# Patient Record
Sex: MALE | Race: WHITE | NOT HISPANIC OR LATINO | Employment: OTHER | URBAN - METROPOLITAN AREA
[De-identification: names, ages, dates, MRNs, and addresses within clinical notes are randomized per-mention and may not be internally consistent; named-entity substitution may affect disease eponyms.]

---

## 2017-01-01 ENCOUNTER — LAB REQUISITION (OUTPATIENT)
Dept: LAB | Facility: HOSPITAL | Age: 82
End: 2017-01-01

## 2017-01-01 ENCOUNTER — TELEPHONE (OUTPATIENT)
Dept: SURGERY | Facility: CLINIC | Age: 82
End: 2017-01-01

## 2017-01-01 ENCOUNTER — APPOINTMENT (OUTPATIENT)
Dept: GENERAL RADIOLOGY | Facility: HOSPITAL | Age: 82
End: 2017-01-01

## 2017-01-01 ENCOUNTER — HOSPITAL ENCOUNTER (OUTPATIENT)
Dept: INFUSION THERAPY | Facility: HOSPITAL | Age: 82
Discharge: HOME | End: 2017-11-14
Attending: INTERNAL MEDICINE | Admitting: INTERNAL MEDICINE

## 2017-01-01 ENCOUNTER — TELEPHONE (OUTPATIENT)
Dept: INFECTIOUS DISEASES | Facility: CLINIC | Age: 82
End: 2017-01-01

## 2017-01-01 ENCOUNTER — INPATIENT HOSPITAL (OUTPATIENT)
Dept: URBAN - METROPOLITAN AREA HOSPITAL 113 | Facility: HOSPITAL | Age: 82
End: 2017-01-01
Payer: MEDICARE

## 2017-01-01 ENCOUNTER — HOSPITAL ENCOUNTER (INPATIENT)
Facility: HOSPITAL | Age: 82
LOS: 15 days | Discharge: HOME HEALTH | End: 2017-06-06
Attending: EMERGENCY MEDICINE | Admitting: FAMILY MEDICINE

## 2017-01-01 ENCOUNTER — DOCUMENTATION (OUTPATIENT)
Dept: SURGERY | Facility: CLINIC | Age: 82
End: 2017-01-01

## 2017-01-01 ENCOUNTER — OFFICE VISIT (OUTPATIENT)
Dept: SURGERY | Facility: CLINIC | Age: 82
End: 2017-01-01

## 2017-01-01 ENCOUNTER — LAB (OUTPATIENT)
Dept: LAB | Facility: HOSPITAL | Age: 82
End: 2017-01-01

## 2017-01-01 ENCOUNTER — APPOINTMENT (OUTPATIENT)
Dept: LAB | Facility: HOSPITAL | Age: 82
End: 2017-01-01

## 2017-01-01 ENCOUNTER — TELEPHONE (OUTPATIENT)
Dept: GASTROENTEROLOGY | Facility: CLINIC | Age: 82
End: 2017-01-01

## 2017-01-01 ENCOUNTER — CLINICAL SUPPORT (OUTPATIENT)
Dept: ONCOLOGY | Facility: HOSPITAL | Age: 82
End: 2017-01-01

## 2017-01-01 ENCOUNTER — OFFICE VISIT (OUTPATIENT)
Dept: CARDIOLOGY | Facility: CLINIC | Age: 82
End: 2017-01-01

## 2017-01-01 ENCOUNTER — LAB (OUTPATIENT)
Dept: LAB | Facility: HOSPITAL | Age: 82
End: 2017-01-01
Attending: INTERNAL MEDICINE

## 2017-01-01 ENCOUNTER — APPOINTMENT (OUTPATIENT)
Dept: NUTRITION | Facility: HOSPITAL | Age: 82
End: 2017-01-01
Attending: SURGERY

## 2017-01-01 ENCOUNTER — TRANSCRIBE ORDERS (OUTPATIENT)
Dept: ADMINISTRATIVE | Facility: HOSPITAL | Age: 82
End: 2017-01-01

## 2017-01-01 ENCOUNTER — ANESTHESIA EVENT (OUTPATIENT)
Dept: PERIOP | Facility: HOSPITAL | Age: 82
End: 2017-01-01

## 2017-01-01 ENCOUNTER — INFUSION (OUTPATIENT)
Dept: ONCOLOGY | Facility: HOSPITAL | Age: 82
End: 2017-01-01

## 2017-01-01 ENCOUNTER — APPOINTMENT (OUTPATIENT)
Dept: CT IMAGING | Facility: HOSPITAL | Age: 82
End: 2017-01-01
Attending: SURGERY

## 2017-01-01 ENCOUNTER — HOSPITAL ENCOUNTER (OUTPATIENT)
Dept: ULTRASOUND IMAGING | Facility: HOSPITAL | Age: 82
Discharge: HOME | End: 2017-08-18
Admitting: NURSE PRACTITIONER

## 2017-01-01 ENCOUNTER — ANESTHESIA (OUTPATIENT)
Dept: PERIOP | Facility: HOSPITAL | Age: 82
End: 2017-01-01

## 2017-01-01 ENCOUNTER — OFFICE (OUTPATIENT)
Dept: URBAN - METROPOLITAN AREA OTHER 6 | Facility: OTHER | Age: 82
End: 2017-01-01
Payer: MEDICARE

## 2017-01-01 ENCOUNTER — APPOINTMENT (OUTPATIENT)
Dept: CARDIOLOGY | Facility: HOSPITAL | Age: 82
End: 2017-01-01
Attending: INTERNAL MEDICINE

## 2017-01-01 ENCOUNTER — APPOINTMENT (OUTPATIENT)
Dept: CT IMAGING | Facility: HOSPITAL | Age: 82
End: 2017-01-01

## 2017-01-01 ENCOUNTER — HOSPITAL ENCOUNTER (OUTPATIENT)
Facility: HOSPITAL | Age: 82
Setting detail: HOSPITAL OUTPATIENT SURGERY
Discharge: HOME | End: 2017-09-19
Attending: SURGERY | Admitting: SURGERY

## 2017-01-01 ENCOUNTER — HOSPITAL ENCOUNTER (OUTPATIENT)
Dept: INFUSION THERAPY | Facility: HOSPITAL | Age: 82
Discharge: HOME | End: 2017-09-12
Attending: INTERNAL MEDICINE | Admitting: INTERNAL MEDICINE

## 2017-01-01 ENCOUNTER — HOSPITAL ENCOUNTER (INPATIENT)
Facility: HOSPITAL | Age: 82
LOS: 21 days | Discharge: NURSING, REHAB OR LONG-TERM CARE | End: 2018-01-05
Attending: EMERGENCY MEDICINE | Admitting: FAMILY MEDICINE

## 2017-01-01 ENCOUNTER — HOSPITAL ENCOUNTER (OUTPATIENT)
Dept: INFUSION THERAPY | Facility: HOSPITAL | Age: 82
Discharge: HOME | End: 2017-08-03
Attending: INTERNAL MEDICINE | Admitting: INTERNAL MEDICINE

## 2017-01-01 ENCOUNTER — APPOINTMENT (OUTPATIENT)
Dept: ONCOLOGY | Facility: HOSPITAL | Age: 82
End: 2017-01-01

## 2017-01-01 ENCOUNTER — OFFICE VISIT (OUTPATIENT)
Dept: ONCOLOGY | Facility: CLINIC | Age: 82
End: 2017-01-01

## 2017-01-01 ENCOUNTER — APPOINTMENT (OUTPATIENT)
Dept: CT IMAGING | Facility: HOSPITAL | Age: 82
End: 2017-01-01
Attending: EMERGENCY MEDICINE

## 2017-01-01 ENCOUNTER — HOSPITAL ENCOUNTER (OUTPATIENT)
Dept: CT IMAGING | Facility: HOSPITAL | Age: 82
Discharge: HOME | End: 2017-11-15
Attending: SURGERY | Admitting: SURGERY

## 2017-01-01 ENCOUNTER — LAB (OUTPATIENT)
Dept: LAB | Facility: HOSPITAL | Age: 82
End: 2017-01-01
Attending: SURGERY

## 2017-01-01 ENCOUNTER — APPOINTMENT (OUTPATIENT)
Dept: ONCOLOGY | Facility: CLINIC | Age: 82
End: 2017-01-01

## 2017-01-01 ENCOUNTER — OFFICE VISIT (OUTPATIENT)
Dept: INFECTIOUS DISEASES | Facility: CLINIC | Age: 82
End: 2017-01-01

## 2017-01-01 ENCOUNTER — INPATIENT HOSPITAL (OUTPATIENT)
Dept: URBAN - METROPOLITAN AREA HOSPITAL 27 | Facility: HOSPITAL | Age: 82
End: 2017-01-01
Payer: MEDICARE

## 2017-01-01 ENCOUNTER — APPOINTMENT (OUTPATIENT)
Dept: CT IMAGING | Facility: HOSPITAL | Age: 82
End: 2017-01-01
Attending: HOSPITALIST

## 2017-01-01 VITALS
DIASTOLIC BLOOD PRESSURE: 62 MMHG | SYSTOLIC BLOOD PRESSURE: 100 MMHG | BODY MASS INDEX: 20.19 KG/M2 | WEIGHT: 141 LBS | HEIGHT: 70 IN

## 2017-01-01 VITALS
SYSTOLIC BLOOD PRESSURE: 118 MMHG | HEIGHT: 70 IN | WEIGHT: 137 LBS | BODY MASS INDEX: 19.61 KG/M2 | DIASTOLIC BLOOD PRESSURE: 72 MMHG

## 2017-01-01 VITALS
HEART RATE: 60 BPM | RESPIRATION RATE: 12 BRPM | BODY MASS INDEX: 19.61 KG/M2 | OXYGEN SATURATION: 95 % | SYSTOLIC BLOOD PRESSURE: 118 MMHG | HEIGHT: 70 IN | TEMPERATURE: 98.1 F | DIASTOLIC BLOOD PRESSURE: 59 MMHG | WEIGHT: 137 LBS

## 2017-01-01 VITALS
TEMPERATURE: 97.8 F | HEART RATE: 60 BPM | RESPIRATION RATE: 16 BRPM | SYSTOLIC BLOOD PRESSURE: 134 MMHG | OXYGEN SATURATION: 99 % | DIASTOLIC BLOOD PRESSURE: 67 MMHG

## 2017-01-01 VITALS
TEMPERATURE: 97.8 F | BODY MASS INDEX: 19.76 KG/M2 | RESPIRATION RATE: 16 BRPM | WEIGHT: 138 LBS | HEIGHT: 70 IN | SYSTOLIC BLOOD PRESSURE: 137 MMHG | HEART RATE: 62 BPM | DIASTOLIC BLOOD PRESSURE: 64 MMHG | OXYGEN SATURATION: 98 %

## 2017-01-01 VITALS
OXYGEN SATURATION: 96 % | BODY MASS INDEX: 22.65 KG/M2 | DIASTOLIC BLOOD PRESSURE: 56 MMHG | WEIGHT: 158.19 LBS | HEART RATE: 69 BPM | TEMPERATURE: 98 F | HEIGHT: 70 IN | SYSTOLIC BLOOD PRESSURE: 118 MMHG | RESPIRATION RATE: 18 BRPM

## 2017-01-01 VITALS
BODY MASS INDEX: 20.33 KG/M2 | WEIGHT: 142 LBS | SYSTOLIC BLOOD PRESSURE: 122 MMHG | DIASTOLIC BLOOD PRESSURE: 68 MMHG | HEIGHT: 70 IN

## 2017-01-01 VITALS
WEIGHT: 135 LBS | DIASTOLIC BLOOD PRESSURE: 52 MMHG | HEIGHT: 70 IN | SYSTOLIC BLOOD PRESSURE: 98 MMHG | BODY MASS INDEX: 19.33 KG/M2

## 2017-01-01 VITALS
DIASTOLIC BLOOD PRESSURE: 60 MMHG | BODY MASS INDEX: 18.75 KG/M2 | HEIGHT: 70 IN | WEIGHT: 131 LBS | SYSTOLIC BLOOD PRESSURE: 102 MMHG

## 2017-01-01 VITALS
BODY MASS INDEX: 19.64 KG/M2 | HEIGHT: 70 IN | WEIGHT: 137.2 LBS | SYSTOLIC BLOOD PRESSURE: 140 MMHG | DIASTOLIC BLOOD PRESSURE: 60 MMHG | HEART RATE: 80 BPM

## 2017-01-01 VITALS
DIASTOLIC BLOOD PRESSURE: 62 MMHG | BODY MASS INDEX: 19.76 KG/M2 | HEIGHT: 70 IN | WEIGHT: 138 LBS | SYSTOLIC BLOOD PRESSURE: 122 MMHG

## 2017-01-01 VITALS
HEART RATE: 55 BPM | SYSTOLIC BLOOD PRESSURE: 127 MMHG | DIASTOLIC BLOOD PRESSURE: 55 MMHG | RESPIRATION RATE: 16 BRPM | TEMPERATURE: 98 F | OXYGEN SATURATION: 99 %

## 2017-01-01 VITALS
HEART RATE: 56 BPM | WEIGHT: 140.38 LBS | RESPIRATION RATE: 18 BRPM | BODY MASS INDEX: 20.1 KG/M2 | OXYGEN SATURATION: 97 % | DIASTOLIC BLOOD PRESSURE: 58 MMHG | SYSTOLIC BLOOD PRESSURE: 114 MMHG | TEMPERATURE: 98 F | HEIGHT: 70 IN

## 2017-01-01 VITALS — TEMPERATURE: 98.5 F

## 2017-01-01 VITALS
OXYGEN SATURATION: 97 % | WEIGHT: 142 LBS | TEMPERATURE: 98.6 F | RESPIRATION RATE: 18 BRPM | SYSTOLIC BLOOD PRESSURE: 129 MMHG | DIASTOLIC BLOOD PRESSURE: 50 MMHG | HEIGHT: 70 IN | HEART RATE: 82 BPM | BODY MASS INDEX: 20.33 KG/M2

## 2017-01-01 VITALS
HEIGHT: 70 IN | HEART RATE: 56 BPM | TEMPERATURE: 97.5 F | OXYGEN SATURATION: 96 % | WEIGHT: 135.8 LBS | RESPIRATION RATE: 12 BRPM | BODY MASS INDEX: 19.44 KG/M2 | SYSTOLIC BLOOD PRESSURE: 111 MMHG | DIASTOLIC BLOOD PRESSURE: 52 MMHG

## 2017-01-01 VITALS
DIASTOLIC BLOOD PRESSURE: 43 MMHG | HEART RATE: 67 BPM | SYSTOLIC BLOOD PRESSURE: 106 MMHG | OXYGEN SATURATION: 98 % | BODY MASS INDEX: 19.54 KG/M2 | TEMPERATURE: 98.3 F | WEIGHT: 136.2 LBS

## 2017-01-01 VITALS
SYSTOLIC BLOOD PRESSURE: 98 MMHG | WEIGHT: 136 LBS | BODY MASS INDEX: 19.47 KG/M2 | HEIGHT: 70 IN | DIASTOLIC BLOOD PRESSURE: 60 MMHG

## 2017-01-01 VITALS
DIASTOLIC BLOOD PRESSURE: 70 MMHG | HEIGHT: 70 IN | BODY MASS INDEX: 19.76 KG/M2 | SYSTOLIC BLOOD PRESSURE: 128 MMHG | WEIGHT: 138 LBS

## 2017-01-01 VITALS
DIASTOLIC BLOOD PRESSURE: 52 MMHG | SYSTOLIC BLOOD PRESSURE: 99 MMHG | TEMPERATURE: 98.1 F | OXYGEN SATURATION: 96 % | HEART RATE: 62 BPM

## 2017-01-01 VITALS
HEIGHT: 70 IN | SYSTOLIC BLOOD PRESSURE: 92 MMHG | DIASTOLIC BLOOD PRESSURE: 58 MMHG | BODY MASS INDEX: 18.9 KG/M2 | WEIGHT: 132 LBS

## 2017-01-01 VITALS
DIASTOLIC BLOOD PRESSURE: 60 MMHG | BODY MASS INDEX: 19.61 KG/M2 | SYSTOLIC BLOOD PRESSURE: 118 MMHG | HEIGHT: 70 IN | WEIGHT: 137 LBS

## 2017-01-01 VITALS
HEIGHT: 70 IN | SYSTOLIC BLOOD PRESSURE: 132 MMHG | BODY MASS INDEX: 20.33 KG/M2 | WEIGHT: 142 LBS | DIASTOLIC BLOOD PRESSURE: 78 MMHG

## 2017-01-01 VITALS
DIASTOLIC BLOOD PRESSURE: 59 MMHG | HEIGHT: 70 IN | SYSTOLIC BLOOD PRESSURE: 128 MMHG | WEIGHT: 138.4 LBS | BODY MASS INDEX: 19.81 KG/M2 | HEART RATE: 64 BPM | TEMPERATURE: 97.5 F | RESPIRATION RATE: 12 BRPM

## 2017-01-01 VITALS
DIASTOLIC BLOOD PRESSURE: 64 MMHG | SYSTOLIC BLOOD PRESSURE: 112 MMHG | BODY MASS INDEX: 20.33 KG/M2 | WEIGHT: 142 LBS | HEIGHT: 70 IN

## 2017-01-01 VITALS
SYSTOLIC BLOOD PRESSURE: 110 MMHG | HEART RATE: 62 BPM | WEIGHT: 132 LBS | HEIGHT: 70 IN | DIASTOLIC BLOOD PRESSURE: 52 MMHG

## 2017-01-01 VITALS — TEMPERATURE: 98.4 F

## 2017-01-01 VITALS
DIASTOLIC BLOOD PRESSURE: 52 MMHG | OXYGEN SATURATION: 94 % | HEART RATE: 69 BPM | SYSTOLIC BLOOD PRESSURE: 114 MMHG | TEMPERATURE: 98.1 F

## 2017-01-01 VITALS
HEIGHT: 70 IN | BODY MASS INDEX: 19.33 KG/M2 | SYSTOLIC BLOOD PRESSURE: 110 MMHG | DIASTOLIC BLOOD PRESSURE: 72 MMHG | WEIGHT: 135 LBS

## 2017-01-01 VITALS — TEMPERATURE: 98.4 F | WEIGHT: 141 LBS | BODY MASS INDEX: 20.23 KG/M2 | TEMPERATURE: 98.3 F

## 2017-01-01 VITALS — TEMPERATURE: 97.7 F

## 2017-01-01 VITALS — TEMPERATURE: 97.5 F

## 2017-01-01 DIAGNOSIS — A49.8 PSEUDOMONAS AERUGINOSA INFECTION: ICD-10-CM

## 2017-01-01 DIAGNOSIS — I35.1 NONRHEUMATIC AORTIC VALVE INSUFFICIENCY: Primary | ICD-10-CM

## 2017-01-01 DIAGNOSIS — D46.9 ANEMIA ASSOC WITH MYELODYSPLASTIC SYNDROME TREATED WITH ERYTHROPOIETIN (HCC): ICD-10-CM

## 2017-01-01 DIAGNOSIS — E46 MALNUTRITION (HCC): Primary | ICD-10-CM

## 2017-01-01 DIAGNOSIS — A04.72 C. DIFFICILE COLITIS: Primary | ICD-10-CM

## 2017-01-01 DIAGNOSIS — K62.89 PROCTITIS: ICD-10-CM

## 2017-01-01 DIAGNOSIS — A04.71 ENTEROCOLITIS DUE TO CLOSTRIDIUM DIFFICILE, RECURRENT: ICD-10-CM

## 2017-01-01 DIAGNOSIS — Z09 SURGERY FOLLOW-UP: Primary | ICD-10-CM

## 2017-01-01 DIAGNOSIS — K44.9 DIAPHRAGMATIC HERNIA WITHOUT OBSTRUCTION OR GANGRENE: ICD-10-CM

## 2017-01-01 DIAGNOSIS — E53.8 VITAMIN B 12 DEFICIENCY: Primary | ICD-10-CM

## 2017-01-01 DIAGNOSIS — D46.20 MDS (MYELODYSPLASTIC SYNDROME), LOW GRADE (HCC): ICD-10-CM

## 2017-01-01 DIAGNOSIS — R10.9 ABDOMINAL PAIN, UNSPECIFIED LOCATION: ICD-10-CM

## 2017-01-01 DIAGNOSIS — D46.9 ANEMIA ASSOC WITH MYELODYSPLASTIC SYNDROME TREATED WITH ERYTHROPOIETIN (HCC): Primary | ICD-10-CM

## 2017-01-01 DIAGNOSIS — D46.20 MDS (MYELODYSPLASTIC SYNDROME), LOW GRADE (HCC): Primary | ICD-10-CM

## 2017-01-01 DIAGNOSIS — R30.0 DYSURIA: Primary | ICD-10-CM

## 2017-01-01 DIAGNOSIS — D69.6 THROMBOCYTOPENIA (HCC): ICD-10-CM

## 2017-01-01 DIAGNOSIS — K57.31 DIVERTICULOSIS OF LARGE INTESTINE WITHOUT PERFORATION OR ABS: ICD-10-CM

## 2017-01-01 DIAGNOSIS — N39.0 ACUTE LOWER UTI (URINARY TRACT INFECTION): Primary | ICD-10-CM

## 2017-01-01 DIAGNOSIS — D63.0 ANEMIA ASSOC WITH MYELODYSPLASTIC SYNDROME TREATED WITH ERYTHROPOIETIN (HCC): Primary | ICD-10-CM

## 2017-01-01 DIAGNOSIS — L08.9 CHRONIC ABDOMINAL WOUND INFECTION, SEQUELA: Primary | ICD-10-CM

## 2017-01-01 DIAGNOSIS — Z09 FOLLOW UP: Primary | ICD-10-CM

## 2017-01-01 DIAGNOSIS — L98.9 HAND LESION: Primary | ICD-10-CM

## 2017-01-01 DIAGNOSIS — T14.8XXA OTHER INJURY OF UNSPECIFIED BODY REGION: ICD-10-CM

## 2017-01-01 DIAGNOSIS — D64.9 ANEMIA, UNSPECIFIED: ICD-10-CM

## 2017-01-01 DIAGNOSIS — D46.20 LOW GRADE MYELODYSPLASTIC SYNDROME LESIONS (HCC): Primary | ICD-10-CM

## 2017-01-01 DIAGNOSIS — K57.10 DIVERTICULOSIS OF SMALL INTESTINE WITHOUT PERFORATION OR ABS: ICD-10-CM

## 2017-01-01 DIAGNOSIS — K63.2 ENTEROENTERIC FISTULA: ICD-10-CM

## 2017-01-01 DIAGNOSIS — R50.81 FEVER IN OTHER DISEASES: Primary | ICD-10-CM

## 2017-01-01 DIAGNOSIS — R35.0 URINARY FREQUENCY: ICD-10-CM

## 2017-01-01 DIAGNOSIS — L24.A9 WOUND DRAINAGE: Primary | ICD-10-CM

## 2017-01-01 DIAGNOSIS — E53.8 VITAMIN B 12 DEFICIENCY: ICD-10-CM

## 2017-01-01 DIAGNOSIS — H61.23 HEARING LOSS DUE TO CERUMEN IMPACTION, BILATERAL: ICD-10-CM

## 2017-01-01 DIAGNOSIS — K21.0 GASTRO-ESOPHAGEAL REFLUX DISEASE WITH ESOPHAGITIS: ICD-10-CM

## 2017-01-01 DIAGNOSIS — S31.109S CHRONIC ABDOMINAL WOUND INFECTION, SEQUELA: Primary | ICD-10-CM

## 2017-01-01 DIAGNOSIS — R53.1 GENERALIZED WEAKNESS: ICD-10-CM

## 2017-01-01 DIAGNOSIS — D46.20 LOW GRADE MYELODYSPLASTIC SYNDROME LESIONS (HCC): ICD-10-CM

## 2017-01-01 DIAGNOSIS — D63.0 ANEMIA ASSOC WITH MYELODYSPLASTIC SYNDROME TREATED WITH ERYTHROPOIETIN (HCC): ICD-10-CM

## 2017-01-01 DIAGNOSIS — K62.89 PROCTITIS: Primary | ICD-10-CM

## 2017-01-01 DIAGNOSIS — N32.1 COLOVESICAL FISTULA: ICD-10-CM

## 2017-01-01 DIAGNOSIS — N30.90 CYSTITIS: ICD-10-CM

## 2017-01-01 DIAGNOSIS — T81.40XA INFECTION FOLLOWING PROCEDURE: ICD-10-CM

## 2017-01-01 DIAGNOSIS — L98.9 HAND LESION: ICD-10-CM

## 2017-01-01 DIAGNOSIS — I45.10 BUNDLE BRANCH BLOCK, RIGHT: ICD-10-CM

## 2017-01-01 DIAGNOSIS — R10.33 PERIUMBILICAL ABDOMINAL PAIN: Primary | ICD-10-CM

## 2017-01-01 DIAGNOSIS — R50.9 FEVER AND CHILLS: Primary | ICD-10-CM

## 2017-01-01 DIAGNOSIS — IMO0001 DEEP INCISIONAL SURGICAL SITE INFECTION, SUBSEQUENT ENCOUNTER: ICD-10-CM

## 2017-01-01 DIAGNOSIS — Z22.322 MRSA (METHICILLIN RESISTANT STAPH AUREUS) CULTURE POSITIVE: ICD-10-CM

## 2017-01-01 DIAGNOSIS — I10 ESSENTIAL HYPERTENSION: ICD-10-CM

## 2017-01-01 DIAGNOSIS — D46.9 MYELODYSPLASTIC SYNDROME (HCC): ICD-10-CM

## 2017-01-01 DIAGNOSIS — K52.9 ILEITIS: ICD-10-CM

## 2017-01-01 DIAGNOSIS — K57.30 DIVERTICULOSIS OF LARGE INTESTINE WITHOUT PERFORATION OR ABS: ICD-10-CM

## 2017-01-01 DIAGNOSIS — R10.9 ABDOMINAL PAIN, UNSPECIFIED LOCATION: Primary | ICD-10-CM

## 2017-01-01 DIAGNOSIS — T14.8XXA OPEN WOUND: ICD-10-CM

## 2017-01-01 DIAGNOSIS — K62.5 RECTAL BLEED: Primary | ICD-10-CM

## 2017-01-01 DIAGNOSIS — E46 PROTEIN CALORIE MALNUTRITION (HCC): ICD-10-CM

## 2017-01-01 DIAGNOSIS — L98.9 SKIN LESION OF HAND: ICD-10-CM

## 2017-01-01 DIAGNOSIS — K62.5 HEMORRHAGE OF ANUS AND RECTUM: ICD-10-CM

## 2017-01-01 DIAGNOSIS — D62 ACUTE BLOOD LOSS ANEMIA: ICD-10-CM

## 2017-01-01 DIAGNOSIS — D12.2 BENIGN NEOPLASM OF ASCENDING COLON: ICD-10-CM

## 2017-01-01 DIAGNOSIS — R30.0 DYSURIA: ICD-10-CM

## 2017-01-01 LAB
ABO + RH BLD: NORMAL
ABO GROUP BLD: NORMAL
ALBUMIN SERPL-MCNC: 2.2 G/DL (ref 3.5–5.2)
ALBUMIN SERPL-MCNC: 2.3 G/DL (ref 3.5–5.2)
ALBUMIN SERPL-MCNC: 2.4 G/DL (ref 3.5–5.2)
ALBUMIN SERPL-MCNC: 2.4 G/DL (ref 3.5–5.2)
ALBUMIN SERPL-MCNC: 2.7 G/DL (ref 3.5–5.2)
ALBUMIN SERPL-MCNC: 2.7 G/DL (ref 3.5–5.2)
ALBUMIN SERPL-MCNC: 3.8 G/DL (ref 3.5–5.2)
ALBUMIN/GLOB SERPL: 0.6 G/DL
ALBUMIN/GLOB SERPL: 0.6 G/DL
ALBUMIN/GLOB SERPL: 0.7 G/DL
ALBUMIN/GLOB SERPL: 1.3 G/DL
ALP SERPL-CCNC: 35 U/L (ref 40–129)
ALP SERPL-CCNC: 47 U/L (ref 40–129)
ALP SERPL-CCNC: 49 U/L (ref 40–129)
ALP SERPL-CCNC: 51 U/L (ref 40–129)
ALP SERPL-CCNC: 61 U/L (ref 40–129)
ALP SERPL-CCNC: 63 U/L (ref 40–129)
ALP SERPL-CCNC: 76 U/L (ref 40–129)
ALT SERPL W P-5'-P-CCNC: 12 U/L (ref 5–41)
ALT SERPL W P-5'-P-CCNC: 5 U/L (ref 5–41)
ALT SERPL W P-5'-P-CCNC: 6 U/L (ref 5–41)
ALT SERPL W P-5'-P-CCNC: <5 U/L (ref 5–41)
ANION GAP SERPL CALCULATED.3IONS-SCNC: 10 MMOL/L
ANION GAP SERPL CALCULATED.3IONS-SCNC: 10 MMOL/L
ANION GAP SERPL CALCULATED.3IONS-SCNC: 10.1 MMOL/L
ANION GAP SERPL CALCULATED.3IONS-SCNC: 10.1 MMOL/L
ANION GAP SERPL CALCULATED.3IONS-SCNC: 10.2 MMOL/L
ANION GAP SERPL CALCULATED.3IONS-SCNC: 10.5 MMOL/L
ANION GAP SERPL CALCULATED.3IONS-SCNC: 10.7 MMOL/L
ANION GAP SERPL CALCULATED.3IONS-SCNC: 10.8 MMOL/L
ANION GAP SERPL CALCULATED.3IONS-SCNC: 10.8 MMOL/L
ANION GAP SERPL CALCULATED.3IONS-SCNC: 11.3 MMOL/L
ANION GAP SERPL CALCULATED.3IONS-SCNC: 11.3 MMOL/L
ANION GAP SERPL CALCULATED.3IONS-SCNC: 11.4 MMOL/L
ANION GAP SERPL CALCULATED.3IONS-SCNC: 11.6 MMOL/L
ANION GAP SERPL CALCULATED.3IONS-SCNC: 11.8 MMOL/L
ANION GAP SERPL CALCULATED.3IONS-SCNC: 11.9 MMOL/L
ANION GAP SERPL CALCULATED.3IONS-SCNC: 11.9 MMOL/L
ANION GAP SERPL CALCULATED.3IONS-SCNC: 12.3 MMOL/L
ANION GAP SERPL CALCULATED.3IONS-SCNC: 12.4 MMOL/L
ANION GAP SERPL CALCULATED.3IONS-SCNC: 12.4 MMOL/L
ANION GAP SERPL CALCULATED.3IONS-SCNC: 12.6 MMOL/L
ANION GAP SERPL CALCULATED.3IONS-SCNC: 13.4 MMOL/L
ANION GAP SERPL CALCULATED.3IONS-SCNC: 13.8 MMOL/L
ANION GAP SERPL CALCULATED.3IONS-SCNC: 14.2 MMOL/L
ANION GAP SERPL CALCULATED.3IONS-SCNC: 14.7 MMOL/L
ANION GAP SERPL CALCULATED.3IONS-SCNC: 14.8 MMOL/L
ANION GAP SERPL CALCULATED.3IONS-SCNC: 15.1 MMOL/L
ANION GAP SERPL CALCULATED.3IONS-SCNC: 16 MMOL/L
ANION GAP SERPL CALCULATED.3IONS-SCNC: 16.6 MMOL/L
ANION GAP SERPL CALCULATED.3IONS-SCNC: 19 MMOL/L
ANION GAP SERPL CALCULATED.3IONS-SCNC: 7 MMOL/L
ANION GAP SERPL CALCULATED.3IONS-SCNC: 7.4 MMOL/L
ANION GAP SERPL CALCULATED.3IONS-SCNC: 8 MMOL/L
ANION GAP SERPL CALCULATED.3IONS-SCNC: 8.1 MMOL/L
ANION GAP SERPL CALCULATED.3IONS-SCNC: 8.2 MMOL/L
ANION GAP SERPL CALCULATED.3IONS-SCNC: 8.2 MMOL/L
ANION GAP SERPL CALCULATED.3IONS-SCNC: 8.4 MMOL/L
ANION GAP SERPL CALCULATED.3IONS-SCNC: 9.7 MMOL/L
ANION GAP SERPL CALCULATED.3IONS-SCNC: 9.7 MMOL/L
ANISOCYTOSIS BLD QL: ABNORMAL
ANISOCYTOSIS BLD QL: NORMAL
APTT PPP: 29.8 SECONDS (ref 24.3–38.1)
APTT PPP: 30.5 SECONDS (ref 24.3–38.1)
APTT PPP: 32.1 SECONDS (ref 24.3–38.1)
APTT PPP: 34.2 SECONDS (ref 24.3–38.1)
APTT PPP: 35 SECONDS (ref 24.3–38.1)
APTT PPP: 35.7 SECONDS (ref 24.3–38.1)
APTT PPP: 38.4 SECONDS (ref 24.3–38.1)
APTT PPP: 38.8 SECONDS (ref 24.3–38.1)
APTT PPP: 39.5 SECONDS (ref 24.3–38.1)
APTT PPP: 40.3 SECONDS (ref 24.3–38.1)
APTT PPP: 43.3 SECONDS (ref 24.3–38.1)
APTT PPP: 46.8 SECONDS (ref 24.3–38.1)
APTT PPP: 49.2 SECONDS (ref 24.3–38.1)
AST SERPL-CCNC: 11 U/L (ref 5–40)
AST SERPL-CCNC: 11 U/L (ref 5–40)
AST SERPL-CCNC: 13 U/L (ref 5–40)
AST SERPL-CCNC: 9 U/L (ref 5–40)
BACTERIA SPEC AEROBE CULT: ABNORMAL
BACTERIA SPEC AEROBE CULT: NO GROWTH
BACTERIA SPEC AEROBE CULT: NORMAL
BACTERIA SPEC ANAEROBE CULT: NORMAL
BACTERIA UR QL AUTO: ABNORMAL /HPF
BASOPHILS # BLD AUTO: 0 10*3/MM3 (ref 0–0.2)
BASOPHILS # BLD AUTO: 0.01 10*3/MM3 (ref 0–0.2)
BASOPHILS # BLD AUTO: 0.02 10*3/MM3 (ref 0–0.2)
BASOPHILS NFR BLD AUTO: 0 % (ref 0–2)
BASOPHILS NFR BLD AUTO: 0.1 % (ref 0–2)
BASOPHILS NFR BLD AUTO: 0.2 % (ref 0–2)
BASOPHILS NFR BLD AUTO: 0.3 % (ref 0–2)
BASOPHILS NFR BLD AUTO: 0.4 % (ref 0–2)
BASOPHILS NFR BLD AUTO: 0.4 % (ref 0–2)
BH BB BLOOD EXPIRATION DATE: NORMAL
BH BB BLOOD TYPE BARCODE: 5100
BH BB BLOOD TYPE BARCODE: 6200
BH BB BLOOD TYPE BARCODE: 7300
BH BB BLOOD TYPE BARCODE: 7300
BH BB BLOOD TYPE BARCODE: 9500
BH BB BLOOD TYPE BARCODE: NORMAL
BH BB DISPENSE STATUS: NORMAL
BH BB PRODUCT CODE: NORMAL
BH BB UNIT NUMBER: NORMAL
BH CV ECHO MEAS - ACS: 2.1 CM
BH CV ECHO MEAS - AO MAX PG (FULL): 3.5 MMHG
BH CV ECHO MEAS - AO MAX PG: 5.7 MMHG
BH CV ECHO MEAS - AO MEAN PG (FULL): 2 MMHG
BH CV ECHO MEAS - AO MEAN PG: 3 MMHG
BH CV ECHO MEAS - AO ROOT AREA (BSA CORRECTED): 2.6
BH CV ECHO MEAS - AO ROOT AREA: 15.9 CM^2
BH CV ECHO MEAS - AO ROOT DIAM: 4.5 CM
BH CV ECHO MEAS - AO V2 MAX: 119 CM/SEC
BH CV ECHO MEAS - AO V2 MEAN: 82.4 CM/SEC
BH CV ECHO MEAS - AO V2 VTI: 25.7 CM
BH CV ECHO MEAS - AVA(I,A): 2.2 CM^2
BH CV ECHO MEAS - AVA(I,D): 2.2 CM^2
BH CV ECHO MEAS - AVA(V,A): 2.2 CM^2
BH CV ECHO MEAS - AVA(V,D): 2.2 CM^2
BH CV ECHO MEAS - BSA(HAYCOCK): 1.7 M^2
BH CV ECHO MEAS - BSA: 1.8 M^2
BH CV ECHO MEAS - BZI_BMI: 19.1 KILOGRAMS/M^2
BH CV ECHO MEAS - BZI_METRIC_HEIGHT: 177.8 CM
BH CV ECHO MEAS - BZI_METRIC_WEIGHT: 60.3 KG
BH CV ECHO MEAS - CONTRAST EF (2CH): 38.2 ML/M^2
BH CV ECHO MEAS - CONTRAST EF 4CH: 37.3 ML/M^2
BH CV ECHO MEAS - EDV(CUBED): 162.8 ML
BH CV ECHO MEAS - EDV(MOD-SP2): 146 ML
BH CV ECHO MEAS - EDV(MOD-SP4): 156 ML
BH CV ECHO MEAS - EDV(TEICH): 145 ML
BH CV ECHO MEAS - EF(CUBED): 60.4 %
BH CV ECHO MEAS - EF(MOD-SP2): 38.2 %
BH CV ECHO MEAS - EF(MOD-SP4): 37.3 %
BH CV ECHO MEAS - EF(TEICH): 51.4 %
BH CV ECHO MEAS - ESV(CUBED): 64.5 ML
BH CV ECHO MEAS - ESV(MOD-SP2): 90.3 ML
BH CV ECHO MEAS - ESV(MOD-SP4): 97.8 ML
BH CV ECHO MEAS - ESV(TEICH): 70.4 ML
BH CV ECHO MEAS - FS: 26.6 %
BH CV ECHO MEAS - IVS/LVPW: 0.85
BH CV ECHO MEAS - IVSD: 1.2 CM
BH CV ECHO MEAS - LAT PEAK E' VEL: 6 CM/SEC
BH CV ECHO MEAS - LV DIASTOLIC VOL/BSA (35-75): 88.9 ML/M^2
BH CV ECHO MEAS - LV MASS(C)D: 292.8 GRAMS
BH CV ECHO MEAS - LV MASS(C)DI: 166.8 GRAMS/M^2
BH CV ECHO MEAS - LV MAX PG: 2.2 MMHG
BH CV ECHO MEAS - LV MEAN PG: 1 MMHG
BH CV ECHO MEAS - LV SYSTOLIC VOL/BSA (12-30): 55.7 ML/M^2
BH CV ECHO MEAS - LV V1 MAX: 73.9 CM/SEC
BH CV ECHO MEAS - LV V1 MEAN: 47.2 CM/SEC
BH CV ECHO MEAS - LV V1 VTI: 16.1 CM
BH CV ECHO MEAS - LVIDD: 5.5 CM
BH CV ECHO MEAS - LVIDS: 4 CM
BH CV ECHO MEAS - LVLD AP2: 7.8 CM
BH CV ECHO MEAS - LVLD AP4: 8.4 CM
BH CV ECHO MEAS - LVLS AP2: 6.7 CM
BH CV ECHO MEAS - LVLS AP4: 7.2 CM
BH CV ECHO MEAS - LVOT AREA (M): 3.5 CM^2
BH CV ECHO MEAS - LVOT AREA: 3.5 CM^2
BH CV ECHO MEAS - LVOT DIAM: 2.1 CM
BH CV ECHO MEAS - LVPWD: 1.4 CM
BH CV ECHO MEAS - MED PEAK E' VEL: 4 CM/SEC
BH CV ECHO MEAS - MR MAX PG: 138.2 MMHG
BH CV ECHO MEAS - MR MAX VEL: 587.5 CM/SEC
BH CV ECHO MEAS - MV A DUR: 0.1 SEC
BH CV ECHO MEAS - MV A MAX VEL: 52.4 CM/SEC
BH CV ECHO MEAS - MV DEC SLOPE: 274 CM/SEC^2
BH CV ECHO MEAS - MV DEC TIME: 0.23 SEC
BH CV ECHO MEAS - MV E MAX VEL: 70.3 CM/SEC
BH CV ECHO MEAS - MV E/A: 1.3
BH CV ECHO MEAS - MV MAX PG: 1.6 MMHG
BH CV ECHO MEAS - MV MEAN PG: 1 MMHG
BH CV ECHO MEAS - MV P1/2T MAX VEL: 67.9 CM/SEC
BH CV ECHO MEAS - MV P1/2T: 72.6 MSEC
BH CV ECHO MEAS - MV V2 MAX: 63.1 CM/SEC
BH CV ECHO MEAS - MV V2 MEAN: 42.6 CM/SEC
BH CV ECHO MEAS - MV V2 VTI: 19.1 CM
BH CV ECHO MEAS - MVA P1/2T LCG: 3.2 CM^2
BH CV ECHO MEAS - MVA(P1/2T): 3 CM^2
BH CV ECHO MEAS - MVA(VTI): 2.9 CM^2
BH CV ECHO MEAS - PA ACC TIME: 0.13 SEC
BH CV ECHO MEAS - PA MAX PG (FULL): 1 MMHG
BH CV ECHO MEAS - PA MAX PG: 2.2 MMHG
BH CV ECHO MEAS - PA PR(ACCEL): 21.9 MMHG
BH CV ECHO MEAS - PA V2 MAX: 74.7 CM/SEC
BH CV ECHO MEAS - PULM A REVS DUR: 0.1 SEC
BH CV ECHO MEAS - PULM A REVS VEL: 23.7 CM/SEC
BH CV ECHO MEAS - PULM DIAS VEL: 41.5 CM/SEC
BH CV ECHO MEAS - PULM S/D: 1.3
BH CV ECHO MEAS - PULM SYS VEL: 53.4 CM/SEC
BH CV ECHO MEAS - PVA(V,A): 1.9 CM^2
BH CV ECHO MEAS - PVA(V,D): 1.9 CM^2
BH CV ECHO MEAS - QP/QS: 0.54
BH CV ECHO MEAS - RAP SYSTOLE: 8 MMHG
BH CV ECHO MEAS - RV MAX PG: 1.2 MMHG
BH CV ECHO MEAS - RV MEAN PG: 1 MMHG
BH CV ECHO MEAS - RV V1 MAX: 54.7 CM/SEC
BH CV ECHO MEAS - RV V1 MEAN: 35.9 CM/SEC
BH CV ECHO MEAS - RV V1 VTI: 11.8 CM
BH CV ECHO MEAS - RVOT AREA: 2.5 CM^2
BH CV ECHO MEAS - RVOT DIAM: 1.8 CM
BH CV ECHO MEAS - RVSP: 34 MMHG
BH CV ECHO MEAS - SI(AO): 232.9 ML/M^2
BH CV ECHO MEAS - SI(CUBED): 56 ML/M^2
BH CV ECHO MEAS - SI(LVOT): 31.8 ML/M^2
BH CV ECHO MEAS - SI(MOD-SP2): 31.7 ML/M^2
BH CV ECHO MEAS - SI(MOD-SP4): 33.2 ML/M^2
BH CV ECHO MEAS - SI(TEICH): 42.5 ML/M^2
BH CV ECHO MEAS - SV(AO): 408.7 ML
BH CV ECHO MEAS - SV(CUBED): 98.3 ML
BH CV ECHO MEAS - SV(LVOT): 55.8 ML
BH CV ECHO MEAS - SV(MOD-SP2): 55.7 ML
BH CV ECHO MEAS - SV(MOD-SP4): 58.2 ML
BH CV ECHO MEAS - SV(RVOT): 30 ML
BH CV ECHO MEAS - SV(TEICH): 74.5 ML
BH CV ECHO MEAS - TAPSE (>1.6): 1.7 CM2
BH CV ECHO MEAS - TR MAX VEL: 255 CM/SEC
BH CV VAS BP RIGHT ARM: NORMAL MMHG
BH CV XLRA - RV BASE: 3.9 CM
BH CV XLRA - TDI S': 1.4 CM/SEC
BILIRUB SERPL-MCNC: 0.3 MG/DL (ref 0.2–1.2)
BILIRUB SERPL-MCNC: 0.5 MG/DL (ref 0.2–1.2)
BILIRUB SERPL-MCNC: 0.5 MG/DL (ref 0.2–1.2)
BILIRUB SERPL-MCNC: 0.6 MG/DL (ref 0.2–1.2)
BILIRUB SERPL-MCNC: 0.7 MG/DL (ref 0.2–1.2)
BILIRUB SERPL-MCNC: 0.7 MG/DL (ref 0.2–1.2)
BILIRUB SERPL-MCNC: 0.8 MG/DL (ref 0.2–1.2)
BILIRUB UR QL STRIP: ABNORMAL
BILIRUB UR QL STRIP: ABNORMAL
BILIRUB UR QL STRIP: NEGATIVE
BILIRUB UR QL STRIP: NEGATIVE
BLD GP AB SCN SERPL QL: NEGATIVE
BUN BLD-MCNC: 10 MG/DL (ref 8–23)
BUN BLD-MCNC: 11 MG/DL (ref 8–23)
BUN BLD-MCNC: 11 MG/DL (ref 8–23)
BUN BLD-MCNC: 12 MG/DL (ref 8–23)
BUN BLD-MCNC: 13 MG/DL (ref 8–23)
BUN BLD-MCNC: 13 MG/DL (ref 8–23)
BUN BLD-MCNC: 14 MG/DL (ref 8–23)
BUN BLD-MCNC: 15 MG/DL (ref 8–23)
BUN BLD-MCNC: 16 MG/DL (ref 8–23)
BUN BLD-MCNC: 17 MG/DL (ref 8–23)
BUN BLD-MCNC: 18 MG/DL (ref 8–23)
BUN BLD-MCNC: 19 MG/DL (ref 8–23)
BUN BLD-MCNC: 19 MG/DL (ref 8–23)
BUN BLD-MCNC: 3 MG/DL (ref 8–23)
BUN BLD-MCNC: 4 MG/DL (ref 8–23)
BUN BLD-MCNC: 5 MG/DL (ref 8–23)
BUN BLD-MCNC: 5 MG/DL (ref 8–23)
BUN BLD-MCNC: 6 MG/DL (ref 8–23)
BUN BLD-MCNC: 7 MG/DL (ref 8–23)
BUN BLD-MCNC: 7 MG/DL (ref 8–23)
BUN BLD-MCNC: 8 MG/DL (ref 8–23)
BUN BLD-MCNC: 9 MG/DL (ref 8–23)
BUN BLD-MCNC: 9 MG/DL (ref 8–23)
BUN/CREAT SERPL: 10.2 (ref 7–25)
BUN/CREAT SERPL: 11.4 (ref 7–25)
BUN/CREAT SERPL: 11.5 (ref 7–25)
BUN/CREAT SERPL: 11.5 (ref 7–25)
BUN/CREAT SERPL: 12.7 (ref 7–25)
BUN/CREAT SERPL: 12.7 (ref 7–25)
BUN/CREAT SERPL: 13.4 (ref 7–25)
BUN/CREAT SERPL: 13.7 (ref 7–25)
BUN/CREAT SERPL: 14.3 (ref 7–25)
BUN/CREAT SERPL: 14.3 (ref 7–25)
BUN/CREAT SERPL: 14.8 (ref 7–25)
BUN/CREAT SERPL: 14.8 (ref 7–25)
BUN/CREAT SERPL: 15.2 (ref 7–25)
BUN/CREAT SERPL: 15.4 (ref 7–25)
BUN/CREAT SERPL: 15.5 (ref 7–25)
BUN/CREAT SERPL: 17.3 (ref 7–25)
BUN/CREAT SERPL: 18 (ref 7–25)
BUN/CREAT SERPL: 18.3 (ref 7–25)
BUN/CREAT SERPL: 20 (ref 7–25)
BUN/CREAT SERPL: 20.9 (ref 7–25)
BUN/CREAT SERPL: 22.2 (ref 7–25)
BUN/CREAT SERPL: 22.8 (ref 7–25)
BUN/CREAT SERPL: 23.3 (ref 7–25)
BUN/CREAT SERPL: 23.3 (ref 7–25)
BUN/CREAT SERPL: 23.7 (ref 7–25)
BUN/CREAT SERPL: 24.6 (ref 7–25)
BUN/CREAT SERPL: 26 (ref 7–25)
BUN/CREAT SERPL: 26.2 (ref 7–25)
BUN/CREAT SERPL: 26.6 (ref 7–25)
BUN/CREAT SERPL: 5.3 (ref 7–25)
BUN/CREAT SERPL: 5.4 (ref 7–25)
BUN/CREAT SERPL: 5.4 (ref 7–25)
BUN/CREAT SERPL: 7.4 (ref 7–25)
BUN/CREAT SERPL: 7.8 (ref 7–25)
BUN/CREAT SERPL: 8.1 (ref 7–25)
BUN/CREAT SERPL: 8.2 (ref 7–25)
BUN/CREAT SERPL: 8.7 (ref 7–25)
BUN/CREAT SERPL: 9.6 (ref 7–25)
C DIFF GDH STL QL: POSITIVE
CA-I BLD-MCNC: 4.05 MG/DL (ref 4.6–5.4)
CALCIUM SPEC-SCNC: 7.2 MG/DL (ref 8.8–10.5)
CALCIUM SPEC-SCNC: 7.3 MG/DL (ref 8.8–10.5)
CALCIUM SPEC-SCNC: 7.4 MG/DL (ref 8.8–10.5)
CALCIUM SPEC-SCNC: 7.5 MG/DL (ref 8.8–10.5)
CALCIUM SPEC-SCNC: 7.6 MG/DL (ref 8.8–10.5)
CALCIUM SPEC-SCNC: 7.7 MG/DL (ref 8.8–10.5)
CALCIUM SPEC-SCNC: 7.7 MG/DL (ref 8.8–10.5)
CALCIUM SPEC-SCNC: 7.8 MG/DL (ref 8.8–10.5)
CALCIUM SPEC-SCNC: 7.9 MG/DL (ref 8.8–10.5)
CALCIUM SPEC-SCNC: 8 MG/DL (ref 8.8–10.5)
CALCIUM SPEC-SCNC: 8.1 MG/DL (ref 8.8–10.5)
CALCIUM SPEC-SCNC: 8.1 MG/DL (ref 8.8–10.5)
CALCIUM SPEC-SCNC: 8.2 MG/DL (ref 8.8–10.5)
CALCIUM SPEC-SCNC: 8.4 MG/DL (ref 8.8–10.5)
CALCIUM SPEC-SCNC: 8.7 MG/DL (ref 8.8–10.5)
CALCIUM SPEC-SCNC: 8.9 MG/DL (ref 8.8–10.5)
CALCIUM SPEC-SCNC: 9.1 MG/DL (ref 8.8–10.5)
CHLORIDE SERPL-SCNC: 100 MMOL/L (ref 98–107)
CHLORIDE SERPL-SCNC: 100 MMOL/L (ref 98–107)
CHLORIDE SERPL-SCNC: 101 MMOL/L (ref 98–107)
CHLORIDE SERPL-SCNC: 102 MMOL/L (ref 98–107)
CHLORIDE SERPL-SCNC: 103 MMOL/L (ref 98–107)
CHLORIDE SERPL-SCNC: 104 MMOL/L (ref 98–107)
CHLORIDE SERPL-SCNC: 105 MMOL/L (ref 98–107)
CHLORIDE SERPL-SCNC: 106 MMOL/L (ref 98–107)
CHLORIDE SERPL-SCNC: 107 MMOL/L (ref 98–107)
CHLORIDE SERPL-SCNC: 107 MMOL/L (ref 98–107)
CHLORIDE SERPL-SCNC: 108 MMOL/L (ref 98–107)
CHLORIDE SERPL-SCNC: 110 MMOL/L (ref 98–107)
CHLORIDE SERPL-SCNC: 96 MMOL/L (ref 98–107)
CHLORIDE SERPL-SCNC: 98 MMOL/L (ref 98–107)
CHLORIDE SERPL-SCNC: 98 MMOL/L (ref 98–107)
CHLORIDE SERPL-SCNC: 99 MMOL/L (ref 98–107)
CK SERPL-CCNC: 57 U/L (ref 36–170)
CLARITY UR: ABNORMAL
CLARITY UR: ABNORMAL
CLARITY UR: CLEAR
CLARITY UR: CLEAR
CO2 SERPL-SCNC: 17.4 MMOL/L (ref 22–29)
CO2 SERPL-SCNC: 18.9 MMOL/L (ref 22–29)
CO2 SERPL-SCNC: 19 MMOL/L (ref 22–29)
CO2 SERPL-SCNC: 20.3 MMOL/L (ref 22–29)
CO2 SERPL-SCNC: 21.4 MMOL/L (ref 22–29)
CO2 SERPL-SCNC: 22.2 MMOL/L (ref 22–29)
CO2 SERPL-SCNC: 22.3 MMOL/L (ref 22–29)
CO2 SERPL-SCNC: 22.4 MMOL/L (ref 22–29)
CO2 SERPL-SCNC: 22.6 MMOL/L (ref 22–29)
CO2 SERPL-SCNC: 22.6 MMOL/L (ref 22–29)
CO2 SERPL-SCNC: 22.7 MMOL/L (ref 22–29)
CO2 SERPL-SCNC: 22.8 MMOL/L (ref 22–29)
CO2 SERPL-SCNC: 23.1 MMOL/L (ref 22–29)
CO2 SERPL-SCNC: 23.1 MMOL/L (ref 22–29)
CO2 SERPL-SCNC: 23.2 MMOL/L (ref 22–29)
CO2 SERPL-SCNC: 23.2 MMOL/L (ref 22–29)
CO2 SERPL-SCNC: 23.6 MMOL/L (ref 22–29)
CO2 SERPL-SCNC: 23.7 MMOL/L (ref 22–29)
CO2 SERPL-SCNC: 23.7 MMOL/L (ref 22–29)
CO2 SERPL-SCNC: 23.9 MMOL/L (ref 22–29)
CO2 SERPL-SCNC: 24 MMOL/L (ref 22–29)
CO2 SERPL-SCNC: 24.2 MMOL/L (ref 22–29)
CO2 SERPL-SCNC: 24.6 MMOL/L (ref 22–29)
CO2 SERPL-SCNC: 24.6 MMOL/L (ref 22–29)
CO2 SERPL-SCNC: 25 MMOL/L (ref 22–29)
CO2 SERPL-SCNC: 25 MMOL/L (ref 22–29)
CO2 SERPL-SCNC: 25.8 MMOL/L (ref 22–29)
CO2 SERPL-SCNC: 26.8 MMOL/L (ref 22–29)
CO2 SERPL-SCNC: 27.5 MMOL/L (ref 22–29)
CO2 SERPL-SCNC: 27.9 MMOL/L (ref 22–29)
CO2 SERPL-SCNC: 28.6 MMOL/L (ref 22–29)
CO2 SERPL-SCNC: 29 MMOL/L (ref 22–29)
CO2 SERPL-SCNC: 29.2 MMOL/L (ref 22–29)
CO2 SERPL-SCNC: 29.8 MMOL/L (ref 22–29)
CO2 SERPL-SCNC: 30.9 MMOL/L (ref 22–29)
CO2 SERPL-SCNC: 34 MMOL/L (ref 22–29)
COLOR UR: ABNORMAL
COLOR UR: YELLOW
CREAT BLD-MCNC: 0.49 MG/DL (ref 0.76–1.27)
CREAT BLD-MCNC: 0.51 MG/DL (ref 0.76–1.27)
CREAT BLD-MCNC: 0.52 MG/DL (ref 0.76–1.27)
CREAT BLD-MCNC: 0.52 MG/DL (ref 0.76–1.27)
CREAT BLD-MCNC: 0.54 MG/DL (ref 0.76–1.27)
CREAT BLD-MCNC: 0.54 MG/DL (ref 0.76–1.27)
CREAT BLD-MCNC: 0.56 MG/DL (ref 0.76–1.27)
CREAT BLD-MCNC: 0.57 MG/DL (ref 0.76–1.27)
CREAT BLD-MCNC: 0.57 MG/DL (ref 0.76–1.27)
CREAT BLD-MCNC: 0.58 MG/DL (ref 0.76–1.27)
CREAT BLD-MCNC: 0.59 MG/DL (ref 0.76–1.27)
CREAT BLD-MCNC: 0.59 MG/DL (ref 0.76–1.27)
CREAT BLD-MCNC: 0.6 MG/DL (ref 0.76–1.27)
CREAT BLD-MCNC: 0.61 MG/DL (ref 0.76–1.27)
CREAT BLD-MCNC: 0.61 MG/DL (ref 0.76–1.27)
CREAT BLD-MCNC: 0.62 MG/DL (ref 0.76–1.27)
CREAT BLD-MCNC: 0.63 MG/DL (ref 0.76–1.27)
CREAT BLD-MCNC: 0.64 MG/DL (ref 0.76–1.27)
CREAT BLD-MCNC: 0.66 MG/DL (ref 0.76–1.27)
CREAT BLD-MCNC: 0.67 MG/DL (ref 0.76–1.27)
CREAT BLD-MCNC: 0.68 MG/DL (ref 0.76–1.27)
CREAT BLD-MCNC: 0.69 MG/DL (ref 0.76–1.27)
CREAT BLD-MCNC: 0.69 MG/DL (ref 0.76–1.27)
CREAT BLD-MCNC: 0.73 MG/DL (ref 0.76–1.27)
CREAT BLD-MCNC: 0.79 MG/DL (ref 0.76–1.27)
CREAT BLD-MCNC: 0.82 MG/DL (ref 0.76–1.27)
CREAT BLD-MCNC: 0.85 MG/DL (ref 0.76–1.27)
CREAT BLD-MCNC: 1 MG/DL (ref 0.76–1.27)
CREAT BLD-MCNC: 1.02 MG/DL (ref 0.76–1.27)
CREAT BLD-MCNC: 1.05 MG/DL (ref 0.76–1.27)
CREAT BLD-MCNC: 1.23 MG/DL (ref 0.76–1.27)
CREAT BLD-MCNC: 1.56 MG/DL (ref 0.76–1.27)
CROSSMATCH INTERPRETATION: NORMAL
CRP SERPL-MCNC: 3.52 MG/DL (ref 0–0.5)
CYTOLOGIST CVX/VAG CYTO: NORMAL
D DIMER PPP FEU-MCNC: 4.49 MCGFEU/ML (ref 0–0.46)
DEPRECATED RDW RBC AUTO: 55.1 FL (ref 37–54)
DEPRECATED RDW RBC AUTO: 56.8 FL (ref 37–54)
DEPRECATED RDW RBC AUTO: 58.6 FL (ref 37–54)
DEPRECATED RDW RBC AUTO: 60.3 FL (ref 37–54)
DEPRECATED RDW RBC AUTO: 65.1 FL (ref 37–54)
DEPRECATED RDW RBC AUTO: 65.3 FL (ref 37–54)
DEPRECATED RDW RBC AUTO: 65.8 FL (ref 37–54)
DEPRECATED RDW RBC AUTO: 66.9 FL (ref 37–54)
DEPRECATED RDW RBC AUTO: 67 FL (ref 37–54)
DEPRECATED RDW RBC AUTO: 67.4 FL (ref 37–54)
DEPRECATED RDW RBC AUTO: 67.7 FL (ref 37–54)
DEPRECATED RDW RBC AUTO: 67.8 FL (ref 37–54)
DEPRECATED RDW RBC AUTO: 67.9 FL (ref 37–54)
DEPRECATED RDW RBC AUTO: 70.2 FL (ref 37–54)
DEPRECATED RDW RBC AUTO: 71 FL (ref 37–54)
DEPRECATED RDW RBC AUTO: 72.5 FL (ref 37–54)
DEPRECATED RDW RBC AUTO: 74.4 FL (ref 37–54)
DEPRECATED RDW RBC AUTO: 75.7 FL (ref 37–54)
DEPRECATED RDW RBC AUTO: 75.7 FL (ref 37–54)
DEPRECATED RDW RBC AUTO: 76.3 FL (ref 37–54)
DEPRECATED RDW RBC AUTO: 77.6 FL (ref 37–54)
DEPRECATED RDW RBC AUTO: 79.3 FL (ref 37–54)
DEPRECATED RDW RBC AUTO: 80.1 FL (ref 37–54)
DEPRECATED RDW RBC AUTO: 82.8 FL (ref 37–54)
DEPRECATED RDW RBC AUTO: 82.8 FL (ref 37–54)
DEPRECATED RDW RBC AUTO: 83.4 FL (ref 37–54)
DEPRECATED RDW RBC AUTO: 83.7 FL (ref 37–54)
DEPRECATED RDW RBC AUTO: 84.1 FL (ref 37–54)
DEPRECATED RDW RBC AUTO: 85.2 FL (ref 37–54)
DEPRECATED RDW RBC AUTO: 86.4 FL (ref 37–54)
DEPRECATED RDW RBC AUTO: 87.1 FL (ref 37–54)
DEPRECATED RDW RBC AUTO: 87.6 FL (ref 37–54)
DEPRECATED RDW RBC AUTO: 88 FL (ref 37–54)
DEPRECATED RDW RBC AUTO: 88.3 FL (ref 37–54)
DEPRECATED RDW RBC AUTO: 88.3 FL (ref 37–54)
DEPRECATED RDW RBC AUTO: 88.9 FL (ref 37–54)
DEPRECATED RDW RBC AUTO: 91.7 FL (ref 37–54)
DEPRECATED RDW RBC AUTO: 95 FL (ref 37–54)
DEPRECATED RDW RBC AUTO: 95.2 FL (ref 37–54)
DEPRECATED RDW RBC AUTO: ABNORMAL FL (ref 37–54)
DIMORPHIC RBC: PRESENT
E/E' RATIO: 14
EOSINOPHIL # BLD AUTO: 0 10*3/MM3 (ref 0.1–0.3)
EOSINOPHIL # BLD AUTO: 0.01 10*3/MM3 (ref 0.1–0.3)
EOSINOPHIL # BLD AUTO: 0.02 10*3/MM3 (ref 0.1–0.3)
EOSINOPHIL # BLD MANUAL: 0.06 10*3/MM3 (ref 0.1–0.3)
EOSINOPHIL # BLD MANUAL: 0.06 10*3/MM3 (ref 0.1–0.3)
EOSINOPHIL NFR BLD AUTO: 0 % (ref 0–4)
EOSINOPHIL NFR BLD AUTO: 0.1 % (ref 0–4)
EOSINOPHIL NFR BLD AUTO: 0.2 % (ref 0–4)
EOSINOPHIL NFR BLD AUTO: 0.3 % (ref 0–4)
EOSINOPHIL NFR BLD AUTO: 0.3 % (ref 0–4)
EOSINOPHIL NFR BLD AUTO: 0.4 % (ref 0–4)
EOSINOPHIL NFR BLD MANUAL: 1 % (ref 0–4)
EOSINOPHIL NFR BLD MANUAL: 1 % (ref 0–4)
ERYTHROCYTE [DISTWIDTH] IN BLOOD BY AUTOMATED COUNT: 13.7 % (ref 11.5–14.5)
ERYTHROCYTE [DISTWIDTH] IN BLOOD BY AUTOMATED COUNT: 14 % (ref 11.5–14.5)
ERYTHROCYTE [DISTWIDTH] IN BLOOD BY AUTOMATED COUNT: 14.1 % (ref 11.5–14.5)
ERYTHROCYTE [DISTWIDTH] IN BLOOD BY AUTOMATED COUNT: 15 % (ref 11.5–14.5)
ERYTHROCYTE [DISTWIDTH] IN BLOOD BY AUTOMATED COUNT: 19.9 % (ref 11.5–14.5)
ERYTHROCYTE [DISTWIDTH] IN BLOOD BY AUTOMATED COUNT: 20.2 % (ref 11.5–14.5)
ERYTHROCYTE [DISTWIDTH] IN BLOOD BY AUTOMATED COUNT: 20.4 % (ref 11.5–14.5)
ERYTHROCYTE [DISTWIDTH] IN BLOOD BY AUTOMATED COUNT: 20.6 % (ref 11.5–14.5)
ERYTHROCYTE [DISTWIDTH] IN BLOOD BY AUTOMATED COUNT: 20.7 % (ref 11.5–14.5)
ERYTHROCYTE [DISTWIDTH] IN BLOOD BY AUTOMATED COUNT: 20.9 % (ref 11.5–14.5)
ERYTHROCYTE [DISTWIDTH] IN BLOOD BY AUTOMATED COUNT: 21 % (ref 11.5–14.5)
ERYTHROCYTE [DISTWIDTH] IN BLOOD BY AUTOMATED COUNT: 21.1 % (ref 11.5–14.5)
ERYTHROCYTE [DISTWIDTH] IN BLOOD BY AUTOMATED COUNT: 21.2 % (ref 11.5–14.5)
ERYTHROCYTE [DISTWIDTH] IN BLOOD BY AUTOMATED COUNT: 21.3 % (ref 11.5–14.5)
ERYTHROCYTE [DISTWIDTH] IN BLOOD BY AUTOMATED COUNT: 21.3 % (ref 11.5–14.5)
ERYTHROCYTE [DISTWIDTH] IN BLOOD BY AUTOMATED COUNT: 21.4 % (ref 11.5–14.5)
ERYTHROCYTE [DISTWIDTH] IN BLOOD BY AUTOMATED COUNT: 21.5 % (ref 11.5–14.5)
ERYTHROCYTE [DISTWIDTH] IN BLOOD BY AUTOMATED COUNT: 21.8 % (ref 11.5–14.5)
ERYTHROCYTE [DISTWIDTH] IN BLOOD BY AUTOMATED COUNT: 21.8 % (ref 11.5–14.5)
ERYTHROCYTE [DISTWIDTH] IN BLOOD BY AUTOMATED COUNT: 21.9 % (ref 11.5–14.5)
ERYTHROCYTE [DISTWIDTH] IN BLOOD BY AUTOMATED COUNT: 22 % (ref 11.5–14.5)
ERYTHROCYTE [DISTWIDTH] IN BLOOD BY AUTOMATED COUNT: 22.1 % (ref 11.5–14.5)
ERYTHROCYTE [DISTWIDTH] IN BLOOD BY AUTOMATED COUNT: 22.2 % (ref 11.5–14.5)
ERYTHROCYTE [DISTWIDTH] IN BLOOD BY AUTOMATED COUNT: 22.4 % (ref 11.5–14.5)
ERYTHROCYTE [DISTWIDTH] IN BLOOD BY AUTOMATED COUNT: 22.5 % (ref 11.5–14.5)
ERYTHROCYTE [DISTWIDTH] IN BLOOD BY AUTOMATED COUNT: 22.6 % (ref 11.5–14.5)
ERYTHROCYTE [DISTWIDTH] IN BLOOD BY AUTOMATED COUNT: 23 % (ref 11.5–14.5)
ERYTHROCYTE [DISTWIDTH] IN BLOOD BY AUTOMATED COUNT: 23.1 % (ref 11.5–14.5)
ERYTHROCYTE [DISTWIDTH] IN BLOOD BY AUTOMATED COUNT: 23.7 % (ref 11.5–14.5)
ERYTHROCYTE [DISTWIDTH] IN BLOOD BY AUTOMATED COUNT: 23.7 % (ref 11.5–14.5)
ERYTHROCYTE [DISTWIDTH] IN BLOOD BY AUTOMATED COUNT: 23.9 % (ref 11.5–14.5)
ERYTHROCYTE [DISTWIDTH] IN BLOOD BY AUTOMATED COUNT: 24.7 % (ref 11.5–14.5)
ERYTHROCYTE [DISTWIDTH] IN BLOOD BY AUTOMATED COUNT: 24.8 % (ref 11.5–14.5)
ERYTHROCYTE [DISTWIDTH] IN BLOOD BY AUTOMATED COUNT: 24.9 % (ref 11.5–14.5)
ERYTHROCYTE [DISTWIDTH] IN BLOOD BY AUTOMATED COUNT: 24.9 % (ref 11.5–14.5)
ERYTHROCYTE [DISTWIDTH] IN BLOOD BY AUTOMATED COUNT: 25.2 % (ref 11.5–14.5)
ERYTHROCYTE [DISTWIDTH] IN BLOOD BY AUTOMATED COUNT: 25.7 % (ref 11.5–14.5)
ERYTHROCYTE [DISTWIDTH] IN BLOOD BY AUTOMATED COUNT: ABNORMAL % (ref 11.5–14.5)
FERRITIN SERPL-MCNC: 453 NG/ML (ref 30–400)
FERRITIN SERPL-MCNC: 466.6 NG/ML (ref 30–400)
FERRITIN SERPL-MCNC: 530 NG/ML (ref 30–400)
FIBRINOGEN AG PPP IA-MCNC: 531 MG/DL (ref 180–350)
FIBRINOGEN PPP-MCNC: 575 MG/DL (ref 200–400)
FIBRINOGEN PPP-MCNC: 577 MG/DL (ref 160–420)
FIBRINOGEN PPP-MCNC: 626 MG/DL (ref 200–400)
FSP PPP LA-ACNC: ABNORMAL
GFR SERPL CREATININE-BSD FRML MDRD: 103 ML/MIN/1.73
GFR SERPL CREATININE-BSD FRML MDRD: 110 ML/MIN/1.73
GFR SERPL CREATININE-BSD FRML MDRD: 110 ML/MIN/1.73
GFR SERPL CREATININE-BSD FRML MDRD: 111 ML/MIN/1.73
GFR SERPL CREATININE-BSD FRML MDRD: 113 ML/MIN/1.73
GFR SERPL CREATININE-BSD FRML MDRD: 115 ML/MIN/1.73
GFR SERPL CREATININE-BSD FRML MDRD: 119 ML/MIN/1.73
GFR SERPL CREATININE-BSD FRML MDRD: 122 ML/MIN/1.73
GFR SERPL CREATININE-BSD FRML MDRD: 124 ML/MIN/1.73
GFR SERPL CREATININE-BSD FRML MDRD: 126 ML/MIN/1.73
GFR SERPL CREATININE-BSD FRML MDRD: 126 ML/MIN/1.73
GFR SERPL CREATININE-BSD FRML MDRD: 129 ML/MIN/1.73
GFR SERPL CREATININE-BSD FRML MDRD: 131 ML/MIN/1.73
GFR SERPL CREATININE-BSD FRML MDRD: 131 ML/MIN/1.73
GFR SERPL CREATININE-BSD FRML MDRD: 134 ML/MIN/1.73
GFR SERPL CREATININE-BSD FRML MDRD: 137 ML/MIN/1.73
GFR SERPL CREATININE-BSD FRML MDRD: 137 ML/MIN/1.73
GFR SERPL CREATININE-BSD FRML MDRD: 139 ML/MIN/1.73
GFR SERPL CREATININE-BSD FRML MDRD: 145 ML/MIN/1.73
GFR SERPL CREATININE-BSD FRML MDRD: 145 ML/MIN/1.73
GFR SERPL CREATININE-BSD FRML MDRD: 43 ML/MIN/1.73
GFR SERPL CREATININE-BSD FRML MDRD: 56 ML/MIN/1.73
GFR SERPL CREATININE-BSD FRML MDRD: 67 ML/MIN/1.73
GFR SERPL CREATININE-BSD FRML MDRD: 70 ML/MIN/1.73
GFR SERPL CREATININE-BSD FRML MDRD: 71 ML/MIN/1.73
GFR SERPL CREATININE-BSD FRML MDRD: 86 ML/MIN/1.73
GFR SERPL CREATININE-BSD FRML MDRD: 90 ML/MIN/1.73
GFR SERPL CREATININE-BSD FRML MDRD: 94 ML/MIN/1.73
GFR SERPL CREATININE-BSD FRML MDRD: >150 ML/MIN/1.73
GLOBULIN UR ELPH-MCNC: 2.9 GM/DL
GLOBULIN UR ELPH-MCNC: 3 GM/DL
GLOBULIN UR ELPH-MCNC: 3.3 GM/DL
GLOBULIN UR ELPH-MCNC: 3.6 GM/DL
GLOBULIN UR ELPH-MCNC: 3.8 GM/DL
GLOBULIN UR ELPH-MCNC: 4.3 GM/DL
GLOBULIN UR ELPH-MCNC: 4.7 GM/DL
GLUCOSE BLD-MCNC: 101 MG/DL (ref 65–99)
GLUCOSE BLD-MCNC: 101 MG/DL (ref 65–99)
GLUCOSE BLD-MCNC: 102 MG/DL (ref 65–99)
GLUCOSE BLD-MCNC: 103 MG/DL (ref 65–99)
GLUCOSE BLD-MCNC: 107 MG/DL (ref 65–99)
GLUCOSE BLD-MCNC: 107 MG/DL (ref 65–99)
GLUCOSE BLD-MCNC: 111 MG/DL (ref 65–99)
GLUCOSE BLD-MCNC: 114 MG/DL (ref 65–99)
GLUCOSE BLD-MCNC: 121 MG/DL (ref 65–99)
GLUCOSE BLD-MCNC: 126 MG/DL (ref 65–99)
GLUCOSE BLD-MCNC: 131 MG/DL (ref 65–99)
GLUCOSE BLD-MCNC: 134 MG/DL (ref 65–99)
GLUCOSE BLD-MCNC: 136 MG/DL (ref 65–99)
GLUCOSE BLD-MCNC: 145 MG/DL (ref 65–99)
GLUCOSE BLD-MCNC: 152 MG/DL (ref 65–99)
GLUCOSE BLD-MCNC: 166 MG/DL (ref 65–99)
GLUCOSE BLD-MCNC: 173 MG/DL (ref 65–99)
GLUCOSE BLD-MCNC: 185 MG/DL (ref 65–99)
GLUCOSE BLD-MCNC: 186 MG/DL (ref 65–99)
GLUCOSE BLD-MCNC: 206 MG/DL (ref 65–99)
GLUCOSE BLD-MCNC: 227 MG/DL (ref 65–99)
GLUCOSE BLD-MCNC: 240 MG/DL (ref 65–99)
GLUCOSE BLD-MCNC: 287 MG/DL (ref 65–99)
GLUCOSE BLD-MCNC: 348 MG/DL (ref 65–99)
GLUCOSE BLD-MCNC: 78 MG/DL (ref 65–99)
GLUCOSE BLD-MCNC: 82 MG/DL (ref 65–99)
GLUCOSE BLD-MCNC: 87 MG/DL (ref 65–99)
GLUCOSE BLD-MCNC: 88 MG/DL (ref 65–99)
GLUCOSE BLD-MCNC: 89 MG/DL (ref 65–99)
GLUCOSE BLD-MCNC: 89 MG/DL (ref 65–99)
GLUCOSE BLD-MCNC: 90 MG/DL (ref 65–99)
GLUCOSE BLD-MCNC: 91 MG/DL (ref 65–99)
GLUCOSE BLD-MCNC: 91 MG/DL (ref 65–99)
GLUCOSE BLD-MCNC: 93 MG/DL (ref 65–99)
GLUCOSE BLD-MCNC: 94 MG/DL (ref 65–99)
GLUCOSE BLD-MCNC: 97 MG/DL (ref 65–99)
GLUCOSE BLD-MCNC: 98 MG/DL (ref 65–99)
GLUCOSE BLD-MCNC: 99 MG/DL (ref 65–99)
GLUCOSE BLDC GLUCOMTR-MCNC: 103 MG/DL (ref 70–130)
GLUCOSE BLDC GLUCOMTR-MCNC: 104 MG/DL (ref 70–130)
GLUCOSE BLDC GLUCOMTR-MCNC: 105 MG/DL (ref 70–130)
GLUCOSE BLDC GLUCOMTR-MCNC: 108 MG/DL (ref 70–130)
GLUCOSE BLDC GLUCOMTR-MCNC: 109 MG/DL (ref 70–130)
GLUCOSE BLDC GLUCOMTR-MCNC: 111 MG/DL (ref 70–130)
GLUCOSE BLDC GLUCOMTR-MCNC: 112 MG/DL (ref 70–130)
GLUCOSE BLDC GLUCOMTR-MCNC: 113 MG/DL (ref 70–130)
GLUCOSE BLDC GLUCOMTR-MCNC: 113 MG/DL (ref 70–130)
GLUCOSE BLDC GLUCOMTR-MCNC: 116 MG/DL (ref 70–130)
GLUCOSE BLDC GLUCOMTR-MCNC: 118 MG/DL (ref 70–130)
GLUCOSE BLDC GLUCOMTR-MCNC: 121 MG/DL (ref 70–130)
GLUCOSE BLDC GLUCOMTR-MCNC: 123 MG/DL (ref 70–130)
GLUCOSE BLDC GLUCOMTR-MCNC: 125 MG/DL (ref 70–130)
GLUCOSE BLDC GLUCOMTR-MCNC: 126 MG/DL (ref 70–130)
GLUCOSE BLDC GLUCOMTR-MCNC: 127 MG/DL (ref 70–130)
GLUCOSE BLDC GLUCOMTR-MCNC: 128 MG/DL (ref 70–130)
GLUCOSE BLDC GLUCOMTR-MCNC: 128 MG/DL (ref 70–130)
GLUCOSE BLDC GLUCOMTR-MCNC: 131 MG/DL (ref 70–130)
GLUCOSE BLDC GLUCOMTR-MCNC: 133 MG/DL (ref 70–130)
GLUCOSE BLDC GLUCOMTR-MCNC: 140 MG/DL (ref 70–130)
GLUCOSE BLDC GLUCOMTR-MCNC: 141 MG/DL (ref 70–130)
GLUCOSE BLDC GLUCOMTR-MCNC: 142 MG/DL (ref 70–130)
GLUCOSE BLDC GLUCOMTR-MCNC: 147 MG/DL (ref 70–130)
GLUCOSE BLDC GLUCOMTR-MCNC: 148 MG/DL (ref 70–130)
GLUCOSE BLDC GLUCOMTR-MCNC: 149 MG/DL (ref 70–130)
GLUCOSE BLDC GLUCOMTR-MCNC: 150 MG/DL (ref 70–130)
GLUCOSE BLDC GLUCOMTR-MCNC: 150 MG/DL (ref 70–130)
GLUCOSE BLDC GLUCOMTR-MCNC: 154 MG/DL (ref 70–130)
GLUCOSE BLDC GLUCOMTR-MCNC: 155 MG/DL (ref 70–130)
GLUCOSE BLDC GLUCOMTR-MCNC: 158 MG/DL (ref 70–130)
GLUCOSE BLDC GLUCOMTR-MCNC: 160 MG/DL (ref 70–130)
GLUCOSE BLDC GLUCOMTR-MCNC: 160 MG/DL (ref 70–130)
GLUCOSE BLDC GLUCOMTR-MCNC: 192 MG/DL (ref 70–130)
GLUCOSE BLDC GLUCOMTR-MCNC: 205 MG/DL (ref 70–130)
GLUCOSE BLDC GLUCOMTR-MCNC: 245 MG/DL (ref 70–130)
GLUCOSE BLDC GLUCOMTR-MCNC: 77 MG/DL (ref 70–130)
GLUCOSE BLDC GLUCOMTR-MCNC: 80 MG/DL (ref 70–130)
GLUCOSE BLDC GLUCOMTR-MCNC: 81 MG/DL (ref 70–130)
GLUCOSE BLDC GLUCOMTR-MCNC: 85 MG/DL (ref 70–130)
GLUCOSE BLDC GLUCOMTR-MCNC: 86 MG/DL (ref 70–130)
GLUCOSE BLDC GLUCOMTR-MCNC: 88 MG/DL (ref 70–130)
GLUCOSE BLDC GLUCOMTR-MCNC: 92 MG/DL (ref 70–130)
GLUCOSE BLDC GLUCOMTR-MCNC: 99 MG/DL (ref 70–130)
GLUCOSE UR STRIP-MCNC: NEGATIVE MG/DL
GRAM STN SPEC: ABNORMAL
GRAN CASTS URNS QL MICRO: ABNORMAL /LPF
HBA1C MFR BLD: 5.61 % (ref 4.8–5.6)
HCT VFR BLD AUTO: 18.1 % (ref 42–52)
HCT VFR BLD AUTO: 19.3 % (ref 42–52)
HCT VFR BLD AUTO: 20.3 % (ref 42–52)
HCT VFR BLD AUTO: 22 % (ref 42–52)
HCT VFR BLD AUTO: 22.3 % (ref 42–52)
HCT VFR BLD AUTO: 22.4 % (ref 42–52)
HCT VFR BLD AUTO: 22.8 % (ref 42–52)
HCT VFR BLD AUTO: 23.5 % (ref 42–52)
HCT VFR BLD AUTO: 23.7 % (ref 42–52)
HCT VFR BLD AUTO: 23.7 % (ref 42–52)
HCT VFR BLD AUTO: 23.9 % (ref 42–52)
HCT VFR BLD AUTO: 24 % (ref 42–52)
HCT VFR BLD AUTO: 24.7 % (ref 42–52)
HCT VFR BLD AUTO: 24.9 % (ref 42–52)
HCT VFR BLD AUTO: 24.9 % (ref 42–52)
HCT VFR BLD AUTO: 25.1 % (ref 42–52)
HCT VFR BLD AUTO: 25.1 % (ref 42–52)
HCT VFR BLD AUTO: 25.2 % (ref 42–52)
HCT VFR BLD AUTO: 25.5 % (ref 42–52)
HCT VFR BLD AUTO: 25.6 % (ref 42–52)
HCT VFR BLD AUTO: 25.8 % (ref 42–52)
HCT VFR BLD AUTO: 25.9 % (ref 42–52)
HCT VFR BLD AUTO: 26 % (ref 42–52)
HCT VFR BLD AUTO: 26 % (ref 42–52)
HCT VFR BLD AUTO: 26.1 % (ref 42–52)
HCT VFR BLD AUTO: 26.2 % (ref 42–52)
HCT VFR BLD AUTO: 26.3 % (ref 42–52)
HCT VFR BLD AUTO: 26.5 % (ref 42–52)
HCT VFR BLD AUTO: 26.6 % (ref 42–52)
HCT VFR BLD AUTO: 26.7 % (ref 42–52)
HCT VFR BLD AUTO: 26.7 % (ref 42–52)
HCT VFR BLD AUTO: 26.8 % (ref 42–52)
HCT VFR BLD AUTO: 26.9 % (ref 42–52)
HCT VFR BLD AUTO: 27.1 % (ref 42–52)
HCT VFR BLD AUTO: 27.4 % (ref 42–52)
HCT VFR BLD AUTO: 27.6 % (ref 42–52)
HCT VFR BLD AUTO: 27.6 % (ref 42–52)
HCT VFR BLD AUTO: 27.7 % (ref 42–52)
HCT VFR BLD AUTO: 28.1 % (ref 42–52)
HCT VFR BLD AUTO: 28.3 % (ref 42–52)
HCT VFR BLD AUTO: 28.5 % (ref 42–52)
HCT VFR BLD AUTO: 28.7 % (ref 42–52)
HCT VFR BLD AUTO: 29 % (ref 42–52)
HCT VFR BLD AUTO: 29 % (ref 42–52)
HCT VFR BLD AUTO: 29.1 % (ref 42–52)
HCT VFR BLD AUTO: 29.2 % (ref 42–52)
HCT VFR BLD AUTO: 29.3 % (ref 42–52)
HCT VFR BLD AUTO: 29.5 % (ref 42–52)
HCT VFR BLD AUTO: 29.7 % (ref 42–52)
HCT VFR BLD AUTO: 30.2 % (ref 42–52)
HCT VFR BLD AUTO: 30.8 % (ref 42–52)
HCT VFR BLD AUTO: 31.1 % (ref 42–52)
HCT VFR BLD AUTO: 31.4 % (ref 42–52)
HCT VFR BLD AUTO: 31.7 % (ref 42–52)
HCT VFR BLD AUTO: 31.7 % (ref 42–52)
HCT VFR BLD AUTO: 32.8 % (ref 42–52)
HGB BLD-MCNC: 10 G/DL (ref 14–18)
HGB BLD-MCNC: 10.1 G/DL (ref 14–18)
HGB BLD-MCNC: 10.3 G/DL (ref 14–18)
HGB BLD-MCNC: 10.3 G/DL (ref 14–18)
HGB BLD-MCNC: 10.5 G/DL (ref 14–18)
HGB BLD-MCNC: 5.9 G/DL (ref 14–18)
HGB BLD-MCNC: 6 G/DL (ref 14–18)
HGB BLD-MCNC: 6.3 G/DL (ref 14–18)
HGB BLD-MCNC: 7 G/DL (ref 14–18)
HGB BLD-MCNC: 7.1 G/DL (ref 14–18)
HGB BLD-MCNC: 7.1 G/DL (ref 14–18)
HGB BLD-MCNC: 7.2 G/DL (ref 14–18)
HGB BLD-MCNC: 7.2 G/DL (ref 14–18)
HGB BLD-MCNC: 7.3 G/DL (ref 14–18)
HGB BLD-MCNC: 7.6 G/DL (ref 14–18)
HGB BLD-MCNC: 7.7 G/DL (ref 14–18)
HGB BLD-MCNC: 7.7 G/DL (ref 14–18)
HGB BLD-MCNC: 7.8 G/DL (ref 14–18)
HGB BLD-MCNC: 7.8 G/DL (ref 14–18)
HGB BLD-MCNC: 7.9 G/DL (ref 14–18)
HGB BLD-MCNC: 7.9 G/DL (ref 14–18)
HGB BLD-MCNC: 8 G/DL (ref 14–18)
HGB BLD-MCNC: 8 G/DL (ref 14–18)
HGB BLD-MCNC: 8.1 G/DL (ref 14–18)
HGB BLD-MCNC: 8.1 G/DL (ref 14–18)
HGB BLD-MCNC: 8.2 G/DL (ref 14–18)
HGB BLD-MCNC: 8.3 G/DL (ref 14–18)
HGB BLD-MCNC: 8.4 G/DL (ref 14–18)
HGB BLD-MCNC: 8.5 G/DL (ref 14–18)
HGB BLD-MCNC: 8.6 G/DL (ref 14–18)
HGB BLD-MCNC: 8.8 G/DL (ref 14–18)
HGB BLD-MCNC: 8.9 G/DL (ref 14–18)
HGB BLD-MCNC: 8.9 G/DL (ref 14–18)
HGB BLD-MCNC: 9 G/DL (ref 14–18)
HGB BLD-MCNC: 9 G/DL (ref 14–18)
HGB BLD-MCNC: 9.1 G/DL (ref 14–18)
HGB BLD-MCNC: 9.2 G/DL (ref 14–18)
HGB BLD-MCNC: 9.2 G/DL (ref 14–18)
HGB BLD-MCNC: 9.3 G/DL (ref 14–18)
HGB BLD-MCNC: 9.4 G/DL (ref 14–18)
HGB BLD-MCNC: 9.4 G/DL (ref 14–18)
HGB BLD-MCNC: 9.5 G/DL (ref 14–18)
HGB BLD-MCNC: 9.7 G/DL (ref 14–18)
HGB BLD-MCNC: 9.8 G/DL (ref 14–18)
HGB UR QL STRIP.AUTO: ABNORMAL
HOLD SPECIMEN: NORMAL
HYALINE CASTS UR QL AUTO: ABNORMAL /LPF
HYPOCHROMIA BLD QL: ABNORMAL
HYPOCHROMIA BLD QL: NORMAL
IMM GRANULOCYTES # BLD: 0.02 10*3/MM3 (ref 0–0.03)
IMM GRANULOCYTES # BLD: 0.03 10*3/MM3 (ref 0–0.03)
IMM GRANULOCYTES # BLD: 0.03 10*3/MM3 (ref 0–0.03)
IMM GRANULOCYTES # BLD: 0.04 10*3/MM3 (ref 0–0.03)
IMM GRANULOCYTES # BLD: 0.05 10*3/MM3 (ref 0–0.03)
IMM GRANULOCYTES # BLD: 0.06 10*3/MM3 (ref 0–0.03)
IMM GRANULOCYTES # BLD: 0.06 10*3/MM3 (ref 0–0.03)
IMM GRANULOCYTES # BLD: 0.07 10*3/MM3 (ref 0–0.03)
IMM GRANULOCYTES # BLD: 0.08 10*3/MM3 (ref 0–0.03)
IMM GRANULOCYTES # BLD: 0.09 10*3/MM3 (ref 0–0.03)
IMM GRANULOCYTES # BLD: 0.1 10*3/MM3 (ref 0–0.03)
IMM GRANULOCYTES # BLD: 0.1 10*3/MM3 (ref 0–0.03)
IMM GRANULOCYTES # BLD: 0.11 10*3/MM3 (ref 0–0.03)
IMM GRANULOCYTES # BLD: 0.13 10*3/MM3 (ref 0–0.03)
IMM GRANULOCYTES # BLD: 0.14 10*3/MM3 (ref 0–0.03)
IMM GRANULOCYTES # BLD: 0.14 10*3/MM3 (ref 0–0.03)
IMM GRANULOCYTES # BLD: 0.17 10*3/MM3 (ref 0–0.03)
IMM GRANULOCYTES # BLD: 0.33 10*3/MM3 (ref 0–0.03)
IMM GRANULOCYTES # BLD: 0.38 10*3/MM3 (ref 0–0.03)
IMM GRANULOCYTES # BLD: 0.46 10*3/MM3 (ref 0–0.03)
IMM GRANULOCYTES # BLD: 0.46 10*3/MM3 (ref 0–0.03)
IMM GRANULOCYTES NFR BLD: 0.5 % (ref 0–0.5)
IMM GRANULOCYTES NFR BLD: 0.6 % (ref 0–0.5)
IMM GRANULOCYTES NFR BLD: 0.7 % (ref 0–0.5)
IMM GRANULOCYTES NFR BLD: 0.8 % (ref 0–0.5)
IMM GRANULOCYTES NFR BLD: 1 % (ref 0–0.5)
IMM GRANULOCYTES NFR BLD: 1.1 % (ref 0–0.5)
IMM GRANULOCYTES NFR BLD: 1.2 % (ref 0–0.5)
IMM GRANULOCYTES NFR BLD: 1.3 % (ref 0–0.5)
IMM GRANULOCYTES NFR BLD: 1.5 % (ref 0–0.5)
IMM GRANULOCYTES NFR BLD: 1.6 % (ref 0–0.5)
IMM GRANULOCYTES NFR BLD: 1.7 % (ref 0–0.5)
IMM GRANULOCYTES NFR BLD: 1.8 % (ref 0–0.5)
IMM GRANULOCYTES NFR BLD: 2.3 % (ref 0–0.5)
IMM GRANULOCYTES NFR BLD: 7.2 % (ref 0–0.5)
IMM GRANULOCYTES NFR BLD: 7.6 % (ref 0–0.5)
INR PPP: 1.18 (ref 0.9–1.1)
INR PPP: 1.22 (ref 0.9–1.1)
INR PPP: 1.23 (ref 0.9–1.1)
INR PPP: 1.35 (ref 0.9–1.1)
INR PPP: 1.4 (ref 0.9–1.1)
INR PPP: 1.47 (ref 0.9–1.1)
INR PPP: 1.48 (ref 0.9–1.1)
INR PPP: 1.5 (ref 0.9–1.1)
INR PPP: 1.51 (ref 0.9–1.1)
INR PPP: 1.52 (ref 0.9–1.1)
INR PPP: 1.56 (ref 0.9–1.1)
INR PPP: 1.56 (ref 0.9–1.1)
INR PPP: 1.64 (ref 0.9–1.1)
IRON 24H UR-MRATE: 48 MCG/DL (ref 59–158)
IRON 24H UR-MRATE: 61 MCG/DL (ref 59–158)
IRON 24H UR-MRATE: 95 MCG/DL (ref 59–158)
IRON SATN MFR SERPL: 24 %
IRON SATN MFR SERPL: 33 %
IRON SATN MFR SERPL: 68 %
KETONES UR QL STRIP: ABNORMAL
KETONES UR QL STRIP: ABNORMAL
KETONES UR QL STRIP: NEGATIVE
KETONES UR QL STRIP: NEGATIVE
LAB AP CASE REPORT: NORMAL
LAB AP CLINICAL INFORMATION: NORMAL
LAB AP CLINICAL INFORMATION: NORMAL
LARGE PLATELETS: ABNORMAL
LARGE PLATELETS: NORMAL
LEFT ATRIUM VOLUME INDEX: 24 ML/M2
LEUKOCYTE ESTERASE UR QL STRIP.AUTO: ABNORMAL
LEUKOCYTE ESTERASE UR QL STRIP.AUTO: NEGATIVE
LV EF 2D ECHO EST: 38 %
LYMPHOCYTES # BLD AUTO: 0.39 10*3/MM3 (ref 0.6–4.8)
LYMPHOCYTES # BLD AUTO: 0.49 10*3/MM3 (ref 0.6–4.8)
LYMPHOCYTES # BLD AUTO: 0.65 10*3/MM3 (ref 0.6–4.8)
LYMPHOCYTES # BLD AUTO: 0.65 10*3/MM3 (ref 0.6–4.8)
LYMPHOCYTES # BLD AUTO: 0.75 10*3/MM3 (ref 0.6–4.8)
LYMPHOCYTES # BLD AUTO: 0.77 10*3/MM3 (ref 0.6–4.8)
LYMPHOCYTES # BLD AUTO: 0.78 10*3/MM3 (ref 0.6–4.8)
LYMPHOCYTES # BLD AUTO: 0.8 10*3/MM3 (ref 0.6–4.8)
LYMPHOCYTES # BLD AUTO: 0.82 10*3/MM3 (ref 0.6–4.8)
LYMPHOCYTES # BLD AUTO: 0.83 10*3/MM3 (ref 0.6–4.8)
LYMPHOCYTES # BLD AUTO: 0.85 10*3/MM3 (ref 0.6–4.8)
LYMPHOCYTES # BLD AUTO: 0.86 10*3/MM3 (ref 0.6–4.8)
LYMPHOCYTES # BLD AUTO: 0.87 10*3/MM3 (ref 0.6–4.8)
LYMPHOCYTES # BLD AUTO: 0.87 10*3/MM3 (ref 0.6–4.8)
LYMPHOCYTES # BLD AUTO: 0.91 10*3/MM3 (ref 0.6–4.8)
LYMPHOCYTES # BLD AUTO: 0.95 10*3/MM3 (ref 0.6–4.8)
LYMPHOCYTES # BLD AUTO: 0.96 10*3/MM3 (ref 0.6–4.8)
LYMPHOCYTES # BLD AUTO: 0.96 10*3/MM3 (ref 0.6–4.8)
LYMPHOCYTES # BLD AUTO: 0.97 10*3/MM3 (ref 0.6–4.8)
LYMPHOCYTES # BLD AUTO: 0.98 10*3/MM3 (ref 0.6–4.8)
LYMPHOCYTES # BLD AUTO: 1.05 10*3/MM3 (ref 0.6–4.8)
LYMPHOCYTES # BLD AUTO: 1.05 10*3/MM3 (ref 0.6–4.8)
LYMPHOCYTES # BLD AUTO: 1.07 10*3/MM3 (ref 0.6–4.8)
LYMPHOCYTES # BLD AUTO: 1.07 10*3/MM3 (ref 0.6–4.8)
LYMPHOCYTES # BLD AUTO: 1.08 10*3/MM3 (ref 0.6–4.8)
LYMPHOCYTES # BLD AUTO: 1.1 10*3/MM3 (ref 0.6–4.8)
LYMPHOCYTES # BLD AUTO: 1.11 10*3/MM3 (ref 0.6–4.8)
LYMPHOCYTES # BLD AUTO: 1.12 10*3/MM3 (ref 0.6–4.8)
LYMPHOCYTES # BLD AUTO: 1.14 10*3/MM3 (ref 0.6–4.8)
LYMPHOCYTES # BLD AUTO: 1.14 10*3/MM3 (ref 0.6–4.8)
LYMPHOCYTES # BLD AUTO: 1.28 10*3/MM3 (ref 0.6–4.8)
LYMPHOCYTES # BLD AUTO: 1.29 10*3/MM3 (ref 0.6–4.8)
LYMPHOCYTES # BLD AUTO: 1.32 10*3/MM3 (ref 0.6–4.8)
LYMPHOCYTES # BLD AUTO: 1.34 10*3/MM3 (ref 0.6–4.8)
LYMPHOCYTES # BLD AUTO: 1.38 10*3/MM3 (ref 0.6–4.8)
LYMPHOCYTES # BLD AUTO: 1.38 10*3/MM3 (ref 0.6–4.8)
LYMPHOCYTES # BLD AUTO: 1.46 10*3/MM3 (ref 0.6–4.8)
LYMPHOCYTES # BLD AUTO: 1.46 10*3/MM3 (ref 0.6–4.8)
LYMPHOCYTES # BLD AUTO: 2.49 10*3/MM3 (ref 0.6–4.8)
LYMPHOCYTES # BLD MANUAL: 0.66 10*3/MM3 (ref 0.6–4.8)
LYMPHOCYTES # BLD MANUAL: 0.76 10*3/MM3 (ref 0.6–4.8)
LYMPHOCYTES # BLD MANUAL: 0.89 10*3/MM3 (ref 0.6–4.8)
LYMPHOCYTES # BLD MANUAL: 0.94 10*3/MM3 (ref 0.6–4.8)
LYMPHOCYTES # BLD MANUAL: 1.07 10*3/MM3 (ref 0.6–4.8)
LYMPHOCYTES # BLD MANUAL: 1.14 10*3/MM3 (ref 0.6–4.8)
LYMPHOCYTES # BLD MANUAL: 1.17 10*3/MM3 (ref 0.6–4.8)
LYMPHOCYTES # BLD MANUAL: 1.21 10*3/MM3 (ref 0.6–4.8)
LYMPHOCYTES # BLD MANUAL: 1.45 10*3/MM3 (ref 0.6–4.8)
LYMPHOCYTES NFR BLD AUTO: 10.5 % (ref 20–45)
LYMPHOCYTES NFR BLD AUTO: 11.8 % (ref 20–45)
LYMPHOCYTES NFR BLD AUTO: 14.4 % (ref 20–45)
LYMPHOCYTES NFR BLD AUTO: 14.7 % (ref 20–45)
LYMPHOCYTES NFR BLD AUTO: 15.4 % (ref 20–45)
LYMPHOCYTES NFR BLD AUTO: 15.7 % (ref 20–45)
LYMPHOCYTES NFR BLD AUTO: 15.9 % (ref 20–45)
LYMPHOCYTES NFR BLD AUTO: 16.3 % (ref 20–45)
LYMPHOCYTES NFR BLD AUTO: 16.4 % (ref 20–45)
LYMPHOCYTES NFR BLD AUTO: 17.4 % (ref 20–45)
LYMPHOCYTES NFR BLD AUTO: 17.4 % (ref 20–45)
LYMPHOCYTES NFR BLD AUTO: 17.5 % (ref 20–45)
LYMPHOCYTES NFR BLD AUTO: 17.6 % (ref 20–45)
LYMPHOCYTES NFR BLD AUTO: 17.7 % (ref 20–45)
LYMPHOCYTES NFR BLD AUTO: 18.9 % (ref 20–45)
LYMPHOCYTES NFR BLD AUTO: 19.8 % (ref 20–45)
LYMPHOCYTES NFR BLD AUTO: 19.9 % (ref 20–45)
LYMPHOCYTES NFR BLD AUTO: 2.9 % (ref 20–45)
LYMPHOCYTES NFR BLD AUTO: 20 % (ref 20–45)
LYMPHOCYTES NFR BLD AUTO: 20.2 % (ref 20–45)
LYMPHOCYTES NFR BLD AUTO: 20.3 % (ref 20–45)
LYMPHOCYTES NFR BLD AUTO: 21.5 % (ref 20–45)
LYMPHOCYTES NFR BLD AUTO: 22.5 % (ref 20–45)
LYMPHOCYTES NFR BLD AUTO: 22.7 % (ref 20–45)
LYMPHOCYTES NFR BLD AUTO: 22.8 % (ref 20–45)
LYMPHOCYTES NFR BLD AUTO: 23.9 % (ref 20–45)
LYMPHOCYTES NFR BLD AUTO: 25.3 % (ref 20–45)
LYMPHOCYTES NFR BLD AUTO: 25.5 % (ref 20–45)
LYMPHOCYTES NFR BLD AUTO: 27 % (ref 20–45)
LYMPHOCYTES NFR BLD AUTO: 27.1 % (ref 20–45)
LYMPHOCYTES NFR BLD AUTO: 29 % (ref 20–45)
LYMPHOCYTES NFR BLD AUTO: 31.1 % (ref 20–45)
LYMPHOCYTES NFR BLD AUTO: 31.8 % (ref 20–45)
LYMPHOCYTES NFR BLD AUTO: 4.5 % (ref 20–45)
LYMPHOCYTES NFR BLD AUTO: 45.7 % (ref 20–45)
LYMPHOCYTES NFR BLD AUTO: 48.4 % (ref 20–45)
LYMPHOCYTES NFR BLD AUTO: 5.3 % (ref 20–45)
LYMPHOCYTES NFR BLD AUTO: 6.4 % (ref 20–45)
LYMPHOCYTES NFR BLD AUTO: 9.5 % (ref 20–45)
LYMPHOCYTES NFR BLD MANUAL: 12 % (ref 20–45)
LYMPHOCYTES NFR BLD MANUAL: 14 % (ref 20–45)
LYMPHOCYTES NFR BLD MANUAL: 17 % (ref 20–45)
LYMPHOCYTES NFR BLD MANUAL: 22 % (ref 3–8)
LYMPHOCYTES NFR BLD MANUAL: 22.2 % (ref 3–8)
LYMPHOCYTES NFR BLD MANUAL: 23 % (ref 3–8)
LYMPHOCYTES NFR BLD MANUAL: 24 % (ref 3–8)
LYMPHOCYTES NFR BLD MANUAL: 25 % (ref 20–45)
LYMPHOCYTES NFR BLD MANUAL: 26 % (ref 20–45)
LYMPHOCYTES NFR BLD MANUAL: 26 % (ref 3–8)
LYMPHOCYTES NFR BLD MANUAL: 34 % (ref 3–8)
LYMPHOCYTES NFR BLD MANUAL: 35 % (ref 20–45)
LYMPHOCYTES NFR BLD MANUAL: 35 % (ref 3–8)
LYMPHOCYTES NFR BLD MANUAL: 48 % (ref 20–45)
LYMPHOCYTES NFR BLD MANUAL: 8 % (ref 20–45)
LYMPHOCYTES NFR BLD MANUAL: 9.1 % (ref 20–45)
Lab: ABNORMAL
Lab: NORMAL
MACROCYTES BLD QL SMEAR: ABNORMAL
MACROCYTES BLD QL SMEAR: NORMAL
MAGNESIUM SERPL-MCNC: 1.4 MG/DL (ref 1.7–2.5)
MAGNESIUM SERPL-MCNC: 1.4 MG/DL (ref 1.7–2.5)
MAGNESIUM SERPL-MCNC: 1.5 MG/DL (ref 1.7–2.5)
MAGNESIUM SERPL-MCNC: 1.6 MG/DL (ref 1.7–2.5)
MAGNESIUM SERPL-MCNC: 1.7 MG/DL (ref 1.7–2.5)
MAGNESIUM SERPL-MCNC: 1.8 MG/DL (ref 1.7–2.5)
MAGNESIUM SERPL-MCNC: 1.8 MG/DL (ref 1.7–2.5)
MAGNESIUM SERPL-MCNC: 1.9 MG/DL (ref 1.7–2.5)
MAGNESIUM SERPL-MCNC: 2 MG/DL (ref 1.7–2.5)
MAGNESIUM SERPL-MCNC: 2.1 MG/DL (ref 1.7–2.5)
MAGNESIUM SERPL-MCNC: 2.3 MG/DL (ref 1.7–2.5)
MAGNESIUM SERPL-MCNC: 2.5 MG/DL (ref 1.7–2.5)
MAGNESIUM SERPL-MCNC: 2.6 MG/DL (ref 1.7–2.5)
MAXIMAL PREDICTED HEART RATE: 137 BPM
MCH RBC QN AUTO: 29.9 PG (ref 27–31)
MCH RBC QN AUTO: 30 PG (ref 27–31)
MCH RBC QN AUTO: 30.2 PG (ref 27–31)
MCH RBC QN AUTO: 30.3 PG (ref 27–31)
MCH RBC QN AUTO: 30.3 PG (ref 27–31)
MCH RBC QN AUTO: 30.4 PG (ref 27–31)
MCH RBC QN AUTO: 30.5 PG (ref 27–31)
MCH RBC QN AUTO: 30.5 PG (ref 27–31)
MCH RBC QN AUTO: 30.6 PG (ref 27–31)
MCH RBC QN AUTO: 30.7 PG (ref 27–31)
MCH RBC QN AUTO: 30.7 PG (ref 27–31)
MCH RBC QN AUTO: 30.9 PG (ref 27–31)
MCH RBC QN AUTO: 31 PG (ref 27–31)
MCH RBC QN AUTO: 31.1 PG (ref 27–31)
MCH RBC QN AUTO: 31.6 PG (ref 27–31)
MCH RBC QN AUTO: 31.6 PG (ref 27–31)
MCH RBC QN AUTO: 32 PG (ref 27–31)
MCH RBC QN AUTO: 32 PG (ref 27–31)
MCH RBC QN AUTO: 32.3 PG (ref 27–31)
MCH RBC QN AUTO: 32.4 PG (ref 27–31)
MCH RBC QN AUTO: 33 PG (ref 27–31)
MCH RBC QN AUTO: 33.1 PG (ref 27–31)
MCH RBC QN AUTO: 33.2 PG (ref 27–31)
MCH RBC QN AUTO: 33.3 PG (ref 27–31)
MCH RBC QN AUTO: 33.8 PG (ref 27–31)
MCH RBC QN AUTO: 33.9 PG (ref 27–31)
MCH RBC QN AUTO: 34.2 PG (ref 27–31)
MCH RBC QN AUTO: 34.3 PG (ref 27–31)
MCH RBC QN AUTO: 34.3 PG (ref 27–31)
MCH RBC QN AUTO: 34.4 PG (ref 27–31)
MCH RBC QN AUTO: 34.5 PG (ref 27–31)
MCH RBC QN AUTO: 34.6 PG (ref 27–31)
MCH RBC QN AUTO: 34.6 PG (ref 27–31)
MCH RBC QN AUTO: 34.8 PG (ref 27–31)
MCH RBC QN AUTO: 34.8 PG (ref 27–31)
MCH RBC QN AUTO: 35.1 PG (ref 27–31)
MCH RBC QN AUTO: 35.1 PG (ref 27–31)
MCH RBC QN AUTO: 35.3 PG (ref 27–31)
MCH RBC QN AUTO: 35.4 PG (ref 27–31)
MCH RBC QN AUTO: 35.8 PG (ref 27–31)
MCH RBC QN AUTO: 36 PG (ref 27–31)
MCH RBC QN AUTO: 36.3 PG (ref 27–31)
MCH RBC QN AUTO: 36.3 PG (ref 27–31)
MCH RBC QN AUTO: 36.4 PG (ref 27–31)
MCH RBC QN AUTO: 36.6 PG (ref 27–31)
MCHC RBC AUTO-ENTMCNC: 29.5 G/DL (ref 31–37)
MCHC RBC AUTO-ENTMCNC: 29.9 G/DL (ref 31–37)
MCHC RBC AUTO-ENTMCNC: 30 G/DL (ref 31–37)
MCHC RBC AUTO-ENTMCNC: 30.1 G/DL (ref 31–37)
MCHC RBC AUTO-ENTMCNC: 30.5 G/DL (ref 31–37)
MCHC RBC AUTO-ENTMCNC: 30.7 G/DL (ref 31–37)
MCHC RBC AUTO-ENTMCNC: 30.8 G/DL (ref 31–37)
MCHC RBC AUTO-ENTMCNC: 30.9 G/DL (ref 31–37)
MCHC RBC AUTO-ENTMCNC: 31 G/DL (ref 31–37)
MCHC RBC AUTO-ENTMCNC: 31.1 G/DL (ref 31–37)
MCHC RBC AUTO-ENTMCNC: 31.1 G/DL (ref 31–37)
MCHC RBC AUTO-ENTMCNC: 31.2 G/DL (ref 31–37)
MCHC RBC AUTO-ENTMCNC: 31.4 G/DL (ref 31–37)
MCHC RBC AUTO-ENTMCNC: 31.4 G/DL (ref 31–37)
MCHC RBC AUTO-ENTMCNC: 31.5 G/DL (ref 31–37)
MCHC RBC AUTO-ENTMCNC: 31.7 G/DL (ref 31–37)
MCHC RBC AUTO-ENTMCNC: 31.7 G/DL (ref 31–37)
MCHC RBC AUTO-ENTMCNC: 31.8 G/DL (ref 31–37)
MCHC RBC AUTO-ENTMCNC: 32 G/DL (ref 31–37)
MCHC RBC AUTO-ENTMCNC: 32.1 G/DL (ref 31–37)
MCHC RBC AUTO-ENTMCNC: 32.2 G/DL (ref 31–37)
MCHC RBC AUTO-ENTMCNC: 32.2 G/DL (ref 31–37)
MCHC RBC AUTO-ENTMCNC: 32.3 G/DL (ref 31–37)
MCHC RBC AUTO-ENTMCNC: 32.3 G/DL (ref 31–37)
MCHC RBC AUTO-ENTMCNC: 32.5 G/DL (ref 31–37)
MCHC RBC AUTO-ENTMCNC: 32.6 G/DL (ref 31–37)
MCHC RBC AUTO-ENTMCNC: 32.6 G/DL (ref 31–37)
MCHC RBC AUTO-ENTMCNC: 32.7 G/DL (ref 31–37)
MCHC RBC AUTO-ENTMCNC: 32.8 G/DL (ref 31–37)
MCHC RBC AUTO-ENTMCNC: 32.9 G/DL (ref 31–37)
MCHC RBC AUTO-ENTMCNC: 32.9 G/DL (ref 31–37)
MCHC RBC AUTO-ENTMCNC: 33.1 G/DL (ref 31–37)
MCHC RBC AUTO-ENTMCNC: 33.2 G/DL (ref 31–37)
MCHC RBC AUTO-ENTMCNC: 33.3 G/DL (ref 31–37)
MCHC RBC AUTO-ENTMCNC: 33.5 G/DL (ref 31–37)
MCHC RBC AUTO-ENTMCNC: 33.9 G/DL (ref 31–37)
MCV RBC AUTO: 102 FL (ref 80–94)
MCV RBC AUTO: 103.3 FL (ref 80–94)
MCV RBC AUTO: 104.3 FL (ref 80–94)
MCV RBC AUTO: 105.4 FL (ref 80–94)
MCV RBC AUTO: 106.4 FL (ref 80–94)
MCV RBC AUTO: 107.3 FL (ref 80–94)
MCV RBC AUTO: 107.4 FL (ref 80–94)
MCV RBC AUTO: 107.7 FL (ref 80–94)
MCV RBC AUTO: 108.3 FL (ref 80–94)
MCV RBC AUTO: 108.7 FL (ref 80–94)
MCV RBC AUTO: 108.8 FL (ref 80–94)
MCV RBC AUTO: 109.3 FL (ref 80–94)
MCV RBC AUTO: 109.3 FL (ref 80–94)
MCV RBC AUTO: 109.7 FL (ref 80–94)
MCV RBC AUTO: 109.8 FL (ref 80–94)
MCV RBC AUTO: 110 FL (ref 80–94)
MCV RBC AUTO: 111.3 FL (ref 80–94)
MCV RBC AUTO: 111.3 FL (ref 80–94)
MCV RBC AUTO: 111.7 FL (ref 80–94)
MCV RBC AUTO: 111.7 FL (ref 80–94)
MCV RBC AUTO: 112.2 FL (ref 80–94)
MCV RBC AUTO: 113 FL (ref 80–94)
MCV RBC AUTO: 113.8 FL (ref 80–94)
MCV RBC AUTO: 115.5 FL (ref 80–94)
MCV RBC AUTO: 116 FL (ref 80–94)
MCV RBC AUTO: 116.4 FL (ref 80–94)
MCV RBC AUTO: 122.2 FL (ref 80–94)
MCV RBC AUTO: 90.6 FL (ref 80–94)
MCV RBC AUTO: 91.2 FL (ref 80–94)
MCV RBC AUTO: 91.4 FL (ref 80–94)
MCV RBC AUTO: 93 FL (ref 80–94)
MCV RBC AUTO: 93.4 FL (ref 80–94)
MCV RBC AUTO: 94.1 FL (ref 80–94)
MCV RBC AUTO: 95.1 FL (ref 80–94)
MCV RBC AUTO: 95.3 FL (ref 80–94)
MCV RBC AUTO: 95.4 FL (ref 80–94)
MCV RBC AUTO: 95.4 FL (ref 80–94)
MCV RBC AUTO: 96.6 FL (ref 80–94)
MCV RBC AUTO: 96.8 FL (ref 80–94)
MCV RBC AUTO: 96.9 FL (ref 80–94)
MCV RBC AUTO: 97 FL (ref 80–94)
MCV RBC AUTO: 97.2 FL (ref 80–94)
MCV RBC AUTO: 97.5 FL (ref 80–94)
MCV RBC AUTO: 97.7 FL (ref 80–94)
MCV RBC AUTO: 98 FL (ref 80–94)
MCV RBC AUTO: 98.1 FL (ref 80–94)
MCV RBC AUTO: 99.6 FL (ref 80–94)
MICROCYTES BLD QL: ABNORMAL
MICROCYTES BLD QL: NORMAL
MONOCYTES # BLD AUTO: 0.42 10*3/MM3 (ref 0–1)
MONOCYTES # BLD AUTO: 0.61 10*3/MM3 (ref 0–1)
MONOCYTES # BLD AUTO: 0.79 10*3/MM3 (ref 0–1)
MONOCYTES # BLD AUTO: 1.05 10*3/MM3 (ref 0–1)
MONOCYTES # BLD AUTO: 1.11 10*3/MM3 (ref 0–1)
MONOCYTES # BLD AUTO: 1.19 10*3/MM3 (ref 0–1)
MONOCYTES # BLD AUTO: 1.2 10*3/MM3 (ref 0–1)
MONOCYTES # BLD AUTO: 1.21 10*3/MM3 (ref 0–1)
MONOCYTES # BLD AUTO: 1.25 10*3/MM3 (ref 0–1)
MONOCYTES # BLD AUTO: 1.26 10*3/MM3 (ref 0–1)
MONOCYTES # BLD AUTO: 1.32 10*3/MM3 (ref 0–1)
MONOCYTES # BLD AUTO: 1.32 10*3/MM3 (ref 0–1)
MONOCYTES # BLD AUTO: 1.36 10*3/MM3 (ref 0–1)
MONOCYTES # BLD AUTO: 1.42 10*3/MM3 (ref 0–1)
MONOCYTES # BLD AUTO: 1.42 10*3/MM3 (ref 0–1)
MONOCYTES # BLD AUTO: 1.43 10*3/MM3 (ref 0–1)
MONOCYTES # BLD AUTO: 1.46 10*3/MM3 (ref 0–1)
MONOCYTES # BLD AUTO: 1.46 10*3/MM3 (ref 0–1)
MONOCYTES # BLD AUTO: 1.47 10*3/MM3 (ref 0–1)
MONOCYTES # BLD AUTO: 1.5 10*3/MM3 (ref 0–1)
MONOCYTES # BLD AUTO: 1.52 10*3/MM3 (ref 0–1)
MONOCYTES # BLD AUTO: 1.54 10*3/MM3 (ref 0–1)
MONOCYTES # BLD AUTO: 1.56 10*3/MM3 (ref 0–1)
MONOCYTES # BLD AUTO: 1.56 10*3/MM3 (ref 0–1)
MONOCYTES # BLD AUTO: 1.57 10*3/MM3 (ref 0–1)
MONOCYTES # BLD AUTO: 1.58 10*3/MM3 (ref 0–1)
MONOCYTES # BLD AUTO: 1.62 10*3/MM3 (ref 0–1)
MONOCYTES # BLD AUTO: 1.62 10*3/MM3 (ref 0–1)
MONOCYTES # BLD AUTO: 1.64 10*3/MM3 (ref 0–1)
MONOCYTES # BLD AUTO: 1.64 10*3/MM3 (ref 0–1)
MONOCYTES # BLD AUTO: 1.67 10*3/MM3 (ref 0–1)
MONOCYTES # BLD AUTO: 1.67 10*3/MM3 (ref 0–1)
MONOCYTES # BLD AUTO: 1.7 10*3/MM3 (ref 0–1)
MONOCYTES # BLD AUTO: 1.72 10*3/MM3 (ref 0–1)
MONOCYTES # BLD AUTO: 1.89 10*3/MM3 (ref 0–1)
MONOCYTES # BLD AUTO: 1.89 10*3/MM3 (ref 0–1)
MONOCYTES # BLD AUTO: 1.94 10*3/MM3 (ref 0–1)
MONOCYTES # BLD AUTO: 1.95 10*3/MM3 (ref 0–1)
MONOCYTES # BLD AUTO: 2.03 10*3/MM3 (ref 0–1)
MONOCYTES # BLD AUTO: 2.04 10*3/MM3 (ref 0–1)
MONOCYTES # BLD AUTO: 2.17 10*3/MM3 (ref 0–1)
MONOCYTES # BLD AUTO: 2.18 10*3/MM3 (ref 0–1)
MONOCYTES # BLD AUTO: 2.62 10*3/MM3 (ref 0–1)
MONOCYTES # BLD AUTO: 2.77 10*3/MM3 (ref 0–1)
MONOCYTES # BLD AUTO: 3.72 10*3/MM3 (ref 0–1)
MONOCYTES # BLD AUTO: 4.3 10*3/MM3 (ref 0–1)
MONOCYTES # BLD AUTO: 6.04 10*3/MM3 (ref 0–1)
MONOCYTES NFR BLD AUTO: 18 % (ref 3–8)
MONOCYTES NFR BLD AUTO: 19.8 % (ref 3–8)
MONOCYTES NFR BLD AUTO: 21.2 % (ref 3–8)
MONOCYTES NFR BLD AUTO: 23 % (ref 3–8)
MONOCYTES NFR BLD AUTO: 23.3 % (ref 3–8)
MONOCYTES NFR BLD AUTO: 24.6 % (ref 3–8)
MONOCYTES NFR BLD AUTO: 24.9 % (ref 3–8)
MONOCYTES NFR BLD AUTO: 25.9 % (ref 3–8)
MONOCYTES NFR BLD AUTO: 26 % (ref 3–8)
MONOCYTES NFR BLD AUTO: 26.3 % (ref 3–8)
MONOCYTES NFR BLD AUTO: 26.3 % (ref 3–8)
MONOCYTES NFR BLD AUTO: 26.4 % (ref 3–8)
MONOCYTES NFR BLD AUTO: 26.7 % (ref 3–8)
MONOCYTES NFR BLD AUTO: 26.9 % (ref 3–8)
MONOCYTES NFR BLD AUTO: 27.1 % (ref 3–8)
MONOCYTES NFR BLD AUTO: 28.4 % (ref 3–8)
MONOCYTES NFR BLD AUTO: 28.6 % (ref 3–8)
MONOCYTES NFR BLD AUTO: 28.9 % (ref 3–8)
MONOCYTES NFR BLD AUTO: 29.1 % (ref 3–8)
MONOCYTES NFR BLD AUTO: 29.7 % (ref 3–8)
MONOCYTES NFR BLD AUTO: 30.7 % (ref 3–8)
MONOCYTES NFR BLD AUTO: 30.9 % (ref 3–8)
MONOCYTES NFR BLD AUTO: 32.1 % (ref 3–8)
MONOCYTES NFR BLD AUTO: 32.5 % (ref 3–8)
MONOCYTES NFR BLD AUTO: 33.4 % (ref 3–8)
MONOCYTES NFR BLD AUTO: 34.5 % (ref 3–8)
MONOCYTES NFR BLD AUTO: 34.6 % (ref 3–8)
MONOCYTES NFR BLD AUTO: 35.8 % (ref 3–8)
MONOCYTES NFR BLD AUTO: 36.4 % (ref 3–8)
MONOCYTES NFR BLD AUTO: 36.7 % (ref 3–8)
MONOCYTES NFR BLD AUTO: 36.8 % (ref 3–8)
MONOCYTES NFR BLD AUTO: 38.1 % (ref 3–8)
MONOCYTES NFR BLD AUTO: 38.9 % (ref 3–8)
MONOCYTES NFR BLD AUTO: 40.6 % (ref 3–8)
MONOCYTES NFR BLD AUTO: 41 % (ref 3–8)
MONOCYTES NFR BLD AUTO: 41.1 % (ref 3–8)
MONOCYTES NFR BLD AUTO: 41.4 % (ref 3–8)
MONOCYTES NFR BLD AUTO: 6.9 % (ref 3–8)
NEUTROPHILS # BLD AUTO: 0.38 10*3/MM3 (ref 1.5–8.3)
NEUTROPHILS # BLD AUTO: 0.71 10*3/MM3 (ref 1.5–8.3)
NEUTROPHILS # BLD AUTO: 0.82 10*3/MM3 (ref 1.5–8.3)
NEUTROPHILS # BLD AUTO: 0.97 10*3/MM3 (ref 1.5–8.3)
NEUTROPHILS # BLD AUTO: 1 10*3/MM3 (ref 1.5–8.3)
NEUTROPHILS # BLD AUTO: 1.19 10*3/MM3 (ref 1.5–8.3)
NEUTROPHILS # BLD AUTO: 1.36 10*3/MM3 (ref 1.5–8.3)
NEUTROPHILS # BLD AUTO: 1.57 10*3/MM3 (ref 1.5–8.3)
NEUTROPHILS # BLD AUTO: 1.58 10*3/MM3 (ref 1.5–8.3)
NEUTROPHILS # BLD AUTO: 1.71 10*3/MM3 (ref 1.5–8.3)
NEUTROPHILS # BLD AUTO: 1.73 10*3/MM3 (ref 1.5–8.3)
NEUTROPHILS # BLD AUTO: 1.75 10*3/MM3 (ref 1.5–8.3)
NEUTROPHILS # BLD AUTO: 1.88 10*3/MM3 (ref 1.5–8.3)
NEUTROPHILS # BLD AUTO: 1.93 10*3/MM3 (ref 1.5–8.3)
NEUTROPHILS # BLD AUTO: 1.95 10*3/MM3 (ref 1.5–8.3)
NEUTROPHILS # BLD AUTO: 11.08 10*3/MM3 (ref 1.5–8.3)
NEUTROPHILS # BLD AUTO: 15.22 10*3/MM3 (ref 1.5–8.3)
NEUTROPHILS # BLD AUTO: 2.07 10*3/MM3 (ref 1.5–8.3)
NEUTROPHILS # BLD AUTO: 2.15 10*3/MM3 (ref 1.5–8.3)
NEUTROPHILS # BLD AUTO: 2.16 10*3/MM3 (ref 1.5–8.3)
NEUTROPHILS # BLD AUTO: 2.38 10*3/MM3 (ref 1.5–8.3)
NEUTROPHILS # BLD AUTO: 2.39 10*3/MM3 (ref 1.5–8.3)
NEUTROPHILS # BLD AUTO: 2.46 10*3/MM3 (ref 1.5–8.3)
NEUTROPHILS # BLD AUTO: 2.59 10*3/MM3 (ref 1.5–8.3)
NEUTROPHILS # BLD AUTO: 2.68 10*3/MM3 (ref 1.5–8.3)
NEUTROPHILS # BLD AUTO: 2.72 10*3/MM3 (ref 1.5–8.3)
NEUTROPHILS # BLD AUTO: 2.8 10*3/MM3 (ref 1.5–8.3)
NEUTROPHILS # BLD AUTO: 2.86 10*3/MM3 (ref 1.5–8.3)
NEUTROPHILS # BLD AUTO: 2.87 10*3/MM3 (ref 1.5–8.3)
NEUTROPHILS # BLD AUTO: 3.02 10*3/MM3 (ref 1.5–8.3)
NEUTROPHILS # BLD AUTO: 3.3 10*3/MM3 (ref 1.5–8.3)
NEUTROPHILS # BLD AUTO: 3.39 10*3/MM3 (ref 1.5–8.3)
NEUTROPHILS # BLD AUTO: 3.4 10*3/MM3 (ref 1.5–8.3)
NEUTROPHILS # BLD AUTO: 3.44 10*3/MM3 (ref 1.5–8.3)
NEUTROPHILS # BLD AUTO: 3.47 10*3/MM3 (ref 1.5–8.3)
NEUTROPHILS # BLD AUTO: 3.5 10*3/MM3 (ref 1.5–8.3)
NEUTROPHILS # BLD AUTO: 3.52 10*3/MM3 (ref 1.5–8.3)
NEUTROPHILS # BLD AUTO: 3.79 10*3/MM3 (ref 1.5–8.3)
NEUTROPHILS # BLD AUTO: 3.89 10*3/MM3 (ref 1.5–8.3)
NEUTROPHILS # BLD AUTO: 3.94 10*3/MM3 (ref 1.5–8.3)
NEUTROPHILS # BLD AUTO: 4.11 10*3/MM3 (ref 1.5–8.3)
NEUTROPHILS # BLD AUTO: 4.12 10*3/MM3 (ref 1.5–8.3)
NEUTROPHILS # BLD AUTO: 4.56 10*3/MM3 (ref 1.5–8.3)
NEUTROPHILS # BLD AUTO: 4.76 10*3/MM3 (ref 1.5–8.3)
NEUTROPHILS # BLD AUTO: 5.01 10*3/MM3 (ref 1.5–8.3)
NEUTROPHILS # BLD AUTO: 5.8 10*3/MM3 (ref 1.5–8.3)
NEUTROPHILS # BLD AUTO: 6.92 10*3/MM3 (ref 1.5–8.3)
NEUTROPHILS # BLD AUTO: 9.15 10*3/MM3 (ref 1.5–8.3)
NEUTROPHILS NFR BLD AUTO: 13.3 % (ref 45–70)
NEUTROPHILS NFR BLD AUTO: 24.9 % (ref 45–70)
NEUTROPHILS NFR BLD AUTO: 26.9 % (ref 45–70)
NEUTROPHILS NFR BLD AUTO: 28.9 % (ref 45–70)
NEUTROPHILS NFR BLD AUTO: 30.3 % (ref 45–70)
NEUTROPHILS NFR BLD AUTO: 34.6 % (ref 45–70)
NEUTROPHILS NFR BLD AUTO: 39.7 % (ref 45–70)
NEUTROPHILS NFR BLD AUTO: 40.6 % (ref 45–70)
NEUTROPHILS NFR BLD AUTO: 40.9 % (ref 45–70)
NEUTROPHILS NFR BLD AUTO: 40.9 % (ref 45–70)
NEUTROPHILS NFR BLD AUTO: 41.3 % (ref 45–70)
NEUTROPHILS NFR BLD AUTO: 41.9 % (ref 45–70)
NEUTROPHILS NFR BLD AUTO: 42.7 % (ref 45–70)
NEUTROPHILS NFR BLD AUTO: 45.2 % (ref 45–70)
NEUTROPHILS NFR BLD AUTO: 45.5 % (ref 45–70)
NEUTROPHILS NFR BLD AUTO: 45.7 % (ref 45–70)
NEUTROPHILS NFR BLD AUTO: 45.9 % (ref 45–70)
NEUTROPHILS NFR BLD AUTO: 46.3 % (ref 45–70)
NEUTROPHILS NFR BLD AUTO: 47.2 % (ref 45–70)
NEUTROPHILS NFR BLD AUTO: 47.7 % (ref 45–70)
NEUTROPHILS NFR BLD AUTO: 48 % (ref 45–70)
NEUTROPHILS NFR BLD AUTO: 49.1 % (ref 45–70)
NEUTROPHILS NFR BLD AUTO: 49.9 % (ref 45–70)
NEUTROPHILS NFR BLD AUTO: 50.3 % (ref 45–70)
NEUTROPHILS NFR BLD AUTO: 51.7 % (ref 45–70)
NEUTROPHILS NFR BLD AUTO: 51.9 % (ref 45–70)
NEUTROPHILS NFR BLD AUTO: 53.3 % (ref 45–70)
NEUTROPHILS NFR BLD AUTO: 54.3 % (ref 45–70)
NEUTROPHILS NFR BLD AUTO: 55.2 % (ref 45–70)
NEUTROPHILS NFR BLD AUTO: 55.7 % (ref 45–70)
NEUTROPHILS NFR BLD AUTO: 56.7 % (ref 45–70)
NEUTROPHILS NFR BLD AUTO: 61.2 % (ref 45–70)
NEUTROPHILS NFR BLD AUTO: 61.2 % (ref 45–70)
NEUTROPHILS NFR BLD AUTO: 62.5 % (ref 45–70)
NEUTROPHILS NFR BLD AUTO: 67.1 % (ref 45–70)
NEUTROPHILS NFR BLD AUTO: 68.4 % (ref 45–70)
NEUTROPHILS NFR BLD AUTO: 68.9 % (ref 45–70)
NEUTROPHILS NFR BLD AUTO: 74.8 % (ref 45–70)
NEUTROPHILS NFR BLD AUTO: 78.7 % (ref 45–70)
NEUTROPHILS NFR BLD MANUAL: 28 % (ref 45–70)
NEUTROPHILS NFR BLD MANUAL: 35 % (ref 45–70)
NEUTROPHILS NFR BLD MANUAL: 39 % (ref 45–70)
NEUTROPHILS NFR BLD MANUAL: 46 % (ref 45–70)
NEUTROPHILS NFR BLD MANUAL: 53 % (ref 45–70)
NEUTROPHILS NFR BLD MANUAL: 54 % (ref 45–70)
NEUTROPHILS NFR BLD MANUAL: 59 % (ref 45–70)
NEUTROPHILS NFR BLD MANUAL: 61 % (ref 45–70)
NEUTROPHILS NFR BLD MANUAL: 68.7 % (ref 45–70)
NEUTS BAND NFR BLD MANUAL: 1 % (ref 0–5)
NEUTS BAND NFR BLD MANUAL: 3 % (ref 0–5)
NEUTS BAND NFR BLD MANUAL: 3 % (ref 0–5)
NEUTS BAND NFR BLD MANUAL: 4 % (ref 0–5)
NEUTS BAND NFR BLD MANUAL: 7 % (ref 0–5)
NITRITE UR QL STRIP: NEGATIVE
NITRITE UR QL STRIP: POSITIVE
NRBC BLD MANUAL-RTO: 0 /100 WBC (ref 0–0)
NRBC BLD MANUAL-RTO: 0.3 /100 WBC (ref 0–0)
O+P SPEC MICRO: NORMAL
OVA + PARASITE RESULT 1: NORMAL
OVALOCYTES BLD QL SMEAR: ABNORMAL
OVALOCYTES BLD QL SMEAR: NORMAL
PATH INTERP BLD-IMP: NORMAL
PATH REPORT.FINAL DX SPEC: NORMAL
PH UR STRIP.AUTO: 5 [PH] (ref 4.5–8)
PH UR STRIP.AUTO: 5.5 [PH] (ref 4.5–8)
PH UR STRIP.AUTO: 6.5 [PH] (ref 4.5–8)
PH UR STRIP.AUTO: 6.5 [PH] (ref 4.5–8)
PHOSPHATE SERPL-MCNC: 1.7 MG/DL (ref 2.7–4.5)
PHOSPHATE SERPL-MCNC: 2.2 MG/DL (ref 2.7–4.5)
PHOSPHATE SERPL-MCNC: 2.3 MG/DL (ref 2.7–4.5)
PHOSPHATE SERPL-MCNC: 2.4 MG/DL (ref 2.7–4.5)
PHOSPHATE SERPL-MCNC: 2.5 MG/DL (ref 2.7–4.5)
PHOSPHATE SERPL-MCNC: 2.5 MG/DL (ref 2.7–4.5)
PHOSPHATE SERPL-MCNC: 2.6 MG/DL (ref 2.7–4.5)
PHOSPHATE SERPL-MCNC: 2.7 MG/DL (ref 2.7–4.5)
PHOSPHATE SERPL-MCNC: 2.8 MG/DL (ref 2.7–4.5)
PHOSPHATE SERPL-MCNC: 3 MG/DL (ref 2.7–4.5)
PHOSPHATE SERPL-MCNC: 3 MG/DL (ref 2.7–4.5)
PHOSPHATE SERPL-MCNC: 3.1 MG/DL (ref 2.7–4.5)
PHOSPHATE SERPL-MCNC: 3.2 MG/DL (ref 2.7–4.5)
PHOSPHATE SERPL-MCNC: 3.6 MG/DL (ref 2.7–4.5)
PHOSPHATE SERPL-MCNC: 3.6 MG/DL (ref 2.7–4.5)
PHOSPHATE SERPL-MCNC: 3.7 MG/DL (ref 2.7–4.5)
PLAT MORPH BLD: NORMAL
PLATELET # BLD AUTO: 101 10*3/MM3 (ref 140–500)
PLATELET # BLD AUTO: 102 10*3/MM3 (ref 140–500)
PLATELET # BLD AUTO: 104 10*3/MM3 (ref 140–500)
PLATELET # BLD AUTO: 105 10*3/MM3 (ref 140–500)
PLATELET # BLD AUTO: 107 10*3/MM3 (ref 140–500)
PLATELET # BLD AUTO: 107 10*3/MM3 (ref 140–500)
PLATELET # BLD AUTO: 108 10*3/MM3 (ref 140–500)
PLATELET # BLD AUTO: 109 10*3/MM3 (ref 140–500)
PLATELET # BLD AUTO: 110 10*3/MM3 (ref 140–500)
PLATELET # BLD AUTO: 112 10*3/MM3 (ref 140–500)
PLATELET # BLD AUTO: 113 10*3/MM3 (ref 140–500)
PLATELET # BLD AUTO: 113 10*3/MM3 (ref 140–500)
PLATELET # BLD AUTO: 114 10*3/MM3 (ref 140–500)
PLATELET # BLD AUTO: 114 10*3/MM3 (ref 140–500)
PLATELET # BLD AUTO: 117 10*3/MM3 (ref 140–500)
PLATELET # BLD AUTO: 118 10*3/MM3 (ref 140–500)
PLATELET # BLD AUTO: 121 10*3/MM3 (ref 140–500)
PLATELET # BLD AUTO: 122 10*3/MM3 (ref 140–500)
PLATELET # BLD AUTO: 126 10*3/MM3 (ref 140–500)
PLATELET # BLD AUTO: 127 10*3/MM3 (ref 140–500)
PLATELET # BLD AUTO: 128 10*3/MM3 (ref 140–500)
PLATELET # BLD AUTO: 129 10*3/MM3 (ref 140–500)
PLATELET # BLD AUTO: 129 10*3/MM3 (ref 140–500)
PLATELET # BLD AUTO: 134 10*3/MM3 (ref 140–500)
PLATELET # BLD AUTO: 134 10*3/MM3 (ref 140–500)
PLATELET # BLD AUTO: 135 10*3/MM3 (ref 140–500)
PLATELET # BLD AUTO: 136 10*3/MM3 (ref 140–500)
PLATELET # BLD AUTO: 142 10*3/MM3 (ref 140–500)
PLATELET # BLD AUTO: 143 10*3/MM3 (ref 140–500)
PLATELET # BLD AUTO: 146 10*3/MM3 (ref 140–500)
PLATELET # BLD AUTO: 146 10*3/MM3 (ref 140–500)
PLATELET # BLD AUTO: 149 10*3/MM3 (ref 140–500)
PLATELET # BLD AUTO: 156 10*3/MM3 (ref 140–500)
PLATELET # BLD AUTO: 163 10*3/MM3 (ref 140–500)
PLATELET # BLD AUTO: 180 10*3/MM3 (ref 140–500)
PLATELET # BLD AUTO: 198 10*3/MM3 (ref 140–500)
PLATELET # BLD AUTO: 209 10*3/MM3 (ref 140–500)
PLATELET # BLD AUTO: 64 10*3/MM3 (ref 140–500)
PLATELET # BLD AUTO: 77 10*3/MM3 (ref 140–500)
PLATELET # BLD AUTO: 83 10*3/MM3 (ref 140–500)
PLATELET # BLD AUTO: 88 10*3/MM3 (ref 140–500)
PLATELET # BLD AUTO: 89 10*3/MM3 (ref 140–500)
PLATELET # BLD AUTO: 96 10*3/MM3 (ref 140–500)
PLATELET # BLD AUTO: 96 10*3/MM3 (ref 140–500)
PLATELET # BLD AUTO: 97 10*3/MM3 (ref 140–500)
PLATELET # BLD AUTO: 99 10*3/MM3 (ref 140–500)
PMV BLD AUTO: 10.6 FL (ref 7.4–10.4)
PMV BLD AUTO: 10.8 FL (ref 7.4–10.4)
PMV BLD AUTO: 10.9 FL (ref 7.4–10.4)
PMV BLD AUTO: 10.9 FL (ref 7.4–10.4)
PMV BLD AUTO: 11 FL (ref 7.4–10.4)
PMV BLD AUTO: 11.1 FL (ref 7.4–10.4)
PMV BLD AUTO: 11.3 FL (ref 7.4–10.4)
PMV BLD AUTO: 11.4 FL (ref 7.4–10.4)
PMV BLD AUTO: 11.5 FL (ref 7.4–10.4)
PMV BLD AUTO: 11.6 FL (ref 7.4–10.4)
PMV BLD AUTO: 11.7 FL (ref 7.4–10.4)
PMV BLD AUTO: 11.8 FL (ref 7.4–10.4)
PMV BLD AUTO: 11.9 FL (ref 7.4–10.4)
PMV BLD AUTO: 12 FL (ref 7.4–10.4)
PMV BLD AUTO: 12.1 FL (ref 7.4–10.4)
PMV BLD AUTO: 12.2 FL (ref 7.4–10.4)
PMV BLD AUTO: 12.3 FL (ref 7.4–10.4)
PMV BLD AUTO: 12.3 FL (ref 7.4–10.4)
PMV BLD AUTO: 12.4 FL (ref 7.4–10.4)
PMV BLD AUTO: 12.4 FL (ref 7.4–10.4)
PMV BLD AUTO: 12.5 FL (ref 7.4–10.4)
PMV BLD AUTO: 12.6 FL (ref 7.4–10.4)
PMV BLD AUTO: 12.9 FL (ref 7.4–10.4)
PMV BLD AUTO: 13 FL (ref 7.4–10.4)
POIKILOCYTOSIS BLD QL SMEAR: ABNORMAL
POIKILOCYTOSIS BLD QL SMEAR: NORMAL
POLYCHROMASIA BLD QL SMEAR: ABNORMAL
POTASSIUM BLD-SCNC: 2.7 MMOL/L (ref 3.5–5.2)
POTASSIUM BLD-SCNC: 2.8 MMOL/L (ref 3.5–5.2)
POTASSIUM BLD-SCNC: 2.9 MMOL/L (ref 3.5–5.2)
POTASSIUM BLD-SCNC: 3 MMOL/L (ref 3.5–5.2)
POTASSIUM BLD-SCNC: 3 MMOL/L (ref 3.5–5.2)
POTASSIUM BLD-SCNC: 3.1 MMOL/L (ref 3.5–5.2)
POTASSIUM BLD-SCNC: 3.2 MMOL/L (ref 3.5–5.2)
POTASSIUM BLD-SCNC: 3.3 MMOL/L (ref 3.5–5.2)
POTASSIUM BLD-SCNC: 3.4 MMOL/L (ref 3.5–5.2)
POTASSIUM BLD-SCNC: 3.5 MMOL/L (ref 3.5–5.2)
POTASSIUM BLD-SCNC: 3.6 MMOL/L (ref 3.5–5.2)
POTASSIUM BLD-SCNC: 3.8 MMOL/L (ref 3.5–5.2)
POTASSIUM BLD-SCNC: 3.9 MMOL/L (ref 3.5–5.2)
POTASSIUM BLD-SCNC: 4 MMOL/L (ref 3.5–5.2)
POTASSIUM BLD-SCNC: 4.1 MMOL/L (ref 3.5–5.2)
POTASSIUM BLD-SCNC: 4.2 MMOL/L (ref 3.5–5.2)
POTASSIUM BLD-SCNC: 4.4 MMOL/L (ref 3.5–5.2)
PREALB SERPL-MCNC: 12 MG/DL (ref 20–40)
PREALB SERPL-MCNC: 8.3 MG/DL (ref 20–40)
PROCALCITONIN SERPL-MCNC: 0.08 NG/ML (ref 0.1–0.25)
PROT SERPL-MCNC: 5.2 G/DL (ref 6–8.5)
PROT SERPL-MCNC: 5.6 G/DL (ref 6–8.5)
PROT SERPL-MCNC: 6 G/DL (ref 6–8.5)
PROT SERPL-MCNC: 6.5 G/DL (ref 6–8.5)
PROT SERPL-MCNC: 6.7 G/DL (ref 6–8.5)
PROT SERPL-MCNC: 6.7 G/DL (ref 6–8.5)
PROT SERPL-MCNC: 7.4 G/DL (ref 6–8.5)
PROT UR QL STRIP: ABNORMAL
PROTHROMBIN TIME: 15.1 SECONDS (ref 12.1–15)
PROTHROMBIN TIME: 15.5 SECONDS (ref 12.1–15)
PROTHROMBIN TIME: 15.6 SECONDS (ref 12.1–15)
PROTHROMBIN TIME: 16.8 SECONDS (ref 12.1–15)
PROTHROMBIN TIME: 17.3 SECONDS (ref 12.1–15)
PROTHROMBIN TIME: 18 SECONDS (ref 12.1–15)
PROTHROMBIN TIME: 18.1 SECONDS (ref 12.1–15)
PROTHROMBIN TIME: 18.3 SECONDS (ref 12.1–15)
PROTHROMBIN TIME: 18.4 SECONDS (ref 12.1–15)
PROTHROMBIN TIME: 18.5 SECONDS (ref 12.1–15)
PROTHROMBIN TIME: 18.8 SECONDS (ref 12.1–15)
PROTHROMBIN TIME: 18.8 SECONDS (ref 12.1–15)
PROTHROMBIN TIME: 19.6 SECONDS (ref 12.1–15)
RBC # BLD AUTO: 1.68 10*6/MM3 (ref 4.7–6.1)
RBC # BLD AUTO: 1.93 10*6/MM3 (ref 4.7–6.1)
RBC # BLD AUTO: 1.94 10*6/MM3 (ref 4.7–6.1)
RBC # BLD AUTO: 1.97 10*6/MM3 (ref 4.7–6.1)
RBC # BLD AUTO: 2.25 10*6/MM3 (ref 4.7–6.1)
RBC # BLD AUTO: 2.26 10*6/MM3 (ref 4.7–6.1)
RBC # BLD AUTO: 2.3 10*6/MM3 (ref 4.7–6.1)
RBC # BLD AUTO: 2.31 10*6/MM3 (ref 4.7–6.1)
RBC # BLD AUTO: 2.35 10*6/MM3 (ref 4.7–6.1)
RBC # BLD AUTO: 2.37 10*6/MM3 (ref 4.7–6.1)
RBC # BLD AUTO: 2.38 10*6/MM3 (ref 4.7–6.1)
RBC # BLD AUTO: 2.4 10*6/MM3 (ref 4.7–6.1)
RBC # BLD AUTO: 2.41 10*6/MM3 (ref 4.7–6.1)
RBC # BLD AUTO: 2.48 10*6/MM3 (ref 4.7–6.1)
RBC # BLD AUTO: 2.49 10*6/MM3 (ref 4.7–6.1)
RBC # BLD AUTO: 2.51 10*6/MM3 (ref 4.7–6.1)
RBC # BLD AUTO: 2.52 10*6/MM3 (ref 4.7–6.1)
RBC # BLD AUTO: 2.53 10*6/MM3 (ref 4.7–6.1)
RBC # BLD AUTO: 2.54 10*6/MM3 (ref 4.7–6.1)
RBC # BLD AUTO: 2.59 10*6/MM3 (ref 4.7–6.1)
RBC # BLD AUTO: 2.59 10*6/MM3 (ref 4.7–6.1)
RBC # BLD AUTO: 2.6 10*6/MM3 (ref 4.7–6.1)
RBC # BLD AUTO: 2.61 10*6/MM3 (ref 4.7–6.1)
RBC # BLD AUTO: 2.64 10*6/MM3 (ref 4.7–6.1)
RBC # BLD AUTO: 2.66 10*6/MM3 (ref 4.7–6.1)
RBC # BLD AUTO: 2.67 10*6/MM3 (ref 4.7–6.1)
RBC # BLD AUTO: 2.71 10*6/MM3 (ref 4.7–6.1)
RBC # BLD AUTO: 2.73 10*6/MM3 (ref 4.7–6.1)
RBC # BLD AUTO: 2.75 10*6/MM3 (ref 4.7–6.1)
RBC # BLD AUTO: 2.75 10*6/MM3 (ref 4.7–6.1)
RBC # BLD AUTO: 2.76 10*6/MM3 (ref 4.7–6.1)
RBC # BLD AUTO: 2.8 10*6/MM3 (ref 4.7–6.1)
RBC # BLD AUTO: 2.85 10*6/MM3 (ref 4.7–6.1)
RBC # BLD AUTO: 2.86 10*6/MM3 (ref 4.7–6.1)
RBC # BLD AUTO: 2.89 10*6/MM3 (ref 4.7–6.1)
RBC # BLD AUTO: 2.97 10*6/MM3 (ref 4.7–6.1)
RBC # BLD AUTO: 2.99 10*6/MM3 (ref 4.7–6.1)
RBC # BLD AUTO: 3 10*6/MM3 (ref 4.7–6.1)
RBC # BLD AUTO: 3.04 10*6/MM3 (ref 4.7–6.1)
RBC # BLD AUTO: 3.05 10*6/MM3 (ref 4.7–6.1)
RBC # BLD AUTO: 3.05 10*6/MM3 (ref 4.7–6.1)
RBC # BLD AUTO: 3.06 10*6/MM3 (ref 4.7–6.1)
RBC # BLD AUTO: 3.07 10*6/MM3 (ref 4.7–6.1)
RBC # BLD AUTO: 3.17 10*6/MM3 (ref 4.7–6.1)
RBC # BLD AUTO: 3.37 10*6/MM3 (ref 4.7–6.1)
RBC # UR: ABNORMAL /HPF
RBC MORPH BLD: NORMAL
RBC MORPH BLD: NORMAL
REF LAB TEST METHOD: ABNORMAL
REPTILASE TIME: 19.2 SEC (ref 0–21.9)
RH BLD: POSITIVE
SCAN SLIDE: NORMAL
SMALL PLATELETS BLD QL SMEAR: ABNORMAL
SMALL PLATELETS BLD QL SMEAR: ADEQUATE
SMALL PLATELETS BLD QL SMEAR: NORMAL
SMALL PLATELETS BLD QL SMEAR: NORMAL
SODIUM BLD-SCNC: 135 MMOL/L (ref 136–145)
SODIUM BLD-SCNC: 136 MMOL/L (ref 136–145)
SODIUM BLD-SCNC: 136 MMOL/L (ref 136–145)
SODIUM BLD-SCNC: 137 MMOL/L (ref 136–145)
SODIUM BLD-SCNC: 138 MMOL/L (ref 136–145)
SODIUM BLD-SCNC: 139 MMOL/L (ref 136–145)
SODIUM BLD-SCNC: 140 MMOL/L (ref 136–145)
SODIUM BLD-SCNC: 141 MMOL/L (ref 136–145)
SODIUM BLD-SCNC: 142 MMOL/L (ref 136–145)
SODIUM BLD-SCNC: 143 MMOL/L (ref 136–145)
SODIUM BLD-SCNC: 144 MMOL/L (ref 136–145)
SODIUM BLD-SCNC: 145 MMOL/L (ref 136–145)
SP GR UR STRIP: 1.01 (ref 1–1.03)
SP GR UR STRIP: 1.02 (ref 1–1.03)
SP GR UR STRIP: 1.02 (ref 1–1.03)
SP GR UR STRIP: 1.03 (ref 1–1.03)
SQUAMOUS #/AREA URNS HPF: ABNORMAL /HPF
STRESS TARGET HR: 116 BPM
THROMBIN TIME: 17.1 SECONDS (ref 15.7–20.4)
THROMBIN TIME: 18.1 SEC (ref 0–23)
TIBC SERPL-MCNC: 139 MCG/DL (ref 261–498)
TIBC SERPL-MCNC: 185 MCG/DL (ref 261–498)
TIBC SERPL-MCNC: 197 MCG/DL (ref 261–498)
UIBC SERPL-MCNC: 124 MCG/DL (ref 112–346)
UIBC SERPL-MCNC: 149 MCG/DL (ref 112–346)
UIBC SERPL-MCNC: 44 MCG/DL (ref 112–346)
UNIT  ABO: NORMAL
UNIT  RH: NORMAL
UROBILINOGEN UR QL STRIP: ABNORMAL
VARIANT LYMPHS NFR BLD MANUAL: 2 % (ref 0–5)
VARIANT LYMPHS NFR BLD MANUAL: 2 % (ref 0–5)
VIT B12 BLD-MCNC: 1261 PG/ML (ref 232–1245)
WAXY CASTS #/AREA URNS LPF: ABNORMAL /LPF
WBC CASTS #/AREA URNS LPF: ABNORMAL /LPF
WBC MORPH BLD: NORMAL
WBC NRBC COR # BLD: 11.76 10*3/MM3 (ref 4.8–10.8)
WBC NRBC COR # BLD: 14.29 10*3/MM3 (ref 4.8–10.8)
WBC NRBC COR # BLD: 16.52 10*3/MM3 (ref 4.8–10.8)
WBC NRBC COR # BLD: 2.53 10*3/MM3 (ref 4.8–10.8)
WBC NRBC COR # BLD: 2.85 10*3/MM3 (ref 4.8–10.8)
WBC NRBC COR # BLD: 22.26 10*3/MM3 (ref 4.8–10.8)
WBC NRBC COR # BLD: 3.05 10*3/MM3 (ref 4.8–10.8)
WBC NRBC COR # BLD: 3.3 10*3/MM3 (ref 4.8–10.8)
WBC NRBC COR # BLD: 3.35 10*3/MM3 (ref 4.8–10.8)
WBC NRBC COR # BLD: 3.81 10*3/MM3 (ref 4.8–10.8)
WBC NRBC COR # BLD: 3.88 10*3/MM3 (ref 4.8–10.8)
WBC NRBC COR # BLD: 4.14 10*3/MM3 (ref 4.8–10.8)
WBC NRBC COR # BLD: 4.22 10*3/MM3 (ref 4.8–10.8)
WBC NRBC COR # BLD: 4.25 10*3/MM3 (ref 4.8–10.8)
WBC NRBC COR # BLD: 4.31 10*3/MM3 (ref 4.8–10.8)
WBC NRBC COR # BLD: 4.49 10*3/MM3 (ref 4.8–10.8)
WBC NRBC COR # BLD: 4.52 10*3/MM3 (ref 4.8–10.8)
WBC NRBC COR # BLD: 4.58 10*3/MM3 (ref 4.8–10.8)
WBC NRBC COR # BLD: 4.99 10*3/MM3 (ref 4.8–10.8)
WBC NRBC COR # BLD: 5.01 10*3/MM3 (ref 4.8–10.8)
WBC NRBC COR # BLD: 5.06 10*3/MM3 (ref 4.8–10.8)
WBC NRBC COR # BLD: 5.26 10*3/MM3 (ref 4.8–10.8)
WBC NRBC COR # BLD: 5.29 10*3/MM3 (ref 4.8–10.8)
WBC NRBC COR # BLD: 5.31 10*3/MM3 (ref 4.8–10.8)
WBC NRBC COR # BLD: 5.32 10*3/MM3 (ref 4.8–10.8)
WBC NRBC COR # BLD: 5.4 10*3/MM3 (ref 4.8–10.8)
WBC NRBC COR # BLD: 5.45 10*3/MM3 (ref 4.8–10.8)
WBC NRBC COR # BLD: 5.5 10*3/MM3 (ref 4.8–10.8)
WBC NRBC COR # BLD: 5.75 10*3/MM3 (ref 4.8–10.8)
WBC NRBC COR # BLD: 5.78 10*3/MM3 (ref 4.8–10.8)
WBC NRBC COR # BLD: 6 10*3/MM3 (ref 4.8–10.8)
WBC NRBC COR # BLD: 6.05 10*3/MM3 (ref 4.8–10.8)
WBC NRBC COR # BLD: 6.15 10*3/MM3 (ref 4.8–10.8)
WBC NRBC COR # BLD: 6.27 10*3/MM3 (ref 4.8–10.8)
WBC NRBC COR # BLD: 6.35 10*3/MM3 (ref 4.8–10.8)
WBC NRBC COR # BLD: 6.35 10*3/MM3 (ref 4.8–10.8)
WBC NRBC COR # BLD: 6.36 10*3/MM3 (ref 4.8–10.8)
WBC NRBC COR # BLD: 6.37 10*3/MM3 (ref 4.8–10.8)
WBC NRBC COR # BLD: 6.38 10*3/MM3 (ref 4.8–10.8)
WBC NRBC COR # BLD: 6.73 10*3/MM3 (ref 4.8–10.8)
WBC NRBC COR # BLD: 6.97 10*3/MM3 (ref 4.8–10.8)
WBC NRBC COR # BLD: 7.3 10*3/MM3 (ref 4.8–10.8)
WBC NRBC COR # BLD: 7.34 10*3/MM3 (ref 4.8–10.8)
WBC NRBC COR # BLD: 7.58 10*3/MM3 (ref 4.8–10.8)
WBC NRBC COR # BLD: 7.73 10*3/MM3 (ref 4.8–10.8)
WBC NRBC COR # BLD: 8.42 10*3/MM3 (ref 4.8–10.8)
WBC NRBC COR # BLD: 9.26 10*3/MM3 (ref 4.8–10.8)
WBC UR QL AUTO: ABNORMAL /HPF

## 2017-01-01 PROCEDURE — P9035 PLATELET PHERES LEUKOREDUCED: HCPCS

## 2017-01-01 PROCEDURE — 94799 UNLISTED PULMONARY SVC/PX: CPT

## 2017-01-01 PROCEDURE — 85730 THROMBOPLASTIN TIME PARTIAL: CPT | Performed by: SURGERY

## 2017-01-01 PROCEDURE — 0 DIATRIZOATE MEGLUMINE & SODIUM PER 1 ML: Performed by: FAMILY MEDICINE

## 2017-01-01 PROCEDURE — P9016 RBC LEUKOCYTES REDUCED: HCPCS

## 2017-01-01 PROCEDURE — 25010000002 CEFOXITIN PER 1 G: Performed by: SURGERY

## 2017-01-01 PROCEDURE — 86140 C-REACTIVE PROTEIN: CPT | Performed by: SURGERY

## 2017-01-01 PROCEDURE — 36430 TRANSFUSION BLD/BLD COMPNT: CPT

## 2017-01-01 PROCEDURE — 76700 US EXAM ABDOM COMPLETE: CPT

## 2017-01-01 PROCEDURE — 82962 GLUCOSE BLOOD TEST: CPT

## 2017-01-01 PROCEDURE — 85635 REPTILASE TEST: CPT | Performed by: FAMILY MEDICINE

## 2017-01-01 PROCEDURE — 81001 URINALYSIS AUTO W/SCOPE: CPT

## 2017-01-01 PROCEDURE — 25010000002 HYDROMORPHONE PER 4 MG: Performed by: NURSE PRACTITIONER

## 2017-01-01 PROCEDURE — 83735 ASSAY OF MAGNESIUM: CPT | Performed by: INTERNAL MEDICINE

## 2017-01-01 PROCEDURE — 84100 ASSAY OF PHOSPHORUS: CPT | Performed by: SURGERY

## 2017-01-01 PROCEDURE — 86901 BLOOD TYPING SEROLOGIC RH(D): CPT

## 2017-01-01 PROCEDURE — 80048 BASIC METABOLIC PNL TOTAL CA: CPT | Performed by: SURGERY

## 2017-01-01 PROCEDURE — 25010000003 POTASSIUM CHLORIDE 10 MEQ/100ML SOLUTION: Performed by: SURGERY

## 2017-01-01 PROCEDURE — 99232 SBSQ HOSP IP/OBS MODERATE 35: CPT | Performed by: INTERNAL MEDICINE

## 2017-01-01 PROCEDURE — 99222 1ST HOSP IP/OBS MODERATE 55: CPT | Performed by: INTERNAL MEDICINE

## 2017-01-01 PROCEDURE — 87045 FECES CULTURE AEROBIC BACT: CPT | Performed by: SURGERY

## 2017-01-01 PROCEDURE — 25010000002 PIPERACILLIN SOD-TAZOBACTAM PER 1 G: Performed by: SURGERY

## 2017-01-01 PROCEDURE — 97110 THERAPEUTIC EXERCISES: CPT

## 2017-01-01 PROCEDURE — 25010000003 POTASSIUM CHLORIDE PER 2 MEQ: Performed by: NURSE PRACTITIONER

## 2017-01-01 PROCEDURE — 85610 PROTHROMBIN TIME: CPT | Performed by: SURGERY

## 2017-01-01 PROCEDURE — 85014 HEMATOCRIT: CPT | Performed by: SURGERY

## 2017-01-01 PROCEDURE — 25010000002 ONDANSETRON PER 1 MG

## 2017-01-01 PROCEDURE — 86850 RBC ANTIBODY SCREEN: CPT | Performed by: SURGERY

## 2017-01-01 PROCEDURE — 85610 PROTHROMBIN TIME: CPT | Performed by: NURSE PRACTITIONER

## 2017-01-01 PROCEDURE — 86900 BLOOD TYPING SEROLOGIC ABO: CPT

## 2017-01-01 PROCEDURE — 87077 CULTURE AEROBIC IDENTIFY: CPT | Performed by: SURGERY

## 2017-01-01 PROCEDURE — 83550 IRON BINDING TEST: CPT | Performed by: INTERNAL MEDICINE

## 2017-01-01 PROCEDURE — 25010000002 HYDROMORPHONE PER 4 MG: Performed by: SURGERY

## 2017-01-01 PROCEDURE — A9270 NON-COVERED ITEM OR SERVICE: HCPCS

## 2017-01-01 PROCEDURE — 99231 SBSQ HOSP IP/OBS SF/LOW 25: CPT | Performed by: NURSE PRACTITIONER

## 2017-01-01 PROCEDURE — 85025 COMPLETE CBC W/AUTO DIFF WBC: CPT | Performed by: INTERNAL MEDICINE

## 2017-01-01 PROCEDURE — 85385 FIBRINOGEN ANTIGEN: CPT | Performed by: FAMILY MEDICINE

## 2017-01-01 PROCEDURE — 93005 ELECTROCARDIOGRAM TRACING: CPT | Performed by: SURGERY

## 2017-01-01 PROCEDURE — 81001 URINALYSIS AUTO W/SCOPE: CPT | Performed by: EMERGENCY MEDICINE

## 2017-01-01 PROCEDURE — 86927 PLASMA FRESH FROZEN: CPT

## 2017-01-01 PROCEDURE — 25010000002 POTASSIUM CHLORIDE PER 2 MEQ OF POTASSIUM: Performed by: SURGERY

## 2017-01-01 PROCEDURE — 99221 1ST HOSP IP/OBS SF/LOW 40: CPT | Performed by: SURGERY

## 2017-01-01 PROCEDURE — 81001 URINALYSIS AUTO W/SCOPE: CPT | Performed by: HOSPITALIST

## 2017-01-01 PROCEDURE — 86923 COMPATIBILITY TEST ELECTRIC: CPT

## 2017-01-01 PROCEDURE — 87070 CULTURE OTHR SPECIMN AEROBIC: CPT | Performed by: SURGERY

## 2017-01-01 PROCEDURE — 87046 STOOL CULTR AEROBIC BACT EA: CPT | Performed by: SURGERY

## 2017-01-01 PROCEDURE — 87205 SMEAR GRAM STAIN: CPT | Performed by: SURGERY

## 2017-01-01 PROCEDURE — 25010000002 MAGNESIUM SULFATE IN D5W 1G/100ML (PREMIX) 1-5 GM/100ML-% SOLUTION: Performed by: NURSE PRACTITIONER

## 2017-01-01 PROCEDURE — 36415 COLL VENOUS BLD VENIPUNCTURE: CPT | Performed by: INTERNAL MEDICINE

## 2017-01-01 PROCEDURE — 85007 BL SMEAR W/DIFF WBC COUNT: CPT | Performed by: INTERNAL MEDICINE

## 2017-01-01 PROCEDURE — 86850 RBC ANTIBODY SCREEN: CPT | Performed by: INTERNAL MEDICINE

## 2017-01-01 PROCEDURE — 25010000002 MAGNESIUM SULFATE PER 500 MG OF MAGNESIUM: Performed by: SURGERY

## 2017-01-01 PROCEDURE — 99232 SBSQ HOSP IP/OBS MODERATE 35: CPT | Performed by: HOSPITALIST

## 2017-01-01 PROCEDURE — 25010000002 ALBUMIN HUMAN 5% PER 50 ML: Performed by: ANESTHESIOLOGY

## 2017-01-01 PROCEDURE — 25810000003 SODIUM CHLORIDE 0.9 % WITH KCL 20 MEQ 20-0.9 MEQ/L-% SOLUTION

## 2017-01-01 PROCEDURE — 25010000002 DEXAMETHASONE PER 1 MG: Performed by: NURSE ANESTHETIST, CERTIFIED REGISTERED

## 2017-01-01 PROCEDURE — 25010000002 ONDANSETRON PER 1 MG: Performed by: NURSE ANESTHETIST, CERTIFIED REGISTERED

## 2017-01-01 PROCEDURE — 86900 BLOOD TYPING SEROLOGIC ABO: CPT | Performed by: FAMILY MEDICINE

## 2017-01-01 PROCEDURE — 97116 GAIT TRAINING THERAPY: CPT

## 2017-01-01 PROCEDURE — A9270 NON-COVERED ITEM OR SERVICE: HCPCS | Performed by: INTERNAL MEDICINE

## 2017-01-01 PROCEDURE — 84134 ASSAY OF PREALBUMIN: CPT | Performed by: SURGERY

## 2017-01-01 PROCEDURE — 25010000002 HYDROMORPHONE PER 4 MG: Performed by: ANESTHESIOLOGY

## 2017-01-01 PROCEDURE — 99223 1ST HOSP IP/OBS HIGH 75: CPT | Performed by: FAMILY MEDICINE

## 2017-01-01 PROCEDURE — 99024 POSTOP FOLLOW-UP VISIT: CPT | Performed by: SURGERY

## 2017-01-01 PROCEDURE — 87177 OVA AND PARASITES SMEARS: CPT

## 2017-01-01 PROCEDURE — 25010000002 MAGNESIUM SULFATE IN D5W 1G/100ML (PREMIX) 1-5 GM/100ML-% SOLUTION: Performed by: HOSPITALIST

## 2017-01-01 PROCEDURE — 87086 URINE CULTURE/COLONY COUNT: CPT

## 2017-01-01 PROCEDURE — 86901 BLOOD TYPING SEROLOGIC RH(D): CPT | Performed by: HOSPITALIST

## 2017-01-01 PROCEDURE — 84132 ASSAY OF SERUM POTASSIUM: CPT | Performed by: SURGERY

## 2017-01-01 PROCEDURE — 93005 ELECTROCARDIOGRAM TRACING: CPT | Performed by: NURSE PRACTITIONER

## 2017-01-01 PROCEDURE — 96372 THER/PROPH/DIAG INJ SC/IM: CPT

## 2017-01-01 PROCEDURE — 25010000002 MAGNESIUM SULFATE 2 GM/50ML SOLUTION

## 2017-01-01 PROCEDURE — 85384 FIBRINOGEN ACTIVITY: CPT | Performed by: INTERNAL MEDICINE

## 2017-01-01 PROCEDURE — 83735 ASSAY OF MAGNESIUM: CPT | Performed by: SURGERY

## 2017-01-01 PROCEDURE — 83540 ASSAY OF IRON: CPT | Performed by: INTERNAL MEDICINE

## 2017-01-01 PROCEDURE — G0378 HOSPITAL OBSERVATION PER HR: HCPCS

## 2017-01-01 PROCEDURE — 63710000001 INSULIN ASPART PER 5 UNITS: Performed by: SURGERY

## 2017-01-01 PROCEDURE — 25010000003 MORPHINE PER 10 MG: Performed by: SURGERY

## 2017-01-01 PROCEDURE — 63510000001 EPOETIN ALFA PER 1000 UNITS: Performed by: HOSPITALIST

## 2017-01-01 PROCEDURE — 85018 HEMOGLOBIN: CPT | Performed by: ANESTHESIOLOGY

## 2017-01-01 PROCEDURE — 99231 SBSQ HOSP IP/OBS SF/LOW 25: CPT | Performed by: INTERNAL MEDICINE

## 2017-01-01 PROCEDURE — 83036 HEMOGLOBIN GLYCOSYLATED A1C: CPT | Performed by: HOSPITALIST

## 2017-01-01 PROCEDURE — 93000 ELECTROCARDIOGRAM COMPLETE: CPT | Performed by: INTERNAL MEDICINE

## 2017-01-01 PROCEDURE — 99213 OFFICE O/P EST LOW 20 MIN: CPT | Performed by: INTERNAL MEDICINE

## 2017-01-01 PROCEDURE — 85362 FIBRIN DEGRADATION PRODUCTS: CPT | Performed by: INTERNAL MEDICINE

## 2017-01-01 PROCEDURE — 85730 THROMBOPLASTIN TIME PARTIAL: CPT | Performed by: INTERNAL MEDICINE

## 2017-01-01 PROCEDURE — 85025 COMPLETE CBC W/AUTO DIFF WBC: CPT | Performed by: SURGERY

## 2017-01-01 PROCEDURE — 85379 FIBRIN DEGRADATION QUANT: CPT | Performed by: NURSE PRACTITIONER

## 2017-01-01 PROCEDURE — 80048 BASIC METABOLIC PNL TOTAL CA: CPT | Performed by: ANESTHESIOLOGY

## 2017-01-01 PROCEDURE — 85025 COMPLETE CBC W/AUTO DIFF WBC: CPT | Performed by: NURSE ANESTHETIST, CERTIFIED REGISTERED

## 2017-01-01 PROCEDURE — 99231 SBSQ HOSP IP/OBS SF/LOW 25: CPT | Performed by: SURGERY

## 2017-01-01 PROCEDURE — 80053 COMPREHEN METABOLIC PANEL: CPT | Performed by: SURGERY

## 2017-01-01 PROCEDURE — 82728 ASSAY OF FERRITIN: CPT | Performed by: INTERNAL MEDICINE

## 2017-01-01 PROCEDURE — 85018 HEMOGLOBIN: CPT | Performed by: SURGERY

## 2017-01-01 PROCEDURE — 87086 URINE CULTURE/COLONY COUNT: CPT | Performed by: NURSE PRACTITIONER

## 2017-01-01 PROCEDURE — 99223 1ST HOSP IP/OBS HIGH 75: CPT | Performed by: INTERNAL MEDICINE

## 2017-01-01 PROCEDURE — 44120 REMOVAL OF SMALL INTESTINE: CPT | Performed by: SURGERY

## 2017-01-01 PROCEDURE — 02HV33Z INSERTION OF INFUSION DEVICE INTO SUPERIOR VENA CAVA, PERCUTANEOUS APPROACH: ICD-10-PCS | Performed by: SURGERY

## 2017-01-01 PROCEDURE — 25010000002 PERFLUTREN (DEFINITY) 8.476 MG IN SODIUM CHLORIDE 0.9 % 10 ML INJECTION: Performed by: FAMILY MEDICINE

## 2017-01-01 PROCEDURE — 85014 HEMATOCRIT: CPT | Performed by: HOSPITALIST

## 2017-01-01 PROCEDURE — 25010000003 POTASSIUM CHLORIDE 10 MEQ/100ML SOLUTION

## 2017-01-01 PROCEDURE — 80048 BASIC METABOLIC PNL TOTAL CA: CPT | Performed by: FAMILY MEDICINE

## 2017-01-01 PROCEDURE — 63510000001 EPOETIN ALFA PER 1000 UNITS: Performed by: INTERNAL MEDICINE

## 2017-01-01 PROCEDURE — 25010000002 CEFTRIAXONE PER 250 MG: Performed by: EMERGENCY MEDICINE

## 2017-01-01 PROCEDURE — 70450 CT HEAD/BRAIN W/O DYE: CPT

## 2017-01-01 PROCEDURE — 99225 PR SBSQ OBSERVATION CARE/DAY 25 MINUTES: CPT | Performed by: FAMILY MEDICINE

## 2017-01-01 PROCEDURE — 99218 PR INITIAL OBSERVATION CARE/DAY 30 MINUTES: CPT | Performed by: SURGERY

## 2017-01-01 PROCEDURE — 25010000002 CYANOCOBALAMIN PER 1000 MCG: Performed by: INTERNAL MEDICINE

## 2017-01-01 PROCEDURE — 0DN80ZZ RELEASE SMALL INTESTINE, OPEN APPROACH: ICD-10-PCS | Performed by: SURGERY

## 2017-01-01 PROCEDURE — 86900 BLOOD TYPING SEROLOGIC ABO: CPT | Performed by: HOSPITALIST

## 2017-01-01 PROCEDURE — 85384 FIBRINOGEN ACTIVITY: CPT | Performed by: FAMILY MEDICINE

## 2017-01-01 PROCEDURE — 99213 OFFICE O/P EST LOW 20 MIN: CPT | Performed by: SURGERY

## 2017-01-01 PROCEDURE — 25010000002 VITAMIN K1 PER 1 MG: Performed by: INTERNAL MEDICINE

## 2017-01-01 PROCEDURE — 25010000003 POTASSIUM CHLORIDE 10 MEQ/100ML SOLUTION: Performed by: NURSE PRACTITIONER

## 2017-01-01 PROCEDURE — 25810000003 SODIUM CHLORIDE 0.9 % WITH KCL 20 MEQ 20-0.9 MEQ/L-% SOLUTION: Performed by: HOSPITALIST

## 2017-01-01 PROCEDURE — 85610 PROTHROMBIN TIME: CPT | Performed by: INTERNAL MEDICINE

## 2017-01-01 PROCEDURE — 74022 RADEX COMPL AQT ABD SERIES: CPT

## 2017-01-01 PROCEDURE — 93010 ELECTROCARDIOGRAM REPORT: CPT | Performed by: INTERNAL MEDICINE

## 2017-01-01 PROCEDURE — 80053 COMPREHEN METABOLIC PANEL: CPT | Performed by: EMERGENCY MEDICINE

## 2017-01-01 PROCEDURE — 85730 THROMBOPLASTIN TIME PARTIAL: CPT | Performed by: ANESTHESIOLOGY

## 2017-01-01 PROCEDURE — 87086 URINE CULTURE/COLONY COUNT: CPT | Performed by: SURGERY

## 2017-01-01 PROCEDURE — 81003 URINALYSIS AUTO W/O SCOPE: CPT | Performed by: SURGERY

## 2017-01-01 PROCEDURE — 86850 RBC ANTIBODY SCREEN: CPT | Performed by: HOSPITALIST

## 2017-01-01 PROCEDURE — 85007 BL SMEAR W/DIFF WBC COUNT: CPT | Performed by: SURGERY

## 2017-01-01 PROCEDURE — 0 IOPAMIDOL 61 % SOLUTION: Performed by: FAMILY MEDICINE

## 2017-01-01 PROCEDURE — 96372 THER/PROPH/DIAG INJ SC/IM: CPT | Performed by: INTERNAL MEDICINE

## 2017-01-01 PROCEDURE — 99214 OFFICE O/P EST MOD 30 MIN: CPT | Performed by: INTERNAL MEDICINE

## 2017-01-01 PROCEDURE — 85025 COMPLETE CBC W/AUTO DIFF WBC: CPT | Performed by: NURSE PRACTITIONER

## 2017-01-01 PROCEDURE — 36415 COLL VENOUS BLD VENIPUNCTURE: CPT

## 2017-01-01 PROCEDURE — 0DJD8ZZ INSPECTION OF LOWER INTESTINAL TRACT, VIA NATURAL OR ARTIFICIAL OPENING ENDOSCOPIC: ICD-10-PCS | Performed by: INTERNAL MEDICINE

## 2017-01-01 PROCEDURE — 44139 MOBILIZATION OF COLON: CPT | Performed by: SURGERY

## 2017-01-01 PROCEDURE — 25010000002 FENTANYL CITRATE (PF) 100 MCG/2ML SOLUTION

## 2017-01-01 PROCEDURE — 45382 COLONOSCOPY W/CONTROL BLEED: CPT | Mod: 78

## 2017-01-01 PROCEDURE — 86900 BLOOD TYPING SEROLOGIC ABO: CPT | Performed by: SURGERY

## 2017-01-01 PROCEDURE — 87209 SMEAR COMPLEX STAIN: CPT

## 2017-01-01 PROCEDURE — 44143 PARTIAL REMOVAL OF COLON: CPT | Performed by: SURGERY

## 2017-01-01 PROCEDURE — 85025 COMPLETE CBC W/AUTO DIFF WBC: CPT | Performed by: EMERGENCY MEDICINE

## 2017-01-01 PROCEDURE — 25010000002 ONDANSETRON PER 1 MG: Performed by: SURGERY

## 2017-01-01 PROCEDURE — 25010000002 ADENOSINE PER 6 MG

## 2017-01-01 PROCEDURE — 85014 HEMATOCRIT: CPT | Performed by: INTERNAL MEDICINE

## 2017-01-01 PROCEDURE — 0D1B0ZP BYPASS ILEUM TO RECTUM, OPEN APPROACH: ICD-10-PCS | Performed by: SURGERY

## 2017-01-01 PROCEDURE — 87075 CULTR BACTERIA EXCEPT BLOOD: CPT | Performed by: SURGERY

## 2017-01-01 PROCEDURE — 99285 EMERGENCY DEPT VISIT HI MDM: CPT | Performed by: EMERGENCY MEDICINE

## 2017-01-01 PROCEDURE — 96372 THER/PROPH/DIAG INJ SC/IM: CPT | Performed by: NURSE PRACTITIONER

## 2017-01-01 PROCEDURE — 25010000002 AMIODARONE IN DEXTROSE 5% 360-4.14 MG/200ML-% SOLUTION: Performed by: INTERNAL MEDICINE

## 2017-01-01 PROCEDURE — 63510000001 EPOETIN ALFA PER 1000 UNITS: Performed by: NURSE PRACTITIONER

## 2017-01-01 PROCEDURE — 82550 ASSAY OF CK (CPK): CPT | Performed by: EMERGENCY MEDICINE

## 2017-01-01 PROCEDURE — 0DTL0ZZ RESECTION OF TRANSVERSE COLON, OPEN APPROACH: ICD-10-PCS | Performed by: SURGERY

## 2017-01-01 PROCEDURE — 25010000003 CEFAZOLIN PER 500 MG: Performed by: SURGERY

## 2017-01-01 PROCEDURE — 85018 HEMOGLOBIN: CPT | Performed by: HOSPITALIST

## 2017-01-01 PROCEDURE — 86901 BLOOD TYPING SEROLOGIC RH(D): CPT | Performed by: INTERNAL MEDICINE

## 2017-01-01 PROCEDURE — 3E0436Z INTRODUCTION OF NUTRITIONAL SUBSTANCE INTO CENTRAL VEIN, PERCUTANEOUS APPROACH: ICD-10-PCS | Performed by: SURGERY

## 2017-01-01 PROCEDURE — 85060 BLOOD SMEAR INTERPRETATION: CPT | Performed by: EMERGENCY MEDICINE

## 2017-01-01 PROCEDURE — 85027 COMPLETE CBC AUTOMATED: CPT | Performed by: INTERNAL MEDICINE

## 2017-01-01 PROCEDURE — 85027 COMPLETE CBC AUTOMATED: CPT | Performed by: SURGERY

## 2017-01-01 PROCEDURE — 86900 BLOOD TYPING SEROLOGIC ABO: CPT | Performed by: INTERNAL MEDICINE

## 2017-01-01 PROCEDURE — 25010000002 MORPHINE PER 10 MG: Performed by: SURGERY

## 2017-01-01 PROCEDURE — 36415 COLL VENOUS BLD VENIPUNCTURE: CPT | Performed by: SURGERY

## 2017-01-01 PROCEDURE — 25010000002 NEOSTIGMINE PER 0.5 MG: Performed by: NURSE ANESTHETIST, CERTIFIED REGISTERED

## 2017-01-01 PROCEDURE — 0 IOPAMIDOL PER 1 ML: Performed by: SURGERY

## 2017-01-01 PROCEDURE — 99232 SBSQ HOSP IP/OBS MODERATE 35: CPT | Performed by: NURSE PRACTITIONER

## 2017-01-01 PROCEDURE — 83735 ASSAY OF MAGNESIUM: CPT | Performed by: HOSPITALIST

## 2017-01-01 PROCEDURE — 0DUP0JZ SUPPLEMENT RECTUM WITH SYNTHETIC SUBSTITUTE, OPEN APPROACH: ICD-10-PCS | Performed by: SURGERY

## 2017-01-01 PROCEDURE — 25010000002 MAGNESIUM SULFATE 2 GM/50ML SOLUTION: Performed by: NURSE PRACTITIONER

## 2017-01-01 PROCEDURE — 74176 CT ABD & PELVIS W/O CONTRAST: CPT

## 2017-01-01 PROCEDURE — P9017 PLASMA 1 DONOR FRZ W/IN 8 HR: HCPCS

## 2017-01-01 PROCEDURE — 45380 COLONOSCOPY AND BIOPSY: CPT

## 2017-01-01 PROCEDURE — 25010000002 PHENYLEPHRINE PER 1 ML: Performed by: ANESTHESIOLOGY

## 2017-01-01 PROCEDURE — 99232 SBSQ HOSP IP/OBS MODERATE 35: CPT | Performed by: SURGERY

## 2017-01-01 PROCEDURE — 25010000002 SUCCINYLCHOLINE PER 20 MG: Performed by: ANESTHESIOLOGY

## 2017-01-01 PROCEDURE — 99225 PR SBSQ OBSERVATION CARE/DAY 25 MINUTES: CPT | Performed by: INTERNAL MEDICINE

## 2017-01-01 PROCEDURE — 25010000002 AMIODARONE IN DEXTROSE 5% 150-4.21 MG/100ML-% SOLUTION: Performed by: INTERNAL MEDICINE

## 2017-01-01 PROCEDURE — 87449 NOS EACH ORGANISM AG IA: CPT

## 2017-01-01 PROCEDURE — 86850 RBC ANTIBODY SCREEN: CPT | Performed by: FAMILY MEDICINE

## 2017-01-01 PROCEDURE — 85014 HEMATOCRIT: CPT | Performed by: FAMILY MEDICINE

## 2017-01-01 PROCEDURE — 87186 SC STD MICRODIL/AGAR DIL: CPT | Performed by: SURGERY

## 2017-01-01 PROCEDURE — 51702 INSERT TEMP BLADDER CATH: CPT

## 2017-01-01 PROCEDURE — 0DQP0ZZ REPAIR RECTUM, OPEN APPROACH: ICD-10-PCS | Performed by: SURGERY

## 2017-01-01 PROCEDURE — 87147 CULTURE TYPE IMMUNOLOGIC: CPT | Performed by: SURGERY

## 2017-01-01 PROCEDURE — 97162 PT EVAL MOD COMPLEX 30 MIN: CPT

## 2017-01-01 PROCEDURE — 81001 URINALYSIS AUTO W/SCOPE: CPT | Performed by: SURGERY

## 2017-01-01 PROCEDURE — 99239 HOSP IP/OBS DSCHRG MGMT >30: CPT | Performed by: NURSE PRACTITIONER

## 2017-01-01 PROCEDURE — 99283 EMERGENCY DEPT VISIT LOW MDM: CPT

## 2017-01-01 PROCEDURE — 80053 COMPREHEN METABOLIC PANEL: CPT | Performed by: INTERNAL MEDICINE

## 2017-01-01 PROCEDURE — 44121 REMOVAL OF SMALL INTESTINE: CPT | Performed by: SURGERY

## 2017-01-01 PROCEDURE — 85018 HEMOGLOBIN: CPT | Performed by: INTERNAL MEDICINE

## 2017-01-01 PROCEDURE — 0 IOPAMIDOL PER 1 ML: Performed by: FAMILY MEDICINE

## 2017-01-01 PROCEDURE — 85007 BL SMEAR W/DIFF WBC COUNT: CPT | Performed by: EMERGENCY MEDICINE

## 2017-01-01 PROCEDURE — 85730 THROMBOPLASTIN TIME PARTIAL: CPT | Performed by: NURSE PRACTITIONER

## 2017-01-01 PROCEDURE — 82330 ASSAY OF CALCIUM: CPT

## 2017-01-01 PROCEDURE — 84100 ASSAY OF PHOSPHORUS: CPT | Performed by: INTERNAL MEDICINE

## 2017-01-01 PROCEDURE — 99284 EMERGENCY DEPT VISIT MOD MDM: CPT | Performed by: EMERGENCY MEDICINE

## 2017-01-01 PROCEDURE — 0DTK0ZZ RESECTION OF ASCENDING COLON, OPEN APPROACH: ICD-10-PCS | Performed by: SURGERY

## 2017-01-01 PROCEDURE — 86920 COMPATIBILITY TEST SPIN: CPT

## 2017-01-01 PROCEDURE — 84132 ASSAY OF SERUM POTASSIUM: CPT | Performed by: FAMILY MEDICINE

## 2017-01-01 PROCEDURE — 63710000001 ACETAMINOPHEN 325 MG TABLET

## 2017-01-01 PROCEDURE — 0DTH0ZZ RESECTION OF CECUM, OPEN APPROACH: ICD-10-PCS | Performed by: SURGERY

## 2017-01-01 PROCEDURE — 84132 ASSAY OF SERUM POTASSIUM: CPT | Performed by: INTERNAL MEDICINE

## 2017-01-01 PROCEDURE — 25010000002 FENTANYL CITRATE (PF) 100 MCG/2ML SOLUTION: Performed by: NURSE ANESTHETIST, CERTIFIED REGISTERED

## 2017-01-01 PROCEDURE — 99222 1ST HOSP IP/OBS MODERATE 55: CPT | Performed by: HOSPITALIST

## 2017-01-01 PROCEDURE — 0D1B0Z4 BYPASS ILEUM TO CUTANEOUS, OPEN APPROACH: ICD-10-PCS | Performed by: SURGERY

## 2017-01-01 PROCEDURE — 25810000003 SODIUM CHLORIDE 0.9 % WITH KCL 20 MEQ 20-0.9 MEQ/L-% SOLUTION: Performed by: FAMILY MEDICINE

## 2017-01-01 PROCEDURE — 69210 REMOVE IMPACTED EAR WAX UNI: CPT | Performed by: NURSE PRACTITIONER

## 2017-01-01 PROCEDURE — 71010 HC CHEST PA OR AP: CPT

## 2017-01-01 PROCEDURE — 25010000002 PROPOFOL 10 MG/ML EMULSION: Performed by: ANESTHESIOLOGY

## 2017-01-01 PROCEDURE — 26113 EXC HAND TUM DEEP 1.5 CM/>: CPT | Performed by: SURGERY

## 2017-01-01 PROCEDURE — 87040 BLOOD CULTURE FOR BACTERIA: CPT | Performed by: INTERNAL MEDICINE

## 2017-01-01 PROCEDURE — 74177 CT ABD & PELVIS W/CONTRAST: CPT

## 2017-01-01 PROCEDURE — 45378 DIAGNOSTIC COLONOSCOPY: CPT | Performed by: SURGERY

## 2017-01-01 PROCEDURE — P9041 ALBUMIN (HUMAN),5%, 50ML: HCPCS | Performed by: ANESTHESIOLOGY

## 2017-01-01 PROCEDURE — 82607 VITAMIN B-12: CPT | Performed by: NURSE PRACTITIONER

## 2017-01-01 PROCEDURE — 93306 TTE W/DOPPLER COMPLETE: CPT

## 2017-01-01 PROCEDURE — 85670 THROMBIN TIME PLASMA: CPT | Performed by: NURSE PRACTITIONER

## 2017-01-01 PROCEDURE — 86901 BLOOD TYPING SEROLOGIC RH(D): CPT | Performed by: FAMILY MEDICINE

## 2017-01-01 PROCEDURE — 43235 EGD DIAGNOSTIC BRUSH WASH: CPT

## 2017-01-01 PROCEDURE — 0 DIATRIZOATE MEGLUMINE & SODIUM PER 1 ML: Performed by: SURGERY

## 2017-01-01 PROCEDURE — 0T788DZ DILATION OF BILATERAL URETERS WITH INTRALUMINAL DEVICE, VIA NATURAL OR ARTIFICIAL OPENING ENDOSCOPIC: ICD-10-PCS | Performed by: UROLOGY

## 2017-01-01 PROCEDURE — 0DJ08ZZ INSPECTION OF UPPER INTESTINAL TRACT, VIA NATURAL OR ARTIFICIAL OPENING ENDOSCOPIC: ICD-10-PCS | Performed by: INTERNAL MEDICINE

## 2017-01-01 PROCEDURE — 85014 HEMATOCRIT: CPT | Performed by: ANESTHESIOLOGY

## 2017-01-01 PROCEDURE — 25010000003 POTASSIUM CHLORIDE 10 MEQ/100ML SOLUTION: Performed by: HOSPITALIST

## 2017-01-01 PROCEDURE — 85007 BL SMEAR W/DIFF WBC COUNT: CPT | Performed by: NURSE PRACTITIONER

## 2017-01-01 PROCEDURE — 25010000002 PROPOFOL 10 MG/ML EMULSION: Performed by: NURSE ANESTHETIST, CERTIFIED REGISTERED

## 2017-01-01 PROCEDURE — 85049 AUTOMATED PLATELET COUNT: CPT | Performed by: SURGERY

## 2017-01-01 PROCEDURE — 0W3P8ZZ CONTROL BLEEDING IN GASTROINTESTINAL TRACT, VIA NATURAL OR ARTIFICIAL OPENING ENDOSCOPIC: ICD-10-PCS | Performed by: INTERNAL MEDICINE

## 2017-01-01 PROCEDURE — 63510000001 EPOETIN ALFA PER 1000 UNITS: Performed by: FAMILY MEDICINE

## 2017-01-01 PROCEDURE — 97162 PT EVAL MOD COMPLEX 30 MIN: CPT | Performed by: PHYSICAL THERAPIST

## 2017-01-01 PROCEDURE — 80048 BASIC METABOLIC PNL TOTAL CA: CPT | Performed by: NURSE PRACTITIONER

## 2017-01-01 PROCEDURE — 25010000002 MAGNESIUM SULFATE 2 GM/50ML SOLUTION: Performed by: SURGERY

## 2017-01-01 PROCEDURE — 99231 SBSQ HOSP IP/OBS SF/LOW 25: CPT | Performed by: HOSPITALIST

## 2017-01-01 PROCEDURE — 87324 CLOSTRIDIUM AG IA: CPT

## 2017-01-01 PROCEDURE — 0DB80ZZ EXCISION OF SMALL INTESTINE, OPEN APPROACH: ICD-10-PCS | Performed by: SURGERY

## 2017-01-01 PROCEDURE — 25010000002 ALBUMIN HUMAN 5% PER 50 ML: Performed by: NURSE ANESTHETIST, CERTIFIED REGISTERED

## 2017-01-01 PROCEDURE — 93005 ELECTROCARDIOGRAM TRACING: CPT | Performed by: HOSPITALIST

## 2017-01-01 PROCEDURE — 73560 X-RAY EXAM OF KNEE 1 OR 2: CPT

## 2017-01-01 PROCEDURE — 97530 THERAPEUTIC ACTIVITIES: CPT

## 2017-01-01 PROCEDURE — 63710000001 ACETAMINOPHEN 325 MG TABLET: Performed by: INTERNAL MEDICINE

## 2017-01-01 PROCEDURE — 0DTM0ZZ RESECTION OF DESCENDING COLON, OPEN APPROACH: ICD-10-PCS | Performed by: SURGERY

## 2017-01-01 PROCEDURE — 85670 THROMBIN TIME PLASMA: CPT | Performed by: FAMILY MEDICINE

## 2017-01-01 PROCEDURE — P9041 ALBUMIN (HUMAN),5%, 50ML: HCPCS | Performed by: NURSE ANESTHETIST, CERTIFIED REGISTERED

## 2017-01-01 PROCEDURE — 44140 PARTIAL REMOVAL OF COLON: CPT | Performed by: SURGERY

## 2017-01-01 PROCEDURE — 25010000002 DIPHENHYDRAMINE PER 50 MG: Performed by: INTERNAL MEDICINE

## 2017-01-01 PROCEDURE — 93306 TTE W/DOPPLER COMPLETE: CPT | Performed by: INTERNAL MEDICINE

## 2017-01-01 PROCEDURE — 85610 PROTHROMBIN TIME: CPT | Performed by: ANESTHESIOLOGY

## 2017-01-01 PROCEDURE — 99284 EMERGENCY DEPT VISIT MOD MDM: CPT

## 2017-01-01 PROCEDURE — 25010000002 FENTANYL CITRATE (PF) 100 MCG/2ML SOLUTION: Performed by: ANESTHESIOLOGY

## 2017-01-01 PROCEDURE — 87086 URINE CULTURE/COLONY COUNT: CPT | Performed by: HOSPITALIST

## 2017-01-01 PROCEDURE — 25010000002 EPINEPHRINE PER 0.1 MG: Performed by: INTERNAL MEDICINE

## 2017-01-01 PROCEDURE — 86901 BLOOD TYPING SEROLOGIC RH(D): CPT | Performed by: SURGERY

## 2017-01-01 PROCEDURE — 97110 THERAPEUTIC EXERCISES: CPT | Performed by: PHYSICAL THERAPIST

## 2017-01-01 PROCEDURE — 74000 HC ABDOMEN KUB: CPT

## 2017-01-01 PROCEDURE — 84100 ASSAY OF PHOSPHORUS: CPT | Performed by: NURSE PRACTITIONER

## 2017-01-01 PROCEDURE — 63710000001 DIPHENHYDRAMINE PER 50 MG

## 2017-01-01 PROCEDURE — 25010000002 VITAMIN K1 1 MG/1 ML SOLUTION: Performed by: INTERNAL MEDICINE

## 2017-01-01 PROCEDURE — 85018 HEMOGLOBIN: CPT | Performed by: FAMILY MEDICINE

## 2017-01-01 PROCEDURE — 99204 OFFICE O/P NEW MOD 45 MIN: CPT | Performed by: INTERNAL MEDICINE

## 2017-01-01 PROCEDURE — 25010000002 DIGOXIN PER 500 MCG

## 2017-01-01 PROCEDURE — 83735 ASSAY OF MAGNESIUM: CPT | Performed by: NURSE PRACTITIONER

## 2017-01-01 PROCEDURE — 84145 PROCALCITONIN (PCT): CPT | Performed by: NURSE PRACTITIONER

## 2017-01-01 PROCEDURE — 25810000003 SODIUM CHLORIDE 0.9 % WITH KCL 20 MEQ 20-0.9 MEQ/L-% SOLUTION: Performed by: SURGERY

## 2017-01-01 PROCEDURE — 87086 URINE CULTURE/COLONY COUNT: CPT | Performed by: EMERGENCY MEDICINE

## 2017-01-01 PROCEDURE — 99213 OFFICE O/P EST LOW 20 MIN: CPT

## 2017-01-01 PROCEDURE — 99221 1ST HOSP IP/OBS SF/LOW 40: CPT | Performed by: INTERNAL MEDICINE

## 2017-01-01 PROCEDURE — C1751 CATH, INF, PER/CENT/MIDLINE: HCPCS

## 2017-01-01 PROCEDURE — C1758 CATHETER, URETERAL: HCPCS | Performed by: SURGERY

## 2017-01-01 PROCEDURE — 25010000002 FENTANYL CITRATE (PF) 100 MCG/2ML SOLUTION: Performed by: SURGERY

## 2017-01-01 PROCEDURE — 25010000002 NEOSTIGMINE 10 MG/10ML SOLUTION: Performed by: ANESTHESIOLOGY

## 2017-01-01 PROCEDURE — 0DTN0ZZ RESECTION OF SIGMOID COLON, OPEN APPROACH: ICD-10-PCS | Performed by: SURGERY

## 2017-01-01 PROCEDURE — 25010000002 FUROSEMIDE PER 20 MG: Performed by: SURGERY

## 2017-01-01 DEVICE — SEALANT WND FIBRIN TISSEEL VAPOR/HEAT/PREFIL/SYR 10ML: Type: IMPLANTABLE DEVICE | Status: FUNCTIONAL

## 2017-01-01 DEVICE — IMPLANTABLE DEVICE: Type: IMPLANTABLE DEVICE | Status: FUNCTIONAL

## 2017-01-01 DEVICE — CLIPPING DEVICE
Type: IMPLANTABLE DEVICE | Status: FUNCTIONAL
Brand: RESOLUTION CLIP

## 2017-01-01 RX ORDER — MEGESTROL ACETATE 40 MG/ML
800 SUSPENSION ORAL DAILY
Qty: 600 ML | Refills: 0 | Status: SHIPPED | OUTPATIENT
Start: 2017-01-01 | End: 2017-01-01

## 2017-01-01 RX ORDER — OMEPRAZOLE 40 MG/1
40 CAPSULE, DELAYED RELEASE ORAL DAILY
Qty: 30 CAPSULE | Refills: 0 | Status: SHIPPED | OUTPATIENT
Start: 2017-01-01 | End: 2017-01-01 | Stop reason: HOSPADM

## 2017-01-01 RX ORDER — MULTIPLE VITAMINS W/ MINERALS TAB 9MG-400MCG
1 TAB ORAL DAILY
Status: DISCONTINUED | OUTPATIENT
Start: 2017-01-01 | End: 2017-01-01

## 2017-01-01 RX ORDER — SODIUM CHLORIDE 9 MG/ML
250 INJECTION, SOLUTION INTRAVENOUS AS NEEDED
Status: CANCELLED | OUTPATIENT
Start: 2017-01-01

## 2017-01-01 RX ORDER — MAGNESIUM SULFATE 1 G/100ML
1 INJECTION INTRAVENOUS ONCE
Status: COMPLETED | OUTPATIENT
Start: 2017-01-01 | End: 2017-01-01

## 2017-01-01 RX ORDER — DIGOXIN 0.25 MG/ML
250 INJECTION INTRAMUSCULAR; INTRAVENOUS ONCE
Status: COMPLETED | OUTPATIENT
Start: 2017-01-01 | End: 2017-01-01

## 2017-01-01 RX ORDER — PROMETHAZINE HYDROCHLORIDE 25 MG/ML
12.5 INJECTION, SOLUTION INTRAMUSCULAR; INTRAVENOUS EVERY 6 HOURS PRN
Status: DISCONTINUED | OUTPATIENT
Start: 2017-01-01 | End: 2017-01-01 | Stop reason: HOSPADM

## 2017-01-01 RX ORDER — MAGNESIUM SULFATE HEPTAHYDRATE 40 MG/ML
INJECTION, SOLUTION INTRAVENOUS
Status: COMPLETED
Start: 2017-01-01 | End: 2017-01-01

## 2017-01-01 RX ORDER — SODIUM CHLORIDE 9 MG/ML
INJECTION, SOLUTION INTRAVENOUS
Status: COMPLETED
Start: 2017-01-01 | End: 2017-01-01

## 2017-01-01 RX ORDER — CYANOCOBALAMIN 1000 UG/ML
1000 INJECTION, SOLUTION INTRAMUSCULAR; SUBCUTANEOUS ONCE
Status: CANCELLED | OUTPATIENT
Start: 2017-01-01

## 2017-01-01 RX ORDER — SODIUM CHLORIDE 9 MG/ML
INJECTION, SOLUTION INTRAVENOUS
Status: DISPENSED
Start: 2017-01-01 | End: 2017-01-01

## 2017-01-01 RX ORDER — ONDANSETRON 2 MG/ML
4 INJECTION INTRAMUSCULAR; INTRAVENOUS ONCE AS NEEDED
Status: DISCONTINUED | OUTPATIENT
Start: 2017-01-01 | End: 2018-01-01 | Stop reason: HOSPADM

## 2017-01-01 RX ORDER — LANOLIN ALCOHOL/MO/W.PET/CERES
400 CREAM (GRAM) TOPICAL DAILY
Status: DISCONTINUED | OUTPATIENT
Start: 2017-01-01 | End: 2017-01-01

## 2017-01-01 RX ORDER — POTASSIUM CHLORIDE 7.45 MG/ML
10 INJECTION INTRAVENOUS ONCE
Status: COMPLETED | OUTPATIENT
Start: 2017-01-01 | End: 2017-01-01

## 2017-01-01 RX ORDER — FUROSEMIDE 10 MG/ML
20 INJECTION INTRAMUSCULAR; INTRAVENOUS ONCE
Status: DISCONTINUED | OUTPATIENT
Start: 2017-01-01 | End: 2017-01-01 | Stop reason: SDUPTHER

## 2017-01-01 RX ORDER — ACETAMINOPHEN 325 MG/1
650 TABLET ORAL ONCE
Status: COMPLETED | OUTPATIENT
Start: 2017-01-01 | End: 2017-01-01

## 2017-01-01 RX ORDER — POTASSIUM CHLORIDE 20 MEQ/1
20 TABLET, EXTENDED RELEASE ORAL ONCE
Status: COMPLETED | OUTPATIENT
Start: 2017-01-01 | End: 2017-01-01

## 2017-01-01 RX ORDER — OXYCODONE HYDROCHLORIDE AND ACETAMINOPHEN 5; 325 MG/1; MG/1
1 TABLET ORAL EVERY 4 HOURS PRN
Status: DISCONTINUED | OUTPATIENT
Start: 2017-01-01 | End: 2018-01-01 | Stop reason: HOSPADM

## 2017-01-01 RX ORDER — DIPHENHYDRAMINE HCL 25 MG
25 CAPSULE ORAL ONCE
Status: CANCELLED | OUTPATIENT
Start: 2017-01-01 | End: 2017-01-01

## 2017-01-01 RX ORDER — LIDOCAINE HYDROCHLORIDE 20 MG/ML
INJECTION, SOLUTION INFILTRATION; PERINEURAL AS NEEDED
Status: DISCONTINUED | OUTPATIENT
Start: 2017-01-01 | End: 2017-01-01 | Stop reason: SURG

## 2017-01-01 RX ORDER — POTASSIUM CHLORIDE 7.45 MG/ML
INJECTION INTRAVENOUS
Status: COMPLETED
Start: 2017-01-01 | End: 2017-01-01

## 2017-01-01 RX ORDER — ONDANSETRON 2 MG/ML
4 INJECTION INTRAMUSCULAR; INTRAVENOUS ONCE AS NEEDED
Status: COMPLETED | OUTPATIENT
Start: 2017-01-01 | End: 2017-01-01

## 2017-01-01 RX ORDER — ALBUMIN, HUMAN INJ 5% 5 %
SOLUTION INTRAVENOUS CONTINUOUS PRN
Status: DISCONTINUED | OUTPATIENT
Start: 2017-01-01 | End: 2017-01-01 | Stop reason: SURG

## 2017-01-01 RX ORDER — OXYCODONE HYDROCHLORIDE AND ACETAMINOPHEN 5; 325 MG/1; MG/1
1 TABLET ORAL EVERY 6 HOURS PRN
Status: DISCONTINUED | OUTPATIENT
Start: 2017-01-01 | End: 2017-01-01

## 2017-01-01 RX ORDER — FUROSEMIDE 10 MG/ML
20 INJECTION INTRAMUSCULAR; INTRAVENOUS ONCE
Status: COMPLETED | OUTPATIENT
Start: 2017-01-01 | End: 2017-01-01

## 2017-01-01 RX ORDER — SODIUM CHLORIDE, SODIUM LACTATE, POTASSIUM CHLORIDE, CALCIUM CHLORIDE 600; 310; 30; 20 MG/100ML; MG/100ML; MG/100ML; MG/100ML
10 INJECTION, SOLUTION INTRAVENOUS CONTINUOUS
Status: DISCONTINUED | OUTPATIENT
Start: 2017-01-01 | End: 2017-01-01

## 2017-01-01 RX ORDER — SUCRALFATE 1 G/1
1 TABLET ORAL
Status: DISCONTINUED | OUTPATIENT
Start: 2017-01-01 | End: 2018-01-01 | Stop reason: HOSPADM

## 2017-01-01 RX ORDER — OXYCODONE HYDROCHLORIDE AND ACETAMINOPHEN 5; 325 MG/1; MG/1
TABLET ORAL
Status: COMPLETED
Start: 2017-01-01 | End: 2017-01-01

## 2017-01-01 RX ORDER — ONDANSETRON 4 MG/1
4 TABLET, FILM COATED ORAL EVERY 6 HOURS PRN
Status: DISCONTINUED | OUTPATIENT
Start: 2017-01-01 | End: 2017-01-01 | Stop reason: HOSPADM

## 2017-01-01 RX ORDER — SULFAMETHOXAZOLE AND TRIMETHOPRIM 800; 160 MG/1; MG/1
1 TABLET ORAL 2 TIMES DAILY
Qty: 14 TABLET | Refills: 0 | Status: SHIPPED | OUTPATIENT
Start: 2017-01-01 | End: 2017-01-01 | Stop reason: SDUPTHER

## 2017-01-01 RX ORDER — PANTOPRAZOLE SODIUM 40 MG/1
40 TABLET, DELAYED RELEASE ORAL DAILY
Qty: 30 TABLET | Refills: 1 | Status: SHIPPED | OUTPATIENT
Start: 2017-01-01 | End: 2017-01-01 | Stop reason: SDUPTHER

## 2017-01-01 RX ORDER — POTASSIUM CHLORIDE 750 MG/1
40 CAPSULE, EXTENDED RELEASE ORAL AS NEEDED
Status: DISCONTINUED | OUTPATIENT
Start: 2017-01-01 | End: 2017-01-01

## 2017-01-01 RX ORDER — MORPHINE SULFATE 2 MG/ML
2 INJECTION, SOLUTION INTRAMUSCULAR; INTRAVENOUS EVERY 4 HOURS PRN
Status: DISCONTINUED | OUTPATIENT
Start: 2017-01-01 | End: 2017-01-01

## 2017-01-01 RX ORDER — MORPHINE SULFATE 10 MG/ML
3 INJECTION INTRAMUSCULAR; INTRAVENOUS; SUBCUTANEOUS
Status: DISCONTINUED | OUTPATIENT
Start: 2017-01-01 | End: 2017-01-01 | Stop reason: HOSPADM

## 2017-01-01 RX ORDER — LEVOFLOXACIN 500 MG/1
500 TABLET, FILM COATED ORAL DAILY
Qty: 10 TABLET | Refills: 0 | Status: SHIPPED | OUTPATIENT
Start: 2017-01-01 | End: 2017-01-01

## 2017-01-01 RX ORDER — OXYCODONE HYDROCHLORIDE AND ACETAMINOPHEN 5; 325 MG/1; MG/1
1 TABLET ORAL EVERY 12 HOURS PRN
Qty: 20 TABLET | Refills: 0 | Status: SHIPPED | OUTPATIENT
Start: 2017-01-01 | End: 2017-01-01

## 2017-01-01 RX ORDER — POTASSIUM CHLORIDE 20 MEQ/1
40 TABLET, EXTENDED RELEASE ORAL ONCE
Status: COMPLETED | OUTPATIENT
Start: 2017-01-01 | End: 2017-01-01

## 2017-01-01 RX ORDER — DIPHENHYDRAMINE HCL 25 MG
25 CAPSULE ORAL ONCE
Status: COMPLETED | OUTPATIENT
Start: 2017-01-01 | End: 2017-01-01

## 2017-01-01 RX ORDER — PROPOFOL 10 MG/ML
VIAL (ML) INTRAVENOUS AS NEEDED
Status: DISCONTINUED | OUTPATIENT
Start: 2017-01-01 | End: 2017-01-01 | Stop reason: SURG

## 2017-01-01 RX ORDER — POTASSIUM CHLORIDE 20 MEQ/1
40 TABLET, EXTENDED RELEASE ORAL
Status: COMPLETED | OUTPATIENT
Start: 2017-01-01 | End: 2017-01-01

## 2017-01-01 RX ORDER — CYANOCOBALAMIN 1000 UG/ML
1000 INJECTION, SOLUTION INTRAMUSCULAR; SUBCUTANEOUS ONCE
Status: COMPLETED | OUTPATIENT
Start: 2017-01-01 | End: 2017-01-01

## 2017-01-01 RX ORDER — LEVOFLOXACIN 750 MG/1
750 TABLET ORAL EVERY 24 HOURS
Status: DISCONTINUED | OUTPATIENT
Start: 2017-01-01 | End: 2017-01-01

## 2017-01-01 RX ORDER — DEXTROSE MONOHYDRATE 25 G/50ML
25 INJECTION, SOLUTION INTRAVENOUS
Status: DISCONTINUED | OUTPATIENT
Start: 2017-01-01 | End: 2018-01-01 | Stop reason: HOSPADM

## 2017-01-01 RX ORDER — POTASSIUM CHLORIDE 1.5 G/1.77G
40 POWDER, FOR SOLUTION ORAL 3 TIMES DAILY
Status: COMPLETED | OUTPATIENT
Start: 2017-01-01 | End: 2017-01-01

## 2017-01-01 RX ORDER — OXYCODONE HYDROCHLORIDE AND ACETAMINOPHEN 5; 325 MG/1; MG/1
1 TABLET ORAL EVERY 8 HOURS PRN
Qty: 30 TABLET | Refills: 0 | Status: SHIPPED | OUTPATIENT
Start: 2017-01-01 | End: 2017-01-01 | Stop reason: SDUPTHER

## 2017-01-01 RX ORDER — MAGNESIUM HYDROXIDE 1200 MG/15ML
LIQUID ORAL AS NEEDED
Status: DISCONTINUED | OUTPATIENT
Start: 2017-01-01 | End: 2017-01-01 | Stop reason: HOSPADM

## 2017-01-01 RX ORDER — HYDROMORPHONE HCL 110MG/55ML
0.5 PATIENT CONTROLLED ANALGESIA SYRINGE INTRAVENOUS
Status: DISCONTINUED | OUTPATIENT
Start: 2017-01-01 | End: 2017-01-01

## 2017-01-01 RX ORDER — ACETAMINOPHEN 325 MG/1
650 TABLET ORAL ONCE
Status: CANCELLED | OUTPATIENT
Start: 2017-01-01 | End: 2017-01-01

## 2017-01-01 RX ORDER — SODIUM CHLORIDE AND POTASSIUM CHLORIDE 150; 900 MG/100ML; MG/100ML
100 INJECTION, SOLUTION INTRAVENOUS CONTINUOUS
Status: DISCONTINUED | OUTPATIENT
Start: 2017-01-01 | End: 2017-01-01

## 2017-01-01 RX ORDER — CIPROFLOXACIN 500 MG/1
500 TABLET, FILM COATED ORAL 2 TIMES DAILY
Qty: 14 TABLET | Refills: 0 | Status: SHIPPED | OUTPATIENT
Start: 2017-01-01 | End: 2017-01-01

## 2017-01-01 RX ORDER — ONDANSETRON 4 MG/1
4 TABLET, FILM COATED ORAL EVERY 8 HOURS PRN
Qty: 20 TABLET | Refills: 0 | Status: SHIPPED | OUTPATIENT
Start: 2017-01-01 | End: 2017-01-01

## 2017-01-01 RX ORDER — NITROFURANTOIN 25; 75 MG/1; MG/1
CAPSULE ORAL
COMMUNITY
Start: 2017-01-01 | End: 2018-01-01 | Stop reason: HOSPADM

## 2017-01-01 RX ORDER — FENTANYL CITRATE 50 UG/ML
25 INJECTION, SOLUTION INTRAMUSCULAR; INTRAVENOUS EVERY 6 HOURS PRN
Status: DISCONTINUED | OUTPATIENT
Start: 2017-01-01 | End: 2017-01-01

## 2017-01-01 RX ORDER — MAGNESIUM SULFATE 1 G/100ML
1 INJECTION INTRAVENOUS
Status: COMPLETED | OUTPATIENT
Start: 2017-01-01 | End: 2017-01-01

## 2017-01-01 RX ORDER — POTASSIUM CHLORIDE 7.45 MG/ML
10 INJECTION INTRAVENOUS
Status: DISCONTINUED | OUTPATIENT
Start: 2017-01-01 | End: 2017-01-01

## 2017-01-01 RX ORDER — PETROLATUM 42 G/100G
OINTMENT TOPICAL EVERY 12 HOURS SCHEDULED
Status: DISCONTINUED | OUTPATIENT
Start: 2017-01-01 | End: 2018-01-01 | Stop reason: HOSPADM

## 2017-01-01 RX ORDER — LIDOCAINE HYDROCHLORIDE AND EPINEPHRINE 15; 5 MG/ML; UG/ML
INJECTION, SOLUTION EPIDURAL AS NEEDED
Status: DISCONTINUED | OUTPATIENT
Start: 2017-01-01 | End: 2017-01-01 | Stop reason: HOSPADM

## 2017-01-01 RX ORDER — POLYETHYLENE GLYCOL 3350 17 G/17G
POWDER, FOR SOLUTION ORAL
Status: COMPLETED
Start: 2017-01-01 | End: 2017-01-01

## 2017-01-01 RX ORDER — CHOLESTYRAMINE 4 G/9G
1 POWDER, FOR SUSPENSION ORAL 2 TIMES DAILY WITH MEALS
COMMUNITY
End: 2018-01-01 | Stop reason: HOSPADM

## 2017-01-01 RX ORDER — GLYCOPYRROLATE 0.2 MG/ML
INJECTION INTRAMUSCULAR; INTRAVENOUS AS NEEDED
Status: DISCONTINUED | OUTPATIENT
Start: 2017-01-01 | End: 2017-01-01 | Stop reason: SURG

## 2017-01-01 RX ORDER — VECURONIUM BROMIDE 1 MG/ML
INJECTION, POWDER, LYOPHILIZED, FOR SOLUTION INTRAVENOUS AS NEEDED
Status: DISCONTINUED | OUTPATIENT
Start: 2017-01-01 | End: 2017-01-01 | Stop reason: SURG

## 2017-01-01 RX ORDER — SODIUM CHLORIDE, SODIUM LACTATE, POTASSIUM CHLORIDE, CALCIUM CHLORIDE 600; 310; 30; 20 MG/100ML; MG/100ML; MG/100ML; MG/100ML
75 INJECTION, SOLUTION INTRAVENOUS CONTINUOUS
Status: DISCONTINUED | OUTPATIENT
Start: 2017-01-01 | End: 2017-01-01

## 2017-01-01 RX ORDER — KETAMINE HYDROCHLORIDE 100 MG/ML
INJECTION INTRAMUSCULAR; INTRAVENOUS AS NEEDED
Status: DISCONTINUED | OUTPATIENT
Start: 2017-01-01 | End: 2017-01-01 | Stop reason: SURG

## 2017-01-01 RX ORDER — NITROFURANTOIN MACROCRYSTALS 100 MG/1
100 CAPSULE ORAL 4 TIMES DAILY
COMMUNITY
End: 2017-01-01

## 2017-01-01 RX ORDER — HYOSCYAMINE SULFATE 0.125 MG
TABLET ORAL
COMMUNITY
Start: 2017-01-01 | End: 2018-01-01 | Stop reason: HOSPADM

## 2017-01-01 RX ORDER — OXYCODONE HYDROCHLORIDE AND ACETAMINOPHEN 5; 325 MG/1; MG/1
1 TABLET ORAL EVERY 8 HOURS PRN
Status: DISCONTINUED | OUTPATIENT
Start: 2017-01-01 | End: 2017-01-01

## 2017-01-01 RX ORDER — LIDOCAINE HYDROCHLORIDE 10 MG/ML
0.5 INJECTION, SOLUTION EPIDURAL; INFILTRATION; INTRACAUDAL; PERINEURAL ONCE AS NEEDED
Status: COMPLETED | OUTPATIENT
Start: 2017-01-01 | End: 2017-01-01

## 2017-01-01 RX ORDER — CEPHALEXIN 500 MG/1
500 CAPSULE ORAL 3 TIMES DAILY
Qty: 21 CAPSULE | Refills: 0 | Status: SHIPPED | OUTPATIENT
Start: 2017-01-01 | End: 2017-01-01

## 2017-01-01 RX ORDER — LIDOCAINE HYDROCHLORIDE 10 MG/ML
0.5 INJECTION, SOLUTION EPIDURAL; INFILTRATION; INTRACAUDAL; PERINEURAL ONCE AS NEEDED
Status: DISCONTINUED | OUTPATIENT
Start: 2017-01-01 | End: 2017-01-01 | Stop reason: HOSPADM

## 2017-01-01 RX ORDER — ONDANSETRON 4 MG/1
4 TABLET, ORALLY DISINTEGRATING ORAL EVERY 6 HOURS PRN
Status: DISCONTINUED | OUTPATIENT
Start: 2017-01-01 | End: 2018-01-01 | Stop reason: HOSPADM

## 2017-01-01 RX ORDER — PANTOPRAZOLE SODIUM 40 MG/1
40 TABLET, DELAYED RELEASE ORAL DAILY
Qty: 30 TABLET | Refills: 1 | Status: SHIPPED | OUTPATIENT
Start: 2017-01-01 | End: 2018-01-01 | Stop reason: HOSPADM

## 2017-01-01 RX ORDER — SODIUM CHLORIDE 0.9 % (FLUSH) 0.9 %
1-10 SYRINGE (ML) INJECTION AS NEEDED
Status: DISCONTINUED | OUTPATIENT
Start: 2017-01-01 | End: 2017-01-01 | Stop reason: HOSPADM

## 2017-01-01 RX ORDER — OXYCODONE HYDROCHLORIDE AND ACETAMINOPHEN 5; 325 MG/1; MG/1
1 TABLET ORAL EVERY 6 HOURS PRN
Status: DISCONTINUED | OUTPATIENT
Start: 2017-01-01 | End: 2017-01-01 | Stop reason: HOSPADM

## 2017-01-01 RX ORDER — SODIUM CHLORIDE 9 MG/ML
40 INJECTION, SOLUTION INTRAVENOUS AS NEEDED
Status: DISCONTINUED | OUTPATIENT
Start: 2017-01-01 | End: 2018-01-01 | Stop reason: HOSPADM

## 2017-01-01 RX ORDER — FAMOTIDINE 10 MG/ML
20 INJECTION, SOLUTION INTRAVENOUS
Status: DISCONTINUED | OUTPATIENT
Start: 2017-01-01 | End: 2017-01-01 | Stop reason: HOSPADM

## 2017-01-01 RX ORDER — PANTOPRAZOLE SODIUM 40 MG/10ML
40 INJECTION, POWDER, LYOPHILIZED, FOR SOLUTION INTRAVENOUS 2 TIMES DAILY
Status: DISCONTINUED | OUTPATIENT
Start: 2017-01-01 | End: 2018-01-01 | Stop reason: HOSPADM

## 2017-01-01 RX ORDER — DEXTROSE, SODIUM CHLORIDE, AND POTASSIUM CHLORIDE 5; .45; .15 G/100ML; G/100ML; G/100ML
50 INJECTION INTRAVENOUS CONTINUOUS
Status: DISCONTINUED | OUTPATIENT
Start: 2017-01-01 | End: 2017-01-01

## 2017-01-01 RX ORDER — NITROGLYCERIN 0.4 MG/1
0.4 TABLET SUBLINGUAL
COMMUNITY
End: 2017-01-01

## 2017-01-01 RX ORDER — POLYETHYLENE GLYCOL 3350 17 G/17G
119 POWDER, FOR SOLUTION ORAL EVERY 8 HOURS
Status: COMPLETED | OUTPATIENT
Start: 2017-01-01 | End: 2017-01-01

## 2017-01-01 RX ORDER — SULFAMETHOXAZOLE AND TRIMETHOPRIM 800; 160 MG/1; MG/1
1 TABLET ORAL 2 TIMES DAILY
Qty: 14 TABLET | Refills: 0 | Status: SHIPPED | OUTPATIENT
Start: 2017-01-01 | End: 2017-01-01

## 2017-01-01 RX ORDER — SODIUM CHLORIDE 9 MG/ML
40 INJECTION, SOLUTION INTRAVENOUS AS NEEDED
Status: DISCONTINUED | OUTPATIENT
Start: 2017-01-01 | End: 2017-01-01 | Stop reason: HOSPADM

## 2017-01-01 RX ORDER — OXYCODONE HYDROCHLORIDE AND ACETAMINOPHEN 5; 325 MG/1; MG/1
2 TABLET ORAL ONCE
Status: COMPLETED | OUTPATIENT
Start: 2017-01-01 | End: 2017-01-01

## 2017-01-01 RX ORDER — IRBESARTAN 150 MG/1
300 TABLET ORAL
Status: DISCONTINUED | OUTPATIENT
Start: 2017-01-01 | End: 2017-01-01

## 2017-01-01 RX ORDER — POTASSIUM CHLORIDE 29.8 MG/ML
20 INJECTION INTRAVENOUS
Status: DISCONTINUED | OUTPATIENT
Start: 2017-01-01 | End: 2018-01-01 | Stop reason: HOSPADM

## 2017-01-01 RX ORDER — HYDROMORPHONE HCL 110MG/55ML
0.5 PATIENT CONTROLLED ANALGESIA SYRINGE INTRAVENOUS
Status: DISCONTINUED | OUTPATIENT
Start: 2017-01-01 | End: 2018-01-01 | Stop reason: HOSPADM

## 2017-01-01 RX ORDER — EPHEDRINE SULFATE 50 MG/ML
INJECTION, SOLUTION INTRAVENOUS AS NEEDED
Status: DISCONTINUED | OUTPATIENT
Start: 2017-01-01 | End: 2017-01-01 | Stop reason: SURG

## 2017-01-01 RX ORDER — PHYTONADIONE 2 MG/ML
5 INJECTION, EMULSION INTRAMUSCULAR; INTRAVENOUS; SUBCUTANEOUS ONCE
Status: COMPLETED | OUTPATIENT
Start: 2017-01-01 | End: 2017-01-01

## 2017-01-01 RX ORDER — FENTANYL CITRATE 50 UG/ML
INJECTION, SOLUTION INTRAMUSCULAR; INTRAVENOUS AS NEEDED
Status: DISCONTINUED | OUTPATIENT
Start: 2017-01-01 | End: 2017-01-01 | Stop reason: SURG

## 2017-01-01 RX ORDER — DEXAMETHASONE SODIUM PHOSPHATE 4 MG/ML
4 INJECTION, SOLUTION INTRA-ARTICULAR; INTRALESIONAL; INTRAMUSCULAR; INTRAVENOUS; SOFT TISSUE ONCE AS NEEDED
Status: COMPLETED | OUTPATIENT
Start: 2017-01-01 | End: 2017-01-01

## 2017-01-01 RX ORDER — MAGNESIUM SULFATE HEPTAHYDRATE 40 MG/ML
2 INJECTION, SOLUTION INTRAVENOUS ONCE
Status: COMPLETED | OUTPATIENT
Start: 2017-01-01 | End: 2017-01-01

## 2017-01-01 RX ORDER — DIPHENHYDRAMINE HYDROCHLORIDE 50 MG/ML
12.5 INJECTION INTRAMUSCULAR; INTRAVENOUS
Status: DISCONTINUED | OUTPATIENT
Start: 2017-01-01 | End: 2018-01-01 | Stop reason: HOSPADM

## 2017-01-01 RX ORDER — SODIUM CHLORIDE, SODIUM LACTATE, POTASSIUM CHLORIDE, CALCIUM CHLORIDE 600; 310; 30; 20 MG/100ML; MG/100ML; MG/100ML; MG/100ML
9 INJECTION, SOLUTION INTRAVENOUS CONTINUOUS
Status: DISCONTINUED | OUTPATIENT
Start: 2017-01-01 | End: 2017-01-01

## 2017-01-01 RX ORDER — NITROFURANTOIN 25; 75 MG/1; MG/1
1 CAPSULE ORAL DAILY
COMMUNITY
Start: 2017-01-01 | End: 2017-01-01

## 2017-01-01 RX ORDER — PANTOPRAZOLE SODIUM 40 MG/10ML
40 INJECTION, POWDER, LYOPHILIZED, FOR SOLUTION INTRAVENOUS
Status: DISCONTINUED | OUTPATIENT
Start: 2017-01-01 | End: 2017-01-01

## 2017-01-01 RX ORDER — ONDANSETRON 4 MG/1
4 TABLET, FILM COATED ORAL EVERY 6 HOURS PRN
Qty: 20 TABLET | Refills: 0 | Status: SHIPPED | OUTPATIENT
Start: 2017-01-01 | End: 2017-01-01

## 2017-01-01 RX ORDER — MORPHINE SULFATE 1 MG/ML
INJECTION INTRAVENOUS CONTINUOUS
Status: DISCONTINUED | OUTPATIENT
Start: 2017-01-01 | End: 2017-01-01

## 2017-01-01 RX ORDER — PROMETHAZINE HYDROCHLORIDE 25 MG/ML
12.5 INJECTION, SOLUTION INTRAMUSCULAR; INTRAVENOUS EVERY 4 HOURS PRN
Status: DISCONTINUED | OUTPATIENT
Start: 2017-01-01 | End: 2017-01-01 | Stop reason: HOSPADM

## 2017-01-01 RX ORDER — ONDANSETRON 2 MG/ML
4 INJECTION INTRAMUSCULAR; INTRAVENOUS EVERY 6 HOURS PRN
Status: DISCONTINUED | OUTPATIENT
Start: 2017-01-01 | End: 2017-01-01 | Stop reason: HOSPADM

## 2017-01-01 RX ORDER — OXYCODONE HYDROCHLORIDE AND ACETAMINOPHEN 5; 325 MG/1; MG/1
1 TABLET ORAL EVERY 6 HOURS PRN
Qty: 30 TABLET | Refills: 0 | Status: SHIPPED | OUTPATIENT
Start: 2017-01-01 | End: 2017-01-01 | Stop reason: SDUPTHER

## 2017-01-01 RX ORDER — ADENOSINE 3 MG/ML
6 INJECTION, SOLUTION INTRAVENOUS ONCE
Status: COMPLETED | OUTPATIENT
Start: 2017-01-01 | End: 2017-01-01

## 2017-01-01 RX ORDER — AMPICILLIN 500 MG/1
500 CAPSULE ORAL 4 TIMES DAILY
Qty: 28 CAPSULE | Refills: 0 | Status: SHIPPED | OUTPATIENT
Start: 2017-01-01 | End: 2017-01-01

## 2017-01-01 RX ORDER — ACETAMINOPHEN 325 MG/1
650 TABLET ORAL EVERY 6 HOURS PRN
Status: DISCONTINUED | OUTPATIENT
Start: 2017-01-01 | End: 2017-01-01

## 2017-01-01 RX ORDER — LOPERAMIDE HYDROCHLORIDE 2 MG/1
2 CAPSULE ORAL 3 TIMES DAILY PRN
Qty: 42 CAPSULE | Refills: 0 | Status: SHIPPED | OUTPATIENT
Start: 2017-01-01 | End: 2017-01-01

## 2017-01-01 RX ORDER — ONDANSETRON 2 MG/ML
4 INJECTION INTRAMUSCULAR; INTRAVENOUS EVERY 6 HOURS PRN
Status: DISCONTINUED | OUTPATIENT
Start: 2017-01-01 | End: 2018-01-01 | Stop reason: HOSPADM

## 2017-01-01 RX ORDER — ACETAMINOPHEN 325 MG/1
TABLET ORAL
Status: COMPLETED
Start: 2017-01-01 | End: 2017-01-01

## 2017-01-01 RX ORDER — ONDANSETRON 4 MG/1
4 TABLET, FILM COATED ORAL EVERY 6 HOURS PRN
Status: DISCONTINUED | OUTPATIENT
Start: 2017-01-01 | End: 2018-01-01 | Stop reason: HOSPADM

## 2017-01-01 RX ORDER — POTASSIUM CHLORIDE 7.45 MG/ML
10 INJECTION INTRAVENOUS
Status: COMPLETED | OUTPATIENT
Start: 2017-01-01 | End: 2017-01-01

## 2017-01-01 RX ORDER — SUCCINYLCHOLINE CHLORIDE 20 MG/ML
INJECTION INTRAMUSCULAR; INTRAVENOUS AS NEEDED
Status: DISCONTINUED | OUTPATIENT
Start: 2017-01-01 | End: 2017-01-01 | Stop reason: SURG

## 2017-01-01 RX ORDER — SODIUM CHLORIDE, SODIUM LACTATE, POTASSIUM CHLORIDE, CALCIUM CHLORIDE 600; 310; 30; 20 MG/100ML; MG/100ML; MG/100ML; MG/100ML
100 INJECTION, SOLUTION INTRAVENOUS CONTINUOUS
Status: DISCONTINUED | OUTPATIENT
Start: 2017-01-01 | End: 2017-01-01

## 2017-01-01 RX ORDER — OXYCODONE HYDROCHLORIDE AND ACETAMINOPHEN 5; 325 MG/1; MG/1
TABLET ORAL
Refills: 0 | COMMUNITY
Start: 2017-01-01 | End: 2017-01-01

## 2017-01-01 RX ORDER — DIPHENHYDRAMINE HCL 25 MG
CAPSULE ORAL
Status: COMPLETED
Start: 2017-01-01 | End: 2017-01-01

## 2017-01-01 RX ORDER — SODIUM CHLORIDE 9 MG/ML
250 INJECTION, SOLUTION INTRAVENOUS AS NEEDED
Status: DISCONTINUED | OUTPATIENT
Start: 2017-01-01 | End: 2017-01-01 | Stop reason: HOSPADM

## 2017-01-01 RX ORDER — CEFOXITIN 1 G/1
INJECTION, POWDER, FOR SOLUTION INTRAVENOUS
Status: DISPENSED
Start: 2017-01-01 | End: 2017-01-01

## 2017-01-01 RX ORDER — SODIUM CHLORIDE 0.9 % (FLUSH) 0.9 %
10 SYRINGE (ML) INJECTION AS NEEDED
Status: DISCONTINUED | OUTPATIENT
Start: 2017-01-01 | End: 2018-01-01 | Stop reason: HOSPADM

## 2017-01-01 RX ORDER — NEOSTIGMINE METHYLSULFATE 1 MG/ML
INJECTION, SOLUTION INTRAVENOUS AS NEEDED
Status: DISCONTINUED | OUTPATIENT
Start: 2017-01-01 | End: 2017-01-01 | Stop reason: SURG

## 2017-01-01 RX ORDER — POTASSIUM CHLORIDE 7.45 MG/ML
10 INJECTION INTRAVENOUS
Status: DISPENSED | OUTPATIENT
Start: 2017-01-01 | End: 2017-01-01

## 2017-01-01 RX ORDER — MAGNESIUM SULFATE 1 G/100ML
3 INJECTION INTRAVENOUS ONCE
Status: DISCONTINUED | OUTPATIENT
Start: 2017-01-01 | End: 2017-01-01 | Stop reason: CLARIF

## 2017-01-01 RX ORDER — POTASSIUM CHLORIDE 1.5 G/1.77G
40 POWDER, FOR SOLUTION ORAL AS NEEDED
Status: DISCONTINUED | OUTPATIENT
Start: 2017-01-01 | End: 2017-01-01

## 2017-01-01 RX ORDER — OXYCODONE HYDROCHLORIDE AND ACETAMINOPHEN 5; 325 MG/1; MG/1
1 TABLET ORAL ONCE AS NEEDED
Status: CANCELLED | OUTPATIENT
Start: 2017-01-01

## 2017-01-01 RX ORDER — MAGNESIUM SULFATE HEPTAHYDRATE 40 MG/ML
4 INJECTION, SOLUTION INTRAVENOUS AS NEEDED
Status: DISCONTINUED | OUTPATIENT
Start: 2017-01-01 | End: 2018-01-01 | Stop reason: HOSPADM

## 2017-01-01 RX ORDER — MAGNESIUM HYDROXIDE 1200 MG/15ML
LIQUID ORAL
Status: COMPLETED
Start: 2017-01-01 | End: 2017-01-01

## 2017-01-01 RX ORDER — OXYCODONE HYDROCHLORIDE AND ACETAMINOPHEN 5; 325 MG/1; MG/1
1 TABLET ORAL EVERY 6 HOURS PRN
Qty: 30 TABLET | Refills: 0
Start: 2017-01-01 | End: 2017-01-01

## 2017-01-01 RX ORDER — SODIUM CHLORIDE, SODIUM LACTATE, POTASSIUM CHLORIDE, CALCIUM CHLORIDE 600; 310; 30; 20 MG/100ML; MG/100ML; MG/100ML; MG/100ML
1000 INJECTION, SOLUTION INTRAVENOUS CONTINUOUS PRN
Status: DISCONTINUED | OUTPATIENT
Start: 2017-01-01 | End: 2017-01-01

## 2017-01-01 RX ORDER — SODIUM CHLORIDE, SODIUM LACTATE, POTASSIUM CHLORIDE, CALCIUM CHLORIDE 600; 310; 30; 20 MG/100ML; MG/100ML; MG/100ML; MG/100ML
9 INJECTION, SOLUTION INTRAVENOUS CONTINUOUS
Status: DISCONTINUED | OUTPATIENT
Start: 2017-01-01 | End: 2017-01-01 | Stop reason: HOSPADM

## 2017-01-01 RX ORDER — CHOLESTYRAMINE 4 G/9G
8 POWDER, FOR SUSPENSION ORAL
Qty: 60 | Refills: 12 | Status: ACTIVE
Start: 2017-01-01

## 2017-01-01 RX ORDER — FUROSEMIDE 10 MG/ML
20 INJECTION INTRAMUSCULAR; INTRAVENOUS ONCE
Status: DISCONTINUED | OUTPATIENT
Start: 2017-01-01 | End: 2017-01-01 | Stop reason: CLARIF

## 2017-01-01 RX ORDER — DOXYCYCLINE HYCLATE 50 MG/1
100 CAPSULE ORAL 2 TIMES DAILY
Qty: 40 CAPSULE | Refills: 0 | Status: SHIPPED | OUTPATIENT
Start: 2017-01-01 | End: 2017-01-01

## 2017-01-01 RX ORDER — MIDAZOLAM HYDROCHLORIDE 1 MG/ML
1 INJECTION INTRAMUSCULAR; INTRAVENOUS
Status: DISCONTINUED | OUTPATIENT
Start: 2017-01-01 | End: 2018-01-01 | Stop reason: HOSPADM

## 2017-01-01 RX ORDER — PANTOPRAZOLE SODIUM 40 MG/1
40 TABLET, DELAYED RELEASE ORAL DAILY
Status: DISCONTINUED | OUTPATIENT
Start: 2017-01-01 | End: 2017-01-01

## 2017-01-01 RX ORDER — SODIUM CHLORIDE AND POTASSIUM CHLORIDE 150; 900 MG/100ML; MG/100ML
75 INJECTION, SOLUTION INTRAVENOUS CONTINUOUS
Status: DISCONTINUED | OUTPATIENT
Start: 2017-01-01 | End: 2017-01-01

## 2017-01-01 RX ORDER — HYDROMORPHONE HCL 110MG/55ML
1 PATIENT CONTROLLED ANALGESIA SYRINGE INTRAVENOUS
Status: DISCONTINUED | OUTPATIENT
Start: 2017-01-01 | End: 2017-01-01

## 2017-01-01 RX ORDER — L.ACID,PARA/B.BIFIDUM/S.THERM 8B CELL
1 CAPSULE ORAL DAILY
Status: DISCONTINUED | OUTPATIENT
Start: 2017-01-01 | End: 2017-01-01

## 2017-01-01 RX ORDER — SODIUM CHLORIDE AND POTASSIUM CHLORIDE 150; 900 MG/100ML; MG/100ML
INJECTION, SOLUTION INTRAVENOUS
Status: COMPLETED
Start: 2017-01-01 | End: 2017-01-01

## 2017-01-01 RX ORDER — ROCURONIUM BROMIDE 10 MG/ML
INJECTION, SOLUTION INTRAVENOUS AS NEEDED
Status: DISCONTINUED | OUTPATIENT
Start: 2017-01-01 | End: 2017-01-01 | Stop reason: SURG

## 2017-01-01 RX ORDER — ONDANSETRON 4 MG/1
4 TABLET, FILM COATED ORAL ONCE AS NEEDED
Status: DISCONTINUED | OUTPATIENT
Start: 2017-01-01 | End: 2017-01-01 | Stop reason: HOSPADM

## 2017-01-01 RX ORDER — FAMOTIDINE 20 MG/1
40 TABLET, FILM COATED ORAL DAILY
Status: DISCONTINUED | OUTPATIENT
Start: 2017-01-01 | End: 2017-01-01

## 2017-01-01 RX ORDER — HYDROMORPHONE HCL 110MG/55ML
0.5 PATIENT CONTROLLED ANALGESIA SYRINGE INTRAVENOUS
Status: COMPLETED | OUTPATIENT
Start: 2017-01-01 | End: 2017-01-01

## 2017-01-01 RX ORDER — CLOTRIMAZOLE AND BETAMETHASONE DIPROPIONATE 10; .64 MG/G; MG/G
1 CREAM TOPICAL EVERY 12 HOURS SCHEDULED
Status: DISCONTINUED | OUTPATIENT
Start: 2017-01-01 | End: 2018-01-01 | Stop reason: HOSPADM

## 2017-01-01 RX ORDER — SODIUM CHLORIDE 0.9 % (FLUSH) 0.9 %
1-10 SYRINGE (ML) INJECTION AS NEEDED
Status: DISCONTINUED | OUTPATIENT
Start: 2017-01-01 | End: 2018-01-01 | Stop reason: HOSPADM

## 2017-01-01 RX ORDER — METRONIDAZOLE 500 MG/1
500 TABLET ORAL 3 TIMES DAILY
Qty: 30 TABLET | Refills: 0 | Status: SHIPPED | OUTPATIENT
Start: 2017-01-01 | End: 2017-01-01 | Stop reason: SDUPTHER

## 2017-01-01 RX ORDER — MEPERIDINE HYDROCHLORIDE 25 MG/ML
12.5 INJECTION INTRAMUSCULAR; INTRAVENOUS; SUBCUTANEOUS
Status: DISCONTINUED | OUTPATIENT
Start: 2017-01-01 | End: 2017-01-01 | Stop reason: HOSPADM

## 2017-01-01 RX ORDER — NALOXONE HCL 0.4 MG/ML
0.1 VIAL (ML) INJECTION
Status: DISCONTINUED | OUTPATIENT
Start: 2017-01-01 | End: 2017-01-01 | Stop reason: HOSPADM

## 2017-01-01 RX ORDER — ONDANSETRON 2 MG/ML
4 INJECTION INTRAMUSCULAR; INTRAVENOUS ONCE AS NEEDED
Status: DISCONTINUED | OUTPATIENT
Start: 2017-01-01 | End: 2017-01-01 | Stop reason: HOSPADM

## 2017-01-01 RX ORDER — MAGNESIUM CARB/ALUMINUM HYDROX 105-160MG
300 TABLET,CHEWABLE ORAL ONCE
Status: COMPLETED | OUTPATIENT
Start: 2017-01-01 | End: 2017-01-01

## 2017-01-01 RX ORDER — DEXTROSE, SODIUM CHLORIDE, AND POTASSIUM CHLORIDE 5; .45; .15 G/100ML; G/100ML; G/100ML
INJECTION INTRAVENOUS
Status: COMPLETED
Start: 2017-01-01 | End: 2017-01-01

## 2017-01-01 RX ORDER — ONDANSETRON 4 MG/1
4 TABLET, ORALLY DISINTEGRATING ORAL EVERY 6 HOURS PRN
Status: DISCONTINUED | OUTPATIENT
Start: 2017-01-01 | End: 2017-01-01 | Stop reason: HOSPADM

## 2017-01-01 RX ORDER — FENTANYL CITRATE 50 UG/ML
INJECTION, SOLUTION INTRAMUSCULAR; INTRAVENOUS
Status: COMPLETED
Start: 2017-01-01 | End: 2017-01-01

## 2017-01-01 RX ORDER — METRONIDAZOLE 500 MG/1
500 TABLET ORAL 3 TIMES DAILY
Qty: 33 TABLET | Refills: 0 | Status: SHIPPED | OUTPATIENT
Start: 2017-01-01 | End: 2017-01-01

## 2017-01-01 RX ORDER — METOPROLOL TARTRATE 50 MG/1
TABLET, FILM COATED ORAL
Status: COMPLETED
Start: 2017-01-01 | End: 2017-01-01

## 2017-01-01 RX ORDER — MAGNESIUM SULFATE 1 G/100ML
1 INJECTION INTRAVENOUS AS NEEDED
Status: DISCONTINUED | OUTPATIENT
Start: 2017-01-01 | End: 2018-01-01 | Stop reason: HOSPADM

## 2017-01-01 RX ORDER — OXYCODONE HYDROCHLORIDE AND ACETAMINOPHEN 5; 325 MG/1; MG/1
1 TABLET ORAL EVERY 8 HOURS PRN
Qty: 20 TABLET | Refills: 0 | Status: SHIPPED | OUTPATIENT
Start: 2017-01-01 | End: 2017-01-01 | Stop reason: SDUPTHER

## 2017-01-01 RX ORDER — PHYTONADIONE 10 MG/ML
2.5 INJECTION, EMULSION INTRAMUSCULAR; INTRAVENOUS; SUBCUTANEOUS ONCE
Status: COMPLETED | OUTPATIENT
Start: 2017-01-01 | End: 2017-01-01

## 2017-01-01 RX ORDER — ONDANSETRON 2 MG/ML
INJECTION INTRAMUSCULAR; INTRAVENOUS
Status: COMPLETED
Start: 2017-01-01 | End: 2017-01-01

## 2017-01-01 RX ORDER — NALOXONE HCL 0.4 MG/ML
0.1 VIAL (ML) INJECTION
Status: DISCONTINUED | OUTPATIENT
Start: 2017-01-01 | End: 2018-01-01 | Stop reason: HOSPADM

## 2017-01-01 RX ORDER — MEPERIDINE HYDROCHLORIDE 25 MG/ML
12.5 INJECTION INTRAMUSCULAR; INTRAVENOUS; SUBCUTANEOUS
Status: DISCONTINUED | OUTPATIENT
Start: 2017-01-01 | End: 2017-01-01

## 2017-01-01 RX ORDER — LIDOCAINE HYDROCHLORIDE 10 MG/ML
INJECTION, SOLUTION INFILTRATION; PERINEURAL AS NEEDED
Status: DISCONTINUED | OUTPATIENT
Start: 2017-01-01 | End: 2017-01-01 | Stop reason: HOSPADM

## 2017-01-01 RX ORDER — L.ACID,PARA/B.BIFIDUM/S.THERM 8B CELL
1 CAPSULE ORAL DAILY
Qty: 30 CAPSULE | Refills: 0 | Status: SHIPPED | OUTPATIENT
Start: 2017-01-01 | End: 2018-01-01 | Stop reason: HOSPADM

## 2017-01-01 RX ORDER — ADENOSINE 3 MG/ML
INJECTION, SOLUTION INTRAVENOUS
Status: COMPLETED
Start: 2017-01-01 | End: 2017-01-01

## 2017-01-01 RX ORDER — MAGNESIUM SULFATE HEPTAHYDRATE 40 MG/ML
2 INJECTION, SOLUTION INTRAVENOUS AS NEEDED
Status: DISCONTINUED | OUTPATIENT
Start: 2017-01-01 | End: 2018-01-01 | Stop reason: HOSPADM

## 2017-01-01 RX ORDER — NITROFURANTOIN 25; 75 MG/1; MG/1
CAPSULE ORAL
COMMUNITY
Start: 2017-01-01 | End: 2017-01-01

## 2017-01-01 RX ORDER — PANTOPRAZOLE SODIUM 40 MG/1
40 TABLET, DELAYED RELEASE ORAL
Status: DISCONTINUED | OUTPATIENT
Start: 2017-01-01 | End: 2017-01-01 | Stop reason: HOSPADM

## 2017-01-01 RX ORDER — DIGOXIN 0.25 MG/ML
INJECTION INTRAMUSCULAR; INTRAVENOUS
Status: COMPLETED
Start: 2017-01-01 | End: 2017-01-01

## 2017-01-01 RX ORDER — FAMOTIDINE 10 MG/ML
INJECTION, SOLUTION INTRAVENOUS
Status: COMPLETED
Start: 2017-01-01 | End: 2017-01-01

## 2017-01-01 RX ORDER — SODIUM CHLORIDE AND POTASSIUM CHLORIDE 150; 900 MG/100ML; MG/100ML
50 INJECTION, SOLUTION INTRAVENOUS CONTINUOUS
Status: DISCONTINUED | OUTPATIENT
Start: 2017-01-01 | End: 2017-01-01

## 2017-01-01 RX ORDER — COLLAGENASE SANTYL 250 [ARB'U]/G
OINTMENT TOPICAL
Refills: 0 | COMMUNITY
Start: 2017-01-01 | End: 2017-01-01

## 2017-01-01 RX ORDER — MAGNESIUM SULFATE HEPTAHYDRATE 40 MG/ML
2 INJECTION, SOLUTION INTRAVENOUS 2 TIMES DAILY
Status: COMPLETED | OUTPATIENT
Start: 2017-01-01 | End: 2017-01-01

## 2017-01-01 RX ORDER — PIPERACILLIN SODIUM, TAZOBACTAM SODIUM 4; .5 G/20ML; G/20ML
INJECTION, POWDER, LYOPHILIZED, FOR SOLUTION INTRAVENOUS
Status: DISPENSED
Start: 2017-01-01 | End: 2017-01-01

## 2017-01-01 RX ORDER — ETOMIDATE 2 MG/ML
INJECTION INTRAVENOUS AS NEEDED
Status: DISCONTINUED | OUTPATIENT
Start: 2017-01-01 | End: 2017-01-01 | Stop reason: SURG

## 2017-01-01 RX ORDER — SODIUM CHLORIDE, SODIUM LACTATE, POTASSIUM CHLORIDE, CALCIUM CHLORIDE 600; 310; 30; 20 MG/100ML; MG/100ML; MG/100ML; MG/100ML
9 INJECTION, SOLUTION INTRAVENOUS CONTINUOUS PRN
Status: DISCONTINUED | OUTPATIENT
Start: 2017-01-01 | End: 2017-01-01 | Stop reason: HOSPADM

## 2017-01-01 RX ORDER — OXYCODONE HYDROCHLORIDE AND ACETAMINOPHEN 5; 325 MG/1; MG/1
1 TABLET ORAL EVERY 6 HOURS PRN
Qty: 40 TABLET | Refills: 0 | Status: SHIPPED | OUTPATIENT
Start: 2017-01-01 | End: 2017-01-01 | Stop reason: HOSPADM

## 2017-01-01 RX ORDER — NICOTINE POLACRILEX 4 MG
15 LOZENGE BUCCAL
Status: DISCONTINUED | OUTPATIENT
Start: 2017-01-01 | End: 2018-01-01 | Stop reason: HOSPADM

## 2017-01-01 RX ORDER — POTASSIUM CHLORIDE 20 MEQ/1
TABLET, EXTENDED RELEASE ORAL
Status: DISPENSED
Start: 2017-01-01 | End: 2017-01-01

## 2017-01-01 RX ORDER — OXYCODONE HYDROCHLORIDE AND ACETAMINOPHEN 5; 325 MG/1; MG/1
1 TABLET ORAL EVERY 8 HOURS PRN
Qty: 10 TABLET | Refills: 0 | Status: SHIPPED | OUTPATIENT
Start: 2017-01-01 | End: 2018-01-01 | Stop reason: HOSPADM

## 2017-01-01 RX ORDER — DIPHENHYDRAMINE HYDROCHLORIDE 50 MG/ML
25 INJECTION INTRAMUSCULAR; INTRAVENOUS ONCE
Status: COMPLETED | OUTPATIENT
Start: 2017-01-01 | End: 2017-01-01

## 2017-01-01 RX ORDER — SODIUM CHLORIDE 9 MG/ML
INJECTION, SOLUTION INTRAVENOUS CONTINUOUS PRN
Status: DISCONTINUED | OUTPATIENT
Start: 2017-01-01 | End: 2017-01-01 | Stop reason: SURG

## 2017-01-01 RX ADMIN — CLOTRIMAZOLE AND BETAMETHASONE DIPROPIONATE 1 APPLICATION: 10; .5 CREAM TOPICAL at 20:14

## 2017-01-01 RX ADMIN — METRONIDAZOLE 500 MG: 500 INJECTION, SOLUTION INTRAVENOUS at 13:05

## 2017-01-01 RX ADMIN — METRONIDAZOLE 500 MG: 500 INJECTION, SOLUTION INTRAVENOUS at 20:48

## 2017-01-01 RX ADMIN — HYDROMORPHONE HYDROCHLORIDE 1 MG: 1 INJECTION, SOLUTION INTRAMUSCULAR; INTRAVENOUS; SUBCUTANEOUS at 08:09

## 2017-01-01 RX ADMIN — I.V. FAT EMULSION 47.04 G: 20 EMULSION INTRAVENOUS at 17:50

## 2017-01-01 RX ADMIN — SODIUM CHLORIDE 4.5 G: 9 INJECTION, SOLUTION INTRAVENOUS at 17:07

## 2017-01-01 RX ADMIN — METOPROLOL TARTRATE 2.5 MG: 5 INJECTION INTRAVENOUS at 03:30

## 2017-01-01 RX ADMIN — PANTOPRAZOLE SODIUM 40 MG: 40 TABLET, DELAYED RELEASE ORAL at 06:31

## 2017-01-01 RX ADMIN — ERYTHROPOIETIN 40000 UNITS: 40000 INJECTION, SOLUTION INTRAVENOUS; SUBCUTANEOUS at 12:14

## 2017-01-01 RX ADMIN — HYDROMORPHONE HYDROCHLORIDE 1 MG: 1 INJECTION, SOLUTION INTRAMUSCULAR; INTRAVENOUS; SUBCUTANEOUS at 22:49

## 2017-01-01 RX ADMIN — METRONIDAZOLE 500 MG: 500 INJECTION, SOLUTION INTRAVENOUS at 03:13

## 2017-01-01 RX ADMIN — POTASSIUM PHOSPHATE, MONOBASIC AND POTASSIUM PHOSPHATE, DIBASIC 20 MMOL: 224; 236 INJECTION, SOLUTION INTRAVENOUS at 10:50

## 2017-01-01 RX ADMIN — METOPROLOL TARTRATE 25 MG: 25 TABLET, FILM COATED ORAL at 09:01

## 2017-01-01 RX ADMIN — PETROLATUM: 42 OINTMENT TOPICAL at 08:10

## 2017-01-01 RX ADMIN — ERYTHROPOIETIN 10000 UNITS: 10000 INJECTION, SOLUTION INTRAVENOUS; SUBCUTANEOUS at 12:14

## 2017-01-01 RX ADMIN — METRONIDAZOLE 500 MG: 500 INJECTION, SOLUTION INTRAVENOUS at 10:58

## 2017-01-01 RX ADMIN — HYDROMORPHONE HYDROCHLORIDE 0.5 MG: 2 INJECTION, SOLUTION INTRAMUSCULAR; INTRAVENOUS; SUBCUTANEOUS at 17:20

## 2017-01-01 RX ADMIN — METRONIDAZOLE 500 MG: 500 INJECTION, SOLUTION INTRAVENOUS at 10:30

## 2017-01-01 RX ADMIN — SODIUM CHLORIDE 250 ML: 9 INJECTION, SOLUTION INTRAVENOUS at 15:57

## 2017-01-01 RX ADMIN — KETAMINE HYDROCHLORIDE 10 MG: 100 INJECTION INTRAMUSCULAR; INTRAVENOUS at 18:06

## 2017-01-01 RX ADMIN — POTASSIUM PHOSPHATE, MONOBASIC AND POTASSIUM PHOSPHATE, DIBASIC: 224; 236 INJECTION, SOLUTION INTRAVENOUS at 17:51

## 2017-01-01 RX ADMIN — SODIUM CHLORIDE 4.5 G: 9 INJECTION, SOLUTION INTRAVENOUS at 01:15

## 2017-01-01 RX ADMIN — CYANOCOBALAMIN 1000 MCG: 1000 INJECTION, SOLUTION INTRAMUSCULAR; SUBCUTANEOUS at 11:31

## 2017-01-01 RX ADMIN — METOPROLOL TARTRATE 2.5 MG: 5 INJECTION INTRAVENOUS at 02:35

## 2017-01-01 RX ADMIN — POTASSIUM CHLORIDE 40 MEQ: 1500 TABLET, EXTENDED RELEASE ORAL at 09:36

## 2017-01-01 RX ADMIN — METOPROLOL TARTRATE 5 MG: 5 INJECTION INTRAVENOUS at 22:43

## 2017-01-01 RX ADMIN — POTASSIUM CHLORIDE, DEXTROSE MONOHYDRATE AND SODIUM CHLORIDE 50 ML/HR: 150; 5; 450 INJECTION, SOLUTION INTRAVENOUS at 19:58

## 2017-01-01 RX ADMIN — HYDROMORPHONE HYDROCHLORIDE 0.5 MG: 2 INJECTION, SOLUTION INTRAMUSCULAR; INTRAVENOUS; SUBCUTANEOUS at 16:25

## 2017-01-01 RX ADMIN — PANTOPRAZOLE SODIUM 40 MG: 40 TABLET, DELAYED RELEASE ORAL at 06:14

## 2017-01-01 RX ADMIN — IRBESARTAN 300 MG: 150 TABLET ORAL at 08:54

## 2017-01-01 RX ADMIN — METOPROLOL TARTRATE 2.5 MG: 5 INJECTION INTRAVENOUS at 23:13

## 2017-01-01 RX ADMIN — SODIUM CHLORIDE, POTASSIUM CHLORIDE, SODIUM LACTATE AND CALCIUM CHLORIDE 100 ML/HR: 600; 310; 30; 20 INJECTION, SOLUTION INTRAVENOUS at 07:52

## 2017-01-01 RX ADMIN — MORPHINE SULFATE: 1 INJECTION INTRAVENOUS at 10:19

## 2017-01-01 RX ADMIN — PROPOFOL 50 MG: 10 INJECTION, EMULSION INTRAVENOUS at 11:31

## 2017-01-01 RX ADMIN — PHYTONADIONE 2.5 MG: 10 INJECTION, EMULSION INTRAMUSCULAR; INTRAVENOUS; SUBCUTANEOUS at 12:01

## 2017-01-01 RX ADMIN — SODIUM CHLORIDE, POTASSIUM CHLORIDE, SODIUM LACTATE AND CALCIUM CHLORIDE 100 ML/HR: 600; 310; 30; 20 INJECTION, SOLUTION INTRAVENOUS at 01:54

## 2017-01-01 RX ADMIN — POTASSIUM CHLORIDE 40 MEQ: 1500 TABLET, EXTENDED RELEASE ORAL at 09:01

## 2017-01-01 RX ADMIN — POLYETHYLENE GLYCOL 3350 119 G: 17 POWDER, FOR SOLUTION ORAL at 20:53

## 2017-01-01 RX ADMIN — CEFOXITIN SODIUM 2 G: 2 INJECTION, SOLUTION INTRAVENOUS at 12:33

## 2017-01-01 RX ADMIN — PERFLUTREN 2 ML: 6.52 INJECTION, SUSPENSION INTRAVENOUS at 17:15

## 2017-01-01 RX ADMIN — SODIUM CHLORIDE 4.5 G: 9 INJECTION, SOLUTION INTRAVENOUS at 00:45

## 2017-01-01 RX ADMIN — POTASSIUM CHLORIDE 20 MEQ: 400 INJECTION, SOLUTION INTRAVENOUS at 11:35

## 2017-01-01 RX ADMIN — MAGNESIUM SULFATE HEPTAHYDRATE: 500 INJECTION, SOLUTION INTRAMUSCULAR; INTRAVENOUS at 18:09

## 2017-01-01 RX ADMIN — PHENYLEPHRINE HYDROCHLORIDE 100 MCG: 10 INJECTION INTRAVENOUS at 14:46

## 2017-01-01 RX ADMIN — METOPROLOL TARTRATE 2.5 MG: 5 INJECTION INTRAVENOUS at 09:27

## 2017-01-01 RX ADMIN — HYDROMORPHONE HYDROCHLORIDE 1 MG: 1 INJECTION, SOLUTION INTRAMUSCULAR; INTRAVENOUS; SUBCUTANEOUS at 14:04

## 2017-01-01 RX ADMIN — ONDANSETRON 4 MG: 2 INJECTION INTRAMUSCULAR; INTRAVENOUS at 11:49

## 2017-01-01 RX ADMIN — SODIUM CHLORIDE, POTASSIUM CHLORIDE, SODIUM LACTATE AND CALCIUM CHLORIDE 100 ML/HR: 600; 310; 30; 20 INJECTION, SOLUTION INTRAVENOUS at 21:42

## 2017-01-01 RX ADMIN — METOPROLOL TARTRATE 25 MG: 25 TABLET ORAL at 12:52

## 2017-01-01 RX ADMIN — METOPROLOL TARTRATE 2.5 MG: 5 INJECTION INTRAVENOUS at 16:24

## 2017-01-01 RX ADMIN — METRONIDAZOLE 500 MG: 500 INJECTION, SOLUTION INTRAVENOUS at 18:34

## 2017-01-01 RX ADMIN — LEVOFLOXACIN 750 MG: 750 TABLET, FILM COATED ORAL at 08:42

## 2017-01-01 RX ADMIN — PANTOPRAZOLE SODIUM 40 MG: 40 INJECTION, POWDER, FOR SOLUTION INTRAVENOUS at 06:16

## 2017-01-01 RX ADMIN — METOPROLOL TARTRATE 2.5 MG: 5 INJECTION INTRAVENOUS at 04:35

## 2017-01-01 RX ADMIN — CLOTRIMAZOLE AND BETAMETHASONE DIPROPIONATE 1 APPLICATION: 10; .5 CREAM TOPICAL at 08:37

## 2017-01-01 RX ADMIN — METRONIDAZOLE 500 MG: 500 INJECTION, SOLUTION INTRAVENOUS at 20:59

## 2017-01-01 RX ADMIN — I.V. FAT EMULSION 47.04 G: 20 EMULSION INTRAVENOUS at 18:16

## 2017-01-01 RX ADMIN — PETROLATUM: 42 OINTMENT TOPICAL at 20:33

## 2017-01-01 RX ADMIN — POTASSIUM CHLORIDE 10 MEQ: 10 INJECTION, SOLUTION INTRAVENOUS at 20:46

## 2017-01-01 RX ADMIN — OXYCODONE HYDROCHLORIDE AND ACETAMINOPHEN 1 TABLET: 5; 325 TABLET ORAL at 10:40

## 2017-01-01 RX ADMIN — METRONIDAZOLE 500 MG: 500 INJECTION, SOLUTION INTRAVENOUS at 10:32

## 2017-01-01 RX ADMIN — KETAMINE HYDROCHLORIDE 10 MG: 100 INJECTION INTRAMUSCULAR; INTRAVENOUS at 16:32

## 2017-01-01 RX ADMIN — METOPROLOL TARTRATE 2.5 MG: 5 INJECTION INTRAVENOUS at 09:26

## 2017-01-01 RX ADMIN — Medication 10 ML: at 21:44

## 2017-01-01 RX ADMIN — POTASSIUM & SODIUM PHOSPHATES POWDER PACK 280-160-250 MG 1 PACKET: 280-160-250 PACK at 09:36

## 2017-01-01 RX ADMIN — PANTOPRAZOLE SODIUM 40 MG: 40 INJECTION, POWDER, FOR SOLUTION INTRAVENOUS at 06:31

## 2017-01-01 RX ADMIN — POTASSIUM CHLORIDE 40 MEQ: 1.5 POWDER, FOR SOLUTION ORAL at 16:53

## 2017-01-01 RX ADMIN — FAMOTIDINE 20 MG: 10 INJECTION, SOLUTION INTRAVENOUS at 11:49

## 2017-01-01 RX ADMIN — CEFOXITIN SODIUM 2 G: 2 INJECTION, SOLUTION INTRAVENOUS at 01:02

## 2017-01-01 RX ADMIN — PETROLATUM: 42 OINTMENT TOPICAL at 13:45

## 2017-01-01 RX ADMIN — SODIUM CHLORIDE 4.5 G: 9 INJECTION, SOLUTION INTRAVENOUS at 10:28

## 2017-01-01 RX ADMIN — MORPHINE SULFATE 2 MG: 2 INJECTION, SOLUTION INTRAMUSCULAR; INTRAVENOUS at 09:37

## 2017-01-01 RX ADMIN — POTASSIUM CHLORIDE AND SODIUM CHLORIDE 75 ML/HR: 900; 150 INJECTION, SOLUTION INTRAVENOUS at 08:28

## 2017-01-01 RX ADMIN — HYDROMORPHONE HYDROCHLORIDE 1 MG: 1 INJECTION, SOLUTION INTRAMUSCULAR; INTRAVENOUS; SUBCUTANEOUS at 04:35

## 2017-01-01 RX ADMIN — CLOTRIMAZOLE AND BETAMETHASONE DIPROPIONATE 1 APPLICATION: 10; .5 CREAM TOPICAL at 21:44

## 2017-01-01 RX ADMIN — SODIUM CHLORIDE 4.5 G: 9 INJECTION, SOLUTION INTRAVENOUS at 09:35

## 2017-01-01 RX ADMIN — ACETAMINOPHEN 650 MG: 325 TABLET, FILM COATED ORAL at 13:05

## 2017-01-01 RX ADMIN — METOPROLOL TARTRATE 2.5 MG: 5 INJECTION INTRAVENOUS at 03:16

## 2017-01-01 RX ADMIN — MORPHINE SULFATE: 1 INJECTION INTRAVENOUS at 10:01

## 2017-01-01 RX ADMIN — OXYCODONE HYDROCHLORIDE AND ACETAMINOPHEN 1 TABLET: 5; 325 TABLET ORAL at 02:41

## 2017-01-01 RX ADMIN — CEFOXITIN SODIUM 2 G: 2 INJECTION, SOLUTION INTRAVENOUS at 09:54

## 2017-01-01 RX ADMIN — SODIUM CHLORIDE, POTASSIUM CHLORIDE, SODIUM LACTATE AND CALCIUM CHLORIDE 100 ML/HR: 600; 310; 30; 20 INJECTION, SOLUTION INTRAVENOUS at 22:02

## 2017-01-01 RX ADMIN — OXYCODONE HYDROCHLORIDE AND ACETAMINOPHEN 1 TABLET: 5; 325 TABLET ORAL at 15:59

## 2017-01-01 RX ADMIN — SODIUM CHLORIDE 4.5 G: 9 INJECTION, SOLUTION INTRAVENOUS at 17:46

## 2017-01-01 RX ADMIN — MORPHINE SULFATE 2 MG: 2 INJECTION, SOLUTION INTRAMUSCULAR; INTRAVENOUS at 10:23

## 2017-01-01 RX ADMIN — HYDROMORPHONE HYDROCHLORIDE 1 MG: 1 INJECTION, SOLUTION INTRAMUSCULAR; INTRAVENOUS; SUBCUTANEOUS at 12:44

## 2017-01-01 RX ADMIN — SODIUM CHLORIDE AND POTASSIUM CHLORIDE 100 ML/HR: 9; 1.49 INJECTION, SOLUTION INTRAVENOUS at 20:11

## 2017-01-01 RX ADMIN — SODIUM CHLORIDE 4.5 G: 9 INJECTION, SOLUTION INTRAVENOUS at 09:00

## 2017-01-01 RX ADMIN — MORPHINE SULFATE: 1 INJECTION INTRAVENOUS at 08:29

## 2017-01-01 RX ADMIN — MORPHINE SULFATE 2 MG: 2 INJECTION, SOLUTION INTRAMUSCULAR; INTRAVENOUS at 22:15

## 2017-01-01 RX ADMIN — POTASSIUM CHLORIDE 10 MEQ: 10 INJECTION, SOLUTION INTRAVENOUS at 10:59

## 2017-01-01 RX ADMIN — SODIUM CHLORIDE, POTASSIUM CHLORIDE, SODIUM LACTATE AND CALCIUM CHLORIDE 100 ML/HR: 600; 310; 30; 20 INJECTION, SOLUTION INTRAVENOUS at 04:07

## 2017-01-01 RX ADMIN — SODIUM CHLORIDE 4.5 G: 9 INJECTION, SOLUTION INTRAVENOUS at 01:43

## 2017-01-01 RX ADMIN — METOPROLOL TARTRATE 2.5 MG: 5 INJECTION INTRAVENOUS at 20:42

## 2017-01-01 RX ADMIN — PROPOFOL 20 MG: 10 INJECTION, EMULSION INTRAVENOUS at 11:27

## 2017-01-01 RX ADMIN — MAGNESIUM SULFATE HEPTAHYDRATE: 500 INJECTION, SOLUTION INTRAMUSCULAR; INTRAVENOUS at 18:14

## 2017-01-01 RX ADMIN — SODIUM CHLORIDE 4.5 G: 9 INJECTION, SOLUTION INTRAVENOUS at 08:28

## 2017-01-01 RX ADMIN — HYDROMORPHONE HYDROCHLORIDE 1 MG: 1 INJECTION, SOLUTION INTRAMUSCULAR; INTRAVENOUS; SUBCUTANEOUS at 20:32

## 2017-01-01 RX ADMIN — SODIUM CHLORIDE 4.5 G: 9 INJECTION, SOLUTION INTRAVENOUS at 18:27

## 2017-01-01 RX ADMIN — METRONIDAZOLE 500 MG: 500 INJECTION, SOLUTION INTRAVENOUS at 10:05

## 2017-01-01 RX ADMIN — SODIUM CHLORIDE 4.5 G: 9 INJECTION, SOLUTION INTRAVENOUS at 00:55

## 2017-01-01 RX ADMIN — SODIUM CHLORIDE, POTASSIUM CHLORIDE, SODIUM LACTATE AND CALCIUM CHLORIDE 100 ML/HR: 600; 310; 30; 20 INJECTION, SOLUTION INTRAVENOUS at 10:46

## 2017-01-01 RX ADMIN — METRONIDAZOLE 500 MG: 500 INJECTION, SOLUTION INTRAVENOUS at 12:01

## 2017-01-01 RX ADMIN — METRONIDAZOLE 500 MG: 500 INJECTION, SOLUTION INTRAVENOUS at 02:37

## 2017-01-01 RX ADMIN — MAGNESIUM SULFATE HEPTAHYDRATE 2 G: 40 INJECTION, SOLUTION INTRAVENOUS at 06:46

## 2017-01-01 RX ADMIN — METRONIDAZOLE 500 MG: 500 INJECTION, SOLUTION INTRAVENOUS at 21:46

## 2017-01-01 RX ADMIN — SODIUM CHLORIDE, POTASSIUM CHLORIDE, SODIUM LACTATE AND CALCIUM CHLORIDE 10 ML/HR: 600; 310; 30; 20 INJECTION, SOLUTION INTRAVENOUS at 09:21

## 2017-01-01 RX ADMIN — Medication 1 CAPSULE: at 08:25

## 2017-01-01 RX ADMIN — FENTANYL CITRATE 25 MCG: 50 INJECTION, SOLUTION INTRAMUSCULAR; INTRAVENOUS at 12:52

## 2017-01-01 RX ADMIN — Medication 10 ML: at 17:11

## 2017-01-01 RX ADMIN — EPHEDRINE SULFATE 10 MG: 50 INJECTION INTRAMUSCULAR; INTRAVENOUS; SUBCUTANEOUS at 12:37

## 2017-01-01 RX ADMIN — PETROLATUM: 42 OINTMENT TOPICAL at 09:26

## 2017-01-01 RX ADMIN — METRONIDAZOLE 500 MG: 500 INJECTION, SOLUTION INTRAVENOUS at 12:15

## 2017-01-01 RX ADMIN — METOPROLOL TARTRATE 2.5 MG: 5 INJECTION INTRAVENOUS at 09:37

## 2017-01-01 RX ADMIN — MAGNESIUM SULFATE HEPTAHYDRATE: 500 INJECTION, SOLUTION INTRAMUSCULAR; INTRAVENOUS at 08:39

## 2017-01-01 RX ADMIN — METRONIDAZOLE 500 MG: 500 INJECTION, SOLUTION INTRAVENOUS at 04:00

## 2017-01-01 RX ADMIN — METOPROLOL TARTRATE 25 MG: 25 TABLET, FILM COATED ORAL at 20:46

## 2017-01-01 RX ADMIN — MAGNESIUM SULFATE HEPTAHYDRATE 1 G: 1 INJECTION, SOLUTION INTRAVENOUS at 15:56

## 2017-01-01 RX ADMIN — ACETAMINOPHEN 650 MG: 325 TABLET ORAL at 13:05

## 2017-01-01 RX ADMIN — POTASSIUM PHOSPHATE, MONOBASIC AND POTASSIUM PHOSPHATE, DIBASIC: 224; 236 INJECTION, SOLUTION INTRAVENOUS at 15:14

## 2017-01-01 RX ADMIN — PROPOFOL 50 MG: 10 INJECTION, EMULSION INTRAVENOUS at 15:06

## 2017-01-01 RX ADMIN — ACETAMINOPHEN 400 MCG: 400 TABLET ORAL at 10:15

## 2017-01-01 RX ADMIN — ACETAMINOPHEN 650 MG: 325 TABLET ORAL at 12:56

## 2017-01-01 RX ADMIN — SODIUM CHLORIDE 250 ML: 9 INJECTION, SOLUTION INTRAVENOUS at 16:28

## 2017-01-01 RX ADMIN — OXYCODONE HYDROCHLORIDE AND ACETAMINOPHEN 1 TABLET: 5; 325 TABLET ORAL at 09:36

## 2017-01-01 RX ADMIN — MORPHINE SULFATE 2 MG: 2 INJECTION, SOLUTION INTRAMUSCULAR; INTRAVENOUS at 17:42

## 2017-01-01 RX ADMIN — MAGNESIUM SULFATE IN WATER: 40 INJECTION, SOLUTION INTRAVENOUS at 08:15

## 2017-01-01 RX ADMIN — METRONIDAZOLE 500 MG: 500 INJECTION, SOLUTION INTRAVENOUS at 03:43

## 2017-01-01 RX ADMIN — INSULIN ASPART 2 UNITS: 100 INJECTION, SOLUTION INTRAVENOUS; SUBCUTANEOUS at 11:31

## 2017-01-01 RX ADMIN — OXYCODONE HYDROCHLORIDE AND ACETAMINOPHEN 1 TABLET: 5; 325 TABLET ORAL at 01:06

## 2017-01-01 RX ADMIN — FENTANYL CITRATE 50 MCG: 50 INJECTION, SOLUTION INTRAMUSCULAR; INTRAVENOUS at 15:36

## 2017-01-01 RX ADMIN — PROPOFOL 50 MG: 10 INJECTION, EMULSION INTRAVENOUS at 14:58

## 2017-01-01 RX ADMIN — SODIUM CHLORIDE, POTASSIUM CHLORIDE, SODIUM LACTATE AND CALCIUM CHLORIDE 75 ML/HR: 600; 310; 30; 20 INJECTION, SOLUTION INTRAVENOUS at 22:48

## 2017-01-01 RX ADMIN — PHYTONADIONE 5 MG: 1 INJECTION, EMULSION INTRAMUSCULAR; INTRAVENOUS; SUBCUTANEOUS at 19:45

## 2017-01-01 RX ADMIN — AMIODARONE HYDROCHLORIDE 0.5 MG/MIN: 1.8 INJECTION, SOLUTION INTRAVENOUS at 20:36

## 2017-01-01 RX ADMIN — METRONIDAZOLE 500 MG: 500 INJECTION, SOLUTION INTRAVENOUS at 21:00

## 2017-01-01 RX ADMIN — PROPOFOL 50 MG: 10 INJECTION, EMULSION INTRAVENOUS at 14:54

## 2017-01-01 RX ADMIN — POTASSIUM PHOSPHATE, MONOBASIC AND POTASSIUM PHOSPHATE, DIBASIC: 224; 236 INJECTION, SOLUTION INTRAVENOUS at 18:13

## 2017-01-01 RX ADMIN — SODIUM CHLORIDE 1000 ML: 900 IRRIGANT IRRIGATION at 16:00

## 2017-01-01 RX ADMIN — INSULIN ASPART 2 UNITS: 100 INJECTION, SOLUTION INTRAVENOUS; SUBCUTANEOUS at 06:47

## 2017-01-01 RX ADMIN — POTASSIUM CHLORIDE 40 MEQ: 1500 TABLET, EXTENDED RELEASE ORAL at 11:16

## 2017-01-01 RX ADMIN — MAGNESIUM SULFATE HEPTAHYDRATE: 500 INJECTION, SOLUTION INTRAMUSCULAR; INTRAVENOUS at 19:12

## 2017-01-01 RX ADMIN — POTASSIUM CHLORIDE 20 MEQ: 400 INJECTION, SOLUTION INTRAVENOUS at 12:07

## 2017-01-01 RX ADMIN — CEFOXITIN SODIUM 2 G: 2 INJECTION, SOLUTION INTRAVENOUS at 08:41

## 2017-01-01 RX ADMIN — METOPROLOL TARTRATE 2.5 MG: 5 INJECTION INTRAVENOUS at 08:14

## 2017-01-01 RX ADMIN — VECURONIUM BROMIDE FOR INJECTION 4 MG: 1 INJECTION, POWDER, LYOPHILIZED, FOR SOLUTION INTRAVENOUS at 12:35

## 2017-01-01 RX ADMIN — FENTANYL CITRATE 25 MCG: 50 INJECTION, SOLUTION INTRAMUSCULAR; INTRAVENOUS at 12:47

## 2017-01-01 RX ADMIN — PROPOFOL 120 MG: 10 INJECTION, EMULSION INTRAVENOUS at 12:31

## 2017-01-01 RX ADMIN — METOPROLOL TARTRATE 5 MG: 5 INJECTION INTRAVENOUS at 09:31

## 2017-01-01 RX ADMIN — METOPROLOL TARTRATE 2.5 MG: 5 INJECTION INTRAVENOUS at 16:50

## 2017-01-01 RX ADMIN — METRONIDAZOLE 500 MG: 500 INJECTION, SOLUTION INTRAVENOUS at 04:06

## 2017-01-01 RX ADMIN — PANTOPRAZOLE SODIUM 40 MG: 40 INJECTION, POWDER, FOR SOLUTION INTRAVENOUS at 06:09

## 2017-01-01 RX ADMIN — SODIUM CHLORIDE 4.5 G: 9 INJECTION, SOLUTION INTRAVENOUS at 10:03

## 2017-01-01 RX ADMIN — METOPROLOL TARTRATE 2.5 MG: 5 INJECTION INTRAVENOUS at 20:16

## 2017-01-01 RX ADMIN — METOPROLOL TARTRATE 25 MG: 50 TABLET, FILM COATED ORAL at 21:02

## 2017-01-01 RX ADMIN — CEFOXITIN SODIUM 2 G: 2 INJECTION, SOLUTION INTRAVENOUS at 01:51

## 2017-01-01 RX ADMIN — CEFOXITIN SODIUM 2 G: 2 INJECTION, SOLUTION INTRAVENOUS at 00:53

## 2017-01-01 RX ADMIN — CLOTRIMAZOLE AND BETAMETHASONE DIPROPIONATE 1 APPLICATION: 10; .5 CREAM TOPICAL at 20:44

## 2017-01-01 RX ADMIN — INSULIN ASPART 2 UNITS: 100 INJECTION, SOLUTION INTRAVENOUS; SUBCUTANEOUS at 19:42

## 2017-01-01 RX ADMIN — CYANOCOBALAMIN 1000 MCG: 1000 INJECTION, SOLUTION INTRAMUSCULAR; SUBCUTANEOUS at 11:30

## 2017-01-01 RX ADMIN — METRONIDAZOLE 500 MG: 500 INJECTION, SOLUTION INTRAVENOUS at 11:19

## 2017-01-01 RX ADMIN — MORPHINE SULFATE 2 MG: 2 INJECTION, SOLUTION INTRAMUSCULAR; INTRAVENOUS at 14:17

## 2017-01-01 RX ADMIN — HYDROMORPHONE HYDROCHLORIDE 1 MG: 1 INJECTION, SOLUTION INTRAMUSCULAR; INTRAVENOUS; SUBCUTANEOUS at 20:20

## 2017-01-01 RX ADMIN — POTASSIUM CHLORIDE 10 MEQ: 10 INJECTION, SOLUTION INTRAVENOUS at 01:58

## 2017-01-01 RX ADMIN — Medication 25 MG: at 13:06

## 2017-01-01 RX ADMIN — METRONIDAZOLE 500 MG: 500 INJECTION, SOLUTION INTRAVENOUS at 18:30

## 2017-01-01 RX ADMIN — Medication 1 TABLET: at 08:24

## 2017-01-01 RX ADMIN — METRONIDAZOLE 500 MG: 500 INJECTION, SOLUTION INTRAVENOUS at 11:10

## 2017-01-01 RX ADMIN — FENTANYL CITRATE 50 MCG: 50 INJECTION, SOLUTION INTRAMUSCULAR; INTRAVENOUS at 15:46

## 2017-01-01 RX ADMIN — CYANOCOBALAMIN 1000 MCG: 1000 INJECTION, SOLUTION INTRAMUSCULAR; SUBCUTANEOUS at 11:53

## 2017-01-01 RX ADMIN — FENTANYL CITRATE 25 MCG: 50 INJECTION, SOLUTION INTRAMUSCULAR; INTRAVENOUS at 12:31

## 2017-01-01 RX ADMIN — METRONIDAZOLE 500 MG: 500 INJECTION, SOLUTION INTRAVENOUS at 03:28

## 2017-01-01 RX ADMIN — Medication 25 MG: at 12:56

## 2017-01-01 RX ADMIN — METRONIDAZOLE 500 MG: 500 INJECTION, SOLUTION INTRAVENOUS at 19:27

## 2017-01-01 RX ADMIN — METRONIDAZOLE 500 MG: 500 INJECTION, SOLUTION INTRAVENOUS at 02:42

## 2017-01-01 RX ADMIN — METOPROLOL TARTRATE 25 MG: 25 TABLET ORAL at 08:24

## 2017-01-01 RX ADMIN — METRONIDAZOLE 500 MG: 500 INJECTION, SOLUTION INTRAVENOUS at 18:47

## 2017-01-01 RX ADMIN — SODIUM CHLORIDE 4.5 G: 9 INJECTION, SOLUTION INTRAVENOUS at 10:07

## 2017-01-01 RX ADMIN — HYDROMORPHONE HYDROCHLORIDE 1 MG: 1 INJECTION, SOLUTION INTRAMUSCULAR; INTRAVENOUS; SUBCUTANEOUS at 09:20

## 2017-01-01 RX ADMIN — METOPROLOL TARTRATE 2.5 MG: 5 INJECTION INTRAVENOUS at 08:58

## 2017-01-01 RX ADMIN — CEFOXITIN SODIUM 2 G: 2 INJECTION, SOLUTION INTRAVENOUS at 01:03

## 2017-01-01 RX ADMIN — LIDOCAINE HYDROCHLORIDE 60 MG: 20 INJECTION, SOLUTION INFILTRATION; PERINEURAL at 12:31

## 2017-01-01 RX ADMIN — METOPROLOL TARTRATE 25 MG: 25 TABLET, FILM COATED ORAL at 09:36

## 2017-01-01 RX ADMIN — SODIUM CHLORIDE 4.5 G: 9 INJECTION, SOLUTION INTRAVENOUS at 09:29

## 2017-01-01 RX ADMIN — PANTOPRAZOLE SODIUM 40 MG: 40 INJECTION, POWDER, FOR SOLUTION INTRAVENOUS at 05:04

## 2017-01-01 RX ADMIN — OXYCODONE HYDROCHLORIDE AND ACETAMINOPHEN 1 TABLET: 5; 325 TABLET ORAL at 11:29

## 2017-01-01 RX ADMIN — PETROLATUM 1 APPLICATION: 42 OINTMENT TOPICAL at 08:51

## 2017-01-01 RX ADMIN — OXYCODONE HYDROCHLORIDE AND ACETAMINOPHEN 1 TABLET: 5; 325 TABLET ORAL at 19:59

## 2017-01-01 RX ADMIN — SODIUM CHLORIDE 4.5 G: 9 INJECTION, SOLUTION INTRAVENOUS at 16:54

## 2017-01-01 RX ADMIN — DIGOXIN 250 MCG: 0.25 INJECTION INTRAMUSCULAR; INTRAVENOUS at 13:05

## 2017-01-01 RX ADMIN — POTASSIUM CHLORIDE 10 MEQ: 10 INJECTION, SOLUTION INTRAVENOUS at 04:53

## 2017-01-01 RX ADMIN — SODIUM CHLORIDE 4.5 G: 9 INJECTION, SOLUTION INTRAVENOUS at 08:14

## 2017-01-01 RX ADMIN — METOPROLOL TARTRATE 25 MG: 25 TABLET ORAL at 20:10

## 2017-01-01 RX ADMIN — METRONIDAZOLE 500 MG: 500 INJECTION, SOLUTION INTRAVENOUS at 18:26

## 2017-01-01 RX ADMIN — PROPOFOL 50 MG: 10 INJECTION, EMULSION INTRAVENOUS at 15:14

## 2017-01-01 RX ADMIN — PROPOFOL 50 MG: 10 INJECTION, EMULSION INTRAVENOUS at 11:46

## 2017-01-01 RX ADMIN — METRONIDAZOLE 500 MG: 500 INJECTION, SOLUTION INTRAVENOUS at 01:45

## 2017-01-01 RX ADMIN — METRONIDAZOLE 500 MG: 500 INJECTION, SOLUTION INTRAVENOUS at 04:04

## 2017-01-01 RX ADMIN — PHYTONADIONE 5 MG: 10 INJECTION, EMULSION INTRAMUSCULAR; INTRAVENOUS; SUBCUTANEOUS at 09:47

## 2017-01-01 RX ADMIN — METRONIDAZOLE 500 MG: 500 INJECTION, SOLUTION INTRAVENOUS at 05:00

## 2017-01-01 RX ADMIN — VECURONIUM BROMIDE FOR INJECTION 1 MG: 1 INJECTION, POWDER, LYOPHILIZED, FOR SOLUTION INTRAVENOUS at 14:29

## 2017-01-01 RX ADMIN — POTASSIUM PHOSPHATE, MONOBASIC AND POTASSIUM PHOSPHATE, DIBASIC 20 MMOL: 224; 236 INJECTION, SOLUTION INTRAVENOUS at 08:32

## 2017-01-01 RX ADMIN — PANTOPRAZOLE SODIUM 40 MG: 40 INJECTION, POWDER, FOR SOLUTION INTRAVENOUS at 06:49

## 2017-01-01 RX ADMIN — FENTANYL CITRATE 25 MCG: 50 INJECTION, SOLUTION INTRAMUSCULAR; INTRAVENOUS at 14:29

## 2017-01-01 RX ADMIN — METOPROLOL TARTRATE 5 MG: 5 INJECTION INTRAVENOUS at 16:37

## 2017-01-01 RX ADMIN — CEFOXITIN SODIUM 2 G: 2 INJECTION, SOLUTION INTRAVENOUS at 20:00

## 2017-01-01 RX ADMIN — MAGNESIUM SULFATE HEPTAHYDRATE: 500 INJECTION, SOLUTION INTRAMUSCULAR; INTRAVENOUS at 18:05

## 2017-01-01 RX ADMIN — METOPROLOL TARTRATE 2.5 MG: 5 INJECTION INTRAVENOUS at 03:39

## 2017-01-01 RX ADMIN — METRONIDAZOLE 500 MG: 500 INJECTION, SOLUTION INTRAVENOUS at 20:14

## 2017-01-01 RX ADMIN — SODIUM CHLORIDE 250 ML: 9 INJECTION, SOLUTION INTRAVENOUS at 12:10

## 2017-01-01 RX ADMIN — NEOSTIGMINE METHYLSULFATE 3 MG: 1 INJECTION, SOLUTION INTRAMUSCULAR; INTRAVENOUS; SUBCUTANEOUS at 18:21

## 2017-01-01 RX ADMIN — LIDOCAINE HYDROCHLORIDE 100 MG: 20 INJECTION, SOLUTION INFILTRATION; PERINEURAL at 14:35

## 2017-01-01 RX ADMIN — SODIUM CHLORIDE, POTASSIUM CHLORIDE, SODIUM LACTATE AND CALCIUM CHLORIDE 9 ML/HR: 600; 310; 30; 20 INJECTION, SOLUTION INTRAVENOUS at 11:48

## 2017-01-01 RX ADMIN — POTASSIUM CHLORIDE AND SODIUM CHLORIDE 100 ML/HR: 900; 150 INJECTION, SOLUTION INTRAVENOUS at 07:03

## 2017-01-01 RX ADMIN — PROPOFOL 50 MG: 10 INJECTION, EMULSION INTRAVENOUS at 14:46

## 2017-01-01 RX ADMIN — POTASSIUM CHLORIDE 40 MEQ: 1.5 POWDER, FOR SOLUTION ORAL at 21:57

## 2017-01-01 RX ADMIN — SUCRALFATE 1 G: 1 TABLET ORAL at 21:01

## 2017-01-01 RX ADMIN — SODIUM CHLORIDE, POTASSIUM CHLORIDE, SODIUM LACTATE AND CALCIUM CHLORIDE: 600; 310; 30; 20 INJECTION, SOLUTION INTRAVENOUS at 14:26

## 2017-01-01 RX ADMIN — SUCCINYLCHOLINE CHLORIDE 100 MG: 20 INJECTION, SOLUTION INTRAMUSCULAR; INTRAVENOUS at 12:31

## 2017-01-01 RX ADMIN — METRONIDAZOLE 500 MG: 500 INJECTION, SOLUTION INTRAVENOUS at 18:00

## 2017-01-01 RX ADMIN — CYANOCOBALAMIN 1000 MCG: 1000 INJECTION, SOLUTION INTRAMUSCULAR; SUBCUTANEOUS at 11:51

## 2017-01-01 RX ADMIN — METRONIDAZOLE 500 MG: 500 INJECTION, SOLUTION INTRAVENOUS at 11:32

## 2017-01-01 RX ADMIN — ERYTHROPOIETIN 40000 UNITS: 40000 INJECTION, SOLUTION INTRAVENOUS; SUBCUTANEOUS at 14:44

## 2017-01-01 RX ADMIN — METRONIDAZOLE 500 MG: 500 INJECTION, SOLUTION INTRAVENOUS at 02:38

## 2017-01-01 RX ADMIN — SODIUM CHLORIDE, POTASSIUM CHLORIDE, SODIUM LACTATE AND CALCIUM CHLORIDE: 600; 310; 30; 20 INJECTION, SOLUTION INTRAVENOUS at 10:55

## 2017-01-01 RX ADMIN — PETROLATUM: 42 OINTMENT TOPICAL at 21:01

## 2017-01-01 RX ADMIN — SODIUM CHLORIDE 4.5 G: 9 INJECTION, SOLUTION INTRAVENOUS at 00:54

## 2017-01-01 RX ADMIN — I.V. FAT EMULSION 47.04 G: 20 EMULSION INTRAVENOUS at 19:13

## 2017-01-01 RX ADMIN — CLOTRIMAZOLE AND BETAMETHASONE DIPROPIONATE 1 APPLICATION: 10; .5 CREAM TOPICAL at 20:52

## 2017-01-01 RX ADMIN — MORPHINE SULFATE: 1 INJECTION INTRAVENOUS at 14:12

## 2017-01-01 RX ADMIN — DIPHENHYDRAMINE HYDROCHLORIDE 25 MG: 25 CAPSULE ORAL at 12:56

## 2017-01-01 RX ADMIN — SODIUM CHLORIDE 4.5 G: 9 INJECTION, SOLUTION INTRAVENOUS at 09:21

## 2017-01-01 RX ADMIN — MORPHINE SULFATE 2 MG: 2 INJECTION, SOLUTION INTRAMUSCULAR; INTRAVENOUS at 18:15

## 2017-01-01 RX ADMIN — HYDROMORPHONE HYDROCHLORIDE 0.5 MG: 2 INJECTION INTRAMUSCULAR; INTRAVENOUS; SUBCUTANEOUS at 07:29

## 2017-01-01 RX ADMIN — CLOTRIMAZOLE AND BETAMETHASONE DIPROPIONATE 1 APPLICATION: 10; .5 CREAM TOPICAL at 08:43

## 2017-01-01 RX ADMIN — HYDROMORPHONE HYDROCHLORIDE 0.5 MG: 2 INJECTION, SOLUTION INTRAMUSCULAR; INTRAVENOUS; SUBCUTANEOUS at 06:53

## 2017-01-01 RX ADMIN — METOPROLOL TARTRATE 2.5 MG: 5 INJECTION INTRAVENOUS at 08:51

## 2017-01-01 RX ADMIN — PANTOPRAZOLE SODIUM 40 MG: 40 INJECTION, POWDER, FOR SOLUTION INTRAVENOUS at 08:41

## 2017-01-01 RX ADMIN — I.V. FAT EMULSION 50 G: 20 EMULSION INTRAVENOUS at 17:50

## 2017-01-01 RX ADMIN — PROPOFOL 50 MG: 10 INJECTION, EMULSION INTRAVENOUS at 11:16

## 2017-01-01 RX ADMIN — METRONIDAZOLE 500 MG: 500 INJECTION, SOLUTION INTRAVENOUS at 20:40

## 2017-01-01 RX ADMIN — POTASSIUM CHLORIDE 10 MEQ: 10 INJECTION, SOLUTION INTRAVENOUS at 17:32

## 2017-01-01 RX ADMIN — ONDANSETRON 4 MG: 2 INJECTION, SOLUTION INTRAMUSCULAR; INTRAVENOUS at 14:21

## 2017-01-01 RX ADMIN — HYDROMORPHONE HYDROCHLORIDE 0.5 MG: 2 INJECTION, SOLUTION INTRAMUSCULAR; INTRAVENOUS; SUBCUTANEOUS at 12:27

## 2017-01-01 RX ADMIN — POTASSIUM CHLORIDE AND SODIUM CHLORIDE 50 ML/HR: 900; 150 INJECTION, SOLUTION INTRAVENOUS at 23:47

## 2017-01-01 RX ADMIN — METOPROLOL TARTRATE 2.5 MG: 5 INJECTION INTRAVENOUS at 21:45

## 2017-01-01 RX ADMIN — PETROLATUM: 42 OINTMENT TOPICAL at 08:56

## 2017-01-01 RX ADMIN — POTASSIUM CHLORIDE: 10 INJECTION, SOLUTION INTRAVENOUS at 13:02

## 2017-01-01 RX ADMIN — DIATRIZOATE MEGLUMINE AND DIATRIZOATE SODIUM 45 ML: 600; 100 SOLUTION ORAL; RECTAL at 14:02

## 2017-01-01 RX ADMIN — ERYTHROPOIETIN 40000 UNITS: 40000 INJECTION, SOLUTION INTRAVENOUS; SUBCUTANEOUS at 11:52

## 2017-01-01 RX ADMIN — CLOTRIMAZOLE AND BETAMETHASONE DIPROPIONATE 1 APPLICATION: 10; .5 CREAM TOPICAL at 08:56

## 2017-01-01 RX ADMIN — SODIUM CHLORIDE, POTASSIUM CHLORIDE, SODIUM LACTATE AND CALCIUM CHLORIDE 9 ML/HR: 600; 310; 30; 20 INJECTION, SOLUTION INTRAVENOUS at 13:52

## 2017-01-01 RX ADMIN — MAGNESIUM SULFATE HEPTAHYDRATE 2 G: 40 INJECTION, SOLUTION INTRAVENOUS at 08:34

## 2017-01-01 RX ADMIN — METOPROLOL TARTRATE 2.5 MG: 5 INJECTION INTRAVENOUS at 09:07

## 2017-01-01 RX ADMIN — METRONIDAZOLE 500 MG: 500 INJECTION, SOLUTION INTRAVENOUS at 18:21

## 2017-01-01 RX ADMIN — PANTOPRAZOLE SODIUM 40 MG: 40 INJECTION, POWDER, FOR SOLUTION INTRAVENOUS at 20:47

## 2017-01-01 RX ADMIN — CEFOXITIN SODIUM 2 G: 2 INJECTION, SOLUTION INTRAVENOUS at 17:54

## 2017-01-01 RX ADMIN — FUROSEMIDE 20 MG: 10 INJECTION, SOLUTION INTRAMUSCULAR; INTRAVENOUS at 20:33

## 2017-01-01 RX ADMIN — DIPHENHYDRAMINE HYDROCHLORIDE 25 MG: 25 CAPSULE ORAL at 13:06

## 2017-01-01 RX ADMIN — POTASSIUM CHLORIDE 10 MEQ: 10 INJECTION, SOLUTION INTRAVENOUS at 22:32

## 2017-01-01 RX ADMIN — POTASSIUM CHLORIDE 10 MEQ: 10 INJECTION, SOLUTION INTRAVENOUS at 12:01

## 2017-01-01 RX ADMIN — MAGNESIUM SULFATE HEPTAHYDRATE 2 G: 40 INJECTION, SOLUTION INTRAVENOUS at 00:52

## 2017-01-01 RX ADMIN — METRONIDAZOLE 500 MG: 500 INJECTION, SOLUTION INTRAVENOUS at 03:08

## 2017-01-01 RX ADMIN — CEFOXITIN SODIUM 2 G: 2 INJECTION, SOLUTION INTRAVENOUS at 17:29

## 2017-01-01 RX ADMIN — SODIUM CHLORIDE 4.5 G: 9 INJECTION, SOLUTION INTRAVENOUS at 02:00

## 2017-01-01 RX ADMIN — METRONIDAZOLE 500 MG: 500 INJECTION, SOLUTION INTRAVENOUS at 03:10

## 2017-01-01 RX ADMIN — POTASSIUM PHOSPHATE, MONOBASIC AND POTASSIUM PHOSPHATE, DIBASIC 10 MMOL: 224; 236 INJECTION, SOLUTION INTRAVENOUS at 06:57

## 2017-01-01 RX ADMIN — OXYCODONE HYDROCHLORIDE AND ACETAMINOPHEN 1 TABLET: 5; 325 TABLET ORAL at 09:28

## 2017-01-01 RX ADMIN — POTASSIUM CHLORIDE 40 MEQ: 20 TABLET, EXTENDED RELEASE ORAL at 17:58

## 2017-01-01 RX ADMIN — CEFOXITIN SODIUM 2 G: 2 INJECTION, SOLUTION INTRAVENOUS at 01:53

## 2017-01-01 RX ADMIN — ERYTHROPOIETIN 60000 UNITS: 40000 INJECTION, SOLUTION INTRAVENOUS; SUBCUTANEOUS at 15:15

## 2017-01-01 RX ADMIN — PANTOPRAZOLE SODIUM 40 MG: 40 TABLET, DELAYED RELEASE ORAL at 08:54

## 2017-01-01 RX ADMIN — METOPROLOL TARTRATE 2.5 MG: 5 INJECTION INTRAVENOUS at 21:47

## 2017-01-01 RX ADMIN — METOPROLOL TARTRATE 25 MG: 25 TABLET ORAL at 21:02

## 2017-01-01 RX ADMIN — SODIUM CHLORIDE 4.5 G: 9 INJECTION, SOLUTION INTRAVENOUS at 00:31

## 2017-01-01 RX ADMIN — CEFOXITIN SODIUM 2 G: 2 INJECTION, SOLUTION INTRAVENOUS at 01:28

## 2017-01-01 RX ADMIN — POTASSIUM CHLORIDE 10 MEQ: 10 INJECTION, SOLUTION INTRAVENOUS at 13:43

## 2017-01-01 RX ADMIN — POTASSIUM CHLORIDE 10 MEQ: 10 INJECTION, SOLUTION INTRAVENOUS at 22:51

## 2017-01-01 RX ADMIN — KETAMINE HYDROCHLORIDE 10 MG: 100 INJECTION INTRAMUSCULAR; INTRAVENOUS at 15:38

## 2017-01-01 RX ADMIN — POTASSIUM CHLORIDE 10 MEQ: 10 INJECTION, SOLUTION INTRAVENOUS at 09:18

## 2017-01-01 RX ADMIN — SODIUM CHLORIDE, POTASSIUM CHLORIDE, SODIUM LACTATE AND CALCIUM CHLORIDE 100 ML/HR: 600; 310; 30; 20 INJECTION, SOLUTION INTRAVENOUS at 11:10

## 2017-01-01 RX ADMIN — POTASSIUM CHLORIDE 10 MEQ: 10 INJECTION, SOLUTION INTRAVENOUS at 13:50

## 2017-01-01 RX ADMIN — MAGNESIUM SULFATE IN DEXTROSE 1 G: 10 INJECTION, SOLUTION INTRAVENOUS at 06:16

## 2017-01-01 RX ADMIN — PANTOPRAZOLE SODIUM 40 MG: 40 TABLET, DELAYED RELEASE ORAL at 08:43

## 2017-01-01 RX ADMIN — METOPROLOL TARTRATE 5 MG: 5 INJECTION INTRAVENOUS at 23:18

## 2017-01-01 RX ADMIN — CEFOXITIN SODIUM 2 G: 2 INJECTION, SOLUTION INTRAVENOUS at 02:42

## 2017-01-01 RX ADMIN — METOPROLOL TARTRATE 2.5 MG: 5 INJECTION INTRAVENOUS at 03:44

## 2017-01-01 RX ADMIN — CEFOXITIN SODIUM 2 G: 2 INJECTION, SOLUTION INTRAVENOUS at 17:48

## 2017-01-01 RX ADMIN — ACETAMINOPHEN 650 MG: 325 TABLET ORAL at 15:37

## 2017-01-01 RX ADMIN — PANTOPRAZOLE SODIUM 40 MG: 40 INJECTION, POWDER, FOR SOLUTION INTRAVENOUS at 21:02

## 2017-01-01 RX ADMIN — POTASSIUM CHLORIDE 10 MEQ: 10 INJECTION, SOLUTION INTRAVENOUS at 15:42

## 2017-01-01 RX ADMIN — HYDROMORPHONE HYDROCHLORIDE 0.5 MG: 2 INJECTION, SOLUTION INTRAMUSCULAR; INTRAVENOUS; SUBCUTANEOUS at 06:25

## 2017-01-01 RX ADMIN — SODIUM CHLORIDE 4.5 G: 9 INJECTION, SOLUTION INTRAVENOUS at 16:50

## 2017-01-01 RX ADMIN — METRONIDAZOLE 500 MG: 500 INJECTION, SOLUTION INTRAVENOUS at 11:17

## 2017-01-01 RX ADMIN — POTASSIUM CHLORIDE AND SODIUM CHLORIDE 100 ML/HR: 900; 150 INJECTION, SOLUTION INTRAVENOUS at 03:08

## 2017-01-01 RX ADMIN — METRONIDAZOLE 500 MG: 500 INJECTION, SOLUTION INTRAVENOUS at 02:34

## 2017-01-01 RX ADMIN — AMIODARONE HYDROCHLORIDE 0.5 MG/MIN: 1.8 INJECTION, SOLUTION INTRAVENOUS at 20:47

## 2017-01-01 RX ADMIN — MAGNESIUM SULFATE HEPTAHYDRATE 1 G: 1 INJECTION, SOLUTION INTRAVENOUS at 12:50

## 2017-01-01 RX ADMIN — PANTOPRAZOLE SODIUM 40 MG: 40 INJECTION, POWDER, FOR SOLUTION INTRAVENOUS at 05:35

## 2017-01-01 RX ADMIN — INSULIN ASPART 2 UNITS: 100 INJECTION, SOLUTION INTRAVENOUS; SUBCUTANEOUS at 06:36

## 2017-01-01 RX ADMIN — PROPOFOL 80 MG: 10 INJECTION, EMULSION INTRAVENOUS at 14:35

## 2017-01-01 RX ADMIN — I.V. FAT EMULSION 47.04 G: 20 EMULSION INTRAVENOUS at 18:14

## 2017-01-01 RX ADMIN — METRONIDAZOLE 500 MG: 500 INJECTION, SOLUTION INTRAVENOUS at 02:07

## 2017-01-01 RX ADMIN — HYDROMORPHONE HYDROCHLORIDE 0.5 MG: 2 INJECTION, SOLUTION INTRAMUSCULAR; INTRAVENOUS; SUBCUTANEOUS at 09:45

## 2017-01-01 RX ADMIN — POTASSIUM PHOSPHATE, MONOBASIC AND POTASSIUM PHOSPHATE, DIBASIC: 224; 236 INJECTION, SOLUTION INTRAVENOUS at 18:19

## 2017-01-01 RX ADMIN — VECURONIUM BROMIDE FOR INJECTION 1 MG: 1 INJECTION, POWDER, LYOPHILIZED, FOR SOLUTION INTRAVENOUS at 12:31

## 2017-01-01 RX ADMIN — POTASSIUM CHLORIDE 10 MEQ: 10 INJECTION, SOLUTION INTRAVENOUS at 20:41

## 2017-01-01 RX ADMIN — METRONIDAZOLE 500 MG: 500 INJECTION, SOLUTION INTRAVENOUS at 17:31

## 2017-01-01 RX ADMIN — METOPROLOL TARTRATE 25 MG: 25 TABLET, FILM COATED ORAL at 08:50

## 2017-01-01 RX ADMIN — PETROLATUM: 42 OINTMENT TOPICAL at 10:46

## 2017-01-01 RX ADMIN — ALBUMIN HUMAN: 0.05 INJECTION, SOLUTION INTRAVENOUS at 15:05

## 2017-01-01 RX ADMIN — SODIUM CHLORIDE, POTASSIUM CHLORIDE, SODIUM LACTATE AND CALCIUM CHLORIDE: 600; 310; 30; 20 INJECTION, SOLUTION INTRAVENOUS at 16:16

## 2017-01-01 RX ADMIN — POTASSIUM PHOSPHATE, MONOBASIC AND POTASSIUM PHOSPHATE, DIBASIC: 224; 236 INJECTION, SOLUTION INTRAVENOUS at 18:05

## 2017-01-01 RX ADMIN — HYDROMORPHONE HYDROCHLORIDE 0.5 MG: 2 INJECTION, SOLUTION INTRAMUSCULAR; INTRAVENOUS; SUBCUTANEOUS at 20:46

## 2017-01-01 RX ADMIN — METRONIDAZOLE 500 MG: 500 INJECTION, SOLUTION INTRAVENOUS at 18:28

## 2017-01-01 RX ADMIN — HYDROMORPHONE HYDROCHLORIDE 0.5 MG: 2 INJECTION, SOLUTION INTRAMUSCULAR; INTRAVENOUS; SUBCUTANEOUS at 17:10

## 2017-01-01 RX ADMIN — POTASSIUM CHLORIDE 40 MEQ: 1500 TABLET, EXTENDED RELEASE ORAL at 17:03

## 2017-01-01 RX ADMIN — METOPROLOL TARTRATE 5 MG: 5 INJECTION INTRAVENOUS at 18:02

## 2017-01-01 RX ADMIN — WATER: 100 IRRIGANT IRRIGATION at 15:45

## 2017-01-01 RX ADMIN — METOPROLOL TARTRATE 2.5 MG: 5 INJECTION INTRAVENOUS at 04:00

## 2017-01-01 RX ADMIN — METRONIDAZOLE 500 MG: 500 INJECTION, SOLUTION INTRAVENOUS at 19:22

## 2017-01-01 RX ADMIN — FAMOTIDINE 20 MG: 10 INJECTION, SOLUTION INTRAVENOUS at 14:22

## 2017-01-01 RX ADMIN — PROPOFOL 50 MG: 10 INJECTION, EMULSION INTRAVENOUS at 11:36

## 2017-01-01 RX ADMIN — METOPROLOL TARTRATE 2.5 MG: 5 INJECTION INTRAVENOUS at 21:01

## 2017-01-01 RX ADMIN — IOPAMIDOL 100 ML: 755 INJECTION, SOLUTION INTRAVENOUS at 13:00

## 2017-01-01 RX ADMIN — AMIODARONE HYDROCHLORIDE 1 MG/MIN: 1.8 INJECTION, SOLUTION INTRAVENOUS at 14:56

## 2017-01-01 RX ADMIN — HYDROMORPHONE HYDROCHLORIDE 1 MG: 1 INJECTION, SOLUTION INTRAMUSCULAR; INTRAVENOUS; SUBCUTANEOUS at 12:15

## 2017-01-01 RX ADMIN — PANTOPRAZOLE SODIUM 40 MG: 40 INJECTION, POWDER, FOR SOLUTION INTRAVENOUS at 06:43

## 2017-01-01 RX ADMIN — CLOTRIMAZOLE AND BETAMETHASONE DIPROPIONATE 1 APPLICATION: 10; .5 CREAM TOPICAL at 08:29

## 2017-01-01 RX ADMIN — HYDROMORPHONE HYDROCHLORIDE 0.5 MG: 2 INJECTION, SOLUTION INTRAMUSCULAR; INTRAVENOUS; SUBCUTANEOUS at 14:17

## 2017-01-01 RX ADMIN — SODIUM CHLORIDE 4.5 G: 9 INJECTION, SOLUTION INTRAVENOUS at 00:30

## 2017-01-01 RX ADMIN — POTASSIUM & SODIUM PHOSPHATES POWDER PACK 280-160-250 MG 1 PACKET: 280-160-250 PACK at 16:42

## 2017-01-01 RX ADMIN — HYDROMORPHONE HYDROCHLORIDE 0.5 MG: 2 INJECTION, SOLUTION INTRAMUSCULAR; INTRAVENOUS; SUBCUTANEOUS at 07:34

## 2017-01-01 RX ADMIN — METRONIDAZOLE 500 MG: 500 INJECTION, SOLUTION INTRAVENOUS at 18:35

## 2017-01-01 RX ADMIN — SODIUM CHLORIDE 4.5 G: 9 INJECTION, SOLUTION INTRAVENOUS at 01:11

## 2017-01-01 RX ADMIN — METRONIDAZOLE 500 MG: 500 INJECTION, SOLUTION INTRAVENOUS at 10:45

## 2017-01-01 RX ADMIN — POLYETHYLENE GLYCOL 3350 119 G: 17 POWDER, FOR SOLUTION ORAL at 05:35

## 2017-01-01 RX ADMIN — METOPROLOL TARTRATE 5 MG: 5 INJECTION INTRAVENOUS at 11:17

## 2017-01-01 RX ADMIN — Medication 1 TABLET: at 08:43

## 2017-01-01 RX ADMIN — ACETAMINOPHEN 400 MCG: 400 TABLET ORAL at 09:15

## 2017-01-01 RX ADMIN — METOPROLOL TARTRATE 2.5 MG: 5 INJECTION INTRAVENOUS at 15:34

## 2017-01-01 RX ADMIN — Medication 1 TABLET: at 08:54

## 2017-01-01 RX ADMIN — POTASSIUM CHLORIDE 10 MEQ: 10 INJECTION, SOLUTION INTRAVENOUS at 21:38

## 2017-01-01 RX ADMIN — FENTANYL CITRATE 25 MCG: 50 INJECTION, SOLUTION INTRAMUSCULAR; INTRAVENOUS at 21:44

## 2017-01-01 RX ADMIN — INSULIN ASPART 2 UNITS: 100 INJECTION, SOLUTION INTRAVENOUS; SUBCUTANEOUS at 00:54

## 2017-01-01 RX ADMIN — METOPROLOL TARTRATE 2.5 MG: 5 INJECTION INTRAVENOUS at 08:44

## 2017-01-01 RX ADMIN — PETROLATUM: 42 OINTMENT TOPICAL at 21:10

## 2017-01-01 RX ADMIN — METOPROLOL TARTRATE 2.5 MG: 5 INJECTION INTRAVENOUS at 20:14

## 2017-01-01 RX ADMIN — Medication 1 CAPSULE: at 08:43

## 2017-01-01 RX ADMIN — CEFTRIAXONE 1 G: 1 INJECTION, SOLUTION INTRAVENOUS at 17:39

## 2017-01-01 RX ADMIN — ERYTHROPOIETIN 50000 UNITS: 40000 INJECTION, SOLUTION INTRAVENOUS; SUBCUTANEOUS at 11:36

## 2017-01-01 RX ADMIN — LIDOCAINE HYDROCHLORIDE 0.5 ML: 10 INJECTION, SOLUTION EPIDURAL; INFILTRATION; INTRACAUDAL; PERINEURAL at 12:45

## 2017-01-01 RX ADMIN — CLOTRIMAZOLE AND BETAMETHASONE DIPROPIONATE 1 APPLICATION: 10; .5 CREAM TOPICAL at 21:48

## 2017-01-01 RX ADMIN — LIDOCAINE HYDROCHLORIDE 100 MG: 20 INJECTION, SOLUTION INFILTRATION; PERINEURAL at 11:13

## 2017-01-01 RX ADMIN — METRONIDAZOLE 500 MG: 500 INJECTION, SOLUTION INTRAVENOUS at 04:35

## 2017-01-01 RX ADMIN — PANTOPRAZOLE SODIUM 40 MG: 40 INJECTION, POWDER, FOR SOLUTION INTRAVENOUS at 06:57

## 2017-01-01 RX ADMIN — HYDROMORPHONE HYDROCHLORIDE 0.5 MG: 2 INJECTION, SOLUTION INTRAMUSCULAR; INTRAVENOUS; SUBCUTANEOUS at 09:47

## 2017-01-01 RX ADMIN — SUCRALFATE 1 G: 1 TABLET ORAL at 16:50

## 2017-01-01 RX ADMIN — METOPROLOL TARTRATE 25 MG: 25 TABLET ORAL at 08:43

## 2017-01-01 RX ADMIN — SODIUM CHLORIDE, POTASSIUM CHLORIDE, SODIUM LACTATE AND CALCIUM CHLORIDE 50 ML/HR: 600; 310; 30; 20 INJECTION, SOLUTION INTRAVENOUS at 08:34

## 2017-01-01 RX ADMIN — PANTOPRAZOLE SODIUM 40 MG: 40 INJECTION, POWDER, FOR SOLUTION INTRAVENOUS at 06:01

## 2017-01-01 RX ADMIN — METOPROLOL TARTRATE 25 MG: 25 TABLET, FILM COATED ORAL at 09:24

## 2017-01-01 RX ADMIN — SODIUM CHLORIDE 4.5 G: 9 INJECTION, SOLUTION INTRAVENOUS at 17:16

## 2017-01-01 RX ADMIN — SODIUM CHLORIDE 4.5 G: 9 INJECTION, SOLUTION INTRAVENOUS at 16:39

## 2017-01-01 RX ADMIN — POTASSIUM CHLORIDE, DEXTROSE MONOHYDRATE AND SODIUM CHLORIDE 50 ML/HR: 150; 5; 450 INJECTION, SOLUTION INTRAVENOUS at 03:08

## 2017-01-01 RX ADMIN — HYDROMORPHONE HYDROCHLORIDE 1 MG: 1 INJECTION, SOLUTION INTRAMUSCULAR; INTRAVENOUS; SUBCUTANEOUS at 16:45

## 2017-01-01 RX ADMIN — ATROPA BELLADONNA AND OPIUM 30 MG: 16.2; 3 SUPPOSITORY RECTAL at 15:56

## 2017-01-01 RX ADMIN — SODIUM CHLORIDE 4.5 G: 9 INJECTION, SOLUTION INTRAVENOUS at 11:39

## 2017-01-01 RX ADMIN — POTASSIUM CHLORIDE 40 MEQ: 20 TABLET, EXTENDED RELEASE ORAL at 13:55

## 2017-01-01 RX ADMIN — CEFOXITIN SODIUM 2 G: 2 INJECTION, SOLUTION INTRAVENOUS at 18:23

## 2017-01-01 RX ADMIN — SODIUM CHLORIDE, POTASSIUM CHLORIDE, SODIUM LACTATE AND CALCIUM CHLORIDE: 600; 310; 30; 20 INJECTION, SOLUTION INTRAVENOUS at 12:20

## 2017-01-01 RX ADMIN — METRONIDAZOLE 500 MG: 500 INJECTION, SOLUTION INTRAVENOUS at 02:03

## 2017-01-01 RX ADMIN — SODIUM CHLORIDE 4.5 G: 9 INJECTION, SOLUTION INTRAVENOUS at 08:57

## 2017-01-01 RX ADMIN — IRBESARTAN 300 MG: 150 TABLET ORAL at 10:15

## 2017-01-01 RX ADMIN — POTASSIUM & SODIUM PHOSPHATES POWDER PACK 280-160-250 MG 1 PACKET: 280-160-250 PACK at 20:41

## 2017-01-01 RX ADMIN — PANTOPRAZOLE SODIUM 40 MG: 40 TABLET, DELAYED RELEASE ORAL at 08:24

## 2017-01-01 RX ADMIN — CLOTRIMAZOLE AND BETAMETHASONE DIPROPIONATE 1 APPLICATION: 10; .5 CREAM TOPICAL at 21:10

## 2017-01-01 RX ADMIN — PETROLATUM: 42 OINTMENT TOPICAL at 21:47

## 2017-01-01 RX ADMIN — CLOTRIMAZOLE AND BETAMETHASONE DIPROPIONATE 1 APPLICATION: 10; .5 CREAM TOPICAL at 10:04

## 2017-01-01 RX ADMIN — CEFOXITIN SODIUM 2 G: 2 INJECTION, SOLUTION INTRAVENOUS at 06:19

## 2017-01-01 RX ADMIN — METRONIDAZOLE 500 MG: 500 INJECTION, SOLUTION INTRAVENOUS at 10:03

## 2017-01-01 RX ADMIN — CEFOXITIN SODIUM 2 G: 2 INJECTION, SOLUTION INTRAVENOUS at 00:44

## 2017-01-01 RX ADMIN — POTASSIUM PHOSPHATE, MONOBASIC AND POTASSIUM PHOSPHATE, DIBASIC: 224; 236 INJECTION, SOLUTION INTRAVENOUS at 09:25

## 2017-01-01 RX ADMIN — IRBESARTAN 300 MG: 150 TABLET ORAL at 08:35

## 2017-01-01 RX ADMIN — EPHEDRINE SULFATE 5 MG: 50 INJECTION INTRAMUSCULAR; INTRAVENOUS; SUBCUTANEOUS at 15:31

## 2017-01-01 RX ADMIN — I.V. FAT EMULSION 47.04 G: 20 EMULSION INTRAVENOUS at 18:05

## 2017-01-01 RX ADMIN — POTASSIUM CHLORIDE 10 MEQ: 10 INJECTION, SOLUTION INTRAVENOUS at 19:06

## 2017-01-01 RX ADMIN — METRONIDAZOLE 500 MG: 500 INJECTION, SOLUTION INTRAVENOUS at 11:52

## 2017-01-01 RX ADMIN — ACETAMINOPHEN 650 MG: 325 TABLET, FILM COATED ORAL at 08:53

## 2017-01-01 RX ADMIN — ONDANSETRON 4 MG: 2 INJECTION INTRAMUSCULAR; INTRAVENOUS at 05:55

## 2017-01-01 RX ADMIN — KETAMINE HYDROCHLORIDE 10 MG: 100 INJECTION INTRAMUSCULAR; INTRAVENOUS at 18:35

## 2017-01-01 RX ADMIN — ERYTHROPOIETIN 40000 UNITS: 40000 INJECTION, SOLUTION INTRAVENOUS; SUBCUTANEOUS at 10:46

## 2017-01-01 RX ADMIN — HYDROMORPHONE HYDROCHLORIDE 1 MG: 1 INJECTION, SOLUTION INTRAMUSCULAR; INTRAVENOUS; SUBCUTANEOUS at 15:54

## 2017-01-01 RX ADMIN — METRONIDAZOLE 500 MG: 500 INJECTION, SOLUTION INTRAVENOUS at 18:25

## 2017-01-01 RX ADMIN — METOPROLOL TARTRATE 5 MG: 5 INJECTION INTRAVENOUS at 06:48

## 2017-01-01 RX ADMIN — SODIUM CHLORIDE 4.5 G: 9 INJECTION, SOLUTION INTRAVENOUS at 00:51

## 2017-01-01 RX ADMIN — POTASSIUM CHLORIDE: 10 INJECTION, SOLUTION INTRAVENOUS at 10:47

## 2017-01-01 RX ADMIN — HYDROMORPHONE HYDROCHLORIDE 0.5 MG: 2 INJECTION, SOLUTION INTRAMUSCULAR; INTRAVENOUS; SUBCUTANEOUS at 16:45

## 2017-01-01 RX ADMIN — MORPHINE SULFATE: 1 INJECTION INTRAVENOUS at 20:41

## 2017-01-01 RX ADMIN — METOPROLOL TARTRATE 25 MG: 25 TABLET, FILM COATED ORAL at 22:15

## 2017-01-01 RX ADMIN — SODIUM CHLORIDE 250 ML: 9 INJECTION, SOLUTION INTRAVENOUS at 11:54

## 2017-01-01 RX ADMIN — POTASSIUM CHLORIDE 10 MEQ: 10 INJECTION, SOLUTION INTRAVENOUS at 18:19

## 2017-01-01 RX ADMIN — ERYTHROPOIETIN 40000 UNITS: 40000 INJECTION, SOLUTION INTRAVENOUS; SUBCUTANEOUS at 10:44

## 2017-01-01 RX ADMIN — ERYTHROPOIETIN 40000 UNITS: 40000 INJECTION, SOLUTION INTRAVENOUS; SUBCUTANEOUS at 09:17

## 2017-01-01 RX ADMIN — MAGNESIUM SULFATE HEPTAHYDRATE 1 G: 1 INJECTION, SOLUTION INTRAVENOUS at 23:42

## 2017-01-01 RX ADMIN — METOPROLOL TARTRATE 2.5 MG: 5 INJECTION INTRAVENOUS at 21:55

## 2017-01-01 RX ADMIN — CYANOCOBALAMIN 1000 MCG: 1000 INJECTION, SOLUTION INTRAMUSCULAR; SUBCUTANEOUS at 10:43

## 2017-01-01 RX ADMIN — CEFOXITIN SODIUM 2 G: 2 INJECTION, SOLUTION INTRAVENOUS at 01:20

## 2017-01-01 RX ADMIN — PETROLATUM: 42 OINTMENT TOPICAL at 09:49

## 2017-01-01 RX ADMIN — I.V. FAT EMULSION 47.04 G: 20 EMULSION INTRAVENOUS at 17:46

## 2017-01-01 RX ADMIN — POTASSIUM CHLORIDE 10 MEQ: 10 INJECTION, SOLUTION INTRAVENOUS at 22:40

## 2017-01-01 RX ADMIN — POTASSIUM CHLORIDE 10 MEQ: 10 INJECTION, SOLUTION INTRAVENOUS at 14:31

## 2017-01-01 RX ADMIN — HYDROMORPHONE HYDROCHLORIDE 1 MG: 1 INJECTION, SOLUTION INTRAMUSCULAR; INTRAVENOUS; SUBCUTANEOUS at 08:06

## 2017-01-01 RX ADMIN — METRONIDAZOLE 500 MG: 500 INJECTION, SOLUTION INTRAVENOUS at 13:20

## 2017-01-01 RX ADMIN — PETROLATUM: 42 OINTMENT TOPICAL at 20:41

## 2017-01-01 RX ADMIN — VECURONIUM BROMIDE FOR INJECTION 2 MG: 1 INJECTION, POWDER, LYOPHILIZED, FOR SOLUTION INTRAVENOUS at 13:38

## 2017-01-01 RX ADMIN — SODIUM CHLORIDE, POTASSIUM CHLORIDE, SODIUM LACTATE AND CALCIUM CHLORIDE 1000 ML: 600; 310; 30; 20 INJECTION, SOLUTION INTRAVENOUS at 15:21

## 2017-01-01 RX ADMIN — POTASSIUM PHOSPHATE, MONOBASIC AND POTASSIUM PHOSPHATE, DIBASIC: 224; 236 INJECTION, SOLUTION INTRAVENOUS at 11:28

## 2017-01-01 RX ADMIN — MORPHINE SULFATE 2 MG: 2 INJECTION, SOLUTION INTRAMUSCULAR; INTRAVENOUS at 06:19

## 2017-01-01 RX ADMIN — CEFOXITIN SODIUM 2 G: 2 INJECTION, SOLUTION INTRAVENOUS at 19:38

## 2017-01-01 RX ADMIN — MAGNESIUM OXIDE TAB 400 MG (241.3 MG ELEMENTAL MG) 400 MG: 400 (241.3 MG) TAB at 11:52

## 2017-01-01 RX ADMIN — CEFOXITIN SODIUM 2 G: 2 INJECTION, SOLUTION INTRAVENOUS at 17:22

## 2017-01-01 RX ADMIN — HYDROMORPHONE HYDROCHLORIDE 1 MG: 1 INJECTION, SOLUTION INTRAMUSCULAR; INTRAVENOUS; SUBCUTANEOUS at 19:54

## 2017-01-01 RX ADMIN — AMIODARONE HYDROCHLORIDE 0.5 MG/MIN: 1.8 INJECTION, SOLUTION INTRAVENOUS at 08:56

## 2017-01-01 RX ADMIN — OXYCODONE HYDROCHLORIDE AND ACETAMINOPHEN 1 TABLET: 5; 325 TABLET ORAL at 17:03

## 2017-01-01 RX ADMIN — PROPOFOL 20 MG: 10 INJECTION, EMULSION INTRAVENOUS at 14:42

## 2017-01-01 RX ADMIN — DIATRIZOATE MEGLUMINE AND DIATRIZOATE SODIUM 30 ML: 600; 100 SOLUTION ORAL; RECTAL at 13:00

## 2017-01-01 RX ADMIN — CLOTRIMAZOLE AND BETAMETHASONE DIPROPIONATE 1 APPLICATION: 10; .5 CREAM TOPICAL at 20:41

## 2017-01-01 RX ADMIN — METRONIDAZOLE 500 MG: 500 INJECTION, SOLUTION INTRAVENOUS at 11:31

## 2017-01-01 RX ADMIN — METOPROLOL TARTRATE 25 MG: 25 TABLET, FILM COATED ORAL at 20:12

## 2017-01-01 RX ADMIN — METRONIDAZOLE 500 MG: 500 INJECTION, SOLUTION INTRAVENOUS at 11:36

## 2017-01-01 RX ADMIN — METOPROLOL TARTRATE 2.5 MG: 5 INJECTION INTRAVENOUS at 10:07

## 2017-01-01 RX ADMIN — ERYTHROPOIETIN 40000 UNITS: 40000 INJECTION, SOLUTION INTRAVENOUS; SUBCUTANEOUS at 11:41

## 2017-01-01 RX ADMIN — FENTANYL CITRATE 25 MCG: 50 INJECTION, SOLUTION INTRAMUSCULAR; INTRAVENOUS at 13:09

## 2017-01-01 RX ADMIN — FENTANYL CITRATE 50 MCG: 50 INJECTION, SOLUTION INTRAMUSCULAR; INTRAVENOUS at 13:57

## 2017-01-01 RX ADMIN — PROPOFOL 50 MG: 10 INJECTION, EMULSION INTRAVENOUS at 11:22

## 2017-01-01 RX ADMIN — MAGNESIUM SULFATE HEPTAHYDRATE 1 G: 1 INJECTION, SOLUTION INTRAVENOUS at 12:48

## 2017-01-01 RX ADMIN — POTASSIUM CHLORIDE 10 MEQ: 10 INJECTION, SOLUTION INTRAVENOUS at 22:50

## 2017-01-01 RX ADMIN — POTASSIUM CHLORIDE AND SODIUM CHLORIDE 75 ML/HR: 900; 150 INJECTION, SOLUTION INTRAVENOUS at 13:50

## 2017-01-01 RX ADMIN — METRONIDAZOLE 500 MG: 500 INJECTION, SOLUTION INTRAVENOUS at 02:35

## 2017-01-01 RX ADMIN — OXYCODONE HYDROCHLORIDE AND ACETAMINOPHEN 1 TABLET: 5; 325 TABLET ORAL at 20:59

## 2017-01-01 RX ADMIN — I.V. FAT EMULSION 47.04 G: 20 EMULSION INTRAVENOUS at 18:08

## 2017-01-01 RX ADMIN — ERYTHROPOIETIN 20000 UNITS: 20000 INJECTION, SOLUTION INTRAVENOUS; SUBCUTANEOUS at 14:44

## 2017-01-01 RX ADMIN — SODIUM CHLORIDE 4.5 G: 9 INJECTION, SOLUTION INTRAVENOUS at 09:07

## 2017-01-01 RX ADMIN — LIDOCAINE HYDROCHLORIDE 60 MG: 20 INJECTION, SOLUTION INFILTRATION; PERINEURAL at 16:21

## 2017-01-01 RX ADMIN — CEFOXITIN SODIUM 2 G: 2 INJECTION, SOLUTION INTRAVENOUS at 09:36

## 2017-01-01 RX ADMIN — METRONIDAZOLE 500 MG: 500 INJECTION, SOLUTION INTRAVENOUS at 12:06

## 2017-01-01 RX ADMIN — PANTOPRAZOLE SODIUM 40 MG: 40 INJECTION, POWDER, FOR SOLUTION INTRAVENOUS at 20:49

## 2017-01-01 RX ADMIN — POTASSIUM & SODIUM PHOSPHATES POWDER PACK 280-160-250 MG 1 PACKET: 280-160-250 PACK at 22:15

## 2017-01-01 RX ADMIN — CYANOCOBALAMIN 1000 MCG: 1000 INJECTION, SOLUTION INTRAMUSCULAR; SUBCUTANEOUS at 11:43

## 2017-01-01 RX ADMIN — METOPROLOL TARTRATE 2.5 MG: 5 INJECTION INTRAVENOUS at 03:50

## 2017-01-01 RX ADMIN — MORPHINE SULFATE 2 MG: 2 INJECTION, SOLUTION INTRAMUSCULAR; INTRAVENOUS at 03:59

## 2017-01-01 RX ADMIN — SODIUM CHLORIDE 4.5 G: 9 INJECTION, SOLUTION INTRAVENOUS at 16:35

## 2017-01-01 RX ADMIN — METOPROLOL TARTRATE 2.5 MG: 5 INJECTION INTRAVENOUS at 16:00

## 2017-01-01 RX ADMIN — POTASSIUM CHLORIDE 10 MEQ: 10 INJECTION, SOLUTION INTRAVENOUS at 13:44

## 2017-01-01 RX ADMIN — I.V. FAT EMULSION 32.64 G: 20 EMULSION INTRAVENOUS at 18:13

## 2017-01-01 RX ADMIN — METRONIDAZOLE 500 MG: 500 INJECTION, SOLUTION INTRAVENOUS at 18:49

## 2017-01-01 RX ADMIN — ONDANSETRON 4 MG: 2 INJECTION, SOLUTION INTRAMUSCULAR; INTRAVENOUS at 17:58

## 2017-01-01 RX ADMIN — METOPROLOL TARTRATE 2.5 MG: 5 INJECTION INTRAVENOUS at 17:19

## 2017-01-01 RX ADMIN — POTASSIUM CHLORIDE 10 MEQ: 10 INJECTION, SOLUTION INTRAVENOUS at 05:26

## 2017-01-01 RX ADMIN — SODIUM CHLORIDE 250 ML: 9 INJECTION, SOLUTION INTRAVENOUS at 09:14

## 2017-01-01 RX ADMIN — PANTOPRAZOLE SODIUM 40 MG: 40 INJECTION, POWDER, FOR SOLUTION INTRAVENOUS at 06:02

## 2017-01-01 RX ADMIN — MORPHINE SULFATE: 1 INJECTION INTRAVENOUS at 19:55

## 2017-01-01 RX ADMIN — HYDROMORPHONE HYDROCHLORIDE 0.5 MG: 2 INJECTION, SOLUTION INTRAMUSCULAR; INTRAVENOUS; SUBCUTANEOUS at 02:31

## 2017-01-01 RX ADMIN — OXYCODONE HYDROCHLORIDE AND ACETAMINOPHEN 1 TABLET: 5; 325 TABLET ORAL at 15:49

## 2017-01-01 RX ADMIN — ACETAMINOPHEN 650 MG: 325 TABLET, FILM COATED ORAL at 21:45

## 2017-01-01 RX ADMIN — CEFOXITIN SODIUM 2 G: 2 INJECTION, SOLUTION INTRAVENOUS at 09:30

## 2017-01-01 RX ADMIN — METRONIDAZOLE 500 MG: 500 INJECTION, SOLUTION INTRAVENOUS at 03:39

## 2017-01-01 RX ADMIN — POTASSIUM CHLORIDE 40 MEQ: 1500 TABLET, EXTENDED RELEASE ORAL at 15:13

## 2017-01-01 RX ADMIN — POTASSIUM PHOSPHATE, MONOBASIC AND POTASSIUM PHOSPHATE, DIBASIC: 224; 236 INJECTION, SOLUTION INTRAVENOUS at 17:46

## 2017-01-01 RX ADMIN — INSULIN ASPART 2 UNITS: 100 INJECTION, SOLUTION INTRAVENOUS; SUBCUTANEOUS at 02:15

## 2017-01-01 RX ADMIN — SODIUM CHLORIDE 4.5 G: 9 INJECTION, SOLUTION INTRAVENOUS at 08:36

## 2017-01-01 RX ADMIN — MORPHINE SULFATE: 1 INJECTION INTRAVENOUS at 10:07

## 2017-01-01 RX ADMIN — ERYTHROPOIETIN 50000 UNITS: 40000 INJECTION, SOLUTION INTRAVENOUS; SUBCUTANEOUS at 11:53

## 2017-01-01 RX ADMIN — INSULIN ASPART 2 UNITS: 100 INJECTION, SOLUTION INTRAVENOUS; SUBCUTANEOUS at 12:26

## 2017-01-01 RX ADMIN — KETAMINE HYDROCHLORIDE 10 MG: 100 INJECTION INTRAMUSCULAR; INTRAVENOUS at 15:17

## 2017-01-01 RX ADMIN — MAGNESIUM SULFATE HEPTAHYDRATE 2 G: 40 INJECTION, SOLUTION INTRAVENOUS at 08:28

## 2017-01-01 RX ADMIN — METRONIDAZOLE 500 MG: 500 INJECTION, SOLUTION INTRAVENOUS at 02:54

## 2017-01-01 RX ADMIN — PROPOFOL 50 MG: 10 INJECTION, EMULSION INTRAVENOUS at 11:41

## 2017-01-01 RX ADMIN — METOPROLOL TARTRATE 2.5 MG: 5 INJECTION INTRAVENOUS at 20:50

## 2017-01-01 RX ADMIN — NEOSTIGMINE METHYLSULFATE 2 MG: 1 INJECTION INTRAVENOUS at 15:55

## 2017-01-01 RX ADMIN — CEFOXITIN SODIUM 2 G: 2 INJECTION, SOLUTION INTRAVENOUS at 09:08

## 2017-01-01 RX ADMIN — CEFOXITIN SODIUM 2 G: 2 INJECTION, SOLUTION INTRAVENOUS at 09:20

## 2017-01-01 RX ADMIN — METOPROLOL TARTRATE 5 MG: 5 INJECTION INTRAVENOUS at 12:23

## 2017-01-01 RX ADMIN — POTASSIUM & SODIUM PHOSPHATES POWDER PACK 280-160-250 MG 1 PACKET: 280-160-250 PACK at 11:53

## 2017-01-01 RX ADMIN — METOPROLOL TARTRATE 5 MG: 5 INJECTION INTRAVENOUS at 23:48

## 2017-01-01 RX ADMIN — CEFOXITIN SODIUM 2 G: 2 INJECTION, SOLUTION INTRAVENOUS at 09:38

## 2017-01-01 RX ADMIN — MAGNESIUM SULFATE HEPTAHYDRATE: 500 INJECTION, SOLUTION INTRAMUSCULAR; INTRAVENOUS at 12:01

## 2017-01-01 RX ADMIN — METRONIDAZOLE 500 MG: 500 INJECTION, SOLUTION INTRAVENOUS at 03:17

## 2017-01-01 RX ADMIN — ERYTHROPOIETIN 40000 UNITS: 40000 INJECTION, SOLUTION INTRAVENOUS; SUBCUTANEOUS at 11:30

## 2017-01-01 RX ADMIN — SODIUM CHLORIDE 500 ML: 9 INJECTION, SOLUTION INTRAVENOUS at 17:19

## 2017-01-01 RX ADMIN — Medication 10 ML: at 06:01

## 2017-01-01 RX ADMIN — FENTANYL CITRATE 25 MCG: 50 INJECTION, SOLUTION INTRAMUSCULAR; INTRAVENOUS at 10:45

## 2017-01-01 RX ADMIN — OXYCODONE HYDROCHLORIDE AND ACETAMINOPHEN 1 TABLET: 5; 325 TABLET ORAL at 15:41

## 2017-01-01 RX ADMIN — POTASSIUM CHLORIDE AND SODIUM CHLORIDE 100 ML/HR: 900; 150 INJECTION, SOLUTION INTRAVENOUS at 14:32

## 2017-01-01 RX ADMIN — OXYCODONE HYDROCHLORIDE AND ACETAMINOPHEN 1 TABLET: 5; 325 TABLET ORAL at 21:05

## 2017-01-01 RX ADMIN — METOPROLOL TARTRATE 2.5 MG: 5 INJECTION INTRAVENOUS at 15:25

## 2017-01-01 RX ADMIN — METOPROLOL TARTRATE 2.5 MG: 5 INJECTION INTRAVENOUS at 20:48

## 2017-01-01 RX ADMIN — FENTANYL CITRATE 50 MCG: 50 INJECTION, SOLUTION INTRAMUSCULAR; INTRAVENOUS at 16:29

## 2017-01-01 RX ADMIN — CLOTRIMAZOLE AND BETAMETHASONE DIPROPIONATE 1 APPLICATION: 10; .5 CREAM TOPICAL at 09:17

## 2017-01-01 RX ADMIN — DIATRIZOATE MEGLUMINE AND DIATRIZOATE SODIUM 30 ML: 600; 100 SOLUTION ORAL; RECTAL at 15:30

## 2017-01-01 RX ADMIN — SODIUM CHLORIDE 4.5 G: 9 INJECTION, SOLUTION INTRAVENOUS at 17:23

## 2017-01-01 RX ADMIN — MAGNESIUM SULFATE HEPTAHYDRATE 1 G: 1 INJECTION, SOLUTION INTRAVENOUS at 22:40

## 2017-01-01 RX ADMIN — ACETAMINOPHEN 400 MCG: 400 TABLET ORAL at 08:36

## 2017-01-01 RX ADMIN — HYDROMORPHONE HYDROCHLORIDE 1 MG: 1 INJECTION, SOLUTION INTRAMUSCULAR; INTRAVENOUS; SUBCUTANEOUS at 06:15

## 2017-01-01 RX ADMIN — METOPROLOL TARTRATE 5 MG: 5 INJECTION INTRAVENOUS at 15:55

## 2017-01-01 RX ADMIN — ADENOSINE 6 MG: 3 INJECTION, SOLUTION INTRAVENOUS at 05:58

## 2017-01-01 RX ADMIN — HYDROMORPHONE HYDROCHLORIDE 0.5 MG: 2 INJECTION, SOLUTION INTRAMUSCULAR; INTRAVENOUS; SUBCUTANEOUS at 21:25

## 2017-01-01 RX ADMIN — CLOTRIMAZOLE AND BETAMETHASONE DIPROPIONATE 1 APPLICATION: 10; .5 CREAM TOPICAL at 09:26

## 2017-01-01 RX ADMIN — OXYCODONE HYDROCHLORIDE AND ACETAMINOPHEN 1 TABLET: 5; 325 TABLET ORAL at 18:00

## 2017-01-01 RX ADMIN — MAGNESIUM OXIDE TAB 400 MG (241.3 MG ELEMENTAL MG) 400 MG: 400 (241.3 MG) TAB at 17:48

## 2017-01-01 RX ADMIN — MAGESIUM CITRATE 300 ML: 1.75 LIQUID ORAL at 17:57

## 2017-01-01 RX ADMIN — CEFOXITIN SODIUM 2 G: 2 INJECTION, SOLUTION INTRAVENOUS at 11:11

## 2017-01-01 RX ADMIN — METOPROLOL TARTRATE 2.5 MG: 5 INJECTION INTRAVENOUS at 21:43

## 2017-01-01 RX ADMIN — METRONIDAZOLE 500 MG: 500 INJECTION, SOLUTION INTRAVENOUS at 02:55

## 2017-01-01 RX ADMIN — METOPROLOL TARTRATE 2.5 MG: 5 INJECTION INTRAVENOUS at 04:47

## 2017-01-01 RX ADMIN — METRONIDAZOLE 500 MG: 500 INJECTION, SOLUTION INTRAVENOUS at 14:54

## 2017-01-01 RX ADMIN — PANTOPRAZOLE SODIUM 40 MG: 40 INJECTION, POWDER, FOR SOLUTION INTRAVENOUS at 08:08

## 2017-01-01 RX ADMIN — MAGNESIUM OXIDE TAB 400 MG (241.3 MG ELEMENTAL MG) 400 MG: 400 (241.3 MG) TAB at 09:22

## 2017-01-01 RX ADMIN — POTASSIUM CHLORIDE 10 MEQ: 10 INJECTION, SOLUTION INTRAVENOUS at 23:42

## 2017-01-01 RX ADMIN — POTASSIUM CHLORIDE 20 MEQ: 400 INJECTION, SOLUTION INTRAVENOUS at 13:05

## 2017-01-01 RX ADMIN — POTASSIUM CHLORIDE 10 MEQ: 10 INJECTION, SOLUTION INTRAVENOUS at 16:00

## 2017-01-01 RX ADMIN — METRONIDAZOLE 500 MG: 500 INJECTION, SOLUTION INTRAVENOUS at 19:26

## 2017-01-01 RX ADMIN — METRONIDAZOLE 500 MG: 500 INJECTION, SOLUTION INTRAVENOUS at 19:17

## 2017-01-01 RX ADMIN — PETROLATUM: 42 OINTMENT TOPICAL at 20:49

## 2017-01-01 RX ADMIN — OXYCODONE HYDROCHLORIDE AND ACETAMINOPHEN 1 TABLET: 5; 325 TABLET ORAL at 01:08

## 2017-01-01 RX ADMIN — OXYCODONE HYDROCHLORIDE AND ACETAMINOPHEN 1 TABLET: 5; 325 TABLET ORAL at 11:35

## 2017-01-01 RX ADMIN — POTASSIUM CHLORIDE 10 MEQ: 10 INJECTION, SOLUTION INTRAVENOUS at 12:52

## 2017-01-01 RX ADMIN — HYDROMORPHONE HYDROCHLORIDE 0.5 MG: 2 INJECTION, SOLUTION INTRAMUSCULAR; INTRAVENOUS; SUBCUTANEOUS at 16:48

## 2017-01-01 RX ADMIN — PANTOPRAZOLE SODIUM 40 MG: 40 INJECTION, POWDER, FOR SOLUTION INTRAVENOUS at 08:36

## 2017-01-01 RX ADMIN — SODIUM CHLORIDE, POTASSIUM CHLORIDE, SODIUM LACTATE AND CALCIUM CHLORIDE: 600; 310; 30; 20 INJECTION, SOLUTION INTRAVENOUS at 15:02

## 2017-01-01 RX ADMIN — FAMOTIDINE 40 MG: 20 TABLET, FILM COATED ORAL at 08:35

## 2017-01-01 RX ADMIN — METOPROLOL TARTRATE 2.5 MG: 5 INJECTION INTRAVENOUS at 02:55

## 2017-01-01 RX ADMIN — METOPROLOL TARTRATE 2.5 MG: 5 INJECTION INTRAVENOUS at 15:14

## 2017-01-01 RX ADMIN — METRONIDAZOLE 500 MG: 500 INJECTION, SOLUTION INTRAVENOUS at 19:15

## 2017-01-01 RX ADMIN — CLOTRIMAZOLE AND BETAMETHASONE DIPROPIONATE 1 APPLICATION: 10; .5 CREAM TOPICAL at 20:33

## 2017-01-01 RX ADMIN — KETAMINE HYDROCHLORIDE 10 MG: 100 INJECTION INTRAMUSCULAR; INTRAVENOUS at 16:10

## 2017-01-01 RX ADMIN — CEFOXITIN SODIUM 2 G: 2 INJECTION, SOLUTION INTRAVENOUS at 09:01

## 2017-01-01 RX ADMIN — MAGNESIUM SULFATE HEPTAHYDRATE 2 G: 40 INJECTION, SOLUTION INTRAVENOUS at 13:46

## 2017-01-01 RX ADMIN — CEFOXITIN SODIUM 2 G: 2 INJECTION, SOLUTION INTRAVENOUS at 17:49

## 2017-01-01 RX ADMIN — FUROSEMIDE 20 MG: 10 INJECTION, SOLUTION INTRAMUSCULAR; INTRAVENOUS at 17:26

## 2017-01-01 RX ADMIN — POTASSIUM CHLORIDE 10 MEQ: 10 INJECTION, SOLUTION INTRAVENOUS at 07:54

## 2017-01-01 RX ADMIN — OXYCODONE HYDROCHLORIDE AND ACETAMINOPHEN 2 TABLET: 5; 325 TABLET ORAL at 17:39

## 2017-01-01 RX ADMIN — PANTOPRAZOLE SODIUM 40 MG: 40 TABLET, DELAYED RELEASE ORAL at 09:59

## 2017-01-01 RX ADMIN — GLYCOPYRROLATE 0.4 MG: 0.2 INJECTION INTRAMUSCULAR; INTRAVENOUS at 18:21

## 2017-01-01 RX ADMIN — MAGNESIUM SULFATE HEPTAHYDRATE 2 G: 40 INJECTION, SOLUTION INTRAVENOUS at 17:29

## 2017-01-01 RX ADMIN — METOPROLOL TARTRATE 5 MG: 5 INJECTION INTRAVENOUS at 05:04

## 2017-01-01 RX ADMIN — CEFOXITIN SODIUM 2 G: 2 INJECTION, SOLUTION INTRAVENOUS at 18:51

## 2017-01-01 RX ADMIN — ERYTHROPOIETIN 10000 UNITS: 10000 INJECTION, SOLUTION INTRAVENOUS; SUBCUTANEOUS at 12:00

## 2017-01-01 RX ADMIN — CYANOCOBALAMIN 1000 MCG: 1000 INJECTION, SOLUTION INTRAMUSCULAR; SUBCUTANEOUS at 10:28

## 2017-01-01 RX ADMIN — SODIUM CHLORIDE 4.5 G: 9 INJECTION, SOLUTION INTRAVENOUS at 18:07

## 2017-01-01 RX ADMIN — HYDROMORPHONE HYDROCHLORIDE 1 MG: 1 INJECTION, SOLUTION INTRAMUSCULAR; INTRAVENOUS; SUBCUTANEOUS at 03:49

## 2017-01-01 RX ADMIN — OXYCODONE HYDROCHLORIDE AND ACETAMINOPHEN 1 TABLET: 5; 325 TABLET ORAL at 17:13

## 2017-01-01 RX ADMIN — HYDROMORPHONE HYDROCHLORIDE 1 MG: 1 INJECTION, SOLUTION INTRAMUSCULAR; INTRAVENOUS; SUBCUTANEOUS at 06:27

## 2017-01-01 RX ADMIN — METRONIDAZOLE 500 MG: 500 INJECTION, SOLUTION INTRAVENOUS at 11:55

## 2017-01-01 RX ADMIN — OXYCODONE HYDROCHLORIDE AND ACETAMINOPHEN 1 TABLET: 5; 325 TABLET ORAL at 08:49

## 2017-01-01 RX ADMIN — PETROLATUM: 42 OINTMENT TOPICAL at 21:43

## 2017-01-01 RX ADMIN — HYDROMORPHONE HYDROCHLORIDE 1 MG: 1 INJECTION, SOLUTION INTRAMUSCULAR; INTRAVENOUS; SUBCUTANEOUS at 20:28

## 2017-01-01 RX ADMIN — PETROLATUM: 42 OINTMENT TOPICAL at 21:56

## 2017-01-01 RX ADMIN — PETROLATUM: 42 OINTMENT TOPICAL at 20:14

## 2017-01-01 RX ADMIN — INSULIN ASPART 3 UNITS: 100 INJECTION, SOLUTION INTRAVENOUS; SUBCUTANEOUS at 00:12

## 2017-01-01 RX ADMIN — HYDROMORPHONE HYDROCHLORIDE 1 MG: 1 INJECTION, SOLUTION INTRAMUSCULAR; INTRAVENOUS; SUBCUTANEOUS at 08:52

## 2017-01-01 RX ADMIN — SODIUM CHLORIDE: 9 INJECTION, SOLUTION INTRAVENOUS at 12:20

## 2017-01-01 RX ADMIN — PROPOFOL 50 MG: 10 INJECTION, EMULSION INTRAVENOUS at 11:13

## 2017-01-01 RX ADMIN — CEFAZOLIN SODIUM 2 G: 2 SOLUTION INTRAVENOUS at 14:47

## 2017-01-01 RX ADMIN — ACETAMINOPHEN 400 MCG: 400 TABLET ORAL at 08:54

## 2017-01-01 RX ADMIN — POTASSIUM PHOSPHATE, MONOBASIC AND POTASSIUM PHOSPHATE, DIBASIC 30 MMOL: 224; 236 INJECTION, SOLUTION INTRAVENOUS at 13:46

## 2017-01-01 RX ADMIN — PANTOPRAZOLE SODIUM 40 MG: 40 INJECTION, POWDER, FOR SOLUTION INTRAVENOUS at 05:37

## 2017-01-01 RX ADMIN — CEFOXITIN SODIUM 2 G: 2 INJECTION, SOLUTION INTRAVENOUS at 01:08

## 2017-01-01 RX ADMIN — CYANOCOBALAMIN 1000 MCG: 1000 INJECTION, SOLUTION INTRAMUSCULAR; SUBCUTANEOUS at 12:12

## 2017-01-01 RX ADMIN — HYDROMORPHONE HYDROCHLORIDE 1 MG: 1 INJECTION, SOLUTION INTRAMUSCULAR; INTRAVENOUS; SUBCUTANEOUS at 00:51

## 2017-01-01 RX ADMIN — POTASSIUM CHLORIDE 10 MEQ: 10 INJECTION, SOLUTION INTRAVENOUS at 20:29

## 2017-01-01 RX ADMIN — METOPROLOL TARTRATE 2.5 MG: 5 INJECTION INTRAVENOUS at 08:36

## 2017-01-01 RX ADMIN — HYDROMORPHONE HYDROCHLORIDE 1 MG: 1 INJECTION, SOLUTION INTRAMUSCULAR; INTRAVENOUS; SUBCUTANEOUS at 18:30

## 2017-01-01 RX ADMIN — PANTOPRAZOLE SODIUM 40 MG: 40 TABLET, DELAYED RELEASE ORAL at 07:04

## 2017-01-01 RX ADMIN — METRONIDAZOLE 500 MG: 500 INJECTION, SOLUTION INTRAVENOUS at 03:26

## 2017-01-01 RX ADMIN — PROPOFOL 120 MG: 10 INJECTION, EMULSION INTRAVENOUS at 16:21

## 2017-01-01 RX ADMIN — HYDROMORPHONE HYDROCHLORIDE 1 MG: 1 INJECTION, SOLUTION INTRAMUSCULAR; INTRAVENOUS; SUBCUTANEOUS at 03:58

## 2017-01-01 RX ADMIN — OXYCODONE HYDROCHLORIDE AND ACETAMINOPHEN 1 TABLET: 5; 325 TABLET ORAL at 18:03

## 2017-01-01 RX ADMIN — VECURONIUM BROMIDE FOR INJECTION 1 MG: 1 INJECTION, POWDER, LYOPHILIZED, FOR SOLUTION INTRAVENOUS at 16:45

## 2017-01-01 RX ADMIN — CEFOXITIN SODIUM 2 G: 2 INJECTION, SOLUTION INTRAVENOUS at 08:54

## 2017-01-01 RX ADMIN — SODIUM CHLORIDE 250 ML: 9 INJECTION, SOLUTION INTRAVENOUS at 21:18

## 2017-01-01 RX ADMIN — METOPROLOL TARTRATE 2.5 MG: 5 INJECTION INTRAVENOUS at 08:41

## 2017-01-01 RX ADMIN — METRONIDAZOLE 500 MG: 500 INJECTION, SOLUTION INTRAVENOUS at 13:02

## 2017-01-01 RX ADMIN — AMIODARONE HYDROCHLORIDE 150 MG: 1.5 INJECTION, SOLUTION INTRAVENOUS at 14:45

## 2017-01-01 RX ADMIN — SODIUM CHLORIDE 500 ML: 900 INJECTION, SOLUTION INTRAVENOUS at 14:38

## 2017-01-01 RX ADMIN — SODIUM CHLORIDE, POTASSIUM CHLORIDE, SODIUM LACTATE AND CALCIUM CHLORIDE 100 ML/HR: 600; 310; 30; 20 INJECTION, SOLUTION INTRAVENOUS at 18:35

## 2017-01-01 RX ADMIN — DEXAMETHASONE SODIUM PHOSPHATE 4 MG: 4 INJECTION, SOLUTION INTRAMUSCULAR; INTRAVENOUS at 14:21

## 2017-01-01 RX ADMIN — METOPROLOL TARTRATE 2.5 MG: 5 INJECTION INTRAVENOUS at 22:11

## 2017-01-01 RX ADMIN — SODIUM CHLORIDE, POTASSIUM CHLORIDE, SODIUM LACTATE AND CALCIUM CHLORIDE 100 ML/HR: 600; 310; 30; 20 INJECTION, SOLUTION INTRAVENOUS at 09:30

## 2017-01-01 RX ADMIN — OXYCODONE HYDROCHLORIDE AND ACETAMINOPHEN 1 TABLET: 5; 325 TABLET ORAL at 10:37

## 2017-01-01 RX ADMIN — POTASSIUM CHLORIDE 40 MEQ: 1.5 POWDER, FOR SOLUTION ORAL at 13:36

## 2017-01-01 RX ADMIN — LIDOCAINE HYDROCHLORIDE 50 MG: 20 INJECTION, SOLUTION INFILTRATION; PERINEURAL at 15:08

## 2017-01-01 RX ADMIN — AMIODARONE HYDROCHLORIDE 0.5 MG/MIN: 1.8 INJECTION, SOLUTION INTRAVENOUS at 08:28

## 2017-01-01 RX ADMIN — METOPROLOL TARTRATE 2.5 MG: 5 INJECTION INTRAVENOUS at 03:59

## 2017-01-01 RX ADMIN — HYDROMORPHONE HYDROCHLORIDE 0.5 MG: 2 INJECTION, SOLUTION INTRAMUSCULAR; INTRAVENOUS; SUBCUTANEOUS at 14:26

## 2017-01-01 RX ADMIN — METRONIDAZOLE 500 MG: 500 INJECTION, SOLUTION INTRAVENOUS at 18:52

## 2017-01-01 RX ADMIN — PROPOFOL 50 MG: 10 INJECTION, EMULSION INTRAVENOUS at 11:18

## 2017-01-01 RX ADMIN — PANTOPRAZOLE SODIUM 40 MG: 40 INJECTION, POWDER, FOR SOLUTION INTRAVENOUS at 04:53

## 2017-01-01 RX ADMIN — MAGNESIUM SULFATE HEPTAHYDRATE 1 G: 1 INJECTION, SOLUTION INTRAVENOUS at 13:44

## 2017-01-01 RX ADMIN — CEFOXITIN SODIUM 2 G: 2 INJECTION, SOLUTION INTRAVENOUS at 08:49

## 2017-01-01 RX ADMIN — PANTOPRAZOLE SODIUM 40 MG: 40 INJECTION, POWDER, FOR SOLUTION INTRAVENOUS at 08:14

## 2017-01-01 RX ADMIN — SODIUM CHLORIDE, SODIUM LACTATE, POTASSIUM CHLORIDE, AND CALCIUM CHLORIDE 9 ML/HR: 600; 310; 30; 20 INJECTION, SOLUTION INTRAVENOUS at 12:45

## 2017-01-01 RX ADMIN — ERYTHROPOIETIN 60000 UNITS: 40000 INJECTION, SOLUTION INTRAVENOUS; SUBCUTANEOUS at 11:48

## 2017-01-01 RX ADMIN — PETROLATUM 1 APPLICATION: 42 OINTMENT TOPICAL at 09:26

## 2017-01-01 RX ADMIN — METRONIDAZOLE 500 MG: 500 INJECTION, SOLUTION INTRAVENOUS at 19:14

## 2017-01-01 RX ADMIN — HYDROMORPHONE HYDROCHLORIDE 1 MG: 1 INJECTION, SOLUTION INTRAMUSCULAR; INTRAVENOUS; SUBCUTANEOUS at 02:42

## 2017-01-01 RX ADMIN — MORPHINE SULFATE 2 MG: 2 INJECTION, SOLUTION INTRAMUSCULAR; INTRAVENOUS at 13:30

## 2017-01-01 RX ADMIN — HYDROMORPHONE HYDROCHLORIDE 1 MG: 1 INJECTION, SOLUTION INTRAMUSCULAR; INTRAVENOUS; SUBCUTANEOUS at 02:22

## 2017-01-01 RX ADMIN — CEFOXITIN SODIUM 2 G: 2 INJECTION, SOLUTION INTRAVENOUS at 17:52

## 2017-01-01 RX ADMIN — IOPAMIDOL 100 ML: 755 INJECTION, SOLUTION INTRAVENOUS at 14:53

## 2017-01-01 RX ADMIN — Medication 1 CAPSULE: at 08:54

## 2017-01-01 RX ADMIN — MORPHINE SULFATE 2 MG: 2 INJECTION, SOLUTION INTRAMUSCULAR; INTRAVENOUS at 21:09

## 2017-01-01 RX ADMIN — SODIUM CHLORIDE 4.5 G: 9 INJECTION, SOLUTION INTRAVENOUS at 17:09

## 2017-01-01 RX ADMIN — POTASSIUM CHLORIDE: 10 INJECTION, SOLUTION INTRAVENOUS at 12:00

## 2017-01-01 RX ADMIN — PROPOFOL 50 MG: 10 INJECTION, EMULSION INTRAVENOUS at 14:39

## 2017-01-01 RX ADMIN — POTASSIUM CHLORIDE 10 MEQ: 10 INJECTION, SOLUTION INTRAVENOUS at 16:08

## 2017-01-01 RX ADMIN — METOPROLOL TARTRATE 5 MG: 5 INJECTION INTRAVENOUS at 11:42

## 2017-01-01 RX ADMIN — POTASSIUM CHLORIDE 40 MEQ: 1500 TABLET, EXTENDED RELEASE ORAL at 11:55

## 2017-01-01 RX ADMIN — HYDROMORPHONE HYDROCHLORIDE 1 MG: 1 INJECTION, SOLUTION INTRAMUSCULAR; INTRAVENOUS; SUBCUTANEOUS at 19:52

## 2017-01-01 RX ADMIN — METOPROLOL TARTRATE 2.5 MG: 5 INJECTION INTRAVENOUS at 15:11

## 2017-01-01 RX ADMIN — PETROLATUM: 42 OINTMENT TOPICAL at 08:37

## 2017-01-01 RX ADMIN — LEVOFLOXACIN 750 MG: 750 TABLET, FILM COATED ORAL at 05:39

## 2017-01-01 RX ADMIN — POTASSIUM & SODIUM PHOSPHATES POWDER PACK 280-160-250 MG 1 PACKET: 280-160-250 PACK at 12:47

## 2017-01-01 RX ADMIN — MORPHINE SULFATE 2 MG: 2 INJECTION, SOLUTION INTRAMUSCULAR; INTRAVENOUS at 08:51

## 2017-01-01 RX ADMIN — FENTANYL CITRATE 25 MCG: 50 INJECTION, SOLUTION INTRAMUSCULAR; INTRAVENOUS at 12:58

## 2017-01-01 RX ADMIN — POTASSIUM & SODIUM PHOSPHATES POWDER PACK 280-160-250 MG 1 PACKET: 280-160-250 PACK at 09:24

## 2017-01-01 RX ADMIN — PANTOPRAZOLE SODIUM 40 MG: 40 INJECTION, POWDER, FOR SOLUTION INTRAVENOUS at 21:49

## 2017-01-01 RX ADMIN — POTASSIUM CHLORIDE 10 MEQ: 10 INJECTION, SOLUTION INTRAVENOUS at 11:51

## 2017-01-01 RX ADMIN — POTASSIUM CHLORIDE 20 MEQ: 1500 TABLET, EXTENDED RELEASE ORAL at 18:43

## 2017-01-01 RX ADMIN — SODIUM CHLORIDE 4.5 G: 9 INJECTION, SOLUTION INTRAVENOUS at 17:28

## 2017-01-01 RX ADMIN — ROCURONIUM BROMIDE 38 MG: 10 INJECTION INTRAVENOUS at 15:14

## 2017-01-01 RX ADMIN — METOPROLOL TARTRATE 5 MG: 5 INJECTION INTRAVENOUS at 04:29

## 2017-01-01 RX ADMIN — GLYCOPYRROLATE 0.2 MG: 0.2 INJECTION INTRAMUSCULAR; INTRAVENOUS at 15:55

## 2017-01-01 RX ADMIN — MORPHINE SULFATE 2 MG: 2 INJECTION, SOLUTION INTRAMUSCULAR; INTRAVENOUS at 14:18

## 2017-01-01 RX ADMIN — PETROLATUM: 42 OINTMENT TOPICAL at 08:28

## 2017-01-01 RX ADMIN — POTASSIUM & SODIUM PHOSPHATES POWDER PACK 280-160-250 MG 1 PACKET: 280-160-250 PACK at 17:58

## 2017-01-01 RX ADMIN — I.V. FAT EMULSION 18.24 G: 20 EMULSION INTRAVENOUS at 18:10

## 2017-01-01 RX ADMIN — CEFOXITIN SODIUM 2 G: 2 INJECTION, SOLUTION INTRAVENOUS at 17:09

## 2017-01-01 RX ADMIN — POTASSIUM CHLORIDE 20 MEQ: 400 INJECTION, SOLUTION INTRAVENOUS at 12:20

## 2017-01-01 RX ADMIN — PANTOPRAZOLE SODIUM 40 MG: 40 TABLET, DELAYED RELEASE ORAL at 06:08

## 2017-01-01 RX ADMIN — POTASSIUM & SODIUM PHOSPHATES POWDER PACK 280-160-250 MG 1 PACKET: 280-160-250 PACK at 20:43

## 2017-01-01 RX ADMIN — METOPROLOL TARTRATE 2.5 MG: 5 INJECTION INTRAVENOUS at 10:03

## 2017-01-01 RX ADMIN — METOPROLOL TARTRATE 25 MG: 25 TABLET ORAL at 08:54

## 2017-01-01 RX ADMIN — METOPROLOL TARTRATE 2.5 MG: 5 INJECTION INTRAVENOUS at 16:41

## 2017-01-01 RX ADMIN — FUROSEMIDE 20 MG: 10 INJECTION, SOLUTION INTRAMUSCULAR; INTRAVENOUS at 17:24

## 2017-01-01 RX ADMIN — MAGNESIUM SULFATE HEPTAHYDRATE 2 G: 40 INJECTION, SOLUTION INTRAVENOUS at 10:03

## 2017-01-01 RX ADMIN — METRONIDAZOLE 500 MG: 500 INJECTION, SOLUTION INTRAVENOUS at 04:03

## 2017-01-01 RX ADMIN — METRONIDAZOLE 500 MG: 500 INJECTION, SOLUTION INTRAVENOUS at 11:50

## 2017-01-01 RX ADMIN — PROPOFOL 50 MG: 10 INJECTION, EMULSION INTRAVENOUS at 14:50

## 2017-01-01 RX ADMIN — PANTOPRAZOLE SODIUM 40 MG: 40 INJECTION, POWDER, FOR SOLUTION INTRAVENOUS at 06:19

## 2017-01-01 RX ADMIN — POTASSIUM CHLORIDE 10 MEQ: 10 INJECTION, SOLUTION INTRAVENOUS at 00:38

## 2017-01-01 RX ADMIN — SODIUM CHLORIDE: 0.9 INJECTION, SOLUTION INTRAVENOUS at 23:44

## 2017-01-01 RX ADMIN — METOPROLOL TARTRATE 2.5 MG: 5 INJECTION INTRAVENOUS at 14:58

## 2017-01-01 RX ADMIN — CLOTRIMAZOLE AND BETAMETHASONE DIPROPIONATE 1 APPLICATION: 10; .5 CREAM TOPICAL at 21:56

## 2017-01-01 RX ADMIN — PETROLATUM: 42 OINTMENT TOPICAL at 10:02

## 2017-01-01 RX ADMIN — HYDROMORPHONE HYDROCHLORIDE 1 MG: 1 INJECTION, SOLUTION INTRAMUSCULAR; INTRAVENOUS; SUBCUTANEOUS at 23:51

## 2017-01-01 RX ADMIN — IOPAMIDOL 100 ML: 612 INJECTION, SOLUTION INTRAVENOUS at 15:22

## 2017-01-01 RX ADMIN — HYDROMORPHONE HYDROCHLORIDE 0.5 MG: 2 INJECTION, SOLUTION INTRAMUSCULAR; INTRAVENOUS; SUBCUTANEOUS at 12:17

## 2017-01-01 RX ADMIN — ALBUMIN HUMAN: 0.05 INJECTION, SOLUTION INTRAVENOUS at 15:17

## 2017-01-01 RX ADMIN — ROCURONIUM BROMIDE 10 MG: 10 INJECTION INTRAVENOUS at 15:51

## 2017-01-01 RX ADMIN — METOPROLOL TARTRATE 2.5 MG: 5 INJECTION INTRAVENOUS at 21:10

## 2017-01-01 RX ADMIN — ALBUMIN HUMAN: 0.05 INJECTION, SOLUTION INTRAVENOUS at 12:56

## 2017-01-01 RX ADMIN — METRONIDAZOLE 500 MG: 500 INJECTION, SOLUTION INTRAVENOUS at 02:19

## 2017-01-01 RX ADMIN — ONDANSETRON 4 MG: 4 TABLET, ORALLY DISINTEGRATING ORAL at 07:04

## 2017-01-01 RX ADMIN — PHYTONADIONE 5 MG: 10 INJECTION, EMULSION INTRAMUSCULAR; INTRAVENOUS; SUBCUTANEOUS at 13:18

## 2017-01-01 RX ADMIN — ONDANSETRON 4 MG: 2 INJECTION, SOLUTION INTRAMUSCULAR; INTRAVENOUS at 11:49

## 2017-01-01 RX ADMIN — POTASSIUM CHLORIDE 10 MEQ: 10 INJECTION, SOLUTION INTRAVENOUS at 06:26

## 2017-01-01 RX ADMIN — METOPROLOL TARTRATE 2.5 MG: 5 INJECTION INTRAVENOUS at 20:51

## 2017-01-01 RX ADMIN — SODIUM CHLORIDE AND POTASSIUM CHLORIDE 100 ML/HR: 9; 1.49 INJECTION, SOLUTION INTRAVENOUS at 21:02

## 2017-01-01 RX ADMIN — MAGNESIUM SULFATE HEPTAHYDRATE 1 G: 1 INJECTION, SOLUTION INTRAVENOUS at 11:50

## 2017-01-01 RX ADMIN — I.V. FAT EMULSION 50 G: 20 EMULSION INTRAVENOUS at 18:05

## 2017-01-01 RX ADMIN — ETOMIDATE 16 MG: 2 INJECTION, SOLUTION INTRAVENOUS at 15:14

## 2017-01-01 RX ADMIN — POTASSIUM CHLORIDE AND SODIUM CHLORIDE 75 ML/HR: 900; 150 INJECTION, SOLUTION INTRAVENOUS at 02:33

## 2017-01-01 RX ADMIN — SODIUM CHLORIDE AND POTASSIUM CHLORIDE 100 ML/HR: 9; 1.49 INJECTION, SOLUTION INTRAVENOUS at 07:24

## 2017-01-01 RX ADMIN — POTASSIUM CHLORIDE 10 MEQ: 10 INJECTION, SOLUTION INTRAVENOUS at 08:50

## 2017-01-01 RX ADMIN — ERYTHROPOIETIN 40000 UNITS: 40000 INJECTION, SOLUTION INTRAVENOUS; SUBCUTANEOUS at 12:00

## 2017-01-01 RX ADMIN — MAGNESIUM SULFATE HEPTAHYDRATE 2 G: 40 INJECTION, SOLUTION INTRAVENOUS at 11:42

## 2017-01-01 RX ADMIN — METRONIDAZOLE 500 MG: 500 INJECTION, SOLUTION INTRAVENOUS at 17:36

## 2017-01-01 RX ADMIN — FUROSEMIDE 20 MG: 10 INJECTION, SOLUTION INTRAMUSCULAR; INTRAVENOUS at 12:05

## 2017-01-01 RX ADMIN — HYDROMORPHONE HYDROCHLORIDE 1 MG: 1 INJECTION, SOLUTION INTRAMUSCULAR; INTRAVENOUS; SUBCUTANEOUS at 08:58

## 2017-01-01 RX ADMIN — METOPROLOL TARTRATE 25 MG: 25 TABLET ORAL at 20:01

## 2017-01-01 RX ADMIN — Medication 10 ML: at 08:57

## 2017-01-01 RX ADMIN — HYDROMORPHONE HYDROCHLORIDE 1 MG: 1 INJECTION, SOLUTION INTRAMUSCULAR; INTRAVENOUS; SUBCUTANEOUS at 22:48

## 2017-01-01 RX ADMIN — SODIUM CHLORIDE 4.5 G: 9 INJECTION, SOLUTION INTRAVENOUS at 08:41

## 2017-01-01 RX ADMIN — MAGNESIUM SULFATE HEPTAHYDRATE 1 G: 1 INJECTION, SOLUTION INTRAVENOUS at 10:59

## 2017-01-01 RX ADMIN — POTASSIUM CHLORIDE 10 MEQ: 10 INJECTION, SOLUTION INTRAVENOUS at 14:57

## 2017-01-01 RX ADMIN — POTASSIUM PHOSPHATE, MONOBASIC AND POTASSIUM PHOSPHATE, DIBASIC: 224; 236 INJECTION, SOLUTION INTRAVENOUS at 10:26

## 2017-01-01 RX ADMIN — METOPROLOL TARTRATE 25 MG: 25 TABLET, FILM COATED ORAL at 20:39

## 2017-01-01 RX ADMIN — POTASSIUM & SODIUM PHOSPHATES POWDER PACK 280-160-250 MG 1 PACKET: 280-160-250 PACK at 17:48

## 2017-01-01 RX ADMIN — MORPHINE SULFATE 2 MG: 2 INJECTION, SOLUTION INTRAMUSCULAR; INTRAVENOUS at 11:28

## 2017-01-01 RX ADMIN — Medication 2 SPRAY: at 23:34

## 2017-01-01 RX ADMIN — SODIUM CHLORIDE, POTASSIUM CHLORIDE, SODIUM LACTATE AND CALCIUM CHLORIDE 50 ML/HR: 600; 310; 30; 20 INJECTION, SOLUTION INTRAVENOUS at 02:33

## 2017-01-01 RX ADMIN — CLOTRIMAZOLE AND BETAMETHASONE DIPROPIONATE 1 APPLICATION: 10; .5 CREAM TOPICAL at 13:05

## 2017-01-01 RX ADMIN — HYDROMORPHONE HYDROCHLORIDE 1 MG: 1 INJECTION, SOLUTION INTRAMUSCULAR; INTRAVENOUS; SUBCUTANEOUS at 20:46

## 2017-01-01 RX ADMIN — ROCURONIUM BROMIDE 2 MG: 10 INJECTION INTRAVENOUS at 15:10

## 2017-01-01 RX ADMIN — PETROLATUM: 42 OINTMENT TOPICAL at 20:47

## 2017-01-01 RX ADMIN — DIPHENHYDRAMINE HYDROCHLORIDE 25 MG: 50 INJECTION, SOLUTION INTRAMUSCULAR; INTRAVENOUS at 21:46

## 2017-01-04 ENCOUNTER — HOSPITAL ENCOUNTER (EMERGENCY)
Facility: HOSPITAL | Age: 82
Discharge: HOME | End: 2017-01-04
Attending: EMERGENCY MEDICINE | Admitting: EMERGENCY MEDICINE

## 2017-01-04 ENCOUNTER — APPOINTMENT (OUTPATIENT)
Dept: CT IMAGING | Facility: HOSPITAL | Age: 82
End: 2017-01-04

## 2017-01-04 VITALS
OXYGEN SATURATION: 98 % | BODY MASS INDEX: 30.21 KG/M2 | SYSTOLIC BLOOD PRESSURE: 124 MMHG | DIASTOLIC BLOOD PRESSURE: 61 MMHG | WEIGHT: 160 LBS | RESPIRATION RATE: 20 BRPM | TEMPERATURE: 98.3 F | HEART RATE: 67 BPM | HEIGHT: 61 IN

## 2017-01-04 DIAGNOSIS — K57.32 SIGMOID DIVERTICULITIS: Primary | ICD-10-CM

## 2017-01-04 LAB
ALBUMIN SERPL-MCNC: 4.1 G/DL (ref 3.5–5.2)
ALBUMIN/GLOB SERPL: 0.9 G/DL
ALP SERPL-CCNC: 110 U/L (ref 40–129)
ALT SERPL W P-5'-P-CCNC: 12 U/L (ref 5–41)
ANION GAP SERPL CALCULATED.3IONS-SCNC: 16.7 MMOL/L
AST SERPL-CCNC: 19 U/L (ref 5–40)
BACTERIA UR QL AUTO: ABNORMAL /HPF
BASOPHILS # BLD AUTO: 0.03 10*3/MM3 (ref 0–0.2)
BASOPHILS NFR BLD AUTO: 0.4 % (ref 0–2)
BILIRUB SERPL-MCNC: 1.6 MG/DL (ref 0.2–1.2)
BILIRUB UR QL STRIP: NEGATIVE
BUN BLD-MCNC: 19 MG/DL (ref 8–23)
BUN/CREAT SERPL: 17.6 (ref 7–25)
CALCIUM SPEC-SCNC: 9.5 MG/DL (ref 8.8–10.5)
CHLORIDE SERPL-SCNC: 92 MMOL/L (ref 98–107)
CLARITY UR: ABNORMAL
CO2 SERPL-SCNC: 26.3 MMOL/L (ref 22–29)
COLOR UR: ABNORMAL
CREAT BLD-MCNC: 1.08 MG/DL (ref 0.76–1.27)
DEPRECATED RDW RBC AUTO: 56.7 FL (ref 37–54)
EOSINOPHIL # BLD AUTO: 0 10*3/MM3 (ref 0.1–0.3)
EOSINOPHIL NFR BLD AUTO: 0 % (ref 0–4)
ERYTHROCYTE [DISTWIDTH] IN BLOOD BY AUTOMATED COUNT: 14 % (ref 11.5–14.5)
GFR SERPL CREATININE-BSD FRML MDRD: 65 ML/MIN/1.73
GLOBULIN UR ELPH-MCNC: 4.8 GM/DL
GLUCOSE BLD-MCNC: 131 MG/DL (ref 65–99)
GLUCOSE UR STRIP-MCNC: NEGATIVE MG/DL
GRAN CASTS URNS QL MICRO: ABNORMAL /LPF
HCT VFR BLD AUTO: 32.4 % (ref 42–52)
HGB BLD-MCNC: 10.2 G/DL (ref 14–18)
HGB UR QL STRIP.AUTO: ABNORMAL
HYALINE CASTS UR QL AUTO: ABNORMAL /LPF
IMM GRANULOCYTES # BLD: 0.07 10*3/MM3 (ref 0–0.03)
IMM GRANULOCYTES NFR BLD: 0.9 % (ref 0–0.5)
KETONES UR QL STRIP: NEGATIVE
LEUKOCYTE ESTERASE UR QL STRIP.AUTO: NEGATIVE
LIPASE SERPL-CCNC: 20 U/L (ref 13–60)
LYMPHOCYTES # BLD AUTO: 1.04 10*3/MM3 (ref 0.6–4.8)
LYMPHOCYTES NFR BLD AUTO: 13 % (ref 20–45)
MCH RBC QN AUTO: 34.7 PG (ref 27–31)
MCHC RBC AUTO-ENTMCNC: 31.5 G/DL (ref 31–37)
MCV RBC AUTO: 110.2 FL (ref 80–94)
MONOCYTES # BLD AUTO: 3.3 10*3/MM3 (ref 0–1)
MONOCYTES NFR BLD AUTO: 41.3 % (ref 3–8)
MUCOUS THREADS URNS QL MICRO: ABNORMAL /HPF
NEUTROPHILS # BLD AUTO: 3.56 10*3/MM3 (ref 1.5–8.3)
NEUTROPHILS NFR BLD AUTO: 44.4 % (ref 45–70)
NITRITE UR QL STRIP: NEGATIVE
NRBC BLD MANUAL-RTO: 0 /100 WBC (ref 0–0)
PH UR STRIP.AUTO: 6 [PH] (ref 4.5–8)
PLATELET # BLD AUTO: 112 10*3/MM3 (ref 140–500)
PMV BLD AUTO: 12 FL (ref 7.4–10.4)
POTASSIUM BLD-SCNC: 3.3 MMOL/L (ref 3.5–5.2)
PROT SERPL-MCNC: 8.9 G/DL (ref 6–8.5)
PROT UR QL STRIP: ABNORMAL
RBC # BLD AUTO: 2.94 10*6/MM3 (ref 4.7–6.1)
RBC # UR: ABNORMAL /HPF
REF LAB TEST METHOD: ABNORMAL
SODIUM BLD-SCNC: 135 MMOL/L (ref 136–145)
SP GR UR STRIP: 1.02 (ref 1–1.03)
SQUAMOUS #/AREA URNS HPF: ABNORMAL /HPF
UROBILINOGEN UR QL STRIP: ABNORMAL
WBC NRBC COR # BLD: 8 10*3/MM3 (ref 4.8–10.8)
WBC UR QL AUTO: ABNORMAL /HPF

## 2017-01-04 PROCEDURE — 81001 URINALYSIS AUTO W/SCOPE: CPT | Performed by: EMERGENCY MEDICINE

## 2017-01-04 PROCEDURE — 99283 EMERGENCY DEPT VISIT LOW MDM: CPT

## 2017-01-04 PROCEDURE — 83690 ASSAY OF LIPASE: CPT | Performed by: EMERGENCY MEDICINE

## 2017-01-04 PROCEDURE — 80053 COMPREHEN METABOLIC PANEL: CPT | Performed by: EMERGENCY MEDICINE

## 2017-01-04 PROCEDURE — 85025 COMPLETE CBC W/AUTO DIFF WBC: CPT | Performed by: EMERGENCY MEDICINE

## 2017-01-04 PROCEDURE — 99284 EMERGENCY DEPT VISIT MOD MDM: CPT | Performed by: EMERGENCY MEDICINE

## 2017-01-04 PROCEDURE — 74176 CT ABD & PELVIS W/O CONTRAST: CPT

## 2017-01-04 RX ORDER — HYDROCODONE BITARTRATE AND ACETAMINOPHEN 5; 325 MG/1; MG/1
1 TABLET ORAL EVERY 6 HOURS PRN
Qty: 20 TABLET | Refills: 0 | Status: SHIPPED | OUTPATIENT
Start: 2017-01-04 | End: 2017-01-09

## 2017-01-04 RX ORDER — LEVOFLOXACIN 500 MG/1
500 TABLET, FILM COATED ORAL ONCE
Status: COMPLETED | OUTPATIENT
Start: 2017-01-04 | End: 2017-01-04

## 2017-01-04 RX ORDER — METRONIDAZOLE 500 MG/1
500 TABLET ORAL 3 TIMES DAILY
Qty: 30 TABLET | Refills: 0 | Status: SHIPPED | OUTPATIENT
Start: 2017-01-04 | End: 2017-01-14

## 2017-01-04 RX ORDER — METRONIDAZOLE 500 MG/1
500 TABLET ORAL ONCE
Status: COMPLETED | OUTPATIENT
Start: 2017-01-04 | End: 2017-01-04

## 2017-01-04 RX ORDER — LEVOFLOXACIN 500 MG/1
500 TABLET, FILM COATED ORAL DAILY
Qty: 10 TABLET | Refills: 0 | Status: SHIPPED | OUTPATIENT
Start: 2017-01-04 | End: 2017-01-14

## 2017-01-04 RX ADMIN — METRONIDAZOLE 500 MG: 500 TABLET ORAL at 18:24

## 2017-01-04 RX ADMIN — LEVOFLOXACIN 500 MG: 500 TABLET, FILM COATED ORAL at 18:25

## 2017-01-04 NOTE — ED PROVIDER NOTES
Subjective     History provided by:  Patient (Daughters)    History of Present Illness    · Chief complaint: Right inguinal pain    · Location: Right inguinal area and right side    · Quality/Severity: Sudden onset of brief sharp jabbing pains in the right inguinal area    · Timing/Onset: Episodes started 6 days ago and occur abruptly and intermittently and last for a couple minutes before stopping    · Modifying Factors: Patient is unaware of any initiating her causative factors.  He states they go away on their own.    · Associated symptoms: He reports that his urine today is a little tea colored.  He denies any nausea or vomiting.  He does report some discomfort in his right flank last week.  He denies any discomfort in his upper abdomen or his left side of his abdomen.  He denies a swelling on the right inguinal area or right scrotum.    Narrative: The patient is a 82-year-old white male complaining of intermittent sharp stabbing pains in the right inguinal area and occasionally in the right side.  He denies a prior history of similar discomfort.  The patient has had to self catheter himself for the last 2 years since having umbilical hernia surgery repair.  He has a remote history of a right inguinal hernia repair.    ED Triage Vitals   Temp Heart Rate Resp BP SpO2   01/04/17 1342 01/04/17 1342 01/04/17 1342 01/04/17 1342 01/04/17 1342   98.3 °F (36.8 °C) 67 20 124/61 98 %      Temp src Heart Rate Source Patient Position BP Location FiO2 (%)   -- -- -- -- --              Review of Systems   Constitutional: Negative for activity change, appetite change, chills, diaphoresis, fatigue and fever.   HENT: Negative for congestion, dental problem, ear pain, hearing loss, mouth sores, postnasal drip, rhinorrhea, sinus pressure, sore throat and voice change.    Eyes: Negative for photophobia, pain, discharge, redness and visual disturbance.   Respiratory: Negative for cough, chest tightness, shortness of breath, wheezing  and stridor.    Cardiovascular: Negative for chest pain, palpitations and leg swelling.   Gastrointestinal: Negative for abdominal pain, diarrhea, nausea and vomiting.        Right inguinal pain   Genitourinary: Positive for difficulty urinating (patient has to self catheter), flank pain and hematuria (Tea colored urine today). Negative for discharge, dysuria, frequency, penile pain, penile swelling, scrotal swelling, testicular pain and urgency.   Musculoskeletal: Negative for arthralgias, back pain, gait problem, joint swelling, myalgias, neck pain and neck stiffness.   Skin: Negative for color change and rash.   Neurological: Negative for dizziness, tremors, seizures, syncope, facial asymmetry, speech difficulty, weakness, light-headedness, numbness and headaches.   Hematological: Negative for adenopathy.   Psychiatric/Behavioral: Negative.  Negative for confusion and decreased concentration. The patient is not nervous/anxious.        Past Medical History   Diagnosis Date   • Aortic regurgitation    • Aortic valve insufficiency    • Arthritis      Bilateral knee   • Chest pain    • Diastolic dysfunction    • History of urinary retention    • MDS (myelodysplastic syndrome)    • RBBB (right bundle branch block)        No Known Allergies    Past Surgical History   Procedure Laterality Date   • Back surgery  2008     fusion   • Cholecystectomy     • Hernia repair     • Knee arthroplasty         Family History   Problem Relation Age of Onset   • Diabetes Brother    • Liver cancer Brother    • Heart disease Other    • Heart disease Mother    • Heart attack Mother    • Other Sister      Brain tumor   • Emphysema Brother    • Alcohol abuse Brother        Social History     Social History   • Marital status:      Spouse name: Neris   • Number of children: N/A   • Years of education: N/A     Occupational History   •  Retired     Social History Main Topics   • Smoking status: Never Smoker   • Smokeless tobacco:  None   • Alcohol use 8.4 oz/week     14 Cans of beer per week   • Drug use: No   • Sexual activity: Not Asked     Other Topics Concern   • None     Social History Narrative           Objective   Physical Exam   Constitutional: He is oriented to person, place, and time. He appears well-developed and well-nourished. No distress.   HENT:   Head: Normocephalic and atraumatic.   Nose: Nose normal.   Mouth/Throat: Oropharynx is clear and moist. No oropharyngeal exudate.   Eyes: EOM are normal. Pupils are equal, round, and reactive to light. Right eye exhibits no discharge. Left eye exhibits no discharge. No scleral icterus.   Neck: Normal range of motion. Neck supple. No JVD present. No thyromegaly present.   Cardiovascular: Normal rate, regular rhythm and normal heart sounds.    No murmur heard.  Pulmonary/Chest: Effort normal and breath sounds normal. He has no wheezes. He has no rales. He exhibits no tenderness.   Abdominal: Soft. Bowel sounds are normal. He exhibits no distension. There is no tenderness.   Genitourinary: Penis normal.   Genitourinary Comments: Testes are descended and nontender and nonswollen.  Patient is circumcised and there is no urethral discharge.  There is no right-sided inguinal swelling or tenderness and no right-sided inguinal hernia.  There is a small left-sided inguinal hernia that is nontender and not incarcerated.   Musculoskeletal: Normal range of motion. He exhibits no edema, tenderness or deformity.   Lymphadenopathy:     He has no cervical adenopathy.   Neurological: He is alert and oriented to person, place, and time. No cranial nerve deficit. Coordination normal.   No focal motor sensory deficit   Skin: Skin is warm and dry. No rash noted. He is not diaphoretic.   Psychiatric: He has a normal mood and affect. His behavior is normal. Judgment and thought content normal.   Nursing note and vitals reviewed.      Procedures         ED Course  ED Course   Comment By Time   I discussed  with the patient and his daughters the results of the CT scan. Marvin Wright MD 01/04 6178                  MDM  Number of Diagnoses or Management Options  Sigmoid diverticulitis: new and requires workup     Amount and/or Complexity of Data Reviewed  Clinical lab tests: ordered and reviewed  Tests in the radiology section of CPT®: ordered and reviewed  Independent visualization of images, tracings, or specimens: yes    Risk of Complications, Morbidity, and/or Mortality  Presenting problems: high  Diagnostic procedures: high  Management options: high  General comments: My differential diagnosis for abdominal pain includes but is not limited to:  Gastritis, gastroenteritis, peptic ulcer disease, GERD, esophageal perforation, acute appendicitis, mesenteric adenitis, Meckel’s diverticulum, epiploic appendagitis, diverticulitis, colon cancer, ulcerative colitis, Crohn’s disease, intussusception, small bowel obstruction, adhesions, ischemic bowel, perforated viscus, ileus, obstipation, biliary colic, cholecystitis, cholelithiasis, Incoente-Dashawn Mejia, hepatitis, pancreatitis, common bile duct obstruction, cholangitis, bile leak, splenic trauma, splenic rupture, splenic infarction, splenic abscess, abdominal abscess, ascites, spontaneous bacterial peritonitis, hernia, UTI, cystitis, prostatitis, ureterolithiasis, urinary obstruction, ovarian cyst, torsion, pregnancy, ectopic pregnancy, PID, pelvic abscess, mittelschmerz, endometriosis, AAA, myocardial infarction, pneumonia, cancer, porphyria, DKA, medications, sickle cell, viral syndrome, zoster    Patient Progress  Patient progress: stable      Final diagnoses:   Sigmoid diverticulitis           Labs Reviewed   URINALYSIS W/ CULTURE IF INDICATED - Abnormal; Notable for the following:        Result Value    Color, UA Nita (*)     Appearance, UA Hazy (*)     Blood, UA Moderate (2+) (*)     Protein,  mg/dL (2+) (*)     All other components within normal limits    URINALYSIS, MICROSCOPIC ONLY - Abnormal; Notable for the following:     RBC, UA 31-50 (*)     WBC, UA 0-2 (*)     Bacteria, UA Trace (*)     Mucus, UA Small/1+ (*)     All other components within normal limits   COMPREHENSIVE METABOLIC PANEL - Abnormal; Notable for the following:     Glucose 131 (*)     Sodium 135 (*)     Potassium 3.3 (*)     Chloride 92 (*)     Total Protein 8.9 (*)     Total Bilirubin 1.6 (*)     All other components within normal limits    Narrative:     The MDRD GFR formula is only valid for adults with stable renal function between ages 18 and 70.   CBC WITH AUTO DIFFERENTIAL - Abnormal; Notable for the following:     RBC 2.94 (*)     Hemoglobin 10.2 (*)     Hematocrit 32.4 (*)     .2 (*)     MCH 34.7 (*)     RDW-SD 56.7 (*)     MPV 12.0 (*)     Platelets 112 (*)     Neutrophil % 44.4 (*)     Lymphocyte % 13.0 (*)     Monocyte % 41.3 (*)     Immature Grans % 0.9 (*)     Monocytes, Absolute 3.30 (*)     Eosinophils, Absolute 0.00 (*)     Immature Grans, Absolute 0.07 (*)     All other components within normal limits   LIPASE - Normal   CBC AND DIFFERENTIAL    Narrative:     The following orders were created for panel order CBC & Differential.  Procedure                               Abnormality         Status                     ---------                               -----------         ------                     Scan Slide[[id number]]                                                                   CBC Auto Differential[[id number]]         Abnormal            Final result                 Please view results for these tests on the individual orders.     CT Abdomen Pelvis Without Contrast   ED Interpretation   Severe acute diverticulitis involving the mid sigmoid colon.  No   visible abscess or bowel perforation.  Recommend future follow-up   colonoscopy to exclude mass.  Inflammation of adjacent bladder   likely secondary to colon inflammation.  No urinary obstruction.    Per Dr. Freeman.              Medication List      New Prescriptions          HYDROcodone-acetaminophen 5-325 MG per tablet   Commonly known as:  NORCO   Take 1 tablet by mouth Every 6 (Six) Hours As Needed for moderate pain   (4-6) for up to 5 days.       levoFLOXacin 500 MG tablet   Commonly known as:  LEVAQUIN   Take 1 tablet by mouth Daily for 10 days.       metroNIDAZOLE 500 MG tablet   Commonly known as:  FLAGYL   Take 1 tablet by mouth 3 (Three) Times a Day for 10 days.         Stop          cyanocobalamin 1000 MCG/ML injection       FLUZONE HIGH-DOSE 0.5 ML suspension prefilled syringe injection   Generic drug:  influenza vac split high-dose                Marvin Wright MD  01/04/17 5276

## 2017-01-10 ENCOUNTER — LAB (OUTPATIENT)
Dept: LAB | Facility: HOSPITAL | Age: 82
End: 2017-01-10

## 2017-01-10 ENCOUNTER — INFUSION (OUTPATIENT)
Dept: ONCOLOGY | Facility: HOSPITAL | Age: 82
End: 2017-01-10

## 2017-01-10 VITALS
DIASTOLIC BLOOD PRESSURE: 54 MMHG | SYSTOLIC BLOOD PRESSURE: 100 MMHG | TEMPERATURE: 98.2 F | HEART RATE: 79 BPM | OXYGEN SATURATION: 94 % | BODY MASS INDEX: 30.55 KG/M2 | WEIGHT: 161.7 LBS

## 2017-01-10 DIAGNOSIS — D46.9 MYELODYSPLASTIC SYNDROME (HCC): ICD-10-CM

## 2017-01-10 DIAGNOSIS — D46.20 MDS (MYELODYSPLASTIC SYNDROME), LOW GRADE (HCC): Primary | ICD-10-CM

## 2017-01-10 DIAGNOSIS — E53.8 VITAMIN B 12 DEFICIENCY: ICD-10-CM

## 2017-01-10 LAB
BASOPHILS # BLD AUTO: 0.02 10*3/MM3 (ref 0–0.2)
BASOPHILS NFR BLD AUTO: 0.3 % (ref 0–2)
DEPRECATED RDW RBC AUTO: 54.3 FL (ref 37–54)
EOSINOPHIL # BLD AUTO: 0 10*3/MM3 (ref 0.1–0.3)
EOSINOPHIL NFR BLD AUTO: 0 % (ref 0–4)
ERYTHROCYTE [DISTWIDTH] IN BLOOD BY AUTOMATED COUNT: 13.6 % (ref 11.5–14.5)
HCT VFR BLD AUTO: 27.2 % (ref 42–52)
HGB BLD-MCNC: 8.8 G/DL (ref 14–18)
IMM GRANULOCYTES # BLD: 0.11 10*3/MM3 (ref 0–0.03)
IMM GRANULOCYTES NFR BLD: 1.5 % (ref 0–0.5)
LYMPHOCYTES # BLD AUTO: 0.95 10*3/MM3 (ref 0.6–4.8)
LYMPHOCYTES NFR BLD AUTO: 12.8 % (ref 20–45)
MCH RBC QN AUTO: 35.2 PG (ref 27–31)
MCHC RBC AUTO-ENTMCNC: 32.4 G/DL (ref 31–37)
MCV RBC AUTO: 108.8 FL (ref 80–94)
MONOCYTES # BLD AUTO: 2.94 10*3/MM3 (ref 0–1)
MONOCYTES NFR BLD AUTO: 39.7 % (ref 3–8)
NEUTROPHILS # BLD AUTO: 3.38 10*3/MM3 (ref 1.5–8.3)
NEUTROPHILS NFR BLD AUTO: 45.7 % (ref 45–70)
NRBC BLD MANUAL-RTO: 0 /100 WBC (ref 0–0)
PLATELET # BLD AUTO: 123 10*3/MM3 (ref 140–500)
PMV BLD AUTO: 12.5 FL (ref 7.4–10.4)
RBC # BLD AUTO: 2.5 10*6/MM3 (ref 4.7–6.1)
WBC NRBC COR # BLD: 7.4 10*3/MM3 (ref 4.8–10.8)

## 2017-01-10 PROCEDURE — 85025 COMPLETE CBC W/AUTO DIFF WBC: CPT | Performed by: INTERNAL MEDICINE

## 2017-01-10 PROCEDURE — 25010000002 EPOETIN ALFA PER 1000 UNITS: Performed by: INTERNAL MEDICINE

## 2017-01-10 PROCEDURE — 36415 COLL VENOUS BLD VENIPUNCTURE: CPT | Performed by: INTERNAL MEDICINE

## 2017-01-10 PROCEDURE — 96372 THER/PROPH/DIAG INJ SC/IM: CPT

## 2017-01-10 PROCEDURE — 25010000002 CYANOCOBALAMIN PER 1000 MCG: Performed by: INTERNAL MEDICINE

## 2017-01-10 RX ORDER — CYANOCOBALAMIN 1000 UG/ML
1000 INJECTION, SOLUTION INTRAMUSCULAR; SUBCUTANEOUS ONCE
Status: COMPLETED | OUTPATIENT
Start: 2017-01-10 | End: 2017-01-10

## 2017-01-10 RX ORDER — CYANOCOBALAMIN 1000 UG/ML
1000 INJECTION, SOLUTION INTRAMUSCULAR; SUBCUTANEOUS ONCE
Status: CANCELLED | OUTPATIENT
Start: 2017-01-31

## 2017-01-10 RX ADMIN — ERYTHROPOIETIN 40000 UNITS: 40000 INJECTION, SOLUTION INTRAVENOUS; SUBCUTANEOUS at 11:49

## 2017-01-10 RX ADMIN — CYANOCOBALAMIN 1000 MCG: 1000 INJECTION, SOLUTION INTRAMUSCULAR; SUBCUTANEOUS at 11:50

## 2017-01-10 NOTE — MR AVS SNAPSHOT
"                        Jason WISEMAN Garcia   1/10/2017 10:45 AM   Appointment    Dept Phone:  [phone]   Encounter #:  [id number]    Provider:  CHAIR 4 CBC LAG   Department:  [facility] CBC                Your Full Care Plan              Your Updated Medication List          This list is accurate as of: 1/10/17 11:16 AM.  Always use your most recent med list.                acetaminophen 325 MG tablet   Commonly known as:  TYLENOL       B-D 3CC LUER-MATT SYR 22GX1\" 22G X 1\" 3 ML misc   Generic drug:  Syringe/Needle (Disp)       cyanocobalamin 1000 MCG/ML injection       FLUZONE HIGH-DOSE 0.5 ML suspension prefilled syringe injection   Generic drug:  influenza vac split high-dose       folic acid 400 MCG tablet   Commonly known as:  FOLVITE       ibuprofen 200 MG tablet   Commonly known as:  ADVIL,MOTRIN       irbesartan-hydrochlorothiazide 300-12.5 MG tablet   Commonly known as:  AVALIDE       levoFLOXacin 500 MG tablet   Commonly known as:  LEVAQUIN   Take 1 tablet by mouth Daily for 10 days.       metroNIDAZOLE 500 MG tablet   Commonly known as:  FLAGYL   Take 1 tablet by mouth 3 (Three) Times a Day for 10 days.       MULTIPLE VITAMINS-MINERALS PO       nitrofurantoin (macrocrystal-monohydrate) 100 MG capsule   Commonly known as:  MACROBID       * potassium chloride 10 MEQ CR tablet   Commonly known as:  K-DUR       * potassium chloride ER 20 MEQ tablet controlled-release ER tablet   Commonly known as:  K-TAB       tamsulosin 0.4 MG capsule 24 hr capsule   Commonly known as:  FLOMAX       VITAMIN B1-B12 IM       * Notice:  This list has 2 medication(s) that are the same as other medications prescribed for you. Read the directions carefully, and ask your doctor or other care provider to review them with you.            Instructions     None    Patient Instructions History      Upcoming Appointments     Visit Type Date Time Department    LAB 1/10/2017 10:20 AM  LAG ONC CBC LAB LAG   " INJECTION 1/10/2017 10:45 AM BH LAG OP INFU CBC LAG    LAB 2017 10:10 AM BH LAG ONC CBC LAB LAG    INJECTION 2017 10:30 AM BH LAG OP INFU CBC LAG    FOLLOW UP 1 UNIT 3/14/2017 10:40 AM MGK ONC CBC LAGRANGE    LAB 3/14/2017 10:20 AM BH LAG ONC CBC LAB LAG    INJECTION 3/14/2017 11:00 AM BH LAG OP INFU CBC LAG      MyChart Signup     [facility] [employer] allows you to send messages to your doctor, view your test results, renew your prescriptions, schedule appointments, and more. To sign up, go to [employer] and click on the Sign Up Now link in the New User? box. Enter your [employer] Activation Code exactly as it appears below along with the last four digits of your Social Security Number and your Date of Birth () to complete the sign-up process. If you do not sign up before the expiration date, you must request a new code.    [employer] Activation Code: [email or web address]  Expires: 2017  6:16 PM    If you have questions, you can email [email or web address] or call [phone] to talk to our [employer] staff. Remember, [employer] is NOT to be used for urgent needs. For medical emergencies, dial 911.               Other Info from Your Visit           Your Appointments     2017 10:10 AM EST   Lab with LAB CHAIR 1 [facility] ONCOLOGY CBC LAB ([place])    [address] [place] [address]   [phone]            2017 10:30 AM EST   Injection with CHAIR 4 [facility]FUSION CBC ([place])    [address]  [place] [address]   [phone]            Mar 14, 2017 10:20 AM EDT   Lab with LAB CHAIR 1 [facility] ONCOLOGY CBC LAB ([place])    [address]  [place] [address]   [phone]            Mar 14, 2017 10:40 AM EDT   FOLLOW UP with Anupam Green MD   [facility] CBC GROUP:CONSULTANTS IN BLOOD DISORDERS AND CANCER (CBC  [place])    [address] [facility] [address]  [place] [address]   [phone]            Mar 14, 2017 11:00 AM EDT   Injection with CHAIR 4 CBC [facility]    [address] [facility] [address]  [place] [address] [phone]              Allergies     No Known Allergies      Vital Signs     Smoking Status                   Never Smoker

## 2017-01-10 NOTE — PROGRESS NOTES
Pt arrived for CBC w/injections via WC with son by his side. He states that he continues to have abdominal pain, blood in the urine (pt self-caths), is very weak. He denies SOA but states that he has been very inactive the last couple days due to feeling so poorly. He has little appetite but will drink fluids if and when encouraged to do so. The pt was questioned regarding blood in the stool and explained that he has been constipated but is passing gas. He was instructed by the ER when seen on 1/4/17 and dx with diverticulitis to see a gastroenterologist. Pt states that he has no appointment at present with Dr. Amado but is filling out the paperwork. CBC showed hgb of 8.8 which is down from 10.2 on 1/4/17. Dr. Zamudio was consulted. Pt did receive B-12 1000mcg and Procrit 40,0000 unit injections today. The pt's PCP, Dr. Coates, was called to obtain an appt for the pt today. The pt was instructed to come by the office immediately following his appt here today for evaluation. The pt and his son was given a copy of the pt's lab results (1/4/17 and today) as well as the pt's 1/4/17 ER visit to take to Dr. Coates office. The pt was scheduled for a followup CBC and RN review in one week on 1/17/17 due to declining hgb and pt status. Pt and son v/u of above.

## 2017-01-17 ENCOUNTER — LAB (OUTPATIENT)
Dept: LAB | Facility: HOSPITAL | Age: 82
End: 2017-01-17

## 2017-01-17 ENCOUNTER — INFUSION (OUTPATIENT)
Dept: ONCOLOGY | Facility: HOSPITAL | Age: 82
End: 2017-01-17

## 2017-01-17 VITALS
HEART RATE: 87 BPM | SYSTOLIC BLOOD PRESSURE: 110 MMHG | TEMPERATURE: 98.5 F | WEIGHT: 160.9 LBS | OXYGEN SATURATION: 95 % | BODY MASS INDEX: 30.4 KG/M2 | DIASTOLIC BLOOD PRESSURE: 62 MMHG

## 2017-01-17 DIAGNOSIS — D46.20 MDS (MYELODYSPLASTIC SYNDROME), LOW GRADE (HCC): Primary | ICD-10-CM

## 2017-01-17 DIAGNOSIS — D46.9 MYELODYSPLASTIC SYNDROME (HCC): ICD-10-CM

## 2017-01-17 LAB
BASOPHILS # BLD AUTO: 0.02 10*3/MM3 (ref 0–0.2)
BASOPHILS NFR BLD AUTO: 0.5 % (ref 0–2)
DEPRECATED RDW RBC AUTO: 57.5 FL (ref 37–54)
EOSINOPHIL # BLD AUTO: 0 10*3/MM3 (ref 0.1–0.3)
EOSINOPHIL NFR BLD AUTO: 0 % (ref 0–4)
ERYTHROCYTE [DISTWIDTH] IN BLOOD BY AUTOMATED COUNT: 14.3 % (ref 11.5–14.5)
HCT VFR BLD AUTO: 28.3 % (ref 42–52)
HGB BLD-MCNC: 9 G/DL (ref 14–18)
IMM GRANULOCYTES # BLD: 0.05 10*3/MM3 (ref 0–0.03)
IMM GRANULOCYTES NFR BLD: 1.1 % (ref 0–0.5)
LYMPHOCYTES # BLD AUTO: 0.92 10*3/MM3 (ref 0.6–4.8)
LYMPHOCYTES NFR BLD AUTO: 21.1 % (ref 20–45)
MCH RBC QN AUTO: 34.9 PG (ref 27–31)
MCHC RBC AUTO-ENTMCNC: 31.8 G/DL (ref 31–37)
MCV RBC AUTO: 109.7 FL (ref 80–94)
MONOCYTES # BLD AUTO: 1.91 10*3/MM3 (ref 0–1)
MONOCYTES NFR BLD AUTO: 43.9 % (ref 3–8)
NEUTROPHILS # BLD AUTO: 1.45 10*3/MM3 (ref 1.5–8.3)
NEUTROPHILS NFR BLD AUTO: 33.4 % (ref 45–70)
NRBC BLD MANUAL-RTO: 0 /100 WBC (ref 0–0)
PLATELET # BLD AUTO: 187 10*3/MM3 (ref 140–500)
PMV BLD AUTO: 11.4 FL (ref 7.4–10.4)
RBC # BLD AUTO: 2.58 10*6/MM3 (ref 4.7–6.1)
WBC NRBC COR # BLD: 4.35 10*3/MM3 (ref 4.8–10.8)

## 2017-01-17 PROCEDURE — 36415 COLL VENOUS BLD VENIPUNCTURE: CPT

## 2017-01-17 PROCEDURE — 85025 COMPLETE CBC W/AUTO DIFF WBC: CPT | Performed by: INTERNAL MEDICINE

## 2017-01-17 PROCEDURE — 25010000002 EPOETIN ALFA PER 1000 UNITS: Performed by: INTERNAL MEDICINE

## 2017-01-17 PROCEDURE — 96372 THER/PROPH/DIAG INJ SC/IM: CPT

## 2017-01-17 RX ADMIN — ERYTHROPOIETIN 40000 UNITS: 20000 INJECTION, SOLUTION INTRAVENOUS; SUBCUTANEOUS at 10:19

## 2017-01-17 NOTE — MR AVS SNAPSHOT
"                        Jason WISEMAN Garcia   1/17/2017 10:00 AM   Infusion    Dept Phone:  [phone]   Encounter #:  [id number]    Provider:  CHAIR Frank CBC LAG   Department:  [facility] INF[facility]ION CBC                Your Full Care Plan              Your Updated Medication List          This list is accurate as of: 1/17/17 10:30 AM.  Always use your most recent med list.                acetaminophen 325 MG tablet   Commonly known as:  TYLENOL       B-D 3CC LUER-MATT SYR 22GX1\" 22G X 1\" 3 ML misc   Generic drug:  Syringe/Needle (Disp)       cyanocobalamin 1000 MCG/ML injection       FLUZONE HIGH-DOSE 0.5 ML suspension prefilled syringe injection   Generic drug:  influenza vac split high-dose       folic acid 400 MCG tablet   Commonly known as:  FOLVITE       ibuprofen 200 MG tablet   Commonly known as:  ADVIL,MOTRIN       irbesartan-hydrochlorothiazide 300-12.5 MG tablet   Commonly known as:  AVALIDE       MULTIPLE VITAMINS-MINERALS PO       VITAMIN B1-B12 IM               We Performed the Following     Provider Communication     Provider Communication     Treatment conditions       You Were Diagnosed With        Codes Comments    MDS (myelodysplastic syndrome), low grade    -  Primary ICD-10-CM: D46.20  ICD-9-CM: 238.72       Instructions     None    Patient Instructions History      Upcoming Appointments     Visit Type Date Time Department    INJECTION 1/17/2017 10:00 AM BH LAG OP INFU CBC LAG    LAB 1/17/2017  9:30 AM BH LAG ONC CBC LAB LAG    RN REVIEW 1/24/2017 10:30 AM  LAG OP INFU CBC LAG    LAB 1/24/2017 10:10 AM BH LAG ONC CBC LAB LAG    LAB 2/14/2017 10:10 AM BH LAG ONC CBC LAB LAG    INJECTION 2/14/2017 10:30 AM BH LAG OP INFU CBC LAG    FOLLOW UP 1 UNIT 3/14/2017 10:40 AM MG[facility] ONC CBC LAGRANGE    LAB 3/14/2017 10:20 AM BH LAG ONC CBC LAB LAG    INJECTION 3/14/2017 11:00 AM BH LAG OP INFU CBC LAG      MyChart Signup     [facility] MyChart allows you to send messages to your doctor, view " your test results, renew your prescriptions, schedule appointments, and more. To sign up, go to [employer] and click on the Sign Up Now link in the New User? box. Enter your [employer] Activation Code exactly as it appears below along with the last four digits of your Social Security Number and your Date of Birth () to complete the sign-up process. If you do not sign up before the expiration date, you must request a new code.    [employer] Activation Code: [email or web address]  Expires: 2017  6:16 PM    If you have questions, you can email [email or web address] or call [phone] to talk to our [employer] staff. Remember, [employer] is NOT to be used for urgent needs. For medical emergencies, dial 911.               Other Info from Your Visit           Your Appointments     2017 10:10 AM EST   Lab with LAB CHAIR 1 [facility] ONCOLOGY CBC LAB ([place])    [address]  [place] [address]   [phone]            2017 10:30 AM EST   NURSE APPOINTMENT with CHAIR 4 [facility] [place] [facility] CBC ([place])    [address]  [place] [address]   [phone]            2017 10:10 AM EST   Lab with LAB CHAIR 1 [facility] ONCOLOGY CBC LAB ([place])    [address]  [place] [address]   [phone]            2017 10:30 AM EST   Injection with CHAIR 4 [facility] CBC ([place])    [address]  [place] [address]   [phone]            Mar 14, 2017 10:40 AM EDT   FOLLOW UP with Anupam Green MD   [facility] MEDICAL GROUP CBC GROUP:CONSULTANTS IN BLOOD DISORDERS AND CANCER ([facility])    [address]  [facility] [address]  [place] [address]   [phone]              Allergies     No Known Allergies      Reason for Visit     Injections procrit      Vital Signs     Blood Pressure Pulse Temperature  Weight Oxygen Saturation Body Mass Index    110/62 87 98.5 °F (36.9 °C) (Oral) 160 lb 14.4 oz (73 kg) 95% 30.4 kg/m2    Smoking Status                   Never Smoker           Problems and Diagnoses Noted     MDS (myelodysplastic syndrome), low grade      Medications Administered     epoetin joi (EPOGEN,PROCRIT) injection 40,000 Units

## 2017-01-17 NOTE — PROGRESS NOTES
Pt arrived again via wheelchair with son as his escort. He states the abdominal pain is improved from last week,and he is no longer constipated but has black stools. He also experiences fevers usually at night (pt did not mention this last week) up to 102 degrees fahrenheit. Pain is experienced in the pt's joints and especially so in the pt's swollen right knee. Dr. Green briefly saw the pt in the infusion area and recommends that the pt be seen weekly for lab checks until he is seen by GI for a colonoscopy. Dr. Amado's office was called [phone]); the pt is scheduled for a COS at [facility] on 2/1/17 at 10:45am.  He should receive info in the mail this week regarding scheduling and prep details. Dr. Green also examined the pt's right knee and admits that it is warm to the touch. The pt was told by Dr. Green that he could contact his orthopedist, Umer Galo, regarding tx for the right knee. The pt and son v/u. The pt was scheduled for repeat labs and RN review in one week.  The pt was instructed to call our office or go to the nearest emergency room if he develops sudden/severe fatigue, SOA, or worsening symptoms such as abdominal pain. The pt and his son v/u of above.Pt did receive a procrit injection today.

## 2017-01-18 ENCOUNTER — TELEPHONE (OUTPATIENT)
Dept: ONCOLOGY | Facility: HOSPITAL | Age: 82
End: 2017-01-18

## 2017-01-18 NOTE — TELEPHONE ENCOUNTER
----- Message from Miesha Moss sent at 1/18/2017 11:16 AM EST -----   Pt's daughter is calling wanting to get him something for pain    Mary Jane  [phone]      Patient was seen at [facility] in the treatment area yesterday and was told to contact orthopedic in regards to pain in his knees.  They have contacted him but says they can't get him in until February.  Explained to daughter they really need to take that route for pain meds since we are not treating his knee issue.  She was going to try to call again.  She will call tomorrow if they cannot get anything and we can review with Dr. Green tomorrow if needed.

## 2017-01-24 ENCOUNTER — CLINICAL SUPPORT (OUTPATIENT)
Dept: ONCOLOGY | Facility: HOSPITAL | Age: 82
End: 2017-01-24

## 2017-01-24 ENCOUNTER — LAB (OUTPATIENT)
Dept: LAB | Facility: HOSPITAL | Age: 82
End: 2017-01-24

## 2017-01-24 DIAGNOSIS — D46.9 MYELODYSPLASTIC SYNDROME (HCC): ICD-10-CM

## 2017-01-24 LAB
BASOPHILS # BLD AUTO: 0.01 10*3/MM3 (ref 0–0.2)
BASOPHILS NFR BLD AUTO: 0.1 % (ref 0–2)
DEPRECATED RDW RBC AUTO: 59.9 FL (ref 37–54)
EOSINOPHIL # BLD AUTO: 0 10*3/MM3 (ref 0.1–0.3)
EOSINOPHIL NFR BLD AUTO: 0 % (ref 0–4)
ERYTHROCYTE [DISTWIDTH] IN BLOOD BY AUTOMATED COUNT: 14.8 % (ref 11.5–14.5)
HCT VFR BLD AUTO: 31.4 % (ref 42–52)
HGB BLD-MCNC: 10 G/DL (ref 14–18)
IMM GRANULOCYTES # BLD: 0.2 10*3/MM3 (ref 0–0.03)
IMM GRANULOCYTES NFR BLD: 2.5 % (ref 0–0.5)
LYMPHOCYTES # BLD AUTO: 1.21 10*3/MM3 (ref 0.6–4.8)
LYMPHOCYTES NFR BLD AUTO: 15.2 % (ref 20–45)
MCH RBC QN AUTO: 35.7 PG (ref 27–31)
MCHC RBC AUTO-ENTMCNC: 31.8 G/DL (ref 31–37)
MCV RBC AUTO: 112.1 FL (ref 80–94)
MONOCYTES # BLD AUTO: 0.69 10*3/MM3 (ref 0–1)
MONOCYTES NFR BLD AUTO: 8.7 % (ref 3–8)
NEUTROPHILS # BLD AUTO: 5.83 10*3/MM3 (ref 1.5–8.3)
NEUTROPHILS NFR BLD AUTO: 73.5 % (ref 45–70)
NRBC BLD MANUAL-RTO: 0 /100 WBC (ref 0–0)
PLATELET # BLD AUTO: 215 10*3/MM3 (ref 140–500)
PMV BLD AUTO: 12.2 FL (ref 7.4–10.4)
RBC # BLD AUTO: 2.8 10*6/MM3 (ref 4.7–6.1)
WBC NRBC COR # BLD: 7.94 10*3/MM3 (ref 4.8–10.8)

## 2017-01-24 PROCEDURE — 36415 COLL VENOUS BLD VENIPUNCTURE: CPT | Performed by: INTERNAL MEDICINE

## 2017-01-24 PROCEDURE — 85025 COMPLETE CBC W/AUTO DIFF WBC: CPT | Performed by: INTERNAL MEDICINE

## 2017-01-24 NOTE — PROGRESS NOTES
CBC reviewed with pt and son after reviewing with Dr. Green. CBC is improved this week with hgb of 10.0; wbc count of 7.94; and platelets of 215. Pt states that he feels better this week because he changed his PCP to Dr. Cedillo and was able to obtain right knee pain relief immediately following receipt of a knee injection. The pt explained that the right knee pain and swelling is improved. He states that he feels better overall as well. The pt is scheduled for a colonoscopy by Dr. Amado on Wed., 2/1/17. Per Dr. Green, the pt should return in 2 weeks for a repeat CBC and possible procrit. The pt did not receive procrit today due to the hgb improvement to the level of 10. However, the pt was reminded to call us if his status changes; that is, symptoms such as  worsening SOA and/or fatigue.

## 2017-01-31 ENCOUNTER — ANESTHESIA EVENT (OUTPATIENT)
Dept: PERIOP | Facility: HOSPITAL | Age: 82
End: 2017-01-31

## 2017-01-31 RX ORDER — SODIUM PHOSPHATE,MONO-DIBASIC 19G-7G/118
1 ENEMA (ML) RECTAL DAILY
COMMUNITY
End: 2017-01-01 | Stop reason: HOSPADM

## 2017-02-01 ENCOUNTER — PREP FOR SURGERY (OUTPATIENT)
Dept: GASTROENTEROLOGY | Facility: HOSPITAL | Age: 82
End: 2017-02-01

## 2017-02-01 ENCOUNTER — ON CAMPUS - OUTPATIENT (OUTPATIENT)
Dept: URBAN - METROPOLITAN AREA HOSPITAL 28 | Facility: HOSPITAL | Age: 82
End: 2017-02-01
Payer: MEDICARE

## 2017-02-01 ENCOUNTER — ANESTHESIA (OUTPATIENT)
Dept: PERIOP | Facility: HOSPITAL | Age: 82
End: 2017-02-01

## 2017-02-01 ENCOUNTER — HOSPITAL ENCOUNTER (OUTPATIENT)
Facility: HOSPITAL | Age: 82
Setting detail: HOSPITAL OUTPATIENT SURGERY
Discharge: HOME | End: 2017-02-01
Attending: INTERNAL MEDICINE | Admitting: INTERNAL MEDICINE

## 2017-02-01 VITALS
BODY MASS INDEX: 29.25 KG/M2 | DIASTOLIC BLOOD PRESSURE: 64 MMHG | SYSTOLIC BLOOD PRESSURE: 125 MMHG | HEART RATE: 78 BPM | WEIGHT: 154.8 LBS | TEMPERATURE: 98 F | OXYGEN SATURATION: 96 % | RESPIRATION RATE: 15 BRPM

## 2017-02-01 DIAGNOSIS — K57.30 DIVERTICULOSIS OF LARGE INTESTINE WITHOUT PERFORATION OR ABS: ICD-10-CM

## 2017-02-01 DIAGNOSIS — D12.5 BENIGN NEOPLASM OF SIGMOID COLON: ICD-10-CM

## 2017-02-01 DIAGNOSIS — Z86.010 HISTORY OF COLON POLYPS: ICD-10-CM

## 2017-02-01 DIAGNOSIS — D12.2 BENIGN NEOPLASM OF ASCENDING COLON: ICD-10-CM

## 2017-02-01 DIAGNOSIS — Z86.010 PERSONAL HISTORY OF COLONIC POLYPS: ICD-10-CM

## 2017-02-01 DIAGNOSIS — D12.3 BENIGN NEOPLASM OF TRANSVERSE COLON: ICD-10-CM

## 2017-02-01 LAB — POTASSIUM BLD-SCNC: 3.7 MMOL/L (ref 3.5–5.2)

## 2017-02-01 PROCEDURE — 84132 ASSAY OF SERUM POTASSIUM: CPT | Performed by: INTERNAL MEDICINE

## 2017-02-01 PROCEDURE — 45380 COLONOSCOPY AND BIOPSY: CPT | Mod: 59,PT

## 2017-02-01 PROCEDURE — 45380 COLONOSCOPY AND BIOPSY: CPT | Mod: PT,59

## 2017-02-01 PROCEDURE — 25010000002 PROPOFOL 10 MG/ML EMULSION: Performed by: ANESTHESIOLOGY

## 2017-02-01 PROCEDURE — 45385 COLONOSCOPY W/LESION REMOVAL: CPT | Mod: PT

## 2017-02-01 RX ORDER — SODIUM CHLORIDE 0.9 % (FLUSH) 0.9 %
1-10 SYRINGE (ML) INJECTION AS NEEDED
Status: DISCONTINUED | OUTPATIENT
Start: 2017-02-01 | End: 2017-02-01 | Stop reason: HOSPADM

## 2017-02-01 RX ORDER — SODIUM CHLORIDE 0.9 % (FLUSH) 0.9 %
1-10 SYRINGE (ML) INJECTION AS NEEDED
Status: CANCELLED | OUTPATIENT
Start: 2017-02-01

## 2017-02-01 RX ORDER — SODIUM CHLORIDE, SODIUM LACTATE, POTASSIUM CHLORIDE, CALCIUM CHLORIDE 600; 310; 30; 20 MG/100ML; MG/100ML; MG/100ML; MG/100ML
9 INJECTION, SOLUTION INTRAVENOUS CONTINUOUS
Status: DISCONTINUED | OUTPATIENT
Start: 2017-02-01 | End: 2017-02-01 | Stop reason: HOSPADM

## 2017-02-01 RX ORDER — LIDOCAINE HYDROCHLORIDE 10 MG/ML
0.3 INJECTION, SOLUTION EPIDURAL; INFILTRATION; INTRACAUDAL; PERINEURAL ONCE
Status: DISCONTINUED | OUTPATIENT
Start: 2017-02-01 | End: 2017-02-01 | Stop reason: HOSPADM

## 2017-02-01 RX ORDER — PROPOFOL 10 MG/ML
VIAL (ML) INTRAVENOUS AS NEEDED
Status: DISCONTINUED | OUTPATIENT
Start: 2017-02-01 | End: 2017-02-01 | Stop reason: SURG

## 2017-02-01 RX ADMIN — SODIUM CHLORIDE, POTASSIUM CHLORIDE, SODIUM LACTATE AND CALCIUM CHLORIDE: 600; 310; 30; 20 INJECTION, SOLUTION INTRAVENOUS at 10:38

## 2017-02-01 RX ADMIN — PROPOFOL 500 MG: 10 INJECTION, EMULSION INTRAVENOUS at 10:43

## 2017-02-01 NOTE — OP NOTE
DATE OF OPERATION:  02/01/2017    PROCEDURE PERFORMED: Difficult colonoscopy with polypectomy.     SURGEON: Bridger Amado MD     INDICATIONS FOR PROCEDURE: Screening for colorectal cancer in a patient with previous colon polyps, at least 5 years after his last exam.     MEDICATIONS: Monitored anesthesia care. Please see anesthesia record.     DESCRIPTION OF PROCEDURE: The risks and benefits of the procedure were explained to the patient. Informed consent was obtained.  He was placed in the left lateral decubitus position and given his IV sedation. Rectal exam revealed no external lesions, normal anal tone, firm mildly enlarged prostate without nodule. The scope was introduced into the rectum, advanced under direct visualization into the 22 to 25 cm.  The colon was tortuous.  Due to looping the scope could not advance further tests reductions and advancements rotational techniques were attempted. Using abdominal splinting, the over the symphysis, the scope could be advanced into the sigmoid colon and eventually, reduced and advanced more easily then through the sigmoid colon, past multiple diverticular openings, through the descending colon, to around the splenic flexure, reduced and advanced more easily through the transverse colon, however the scope was reduced around the hepatic flexure, past a broad-based greater than 1 cm polyp, abdominal splinting was again used to advance the through the ascending colon into the cecum. The cecum was identified by the lack of an appendiceal orifice and ileocecal valve was identified, but not intubated. The preparation of the colon overall was fair to poor. Most of the residual bowel contents could be aspirated, but there was a significant amount of particulate matter that would not suction through the scope.  As the scope was withdrawn through the ascending colon was a single ball-shaped polyp that was removed by cold snare technique, but there is a large, broad-based  2 cm by 1 to 1.5 cm polyp that was removed in piecemeal fashion.  Complete polypectomy was felt to be achieved with excellent hemostasis. These were sent as ascending colon polyps x2. The scope was withdrawn the hepatic flexure where 3 sessile, less than 6 mm, polyps were identified and removed by cold snare technique and biopsy and sent together as hepatic flexure polyps x3. The scope was then withdrawn to the transverse colon where the larger, broad-based polyp was identified, snared. It was too big to suction through the scope, it had to be cut before it could be suctioned through the scope.  There were 2 other flat sessile, less than 1 cm, polyps were noted in the transverse colon. They were sent together as transverse colon polyps x3. The scope was then withdrawn through the descending colon and sigmoid colon. There was one diminutive inflammatory-looking sigmoid polyp that was identified, biopsied, and felt to be completely removed. It was sent separately as sigmoid colon polyp. The scope was withdrawn in the rectum and retroflexed revealing intact dentate line, and no mucosal lesion. The scope was de-retroflexed and withdrawn. The patient tolerated the lengthy procedure well.     IMPRESSION:   1.  Difficult colonoscopy to cecum with fair prep.   2.  Pan diverticulosis.   3.  Polyps, numbering 9, removed, 2 from the ascending colon, 3 from the hepatic flexure, 3 from the transverse and 1 from the sigmoid.     RECOMMENDATIONS: I will have the patient avoid aspirin and nonsteroidal anti-inflammatory drugs for 2 weeks to decrease his risk of postpolypectomy bleeding.  I will contact him with the results of the polyps as soon as they are available. I will most likely recommend a repeat exam in 3 years.     LIYA Marie  D:  02/01/2017 11:40:15   T:  02/01/2017 12:58:56   Job ID:  [id number]   Document ID:  [id number]  cc:  David Cedillo M.D.

## 2017-02-01 NOTE — PLAN OF CARE
Problem: Patient Care Overview (Adult)  Goal: Plan of Care Review  Outcome: Outcome(s) achieved Date Met:  02/01/17 02/01/17 1144   Coping/Psychosocial Response Interventions   Plan Of Care Reviewed With patient   Patient Care Overview   Progress improving   Outcome Evaluation   Outcome Summary/Follow up Plan vss, waiting for procedure

## 2017-02-01 NOTE — PLAN OF CARE
Problem: Patient Care Overview (Adult)  Goal: Adult Individualization and Mutuality  Outcome: Outcome(s) achieved Date Met:  02/01/17 02/01/17 0952   Individualization   Patient Specific Preferences none

## 2017-02-01 NOTE — PLAN OF CARE
Problem: GI Endoscopy (Adult)  Goal: Signs and Symptoms of Listed Potential Problems Will be Absent or Manageable (GI Endoscopy)  Outcome: Ongoing (interventions implemented as appropriate)    02/01/17 1045   GI Endoscopy   Problems Assessed (GI Endoscopy) all   Problems Present (GI Endoscopy) none

## 2017-02-01 NOTE — ANESTHESIA PREPROCEDURE EVALUATION
Anesthesia Evaluation     Patient summary reviewed and Nursing notes reviewed    No history of anesthetic complications   Airway   Mallampati: II  TM distance: >3 FB  Neck ROM: full  no difficulty expected  Dental    (+) edentulous    Pulmonary - negative pulmonary ROS    breath sounds clear to auscultation    ROS comment: Study Result     INDICATION: Fever x1 day.      COMPARISON: 04/22/2014      FINDINGS: PA and lateral views of the chest. The heart and mediastinal  contours are normal. Minimal interstitial thickening is noted in both  lung bases. This is fairly similar to the prior radiograph and probably  represents chronic interstitial change or atelectasis. No pneumothorax  or pleural effusions.      IMPRESSION:  1. No acute cardiopulmonary findings.  2. Increased interstitial changes in both lung bases are fairly similar  to the prior study and probably represent chronic interstitial change  versus mild atelectasis.      This report was finalized on 9/2/2016 7:48 AM by Dr. Rafiq Blue MD.          Cardiovascular   Exercise tolerance: good (4-7 METS)  (+) hypertension well controlled, valvular problems/murmurs, dysrhythmias (RBBB),     ECG reviewed  Rhythm: regular  Rate: normal  ROS comment: Aortic regurgitation    Chest pain     RBBB (right bundle branch block)    Diastolic dysfunction     Cal Oropeza MD     5/23/2016 10:57 AM  ECG 12 lead    Date/Time: 5/23/2016 10:44 AM  Performed by: CAL OROPEZA  Authorized by: CAL OROPEZA   Comparison: compared with previous ECG   Similar to previous ECG  Rhythm: sinus rhythm  Ectopy: infrequent PVCs  Conduction: right bundle branch block  Clinical impression: abnormal ECG    Interpretation Summary     · Left ventricular function is normal. Estimated EF = 50%.  · Left ventricular wall thickness is consistent with moderate concentric hypertrophy.  · All left ventricular wall segments contract normally.  · Left ventricular diastolic  dysfunction (grade II) consistent with pseudonormalization.  · Moderate mitral valve regurgitation is present  · Moderate aortic valve regurgitation is present.  · Mild to moderate tricuspid valve regurgitation is present.  · Mild pulmonic valve regurgitation is present.  · Left atrial cavity size is mildly dilated.          Neuro/Psych  (+) dizziness/light headedness, weakness (WALKER AND WHEELCHAIR),    GI/Hepatic/Renal/Endo      ROS Comment: SELF CATHS  DIVERTICULITIS     Musculoskeletal     (+) back pain (DISC REMOVED/FUISON ), joint swelling (KNEES),       ROS comment: RIGHT SHOULDER RCT  PT STATES HE NEEDS STEFFANIE KNEES SURGERY  Abdominal    Substance History   Alcohol use: QUIT 3 MONTHS AGO.     OB/GYN          Other   (+) blood dyscrasia ( MDS (myelodysplastic syndrome)), arthritis      Other Comment: Skin cancer                      Anesthesia Plan    ASA 2     MAC     intravenous induction   Anesthetic plan and risks discussed with patient.  Use of blood products discussed with patient  Consented to blood products.

## 2017-02-01 NOTE — BRIEF OP NOTE
COLONOSCOPY  Procedure Note    Jason Zelaya  2/1/2017    Pre-op Diagnosis:   Z86.010    Post-op Diagnosis:     Post-Op Diagnosis Codes:     * Colonic diverticulum [K57.30]     * Colon polyps [K63.5]    Procedure/CPT® Codes:      Procedure(s):  COLONOSCOPY    Surgeon(s):  Bridger Amado MD    Anesthesia: Monitor Anesthesia Care    Staff:   Circulator: Anupam Henry RN  Scrub Person: Naila Johnson    Estimated Blood Loss: * No values recorded between 2/1/2017 10:37 AM and 2/1/2017 11:34 AM *    Specimens:                  ID Type Source Tests Collected by Time Destination   A : traps 1&2 (x2) Polyp Large Intestine, Right / Ascending Colon TISSUE EXAM Bridger Amado MD 2/1/2017 1102    B : hepatic flexure polyp x3 Polyp Large Intestine, Right / Ascending Colon TISSUE EXAM Bridger Amado MD 2/1/2017 1115    C : trap 3 (x3) Polyp Large Intestine, Transverse Colon TISSUE EXAM Bridger Amado MD 2/1/2017 1119    D :  Polyp Large Intestine, Sigmoid Colon TISSUE EXAM Bridger Amado MD 2/1/2017 1126          Drains:           Findings: Difficult colon to cecum fair prep  Pandiverticulosis  Polyps(9)    Complications: none   2333      Bridger Amado MD     Date: 2/1/2017  Time: 11:35 AM

## 2017-02-01 NOTE — PLAN OF CARE
Problem: Patient Care Overview (Adult)  Goal: Adult Individualization and Mutuality  Outcome: Ongoing (interventions implemented as appropriate)    02/01/17 0952   Individualization   Patient Specific Preferences none

## 2017-02-01 NOTE — PLAN OF CARE
Problem: GI Endoscopy (Adult)  Goal: Signs and Symptoms of Listed Potential Problems Will be Absent or Manageable (GI Endoscopy)  Outcome: Outcome(s) achieved Date Met:  02/01/17

## 2017-02-01 NOTE — ANESTHESIA POSTPROCEDURE EVALUATION
Patient: Jason Zelaya    Procedure Summary     Date Anesthesia Start Anesthesia Stop Room / Location    02/01/17 1038 1133 [facility] /  [facility]       Procedure Diagnosis Surgeon Provider    COLONOSCOPY (N/A ) Colonic diverticulum; Colon polyps  (Z86.010) MD Skylar Mott MD          Anesthesia Type: MAC  Last vitals  BP      Temp      Pulse     Resp      SpO2        Post Anesthesia Care and Evaluation    Patient location during evaluation: bedside  Patient participation: complete - patient participated  Level of consciousness: awake and alert  Pain management: adequate  Airway patency: patent  Anesthetic complications: No anesthetic complications    Cardiovascular status: acceptable  Respiratory status: acceptable  Hydration status: acceptable

## 2017-02-01 NOTE — PLAN OF CARE
Problem: Patient Care Overview (Adult)  Goal: Plan of Care Review  Outcome: Outcome(s) achieved Date Met:  02/01/17 02/01/17 0952   Coping/Psychosocial Response Interventions   Plan Of Care Reviewed With patient   Patient Care Overview   Progress no change   Outcome Evaluation   Outcome Summary/Follow up Plan vss, waiting for procedure

## 2017-02-01 NOTE — PLAN OF CARE
Problem: GI Endoscopy (Adult)  Goal: Signs and Symptoms of Listed Potential Problems Will be Absent or Manageable (GI Endoscopy)  Outcome: Ongoing (interventions implemented as appropriate)    02/01/17 0948   GI Endoscopy   Problems Assessed (GI Endoscopy) all   Problems Present (GI Endoscopy) none

## 2017-02-07 ENCOUNTER — HOSPITAL ENCOUNTER (INPATIENT)
Facility: HOSPITAL | Age: 82
LOS: 8 days | Discharge: HOME | End: 2017-02-15
Attending: HOSPITALIST | Admitting: HOSPITALIST

## 2017-02-07 ENCOUNTER — INFUSION (OUTPATIENT)
Dept: ONCOLOGY | Facility: HOSPITAL | Age: 82
End: 2017-02-07

## 2017-02-07 ENCOUNTER — LAB (OUTPATIENT)
Dept: LAB | Facility: HOSPITAL | Age: 82
End: 2017-02-07

## 2017-02-07 ENCOUNTER — OFFICE VISIT (OUTPATIENT)
Dept: ONCOLOGY | Facility: CLINIC | Age: 82
End: 2017-02-07

## 2017-02-07 ENCOUNTER — HOSPITAL ENCOUNTER (OUTPATIENT)
Dept: CT IMAGING | Facility: HOSPITAL | Age: 82
Discharge: HOME | End: 2017-02-07
Attending: INTERNAL MEDICINE

## 2017-02-07 ENCOUNTER — CLINICAL SUPPORT (OUTPATIENT)
Dept: ONCOLOGY | Facility: HOSPITAL | Age: 82
End: 2017-02-07

## 2017-02-07 VITALS
DIASTOLIC BLOOD PRESSURE: 61 MMHG | OXYGEN SATURATION: 95 % | HEART RATE: 103 BPM | SYSTOLIC BLOOD PRESSURE: 127 MMHG | WEIGHT: 149.9 LBS | BODY MASS INDEX: 28.32 KG/M2 | TEMPERATURE: 100.8 F

## 2017-02-07 VITALS
TEMPERATURE: 100.8 F | DIASTOLIC BLOOD PRESSURE: 61 MMHG | WEIGHT: 149.9 LBS | BODY MASS INDEX: 28.32 KG/M2 | HEART RATE: 103 BPM | SYSTOLIC BLOOD PRESSURE: 127 MMHG | OXYGEN SATURATION: 95 %

## 2017-02-07 DIAGNOSIS — E53.8 VITAMIN B 12 DEFICIENCY: ICD-10-CM

## 2017-02-07 DIAGNOSIS — D46.20 MDS (MYELODYSPLASTIC SYNDROME), LOW GRADE (HCC): Primary | ICD-10-CM

## 2017-02-07 DIAGNOSIS — D46.20 MDS (MYELODYSPLASTIC SYNDROME), LOW GRADE (HCC): ICD-10-CM

## 2017-02-07 DIAGNOSIS — R10.84 GENERALIZED ABDOMINAL PAIN: Primary | ICD-10-CM

## 2017-02-07 PROBLEM — K57.92 ACUTE DIVERTICULITIS: Status: ACTIVE | Noted: 2017-02-07

## 2017-02-07 LAB
ALBUMIN SERPL-MCNC: 2.9 G/DL (ref 3.5–5.2)
ALBUMIN/GLOB SERPL: 0.7 G/DL
ALP SERPL-CCNC: 186 U/L (ref 40–129)
ALT SERPL W P-5'-P-CCNC: 46 U/L (ref 5–41)
ANION GAP SERPL CALCULATED.3IONS-SCNC: 13.7 MMOL/L
AST SERPL-CCNC: 21 U/L (ref 5–40)
BASOPHILS # BLD AUTO: 0.02 10*3/MM3 (ref 0–0.2)
BASOPHILS NFR BLD AUTO: 0.2 % (ref 0–2)
BILIRUB SERPL-MCNC: 0.7 MG/DL (ref 0.2–1.2)
BUN BLD-MCNC: 15 MG/DL (ref 8–23)
BUN/CREAT SERPL: 17.2 (ref 7–25)
CALCIUM SPEC-SCNC: 8.4 MG/DL (ref 8.8–10.5)
CHLORIDE SERPL-SCNC: 96 MMOL/L (ref 98–107)
CO2 SERPL-SCNC: 23.3 MMOL/L (ref 22–29)
CREAT BLD-MCNC: 0.87 MG/DL (ref 0.76–1.27)
DEPRECATED RDW RBC AUTO: 61.7 FL (ref 37–54)
EOSINOPHIL # BLD AUTO: 0.02 10*3/MM3 (ref 0.1–0.3)
EOSINOPHIL NFR BLD AUTO: 0.2 % (ref 0–4)
ERYTHROCYTE [DISTWIDTH] IN BLOOD BY AUTOMATED COUNT: 15.2 % (ref 11.5–14.5)
GFR SERPL CREATININE-BSD FRML MDRD: 84 ML/MIN/1.73
GLOBULIN UR ELPH-MCNC: 4.2 GM/DL
GLUCOSE BLD-MCNC: 92 MG/DL (ref 65–99)
HCT VFR BLD AUTO: 30.3 % (ref 42–52)
HGB BLD-MCNC: 9.7 G/DL (ref 14–18)
IMM GRANULOCYTES # BLD: 0.23 10*3/MM3 (ref 0–0.03)
IMM GRANULOCYTES NFR BLD: 2 % (ref 0–0.5)
LYMPHOCYTES # BLD AUTO: 1.09 10*3/MM3 (ref 0.6–4.8)
LYMPHOCYTES NFR BLD AUTO: 9.7 % (ref 20–45)
MCH RBC QN AUTO: 35.7 PG (ref 27–31)
MCHC RBC AUTO-ENTMCNC: 32 G/DL (ref 31–37)
MCV RBC AUTO: 111.4 FL (ref 80–94)
MONOCYTES # BLD AUTO: 4.32 10*3/MM3 (ref 0–1)
MONOCYTES NFR BLD AUTO: 38.5 % (ref 3–8)
NEUTROPHILS # BLD AUTO: 5.54 10*3/MM3 (ref 1.5–8.3)
NEUTROPHILS NFR BLD AUTO: 49.4 % (ref 45–70)
NRBC BLD MANUAL-RTO: 0.2 /100 WBC (ref 0–0)
PLATELET # BLD AUTO: 107 10*3/MM3 (ref 140–500)
PMV BLD AUTO: 12.2 FL (ref 7.4–10.4)
POTASSIUM BLD-SCNC: 3.7 MMOL/L (ref 3.5–5.2)
PROT SERPL-MCNC: 7.1 G/DL (ref 6–8.5)
RBC # BLD AUTO: 2.72 10*6/MM3 (ref 4.7–6.1)
SODIUM BLD-SCNC: 133 MMOL/L (ref 136–145)
WBC NRBC COR # BLD: 11.22 10*3/MM3 (ref 4.8–10.8)

## 2017-02-07 PROCEDURE — 25010000002 LEVOFLOXACIN PER 250 MG

## 2017-02-07 PROCEDURE — 99222 1ST HOSP IP/OBS MODERATE 55: CPT | Performed by: HOSPITALIST

## 2017-02-07 PROCEDURE — 25010000002 EPOETIN ALFA PER 1000 UNITS: Performed by: INTERNAL MEDICINE

## 2017-02-07 PROCEDURE — 85025 COMPLETE CBC W/AUTO DIFF WBC: CPT | Performed by: INTERNAL MEDICINE

## 2017-02-07 PROCEDURE — 74177 CT ABD & PELVIS W/CONTRAST: CPT

## 2017-02-07 PROCEDURE — 96372 THER/PROPH/DIAG INJ SC/IM: CPT

## 2017-02-07 PROCEDURE — 80053 COMPREHEN METABOLIC PANEL: CPT | Performed by: HOSPITALIST

## 2017-02-07 PROCEDURE — 0 IOPAMIDOL 61 % SOLUTION: Performed by: INTERNAL MEDICINE

## 2017-02-07 PROCEDURE — 36415 COLL VENOUS BLD VENIPUNCTURE: CPT | Performed by: INTERNAL MEDICINE

## 2017-02-07 PROCEDURE — 99214 OFFICE O/P EST MOD 30 MIN: CPT | Performed by: INTERNAL MEDICINE

## 2017-02-07 PROCEDURE — 25010000002 CYANOCOBALAMIN PER 1000 MCG: Performed by: INTERNAL MEDICINE

## 2017-02-07 RX ORDER — MULTIPLE VITAMINS W/ MINERALS TAB 9MG-400MCG
1 TAB ORAL DAILY
Status: DISCONTINUED | OUTPATIENT
Start: 2017-02-08 | End: 2017-02-15 | Stop reason: HOSPADM

## 2017-02-07 RX ORDER — CYANOCOBALAMIN 1000 UG/ML
1000 INJECTION, SOLUTION INTRAMUSCULAR; SUBCUTANEOUS ONCE
Status: CANCELLED | OUTPATIENT
Start: 2017-03-07

## 2017-02-07 RX ORDER — CLOTRIMAZOLE AND BETAMETHASONE DIPROPIONATE 10; .64 MG/G; MG/G
1 CREAM TOPICAL AS NEEDED
COMMUNITY
Start: 2017-01-04 | End: 2018-01-01 | Stop reason: HOSPADM

## 2017-02-07 RX ORDER — LEVOFLOXACIN 5 MG/ML
INJECTION, SOLUTION INTRAVENOUS
Status: COMPLETED
Start: 2017-02-07 | End: 2017-02-07

## 2017-02-07 RX ORDER — SODIUM CHLORIDE 0.9 % (FLUSH) 0.9 %
1-10 SYRINGE (ML) INJECTION AS NEEDED
Status: DISCONTINUED | OUTPATIENT
Start: 2017-02-07 | End: 2017-02-15

## 2017-02-07 RX ORDER — CLOTRIMAZOLE AND BETAMETHASONE DIPROPIONATE 10; .64 MG/G; MG/G
1 CREAM TOPICAL DAILY
Status: DISCONTINUED | OUTPATIENT
Start: 2017-02-08 | End: 2017-02-15 | Stop reason: HOSPADM

## 2017-02-07 RX ORDER — LANOLIN ALCOHOL/MO/W.PET/CERES
400 CREAM (GRAM) TOPICAL DAILY
Status: DISCONTINUED | OUTPATIENT
Start: 2017-02-08 | End: 2017-02-15 | Stop reason: HOSPADM

## 2017-02-07 RX ORDER — SODIUM CHLORIDE, SODIUM LACTATE, POTASSIUM CHLORIDE, CALCIUM CHLORIDE 600; 310; 30; 20 MG/100ML; MG/100ML; MG/100ML; MG/100ML
75 INJECTION, SOLUTION INTRAVENOUS CONTINUOUS
Status: DISCONTINUED | OUTPATIENT
Start: 2017-02-07 | End: 2017-02-14

## 2017-02-07 RX ORDER — SODIUM CHLORIDE, SODIUM LACTATE, POTASSIUM CHLORIDE, CALCIUM CHLORIDE 600; 310; 30; 20 MG/100ML; MG/100ML; MG/100ML; MG/100ML
INJECTION, SOLUTION INTRAVENOUS
Status: COMPLETED
Start: 2017-02-07 | End: 2017-02-07

## 2017-02-07 RX ORDER — IRBESARTAN 150 MG/1
300 TABLET ORAL
Status: DISCONTINUED | OUTPATIENT
Start: 2017-02-08 | End: 2017-02-15 | Stop reason: HOSPADM

## 2017-02-07 RX ORDER — LEVOFLOXACIN 5 MG/ML
750 INJECTION, SOLUTION INTRAVENOUS EVERY 24 HOURS
Status: DISCONTINUED | OUTPATIENT
Start: 2017-02-07 | End: 2017-02-07

## 2017-02-07 RX ORDER — ACETAMINOPHEN 325 MG/1
650 TABLET ORAL EVERY 6 HOURS PRN
Status: DISCONTINUED | OUTPATIENT
Start: 2017-02-07 | End: 2017-02-15 | Stop reason: HOSPADM

## 2017-02-07 RX ORDER — HYDROCODONE BITARTRATE AND ACETAMINOPHEN 5; 325 MG/1; MG/1
1 TABLET ORAL EVERY 6 HOURS PRN
Status: DISCONTINUED | OUTPATIENT
Start: 2017-02-07 | End: 2017-02-15 | Stop reason: HOSPADM

## 2017-02-07 RX ORDER — CYANOCOBALAMIN 1000 UG/ML
1000 INJECTION, SOLUTION INTRAMUSCULAR; SUBCUTANEOUS ONCE
Status: COMPLETED | OUTPATIENT
Start: 2017-02-07 | End: 2017-02-07

## 2017-02-07 RX ADMIN — CYANOCOBALAMIN 1000 MCG: 1000 INJECTION INTRAMUSCULAR; SUBCUTANEOUS at 12:10

## 2017-02-07 RX ADMIN — HYDROCODONE BITARTRATE AND ACETAMINOPHEN 1 TABLET: 5; 325 TABLET ORAL at 22:25

## 2017-02-07 RX ADMIN — LEVOFLOXACIN 750 MG: 5 INJECTION, SOLUTION INTRAVENOUS at 21:42

## 2017-02-07 RX ADMIN — IOPAMIDOL 100 ML: 612 INJECTION, SOLUTION INTRAVENOUS at 17:15

## 2017-02-07 RX ADMIN — SODIUM CHLORIDE, POTASSIUM CHLORIDE, SODIUM LACTATE AND CALCIUM CHLORIDE 75 ML: 600; 310; 30; 20 INJECTION, SOLUTION INTRAVENOUS at 21:41

## 2017-02-07 RX ADMIN — ERYTHROPOIETIN 40000 UNITS: 40000 INJECTION, SOLUTION INTRAVENOUS; SUBCUTANEOUS at 12:08

## 2017-02-07 RX ADMIN — METRONIDAZOLE 500 MG: 500 INJECTION, SOLUTION INTRAVENOUS at 23:21

## 2017-02-07 NOTE — PROGRESS NOTES
Pt was brought back to the infusion/treatment area via wheelchair by his wife. Pt c/o low abdominal and rectal pain since the colonoscopy on 2/1/17. He has not contacted gastroenterologist, Dr. Amado regarding this. Pt mentioned that he has had night sweats for 3-4 nights; sweats are so great that he is changing his t-shirt 3 to 4 times a night. Temperature has been as high as 102 degrees fahrenheit but is improved with use of Tylenol. Pt states that he continues to eat, but his appetite is less than usual because he feels bloated at times. Pt denies rectal bleeding, diarrhea, or constipation. Dr. Green evaluated the pt in the treatment area and recommended a ct scan, which was scheduled for later today.

## 2017-02-07 NOTE — PROGRESS NOTES
Subjective    REASONS FOR FOLLOW-UP:   Low-grade myelodysplastic syndrome with chronic pancytopenia.   The patient is seen today for an acute care visit for evaluation of abdominal pain and fever    History of Present Illness    Mr. Zelaya is a pleasant 82-year-old man returning for follow-up of his myelodysplastic syndrome with chronic pancytopenia.  He currently is receiving monthly B12 injections and Procrit as needed when the hemoglobin drops below 10.0.  He came in today for CBC and Procrit but I was asked to see the patient by our nurse as the patient is not feeling well.  He had a colonoscopy performed on 2/1/17 and I reviewed the report.  It was a difficult exam and benign polyps were resected.  Since the colonoscopy, the patient has had generalized abdominal pain but no associated nausea or vomiting.  He has not had diarrhea or noted blood in the stool.  He has been running a fever at home up to 100.  He has been feeling generally weak and poor appetite.  He denies dysuria but straight catheters.  The urine has not been foul smelling.  He denies shortness of breath or cough.  He has been diaphoretic.    PAST MEDICAL HISTORY:    1. Arthritis.  2. Urinary retention.  3. Aortic insufficiency  4. Myelodysplastic syndrome    PAST SURGICAL HISTORY:    1. Back fusion in 2008.    2. Hernia surgery.    3. Cholecystectomy.  4. Knee arthroscopic surgery.      SOCIAL HISTORY:  .  Retired, worked in sheet metals.  Never smoked.  Alcohol consumption of approximately a 6 pack per week.  No risk factors for HIV.    FAMILY HISTORY: Negative for hematologic disorders. Sister had brain tumor. A brother had liver cancer.    Review of Systems   Constitutional: Positive for fatigue. Negative for activity change, fever and unexpected weight change.   Respiratory: Negative for cough and shortness of breath.    Cardiovascular: Negative for palpitations and leg swelling.   Gastrointestinal: Positive for abdominal pain and  rectal pain. Negative for abdominal distention, diarrhea, nausea and vomiting.   Endocrine: Positive for cold intolerance. Negative for heat intolerance.   Musculoskeletal:        Right shoulder pain   Hematological: Negative for adenopathy. Does not bruise/bleed easily.      A comprehensive 14 point review of systems was performed and was negative except as mentioned.    Medications:  The current medication list was reviewed in the EMR    ALLERGIES:  No Known Allergies    Objective    Temp 100.8  /61  Sat 95% RA        Physical Exam   Constitutional: No distress.   Looks somewhat ill but not necessarily toxic; generally weak   HENT:   Head: Atraumatic.   Eyes: No scleral icterus.   Cardiovascular: Normal rate.    Murmur heard.  Pulmonary/Chest: Effort normal and breath sounds normal. No respiratory distress.   Abdominal: Soft. He exhibits no distension and no mass. There is tenderness. There is no rebound and no guarding.   Musculoskeletal:   Decreased ROM right shoulder   Skin: Skin is warm. No erythema. There is pallor.   Psychiatric: He has a normal mood and affect.          RECENT LABS:  Hematology WBC   Date Value Ref Range Status   02/07/2017 11.22 (H) 4.80 - 10.80 10*3/mm3 Final   03/01/2016 6.15 4.80 - 10.80 K/Cumm Final     RBC   Date Value Ref Range Status   02/07/2017 2.72 (L) 4.70 - 6.10 10*6/mm3 Final   03/01/2016 3.19 (L) 4.70 - 6.10 Million Final     HEMOGLOBIN   Date Value Ref Range Status   02/07/2017 9.7 (L) 14.0 - 18.0 g/dL Final   03/01/2016 11.7 (L) 14.0 - 18.0 g/dL Final     HEMATOCRIT   Date Value Ref Range Status   02/07/2017 30.3 (L) 42.0 - 52.0 % Final   03/01/2016 34.8 (L) 42.0 - 52.0 % Final     PLATELETS   Date Value Ref Range Status   02/07/2017 107 (L) 140 - 500 10*3/mm3 Final   03/01/2016 108 (L) 140 - 500 K/Cumm Final       Lab Results   Component Value Date    GLUCOSE 131 (H) 01/04/2017    BUN 19 01/04/2017    CREATININE 1.08 01/04/2017    EGFRIFNONA 65 01/04/2017     EGFRIFAFRI  09/01/2016      Comment:      <15 Indicative of kidney failure.    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    His CBC was reviewed today with mild leukocytosis of 11.2, hemoglobin 9.7 and platelets 107.  His neutrophil count is 5.5 with elevated monocytes.  Procrit was given today.    The patient looks ill today with loss of appetite, weight loss, abdominal pain, and low-grade fever.  He had a difficult colonoscopy on 2/1/17 with multiple polyps resected.  His abdomen is mildly tender on exam diffusely but no rebound or guarding.  He is not having diarrhea or blood in the stool.  I have requested a stat CT of the abdomen and pelvis to rule out perforation after the colonoscopy, evaluate for diverticulitis, or any other acute abnormalities.  It is noted he had CT evidence of acute diverticulitis by CT 1/4/17.    ADDENDUM:  Dr. Dior called CT report which shows a long segment of severe sigmoid diverticulitis (mid and upper) with intramural and pericolonic abscesses and extensive surrounding soft tissue standing and bladder inflammation.  He took a course of fluoroquinolone and flagyl one month ago and has clearly failed oral antibiotics.  I have d/w hospitalist Dr. Hollingsworth who agrees to admit the patient for IV antibiotics and management.  Likely needs GI and/ or surgical input as well pending response to antibiotics.  I called and discussed with patient and wife who will come back to registration for admission.  We are available for consult if he has worsening anemia or neutropenia (if ANC less 1000 consider growth factors) but counts are currently stable.              2/7/2017      CC:

## 2017-02-08 ENCOUNTER — INPATIENT HOSPITAL (OUTPATIENT)
Dept: URBAN - METROPOLITAN AREA HOSPITAL 27 | Facility: HOSPITAL | Age: 82
End: 2017-02-08
Payer: MEDICARE

## 2017-02-08 DIAGNOSIS — K57.32 DIVERTICULITIS OF LARGE INTESTINE WITHOUT PERFORATION OR ABS: ICD-10-CM

## 2017-02-08 LAB
ABO GROUP BLD: NORMAL
ABO GROUP BLD: NORMAL
ANION GAP SERPL CALCULATED.3IONS-SCNC: 13.9 MMOL/L
ANISOCYTOSIS BLD QL: ABNORMAL
BLD GP AB SCN SERPL QL: NEGATIVE
BUN BLD-MCNC: 14 MG/DL (ref 8–23)
BUN/CREAT SERPL: 15.2 (ref 7–25)
CALCIUM SPEC-SCNC: 8.6 MG/DL (ref 8.8–10.5)
CHLORIDE SERPL-SCNC: 100 MMOL/L (ref 98–107)
CO2 SERPL-SCNC: 25.1 MMOL/L (ref 22–29)
CREAT BLD-MCNC: 0.92 MG/DL (ref 0.76–1.27)
DEPRECATED RDW RBC AUTO: 65.5 FL (ref 37–54)
ERYTHROCYTE [DISTWIDTH] IN BLOOD BY AUTOMATED COUNT: 15.1 % (ref 11.5–14.5)
GFR SERPL CREATININE-BSD FRML MDRD: 79 ML/MIN/1.73
GLUCOSE BLD-MCNC: 88 MG/DL (ref 65–99)
HCT VFR BLD AUTO: 25 % (ref 42–52)
HCT VFR BLD AUTO: 25.4 % (ref 42–52)
HGB BLD-MCNC: 7.6 G/DL (ref 14–18)
HGB BLD-MCNC: 7.7 G/DL (ref 14–18)
LYMPHOCYTES # BLD MANUAL: 1.15 10*3/MM3 (ref 0.6–4.8)
LYMPHOCYTES NFR BLD MANUAL: 13 % (ref 20–45)
LYMPHOCYTES NFR BLD MANUAL: 26 % (ref 3–8)
MACROCYTES BLD QL SMEAR: ABNORMAL
MCH RBC QN AUTO: 35.2 PG (ref 27–31)
MCHC RBC AUTO-ENTMCNC: 29.9 G/DL (ref 31–37)
MCV RBC AUTO: 117.6 FL (ref 80–94)
MONOCYTES # BLD AUTO: 2.3 10*3/MM3 (ref 0–1)
MYELOCYTES NFR BLD MANUAL: 2 % (ref 0–0)
NEUTROPHILS # BLD AUTO: 4.61 10*3/MM3 (ref 1.5–8.3)
NEUTROPHILS NFR BLD MANUAL: 52 % (ref 45–70)
OTHER CELLS %: 2 % (ref 0–0)
PLATELET # BLD AUTO: 97 10*3/MM3 (ref 140–500)
PMV BLD AUTO: 12 FL (ref 7.4–10.4)
POTASSIUM BLD-SCNC: 3.9 MMOL/L (ref 3.5–5.2)
RBC # BLD AUTO: 2.16 10*6/MM3 (ref 4.7–6.1)
RH BLD: POSITIVE
RH BLD: POSITIVE
SCAN SLIDE: NORMAL
SMALL PLATELETS BLD QL SMEAR: ABNORMAL
SODIUM BLD-SCNC: 139 MMOL/L (ref 136–145)
VARIANT LYMPHS NFR BLD MANUAL: 5 % (ref 0–5)
WBC MORPH BLD: NORMAL
WBC NRBC COR # BLD: 8.86 10*3/MM3 (ref 4.8–10.8)

## 2017-02-08 PROCEDURE — 85007 BL SMEAR W/DIFF WBC COUNT: CPT | Performed by: HOSPITALIST

## 2017-02-08 PROCEDURE — 86920 COMPATIBILITY TEST SPIN: CPT

## 2017-02-08 PROCEDURE — 36430 TRANSFUSION BLD/BLD COMPNT: CPT

## 2017-02-08 PROCEDURE — 25010000002 PIPERACILLIN SOD-TAZOBACTAM PER 1 G: Performed by: SURGERY

## 2017-02-08 PROCEDURE — 25010000002 PIPERACILLIN-TAZOBACTAM: Performed by: SURGERY

## 2017-02-08 PROCEDURE — 86850 RBC ANTIBODY SCREEN: CPT

## 2017-02-08 PROCEDURE — 86901 BLOOD TYPING SEROLOGIC RH(D): CPT

## 2017-02-08 PROCEDURE — 25010000002 PIPERACILLIN SOD-TAZOBACTAM PER 1 G

## 2017-02-08 PROCEDURE — 85025 COMPLETE CBC W/AUTO DIFF WBC: CPT | Performed by: HOSPITALIST

## 2017-02-08 PROCEDURE — 99231 SBSQ HOSP IP/OBS SF/LOW 25: CPT | Performed by: HOSPITALIST

## 2017-02-08 PROCEDURE — 99222 1ST HOSP IP/OBS MODERATE 55: CPT

## 2017-02-08 PROCEDURE — 86900 BLOOD TYPING SEROLOGIC ABO: CPT

## 2017-02-08 PROCEDURE — 99221 1ST HOSP IP/OBS SF/LOW 40: CPT | Performed by: SURGERY

## 2017-02-08 PROCEDURE — 85014 HEMATOCRIT: CPT | Performed by: INTERNAL MEDICINE

## 2017-02-08 PROCEDURE — 85018 HEMOGLOBIN: CPT | Performed by: INTERNAL MEDICINE

## 2017-02-08 PROCEDURE — P9016 RBC LEUKOCYTES REDUCED: HCPCS

## 2017-02-08 PROCEDURE — 80048 BASIC METABOLIC PNL TOTAL CA: CPT | Performed by: HOSPITALIST

## 2017-02-08 RX ORDER — SODIUM CHLORIDE 9 MG/ML
INJECTION, SOLUTION INTRAVENOUS
Status: COMPLETED
Start: 2017-02-08 | End: 2017-02-08

## 2017-02-08 RX ORDER — PIPERACILLIN SODIUM, TAZOBACTAM SODIUM 4; .5 G/20ML; G/20ML
INJECTION, POWDER, LYOPHILIZED, FOR SOLUTION INTRAVENOUS
Status: COMPLETED
Start: 2017-02-08 | End: 2017-02-08

## 2017-02-08 RX ADMIN — ACETAMINOPHEN 400 MCG: 400 TABLET ORAL at 09:07

## 2017-02-08 RX ADMIN — METRONIDAZOLE 500 MG: 500 INJECTION, SOLUTION INTRAVENOUS at 06:32

## 2017-02-08 RX ADMIN — SODIUM CHLORIDE 4.5 G: 900 INJECTION, SOLUTION INTRAVENOUS at 16:24

## 2017-02-08 RX ADMIN — SODIUM CHLORIDE 100 ML: 9 INJECTION, SOLUTION INTRAVENOUS at 00:35

## 2017-02-08 RX ADMIN — SODIUM CHLORIDE 4.5 G: 900 INJECTION, SOLUTION INTRAVENOUS at 00:36

## 2017-02-08 RX ADMIN — CLOTRIMAZOLE AND BETAMETHASONE DIPROPIONATE 1 APPLICATION: 10; .5 CREAM TOPICAL at 09:07

## 2017-02-08 RX ADMIN — MULTIPLE VITAMINS W/ MINERALS TAB 1 TABLET: TAB at 09:07

## 2017-02-08 RX ADMIN — METRONIDAZOLE 500 MG: 500 INJECTION, SOLUTION INTRAVENOUS at 22:20

## 2017-02-08 RX ADMIN — SODIUM CHLORIDE: 0.9 INJECTION, SOLUTION INTRAVENOUS at 20:01

## 2017-02-08 RX ADMIN — HYDROCODONE BITARTRATE AND ACETAMINOPHEN 1 TABLET: 5; 325 TABLET ORAL at 16:34

## 2017-02-08 RX ADMIN — SODIUM CHLORIDE: 0.9 INJECTION, SOLUTION INTRAVENOUS at 14:18

## 2017-02-08 RX ADMIN — HYDROCODONE BITARTRATE AND ACETAMINOPHEN 1 TABLET: 5; 325 TABLET ORAL at 10:22

## 2017-02-08 RX ADMIN — SODIUM CHLORIDE 4.5 G: 900 INJECTION, SOLUTION INTRAVENOUS at 09:00

## 2017-02-08 RX ADMIN — HYDROCODONE BITARTRATE AND ACETAMINOPHEN 1 TABLET: 5; 325 TABLET ORAL at 04:27

## 2017-02-08 RX ADMIN — IRBESARTAN 300 MG: 150 TABLET ORAL at 09:07

## 2017-02-08 RX ADMIN — PIPERACILLIN AND TAZOBACTAM 4.5 G: 4; .5 INJECTION, POWDER, LYOPHILIZED, FOR SOLUTION INTRAVENOUS; PARENTERAL at 00:35

## 2017-02-08 RX ADMIN — METRONIDAZOLE 500 MG: 500 INJECTION, SOLUTION INTRAVENOUS at 13:58

## 2017-02-08 NOTE — PLAN OF CARE
Problem: Patient Care Overview (Adult)  Goal: Plan of Care Review  Outcome: Ongoing (interventions implemented as appropriate)    02/08/17 0329   Patient Care Overview   Progress no change   Outcome Evaluation   Outcome Summary/Follow up Plan direct admit          Problem: Pain, Acute (Adult)  Goal: Identify Related Risk Factors and Signs and Symptoms  Outcome: Ongoing (interventions implemented as appropriate)  Goal: Acceptable Pain Control/Comfort Level  Outcome: Ongoing (interventions implemented as appropriate)

## 2017-02-08 NOTE — CONSULTS
Malnutrition Severity Assessment    Patient Name:  Jason Zelaya  YOB: 1934  MRN: [id number]  Admit Date:  2/7/2017    Patient meets criteria for : Severe malnutrition    Comments:    Pt appears to meet criteria for malnutrition due to 10# wt loss over 1 month and poor po intake x 1 month.      Malnutrition Type: Acute Illness/Injury Malnutrition     Malnutrition Type (last 8 hours)      Malnutrition Severity Assessment       02/08/17 1026    Malnutrition Severity Assessment    Malnutrition Type Acute Illness/Injury Malnutrition          Weight Status         Most Recent Value    Weight Loss  Severe (>5% / 1 mo)      Energy Intake Status         Most Recent Value    Energy Intake  Severe (< or equal to 50% / > or equal to 5d)              Electronically signed by:  Roseanne Clayton RD  02/08/17 10:33 AM

## 2017-02-08 NOTE — PLAN OF CARE
Problem: Patient Care Overview (Adult)  Goal: Discharge Needs Assessment  Outcome: Ongoing (interventions implemented as appropriate)    02/08/17 1316   Discharge Needs Assessment   Concerns To Be Addressed denies needs/concerns at this time   Readmission Within The Last 30 Days no previous admission in last 30 days   Equipment Needed After Discharge (will continue to assess)   Discharge Planning Comments spoke with patient and wife at bedside. patient agreeable to speak to . patient lives with wife. no HH or Home O2. patient has a rolling walker that he has been using lately. also has a w/c and cane but does not use. uses [facility] and mail order for prescriptions. denies having trouble paying for medications. he has not been driving recently but wife is available for transportation. independent with ADL's prior to admission.plan is home when medically stable. will continue to follow.    Current Health   Anticipated Changes Related to Illness (will continue to assess)   Living Environment   Transportation Available family or friend will provide   Self-Care   Equipment Currently Used at Home cane, straight;walker, rolling;wheelchair

## 2017-02-08 NOTE — NURSING NOTE
spoke with patient and wife at bedside. patient agreeable to speak to . patient lives with wife. no HH or Home O2. patient has a rolling walker that he has been using lately. also has a w/c and cane but does not use. uses [facility] and mail order for prescriptions. denies having trouble paying for medications. he has not been driving recently but wife is available for transportation. independent with ADL's prior to admission.plan is home when medically stable. will continue to follow.

## 2017-02-08 NOTE — PAYOR COMM NOTE
"Katie Zelaya (83 y.o. Male)     :              LEO ISBELL RN           PHONE  [phone]   FAX  [phone]        Date of Birth Social Security Number Address Home Phone MRN    [id number]  [id number] [id number] [phone] [id number]    [facility] Mar[facility] Status          Unknown        Admission Date Admission Type Admitting Provider Attending Provider Department, Room/Bed    2/7/17 Elective Kenya Marks MD Engelhardt, Michelle Theresa, MD [facility] MED SURG, 1401/1    Discharge Date Discharge Disposition Discharge Destination                      Attending Provider: Kenya Marks MD     Allergies:  No Known Allergies    Isolation:  None   Infection:  None   Code Status:  FULL    Ht:  70\" (177.8 cm)   Wt:  149 lb (67.6 kg)    Admission Cmt:  None   Principal Problem:  None                Active Insurance as of 2/7/2017     Primary Coverage     Payor Plan Insurance Group Employer/Plan Group    HUMANA MEDICARE REPL HUMANA MEDICARE REPL [id number]     Payor Plan Address Payor Plan Phone Number Effective From Effective To    [address] [address] [phone] 1/1/2016     [place] [address]       Subscriber Name Subscriber Birth Date Member ID       KATIE ZELAYA 1934 [id number]                 Emergency Contacts      (Rel.) Home Phone Work Phone Mobile Phone    GarciaNeris (Spouse) [phone] -- --            Insurance Information                HUMANA MEDICARE REPL/HUMANA MEDICARE REPL Phone: [phone]    Subscriber: Katie Zelaya Subscriber#: [id number]    Group#: [id number] Precert#:           Problem List           Codes Noted - Resolved       Hospital    Acute diverticulitis ICD-10-CM: K57.92  ICD-9-CM: 562.11 2/7/2017 - Present       Non-Hospital    AI (aortic incompetence) ICD-10-CM: I35.1  ICD-9-CM: 424.1 5/23/2016 - Present    Bundle branch block, right ICD-10-CM: " I45.10  ICD-9-CM: 426.4 5/23/2016 - Present    MDS (myelodysplastic syndrome), low grade ICD-10-CM: D46.20  ICD-9-CM: 238.72 3/25/2016 - Present    Vitamin B 12 deficiency ICD-10-CM: E53.8  ICD-9-CM: 266.2 3/25/2016 - Present             History & Physical      Kenya Marks MD at 2/7/2017 10:00 PM          [facility] HOSPITALIST     David Cedillo MD    CHIEF COMPLAINT: Abdominal pain and low grade fevers    HISTORY OF PRESENT ILLNESS:    84 y/o male with hypertension, grade II diastolic dysfunction, Mild to moderate cardiac valvular disease, chronic urinary retention and straight caths 3-4 x/day and  suspected low grade MDS is being admitted directly from Dr. Green's office secondary to acute diverticulitis with abscess.  The patient was here back in early January, seen in ER and treated outpatient for acute diverticulitis with levoquin and flagyl.  He followed up with Dr. Amado and had an outpatient colonoscopy on 2/1/17 that showed pan-diverticulosis and multiple polyps.  The patient notes that the original abdominal pain her had back in January did improve with antibiotics but never completely resolved.  Then about 2 days after his colonoscopy patient started having sweats at night and fever to 102.  He denies any N/V/D, CP, SOA or heart palpitations.  Patient notes poor appetite since his initial infection in January and probably (per patient) a 10lb weight loss due to his decreased appetite.  Patient saw Dr. Green today for labs and a procrit injection and a CT abdomen was ordered secondary to his symptoms which showed Acute diverticulitis and abscess and Dr. Green spoke with myself about the findings and the patient was direct admitted for treatment.       Past Medical History   Diagnosis Date   • Aortic regurgitation    • Aortic valve insufficiency    • Arthritis      Bilateral knee   • Chest pain    • Diastolic dysfunction    • History of urinary retention    •  Hypertension    • MDS (myelodysplastic syndrome)    • RBBB (right bundle branch block)    • Self-catheterizes urinary bladder      3-4 times aday   • Skin cancer      Past Surgical History   Procedure Laterality Date   • Back surgery  2008     fusion   • Cholecystectomy     • Hernia repair     • Colonoscopy N/A 2/1/2017     Procedure: COLONOSCOPY;  Surgeon: Bridger Amado MD;  Location: [facility];  Service:      Family History   Problem Relation Age of Onset   • Diabetes Brother    • Liver cancer Brother    • Heart disease Other    • Heart disease Mother    • Heart attack Mother    • Other Sister      Brain tumor   • Emphysema Brother    • Alcohol abuse Brother      Social History   Substance Use Topics   • Smoking status: Never Smoker   • Smokeless tobacco: Never Used   • Alcohol use 1.2 - 1.8 oz/week     2 - 3 Cans of beer per week      Comment: hasnt drank since romeo      Prescriptions Prior to Admission   Medication Sig Dispense Refill Last Dose   • acetaminophen (TYLENOL) 325 MG tablet Take 650 mg by mouth every 6 (six) hours as needed for mild pain (1-3).   Past Week at Unknown time   • cyanocobalamin 1000 MCG/ML injection 1,000 mcg Every 14 (Fourteen) Days. Cyanocobalamin SOLN; Patient Sig: Cyanocobalamin SOLN ; 0; 06-Jun-2014; Active   Past Week at Unknown time   • folic acid (FOLVITE) 400 MCG tablet Take 400 mcg by mouth Daily.   2/6/2017 at 0800   • glucosamine-chondroitin 500-400 MG capsule capsule Take 1 capsule by mouth Daily.   2/6/2017 at 0800   • irbesartan-hydrochlorothiazide (AVALIDE) 300-12.5 MG tablet Take 1 tablet by mouth. 1/17/17: pt was instructed last week by pcp to take 1/4 of this pill   2/6/2017 at 0800   • MULTIPLE VITAMINS-MINERALS PO Take 1 tablet by mouth Daily.   2/6/2017 at 0800   • clotrimazole-betamethasone (LOTRISONE) 1-0.05 % cream Apply 1 application topically Daily.        Allergies:  Review of patient's allergies indicates no known allergies.    REVIEW OF  "SYSTEMS:  Please see the above history of present illness for pertinent positives and negatives.  The remainder of the patient's systems have been reviewed and are negative.     Vital Signs  Temp:  [97.3 °F (36.3 °C)-100.8 °F (38.2 °C)] 97.3 °F (36.3 °C)  Heart Rate:  [] 93  Resp:  [16] 16  BP: (127-133)/(61-67) 133/67  Oxygen Therapy  SpO2: 94 %  O2 Device: room air  Body mass index is 21.38 kg/(m^2).     Flowsheet Rows         First Filed Value    Admission Height  70\" (177.8 cm) Documented at 02/07/2017 1859    Admission Weight  149 lb (67.6 kg) Documented at 02/07/2017 1859             Physical Exam:  Physical Exam   Constitutional: Patient appears well-developed and well-nourished and in no acute distress   HEENT:   Head: Normocephalic and atraumatic.   Eyes:  Pupils are equal, round, and reactive to light. EOM are intact. Sclera are anicteric and non-injected.  Mouth and Throat: Patient has moist mucous membranes. Oropharynx is clear of any erythema or exudate.     Neck: Neck supple. No JVD present. No thyromegaly present. No lymphadenopathy present.  Cardiovascular: Regular rate, regular rhythm, S1 normal and S2 normal.  Exam reveals no gallop and no friction rub.  1/6 systolic murmur heard RSB.  Pulmonary/Chest: Lungs are clear to auscultation bilaterally. No respiratory distress. No wheezes. No rhonchi. No rales.   Abdominal: Soft. TTP diffusely but most notable in the bilateral lower quadrants L>R, No rebound or guarding, Bowel sounds are normal. No distension and no mass. There is no hepatosplenomegaly.    Musculoskeletal: Normal Muscle tone  Extremities: No edema. Pulses are palpable in all 4 extremities.  Neurological: Patient is alert and oriented to person, place, and time. Cranial nerves II-XII are grossly intact with no focal deficits.  Skin: Skin is warm. No rash noted. Nails show no clubbing.  No cyanosis or erythema.     Results Review:    I reviewed the patient's new clinical results.  Lab " Results (most recent)     Procedure Component Value Units Date/Time    Comprehensive Metabolic Panel [[id number]]  (Abnormal) Collected:  02/07/17 2107    Specimen:  Blood Updated:  02/07/17 2130     Glucose 92 mg/dL      BUN 15 mg/dL      Creatinine 0.87 mg/dL      Sodium 133 (L) mmol/L      Potassium 3.7 mmol/L      Chloride 96 (L) mmol/L      CO2 23.3 mmol/L      Calcium 8.4 (L) mg/dL      Total Protein 7.1 g/dL      Albumin 2.90 (L) g/dL      ALT (SGPT) 46 (H) U/L      AST (SGOT) 21 U/L      Alkaline Phosphatase 186 (H) U/L      Total Bilirubin 0.7 mg/dL      eGFR Non African Amer 84 mL/min/1.73      Globulin 4.2 gm/dL      A/G Ratio 0.7 g/dL      BUN/Creatinine Ratio 17.2      Anion Gap 13.7 mmol/L     Narrative:       The MDRD GFR formula is only valid for adults with stable renal function between ages 18 and 70.          Imaging Results (most recent)     None            ECG/EMG Results (most recent)     None            Assessment/Plan     1. Acute diverticulitis with Abscess: Dr. Ma and Dr. Amado consulted.  Initially the patient was started back on levoquin and flagyl but after speaking with Dr. Ma he has changed the levoquin to zosyn with concern that this may be an unresolved colitis and not a recurrent colitis from 1 month ago.  Will keep patient NPO for now except sips with meds and start IVFs.  Norco PRN for pain.    2. Suspected low grade MDS: Patient follows with Dr. Green and received procrit outpatient today prior to admission. Hb appears near baseline.    3. Hypertension: continue patient's home avapro but hold HCTZ for now while on IVFs. BP is WNL.    4. Grade II diastolic dysfunction and mild-mod cardiac valvular disease: No acute issues here.    5. Chronic urinary retention: Patient to continue to straight cath 3-4 times per day as per home regimen.    6. DVT prophy: SCDs, No AC until seen by surgery.    I discussed the patients findings and my recommendations with patient.     Kenya  Pat Marks MD  02/07/17  10:00 PM           Electronically signed by Kenya Marks MD at 2/8/2017  1:34 AM        Emergency Department Notes     No notes of this type exist for this encounter.        Vital Signs (last 24 hours)       02/07 0700  -  02/08 0659 02/08 0700  -  02/08 0906   Most Recent    Temp (°F) 97.2 -  (!)100.8       97.2 (36.2)    Heart Rate 84 -  103       84    Resp   16       16    /59 -  133/67       115/59    SpO2 (%) 94 -  95       94          Intake & Output (last day)       02/07 0701 - 02/08 0700 02/08 0701 - 02/09 0700          Unmeasured Urine Occurrence 2 x         Lines, Drains & Airways    Active LDAs     Name:   Placement date:   Placement time:   Site:   Days:    Peripheral IV Line - Single Lumen 02/07/17 2030  22 gauge  02/07/17    2030      less than 1         Inactive LDAs     Name:   Placement date:   Placement time:   Removal date:   Removal time:   Site:   Days:    [REMOVED] Peripheral IV Line - Single Lumen 02/07/17 1620 median cubital vein (antecubital fossa), left 20 gauge  02/07/17    1620    02/07/17    1640      less than 1                Hospital Medications (all)       Dose Frequency Start End    acetaminophen (TYLENOL) tablet 650 mg 650 mg Every 6 Hours PRN 2/7/2017     Sig - Route: Take 2 tablets by mouth Every 6 (Six) Hours As Needed for mild pain (1-3). - Oral    clotrimazole-betamethasone (LOTRISONE) 1-0.05 % cream 1 application 1 application Daily 2/8/2017     Sig - Route: Apply 1 application topically Daily. - Topical    folic acid (FOLVITE) tablet 400 mcg 400 mcg Daily 2/8/2017     Sig - Route: Take 1 tablet by mouth Daily. - Oral    HYDROcodone-acetaminophen (NORCO) 5-325 MG per tablet 1 tablet 1 tablet Every 6 Hours PRN 2/7/2017 2/17/2017    Sig - Route: Take 1 tablet by mouth Every 6 (Six) Hours As Needed for moderate pain (4-6). - Oral    irbesartan (AVAPRO) tablet 300 mg 300 mg Every 24 Hours Scheduled 2/8/2017     Sig -  Route: Take 2 tablets by mouth Daily. - Oral    lactated ringers infusion 75 mL/hr Continuous 2/7/2017     Sig - Route: Infuse 75 mL/hr into a venous catheter Continuous. - Intravenous    metroNIDAZOLE (FLAGYL) IVPB 500 mg 500 mg Every 8 Hours 2/7/2017     Sig - Route: Infuse 100 mL into a venous catheter Every 8 (Eight) Hours. - Intravenous    multivitamin with minerals 1 tablet 1 tablet Daily 2/8/2017     Sig - Route: Take 1 tablet by mouth Daily. - Oral    piperacillin-tazobactam (ZOSYN) 4.5 (4-0.5) G injection  - ADS Override Pull   2/8/2017 2/8/2017    Notes to Pharmacy: Created by cabinet override    piperacillin-tazobactam (ZOSYN) 4.5 g/100 mL 0.9% NS IVPB (mbp) 4.5 g Every 8 Hours 2/7/2017     Sig - Route: Infuse 100 mL into a venous catheter Every 8 (Eight) Hours. - Intravenous    sodium chloride 0.9 % flush 1-10 mL 1-10 mL As Needed 2/7/2017     Sig - Route: Infuse 1-10 mL into a venous catheter As Needed for line care. - Intravenous    sodium chloride 0.9 % infusion  - ADS Override Pull   2/8/2017 2/8/2017    Notes to Pharmacy: Created by cabinet override    levoFLOXacin (LEVAQUIN) 750 mg/150 mL D5W (premix) (LEVAQUIN) 750 mg (Discontinued) 750 mg Every 24 Hours 2/7/2017 2/7/2017    Sig - Route: Infuse 150 mL into a venous catheter Daily. - Intravenous          Blood Administration Record     None          Lab Results (last 24 hours)     Procedure Component Value Units Date/Time    Comprehensive Metabolic Panel [[id number]]  (Abnormal) Collected:  02/07/17 2107    Specimen:  Blood Updated:  02/07/17 2130     Glucose 92 mg/dL      BUN 15 mg/dL      Creatinine 0.87 mg/dL      Sodium 133 (L) mmol/L      Potassium 3.7 mmol/L      Chloride 96 (L) mmol/L      CO2 23.3 mmol/L      Calcium 8.4 (L) mg/dL      Total Protein 7.1 g/dL      Albumin 2.90 (L) g/dL      ALT (SGPT) 46 (H) U/L      AST (SGOT) 21 U/L      Alkaline Phosphatase 186 (H) U/L      Total Bilirubin 0.7 mg/dL      eGFR Non African Amer 84 mL/min/1.73       Globulin 4.2 gm/dL      A/G Ratio 0.7 g/dL      BUN/Creatinine Ratio 17.2      Anion Gap 13.7 mmol/L     Narrative:       The MDRD GFR formula is only valid for adults with stable renal function between ages 18 and 70.    Basic Metabolic Panel [[id number]]  (Abnormal) Collected:  02/08/17 0349    Specimen:  Blood Updated:  02/08/17 0527     Glucose 88 mg/dL      BUN 14 mg/dL      Creatinine 0.92 mg/dL      Sodium 139 mmol/L      Potassium 3.9 mmol/L      Chloride 100 mmol/L      CO2 25.1 mmol/L      Calcium 8.6 (L) mg/dL      eGFR Non African Amer 79 mL/min/1.73      BUN/Creatinine Ratio 15.2      Anion Gap 13.9 mmol/L     Narrative:       The MDRD GFR formula is only valid for adults with stable renal function between ages 18 and 70.    CBC Auto Differential [[id number]]  (Abnormal) Collected:  02/08/17 0349    Specimen:  Blood Updated:  02/08/17 0618     WBC 8.86 10*3/mm3      RBC 2.16 (L) 10*6/mm3      Hemoglobin 7.6 (L) g/dL      Hematocrit 25.4 (L) %      .6 (H) fL      MCH 35.2 (H) pg      MCHC 29.9 (L) g/dL      RDW 15.1 (H) %      RDW-SD 65.5 (H) fl      MPV 12.0 (H) fL      Platelets 97 (L) 10*3/mm3     Scan Slide [[id number]] Collected:  02/08/17 0349    Specimen:  Blood Updated:  02/08/17 0618     Scan Slide --       See Manual Differential Results       Manual Differential [[id number]]  (Abnormal) Collected:  02/08/17 0349    Specimen:  Blood Updated:  02/08/17 0618     Neutrophil % 52.0 %      Lymphocyte % 13.0 (L) %      Monocyte % 26.0 (H) %      Myelocyte % 2.0 (H) %      Atypical Lymphocyte % 5.0 %      Other Cells % 2.0 (H) %      Neutrophils Absolute 4.61 10*3/mm3      Lymphocytes Absolute 1.15 10*3/mm3      Monocytes Absolute 2.30 (H) 10*3/mm3      Anisocytosis Mod/2+      Macrocytes Mod/2+      WBC Morphology Normal      Platelet Estimate Decreased       Platelets appear mildly decreased on smear        Narrative:       Other cells appear to be promonocytes      Slide was not sent to  pathologist.  If review by path is wanted, please notify lab staff.         Imaging Results (last 24 hours)     ** No results found for the last 24 hours. **        ECG/EMG Results (last 24 hours)     ** No results found for the last 24 hours. **        Ventilator/Non-Invasive Ventilation Settings     None        Operative/Procedure Notes (last 24 hours) (Notes from 2/7/2017  9:06 AM through 2/8/2017  9:06 AM)     No notes of this type exist for this encounter.        Physician Progress Notes (last 24 hours) (Notes from 2/7/2017  9:06 AM through 2/8/2017  9:06 AM)     No notes of this type exist for this encounter.        Consult Notes (last 24 hours) (Notes from 2/7/2017  9:06 AM through 2/8/2017  9:06 AM)     No notes of this type exist for this encounter.        Nutrition Notes (last 24 hours) (Notes from 2/7/2017  9:06 AM through 2/8/2017  9:06 AM)     No notes of this type exist for this encounter.            Speech Language Pathology Notes (last 24 hours) (Notes from 2/7/2017  9:06 AM through 2/8/2017  9:06 AM)     No notes of this type exist for this encounter.        Respiratory Therapy Notes (last 24 hours) (Notes from 2/7/2017  9:06 AM through 2/8/2017  9:06 AM)     No notes of this type exist for this encounter.

## 2017-02-08 NOTE — H&P
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 Green's office secondary to acute diverticulitis with abscess.  The patient was here back in early January, seen in ER and treated outpatient for acute diverticulitis with levoquin and flagyl.  He followed up with Dr. Amado and had an outpatient colonoscopy on 2/1/17 that showed pan-diverticulosis and multiple polyps.  The patient notes that the original abdominal pain her had back in January did improve with antibiotics but never completely resolved.  Then about 2 days after his colonoscopy patient started having sweats at night and fever to 102.  He denies any N/V/D, CP, SOA or heart palpitations.  Patient notes poor appetite since his initial infection in January and probably (per patient) a 10lb weight loss due to his decreased appetite.  Patient saw Dr. Green today for labs and a procrit injection and a CT abdomen was ordered secondary to his symptoms which showed Acute diverticulitis and abscess and Dr. Green spoke with myself about the findings and the patient was direct admitted for treatment.       Past Medical History   Diagnosis Date   • Aortic regurgitation    • Aortic valve insufficiency    • Arthritis      Bilateral knee   • Chest pain    • Diastolic dysfunction    • History of urinary retention    • Hypertension    • MDS (myelodysplastic syndrome)    • RBBB (right bundle branch block)    • Self-catheterizes urinary bladder      3-4 times aday   • Skin cancer      Past Surgical History   Procedure Laterality Date   • Back surgery  2008     fusion   • Cholecystectomy     • Hernia repair     • Colonoscopy N/A 2/1/2017      Procedure: COLONOSCOPY;  Surgeon: Bridger Amado MD;  Location: [facility];  Service:      Family History   Problem Relation Age of Onset   • Diabetes Brother    • Liver cancer Brother    • Heart disease Other    • Heart disease Mother    • Heart attack Mother    • Other Sister      Brain tumor   • Emphysema Brother    • Alcohol abuse Brother      Social History   Substance Use Topics   • Smoking status: Never Smoker   • Smokeless tobacco: Never Used   • Alcohol use 1.2 - 1.8 oz/week     2 - 3 Cans of beer per week      Comment: hasnt drank since [place]      Prescriptions Prior to Admission   Medication Sig Dispense Refill Last Dose   • acetaminophen (TYLENOL) 325 MG tablet Take 650 mg by mouth every 6 (six) hours as needed for mild pain (1-3).   Past Week at Unknown time   • cyanocobalamin 1000 MCG/ML injection 1,000 mcg Every 14 (Fourteen) Days. Cyanocobalamin SOLN; Patient Sig: Cyanocobalamin SOLN ; 0; 06-Jun-2014; Active   Past Week at Unknown time   • folic acid (FOLVITE) 400 MCG tablet Take 400 mcg by mouth Daily.   2/6/2017 at 0800   • glucosamine-chondroitin 500-400 MG capsule capsule Take 1 capsule by mouth Daily.   2/6/2017 at 0800   • irbesartan-hydrochlorothiazide (AVALIDE) 300-12.5 MG tablet Take 1 tablet by mouth. 1/17/17: pt was instructed last week by pcp to take 1/4 of this pill   2/6/2017 at 0800   • MULTIPLE VITAMINS-MINERALS PO Take 1 tablet by mouth Daily.   2/6/2017 at 0800   • clotrimazole-betamethasone (LOTRISONE) 1-0.05 % cream Apply 1 application topically Daily.        Allergies:  Review of patient's allergies indicates no known allergies.    REVIEW OF SYSTEMS:  Please see the above history of present illness for pertinent positives and negatives.  The remainder of the patient's systems have been reviewed and are negative.     Vital Signs  Temp:  [97.3 °F (36.3 °C)-100.8 °F (38.2 °C)] 97.3 °F (36.3 °C)  Heart Rate:  [] 93  Resp:  [16] 16  BP: (127-133)/(61-67)  "133/67  Oxygen Therapy  SpO2: 94 %  O2 Device: room air  Body mass index is 21.38 kg/(m^2).     Flowsheet Rows         First Filed Value    Admission Height  70\" (177.8 cm) Documented at 02/07/2017 1859    Admission Weight  149 lb (67.6 kg) Documented at 02/07/2017 1859             Physical Exam:  Physical Exam   Constitutional: Patient appears well-developed and well-nourished and in no acute distress   HEENT:   Head: Normocephalic and atraumatic.   Eyes:  Pupils are equal, round, and reactive to light. EOM are intact. Sclera are anicteric and non-injected.  Mouth and Throat: Patient has moist mucous membranes. Oropharynx is clear of any erythema or exudate.     Neck: Neck supple. No JVD present. No thyromegaly present. No lymphadenopathy present.  Cardiovascular: Regular rate, regular rhythm, S1 normal and S2 normal.  Exam reveals no gallop and no friction rub.  1/6 systolic murmur heard RSB.  Pulmonary/Chest: Lungs are clear to auscultation bilaterally. No respiratory distress. No wheezes. No rhonchi. No rales.   Abdominal: Soft. TTP diffusely but most notable in the bilateral lower quadrants L>R, No rebound or guarding, Bowel sounds are normal. No distension and no mass. There is no hepatosplenomegaly.    Musculoskeletal: Normal Muscle tone  Extremities: No edema. Pulses are palpable in all 4 extremities.  Neurological: Patient is alert and oriented to person, place, and time. Cranial nerves II-XII are grossly intact with no focal deficits.  Skin: Skin is warm. No rash noted. Nails show no clubbing.  No cyanosis or erythema.     Results Review:    I reviewed the patient's new clinical results.  Lab Results (most recent)     Procedure Component Value Units Date/Time    Comprehensive Metabolic Panel [[id number]]  (Abnormal) Collected:  02/07/17 2107    Specimen:  Blood Updated:  02/07/17 2130     Glucose 92 mg/dL      BUN 15 mg/dL      Creatinine 0.87 mg/dL      Sodium 133 (L) mmol/L      Potassium 3.7 mmol/L      " Chloride 96 (L) mmol/L      CO2 23.3 mmol/L      Calcium 8.4 (L) mg/dL      Total Protein 7.1 g/dL      Albumin 2.90 (L) g/dL      ALT (SGPT) 46 (H) U/L      AST (SGOT) 21 U/L      Alkaline Phosphatase 186 (H) U/L      Total Bilirubin 0.7 mg/dL      eGFR Non African Amer 84 mL/min/1.73      Globulin 4.2 gm/dL      A/G Ratio 0.7 g/dL      BUN/Creatinine Ratio 17.2      Anion Gap 13.7 mmol/L     Narrative:       The MDRD GFR formula is only valid for adults with stable renal function between ages 18 and 70.          Imaging Results (most recent)     None            ECG/EMG Results (most recent)     None            Assessment/Plan     1. Acute diverticulitis with Abscess: Dr. Ma and Dr. Amado consulted.  Initially the patient was started back on levoquin and flagyl but after speaking with Dr. Ma he has changed the levoquin to zosyn with concern that this may be an unresolved colitis and not a recurrent colitis from 1 month ago.  Will keep patient NPO for now except sips with meds and start IVFs.  Norco PRN for pain.    2. Suspected low grade MDS: Patient follows with Dr. Green and received procrit outpatient today prior to admission. Hb appears near baseline.    3. Hypertension: continue patient's home avapro but hold HCTZ for now while on IVFs. BP is WNL.    4. Grade II diastolic dysfunction and mild-mod cardiac valvular disease: No acute issues here.    5. Chronic urinary retention: Patient to continue to straight cath 3-4 times per day as per home regimen.    6. DVT prophy: SCDs, No AC until seen by surgery.    I discussed the patients findings and my recommendations with patient.     Kenya Marks MD  02/07/17  10:00 PM

## 2017-02-08 NOTE — CONSULTS
Patient Care Team:  David Cedillo MD as PCP - General (Family Medicine)  Anupam Green MD as Consulting Physician (Hematology and Oncology)    CHIEF COMPLAINT: Diverticulitis    HISTORY OF PRESENT ILLNESS:    84 yo S/P colonoscopy with polypectomy on 2/1/17, Had resovedd his diverticulitis from January but apparantly had increased pain after his colonoscopy, is a poor historian and cant say when his recurrant pain started afterward but was tolerating liquids and ensure as OP. Presented to CBC not feeling well simply for his procrit but his usual pancytopenia had a mild elevation in his WBC. Sent for CT and based on diverticulitis and small intramural and possibly extramural abscess formation Pt was admitted. Have discussed with Dr Anil tucker findings as well as his recent and present course, His polyp path is presently pending?!?  This Am he is feeling better but with IVFs hisHGB has settled to 7.6 down form 9.7(per pt hx from CBC) Will recheck that number prior to transfusion.      Past Medical History   Diagnosis Date   • Aortic regurgitation    • Aortic valve insufficiency    • Arthritis      Bilateral knee   • Chest pain    • Diastolic dysfunction    • History of urinary retention    • Hypertension    • MDS (myelodysplastic syndrome)    • RBBB (right bundle branch block)    • Self-catheterizes urinary bladder      3-4 times aday   • Skin cancer      Past Surgical History   Procedure Laterality Date   • Back surgery  2008     fusion   • Cholecystectomy     • Hernia repair     • Colonoscopy N/A 2/1/2017     Procedure: COLONOSCOPY;  Surgeon: Bridger Amado MD;  Location: [facility];  Service:      Family History   Problem Relation Age of Onset   • Diabetes Brother    • Liver cancer Brother    • Heart disease Other    • Heart disease Mother    • Heart attack Mother    • Other Sister      Brain tumor   • Emphysema Brother    • Alcohol abuse Brother      Social History   Substance Use Topics   •  "Smoking status: Never Smoker   • Smokeless tobacco: Never Used   • Alcohol use 1.2 - 1.8 oz/week     2 - 3 Cans of beer per week      Comment: hasnt drank since [place]      Prescriptions Prior to Admission   Medication Sig Dispense Refill Last Dose   • acetaminophen (TYLENOL) 325 MG tablet Take 650 mg by mouth every 6 (six) hours as needed for mild pain (1-3).   Past Week at Unknown time   • cyanocobalamin 1000 MCG/ML injection 1,000 mcg Every 14 (Fourteen) Days. Cyanocobalamin SOLN; Patient Sig: Cyanocobalamin SOLN ; 0; 06-Jun-2014; Active   Past Week at Unknown time   • folic acid (FOLVITE) 400 MCG tablet Take 400 mcg by mouth Daily.   2/6/2017 at 0800   • glucosamine-chondroitin 500-400 MG capsule capsule Take 1 capsule by mouth Daily.   2/6/2017 at 0800   • irbesartan-hydrochlorothiazide (AVALIDE) 300-12.5 MG tablet Take 1 tablet by mouth. 1/17/17: pt was instructed last week by pcp to take 1/4 of this pill   2/6/2017 at 0800   • MULTIPLE VITAMINS-MINERALS PO Take 1 tablet by mouth Daily.   2/6/2017 at 0800   • clotrimazole-betamethasone (LOTRISONE) 1-0.05 % cream Apply 1 application topically Daily.        Allergies:  Review of patient's allergies indicates no known allergies.    REVIEW OF SYSTEMS:  Please see the above history of present illness for pertinent positives and negatives.  The remainder of the patient's systems have been reviewed and are negative.     Vital Signs  Temp:  [97.2 °F (36.2 °C)-100.8 °F (38.2 °C)] 98.2 °F (36.8 °C)  Heart Rate:  [] 83  Resp:  [16] 16  BP: (104-133)/(58-67) 104/58    Flowsheet Rows         First Filed Value    Admission Height  70\" (177.8 cm) Documented at 02/07/2017 1859    Admission Weight  149 lb (67.6 kg) Documented at 02/07/2017 1859           Physical Exam:  Physical Exam   Constitutional: Patient appears well-developed and well-nourished and in no acute distress   HEENT:   Head: Normocephalic and atraumatic.   Eyes:  Pupils are equal, round, and reactive " to light. EOM are intact. Sclera are anicteric and non-injected.  Mouth and Throat: Patient has moist mucous membranes. Oropharynx is clear of any erythema or exudate.     Neck: Neck supple. No JVD present. No thyromegaly present. No lymphadenopathy present.  Cardiovascular: Regular rate, regular rhythm, S1 normal and S2 normal.  Exam reveals no gallop and no friction rub.  No murmur heard.  Pulmonary/Chest: Lungs are clear to auscultation bilaterally. No respiratory distress. No wheezes. No rhonchi. No rales.   Abdominal: Soft. Bowel sounds are normal. No distension and no mass. There is no hepatosplenomegaly. There is no tenderness.   Musculoskeletal: Normal Muscle tone  Extremities: No edema. Pulses are palpable in all 4 extremities.  Neurological: Patient is alert and oriented to person, place, and time. Cranial nerves II-XII are grossly intact with no focal deficits.  Skin: Skin is warm. No rash noted. Nails show no clubbing.  No cyanosis or erythema.     Results Review:    I reviewed the patient's new clinical results.  Lab Results (most recent)     Procedure Component Value Units Date/Time    Comprehensive Metabolic Panel [[id number]]  (Abnormal) Collected:  02/07/17 2107    Specimen:  Blood Updated:  02/07/17 2130     Glucose 92 mg/dL      BUN 15 mg/dL      Creatinine 0.87 mg/dL      Sodium 133 (L) mmol/L      Potassium 3.7 mmol/L      Chloride 96 (L) mmol/L      CO2 23.3 mmol/L      Calcium 8.4 (L) mg/dL      Total Protein 7.1 g/dL      Albumin 2.90 (L) g/dL      ALT (SGPT) 46 (H) U/L      AST (SGOT) 21 U/L      Alkaline Phosphatase 186 (H) U/L      Total Bilirubin 0.7 mg/dL      eGFR Non African Amer 84 mL/min/1.73      Globulin 4.2 gm/dL      A/G Ratio 0.7 g/dL      BUN/Creatinine Ratio 17.2      Anion Gap 13.7 mmol/L     Narrative:       The MDRD GFR formula is only valid for adults with stable renal function between ages 18 and 70.    Basic Metabolic Panel [[id number]]  (Abnormal) Collected:  02/08/17 6660     Specimen:  Blood Updated:  02/08/17 0527     Glucose 88 mg/dL      BUN 14 mg/dL      Creatinine 0.92 mg/dL      Sodium 139 mmol/L      Potassium 3.9 mmol/L      Chloride 100 mmol/L      CO2 25.1 mmol/L      Calcium 8.6 (L) mg/dL      eGFR Non African Amer 79 mL/min/1.73      BUN/Creatinine Ratio 15.2      Anion Gap 13.9 mmol/L     Narrative:       The MDRD GFR formula is only valid for adults with stable renal function between ages 18 and 70.    CBC Auto Differential [[id number]]  (Abnormal) Collected:  02/08/17 0349    Specimen:  Blood Updated:  02/08/17 0618     WBC 8.86 10*3/mm3      RBC 2.16 (L) 10*6/mm3      Hemoglobin 7.6 (L) g/dL      Hematocrit 25.4 (L) %      .6 (H) fL      MCH 35.2 (H) pg      MCHC 29.9 (L) g/dL      RDW 15.1 (H) %      RDW-SD 65.5 (H) fl      MPV 12.0 (H) fL      Platelets 97 (L) 10*3/mm3     Scan Slide [[id number]] Collected:  02/08/17 0349    Specimen:  Blood Updated:  02/08/17 0618     Scan Slide --       See Manual Differential Results       Manual Differential [[id number]]  (Abnormal) Collected:  02/08/17 0349    Specimen:  Blood Updated:  02/08/17 0618     Neutrophil % 52.0 %      Lymphocyte % 13.0 (L) %      Monocyte % 26.0 (H) %      Myelocyte % 2.0 (H) %      Atypical Lymphocyte % 5.0 %      Other Cells % 2.0 (H) %      Neutrophils Absolute 4.61 10*3/mm3      Lymphocytes Absolute 1.15 10*3/mm3      Monocytes Absolute 2.30 (H) 10*3/mm3      Anisocytosis Mod/2+      Macrocytes Mod/2+      WBC Morphology Normal      Platelet Estimate Decreased       Platelets appear mildly decreased on smear        Narrative:       Other cells appear to be promonocytes      Slide was not sent to pathologist.  If review by path is wanted, please notify lab staff.           Imaging Results (most recent)     None        reviewed    ECG/EMG Results (most recent)     None        reviewed    Assessment/Plan     Diverticulitis  Personal History of colon Polyps- Recent colon on 2/1 and Path pending    I  discussed the patients findings and my recommendations with patient and Wife and Dr Ma.     Bridger Amado MD  02/08/17  11:44 AM    Time: Not recorded

## 2017-02-08 NOTE — PROGRESS NOTES
"Adult Nutrition  Assessment/PES    Patient Name:  Jason Zelaya  YOB: 1934  MRN: [id number]  Admit Date:  2/7/2017    Assessment Date:  2/8/2017        Reason for Assessment       02/08/17 1018    Reason for Assessment    Reason For Assessment/Visit nurse/nurse practitioner consult    Identified At Risk By Screening Criteria MST SCORE 2+    Diagnosis --   Acute divirticulitis w abscess , poss low grade MDS, HTN              Nutrition/Diet History       02/08/17 1018    Nutrition/Diet History    Patient Reported Diet/Restrictions/Preferences regular    Typical Food/Fluid Intake Spoke w pt & wife, hasn't been eating well past month, always been smaller eater. Typically eats one good meal per day. Pt like ENSURE butter pecan twice per day, quyen is too chocolately. Reports 10# wt loss unintentional.     Meal/Snack Patterns 1 good meal per day per wife report typical , always has candy jar at hand not much on it lately            Anthropometrics       02/08/17 1021    Anthropometrics    RD Documented Current Weight  67.6 kg (149 lb 0.5 oz)    Anthropometrics (Special Considerations)    RD Calculated BMI (kg/m2) 21.3    Usual Body Weight (UBW)    Weight Loss 4.536 kg (10 lb)    % Weight Loss  6 %    Weight Loss Time Frame 1 month reported     Body Mass Index (BMI)    BMI Grade 19.1 - 24.9 - normal            Labs/Tests/Procedures/Meds       02/08/17 1022    Labs/Tests/Procedures/Meds    Labs/Tests Review Reviewed    Medication Review Antibiotic;Reviewed, pertinent;Multivitamin   folic acid              Estimated/Assessed Needs       02/08/17 1023    Calculation Measurements    Weight Used For Calculations 67.6 kg (149 lb 0.5 oz)    Height Used for Calculations 1.778 m (5' 10\")    Estimated/Assessed Energy Needs    Energy Need Method Rapides-St Jeor    Age 83    RMR (Rapides-St. Jeor Equation) 1377.25    Activity Factors (Rapides St Jeor)  Confined to bed  1.2    Total estimated needs (Rapides St. Jeor) " 1652 kcal     Estimated Kcal Range  1902 kcal (miflfin 1.2 + 250 kcal for gain)    Estimated/Assessed Protein Needs    Weight Used for Protein Calculation 67.6 kg (149 lb 0.5 oz)    Protein (gm/kg) 1.0    1.0 Gm Protein (gm) 67.6    Estimated Protein Range 68 gm    Estimated/Assessed Fluid Needs    Fluid Need Method RDA method    RDA Method (mL)  1900            Nutrition Prescription Ordered       02/08/17 1025    Nutrition Prescription PO    Current PO Diet NPO            Evaluation of Received Nutrient/Fluid Intake       02/08/17 1026    Evaluation of Received Nutrient/Fluid Intake    Nutrition Delivered Fluid Evaluation    Fluid Intake Evaluation    % Fluid Needs no data    PO Evaluation    Number of Days PO Intake Evaluated Insufficient Data              Malnutrition Severity Assessment       02/08/17 1026    Malnutrition Severity Assessment    Malnutrition Type Acute Illness/Injury Malnutrition    Weight Status (Acute)    Weight Loss Severe (>5% / 1 mo)    Energy Intake Status (Acute)    Energy Intake Severe (< or equal to 50% / > or equal to 5d)    Criteria Met (Must meet criteria for severity in at least 2 of these categories: M Wasting, Fat Loss, Fluid, Secondary Signs, Wt. Status, Intake)    Patient meets criteria for  Severe malnutrition          Problem/Interventions:        Problem 1       02/08/17 1028    Nutrition Diagnoses Problem 1    Problem 1 Malnutrition    Etiology (related to) Medical Diagnosis    Gastrointestinal Diverticulitis    Signs/Symptoms (evidenced by) Unintended Weight Change;Report of Mnimal PO Intake    Unintended Weight Change Loss    Number of Pounds Lost 10#    Weight loss time period 1 month                    Intervention Goal       02/08/17 1029    Intervention Goal    General Meet nutritional needs for age/condition    PO Establish PO    Weight Maintain weight            Nutrition Intervention       02/08/17 1029    Nutrition Intervention    RD/Tech Action Follow Tx  progress;Interview for preference            Nutrition Prescription       02/08/17 1029    Other Orders    Other Advance diet as clinically indicated w GI Function. Once on Fulls pt drinks ENSURE twice per day at home.            Education/Evaluation       02/08/17 1030    Education    Education Other (comment)   Spoke w pt & wife, aware of NPO. Regular diet at home. Will cont to follow.    Monitor/Evaluation    Monitor Per protocol;I&O;PO intake;Pertinent labs;Weight        Comments:    Advance diet as clinically indicated with GI issues. Will cont to follow and monitor.     Electronically signed by:  Roseanne Clayton RD  02/08/17 10:34 AM

## 2017-02-08 NOTE — CONSULTS
Chief complaint left lower quadrant abdominal pain  History of present illness I obtained this from the patient and his wife I discussed the case with Dr. Hollingsworth and Sophie Amado and Suresh.  He presented to the emergency room in January of this year with some lower abdominal pain and had a CT scan that showed fairly long segment severe sigmoid diverticulitis.  He was discharged to the emergency room on Levaquin and Flagyl he said that he improved.  He subsequently had a colonoscopy by Dr. Amado on February 1 of this year.  He says it soon after the colonoscopy he developed some abdominal pain some fevers and some night sweats this progressed over several days. .  He denies any nausea or vomiting.  Says he hasn't been eating well.  He follows with hematology for myelodysplastic syndrome and saw Dr. Green yesterday and Dr. Green was concerned about his abdominal pain and obtained a CT scan.  CT scan read to her and the patient was called and he was admitted for further care.  I was asked to see him.  He says he is feeling much better this morning than he did yesterday.  I reviewed his colonoscopy report and discussed the colonoscopy with Dr. Amado.  His past medical history is significant for arthritis, myelodysplastic syndrome, hypertension.  He says his hypertension has improved since he lost weight he's had to decrease his dose secondary to dizziness.  he had back surgery.  Exploratory laparotomy with cholecystectomy.  A right inguinal hernia repair.  and a laparoscopic umbilical hernia repair.  He says that since the umbilical hernia he has been unable to void and self caths himself 3 times daily.  He sees Dr. Prince for this.  He has a history of a leaky valve.  He denies allergies to medications.  Medications at home include Lotrisone, multivitamins, Avalide, glucosamine, Folic acid, vitamin B injection, Tylenol.  Family history is significant for diabetes  Social history does not smoke and  he has not had alcohol for several months.  Review of systems is significant for pain in his knees.  Otherwise negative  Physical exam is an elderly white male who is oriented ×3 in no active distress.  He is a low-grade fever.  His heart shows regular rate and rhythm with a systolic murmur.  His lungs are clear and equal.  His abdomen shows normoactive bowel sounds with very mild left lower quadrant tenderness.  His white blood count yesterday was elevated is normal today.  His hemoglobin has fallen to 7-1/2.  CT scan of the abdomen from yesterday was reviewed reviewed by me.  She is markedly thickened urinary bladder wall which is more thickened adjacent to the sigmoid colon.  Has long segment of sigmoid inflammation with an intramural abscess and a small abscess adjacent to the colon on the opposite side from the bladder.  Assessment recurrent long segment diverticulitis.  Recommendation I switched his antibiotics to Zosyn last evening.  I would continue bowel rest and intravenous antibiotics.  I spoke with Dr. Prince and he would like to see him on an outpatient basis after he is discharged from the hospital.  I will follow him along.

## 2017-02-09 ENCOUNTER — INPATIENT HOSPITAL (OUTPATIENT)
Dept: URBAN - METROPOLITAN AREA HOSPITAL 27 | Facility: HOSPITAL | Age: 82
End: 2017-02-09
Payer: MEDICARE

## 2017-02-09 DIAGNOSIS — K57.32 DIVERTICULITIS OF LARGE INTESTINE WITHOUT PERFORATION OR ABS: ICD-10-CM

## 2017-02-09 DIAGNOSIS — Z86.010 PERSONAL HISTORY OF COLONIC POLYPS: ICD-10-CM

## 2017-02-09 LAB
ABO + RH BLD: NORMAL
ABO + RH BLD: NORMAL
ANION GAP SERPL CALCULATED.3IONS-SCNC: 15.1 MMOL/L
BASOPHILS # BLD AUTO: 0.01 10*3/MM3 (ref 0–0.2)
BASOPHILS NFR BLD AUTO: 0.1 % (ref 0–2)
BH BB BLOOD EXPIRATION DATE: NORMAL
BH BB BLOOD EXPIRATION DATE: NORMAL
BH BB BLOOD TYPE BARCODE: 5100
BH BB BLOOD TYPE BARCODE: 5100
BH BB DISPENSE STATUS: NORMAL
BH BB DISPENSE STATUS: NORMAL
BH BB PRODUCT CODE: NORMAL
BH BB PRODUCT CODE: NORMAL
BH BB UNIT NUMBER: NORMAL
BH BB UNIT NUMBER: NORMAL
BUN BLD-MCNC: 12 MG/DL (ref 8–23)
BUN/CREAT SERPL: 12.8 (ref 7–25)
CALCIUM SPEC-SCNC: 8.5 MG/DL (ref 8.8–10.5)
CHLORIDE SERPL-SCNC: 100 MMOL/L (ref 98–107)
CO2 SERPL-SCNC: 20.9 MMOL/L (ref 22–29)
CREAT BLD-MCNC: 0.94 MG/DL (ref 0.76–1.27)
CROSSMATCH INTERPRETATION: NORMAL
CROSSMATCH INTERPRETATION: NORMAL
DEPRECATED RDW RBC AUTO: 81.8 FL (ref 37–54)
EOSINOPHIL # BLD AUTO: 0 10*3/MM3 (ref 0.1–0.3)
EOSINOPHIL NFR BLD AUTO: 0 % (ref 0–4)
ERYTHROCYTE [DISTWIDTH] IN BLOOD BY AUTOMATED COUNT: 20.9 % (ref 11.5–14.5)
GFR SERPL CREATININE-BSD FRML MDRD: 77 ML/MIN/1.73
GLUCOSE BLD-MCNC: 77 MG/DL (ref 65–99)
HCT VFR BLD AUTO: 31.1 % (ref 42–52)
HGB BLD-MCNC: 9.7 G/DL (ref 14–18)
IMM GRANULOCYTES # BLD: 0.45 10*3/MM3 (ref 0–0.03)
IMM GRANULOCYTES NFR BLD: 5.2 % (ref 0–0.5)
LYMPHOCYTES # BLD AUTO: 1.17 10*3/MM3 (ref 0.6–4.8)
LYMPHOCYTES NFR BLD AUTO: 13.5 % (ref 20–45)
MCH RBC QN AUTO: 33.8 PG (ref 27–31)
MCHC RBC AUTO-ENTMCNC: 31.2 G/DL (ref 31–37)
MCV RBC AUTO: 108.4 FL (ref 80–94)
MONOCYTES # BLD AUTO: 2.76 10*3/MM3 (ref 0–1)
MONOCYTES NFR BLD AUTO: 31.9 % (ref 3–8)
NEUTROPHILS # BLD AUTO: 4.25 10*3/MM3 (ref 1.5–8.3)
NEUTROPHILS NFR BLD AUTO: 49.3 % (ref 45–70)
NRBC BLD MANUAL-RTO: 0 /100 WBC (ref 0–0)
PLATELET # BLD AUTO: 101 10*3/MM3 (ref 140–500)
PMV BLD AUTO: 12.1 FL (ref 7.4–10.4)
POTASSIUM BLD-SCNC: 3.5 MMOL/L (ref 3.5–5.2)
RBC # BLD AUTO: 2.87 10*6/MM3 (ref 4.7–6.1)
SODIUM BLD-SCNC: 136 MMOL/L (ref 136–145)
UNIT  ABO: NORMAL
UNIT  ABO: NORMAL
UNIT  RH: NORMAL
UNIT  RH: NORMAL
WBC NRBC COR # BLD: 8.64 10*3/MM3 (ref 4.8–10.8)

## 2017-02-09 PROCEDURE — 85025 COMPLETE CBC W/AUTO DIFF WBC: CPT | Performed by: SURGERY

## 2017-02-09 PROCEDURE — 80048 BASIC METABOLIC PNL TOTAL CA: CPT | Performed by: SURGERY

## 2017-02-09 PROCEDURE — 25010000002 PIPERACILLIN SOD-TAZOBACTAM PER 1 G

## 2017-02-09 PROCEDURE — 25010000002 PIPERACILLIN SOD-TAZOBACTAM PER 1 G: Performed by: SURGERY

## 2017-02-09 PROCEDURE — 99231 SBSQ HOSP IP/OBS SF/LOW 25: CPT

## 2017-02-09 PROCEDURE — 99232 SBSQ HOSP IP/OBS MODERATE 35: CPT | Performed by: SURGERY

## 2017-02-09 PROCEDURE — 97161 PT EVAL LOW COMPLEX 20 MIN: CPT

## 2017-02-09 PROCEDURE — 99232 SBSQ HOSP IP/OBS MODERATE 35: CPT | Performed by: INTERNAL MEDICINE

## 2017-02-09 RX ORDER — L.ACID,PARA/B.BIFIDUM/S.THERM 8B CELL
1 CAPSULE ORAL DAILY
Status: DISCONTINUED | OUTPATIENT
Start: 2017-02-10 | End: 2017-02-15 | Stop reason: HOSPADM

## 2017-02-09 RX ORDER — PIPERACILLIN SODIUM, TAZOBACTAM SODIUM 4; .5 G/20ML; G/20ML
INJECTION, POWDER, LYOPHILIZED, FOR SOLUTION INTRAVENOUS
Status: COMPLETED
Start: 2017-02-09 | End: 2017-02-09

## 2017-02-09 RX ORDER — SODIUM CHLORIDE 9 MG/ML
INJECTION, SOLUTION INTRAVENOUS
Status: COMPLETED
Start: 2017-02-09 | End: 2017-02-09

## 2017-02-09 RX ORDER — SODIUM CHLORIDE, SODIUM LACTATE, POTASSIUM CHLORIDE, CALCIUM CHLORIDE 600; 310; 30; 20 MG/100ML; MG/100ML; MG/100ML; MG/100ML
INJECTION, SOLUTION INTRAVENOUS
Status: COMPLETED
Start: 2017-02-09 | End: 2017-02-09

## 2017-02-09 RX ADMIN — HYDROCODONE BITARTRATE AND ACETAMINOPHEN 1 TABLET: 5; 325 TABLET ORAL at 06:40

## 2017-02-09 RX ADMIN — SODIUM CHLORIDE, POTASSIUM CHLORIDE, SODIUM LACTATE AND CALCIUM CHLORIDE 75 ML/HR: 600; 310; 30; 20 INJECTION, SOLUTION INTRAVENOUS at 22:13

## 2017-02-09 RX ADMIN — IRBESARTAN 300 MG: 150 TABLET ORAL at 08:54

## 2017-02-09 RX ADMIN — SODIUM CHLORIDE: 9 INJECTION, SOLUTION INTRAVENOUS at 23:12

## 2017-02-09 RX ADMIN — METRONIDAZOLE 500 MG: 500 INJECTION, SOLUTION INTRAVENOUS at 22:12

## 2017-02-09 RX ADMIN — ACETAMINOPHEN 400 MCG: 400 TABLET ORAL at 08:54

## 2017-02-09 RX ADMIN — SODIUM CHLORIDE, POTASSIUM CHLORIDE, SODIUM LACTATE AND CALCIUM CHLORIDE 75 ML/HR: 600; 310; 30; 20 INJECTION, SOLUTION INTRAVENOUS at 01:50

## 2017-02-09 RX ADMIN — MULTIPLE VITAMINS W/ MINERALS TAB 1 TABLET: TAB at 08:54

## 2017-02-09 RX ADMIN — METRONIDAZOLE 500 MG: 500 INJECTION, SOLUTION INTRAVENOUS at 06:33

## 2017-02-09 RX ADMIN — METRONIDAZOLE 500 MG: 500 INJECTION, SOLUTION INTRAVENOUS at 15:43

## 2017-02-09 RX ADMIN — SODIUM CHLORIDE 4.5 G: 900 INJECTION, SOLUTION INTRAVENOUS at 17:01

## 2017-02-09 RX ADMIN — PIPERACILLIN AND TAZOBACTAM 4.5 G: 4; .5 INJECTION, POWDER, LYOPHILIZED, FOR SOLUTION INTRAVENOUS; PARENTERAL at 23:12

## 2017-02-09 RX ADMIN — SODIUM CHLORIDE 4.5 G: 900 INJECTION, SOLUTION INTRAVENOUS at 07:45

## 2017-02-09 RX ADMIN — HYDROCODONE BITARTRATE AND ACETAMINOPHEN 1 TABLET: 5; 325 TABLET ORAL at 17:07

## 2017-02-09 RX ADMIN — SODIUM CHLORIDE 4.5 G: 900 INJECTION, SOLUTION INTRAVENOUS at 00:16

## 2017-02-09 NOTE — PROGRESS NOTES
GI Daily Progress Note    Assessment/Plan:    Active Problems:    Acute diverticulitis       LOS: 2 days     Jason Zelaya is a 83 y.o. male who was admitted with Diverticulitis. He reports his symptoms are improving with treatment. Still with intermittant cramps controlled by po pain meds, also pos BM today. Plans per surgery for PO tomorrow.    Subjective:    Patient expresses abdominal pain  Patient denies constipation, vomiting and diarrhea    Objective:    Vital signs in last 24 hours:  Temp:  [96.8 °F (36 °C)-99.8 °F (37.7 °C)] 96.8 °F (36 °C)  Heart Rate:  [73-96] 73  Resp:  [16-18] 18  BP: (116-139)/(59-68) 133/67    Intake/Output last 3 shifts:  I/O last 3 completed shifts:  In: 1854 [I.V.:980; Blood:574; IV Piggyback:300]  Out: -   Intake/Output this shift:       Results from last 7 days  Lab Units 02/09/17  0620   WBC 10*3/mm3 8.64   HEMOGLOBIN g/dL 9.7*   HEMATOCRIT % 31.1*   PLATELETS 10*3/mm3 101*       Results from last 7 days  Lab Units 02/09/17  0620  02/07/17  2107   SODIUM mmol/L 136  < > 133*   POTASSIUM mmol/L 3.5  < > 3.7   CHLORIDE mmol/L 100  < > 96*   TOTAL CO2 mmol/L 20.9*  < > 23.3   BUN mg/dL 12  < > 15   CREATININE mg/dL 0.94  < > 0.87   CALCIUM mg/dL 8.5*  < > 8.4*   BILIRUBIN mg/dL  --   --  0.7   ALK PHOS U/L  --   --  186*   ALT (SGPT) U/L  --   --  46*   AST (SGOT) U/L  --   --  21   GLUCOSE mg/dL 77  < > 92   < > = values in this interval not displayed.      Physical Exam:Abdomen  Sounds Normal Active Bowel Sounds   Distension Soft   Tenderness Mildly Tender LLQ     Diverticulitis w developing Abscesses  Colon Polyps all benign Tubular adenomas    Overall looks good and will add Probiotic for his prolonged ABX exposure.

## 2017-02-09 NOTE — PROGRESS NOTES
"Hospitalist Team      Patient Care Team:  David Cedillo MD as PCP - General (Family Medicine)  Anupam Green MD as Consulting Physician (Hematology and Oncology)        Chief Complaint:  F/U Acute diverticulitis with abscess    Subjective    Interval History and ROS:     Patient notes he's feeling better today, still with abdominal pain but it has improved.  Denies N/V/D. Still with intermittent fevers.  Denies CP or SOA.    Objective    Vital Signs  Temp:  [97.2 °F (36.2 °C)-100.5 °F (38.1 °C)] 99 °F (37.2 °C)  Heart Rate:  [] 89  Resp:  [16-18] 18  BP: (104-138)/(58-71) 116/59  Oxygen Therapy  SpO2: 92 %  Pulse Oximetry Type: Intermittent  O2 Device: room air     Flowsheet Rows         First Filed Value    Admission Height  70\" (177.8 cm) Documented at 02/07/2017 1859    Admission Weight  149 lb (67.6 kg) Documented at 02/07/2017 1859            Physical Exam:  Constitutional: Patient appears well-developed and well-nourished and in no acute distress   HEENT:   Head: Normocephalic and atraumatic.   Eyes:  EOM are intact. Sclera are anicteric and non-injected.  Mouth and Throat: Patient has moist mucous membranes.   Neck: Neck supple. No JVD present.   Cardiovascular: Regular rate, regular rhythm, Exam reveals no gallop and no friction rub. 1/6 systolic murmur heard RSB.  Pulmonary/Chest: Lungs are clear to auscultation bilaterally. No respiratory distress. No wheezes. No rhonchi. No rales.   Abdominal: Soft. TTP in the bilateral lower quadrants L>R, No rebound or guarding, Bowel sounds are normal. No distension and no mass. There is no hepatosplenomegaly.   Extremities: No edema. Pulses are palpable in all 4 extremities.  Neurological: Patient is alert and oriented to person, place, and time.   Skin: Skin is warm.  No cyanosis or erythema.        Results Review:     I reviewed the patient's new clinical results.    Lab Results (last 24 hours)     Procedure Component Value Units Date/Time    " Comprehensive Metabolic Panel [[id number]]  (Abnormal) Collected:  02/07/17 2107    Specimen:  Blood Updated:  02/07/17 2130     Glucose 92 mg/dL      BUN 15 mg/dL      Creatinine 0.87 mg/dL      Sodium 133 (L) mmol/L      Potassium 3.7 mmol/L      Chloride 96 (L) mmol/L      CO2 23.3 mmol/L      Calcium 8.4 (L) mg/dL      Total Protein 7.1 g/dL      Albumin 2.90 (L) g/dL      ALT (SGPT) 46 (H) U/L      AST (SGOT) 21 U/L      Alkaline Phosphatase 186 (H) U/L      Total Bilirubin 0.7 mg/dL      eGFR Non African Amer 84 mL/min/1.73      Globulin 4.2 gm/dL      A/G Ratio 0.7 g/dL      BUN/Creatinine Ratio 17.2      Anion Gap 13.7 mmol/L     Narrative:       The MDRD GFR formula is only valid for adults with stable renal function between ages 18 and 70.    Basic Metabolic Panel [[id number]]  (Abnormal) Collected:  02/08/17 0349    Specimen:  Blood Updated:  02/08/17 0527     Glucose 88 mg/dL      BUN 14 mg/dL      Creatinine 0.92 mg/dL      Sodium 139 mmol/L      Potassium 3.9 mmol/L      Chloride 100 mmol/L      CO2 25.1 mmol/L      Calcium 8.6 (L) mg/dL      eGFR Non African Amer 79 mL/min/1.73      BUN/Creatinine Ratio 15.2      Anion Gap 13.9 mmol/L     Narrative:       The MDRD GFR formula is only valid for adults with stable renal function between ages 18 and 70.    CBC Auto Differential [[id number]]  (Abnormal) Collected:  02/08/17 0349    Specimen:  Blood Updated:  02/08/17 0618     WBC 8.86 10*3/mm3      RBC 2.16 (L) 10*6/mm3      Hemoglobin 7.6 (L) g/dL      Hematocrit 25.4 (L) %      .6 (H) fL      MCH 35.2 (H) pg      MCHC 29.9 (L) g/dL      RDW 15.1 (H) %      RDW-SD 65.5 (H) fl      MPV 12.0 (H) fL      Platelets 97 (L) 10*3/mm3     Scan Slide [[id number]] Collected:  02/08/17 0349    Specimen:  Blood Updated:  02/08/17 0618     Scan Slide --       See Manual Differential Results       Manual Differential [[id number]]  (Abnormal) Collected:  02/08/17 0349    Specimen:  Blood Updated:  02/08/17 0618      Neutrophil % 52.0 %      Lymphocyte % 13.0 (L) %      Monocyte % 26.0 (H) %      Myelocyte % 2.0 (H) %      Atypical Lymphocyte % 5.0 %      Other Cells % 2.0 (H) %      Neutrophils Absolute 4.61 10*3/mm3      Lymphocytes Absolute 1.15 10*3/mm3      Monocytes Absolute 2.30 (H) 10*3/mm3      Anisocytosis Mod/2+      Macrocytes Mod/2+      WBC Morphology Normal      Platelet Estimate Decreased       Platelets appear mildly decreased on smear        Narrative:       Other cells appear to be promonocytes      Slide was not sent to pathologist.  If review by path is wanted, please notify lab staff.     Hemoglobin & Hematocrit, Blood [[id number]]  (Abnormal) Collected:  02/08/17 1143    Specimen:  Blood Updated:  02/08/17 1146     Hemoglobin 7.7 (L) g/dL      Hematocrit 25.0 (L) %           Imaging Results (last 24 hours)     ** No results found for the last 24 hours. **          ECG/EMG Results (most recent)     None          Medication Review:   I have reviewed the patient's current medication list    Current Facility-Administered Medications:   •  acetaminophen (TYLENOL) tablet 650 mg, 650 mg, Oral, Q6H PRN, Kenya Marks MD  •  clotrimazole-betamethasone (LOTRISONE) 1-0.05 % cream 1 application, 1 application, Topical, Daily, Kenya Marks MD, 1 application at 02/08/17 0907  •  folic acid (FOLVITE) tablet 400 mcg, 400 mcg, Oral, Daily, Kenya Marks MD, 400 mcg at 02/08/17 0907  •  HYDROcodone-acetaminophen (NORCO) 5-325 MG per tablet 1 tablet, 1 tablet, Oral, Q6H PRN, Kenya Marks MD, 1 tablet at 02/08/17 1634  •  irbesartan (AVAPRO) tablet 300 mg, 300 mg, Oral, Q24H, Kenya Marks MD, 300 mg at 02/08/17 0907  •  lactated ringers infusion, 75 mL/hr, Intravenous, Continuous, Kenya Marks MD, Last Rate: 75 mL/hr at 02/07/17 2141, 75 mL at 02/07/17 2141  •  metroNIDAZOLE (FLAGYL) IVPB 500 mg, 500 mg, Intravenous, Q8H, Kenya  Pat Marks MD, Last Rate: 0 mL/hr at 02/08/17 0036, 500 mg at 02/08/17 1358  •  multivitamin with minerals 1 tablet, 1 tablet, Oral, Daily, Kenya Marks MD, 1 tablet at 02/08/17 0907  •  piperacillin-tazobactam (ZOSYN) 4.5 g/100 mL 0.9% NS IVPB (mbp), 4.5 g, Intravenous, Q8H, Marvin Ma MD, 4.5 g at 02/08/17 1624  •  sodium chloride 0.9 % flush 1-10 mL, 1-10 mL, Intravenous, PRN, Kenya Marks MD      Assessment/Plan     1. Acute diverticulitis with Abscess: Dr. Ma and Dr. Franket following. Initially the patient was started on levoquin and flagyl but Dr. Ma changed the levoquin to zosyn with concern that this may be an unresolved colitis and not a recurrent colitis from 1 month ago. continue NPO for now except sips with meds and IVFs. Norco PRN  Working for pain. Patient with fever earlier this evening, WBC WNL.     2. Suspected low grade MDS: Patient follows with Dr. Green and received procrit outpatient yesterday prior to admission. Hb was near baseline at admission, dropped today to 7.6 this AM but was stable.  Suspect multifactorial with IVFs, blood draws.  Patient with no overt s/s of bleeding. 2 units of PRBCs ordered by my partner earlier today. Monitor.      3. Hypertension: On home avapro but hold HCTZ for now while on IVFs. BP is WNL.     4. Grade II diastolic dysfunction and mild-mod cardiac valvular disease: No acute issues here.     5. Chronic urinary retention: Patient to continue to straight cath 3-4 times per day as per home regimen. CT showed some inflammation of the bladder where it abuts the inflamed sigmoid colon. Dr. Ma spoke with Dr. Prince and he would like to see patient in f/U as outpatient.     6. DVT prophy: SCDs, No AC for now with anemia and possibility of needed surgery.    Plan for disposition: Home when able     Kenya Marks MD  02/08/17  8:25 PM

## 2017-02-09 NOTE — PLAN OF CARE
Problem: Patient Care Overview (Adult)  Goal: Plan of Care Review    02/09/17 1504   Outcome Evaluation   Outcome Summary/Follow up Plan PT evaluation complete: Patient completes bed mobility with independence, transfers with supervision, and performs gait x 150 feet with SBA/supervision with use of RW. Patient does not require assist or cues for safe performance of functional mobility. No skilled PT needs at this time. Recommend patient to ambulate with nursing throughout duration of stay. Nursing notified.    Coping/Psychosocial Response Interventions   Plan Of Care Reviewed With patient

## 2017-02-09 NOTE — NURSING NOTE
2/9/17 @ 1155- I SPOKE WITH PATIENT AND WITH HIS PERMISSION HIS WIFE ANABELLA WHO WAS AT BEDSIDE- HE STATED HE IS NORMALLY INDEPENDENT AND DRIVES- HE PLANS TO RETURN HOME WHEN DISCHARGED- BOTH DENY ANY NEEDS AT THIS TIME- NOT ANTICIPATED FOR DISCHARGE TODAY AND THEY ARE AWARE    2/15/17 @ 1100-- I SPOKE WITH PATIENT AT BEDSIDE AND HE STATED DR. GIANG CAME IN AND SAW HIM AND TOLD HIM HE WAS PROBABLY GOING HOME TODAY AND HE WAS TO F/U  WITH HIM ON Tuesday- HE IS AGREEABLE TO DISCHARGE TODAY STATING  HE HAS BEEN HERE 8 DAYS- HE SAID HE LIVES WITH HIS WIFE AND WILL RETURN HOME WITH HER, AND HIS WIFE AND SON WILL COME AND PICK HIM   UP. DENIES ANY NEEDS AT THIS TIME

## 2017-02-09 NOTE — PROGRESS NOTES
"    HOSPITALIST SERVICES  @ [place], [place]            HOSPITALIST TEAM   PROGRESS NOTE      Patient Care Team:  David Cedillo MD as PCP - General (Family Medicine)  Anupam Green MD as Consulting Physician (Hematology and Oncology)        Chief Complaint:        Acute diverticulitis with abscess      Subjective      82 y/o male with hypertension, grade II diastolic dysfunction, Mild to moderate cardiac valvular disease, chronic urinary retention and straight caths 3-4 x/day and suspected low grade MDS is being admitted directly from Dr. Green's office secondary to acute diverticulitis with abscess.       Interval History and ROS:     Patient States abdominal pain has improved but still has some discomfort  Patient Complaints: No new complaints  Patient Denies:  Any sx of fever, n/v, chest pain or shortness of breath  History taken from: Patient      Objective    Vital Signs  Temp:  [96.8 °F (36 °C)-100.5 °F (38.1 °C)] 96.8 °F (36 °C)  Heart Rate:  [73-96] 73  Resp:  [16-18] 18  BP: (116-139)/(59-71) 133/67    Flowsheet Rows         First Filed Value    Admission Height  70\" (177.8 cm) Documented at 02/07/2017 1859    Admission Weight  149 lb (67.6 kg) Documented at 02/07/2017 1859              Physical Exam:      PHYSICAL EXAMINATION:    VITAL SIGNS: As per Nurse's notes    GENERAL APPEARANCE: The patient is a well developed, well nourished,  male, in no acute distress.     HEENT: Normocephalic, atraumatic. PERRL. The sclerae anicteric and conjunctivae pink and moist.     NECK: Supple and symmetric. No thyromegaly. No carotid bruits. No evidence of JVD.    SKIN: Warm, dry and intact. No rash or lesions or wounds or patechiae.    LUNGS: Clear to auscultation bilaterally.     CARDIOVASCULAR: RRR. S1, S2 normal without murmur/gallop/rub.     ABDOMEN: Soft, slightly tender-tender in lower part, non-distended. No masses. No rebound/guarding.     EXTREMITIES:  No evidence of cyanosis, " clubbing, or edema.     MUSCULOSKELETAL: Muscle strength and tone normal.    PSYCHIATRY: No evidence of acute anxiety, depression, panic attacks or hallucinations.    MENTAL STATUS EXAMINATION: Speech and behavior are normal. Patient is alert and oriented x3. Thought Process WNL.     NEUROLOGIC: Examination is grossly intact globally with no focal deficits.         Results Review:     I reviewed the patient's new clinical results.    Lab Results (last 24 hours)     Procedure Component Value Units Date/Time    CBC Auto Differential [[id number]]  (Abnormal) Collected:  02/09/17 0620    Specimen:  Blood Updated:  02/09/17 0721     WBC 8.64 10*3/mm3      RBC 2.87 (L) 10*6/mm3      Hemoglobin 9.7 (L) g/dL      Hematocrit 31.1 (L) %      .4 (H) fL      MCH 33.8 (H) pg      MCHC 31.2 g/dL      RDW 20.9 (H) %      RDW-SD 81.8 (H) fl      MPV 12.1 (H) fL      Platelets 101 (L) 10*3/mm3      Neutrophil % 49.3 %      Lymphocyte % 13.5 (L) %      Monocyte % 31.9 (H) %      Eosinophil % 0.0 %      Basophil % 0.1 %      Immature Grans % 5.2 (H) %      Neutrophils, Absolute 4.25 10*3/mm3      Lymphocytes, Absolute 1.17 10*3/mm3      Monocytes, Absolute 2.76 (H) 10*3/mm3      Eosinophils, Absolute 0.00 (L) 10*3/mm3      Basophils, Absolute 0.01 10*3/mm3      Immature Grans, Absolute 0.45 (H) 10*3/mm3      nRBC 0.0 /100 WBC     Basic Metabolic Panel [[id number]]  (Abnormal) Collected:  02/09/17 0620    Specimen:  Blood Updated:  02/09/17 0728     Glucose 77 mg/dL      BUN 12 mg/dL      Creatinine 0.94 mg/dL      Sodium 136 mmol/L      Potassium 3.5 mmol/L      Chloride 100 mmol/L      CO2 20.9 (L) mmol/L      Calcium 8.5 (L) mg/dL      eGFR Non African Amer 77 mL/min/1.73      BUN/Creatinine Ratio 12.8      Anion Gap 15.1 mmol/L     Narrative:       The MDRD GFR formula is only valid for adults with stable renal function between ages 18 and 70.    CBC & Differential [[id number]] Collected:  02/09/17 0620    Specimen:  Blood  Updated:  02/09/17 0907    Narrative:       The following orders were created for panel order CBC & Differential.  Procedure                               Abnormality         Status                     ---------                               -----------         ------                     Manual Differential[[id number]]                                                          Scan Slide[[id number]]                                                                   CBC Auto Differential[[id number]]         Abnormal            Final result                 Please view results for these tests on the individual orders.          Imaging Results (last 24 hours)     ** No results found for the last 24 hours. **          Xray not reviewed personally by physician.      ECG not reviewed personally by physician  ECG/EMG Results (most recent)     None          Medication Review:   I have reviewed the patient's current medication list    Current Facility-Administered Medications:   •  acetaminophen (TYLENOL) tablet 650 mg, 650 mg, Oral, Q6H PRN, Kenya Marks MD  •  clotrimazole-betamethasone (LOTRISONE) 1-0.05 % cream 1 application, 1 application, Topical, Daily, Kenya Marks MD, 1 application at 02/08/17 0907  •  folic acid (FOLVITE) tablet 400 mcg, 400 mcg, Oral, Daily, Kenya Marks MD, 400 mcg at 02/09/17 0854  •  HYDROcodone-acetaminophen (NORCO) 5-325 MG per tablet 1 tablet, 1 tablet, Oral, Q6H PRN, Kenya Marks MD, 1 tablet at 02/09/17 0640  •  irbesartan (AVAPRO) tablet 300 mg, 300 mg, Oral, Q24H, Kenya Marks MD, 300 mg at 02/09/17 0854  •  lactated ringers infusion, 75 mL/hr, Intravenous, Continuous, Kenya Marks MD, Last Rate: 75 mL/hr at 02/09/17 0150, 75 mL/hr at 02/09/17 0150  •  metroNIDAZOLE (FLAGYL) IVPB 500 mg, 500 mg, Intravenous, Q8H, Kenya Marks MD, Last Rate: 0 mL/hr at 02/09/17 0017, 500 mg at 02/09/17  0633  •  multivitamin with minerals 1 tablet, 1 tablet, Oral, Daily, Kenya Marks MD, 1 tablet at 02/09/17 0854  •  piperacillin-tazobactam (ZOSYN) 4.5 g/100 mL 0.9% NS IVPB (mbp), 4.5 g, Intravenous, Q8H, Marvin Ma MD, 4.5 g at 02/09/17 0745  •  sodium chloride 0.9 % flush 1-10 mL, 1-10 mL, Intravenous, PRN, Kenya Marks MD      Assessment/Plan       ASSESSMENT AND PLAN:       SUMMARY:    ?   PROPHYLAXIS:   -DVT Prophylaxis: On SCDs   -Ayoub Catheter: Not indicated at this time    NUTRITION AND FLUIDS:  -Diet/ Nutrition: NPO with sips with meds   -Fluid Status/Electrolytes: Lactated Ringers 75 mL/Hr      SOCIAL ISSUES:    -Behavioral/ Agitation Issues: NONE   -Social Issues: Lives at home with his family.       THERAPEUTIC:    -ANTIBIOTICS: Inj Zosyn and Inj Metronidazole  -PAIN MANAGEMENT: Norco 5-325 mg              PRIMARY DIAGNOSES:    1. Acute diverticulitis with Abscess: Dr. Ma and Dr. Amado following. Initially the patient was started on levoquin and flagyl but Dr. Ma changed the levoquin to zosyn with concern that this may be an unresolved colitis and not a recurrent colitis from 1 month ago. continue NPO for now except sips with meds and IVFs. Norco PRN Working for pain. Patient with fever earlier this evening, WBC WNL. Patient is afebrile now.      2. Suspected low grade MDS (Myelodysplastic syndrome): Patient follows with Dr. Green and received procrit outpatient yesterday prior to admission. Hb was near baseline at admission, dropped today to 7.6 this AM but was stable. Suspect multifactorial with IVFs, blood draws. Patient with no overt s/s of bleeding. Patient received 2 units of PRBCs yesterday and last Hb is 9.7.        3. Hypertension: On home avapro but hold HCTZ for now while on IVFs. BP is WNL.      4. Grade II diastolic dysfunction and mild-mod cardiac valvular disease: No acute issues here.      5. Chronic urinary retention: Patient to continue  to straight cath 3-4 times per day as per home regimen. CT showed some inflammation of the bladder where it abuts the inflamed sigmoid colon. Dr. Ma spoke with Dr. Prince and he would like to see patient in f/U as outpatient.      6. DVT prophy: SCDs, No AC for now with anemia and possibility of needed surgery.  ?     SECONDARY DIAGNOSES:  ?   Aortic Regurgitation, DJD, Hx of skin cancer      SURGICAL DIAGNOSES:    S/P Fusion Back surgery, S/P Cholecystectomy, S/P Colonoscopy, S/P Herniorrhaphy          PLAN:    -Labs and diagnostic tests reviewed: Gluc 77, Na 136, K 3.5, Creat 0.94, WBC 8.64, Hb 9.7, Plt 101    -Diagnostic tests reviewed: None done in last 24 hours    -Any new recommendations: N/A    -New Labs ordered: CBC and CMP in AM    -New diagnostic tests ordered: N/A    -Any changes in medications: N/A    -Discharge planning issues: Patient should be able to go back home once ready for discharge    -Placement issues: N/A    -Patient is clinically and hemodynamically stable    -To continue current management and supportive care    -Will follow patient closely    -Nothing new to add for right now      Plan for disposition: Patient should be able to go home once ready for discharge.    Rian Dean MD  02/09/17  12:29 PM            Rian Dean M.D., [facility]  Internal Medicine/ Hospitalist          Time:       EMR Dragon/Transcription disclaimer:      Much of this encounter note is an electronic transcription/translation of spoken language to printed text. The electronic translation of spoken language may permit erroneous, or at times, nonsensical words or phrases to be inadvertently transcribed; Although I have reviewed the note for such errors, some may still exist.

## 2017-02-09 NOTE — THERAPY DISCHARGE NOTE
Acute Care - Physical Therapy Initial Eval/Discharge   Tiffanie Keith     Patient Name: Jason Zelaya  : 1934  MRN: [id number]  Today's Date: 2017   Onset of Illness/Injury or Date of Surgery Date: 17  Date of Referral to PT: 17  Referring Physician: Dr. Dean       Admit Date: 2017    Visit Dx:  No diagnosis found.  Patient Active Problem List   Diagnosis   • MDS (myelodysplastic syndrome), low grade   • Vitamin B 12 deficiency   • AI (aortic incompetence)   • Bundle branch block, right   • Acute diverticulitis     Past Medical History   Diagnosis Date   • Aortic regurgitation    • Aortic valve insufficiency    • Arthritis      Bilateral knee   • Chest pain    • Diastolic dysfunction    • History of urinary retention    • Hypertension    • MDS (myelodysplastic syndrome)    • RBBB (right bundle branch block)    • Self-catheterizes urinary bladder      3-4 times aday   • Skin cancer      Past Surgical History   Procedure Laterality Date   • Back surgery       fusion   • Cholecystectomy     • Hernia repair     • Colonoscopy N/A 2017     Procedure: COLONOSCOPY;  Surgeon: Bridger Amado MD;  Location: [facility];  Service:           PT ASSESSMENT (last 72 hours)      PT Evaluation       17 1320 17 1316    Rehab Evaluation    Document Type evaluation  -BP     Subjective Information no complaints;agree to therapy  -BP     Patient Effort, Rehab Treatment good  -BP     Symptoms Noted During/After Treatment none  -BP     General Information    Patient Profile Review yes  -BP     Onset of Illness/Injury or Date of Surgery Date 17  -BP     Referring Physician Dr. Dean   -BP     General Observations Patient sitting EOB. Wife and family present. IV line in use   -BP     Pertinent History Of Current Problem Patient with onset of acute diverticulits, directly admitted from doctors office. Patient has been using a rolling walker for several weeks due to knee pain. He is  normally independent with all mobility and ADL's. He and wife report no concerns for return home at this time.   -BP     Precautions/Limitations fall precautions  -BP     Prior Level of Function independent:;bed mobility;gait;transfer  -BP     Equipment Currently Used at Home cane, straight;walker, rolling;crutches   rollator, BSC  -BP     Plans/Goals Discussed With patient;spouse/S.O.  -BP     Risks Reviewed patient:;spouse/S.O.:;increased discomfort  -BP     Benefits Reviewed patient:;spouse/S.O.:;improve function;increase independence  -BP     Barriers to Rehab none identified  -BP     Living Environment    Lives With spouse  -BP     Living Arrangements house  -BP     Home Accessibility stairs to enter home  -BP     Number of Stairs to Enter Home 3  -BP     Stair Railings at Home outside, present at both sides  -BP     Transportation Available      Clinical Impression    Date of Referral to PT 02/09/17  -BP     Patient/Family Goals Statement To return home when medically able  -BP     Criteria for Skilled Therapeutic Interventions Met no problems identified which require skilled intervention  -BP     Pain Assessment    Pain Assessment No/denies pain  -BP     ROM (Range of Motion)    General ROM Detail B LE AROM WFL  -BP     MMT (Manual Muscle Testing)    General MMT Assessment Detail B LE Gross MMT 4/5   -BP     Bed Mobility, Assessment/Treatment    Bed Mob, Sit to Supine, [place] independent  -BP     Transfer Assessment/Treatment    Transfers, Sit-Stand [place] supervision required  -BP     Transfers, Stand-Sit [place] supervision required  -BP     Transfers, Sit-Stand-Sit, Assist Device rolling walker  -BP     Gait Assessment/Treatment    Gait, [place] Level supervision required;stand by assist  -BP     Gait, Assistive Device rolling walker  -BP     Gait, Distance (Feet) 150  -BP     Gait, Gait Pattern Analysis swing-through gait  -BP     Gait, Gait Deviations forward flexed posture  -BP      Gait, Comment Cues for improved distance from walker. Patient demonstrates no LOB with directional changes.   -BP     Positioning and Restraints    Pre-Treatment Position in bed  -BP     Post Treatment Position bed  -BP     In Bed notified nsg;supine;call light within reach;encouraged to call for assist;with family/caregiver   notified CNA and RN  -BP             User Key  (r) = Recorded By, (t) = Taken By, (c) = Cosigned By    Initials Name Provider Type     Dipti Masters RN Registered Nurse    CLIVE Mayen RN Case Manager    BP Hermila St, PT Physical Therapist          Physical Therapy Education     Title: PT OT SLP Therapies (Resolved)     Topic: Physical Therapy (Resolved)     Point: Mobility training (Resolved)    Learning Progress Summary    Learner Readiness Method Response Comment Documented by Status   Patient Acceptance E VU Recommended patient to ambulate with nursing throughout duration of hospital stay. Patient and wife receptive BP 02/09/17 1502 Done   Significant Other Acceptance E VU Recommended patient to ambulate with nursing throughout duration of hospital stay. Patient and wife receptive BP 02/09/17 1502 Done                      User Key     Initials Effective Dates Name Provider Type Discipline     12/01/15 -  Hermila St, PT Physical Therapist PT                PT Recommendation and Plan  Anticipated Discharge Disposition: home  Planned Therapy Interventions:  (evaluation only)  PT Frequency: evaluation only  Plan of Care Review  Plan Of Care Reviewed With: patient  Outcome Summary/Follow up Plan: PT evaluation complete: Patient completes bed mobility with independence, transfers with supervision, and performs gait x 150 feet with SBA/supervision with use of RW. Patient does not require assist or cues for safe performance of functional mobility. No skilled PT needs at this time. Recommend patient to ambulate with nursing throughout duration of stay. Nursing  notified.               Outcome Measures       02/09/17 1320          How much help from another person do you currently need...    Turning from your back to your side while in flat bed without using bedrails? 4  -BP      Moving from lying on back to sitting on the side of a flat bed without bedrails? 4  -BP      Moving to and from a bed to a chair (including a wheelchair)? 3  -BP      Standing up from a chair using your arms (e.g., wheelchair, bedside chair)? 3  -BP      Climbing 3-5 steps with a railing? 3  -BP      To walk in hospital room? 3  -BP      AM-PAC 6 Clicks Score 20  -BP      Functional Assessment    Outcome Measure Options AM-PAC 6 Clicks Basic Mobility (PT)  -BP        User Key  (r) = Recorded By, (t) = Taken By, (c) = Cosigned By    Initials Name Provider Type    BP Hermila St PT Physical Therapist           Time Calculation:         PT Charges       02/09/17 1511          Time Calculation    Start Time 1315  -BP      Stop Time 1330  -BP      Time Calculation (min) 15 min  -BP      PT Received On 02/09/17  -BP        User Key  (r) = Recorded By, (t) = Taken By, (c) = Cosigned By    Initials Name Provider Type    BP Hermila St PT Physical Therapist          Therapy Charges for Today     Code Description Service Date Service Provider Modifiers Qty    [id number] HC PT EVAL LOW COMPLEXITY 2 2/9/2017 Hermila St PT GP 1          PT G-Codes  Outcome Measure Options: AM-PAC 6 Clicks Basic Mobility (PT)    PT Discharge Summary  Anticipated Discharge Disposition: home    Hermila St PT  2/9/2017

## 2017-02-09 NOTE — PROGRESS NOTES
BGA/GI Progress Note   Chief Complaint:  Acute diverticulitis with abscess    Subjective     Patient seen in follow-up acute diverticulitis with abscess.  States his pain is better today.  Is tolerating sips.  Maximum temperature 100.8 he's been afebrile since that spike.    Objective     Vital Signs  Temp:  [97.2 °F (36.2 °C)-100.5 °F (38.1 °C)] 98.3 °F (36.8 °C)  Heart Rate:  [79-96] 80  Resp:  [16-18] 18  BP: (104-139)/(58-71) 120/67  Body mass index is 21.38 kg/(m^2).    Intake/Output Summary (Last 24 hours) at 02/09/17 1009  Last data filed at 02/09/17 0150   Gross per 24 hour   Intake   1754 ml   Output      0 ml   Net   1754 ml             Physical Exam:   General: patient awake, alert and cooperative   Eyes: Normal lids and lashes, no scleral icterus   Abdomen: soft, mild left lower quadrant tenderness, no guarding or rebound, nondistended; normal bowel                              sounds   Extremities: no rash or edema   Neurologic: Normal mood and behavior     Results Review:     I reviewed the patient's new clinical results.      WBC No results found for: WBCS   HGB HEMOGLOBIN   Date Value Ref Range Status   02/09/2017 9.7 (L) 14.0 - 18.0 g/dL Final   02/08/2017 7.7 (L) 14.0 - 18.0 g/dL Final   02/08/2017 7.6 (L) 14.0 - 18.0 g/dL Final   02/07/2017 9.7 (L) 14.0 - 18.0 g/dL Final      HCT HEMATOCRIT   Date Value Ref Range Status   02/09/2017 31.1 (L) 42.0 - 52.0 % Final   02/08/2017 25.0 (L) 42.0 - 52.0 % Final   02/08/2017 25.4 (L) 42.0 - 52.0 % Final   02/07/2017 30.3 (L) 42.0 - 52.0 % Final      Platlets No results found for: LABPLAT     PT/INR:  No results found for: PROTIME/No results found for: INR    Sodium SODIUM   Date Value Ref Range Status   02/09/2017 136 136 - 145 mmol/L Final   02/08/2017 139 136 - 145 mmol/L Final   02/07/2017 133 (L) 136 - 145 mmol/L Final      Potassium POTASSIUM   Date Value Ref Range Status   02/09/2017 3.5 3.5 - 5.2 mmol/L Final   02/08/2017 3.9 3.5 - 5.2 mmol/L  Final   02/07/2017 3.7 3.5 - 5.2 mmol/L Final      Chloride CHLORIDE   Date Value Ref Range Status   02/09/2017 100 98 - 107 mmol/L Final   02/08/2017 100 98 - 107 mmol/L Final   02/07/2017 96 (L) 98 - 107 mmol/L Final      Bicarbonate No results found for: PLASMABICARB   BUN BUN   Date Value Ref Range Status   02/09/2017 12 8 - 23 mg/dL Final   02/08/2017 14 8 - 23 mg/dL Final   02/07/2017 15 8 - 23 mg/dL Final      Creatinine CREATININE   Date Value Ref Range Status   02/09/2017 0.94 0.76 - 1.27 mg/dL Final   02/08/2017 0.92 0.76 - 1.27 mg/dL Final   02/07/2017 0.87 0.76 - 1.27 mg/dL Final      Calcium CALCIUM   Date Value Ref Range Status   02/09/2017 8.5 (L) 8.8 - 10.5 mg/dL Final   02/08/2017 8.6 (L) 8.8 - 10.5 mg/dL Final   02/07/2017 8.4 (L) 8.8 - 10.5 mg/dL Final      Magnesium  AST  ALT  Bilirubin, Total  AlkPhos  Albumin    Amylase  Lipase    Radiology: No results found for: MG  No components found for: AST.*  No components found for: ALT.*  No components found for: BILIRUBIN, TOTAL.*    No components found for: ALKPHOS.*  No components found for: ALBUMIN.*      No components found for: AMYLASE.*  No components found for: LIPASE.*            Imaging Results (most recent)     None                                     lactated ringers 75 mL/hr Last Rate: 75 mL/hr (02/09/17 0150)           Assessment/Plan     Patient Active Problem List   Diagnosis Code   • MDS (myelodysplastic syndrome), low grade D46.20   • Vitamin B 12 deficiency E53.8   • AI (aortic incompetence) I35.1   • Bundle branch block, right I45.10   • Acute diverticulitis K57.92       Patient responding well to antibiotics.  Still has some mild left-sided tenderness.  Would hold his diet for today.  Continues to do well would consider liquids tomorrow.        Bhavesh Zavala MD  02/09/17  10:09 AM

## 2017-02-10 LAB
ANISOCYTOSIS BLD QL: ABNORMAL
DEPRECATED RDW RBC AUTO: 80.5 FL (ref 37–54)
ERYTHROCYTE [DISTWIDTH] IN BLOOD BY AUTOMATED COUNT: 20.1 % (ref 11.5–14.5)
HCT VFR BLD AUTO: 26.2 % (ref 42–52)
HGB BLD-MCNC: 8.2 G/DL (ref 14–18)
LAB AP CASE REPORT: NORMAL
LARGE PLATELETS: ABNORMAL
LYMPHOCYTES # BLD MANUAL: 0.51 10*3/MM3 (ref 0.6–4.8)
LYMPHOCYTES NFR BLD MANUAL: 11 % (ref 20–45)
LYMPHOCYTES NFR BLD MANUAL: 36 % (ref 3–8)
Lab: NORMAL
MACROCYTES BLD QL SMEAR: ABNORMAL
MCH RBC QN AUTO: 34.2 PG (ref 27–31)
MCHC RBC AUTO-ENTMCNC: 31.3 G/DL (ref 31–37)
MCV RBC AUTO: 109.2 FL (ref 80–94)
METAMYELOCYTES NFR BLD MANUAL: 3 % (ref 0–0)
MONOCYTES # BLD AUTO: 1.66 10*3/MM3 (ref 0–1)
NEUTROPHILS # BLD AUTO: 2.3 10*3/MM3 (ref 1.5–8.3)
NEUTROPHILS NFR BLD MANUAL: 48 % (ref 45–70)
NEUTS BAND NFR BLD MANUAL: 2 % (ref 0–5)
PATH REPORT.FINAL DX SPEC: NORMAL
PLATELET # BLD AUTO: 112 10*3/MM3 (ref 140–500)
PMV BLD AUTO: 10.9 FL (ref 7.4–10.4)
RBC # BLD AUTO: 2.4 10*6/MM3 (ref 4.7–6.1)
SMALL PLATELETS BLD QL SMEAR: ADEQUATE
WBC MORPH BLD: NORMAL
WBC NRBC COR # BLD: 4.6 10*3/MM3 (ref 4.8–10.8)

## 2017-02-10 PROCEDURE — 85007 BL SMEAR W/DIFF WBC COUNT: CPT | Performed by: INTERNAL MEDICINE

## 2017-02-10 PROCEDURE — 25010000002 PIPERACILLIN-TAZOBACTAM: Performed by: SURGERY

## 2017-02-10 PROCEDURE — 85025 COMPLETE CBC W/AUTO DIFF WBC: CPT | Performed by: INTERNAL MEDICINE

## 2017-02-10 PROCEDURE — 99232 SBSQ HOSP IP/OBS MODERATE 35: CPT | Performed by: SURGERY

## 2017-02-10 PROCEDURE — 99231 SBSQ HOSP IP/OBS SF/LOW 25: CPT

## 2017-02-10 PROCEDURE — 99232 SBSQ HOSP IP/OBS MODERATE 35: CPT | Performed by: INTERNAL MEDICINE

## 2017-02-10 PROCEDURE — 25010000002 PIPERACILLIN SOD-TAZOBACTAM PER 1 G: Performed by: SURGERY

## 2017-02-10 RX ADMIN — HYDROCODONE BITARTRATE AND ACETAMINOPHEN 1 TABLET: 5; 325 TABLET ORAL at 23:01

## 2017-02-10 RX ADMIN — METRONIDAZOLE 500 MG: 500 INJECTION, SOLUTION INTRAVENOUS at 06:45

## 2017-02-10 RX ADMIN — SODIUM CHLORIDE 4.5 G: 900 INJECTION, SOLUTION INTRAVENOUS at 08:28

## 2017-02-10 RX ADMIN — SODIUM CHLORIDE, POTASSIUM CHLORIDE, SODIUM LACTATE AND CALCIUM CHLORIDE 75 ML/HR: 600; 310; 30; 20 INJECTION, SOLUTION INTRAVENOUS at 18:48

## 2017-02-10 RX ADMIN — HYDROCODONE BITARTRATE AND ACETAMINOPHEN 1 TABLET: 5; 325 TABLET ORAL at 00:23

## 2017-02-10 RX ADMIN — MULTIPLE VITAMINS W/ MINERALS TAB 1 TABLET: TAB at 08:28

## 2017-02-10 RX ADMIN — CLOTRIMAZOLE AND BETAMETHASONE DIPROPIONATE 1 APPLICATION: 10; .5 CREAM TOPICAL at 08:29

## 2017-02-10 RX ADMIN — METRONIDAZOLE 500 MG: 500 INJECTION, SOLUTION INTRAVENOUS at 23:04

## 2017-02-10 RX ADMIN — Medication 1 CAPSULE: at 08:28

## 2017-02-10 RX ADMIN — METRONIDAZOLE 500 MG: 500 INJECTION, SOLUTION INTRAVENOUS at 14:35

## 2017-02-10 RX ADMIN — IRBESARTAN 300 MG: 150 TABLET ORAL at 08:28

## 2017-02-10 RX ADMIN — SODIUM CHLORIDE 4.5 G: 900 INJECTION, SOLUTION INTRAVENOUS at 15:53

## 2017-02-10 RX ADMIN — ACETAMINOPHEN 400 MCG: 400 TABLET ORAL at 08:28

## 2017-02-10 NOTE — PROGRESS NOTES
BGA/GI Progress Note   Chief Complaint:  Acute diverticulitis with abscess    Subjective     Patient admitted with acute diverticulitis with abscess.  He's been improving on a daily basis.  States his pain is resolved.  Today he is afebrile.  No complaints.      Objective     Vital Signs  Temp:  [96.8 °F (36 °C)-98.1 °F (36.7 °C)] 97.3 °F (36.3 °C)  Heart Rate:  [67-77] 67  Resp:  [18] 18  BP: (121-133)/(57-67) 121/57  Body mass index is 21.38 kg/(m^2).    Intake/Output Summary (Last 24 hours) at 02/10/17 0910  Last data filed at 02/10/17 0647   Gross per 24 hour   Intake    270 ml   Output    150 ml   Net    120 ml             Physical Exam:   General: patient awake, alert and cooperative   Abdomen: soft, nontender, nondistended; normal bowel sounds   Extremities: no rash or edema   Neurologic: Normal mood and behavior     Results Review:     I reviewed the patient's new clinical results.      WBC No results found for: WBCS   HGB HEMOGLOBIN   Date Value Ref Range Status   02/09/2017 9.7 (L) 14.0 - 18.0 g/dL Final   02/08/2017 7.7 (L) 14.0 - 18.0 g/dL Final   02/08/2017 7.6 (L) 14.0 - 18.0 g/dL Final   02/07/2017 9.7 (L) 14.0 - 18.0 g/dL Final      HCT HEMATOCRIT   Date Value Ref Range Status   02/09/2017 31.1 (L) 42.0 - 52.0 % Final   02/08/2017 25.0 (L) 42.0 - 52.0 % Final   02/08/2017 25.4 (L) 42.0 - 52.0 % Final   02/07/2017 30.3 (L) 42.0 - 52.0 % Final      Platlets No results found for: LABPLAT     PT/INR:  No results found for: PROTIME/No results found for: INR    Sodium SODIUM   Date Value Ref Range Status   02/09/2017 136 136 - 145 mmol/L Final   02/08/2017 139 136 - 145 mmol/L Final   02/07/2017 133 (L) 136 - 145 mmol/L Final      Potassium POTASSIUM   Date Value Ref Range Status   02/09/2017 3.5 3.5 - 5.2 mmol/L Final   02/08/2017 3.9 3.5 - 5.2 mmol/L Final   02/07/2017 3.7 3.5 - 5.2 mmol/L Final      Chloride CHLORIDE   Date Value Ref Range Status   02/09/2017 100 98 - 107 mmol/L Final    02/08/2017 100 98 - 107 mmol/L Final   02/07/2017 96 (L) 98 - 107 mmol/L Final      Bicarbonate No results found for: PLASMABICARB   BUN BUN   Date Value Ref Range Status   02/09/2017 12 8 - 23 mg/dL Final   02/08/2017 14 8 - 23 mg/dL Final   02/07/2017 15 8 - 23 mg/dL Final      Creatinine CREATININE   Date Value Ref Range Status   02/09/2017 0.94 0.76 - 1.27 mg/dL Final   02/08/2017 0.92 0.76 - 1.27 mg/dL Final   02/07/2017 0.87 0.76 - 1.27 mg/dL Final      Calcium CALCIUM   Date Value Ref Range Status   02/09/2017 8.5 (L) 8.8 - 10.5 mg/dL Final   02/08/2017 8.6 (L) 8.8 - 10.5 mg/dL Final   02/07/2017 8.4 (L) 8.8 - 10.5 mg/dL Final      Magnesium  AST  ALT  Bilirubin, Total  AlkPhos  Albumin    Amylase  Lipase    Radiology: No results found for: MG  No components found for: AST.*  No components found for: ALT.*  No components found for: BILIRUBIN, TOTAL.*    No components found for: ALKPHOS.*  No components found for: ALBUMIN.*      No components found for: AMYLASE.*  No components found for: LIPASE.*            Imaging Results (most recent)     None                                     lactated ringers 75 mL/hr Last Rate: 75 mL/hr (02/09/17 2213)           Assessment/Plan     Patient Active Problem List   Diagnosis Code   • MDS (myelodysplastic syndrome), low grade D46.20   • Vitamin B 12 deficiency E53.8   • AI (aortic incompetence) I35.1   • Bundle branch block, right I45.10   • Acute diverticulitis K57.92       Patient is doing well.  Will start him on clear liquids today.  Continue IV antibiotics.        Bhavesh Zavala MD  02/10/17  9:10 AM

## 2017-02-10 NOTE — PROGRESS NOTES
GI Daily Progress Note    Assessment/Plan:    Active Problems:    Acute diverticulitis       LOS: 3 days     Jason Zelaya is a 83 y.o. male who was admitted with Diverticulitis. He reports his symptoms are improving with treatment. Tolerating clears but has had two [place] BMs abd feels better.    Subjective:    Patient expresses abdominal pain and bloody stools  Patient denies vomiting, difficulty swallowing and loss of appetite    Objective:    Vital signs in last 24 hours:  Temp:  [96.6 °F (35.9 °C)-98.1 °F (36.7 °C)] 96.6 °F (35.9 °C)  Heart Rate:  [67-81] 81  Resp:  [18] 18  BP: (121-133)/(57-64) 133/64    Intake/Output last 3 shifts:  I/O last 3 completed shifts:  In: 1550 [I.V.:1250; Blood:300]  Out: 150 [Urine:150]  Intake/Output this shift:  I/O this shift:  In: 960 [P.O.:960]  Out: 400 [Urine:400]    Results from last 7 days  Lab Units 02/09/17  0620   WBC 10*3/mm3 8.64   HEMOGLOBIN g/dL 9.7*   HEMATOCRIT % 31.1*   PLATELETS 10*3/mm3 101*       Results from last 7 days  Lab Units 02/09/17  0620   SODIUM mmol/L 136   POTASSIUM mmol/L 3.5   CHLORIDE mmol/L 100   TOTAL CO2 mmol/L 20.9*   BUN mg/dL 12   CREATININE mg/dL 0.94   GLUCOSE mg/dL 77   CALCIUM mg/dL 8.5*     Physical Exam:Abdomen  Sounds Normal Active Bowel Sounds   Distension Soft   Tenderness Moderately Tender BLQ     Diverticulitis  Rectal bleeding    Will check a CBC and have Bernie follow this wknd

## 2017-02-11 LAB
ALBUMIN SERPL-MCNC: 2.3 G/DL (ref 3.5–5.2)
ALBUMIN/GLOB SERPL: 0.7 G/DL
ALP SERPL-CCNC: 116 U/L (ref 40–129)
ALT SERPL W P-5'-P-CCNC: 17 U/L (ref 5–41)
ANION GAP SERPL CALCULATED.3IONS-SCNC: 10.6 MMOL/L
ANISOCYTOSIS BLD QL: ABNORMAL
AST SERPL-CCNC: 13 U/L (ref 5–40)
BASOPHILS # BLD AUTO: 0 10*3/MM3 (ref 0–0.2)
BASOPHILS NFR BLD AUTO: 0 % (ref 0–2)
BILIRUB SERPL-MCNC: 0.6 MG/DL (ref 0.2–1.2)
BUN BLD-MCNC: 11 MG/DL (ref 8–23)
BUN/CREAT SERPL: 15.9 (ref 7–25)
CALCIUM SPEC-SCNC: 7.6 MG/DL (ref 8.8–10.5)
CHLORIDE SERPL-SCNC: 110 MMOL/L (ref 98–107)
CO2 SERPL-SCNC: 21.4 MMOL/L (ref 22–29)
CREAT BLD-MCNC: 0.69 MG/DL (ref 0.76–1.27)
DEPRECATED RDW RBC AUTO: 78.6 FL (ref 37–54)
EOSINOPHIL # BLD AUTO: 0 10*3/MM3 (ref 0.1–0.3)
EOSINOPHIL NFR BLD AUTO: 0 % (ref 0–4)
ERYTHROCYTE [DISTWIDTH] IN BLOOD BY AUTOMATED COUNT: 19.7 % (ref 11.5–14.5)
GFR SERPL CREATININE-BSD FRML MDRD: 110 ML/MIN/1.73
GLOBULIN UR ELPH-MCNC: 3.5 GM/DL
GLUCOSE BLD-MCNC: 88 MG/DL (ref 65–99)
HCT VFR BLD AUTO: 25 % (ref 42–52)
HCT VFR BLD AUTO: 28.9 % (ref 42–52)
HGB BLD-MCNC: 7.7 G/DL (ref 14–18)
HGB BLD-MCNC: 9 G/DL (ref 14–18)
HYPOCHROMIA BLD QL: ABNORMAL
IMM GRANULOCYTES # BLD: 0.25 10*3/MM3 (ref 0–0.03)
IMM GRANULOCYTES NFR BLD: 6.8 % (ref 0–0.5)
LYMPHOCYTES # BLD AUTO: 0.75 10*3/MM3 (ref 0.6–4.8)
LYMPHOCYTES # BLD MANUAL: 1.1 10*3/MM3 (ref 0.6–4.8)
LYMPHOCYTES NFR BLD AUTO: 20.4 % (ref 20–45)
LYMPHOCYTES NFR BLD MANUAL: 30 % (ref 20–45)
LYMPHOCYTES NFR BLD MANUAL: 31 % (ref 3–8)
MACROCYTES BLD QL SMEAR: ABNORMAL
MAGNESIUM SERPL-MCNC: 2 MG/DL (ref 1.7–2.5)
MCH RBC QN AUTO: 34.1 PG (ref 27–31)
MCHC RBC AUTO-ENTMCNC: 30.8 G/DL (ref 31–37)
MCV RBC AUTO: 110.6 FL (ref 80–94)
METAMYELOCYTES NFR BLD MANUAL: 2 % (ref 0–0)
MONOCYTES # BLD AUTO: 1.14 10*3/MM3 (ref 0–1)
MONOCYTES # BLD AUTO: 1.68 10*3/MM3 (ref 0–1)
MONOCYTES NFR BLD AUTO: 45.8 % (ref 3–8)
MYELOCYTES NFR BLD MANUAL: 3 % (ref 0–0)
NEUTROPHILS # BLD AUTO: 0.99 10*3/MM3 (ref 1.5–8.3)
NEUTROPHILS # BLD AUTO: 1.17 10*3/MM3 (ref 1.5–8.3)
NEUTROPHILS NFR BLD AUTO: 27 % (ref 45–70)
NEUTROPHILS NFR BLD MANUAL: 28 % (ref 45–70)
NEUTS BAND NFR BLD MANUAL: 4 % (ref 0–5)
NRBC BLD MANUAL-RTO: 0 /100 WBC (ref 0–0)
PLATELET # BLD AUTO: 104 10*3/MM3 (ref 140–500)
PMV BLD AUTO: 11.6 FL (ref 7.4–10.4)
POIKILOCYTOSIS BLD QL SMEAR: ABNORMAL
POTASSIUM BLD-SCNC: 2.9 MMOL/L (ref 3.5–5.2)
PROMYELOCYTES NFR BLD MANUAL: 2 % (ref 0–0)
PROT SERPL-MCNC: 5.8 G/DL (ref 6–8.5)
RBC # BLD AUTO: 2.26 10*6/MM3 (ref 4.7–6.1)
SCAN SLIDE: NORMAL
SMALL PLATELETS BLD QL SMEAR: ABNORMAL
SODIUM BLD-SCNC: 142 MMOL/L (ref 136–145)
WBC MORPH BLD: NORMAL
WBC NRBC COR # BLD: 3.67 10*3/MM3 (ref 4.8–10.8)

## 2017-02-11 PROCEDURE — 85014 HEMATOCRIT: CPT | Performed by: SURGERY

## 2017-02-11 PROCEDURE — 80053 COMPREHEN METABOLIC PANEL: CPT | Performed by: INTERNAL MEDICINE

## 2017-02-11 PROCEDURE — 25010000002 PIPERACILLIN SOD-TAZOBACTAM PER 1 G: Performed by: SURGERY

## 2017-02-11 PROCEDURE — 25010000002 PIPERACILLIN-TAZOBACTAM: Performed by: SURGERY

## 2017-02-11 PROCEDURE — 85025 COMPLETE CBC W/AUTO DIFF WBC: CPT | Performed by: INTERNAL MEDICINE

## 2017-02-11 PROCEDURE — 99231 SBSQ HOSP IP/OBS SF/LOW 25: CPT | Performed by: HOSPITALIST

## 2017-02-11 PROCEDURE — 99231 SBSQ HOSP IP/OBS SF/LOW 25: CPT | Performed by: SURGERY

## 2017-02-11 PROCEDURE — 85018 HEMOGLOBIN: CPT | Performed by: SURGERY

## 2017-02-11 PROCEDURE — 83735 ASSAY OF MAGNESIUM: CPT | Performed by: HOSPITALIST

## 2017-02-11 PROCEDURE — 99231 SBSQ HOSP IP/OBS SF/LOW 25: CPT | Performed by: INTERNAL MEDICINE

## 2017-02-11 PROCEDURE — 85007 BL SMEAR W/DIFF WBC COUNT: CPT | Performed by: INTERNAL MEDICINE

## 2017-02-11 RX ORDER — POTASSIUM CHLORIDE 20 MEQ/1
TABLET, EXTENDED RELEASE ORAL
Status: COMPLETED
Start: 2017-02-11 | End: 2017-02-11

## 2017-02-11 RX ORDER — POTASSIUM CHLORIDE 7.45 MG/ML
10 INJECTION INTRAVENOUS
Status: DISCONTINUED | OUTPATIENT
Start: 2017-02-11 | End: 2017-02-15

## 2017-02-11 RX ORDER — POTASSIUM CHLORIDE 1.5 G/1.77G
40 POWDER, FOR SOLUTION ORAL AS NEEDED
Status: DISCONTINUED | OUTPATIENT
Start: 2017-02-11 | End: 2017-02-15

## 2017-02-11 RX ORDER — GUAIFENESIN 600 MG/1
600 TABLET, EXTENDED RELEASE ORAL 2 TIMES DAILY
Status: DISCONTINUED | OUTPATIENT
Start: 2017-02-11 | End: 2017-02-15 | Stop reason: HOSPADM

## 2017-02-11 RX ORDER — POTASSIUM CHLORIDE 1500 MG/1
40 TABLET, FILM COATED, EXTENDED RELEASE ORAL AS NEEDED
Status: DISCONTINUED | OUTPATIENT
Start: 2017-02-11 | End: 2017-02-15

## 2017-02-11 RX ADMIN — METRONIDAZOLE 500 MG: 500 INJECTION, SOLUTION INTRAVENOUS at 06:59

## 2017-02-11 RX ADMIN — Medication 1 CAPSULE: at 09:15

## 2017-02-11 RX ADMIN — METRONIDAZOLE 500 MG: 500 INJECTION, SOLUTION INTRAVENOUS at 14:51

## 2017-02-11 RX ADMIN — POTASSIUM CHLORIDE 40 MEQ: 1500 TABLET, FILM COATED, EXTENDED RELEASE ORAL at 14:47

## 2017-02-11 RX ADMIN — HYDROCODONE BITARTRATE AND ACETAMINOPHEN 1 TABLET: 5; 325 TABLET ORAL at 22:58

## 2017-02-11 RX ADMIN — SODIUM CHLORIDE 4.5 G: 900 INJECTION, SOLUTION INTRAVENOUS at 00:36

## 2017-02-11 RX ADMIN — METRONIDAZOLE 500 MG: 500 INJECTION, SOLUTION INTRAVENOUS at 22:58

## 2017-02-11 RX ADMIN — MULTIPLE VITAMINS W/ MINERALS TAB 1 TABLET: TAB at 09:16

## 2017-02-11 RX ADMIN — POTASSIUM CHLORIDE 40 MEQ: 1500 TABLET, FILM COATED, EXTENDED RELEASE ORAL at 19:11

## 2017-02-11 RX ADMIN — CLOTRIMAZOLE AND BETAMETHASONE DIPROPIONATE 1 APPLICATION: 10; .5 CREAM TOPICAL at 09:17

## 2017-02-11 RX ADMIN — ACETAMINOPHEN 400 MCG: 400 TABLET ORAL at 09:15

## 2017-02-11 RX ADMIN — GUAIFENESIN 600 MG: 600 TABLET, EXTENDED RELEASE ORAL at 17:33

## 2017-02-11 RX ADMIN — POTASSIUM CHLORIDE 40 MEQ: 20 TABLET, EXTENDED RELEASE ORAL at 14:47

## 2017-02-11 RX ADMIN — POTASSIUM CHLORIDE 40 MEQ: 1500 TABLET, EXTENDED RELEASE ORAL at 19:11

## 2017-02-11 RX ADMIN — SODIUM CHLORIDE 4.5 G: 900 INJECTION, SOLUTION INTRAVENOUS at 09:16

## 2017-02-11 RX ADMIN — SODIUM CHLORIDE 4.5 G: 900 INJECTION, SOLUTION INTRAVENOUS at 17:33

## 2017-02-11 RX ADMIN — IRBESARTAN 300 MG: 150 TABLET ORAL at 09:15

## 2017-02-11 NOTE — PROGRESS NOTES
"    HOSPITALIST SERVICES  @ [place], [place]            HOSPITALIST TEAM   PROGRESS NOTE      Patient Care Team:  David Cedillo MD as PCP - General (Family Medicine)  Anupam Green MD as Consulting Physician (Hematology and Oncology)        Chief Complaint:        Acute abdominal pain      Subjective    83 year old  male was admitted with Acute Diverticulitis        Interval History and ROS:     Patient States his pain is better but has blood in stool  Patient Complaints: As above  Patient Denies:  No hx of chest pain, dyspnea or n/v  History taken from: Patient      Objective    Vital Signs  Temp:  [96.6 °F (35.9 °C)-98.1 °F (36.7 °C)] 98.1 °F (36.7 °C)  Heart Rate:  [67-81] 81  Resp:  [18] 18  BP: (121-139)/(57-65) 139/65    Flowsheet Rows         First Filed Value    Admission Height  70\" (177.8 cm) Documented at 02/07/2017 1859    Admission Weight  149 lb (67.6 kg) Documented at 02/07/2017 1859              Physical Exam:      PHYSICAL EXAMINATION:    VITAL SIGNS: As per Nurse's notes    GENERAL APPEARANCE: The patient is a well developed, well nourished,  male, in no acute distress.     HEENT: Normocephalic, atraumatic. PERRL.     NECK: Supple and symmetric. No thyromegaly. No No evidence of JVD.    LYMPH NODES: No palpable lymphadenopathy.    SKIN: Warm, dry and intact. No rash or lesions or wounds or patechiae.    CHEST: Normal AP diameter and normal contour without any kyphoscoliosis.    LUNGS: Accessory muscles of respiration are not active. Clear to auscultation bilaterally. Respiratory expansion equal bilaterally. No wheezes/rales/crackles/rubs.    CARDIOVASCULAR: RRR. S1, S2 normal without murmur/gallop/rub. No S3, S4.     ABDOMEN: Soft, non-tender, non-distended. No masses. No rebound/guarding.     EXTREMITIES:  No evidence of cyanosis, clubbing, or edema.     NEUROLOGIC: Examination is grossly intact globally with no focal deficits.         Results Review:     I " reviewed the patient's new clinical results.    Lab Results (last 24 hours)     Procedure Component Value Units Date/Time    CBC Auto Differential [[id number]]  (Abnormal) Collected:  02/10/17 1529    Specimen:  Blood Updated:  02/10/17 1603     WBC 4.60 (L) 10*3/mm3      RBC 2.40 (L) 10*6/mm3      Hemoglobin 8.2 (L) g/dL      Hematocrit 26.2 (L) %      .2 (H) fL      MCH 34.2 (H) pg      MCHC 31.3 g/dL      RDW 20.1 (H) %      RDW-SD 80.5 (H) fl      MPV 10.9 (H) fL      Platelets 112 (L) 10*3/mm3     Manual Differential [[id number]]  (Abnormal) Collected:  02/10/17 1529    Specimen:  Blood Updated:  02/10/17 1603     Neutrophil % 48.0 %      Lymphocyte % 11.0 (L) %      Monocyte % 36.0 (H) %      Bands %  2.0 %      Metamyelocyte % 3.0 (H) %      Neutrophils Absolute 2.30 10*3/mm3      Lymphocytes Absolute 0.51 (L) 10*3/mm3      Monocytes Absolute 1.66 (H) 10*3/mm3      Anisocytosis Mod/2+      Macrocytes Mod/2+      WBC Morphology Normal      Platelet Estimate Adequate      Large Platelets Slight/1+     CBC & Differential [[id number]] Collected:  02/10/17 1529    Specimen:  Blood Updated:  02/10/17 1603    Narrative:       The following orders were created for panel order CBC & Differential.  Procedure                               Abnormality         Status                     ---------                               -----------         ------                     Manual Differential[[id number]]           Abnormal            Final result               Scan Slide[[id number]]                                                                   CBC Auto Differential[[id number]]         Abnormal            Final result                 Please view results for these tests on the individual orders.          Imaging Results (last 24 hours)     ** No results found for the last 24 hours. **          Xray not reviewed personally by physician.      ECG not reviewed personally by physician  ECG/EMG Results (most recent)     None           Medication Review:   I have reviewed the patient's current medication list    Current Facility-Administered Medications:   •  acetaminophen (TYLENOL) tablet 650 mg, 650 mg, Oral, Q6H PRN, Kenya Marks MD  •  clotrimazole-betamethasone (LOTRISONE) 1-0.05 % cream 1 application, 1 application, Topical, Daily, Kenya Marks MD, 1 application at 02/10/17 0829  •  folic acid (FOLVITE) tablet 400 mcg, 400 mcg, Oral, Daily, Kenya Marks MD, 400 mcg at 02/10/17 0828  •  HYDROcodone-acetaminophen (NORCO) 5-325 MG per tablet 1 tablet, 1 tablet, Oral, Q6H PRN, Kenya Marks MD, 1 tablet at 02/10/17 0023  •  irbesartan (AVAPRO) tablet 300 mg, 300 mg, Oral, Q24H, Kenya Marks MD, 300 mg at 02/10/17 0828  •  lactated ringers infusion, 75 mL/hr, Intravenous, Continuous, Kenya Marks MD, Last Rate: 75 mL/hr at 02/10/17 1848, 75 mL/hr at 02/10/17 1848  •  lactobacillus acidophilus (RISAQUAD) capsule 1 capsule, 1 capsule, Oral, Daily, Bridger Amado MD, 1 capsule at 02/10/17 0828  •  metroNIDAZOLE (FLAGYL) IVPB 500 mg, 500 mg, Intravenous, Q8H, Kenya Marks MD, Last Rate: 0 mL/hr at 02/09/17 0017, 500 mg at 02/10/17 1435  •  multivitamin with minerals 1 tablet, 1 tablet, Oral, Daily, Kenya Marks MD, 1 tablet at 02/10/17 0828  •  piperacillin-tazobactam (ZOSYN) 4.5 g/100 mL 0.9% NS IVPB (mbp), 4.5 g, Intravenous, Q8H, Marvin Ma MD, 4.5 g at 02/10/17 1553  •  sodium chloride 0.9 % flush 1-10 mL, 1-10 mL, Intravenous, PRN, Kenya Marks MD      Assessment/Plan       ASSESSMENT AND PLAN:       SUMMARY:    ?   PROPHYLAXIS:   -DVT Prophylaxis: On SCDs   -Ayoub Catheter: Not indicated at this time    NUTRITION AND FLUIDS:  -Diet/ Nutrition: Clear Liquids   -Fluid Status/Electrolytes: Lactated Ringer 75 mL/Hr      SOCIAL ISSUES:    -Behavioral/ Agitation Issues: NONE   -Social  Issues: Lives at home with his family.       THERAPEUTIC:    -ANTIBIOTICS: Inj Zosyn  -PAIN MANAGEMENT: Norco 5-325 mg              PRIMARY DIAGNOSES:     1. Acute diverticulitis with Abscess: Dr. Ma and Dr. Amado following. Initially the patient was started on levoquin and flagyl but Dr. Ma changed the levoquin to zosyn with concern that this may be an unresolved colitis and not a recurrent colitis from 1 month ago. continue NPO for now except sips with meds and IVFs. Norco PRN Working for pain. Patient with fever earlier this evening, WBC WNL. Patient is afebrile now.      2. Suspected low grade MDS (Myelodysplastic syndrome): Patient follows with Dr. Green and received procrit outpatient yesterday prior to admission. Hb was near baseline at admission, dropped today to 7.6 this AM but was stable. Suspect multifactorial with IVFs, blood draws. Patient with no overt s/s of bleeding. Patient received 2 units of PRBCs yesterday and last Hb is 9.7.        3. Hypertension: On home avapro but hold HCTZ for now while on IVFs. BP is WNL.      4. Grade II diastolic dysfunction and mild-mod cardiac valvular disease: No acute issues here.      5. Chronic urinary retention: Patient to continue to straight cath 3-4 times per day as per home regimen. CT showed some inflammation of the bladder where it abuts the inflamed sigmoid colon. Dr. Ma spoke with Dr. Prince and he would like to see patient in f/U as outpatient.      6. DVT prophy: SCDs, No AC for now with anemia and possibility of needed surgery.         SECONDARY DIAGNOSES:      Aortic Regurgitation, DJD, Hx of skin cancer        SURGICAL DIAGNOSES:     S/P Fusion Back surgery, S/P Cholecystectomy, S/P Colonoscopy, S/P Herniorrhaphy            PLAN:    -Labs and diagnostic tests reviewed: WBC 4.60, Hb 8.2, Plt 112    -Diagnostic tests reviewed: None done in last 24 hrs    -Any new recommendations: N/A    -New Labs ordered: CBC and CMP in AM    -New diagnostic  tests ordered: N/A    -Any changes in medications: N/A    -Discharge planning issues: Patient should be able to go back home once ready for discharge    -Placement issues: N/A    -Patient is clinically and hemodynamically stable    -To continue current management and supportive care    -Will follow patient closely    -Nothing new to add for right now      Plan for disposition: patient should be able to go home once ready for discharge    Rian Dean MD  02/10/17  7:03 PM            Rian Dean M.D., FACP  Internal Medicine/ Hospitalist          Time:       EMR Dragon/Transcription disclaimer:      Much of this encounter note is an electronic transcription/translation of spoken language to printed text. The electronic translation of spoken language may permit erroneous, or at times, nonsensical words or phrases to be inadvertently transcribed; Although I have reviewed the note for such errors, some may still exist.

## 2017-02-11 NOTE — PROGRESS NOTES
"Hospitalist Team      Patient Care Team:  David Cedillo MD as PCP - General (Family Medicine)  Anupam Green MD as Consulting Physician (Hematology and Oncology)        Chief Complaint:  F/U Acute diverticulitis with abscess.    Subjective    Interval History and ROS:     Patient notes his abdominal tenderness is better, he is still having diarrhea and blood in his stools though although he also notes this has improved a little. Patient denies any N/V.  Is tolerating a clear liquid diet.  Notes he continues to have sweats at night. He has remained afebrile for >24 hours.  He denies any SOA or CP.      Objective    Vital Signs  Temp:  [97.4 °F (36.3 °C)-98.5 °F (36.9 °C)] 97.5 °F (36.4 °C)  Heart Rate:  [64-81] 70  Resp:  [18] 18  BP: (128-145)/(56-75) 145/65  Oxygen Therapy  SpO2: 95 %  Pulse Oximetry Type: Intermittent  O2 Device: room air     Flowsheet Rows         First Filed Value    Admission Height  70\" (177.8 cm) Documented at 02/07/2017 1859    Admission Weight  149 lb (67.6 kg) Documented at 02/07/2017 1859            Physical Exam:  Constitutional: Patient appears well-developed and well-nourished and in no acute distress   HEENT:   Head: Normocephalic and atraumatic.   Eyes:  EOM are intact. Sclera are anicteric and non-injected.  Mouth and Throat: Patient has moist mucous membranes.   Neck: Neck supple. No JVD present.   Cardiovascular: Regular rate, regular rhythm, S1 normal and S2 normal. Exam reveals no gallop and no friction rub. 1/6 systolic murmur heard RSB.  Pulmonary/Chest: Lungs are clear to auscultation bilaterally. No respiratory distress. No wheezes. No rhonchi. No rales.   Abdominal: Soft. Mild TTP in the bilateral lower quadrants L>R, No rebound or guarding, Bowel sounds are normal. No distension and no mass.   Extremities: No edema. Pulses are palpable in all 4 extremities.  Neurological: Patient is alert and oriented to person, place, and time.   Skin: Skin is warm. No rash " noted.    Results Review:     I reviewed the patient's new clinical results.    Lab Results (last 24 hours)     Procedure Component Value Units Date/Time    CBC Auto Differential [[id number]]  (Abnormal) Collected:  02/10/17 1529    Specimen:  Blood Updated:  02/10/17 1603     WBC 4.60 (L) 10*3/mm3      RBC 2.40 (L) 10*6/mm3      Hemoglobin 8.2 (L) g/dL      Hematocrit 26.2 (L) %      .2 (H) fL      MCH 34.2 (H) pg      MCHC 31.3 g/dL      RDW 20.1 (H) %      RDW-SD 80.5 (H) fl      MPV 10.9 (H) fL      Platelets 112 (L) 10*3/mm3     Manual Differential [[id number]]  (Abnormal) Collected:  02/10/17 1529    Specimen:  Blood Updated:  02/10/17 1603     Neutrophil % 48.0 %      Lymphocyte % 11.0 (L) %      Monocyte % 36.0 (H) %      Bands %  2.0 %      Metamyelocyte % 3.0 (H) %      Neutrophils Absolute 2.30 10*3/mm3      Lymphocytes Absolute 0.51 (L) 10*3/mm3      Monocytes Absolute 1.66 (H) 10*3/mm3      Anisocytosis Mod/2+      Macrocytes Mod/2+      WBC Morphology Normal      Platelet Estimate Adequate      Large Platelets Slight/1+     CBC & Differential [[id number]] Collected:  02/10/17 1529    Specimen:  Blood Updated:  02/10/17 1603    Narrative:       The following orders were created for panel order CBC & Differential.  Procedure                               Abnormality         Status                     ---------                               -----------         ------                     Manual Differential[[id number]]           Abnormal            Final result               Scan Slide[[id number]]                                                                   CBC Auto Differential[[id number]]         Abnormal            Final result                 Please view results for these tests on the individual orders.    CBC Auto Differential [[id number]]  (Abnormal) Collected:  02/11/17 0328    Specimen:  Blood Updated:  02/11/17 0508     WBC 3.67 (L) 10*3/mm3      RBC 2.26 (L) 10*6/mm3      Hemoglobin 7.7 (L) g/dL       Hematocrit 25.0 (L) %      .6 (H) fL      MCH 34.1 (H) pg      MCHC 30.8 (L) g/dL      RDW 19.7 (H) %      RDW-SD 78.6 (H) fl      MPV 11.6 (H) fL      Platelets 104 (L) 10*3/mm3      Neutrophil % 27.0 (L) %      Lymphocyte % 20.4 %      Monocyte % 45.8 (H) %      Eosinophil % 0.0 %      Basophil % 0.0 %      Immature Grans % 6.8 (H) %      Neutrophils, Absolute 0.99 (L) 10*3/mm3      Lymphocytes, Absolute 0.75 10*3/mm3      Monocytes, Absolute 1.68 (H) 10*3/mm3      Eosinophils, Absolute 0.00 (L) 10*3/mm3      Basophils, Absolute 0.00 10*3/mm3      Immature Grans, Absolute 0.25 (H) 10*3/mm3      nRBC 0.0 /100 WBC     Comprehensive Metabolic Panel [[id number]]  (Abnormal) Collected:  02/11/17 0328    Specimen:  Blood Updated:  02/11/17 0521     Glucose 88 mg/dL      BUN 11 mg/dL      Creatinine 0.69 (L) mg/dL      Sodium 142 mmol/L      Potassium 2.9 (L) mmol/L      Chloride 110 (H) mmol/L      CO2 21.4 (L) mmol/L      Calcium 7.6 (L) mg/dL      Total Protein 5.8 (L) g/dL      Albumin 2.30 (L) g/dL      ALT (SGPT) 17 U/L      AST (SGOT) 13 U/L      Alkaline Phosphatase 116 U/L      Total Bilirubin 0.6 mg/dL      eGFR Non African Amer 110 mL/min/1.73      Globulin 3.5 gm/dL      A/G Ratio 0.7 g/dL      BUN/Creatinine Ratio 15.9      Anion Gap 10.6 mmol/L     Narrative:       The MDRD GFR formula is only valid for adults with stable renal function between ages 18 and 70.    Manual Differential [[id number]]  (Abnormal) Collected:  02/11/17 0328    Specimen:  Blood Updated:  02/11/17 0545     Neutrophil % 28.0 (L) %      Lymphocyte % 30.0 %      Monocyte % 31.0 (H) %      Bands %  4.0 %      Metamyelocyte % 2.0 (H) %      Myelocyte % 3.0 (H) %      Promyelocyte % 2.0 (H) %      Neutrophils Absolute 1.17 (L) 10*3/mm3      Lymphocytes Absolute 1.10 10*3/mm3      Monocytes Absolute 1.14 (H) 10*3/mm3      Anisocytosis Slight/1+      Hypochromia Slight/1+      Macrocytes Slight/1+      Poikilocytes Slight/1+      WBC  Morphology Normal      Platelet Estimate Decreased     CBC & Differential [[id number]] Collected:  02/11/17 0328    Specimen:  Blood Updated:  02/11/17 0545    Narrative:       The following orders were created for panel order CBC & Differential.  Procedure                               Abnormality         Status                     ---------                               -----------         ------                     Manual Differential[[id number]]           Abnormal            Final result               Scan Slide[[id number]]                                        Final result               CBC Auto Differential[[id number]]         Abnormal            Final result                 Please view results for these tests on the individual orders.    Scan Slide [[id number]] Collected:  02/11/17 0328    Specimen:  Blood Updated:  02/11/17 0545     Scan Slide --       See Manual Differential Results             Imaging Results (last 24 hours)     ** No results found for the last 24 hours. **          ECG/EMG Results (most recent)     None          Medication Review:   I have reviewed the patient's current medication list    Current Facility-Administered Medications:   •  acetaminophen (TYLENOL) tablet 650 mg, 650 mg, Oral, Q6H PRN, Kenya Marks MD  •  clotrimazole-betamethasone (LOTRISONE) 1-0.05 % cream 1 application, 1 application, Topical, Daily, Kenya Marks MD, 1 application at 02/11/17 0917  •  folic acid (FOLVITE) tablet 400 mcg, 400 mcg, Oral, Daily, Kenya Marks MD, 400 mcg at 02/11/17 0915  •  HYDROcodone-acetaminophen (NORCO) 5-325 MG per tablet 1 tablet, 1 tablet, Oral, Q6H PRN, Kenya Marks MD, 1 tablet at 02/10/17 2301  •  irbesartan (AVAPRO) tablet 300 mg, 300 mg, Oral, Q24H, Kenya Marks MD, 300 mg at 02/11/17 0915  •  lactated ringers infusion, 75 mL/hr, Intravenous, Continuous, Kenya Marks MD, Last Rate: 75 mL/hr at  02/10/17 1848, 75 mL/hr at 02/10/17 1848  •  lactobacillus acidophilus (RISAQUAD) capsule 1 capsule, 1 capsule, Oral, Daily, Bridger Amado MD, 1 capsule at 02/11/17 0915  •  metroNIDAZOLE (FLAGYL) IVPB 500 mg, 500 mg, Intravenous, Q8H, Kenya Marks MD, Last Rate: 0 mL/hr at 02/09/17 0017, 500 mg at 02/11/17 1451  •  multivitamin with minerals 1 tablet, 1 tablet, Oral, Daily, Kenya Marks MD, 1 tablet at 02/11/17 0916  •  piperacillin-tazobactam (ZOSYN) 4.5 g/100 mL 0.9% NS IVPB (mbp), 4.5 g, Intravenous, Q8H, Marvin Ma MD, 4.5 g at 02/11/17 0916  •  potassium chloride (K-DUR,KLOR-CON) ER tablet 40 mEq, 40 mEq, Oral, PRN, 40 mEq at 02/11/17 1447 **OR** potassium chloride (KLOR-CON) packet 40 mEq, 40 mEq, Oral, PRN **OR** potassium chloride 10 mEq in 100 mL IVPB, 10 mEq, Intravenous, Q1H PRN, Kaylie Granados MD  •  sodium chloride 0.9 % flush 1-10 mL, 1-10 mL, Intravenous, PRN, Kenya Marks MD      Assessment/Plan     1. Acute diverticulitis with Abscess: Surgery and GI following.  Patient afebrile.  Still with diarrhea and blood in stools.  Remains on clear liquid diet.  On IV zosyn and flagyl. Likely could discontinue the flagyl, will discuss with surgery in AM.      2. Suspected low grade MDS: Patient follows with Dr. Green and received procrit outpatient prior to admission. Hb was near baseline at admission, dropped today to 7.6 and required 2 units of PRBCs. Hb dropped again this AM but repeat level was much higher (suspect initial blood draw was a lab error). Continue to monitor.      3. Hypertension: On home avapro but hold HCTZ for now while on IVFs. BP is WNL.      4. Grade II diastolic dysfunction and mild-mod cardiac valvular disease: No acute issues here.      5. Chronic urinary retention: Patient to continue to straight cath 3-4 times per day as per home regimen. CT showed some inflammation of the bladder where it abuts the inflamed  sigmoid colon. Dr. Ma spoke with Dr. Prince at admission and he would like to see patient in f/U as outpatient.      6. Hypokalemia: replaced po by surgery, Mag is WNL.    7. DVT prophy: SCDs, No AC for now with anemia and bleeding.    Plan for disposition: Home when able    Kenya Marks MD  02/11/17  3:13 PM

## 2017-02-11 NOTE — PROGRESS NOTES
Gastroenterology Progress Note       Subjective:    Feeling better, little pain, tolerating clears    Objective:    Vital Signs  Temp:  [96.6 °F (35.9 °C)-98.5 °F (36.9 °C)] 97.5 °F (36.4 °C)  Heart Rate:  [64-81] 81  Resp:  [18] 18  BP: (128-141)/(56-75) 128/56  Body mass index is 21.38 kg/(m^2).    Intake/Output Summary (Last 24 hours) at 02/11/17 0821  Last data filed at 02/11/17 0405   Gross per 24 hour   Intake    960 ml   Output   1050 ml   Net    -90 ml            Physical Exam:   General: patient awake, alert and cooperative   Skin: warm and dry, not jaundiced   Cardiovascular: regular rhythm and rate, no murmurs auscultated   Pulm: clear to auscultation bilaterally, regular and unlabored   Abdomen: soft, mild llq pain, no rebound   Extremities: no rash or edema   Neurologic: Normal mood and behavior     Results Review:     I reviewed the patient's new clinical results.        Results from last 7 days  Lab Units 02/11/17  0328   WBC 10*3/mm3 3.67*   HEMOGLOBIN g/dL 7.7*   HEMATOCRIT % 25.0*   PLATELETS 10*3/mm3 104*       Results from last 7 days  Lab Units 02/11/17  0328   SODIUM mmol/L 142   POTASSIUM mmol/L 2.9*   CHLORIDE mmol/L 110*   TOTAL CO2 mmol/L 21.4*   BUN mg/dL 11   CREATININE mg/dL 0.69*   CALCIUM mg/dL 7.6*   BILIRUBIN mg/dL 0.6   ALK PHOS U/L 116   ALT (SGPT) U/L 17   AST (SGOT) U/L 13   GLUCOSE mg/dL 88         PT/INR:  No results found for: PROTIME/No results found for: INR    Calcium CALCIUM   Date Value Ref Range Status   02/11/2017 7.6 (L) 8.8 - 10.5 mg/dL Final   02/09/2017 8.5 (L) 8.8 - 10.5 mg/dL Final      Magnesium  AST  ALT  Bilirubin, Total  AlkPhos  Albumin    Amylase  Lipase    Radiology: No results found for: MG  No components found for: AST.*  No components found for: ALT.*  No components found for: BILIRUBIN, TOTAL.*    No components found for: ALKPHOS.*  No components found for: ALBUMIN.*      No components found for: AMYLASE.*  No components found for: LIPASE.*      Imaging  Results (most recent)     None             Assessment/Plan     Diverticulitis-on antibiotics tolerating clears, slowly improving.  Surgery following  Anemia-hemoglobin of 7.7 noted has MDS. BUN is normal.   Will follow    Radha Gibbs MD  02/11/17  8:21 AM    Time:

## 2017-02-11 NOTE — PROGRESS NOTES
General Surgery Progress Note    Chief Complaint:  Acute diverticulitis      Interval History:   Reports abdominal pain is improved.  Has been tolerating clear liquids.  Still having bloody bowel movements.  His nervous about advancing diet.  H&H has dropped this morning.    Objective     Vital Signs  Temp:  [97.4 °F (36.3 °C)-98.5 °F (36.9 °C)] 97.5 °F (36.4 °C)  Heart Rate:  [64-81] 81  Resp:  [18] 18  BP: (128-141)/(56-75) 128/56  Body mass index is 21.38 kg/(m^2).    Intake/Output Summary (Last 24 hours) at 02/11/17 1129  Last data filed at 02/11/17 1052   Gross per 24 hour   Intake    480 ml   Output   1550 ml   Net  -1070 ml     I/O this shift:  In: -   Out: 500 [Urine:500]       Physical Exam:   General: patient awake, alert and cooperative   Cardiovascular: regular rhythm and rate, no murmurs auscultated   Pulm: clear to auscultation bilaterally, regular and unlabored   Abdomen: soft, nondistended;mild left lower quadrant tenderness,  normal bowel  sounds   Extremities: no rash or edema   Neurologic: Normal mood and behavior     Results Review:     I reviewed the patient's new clinical results.    Lab Results (last 24 hours)     Procedure Component Value Units Date/Time    CBC Auto Differential [[id number]]  (Abnormal) Collected:  02/10/17 1529    Specimen:  Blood Updated:  02/10/17 1603     WBC 4.60 (L) 10*3/mm3      RBC 2.40 (L) 10*6/mm3      Hemoglobin 8.2 (L) g/dL      Hematocrit 26.2 (L) %      .2 (H) fL      MCH 34.2 (H) pg      MCHC 31.3 g/dL      RDW 20.1 (H) %      RDW-SD 80.5 (H) fl      MPV 10.9 (H) fL      Platelets 112 (L) 10*3/mm3     Manual Differential [[id number]]  (Abnormal) Collected:  02/10/17 1529    Specimen:  Blood Updated:  02/10/17 1603     Neutrophil % 48.0 %      Lymphocyte % 11.0 (L) %      Monocyte % 36.0 (H) %      Bands %  2.0 %      Metamyelocyte % 3.0 (H) %      Neutrophils Absolute 2.30 10*3/mm3      Lymphocytes Absolute 0.51 (L) 10*3/mm3      Monocytes Absolute 1.66 (H)  10*3/mm3      Anisocytosis Mod/2+      Macrocytes Mod/2+      WBC Morphology Normal      Platelet Estimate Adequate      Large Platelets Slight/1+     CBC & Differential [[id number]] Collected:  02/10/17 1529    Specimen:  Blood Updated:  02/10/17 1603    Narrative:       The following orders were created for panel order CBC & Differential.  Procedure                               Abnormality         Status                     ---------                               -----------         ------                     Manual Differential[[id number]]           Abnormal            Final result               Scan Slide[[id number]]                                                                   CBC Auto Differential[[id number]]         Abnormal            Final result                 Please view results for these tests on the individual orders.    CBC Auto Differential [[id number]]  (Abnormal) Collected:  02/11/17 0328    Specimen:  Blood Updated:  02/11/17 0508     WBC 3.67 (L) 10*3/mm3      RBC 2.26 (L) 10*6/mm3      Hemoglobin 7.7 (L) g/dL      Hematocrit 25.0 (L) %      .6 (H) fL      MCH 34.1 (H) pg      MCHC 30.8 (L) g/dL      RDW 19.7 (H) %      RDW-SD 78.6 (H) fl      MPV 11.6 (H) fL      Platelets 104 (L) 10*3/mm3      Neutrophil % 27.0 (L) %      Lymphocyte % 20.4 %      Monocyte % 45.8 (H) %      Eosinophil % 0.0 %      Basophil % 0.0 %      Immature Grans % 6.8 (H) %      Neutrophils, Absolute 0.99 (L) 10*3/mm3      Lymphocytes, Absolute 0.75 10*3/mm3      Monocytes, Absolute 1.68 (H) 10*3/mm3      Eosinophils, Absolute 0.00 (L) 10*3/mm3      Basophils, Absolute 0.00 10*3/mm3      Immature Grans, Absolute 0.25 (H) 10*3/mm3      nRBC 0.0 /100 WBC     Comprehensive Metabolic Panel [[id number]]  (Abnormal) Collected:  02/11/17 0328    Specimen:  Blood Updated:  02/11/17 0521     Glucose 88 mg/dL      BUN 11 mg/dL      Creatinine 0.69 (L) mg/dL      Sodium 142 mmol/L      Potassium 2.9 (L) mmol/L      Chloride 110 (H)  mmol/L      CO2 21.4 (L) mmol/L      Calcium 7.6 (L) mg/dL      Total Protein 5.8 (L) g/dL      Albumin 2.30 (L) g/dL      ALT (SGPT) 17 U/L      AST (SGOT) 13 U/L      Alkaline Phosphatase 116 U/L      Total Bilirubin 0.6 mg/dL      eGFR Non African Amer 110 mL/min/1.73      Globulin 3.5 gm/dL      A/G Ratio 0.7 g/dL      BUN/Creatinine Ratio 15.9      Anion Gap 10.6 mmol/L     Narrative:       The MDRD GFR formula is only valid for adults with stable renal function between ages 18 and 70.    Manual Differential [[id number]]  (Abnormal) Collected:  02/11/17 0328    Specimen:  Blood Updated:  02/11/17 0545     Neutrophil % 28.0 (L) %      Lymphocyte % 30.0 %      Monocyte % 31.0 (H) %      Bands %  4.0 %      Metamyelocyte % 2.0 (H) %      Myelocyte % 3.0 (H) %      Promyelocyte % 2.0 (H) %      Neutrophils Absolute 1.17 (L) 10*3/mm3      Lymphocytes Absolute 1.10 10*3/mm3      Monocytes Absolute 1.14 (H) 10*3/mm3      Anisocytosis Slight/1+      Hypochromia Slight/1+      Macrocytes Slight/1+      Poikilocytes Slight/1+      WBC Morphology Normal      Platelet Estimate Decreased     CBC & Differential [[id number]] Collected:  02/11/17 0328    Specimen:  Blood Updated:  02/11/17 0545    Narrative:       The following orders were created for panel order CBC & Differential.  Procedure                               Abnormality         Status                     ---------                               -----------         ------                     Manual Differential[[id number]]           Abnormal            Final result               Scan Slide[[id number]]                                        Final result               CBC Auto Differential[[id number]]         Abnormal            Final result                 Please view results for these tests on the individual orders.    Scan Slide [[id number]] Collected:  02/11/17 0328    Specimen:  Blood Updated:  02/11/17 0545     Scan Slide --       See Manual Differential Results              Radiology:    Imaging Results (last 72 hours)     ** No results found for the last 72 hours. **            lactated ringers 75 mL/hr Last Rate: 75 mL/hr (02/10/17 6747)           Assessment/Plan     Patient Active Problem List   Diagnosis Code   • MDS (myelodysplastic syndrome), low grade D46.20   • Vitamin B 12 deficiency E53.8   • AI (aortic incompetence) I35.1   • Bundle branch block, right I45.10   • Acute diverticulitis K57.92     Acute diverticulitis with abscess  Continue IV antibiotics  Continue clear liquids for now    Still with bloody bowel movements.  H&H has trended down. Does have myelodysplastic syndrome.  Did receive 2 units of packed RBC 2 days ago.  We'll recheck H&H this afternoon - may need to consider transfusion if continues to have bloody bowel movements.  GI is following.    Hypokalemia-will replace    Plan of care discussed with patient, nursing staff and the hospitalist Dr. Berman and Dr. Miller.      Kaylie Granados MD  02/11/17  11:29 AM    Time:

## 2017-02-12 LAB
ABO GROUP BLD: NORMAL
ANION GAP SERPL CALCULATED.3IONS-SCNC: 13.3 MMOL/L
BASOPHILS # BLD AUTO: 0 10*3/MM3 (ref 0–0.2)
BASOPHILS NFR BLD AUTO: 0 % (ref 0–2)
BLD GP AB SCN SERPL QL: NEGATIVE
BUN BLD-MCNC: 7 MG/DL (ref 8–23)
BUN/CREAT SERPL: 10.6 (ref 7–25)
CALCIUM SPEC-SCNC: 7.7 MG/DL (ref 8.8–10.5)
CHLORIDE SERPL-SCNC: 108 MMOL/L (ref 98–107)
CO2 SERPL-SCNC: 19.7 MMOL/L (ref 22–29)
CREAT BLD-MCNC: 0.66 MG/DL (ref 0.76–1.27)
DEPRECATED RDW RBC AUTO: 81.1 FL (ref 37–54)
EOSINOPHIL # BLD AUTO: 0.01 10*3/MM3 (ref 0.1–0.3)
EOSINOPHIL NFR BLD AUTO: 0.3 % (ref 0–4)
ERYTHROCYTE [DISTWIDTH] IN BLOOD BY AUTOMATED COUNT: 19.9 % (ref 11.5–14.5)
GFR SERPL CREATININE-BSD FRML MDRD: 115 ML/MIN/1.73
GLUCOSE BLD-MCNC: 89 MG/DL (ref 65–99)
HCT VFR BLD AUTO: 24.1 % (ref 42–52)
HCT VFR BLD AUTO: 24.2 % (ref 42–52)
HGB BLD-MCNC: 7.1 G/DL (ref 14–18)
HGB BLD-MCNC: 7.4 G/DL (ref 14–18)
IMM GRANULOCYTES # BLD: 0.16 10*3/MM3 (ref 0–0.03)
IMM GRANULOCYTES NFR BLD: 4.9 % (ref 0–0.5)
LYMPHOCYTES # BLD AUTO: 0.72 10*3/MM3 (ref 0.6–4.8)
LYMPHOCYTES NFR BLD AUTO: 22 % (ref 20–45)
MCH RBC QN AUTO: 33.3 PG (ref 27–31)
MCHC RBC AUTO-ENTMCNC: 29.5 G/DL (ref 31–37)
MCV RBC AUTO: 113.1 FL (ref 80–94)
MONOCYTES # BLD AUTO: 1.48 10*3/MM3 (ref 0–1)
MONOCYTES NFR BLD AUTO: 45.3 % (ref 3–8)
NEUTROPHILS # BLD AUTO: 0.9 10*3/MM3 (ref 1.5–8.3)
NEUTROPHILS NFR BLD AUTO: 27.5 % (ref 45–70)
NRBC BLD MANUAL-RTO: 0 /100 WBC (ref 0–0)
PLATELET # BLD AUTO: 114 10*3/MM3 (ref 140–500)
PMV BLD AUTO: 11.5 FL (ref 7.4–10.4)
POTASSIUM BLD-SCNC: 3.6 MMOL/L (ref 3.5–5.2)
RBC # BLD AUTO: 2.13 10*6/MM3 (ref 4.7–6.1)
RH BLD: POSITIVE
SODIUM BLD-SCNC: 141 MMOL/L (ref 136–145)
WBC NRBC COR # BLD: 3.27 10*3/MM3 (ref 4.8–10.8)

## 2017-02-12 PROCEDURE — 86900 BLOOD TYPING SEROLOGIC ABO: CPT

## 2017-02-12 PROCEDURE — 86920 COMPATIBILITY TEST SPIN: CPT

## 2017-02-12 PROCEDURE — 85014 HEMATOCRIT: CPT | Performed by: HOSPITALIST

## 2017-02-12 PROCEDURE — 99232 SBSQ HOSP IP/OBS MODERATE 35: CPT | Performed by: INTERNAL MEDICINE

## 2017-02-12 PROCEDURE — 86850 RBC ANTIBODY SCREEN: CPT

## 2017-02-12 PROCEDURE — 87493 C DIFF AMPLIFIED PROBE: CPT | Performed by: HOSPITALIST

## 2017-02-12 PROCEDURE — P9016 RBC LEUKOCYTES REDUCED: HCPCS

## 2017-02-12 PROCEDURE — 36430 TRANSFUSION BLD/BLD COMPNT: CPT

## 2017-02-12 PROCEDURE — 25010000002 PIPERACILLIN-TAZOBACTAM: Performed by: SURGERY

## 2017-02-12 PROCEDURE — 85018 HEMOGLOBIN: CPT | Performed by: HOSPITALIST

## 2017-02-12 PROCEDURE — 85025 COMPLETE CBC W/AUTO DIFF WBC: CPT | Performed by: SURGERY

## 2017-02-12 PROCEDURE — 99232 SBSQ HOSP IP/OBS MODERATE 35: CPT | Performed by: HOSPITALIST

## 2017-02-12 PROCEDURE — 25010000002 PIPERACILLIN SOD-TAZOBACTAM PER 1 G: Performed by: SURGERY

## 2017-02-12 PROCEDURE — 86901 BLOOD TYPING SEROLOGIC RH(D): CPT

## 2017-02-12 PROCEDURE — 80048 BASIC METABOLIC PNL TOTAL CA: CPT | Performed by: SURGERY

## 2017-02-12 PROCEDURE — 99231 SBSQ HOSP IP/OBS SF/LOW 25: CPT | Performed by: SURGERY

## 2017-02-12 RX ORDER — POTASSIUM CHLORIDE 20 MEQ/1
TABLET, EXTENDED RELEASE ORAL
Status: COMPLETED
Start: 2017-02-12 | End: 2017-02-12

## 2017-02-12 RX ORDER — SODIUM CHLORIDE 9 MG/ML
INJECTION, SOLUTION INTRAVENOUS
Status: COMPLETED
Start: 2017-02-12 | End: 2017-02-12

## 2017-02-12 RX ADMIN — POTASSIUM CHLORIDE 40 MEQ: 1500 TABLET, FILM COATED, EXTENDED RELEASE ORAL at 17:34

## 2017-02-12 RX ADMIN — SODIUM CHLORIDE 250 ML: 9 INJECTION, SOLUTION INTRAVENOUS at 12:40

## 2017-02-12 RX ADMIN — METRONIDAZOLE 500 MG: 500 INJECTION, SOLUTION INTRAVENOUS at 06:52

## 2017-02-12 RX ADMIN — ACETAMINOPHEN 400 MCG: 400 TABLET ORAL at 09:44

## 2017-02-12 RX ADMIN — POTASSIUM CHLORIDE 40 MEQ: 1500 TABLET, FILM COATED, EXTENDED RELEASE ORAL at 00:26

## 2017-02-12 RX ADMIN — POTASSIUM CHLORIDE 40 MEQ: 1500 TABLET, EXTENDED RELEASE ORAL at 00:26

## 2017-02-12 RX ADMIN — POTASSIUM CHLORIDE 40 MEQ: 1500 TABLET, EXTENDED RELEASE ORAL at 17:34

## 2017-02-12 RX ADMIN — SODIUM CHLORIDE 4.5 G: 900 INJECTION, SOLUTION INTRAVENOUS at 09:43

## 2017-02-12 RX ADMIN — IRBESARTAN 300 MG: 150 TABLET ORAL at 09:44

## 2017-02-12 RX ADMIN — SODIUM CHLORIDE, POTASSIUM CHLORIDE, SODIUM LACTATE AND CALCIUM CHLORIDE 75 ML/HR: 600; 310; 30; 20 INJECTION, SOLUTION INTRAVENOUS at 09:43

## 2017-02-12 RX ADMIN — MULTIPLE VITAMINS W/ MINERALS TAB 1 TABLET: TAB at 09:44

## 2017-02-12 RX ADMIN — METRONIDAZOLE 500 MG: 500 INJECTION, SOLUTION INTRAVENOUS at 21:07

## 2017-02-12 RX ADMIN — GUAIFENESIN 600 MG: 600 TABLET, EXTENDED RELEASE ORAL at 09:45

## 2017-02-12 RX ADMIN — POTASSIUM CHLORIDE 40 MEQ: 20 TABLET, EXTENDED RELEASE ORAL at 09:48

## 2017-02-12 RX ADMIN — GUAIFENESIN 600 MG: 600 TABLET, EXTENDED RELEASE ORAL at 17:34

## 2017-02-12 RX ADMIN — Medication 1 CAPSULE: at 09:44

## 2017-02-12 RX ADMIN — SODIUM CHLORIDE 4.5 G: 900 INJECTION, SOLUTION INTRAVENOUS at 23:11

## 2017-02-12 RX ADMIN — CLOTRIMAZOLE AND BETAMETHASONE DIPROPIONATE 1 APPLICATION: 10; .5 CREAM TOPICAL at 09:00

## 2017-02-12 RX ADMIN — METRONIDAZOLE 500 MG: 500 INJECTION, SOLUTION INTRAVENOUS at 16:02

## 2017-02-12 RX ADMIN — SODIUM CHLORIDE 4.5 G: 900 INJECTION, SOLUTION INTRAVENOUS at 17:35

## 2017-02-12 RX ADMIN — POTASSIUM CHLORIDE 40 MEQ: 1500 TABLET, FILM COATED, EXTENDED RELEASE ORAL at 09:48

## 2017-02-12 NOTE — PROGRESS NOTES
Gastroenterology Progress Note       Subjective:    Patient Complaints: abdominal pain  He has been having bloody diarrhea. Hemoglobin yesterday was 7.7 and 7.1 this morning. He has orders for blood today. Abdominal pain is about the same. No nausea or vomiting.      Objective:    Vital Signs  Temp:  [97.5 °F (36.4 °C)-98.7 °F (37.1 °C)] 97.9 °F (36.6 °C)  Heart Rate:  [62-75] 62  Resp:  [18] 18  BP: (132-145)/(57-68) 139/57  Body mass index is 21.38 kg/(m^2).    Intake/Output Summary (Last 24 hours) at 02/12/17 1114  Last data filed at 02/12/17 0943   Gross per 24 hour   Intake   4880 ml   Output   1300 ml   Net   3580 ml            Physical Exam:   General: patient awake, alert and cooperative   Skin: warm and dry, not jaundiced   Cardiovascular: regular rhythm and rate, no murmurs auscultated   Pulm: clear to auscultation bilaterally, regular and unlabored   Abdomen: soft, mild llq pain, nondistended; normal bowel sounds   Extremities: no rash or edema   Neurologic: Normal mood and behavior     Results Review:     I reviewed the patient's new clinical results.        Results from last 7 days  Lab Units 02/12/17  0819 02/12/17  0606   WBC 10*3/mm3  --  3.27*   HEMOGLOBIN g/dL 7.4* 7.1*   HEMATOCRIT % 24.2* 24.1*   PLATELETS 10*3/mm3  --  114*       Results from last 7 days  Lab Units 02/12/17  0359 02/11/17  0328   SODIUM mmol/L 141 142   POTASSIUM mmol/L 3.6 2.9*   CHLORIDE mmol/L 108* 110*   TOTAL CO2 mmol/L 19.7* 21.4*   BUN mg/dL 7* 11   CREATININE mg/dL 0.66* 0.69*   CALCIUM mg/dL 7.7* 7.6*   BILIRUBIN mg/dL  --  0.6   ALK PHOS U/L  --  116   ALT (SGPT) U/L  --  17   AST (SGOT) U/L  --  13   GLUCOSE mg/dL 89 88         PT/INR:  No results found for: PROTIME/No results found for: INR    Calcium CALCIUM   Date Value Ref Range Status   02/12/2017 7.7 (L) 8.8 - 10.5 mg/dL Final   02/11/2017 7.6 (L) 8.8 - 10.5 mg/dL Final      Magnesium  AST  ALT  Bilirubin, Total  AlkPhos  Albumin    Amylase  Lipase    Radiology:  MAGNESIUM   Date Value Ref Range Status   02/11/2017 2.0 1.7 - 2.5 mg/dL Final     No components found for: AST.*  No components found for: ALT.*  No components found for: BILIRUBIN, TOTAL.*    No components found for: ALKPHOS.*  No components found for: ALBUMIN.*      No components found for: AMYLASE.*  No components found for: LIPASE.*      Imaging Results (most recent)     None             Assessment/Plan     Acute diverticulitis  Lower Gi bleed-likely diverticular, has one unit prbc ordered  On antibiotics  Clinically better, surgery following. Dr. Amado to see in the morning.        Radha Gibbs MD  02/12/17  11:14 AM    Time:

## 2017-02-12 NOTE — PROGRESS NOTES
"Hospitalist Team      Patient Care Team:  David Cedillo MD as PCP - General (Family Medicine)  Anupam Green MD as Consulting Physician (Hematology and Oncology)        Chief Complaint:  F/U Acute diverticulitis with GI bleeding    Subjective    Interval History and ROS:     Patient with large bloody diarrhea early this AM and Hb dropped this AM.  Afebrile. Abdomen still with mild-mod tenderness.  No N/V.  Tolerating clear liquids. Denies CP or SOA.    Objective    Vital Signs  Temp:  [97.9 °F (36.6 °C)-98.7 °F (37.1 °C)] 98.1 °F (36.7 °C)  Heart Rate:  [62-75] 64  Resp:  [18] 18  BP: (131-143)/(57-68) 131/68  Oxygen Therapy  SpO2: 95 %  Pulse Oximetry Type: Intermittent  O2 Device: room air     Flowsheet Rows         First Filed Value    Admission Height  70\" (177.8 cm) Documented at 02/07/2017 1859    Admission Weight  149 lb (67.6 kg) Documented at 02/07/2017 1859            Physical Exam:  Constitutional: Patient appears well-developed and well-nourished and in no acute distress   HEENT:   Head: Normocephalic and atraumatic.   Eyes: EOM are intact. Sclera are anicteric and non-injected.  Mouth and Throat: Patient has moist mucous membranes.   Neck: Neck supple. No JVD present.   Cardiovascular: Regular rate, regular rhythm, S1 normal and S2 normal. Exam reveals no gallop and no friction rub. Faint 1/6 systolic murmur heard RSB.  Pulmonary/Chest: Lungs are clear to auscultation bilaterally. No respiratory distress. No wheezes. No rhonchi. No rales.   Abdominal: Soft. Mild TTP in the bilateral lower quadrants now R>L, No rebound or guarding, Bowel sounds are normal. No distension and no mass.   Extremities: No edema. Pulses are palpable in all 4 extremities.  Neurological: Patient is alert and oriented to person, place, and time.   Skin: Skin is warm. No rash noted.    Results Review:     I reviewed the patient's new clinical results.    Lab Results (last 24 hours)     Procedure Component Value Units " Date/Time    Magnesium [[id number]]  (Normal) Collected:  02/11/17 0328    Specimen:  Blood Updated:  02/11/17 1604     Magnesium 2.0 mg/dL     Hemoglobin & Hematocrit, Blood [[id number]]  (Abnormal) Collected:  02/11/17 1626    Specimen:  Blood Updated:  02/11/17 1634     Hemoglobin 9.0 (L) g/dL      Hematocrit 28.9 (L) %     Basic Metabolic Panel [[id number]]  (Abnormal) Collected:  02/12/17 0359    Specimen:  Blood Updated:  02/12/17 0539     Glucose 89 mg/dL      BUN 7 (L) mg/dL      Creatinine 0.66 (L) mg/dL      Sodium 141 mmol/L      Potassium 3.6 mmol/L      Chloride 108 (H) mmol/L      CO2 19.7 (L) mmol/L      Calcium 7.7 (L) mg/dL      eGFR Non African Amer 115 mL/min/1.73      BUN/Creatinine Ratio 10.6      Anion Gap 13.3 mmol/L     Narrative:       The MDRD GFR formula is only valid for adults with stable renal function between ages 18 and 70.    CBC & Differential [[id number]] Collected:  02/12/17 0359    Specimen:  Blood Updated:  02/12/17 0654    Narrative:       The following orders were created for panel order CBC & Differential.  Procedure                               Abnormality         Status                     ---------                               -----------         ------                     Scan Slide[[id number]]                                                                   CBC Auto Differential[[id number]]         Abnormal            Final result                 Please view results for these tests on the individual orders.    CBC Auto Differential [[id number]]  (Abnormal) Collected:  02/12/17 0606    Specimen:  Blood Updated:  02/12/17 0654     WBC 3.27 (L) 10*3/mm3      RBC 2.13 (L) 10*6/mm3      Hemoglobin 7.1 (C) g/dL      Hematocrit 24.1 (L) %      .1 (H) fL      MCH 33.3 (H) pg      MCHC 29.5 (L) g/dL      RDW 19.9 (H) %      RDW-SD 81.1 (H) fl      MPV 11.5 (H) fL      Platelets 114 (L) 10*3/mm3      Neutrophil % 27.5 (L) %      Lymphocyte % 22.0 %      Monocyte % 45.3 (H) %       Eosinophil % 0.3 %      Basophil % 0.0 %      Immature Grans % 4.9 (H) %      Neutrophils, Absolute 0.90 (L) 10*3/mm3      Lymphocytes, Absolute 0.72 10*3/mm3      Monocytes, Absolute 1.48 (H) 10*3/mm3      Eosinophils, Absolute 0.01 (L) 10*3/mm3      Basophils, Absolute 0.00 10*3/mm3      Immature Grans, Absolute 0.16 (H) 10*3/mm3      nRBC 0.0 /100 WBC     Hemoglobin & Hematocrit, Blood [[id number]]  (Abnormal) Collected:  02/12/17 0819    Specimen:  Blood Updated:  02/12/17 0823     Hemoglobin 7.4 (C) g/dL      Hematocrit 24.2 (L) %           Imaging Results (last 24 hours)     ** No results found for the last 24 hours. **          ECG/EMG Results (most recent)     None          Medication Review:   I have reviewed the patient's current medication list    Current Facility-Administered Medications:   •  acetaminophen (TYLENOL) tablet 650 mg, 650 mg, Oral, Q6H PRN, Kenya Marks MD  •  clotrimazole-betamethasone (LOTRISONE) 1-0.05 % cream 1 application, 1 application, Topical, Daily, Kenya Marks MD, 1 application at 02/12/17 0900  •  folic acid (FOLVITE) tablet 400 mcg, 400 mcg, Oral, Daily, Kenya Marks MD, 400 mcg at 02/12/17 0944  •  guaiFENesin (MUCINEX) 12 hr tablet 600 mg, 600 mg, Oral, BID, Kenya Marks MD, 600 mg at 02/12/17 0945  •  HYDROcodone-acetaminophen (NORCO) 5-325 MG per tablet 1 tablet, 1 tablet, Oral, Q6H PRN, Kenya Marks MD, 1 tablet at 02/11/17 2258  •  irbesartan (AVAPRO) tablet 300 mg, 300 mg, Oral, Q24H, Kenya Marks MD, 300 mg at 02/12/17 0944  •  lactated ringers infusion, 75 mL/hr, Intravenous, Continuous, Kenya Marks MD, Last Rate: 75 mL/hr at 02/12/17 0943, 75 mL/hr at 02/12/17 0943  •  lactobacillus acidophilus (RISAQUAD) capsule 1 capsule, 1 capsule, Oral, Daily, Bridger Amado MD, 1 capsule at 02/12/17 0944  •  metroNIDAZOLE (FLAGYL) IVPB 500 mg, 500 mg,  Intravenous, Q8H, Kenya Marks MD, Stopped at 02/12/17 0750  •  multivitamin with minerals 1 tablet, 1 tablet, Oral, Daily, Kenya Marks MD, 1 tablet at 02/12/17 0944  •  piperacillin-tazobactam (ZOSYN) 4.5 g/100 mL 0.9% NS IVPB (mbp), 4.5 g, Intravenous, Q8H, Marvin Ma MD, Last Rate: 0 mL/hr at 02/12/17 0140, 4.5 g at 02/12/17 0943  •  potassium chloride (K-DUR,KLOR-CON) ER tablet 40 mEq, 40 mEq, Oral, PRN, 40 mEq at 02/12/17 0948 **OR** potassium chloride (KLOR-CON) packet 40 mEq, 40 mEq, Oral, PRN **OR** potassium chloride 10 mEq in 100 mL IVPB, 10 mEq, Intravenous, Q1H PRN, Kaylie Granados MD  •  sodium chloride 0.9 % flush 1-10 mL, 1-10 mL, Intravenous, PRN, Kenya Marks MD      Assessment/Plan     1. Acute diverticulitis with Abscess and GI bleeding: Surgery and GI following. Patient afebrile. Still with diarrhea and blood in stools. Remains on clear liquid diet. On IV zosyn and flagyl. I spoke with Dr. Granados and she would like to check C-diff before we D/C flagyl. I will order today.      2. Acute blood loss on Chronic anemia with suspected low grade MDS: Patient follows with Dr. Green and received procrit outpatient prior to admission. Platelets and WBC near baseline. Hb was near baseline at admission, dropped today to 7.6 and required 2 units of PRBCs. Hb dropped again this AM and I am transfusing another unit of PRBCs as patient is still bleeding. Continue to monitor.      3. Hypertension: On home avapro, holding HCTZ for now while on IVFs. BP is WNL.      4. Grade II diastolic dysfunction and mild-mod cardiac valvular disease: No acute issues here.      5. Chronic urinary retention: Patient to continue to straight cath 3-4 times per day as per home regimen. CT showed some inflammation of the bladder where it abuts the inflamed sigmoid colon. Dr. Ma spoke with Dr. Prince at admission and he would like to see patient in f/U as outpatient.      6.  Hypokalemia: replaced po by surgery yesterday, Mag is WNL, today potassium is WNL. Monitor.     7. DVT prophy: SCDs, No AC for now with anemia and bleeding.    Plan for disposition: Home when able.    Kenya Marks MD  02/12/17  11:57 AM

## 2017-02-12 NOTE — PROGRESS NOTES
General Surgery Progress Note    Chief Complaint:  Acute diverticulitis    Interval History:   Still with bloody bowel movements.  Per nursing staff has had multiple bloody bowel movements overnight. H&H is down to 7.1/24.1 this morning  Reports abdominal pain is the same.  Denies any nausea, vomiting.      Objective     Vital Signs  Temp:  [97.5 °F (36.4 °C)-98.7 °F (37.1 °C)] 97.9 °F (36.6 °C)  Heart Rate:  [62-75] 62  Resp:  [18] 18  BP: (132-145)/(57-68) 139/57  Body mass index is 21.38 kg/(m^2).    Intake/Output Summary (Last 24 hours) at 02/12/17 1120  Last data filed at 02/12/17 0943   Gross per 24 hour   Intake   4880 ml   Output   1300 ml   Net   3580 ml     I/O this shift:  In: 4060 [P.O.:240; I.V.:3820]  Out: 400 [Urine:400]       Physical Exam:   General: patient awake, alert and cooperative   Cardiovascular: regular rhythm and rate, no murmurs auscultated   Pulm: clear to auscultation bilaterally, regular and unlabored   Abdomen: soft, nondistended; mild tenderness left lower quadrant,  normal bowel  sounds   Extremities: no rash or edema   Neurologic: Normal mood and behavior     Results Review:     I reviewed the patient's new clinical results.    Lab Results (last 24 hours)     Procedure Component Value Units Date/Time    Magnesium [[id number]]  (Normal) Collected:  02/11/17 0328    Specimen:  Blood Updated:  02/11/17 1604     Magnesium 2.0 mg/dL     Hemoglobin & Hematocrit, Blood [[id number]]  (Abnormal) Collected:  02/11/17 1626    Specimen:  Blood Updated:  02/11/17 1634     Hemoglobin 9.0 (L) g/dL      Hematocrit 28.9 (L) %     Basic Metabolic Panel [[id number]]  (Abnormal) Collected:  02/12/17 0359    Specimen:  Blood Updated:  02/12/17 0539     Glucose 89 mg/dL      BUN 7 (L) mg/dL      Creatinine 0.66 (L) mg/dL      Sodium 141 mmol/L      Potassium 3.6 mmol/L      Chloride 108 (H) mmol/L      CO2 19.7 (L) mmol/L      Calcium 7.7 (L) mg/dL      eGFR Non African Amer 115 mL/min/1.73       BUN/Creatinine Ratio 10.6      Anion Gap 13.3 mmol/L     Narrative:       The MDRD GFR formula is only valid for adults with stable renal function between ages 18 and 70.    CBC & Differential [[id number]] Collected:  02/12/17 0359    Specimen:  Blood Updated:  02/12/17 0654    Narrative:       The following orders were created for panel order CBC & Differential.  Procedure                               Abnormality         Status                     ---------                               -----------         ------                     Scan Slide[[id number]]                                                                   CBC Auto Differential[[id number]]         Abnormal            Final result                 Please view results for these tests on the individual orders.    CBC Auto Differential [[id number]]  (Abnormal) Collected:  02/12/17 0606    Specimen:  Blood Updated:  02/12/17 0654     WBC 3.27 (L) 10*3/mm3      RBC 2.13 (L) 10*6/mm3      Hemoglobin 7.1 (C) g/dL      Hematocrit 24.1 (L) %      .1 (H) fL      MCH 33.3 (H) pg      MCHC 29.5 (L) g/dL      RDW 19.9 (H) %      RDW-SD 81.1 (H) fl      MPV 11.5 (H) fL      Platelets 114 (L) 10*3/mm3      Neutrophil % 27.5 (L) %      Lymphocyte % 22.0 %      Monocyte % 45.3 (H) %      Eosinophil % 0.3 %      Basophil % 0.0 %      Immature Grans % 4.9 (H) %      Neutrophils, Absolute 0.90 (L) 10*3/mm3      Lymphocytes, Absolute 0.72 10*3/mm3      Monocytes, Absolute 1.48 (H) 10*3/mm3      Eosinophils, Absolute 0.01 (L) 10*3/mm3      Basophils, Absolute 0.00 10*3/mm3      Immature Grans, Absolute 0.16 (H) 10*3/mm3      nRBC 0.0 /100 WBC     Hemoglobin & Hematocrit, Blood [[id number]]  (Abnormal) Collected:  02/12/17 0819    Specimen:  Blood Updated:  02/12/17 0823     Hemoglobin 7.4 (C) g/dL      Hematocrit 24.2 (L) %           Radiology:    Imaging Results (last 72 hours)     ** No results found for the last 72 hours. **            lactated ringers 75 mL/hr Last Rate:  75 mL/hr (02/12/17 0943)           Assessment/Plan     Patient Active Problem List   Diagnosis Code   • MDS (myelodysplastic syndrome), low grade D46.20   • Vitamin B 12 deficiency E53.8   • AI (aortic incompetence) I35.1   • Bundle branch block, right I45.10   • Acute diverticulitis K57.92     Acute diverticulitis with abscess  Lower GI bleed --likely diverticular -- being transfused 1 unit PRBC  Clinically abdominal pain is improving  Continue IV antibiotics  GI following.    Plan of care discussed with patient, Dr. Berman and Dr. Gibbs.        Kaylie Granados MD  02/12/17  11:20 AM    Time:

## 2017-02-13 LAB
ABO + RH BLD: NORMAL
ANION GAP SERPL CALCULATED.3IONS-SCNC: 12.2 MMOL/L
BH BB BLOOD EXPIRATION DATE: NORMAL
BH BB BLOOD TYPE BARCODE: 5100
BH BB DISPENSE STATUS: NORMAL
BH BB PRODUCT CODE: NORMAL
BH BB UNIT NUMBER: NORMAL
BUN BLD-MCNC: 4 MG/DL (ref 8–23)
BUN/CREAT SERPL: 5.6 (ref 7–25)
C DIFF TOX GENS STL QL NAA+PROBE: NEGATIVE
CALCIUM SPEC-SCNC: 7.9 MG/DL (ref 8.8–10.5)
CHLORIDE SERPL-SCNC: 108 MMOL/L (ref 98–107)
CO2 SERPL-SCNC: 19.8 MMOL/L (ref 22–29)
CREAT BLD-MCNC: 0.71 MG/DL (ref 0.76–1.27)
CROSSMATCH INTERPRETATION: NORMAL
DEPRECATED RDW RBC AUTO: 79.2 FL (ref 37–54)
ERYTHROCYTE [DISTWIDTH] IN BLOOD BY AUTOMATED COUNT: 20.2 % (ref 11.5–14.5)
GFR SERPL CREATININE-BSD FRML MDRD: 106 ML/MIN/1.73
GLUCOSE BLD-MCNC: 90 MG/DL (ref 65–99)
HCT VFR BLD AUTO: 27.8 % (ref 42–52)
HGB BLD-MCNC: 8.5 G/DL (ref 14–18)
MCH RBC QN AUTO: 33.3 PG (ref 27–31)
MCHC RBC AUTO-ENTMCNC: 30.6 G/DL (ref 31–37)
MCV RBC AUTO: 109 FL (ref 80–94)
PLATELET # BLD AUTO: 118 10*3/MM3 (ref 140–500)
PMV BLD AUTO: 11.5 FL (ref 7.4–10.4)
POTASSIUM BLD-SCNC: 3.7 MMOL/L (ref 3.5–5.2)
RBC # BLD AUTO: 2.55 10*6/MM3 (ref 4.7–6.1)
SODIUM BLD-SCNC: 140 MMOL/L (ref 136–145)
UNIT  ABO: NORMAL
UNIT  RH: NORMAL
WBC NRBC COR # BLD: 3.68 10*3/MM3 (ref 4.8–10.8)

## 2017-02-13 PROCEDURE — 80048 BASIC METABOLIC PNL TOTAL CA: CPT | Performed by: HOSPITALIST

## 2017-02-13 PROCEDURE — 25010000002 PIPERACILLIN SOD-TAZOBACTAM PER 1 G: Performed by: SURGERY

## 2017-02-13 PROCEDURE — 99231 SBSQ HOSP IP/OBS SF/LOW 25: CPT

## 2017-02-13 PROCEDURE — 25010000002 PIPERACILLIN-TAZOBACTAM: Performed by: SURGERY

## 2017-02-13 PROCEDURE — 99232 SBSQ HOSP IP/OBS MODERATE 35: CPT | Performed by: HOSPITALIST

## 2017-02-13 PROCEDURE — 85027 COMPLETE CBC AUTOMATED: CPT | Performed by: INTERNAL MEDICINE

## 2017-02-13 PROCEDURE — 99232 SBSQ HOSP IP/OBS MODERATE 35: CPT | Performed by: SURGERY

## 2017-02-13 RX ADMIN — IRBESARTAN 300 MG: 150 TABLET ORAL at 08:47

## 2017-02-13 RX ADMIN — SODIUM CHLORIDE, POTASSIUM CHLORIDE, SODIUM LACTATE AND CALCIUM CHLORIDE 75 ML/HR: 600; 310; 30; 20 INJECTION, SOLUTION INTRAVENOUS at 10:40

## 2017-02-13 RX ADMIN — METRONIDAZOLE 500 MG: 500 INJECTION, SOLUTION INTRAVENOUS at 14:47

## 2017-02-13 RX ADMIN — GUAIFENESIN 600 MG: 600 TABLET, EXTENDED RELEASE ORAL at 08:46

## 2017-02-13 RX ADMIN — CLOTRIMAZOLE AND BETAMETHASONE DIPROPIONATE 1 APPLICATION: 10; .5 CREAM TOPICAL at 08:49

## 2017-02-13 RX ADMIN — SODIUM CHLORIDE 4.5 G: 900 INJECTION, SOLUTION INTRAVENOUS at 16:07

## 2017-02-13 RX ADMIN — ACETAMINOPHEN 400 MCG: 400 TABLET ORAL at 08:47

## 2017-02-13 RX ADMIN — SODIUM CHLORIDE 4.5 G: 900 INJECTION, SOLUTION INTRAVENOUS at 22:48

## 2017-02-13 RX ADMIN — Medication 1 CAPSULE: at 08:46

## 2017-02-13 RX ADMIN — GUAIFENESIN 600 MG: 600 TABLET, EXTENDED RELEASE ORAL at 17:50

## 2017-02-13 RX ADMIN — MULTIPLE VITAMINS W/ MINERALS TAB 1 TABLET: TAB at 08:47

## 2017-02-13 RX ADMIN — METRONIDAZOLE 500 MG: 500 INJECTION, SOLUTION INTRAVENOUS at 20:57

## 2017-02-13 RX ADMIN — METRONIDAZOLE 500 MG: 500 INJECTION, SOLUTION INTRAVENOUS at 07:02

## 2017-02-13 RX ADMIN — SODIUM CHLORIDE 4.5 G: 900 INJECTION, SOLUTION INTRAVENOUS at 08:45

## 2017-02-13 NOTE — PLAN OF CARE
Problem: Patient Care Overview (Adult)  Goal: Plan of Care Review  Outcome: Ongoing (interventions implemented as appropriate)    02/13/17 1310   Patient Care Overview   Progress improving   Outcome Evaluation   Outcome Summary/Follow up Plan less stools today , tolerates full lquids, up in room   Coping/Psychosocial Response Interventions   Plan Of Care Reviewed With patient;spouse       Goal: Adult Individualization and Mutuality  Outcome: Ongoing (interventions implemented as appropriate)  Goal: Discharge Needs Assessment  Outcome: Ongoing (interventions implemented as appropriate)    Problem: Pain, Acute (Adult)  Goal: Identify Related Risk Factors and Signs and Symptoms  Outcome: Outcome(s) achieved Date Met:  02/13/17  Goal: Acceptable Pain Control/Comfort Level  Outcome: Ongoing (interventions implemented as appropriate)

## 2017-02-13 NOTE — PROGRESS NOTES
GI Daily Progress Note    Assessment/Plan:    Active Problems:    Acute diverticulitis       LOS: 6 days     Jason Zelaya is a 83 y.o. male who was admitted with Diverticulitis. He reports his symptoms are improving with treatment. BMs have slowed and none after lunch. Overall no complaints except his LUE from a bad IV site. HGB stable ofter transfusion.    Subjective:    Patient expresses abdominal pain  Patient denies vomiting, diarrhea and bloody stools    Objective:    Vital signs in last 24 hours:  Temp:  [97 °F (36.1 °C)-97.4 °F (36.3 °C)] 97.4 °F (36.3 °C)  Heart Rate:  [70-81] 70  Resp:  [16-18] 18  BP: (114-144)/(61-67) 132/65    Intake/Output last 3 shifts:  I/O last 3 completed shifts:  In: 7071.3 [P.O.:1200; I.V.:5241.3; Blood:330; IV Piggyback:300]  Out: 2710 [Urine:2650; Stool:60]  Intake/Output this shift:  I/O this shift:  In: 1067.5 [P.O.:360; I.V.:407.5; IV Piggyback:300]  Out: 800 [Urine:800]    Results from last 7 days  Lab Units 02/13/17  0334 02/12/17  0819 02/12/17  0606  02/11/17  0328 02/10/17  1529  02/08/17  0349   WBC 10*3/mm3 3.68*  --  3.27*  --  3.67* 4.60*  < > 8.86   HEMOGLOBIN g/dL 8.5* 7.4* 7.1*  < > 7.7* 8.2*  < > 7.6*   HEMATOCRIT % 27.8* 24.2* 24.1*  < > 25.0* 26.2*  < > 25.4*   PLATELETS 10*3/mm3 118*  --  114*  --  104* 112*  < > 97*   MONOCYTES % %  --   --   --   --  31.0* 36.0*  --  26.0*   < > = values in this interval not displayed.  Physical Exam:Abdomen  Sounds Normal Active Bowel Sounds   Distension Soft   Tenderness Mildly Tender     Diverticulitis  Rectal bleeding  MDS  Phlebitis    Surgery managing but subjectively improved on all fronts, will follow.

## 2017-02-13 NOTE — PROGRESS NOTES
Adult Nutrition  Assessment/PES    Patient Name:  Jason Zelaya  YOB: 1934  MRN: [id number]  Admit Date:  2/7/2017    Assessment Date:  2/13/2017        Reason for Assessment       02/13/17 1107    Reason for Assessment    Reason For Assessment/Visit follow up protocol    Gastrointestinal Diverticulitis   Abcess              Nutrition/Diet History       02/13/17 1108    Nutrition/Diet History    Typical Food/Fluid Intake Pt tolerating liquids. Noted no plans for surgery per Dr. Ma note today. Bloody BM cont noted.            Anthropometrics       02/13/17 1108    Anthropometrics    RD Documented Current Weight  --   no new wt            Labs/Tests/Procedures/Meds       02/13/17 1108    Labs/Tests/Procedures/Meds    Labs/Tests Review Reviewed    Medication Review Reviewed, pertinent;Antibiotic;Multivitamin   Risaquad, folic acid                Nutrition Prescription Ordered       02/13/17 1109    Nutrition Prescription PO    Current PO Diet Full Liquid            Evaluation of Received Nutrient/Fluid Intake       02/13/17 1110    Fluid Intake Evaluation    Oral Fluid (mL) 840    Total Fluid Intake (mL) 840    % Fluid Needs 44%    PO Evaluation    Number of Meals 7    % PO Intake 58%              Problem/Interventions:        Problem 1       02/13/17 1111    Nutrition Diagnoses Problem 1    Problem 1 Malnutrition    Etiology (related to) Medical Diagnosis    Gastrointestinal Diverticulitis    Signs/Symptoms (evidenced by) Unintended Weight Change;PO Intake    Unintended Weight Change Loss                    Intervention Goal       02/13/17 1111    Intervention Goal    General Meet nutritional needs for age/condition    PO Increase intake;PO intake (%)    PO Intake % 75 %    Weight Maintain weight            Nutrition Intervention       02/13/17 1111    Nutrition Intervention    RD/Tech Action Follow Tx progress;Recommend/ordered    Recommended/Ordered Supplement            Nutrition Prescription        02/13/17 1112    Nutrition Prescription PO    PO Prescription Begin/change supplement    Supplement Ensure Plus    Supplement Frequency 3 times a day    Other Orders    Other pt drinks ensure at home             Education/Evaluation       02/13/17 1112    Education    Education Other (comment)   Pt receiving care, will follow.     Monitor/Evaluation    Monitor Per protocol;I&O;PO intake;Supplement intake;Pertinent labs;Weight;Symptoms        Comments:   Recommend: ENSURE PLUS 3 times per day (pt drinks ensure daily at home) Butterpecan Flavor if available.    Will cont to follow and monitor closely.    Electronically signed by:  Roseanne Clayton RD  02/13/17 11:12 AM

## 2017-02-13 NOTE — PLAN OF CARE
Problem: Diarrhea (Adult)  Goal: Identify Related Risk Factors and Signs and Symptoms  Outcome: Outcome(s) achieved Date Met:  02/13/17  Goal: Improved/Reduced Symptoms  Outcome: Ongoing (interventions implemented as appropriate)

## 2017-02-13 NOTE — PROGRESS NOTES
He denies any abdominal pain.  He says it is bloody bowel movements have persisted but had markedly decreased in volume.  He is tolerating full liquids.  His abdomen was soft and nontender.  His hemoglobin this morning is 8.5.  Continue liquids on him and recheck a CBC in the a.m.  I have no plans for surgical intervention at this point.  Events of the weekend were noted.

## 2017-02-13 NOTE — PLAN OF CARE
Problem: Patient Care Overview (Adult)  Goal: Plan of Care Review  Outcome: Ongoing (interventions implemented as appropriate)    02/12/17 1947   Patient Care Overview   Progress improving   Outcome Evaluation   Outcome Summary/Follow up Plan patient still on liquids, IV antibiotics, moderated amount of bloody stools   Coping/Psychosocial Response Interventions   Plan Of Care Reviewed With patient         Problem: Pain, Acute (Adult)  Goal: Acceptable Pain Control/Comfort Level  Outcome: Ongoing (interventions implemented as appropriate)    02/12/17 1947   Pain, Acute (Adult)   Acceptable Pain Control/Comfort Level achieves outcome

## 2017-02-14 ENCOUNTER — APPOINTMENT (OUTPATIENT)
Dept: ONCOLOGY | Facility: HOSPITAL | Age: 82
End: 2017-02-14

## 2017-02-14 ENCOUNTER — APPOINTMENT (OUTPATIENT)
Dept: LAB | Facility: HOSPITAL | Age: 82
End: 2017-02-14

## 2017-02-14 ENCOUNTER — INPATIENT HOSPITAL (OUTPATIENT)
Dept: URBAN - METROPOLITAN AREA HOSPITAL 27 | Facility: HOSPITAL | Age: 82
End: 2017-02-14
Payer: MEDICARE

## 2017-02-14 DIAGNOSIS — K57.32 DIVERTICULITIS OF LARGE INTESTINE WITHOUT PERFORATION OR ABS: ICD-10-CM

## 2017-02-14 LAB
ANISOCYTOSIS BLD QL: ABNORMAL
BASOPHILS # BLD AUTO: 0.01 10*3/MM3 (ref 0–0.2)
BASOPHILS NFR BLD AUTO: 0.3 % (ref 0–2)
DEPRECATED RDW RBC AUTO: 78.8 FL (ref 37–54)
EOSINOPHIL # BLD AUTO: 0 10*3/MM3 (ref 0.1–0.3)
EOSINOPHIL NFR BLD AUTO: 0 % (ref 0–4)
ERYTHROCYTE [DISTWIDTH] IN BLOOD BY AUTOMATED COUNT: 20.3 % (ref 11.5–14.5)
GLUCOSE BLDC GLUCOMTR-MCNC: 108 MG/DL (ref 70–130)
GLUCOSE BLDC GLUCOMTR-MCNC: 127 MG/DL (ref 70–130)
HCT VFR BLD AUTO: 33 % (ref 42–52)
HGB BLD-MCNC: 10.3 G/DL (ref 14–18)
IMM GRANULOCYTES # BLD: 0.13 10*3/MM3 (ref 0–0.03)
IMM GRANULOCYTES NFR BLD: 3.3 % (ref 0–0.5)
LARGE PLATELETS: ABNORMAL
LYMPHOCYTES # BLD AUTO: 1.19 10*3/MM3 (ref 0.6–4.8)
LYMPHOCYTES # BLD MANUAL: 1.15 10*3/MM3 (ref 0.6–4.8)
LYMPHOCYTES NFR BLD AUTO: 30 % (ref 20–45)
LYMPHOCYTES NFR BLD MANUAL: 29 % (ref 20–45)
LYMPHOCYTES NFR BLD MANUAL: 36 % (ref 3–8)
MACROCYTES BLD QL SMEAR: ABNORMAL
MCH RBC QN AUTO: 33.6 PG (ref 27–31)
MCHC RBC AUTO-ENTMCNC: 31.2 G/DL (ref 31–37)
MCV RBC AUTO: 107.5 FL (ref 80–94)
METAMYELOCYTES NFR BLD MANUAL: 3 % (ref 0–0)
MONOCYTES # BLD AUTO: 1.36 10*3/MM3 (ref 0–1)
MONOCYTES # BLD AUTO: 1.43 10*3/MM3 (ref 0–1)
MONOCYTES NFR BLD AUTO: 34.3 % (ref 3–8)
MYELOCYTES NFR BLD MANUAL: 1 % (ref 0–0)
NEUTROPHILS # BLD AUTO: 1.23 10*3/MM3 (ref 1.5–8.3)
NEUTROPHILS # BLD AUTO: 1.28 10*3/MM3 (ref 1.5–8.3)
NEUTROPHILS NFR BLD AUTO: 32.1 % (ref 45–70)
NEUTROPHILS NFR BLD MANUAL: 27 % (ref 45–70)
NEUTS BAND NFR BLD MANUAL: 4 % (ref 0–5)
NRBC BLD MANUAL-RTO: 0 /100 WBC (ref 0–0)
PLATELET # BLD AUTO: 156 10*3/MM3 (ref 140–500)
PMV BLD AUTO: 11.4 FL (ref 7.4–10.4)
RBC # BLD AUTO: 3.07 10*6/MM3 (ref 4.7–6.1)
WBC MORPH BLD: NORMAL
WBC NRBC COR # BLD: 3.97 10*3/MM3 (ref 4.8–10.8)

## 2017-02-14 PROCEDURE — 82962 GLUCOSE BLOOD TEST: CPT

## 2017-02-14 PROCEDURE — 85025 COMPLETE CBC W/AUTO DIFF WBC: CPT | Performed by: SURGERY

## 2017-02-14 PROCEDURE — 99232 SBSQ HOSP IP/OBS MODERATE 35: CPT | Performed by: SURGERY

## 2017-02-14 PROCEDURE — 99231 SBSQ HOSP IP/OBS SF/LOW 25: CPT

## 2017-02-14 PROCEDURE — 99232 SBSQ HOSP IP/OBS MODERATE 35: CPT | Performed by: INTERNAL MEDICINE

## 2017-02-14 PROCEDURE — 85007 BL SMEAR W/DIFF WBC COUNT: CPT | Performed by: SURGERY

## 2017-02-14 PROCEDURE — 25010000002 PIPERACILLIN-TAZOBACTAM: Performed by: SURGERY

## 2017-02-14 PROCEDURE — 25010000002 PIPERACILLIN SOD-TAZOBACTAM PER 1 G: Performed by: SURGERY

## 2017-02-14 RX ORDER — AMOXICILLIN AND CLAVULANATE POTASSIUM 875; 125 MG/1; MG/1
1 TABLET, FILM COATED ORAL EVERY 12 HOURS SCHEDULED
Status: DISCONTINUED | OUTPATIENT
Start: 2017-02-14 | End: 2017-02-15 | Stop reason: HOSPADM

## 2017-02-14 RX ADMIN — ACETAMINOPHEN 400 MCG: 400 TABLET ORAL at 10:08

## 2017-02-14 RX ADMIN — MULTIPLE VITAMINS W/ MINERALS TAB 1 TABLET: TAB at 10:08

## 2017-02-14 RX ADMIN — CLOTRIMAZOLE AND BETAMETHASONE DIPROPIONATE 1 APPLICATION: 10; .5 CREAM TOPICAL at 10:07

## 2017-02-14 RX ADMIN — SODIUM CHLORIDE 4.5 G: 900 INJECTION, SOLUTION INTRAVENOUS at 06:46

## 2017-02-14 RX ADMIN — GUAIFENESIN 600 MG: 600 TABLET, EXTENDED RELEASE ORAL at 18:14

## 2017-02-14 RX ADMIN — AMOXICILLIN AND CLAVULANATE POTASSIUM 1 TABLET: 875; 125 TABLET, FILM COATED ORAL at 20:26

## 2017-02-14 RX ADMIN — IRBESARTAN 300 MG: 150 TABLET ORAL at 10:08

## 2017-02-14 RX ADMIN — SODIUM CHLORIDE 4.5 G: 900 INJECTION, SOLUTION INTRAVENOUS at 15:07

## 2017-02-14 RX ADMIN — Medication 1 CAPSULE: at 10:08

## 2017-02-14 RX ADMIN — GUAIFENESIN 600 MG: 600 TABLET, EXTENDED RELEASE ORAL at 10:08

## 2017-02-14 NOTE — PROGRESS NOTES
"Hospitalist Team      Patient Care Team:  aDvid Cedillo MD as PCP - General (Family Medicine)  Anupam Green MD as Consulting Physician (Hematology and Oncology)        Chief Complaint: Follow-up Acute Diverticulitis; Blood loss anemia    Subjective    Pain much better this evening, and no further bloody bowel movements.  He denies chest pain and dyspnea.  Tolerating clears.    Objective    Vital Signs  Temp:  [97 °F (36.1 °C)-97.7 °F (36.5 °C)] 97.7 °F (36.5 °C)  Heart Rate:  [70-81] 73  Resp:  [18] 18  BP: (132-144)/(61-69) 134/69  Oxygen Therapy  SpO2: 97 %  Pulse Oximetry Type: Intermittent  O2 Device: room air}    Flowsheet Rows         First Filed Value    Admission Height  70\" (177.8 cm) Documented at 02/07/2017 1859    Admission Weight  149 lb (67.6 kg) Documented at 02/07/2017 1859          Physical Exam:    General Appearance:    Alert, cooperative, in no acute distress   Lungs:     Clear to auscultation,respirations regular, even and                  unlabored    Heart:    Regular rhythm and normal rate, normal S1 and S2, no            murmur, no gallop, no rub, no click   Abdomen:     Normal bowel sounds, no masses, no organomegaly, soft        non-tender, non-distended, no guarding, no rebound                tenderness   Extremities:   Moves all extremities well, no edema, no cyanosis   Pulses:   Radial pulses palpable and equal bilaterally   Neurologic:   Cranial nerves 2 - 12 grossly intact       Results Review:     I reviewed the patient's new clinical results.    Lab Results (last 24 hours)     Procedure Component Value Units Date/Time    CBC (No Diff) [[id number]]  (Abnormal) Collected:  02/13/17 0334    Specimen:  Blood Updated:  02/13/17 0526     WBC 3.68 (L) 10*3/mm3      RBC 2.55 (L) 10*6/mm3      Hemoglobin 8.5 (L) g/dL      Hematocrit 27.8 (L) %      .0 (H) fL      MCH 33.3 (H) pg      MCHC 30.6 (L) g/dL      RDW 20.2 (H) %      RDW-SD 79.2 (H) fl      MPV 11.5 (H) fL      " Platelets 118 (L) 10*3/mm3     Basic Metabolic Panel [[id number]]  (Abnormal) Collected:  02/13/17 0334    Specimen:  Blood Updated:  02/13/17 0528     Glucose 90 mg/dL      BUN 4 (L) mg/dL      Creatinine 0.71 (L) mg/dL      Sodium 140 mmol/L      Potassium 3.7 mmol/L      Chloride 108 (H) mmol/L      CO2 19.8 (L) mmol/L      Calcium 7.9 (L) mg/dL      eGFR Non African Amer 106 mL/min/1.73      BUN/Creatinine Ratio 5.6 (L)      Anion Gap 12.2 mmol/L     Narrative:       The MDRD GFR formula is only valid for adults with stable renal function between ages 18 and 70.    Clostridium Difficile Toxin, PCR [[id number]]  (Normal) Collected:  02/12/17 2327    Specimen:  Stool from Per Rectum Updated:  02/13/17 1100     C. Difficile Toxins by PCR Negative           Medication Review:   I have reviewed the patient's current medication list    Current Facility-Administered Medications:   •  acetaminophen (TYLENOL) tablet 650 mg, 650 mg, Oral, Q6H PRN, Kenya Marks MD  •  clotrimazole-betamethasone (LOTRISONE) 1-0.05 % cream 1 application, 1 application, Topical, Daily, Kenya Marks MD, 1 application at 02/13/17 0849  •  folic acid (FOLVITE) tablet 400 mcg, 400 mcg, Oral, Daily, Kenya Marks MD, 400 mcg at 02/13/17 0847  •  guaiFENesin (MUCINEX) 12 hr tablet 600 mg, 600 mg, Oral, BID, Kenya Marks MD, 600 mg at 02/13/17 1750  •  HYDROcodone-acetaminophen (NORCO) 5-325 MG per tablet 1 tablet, 1 tablet, Oral, Q6H PRN, Kenya Marks MD, 1 tablet at 02/11/17 2258  •  irbesartan (AVAPRO) tablet 300 mg, 300 mg, Oral, Q24H, Kenya Marks MD, 300 mg at 02/13/17 0847  •  lactated ringers infusion, 75 mL/hr, Intravenous, Continuous, Kenya Marks MD, Stopped at 02/13/17 2057  •  lactobacillus acidophilus (RISAQUAD) capsule 1 capsule, 1 capsule, Oral, Daily, Bridger Amado MD, 1 capsule at 02/13/17 0824  •  metroNIDAZOLE  (FLAGYL) IVPB 500 mg, 500 mg, Intravenous, Q8H, Kenya Marks MD, Last Rate: 0 mL/hr at 02/13/17 1606, 500 mg at 02/13/17 2057  •  multivitamin with minerals 1 tablet, 1 tablet, Oral, Daily, Kenya Marks MD, 1 tablet at 02/13/17 0847  •  piperacillin-tazobactam (ZOSYN) 4.5 g/100 mL 0.9% NS IVPB (mbp), 4.5 g, Intravenous, Q8H, Marvin Ma MD, Stopped at 02/13/17 1701  •  potassium chloride (K-DUR,KLOR-CON) ER tablet 40 mEq, 40 mEq, Oral, PRN, 40 mEq at 02/12/17 1734 **OR** potassium chloride (KLOR-CON) packet 40 mEq, 40 mEq, Oral, PRN **OR** potassium chloride 10 mEq in 100 mL IVPB, 10 mEq, Intravenous, Q1H PRN, Kaylie Granados MD  •  sodium chloride 0.9 % flush 1-10 mL, 1-10 mL, Intravenous, PRN, Kenya Marks MD      Assessment/Plan     1. Acute diverticulitis with Abscess:  C-diff was negative so I've stopped the Flagyl.  Appears clinically better.  Appreciate consulting services.  Since tolerating PO, I would consider changing to PO Augmentin tomorrow.       2. Acute blood loss on Chronic anemia with suspected low grade MDS:  Hgb appears stable.  Given recovery, and lack of continued bleeding, would not check any further serial labs.      3. Hypertension:  Near goal on exam.  Continue to hold HCTZ.      4. Grade II diastolic dysfunction and mild-mod cardiac valvular disease:  Nothing acute.      5. Chronic urinary retention: Will need Urology follow-up at discharge.        Plan for disposition: Home soon.    Ronald Sagastume MD  02/13/17  9:53 PM

## 2017-02-14 NOTE — PROGRESS NOTES
GI Daily Progress Note    Assessment/Plan:    Active Problems:    Acute diverticulitis       LOS: 7 days     Jason Zelaya is a 83 y.o. male who was admitted with diverticulitis. He reports his symptoms are improving with treatment. Tolerating regular food w/o diarrhea or bleeding.    Subjective:    Patient expresses abdominal pain  Patient denies vomiting, diarrhea and bloody stools    Objective:    Vital signs in last 24 hours:  Temp:  [97.4 °F (36.3 °C)-98.7 °F (37.1 °C)] 97.6 °F (36.4 °C)  Heart Rate:  [54-73] 72  Resp:  [18] 18  BP: (118-137)/(61-69) 118/67    Intake/Output last 3 shifts:  I/O last 3 completed shifts:  In: 3588.8 [P.O.:960; I.V.:1828.8; IV Piggyback:800]  Out: 2810 [Urine:2750; Stool:60]  Intake/Output this shift:  I/O this shift:  In: 920 [P.O.:720; IV Piggyback:200]  Out: 800 [Urine:800]    Results from last 7 days  Lab Units 02/14/17  0829 02/13/17  0334 02/12/17  0819 02/12/17  0606  02/11/17  0328 02/10/17  1529   WBC 10*3/mm3 3.97* 3.68*  --  3.27*  --  3.67* 4.60*   HEMOGLOBIN g/dL 10.3* 8.5* 7.4* 7.1*  < > 7.7* 8.2*   HEMATOCRIT % 33.0* 27.8* 24.2* 24.1*  < > 25.0* 26.2*   PLATELETS 10*3/mm3 156 118*  --  114*  --  104* 112*   MONOCYTES % % 36.0*  --   --   --   --  31.0* 36.0*   < > = values in this interval not displayed.     Results from last 7 days  Lab Units 02/13/17  0334 02/12/17  0359 02/11/17  0328   SODIUM mmol/L 140 141 142   POTASSIUM mmol/L 3.7 3.6 2.9*   CHLORIDE mmol/L 108* 108* 110*   TOTAL CO2 mmol/L 19.8* 19.7* 21.4*   BUN mg/dL 4* 7* 11   CREATININE mg/dL 0.71* 0.66* 0.69*   GLUCOSE mg/dL 90 89 88   CALCIUM mg/dL 7.9* 7.7* 7.6*       Physical Exam:Abdomen  Sounds Normal Active Bowel Sounds   Distension Soft   Tenderness Mildly Tender bilat pelvis     Diverticulitis  Rectal Bleeding  Anemia w hx of MDS    Overall making great progress will follow surgery lead

## 2017-02-14 NOTE — PROGRESS NOTES
He says he is feeling well.  His abdomen is soft and nontender.  His vital signs are stable.  His CBC is still pending from this morning.  If this is stable we will advance his diet.

## 2017-02-14 NOTE — PLAN OF CARE
Problem: Patient Care Overview (Adult)  Goal: Plan of Care Review  Outcome: Ongoing (interventions implemented as appropriate)    02/14/17 1531   Patient Care Overview   Progress improving   Outcome Evaluation   Outcome Summary/Follow up Plan diet adjusted,( tolerates well), decrease stool output, feeiling better   Coping/Psychosocial Response Interventions   Plan Of Care Reviewed With patient;spouse       Goal: Adult Individualization and Mutuality  Outcome: Ongoing (interventions implemented as appropriate)  Goal: Discharge Needs Assessment  Outcome: Ongoing (interventions implemented as appropriate)    Problem: Pain, Acute (Adult)  Goal: Acceptable Pain Control/Comfort Level  Outcome: Ongoing (interventions implemented as appropriate)    Problem: Diarrhea (Adult)  Goal: Improved/Reduced Symptoms  Outcome: Ongoing (interventions implemented as appropriate)

## 2017-02-15 VITALS
SYSTOLIC BLOOD PRESSURE: 137 MMHG | OXYGEN SATURATION: 97 % | WEIGHT: 149 LBS | HEIGHT: 70 IN | TEMPERATURE: 98.8 F | HEART RATE: 82 BPM | BODY MASS INDEX: 21.33 KG/M2 | DIASTOLIC BLOOD PRESSURE: 67 MMHG | RESPIRATION RATE: 18 BRPM

## 2017-02-15 LAB
ANION GAP SERPL CALCULATED.3IONS-SCNC: 15 MMOL/L
ANISOCYTOSIS BLD QL: ABNORMAL
BUN BLD-MCNC: 4 MG/DL (ref 8–23)
BUN/CREAT SERPL: 4.9 (ref 7–25)
CALCIUM SPEC-SCNC: 8.4 MG/DL (ref 8.8–10.5)
CHLORIDE SERPL-SCNC: 103 MMOL/L (ref 98–107)
CO2 SERPL-SCNC: 22 MMOL/L (ref 22–29)
CREAT BLD-MCNC: 0.82 MG/DL (ref 0.76–1.27)
DEPRECATED RDW RBC AUTO: 76.4 FL (ref 37–54)
EOSINOPHIL # BLD MANUAL: 0.02 10*3/MM3 (ref 0.1–0.3)
EOSINOPHIL NFR BLD MANUAL: 1 % (ref 0–4)
ERYTHROCYTE [DISTWIDTH] IN BLOOD BY AUTOMATED COUNT: 20.2 % (ref 11.5–14.5)
GFR SERPL CREATININE-BSD FRML MDRD: 90 ML/MIN/1.73
GLUCOSE BLD-MCNC: 167 MG/DL (ref 65–99)
HCT VFR BLD AUTO: 29.4 % (ref 42–52)
HCT VFR BLD AUTO: 29.5 % (ref 42–52)
HGB BLD-MCNC: 9.1 G/DL (ref 14–18)
HGB BLD-MCNC: 9.3 G/DL (ref 14–18)
LYMPHOCYTES # BLD MANUAL: 0.5 10*3/MM3 (ref 0.6–4.8)
LYMPHOCYTES NFR BLD MANUAL: 21 % (ref 20–45)
LYMPHOCYTES NFR BLD MANUAL: 44 % (ref 3–8)
MACROCYTES BLD QL SMEAR: ABNORMAL
MAGNESIUM SERPL-MCNC: 1.6 MG/DL (ref 1.7–2.5)
MAGNESIUM SERPL-MCNC: 2 MG/DL (ref 1.7–2.5)
MCH RBC QN AUTO: 33.8 PG (ref 27–31)
MCHC RBC AUTO-ENTMCNC: 31.6 G/DL (ref 31–37)
MCV RBC AUTO: 106.9 FL (ref 80–94)
METAMYELOCYTES NFR BLD MANUAL: 2 % (ref 0–0)
MONOCYTES # BLD AUTO: 1.04 10*3/MM3 (ref 0–1)
NEUTROPHILS # BLD AUTO: 0.73 10*3/MM3 (ref 1.5–8.3)
NEUTROPHILS NFR BLD MANUAL: 31 % (ref 45–70)
PLAT MORPH BLD: NORMAL
PLATELET # BLD AUTO: 116 10*3/MM3 (ref 140–500)
PMV BLD AUTO: 11.7 FL (ref 7.4–10.4)
POTASSIUM BLD-SCNC: 2.9 MMOL/L (ref 3.5–5.2)
POTASSIUM BLD-SCNC: 4.1 MMOL/L (ref 3.5–5.2)
RBC # BLD AUTO: 2.75 10*6/MM3 (ref 4.7–6.1)
SODIUM BLD-SCNC: 140 MMOL/L (ref 136–145)
VARIANT LYMPHS NFR BLD MANUAL: 1 % (ref 0–5)
WBC MORPH BLD: NORMAL
WBC NRBC COR # BLD: 2.36 10*3/MM3 (ref 4.8–10.8)

## 2017-02-15 PROCEDURE — 99231 SBSQ HOSP IP/OBS SF/LOW 25: CPT

## 2017-02-15 PROCEDURE — 99232 SBSQ HOSP IP/OBS MODERATE 35: CPT | Performed by: SURGERY

## 2017-02-15 PROCEDURE — 25010000003 POTASSIUM CHLORIDE 10 MEQ/100ML SOLUTION: Performed by: HOSPITALIST

## 2017-02-15 PROCEDURE — 84132 ASSAY OF SERUM POTASSIUM: CPT | Performed by: HOSPITALIST

## 2017-02-15 PROCEDURE — 25010000002 MAGNESIUM SULFATE IN D5W 1G/100ML (PREMIX) 10-5 MG/ML-% SOLUTION: Performed by: HOSPITALIST

## 2017-02-15 PROCEDURE — 85018 HEMOGLOBIN: CPT | Performed by: HOSPITALIST

## 2017-02-15 PROCEDURE — 85007 BL SMEAR W/DIFF WBC COUNT: CPT | Performed by: HOSPITALIST

## 2017-02-15 PROCEDURE — 85014 HEMATOCRIT: CPT | Performed by: HOSPITALIST

## 2017-02-15 PROCEDURE — 83735 ASSAY OF MAGNESIUM: CPT | Performed by: HOSPITALIST

## 2017-02-15 PROCEDURE — 85025 COMPLETE CBC W/AUTO DIFF WBC: CPT | Performed by: HOSPITALIST

## 2017-02-15 PROCEDURE — 80048 BASIC METABOLIC PNL TOTAL CA: CPT | Performed by: HOSPITALIST

## 2017-02-15 PROCEDURE — 99239 HOSP IP/OBS DSCHRG MGMT >30: CPT | Performed by: HOSPITALIST

## 2017-02-15 RX ORDER — AMOXICILLIN AND CLAVULANATE POTASSIUM 875; 125 MG/1; MG/1
1 TABLET, FILM COATED ORAL EVERY 12 HOURS SCHEDULED
Qty: 14 TABLET | Refills: 0 | Status: SHIPPED | OUTPATIENT
Start: 2017-02-15 | End: 2017-02-22

## 2017-02-15 RX ORDER — POTASSIUM CHLORIDE 1.5 G/1.77G
40 POWDER, FOR SOLUTION ORAL
Status: COMPLETED | OUTPATIENT
Start: 2017-02-15 | End: 2017-02-15

## 2017-02-15 RX ORDER — POTASSIUM CHLORIDE 20 MEQ/1
20 TABLET, EXTENDED RELEASE ORAL DAILY
Status: DISCONTINUED | OUTPATIENT
Start: 2017-02-16 | End: 2017-02-15 | Stop reason: HOSPADM

## 2017-02-15 RX ORDER — IRBESARTAN 300 MG/1
300 TABLET ORAL
Qty: 30 TABLET | Refills: 0 | Status: SHIPPED | OUTPATIENT
Start: 2017-02-15 | End: 2017-01-01 | Stop reason: HOSPADM

## 2017-02-15 RX ORDER — POTASSIUM CHLORIDE 20 MEQ/1
20 TABLET, EXTENDED RELEASE ORAL DAILY
Status: DISCONTINUED | OUTPATIENT
Start: 2017-02-15 | End: 2017-02-15

## 2017-02-15 RX ORDER — POTASSIUM CHLORIDE 750 MG/1
10 TABLET, FILM COATED, EXTENDED RELEASE ORAL DAILY
Qty: 30 TABLET | Refills: 0 | Status: SHIPPED | OUTPATIENT
Start: 2017-02-15 | End: 2017-01-01

## 2017-02-15 RX ORDER — POTASSIUM CHLORIDE 7.45 MG/ML
10 INJECTION INTRAVENOUS
Status: COMPLETED | OUTPATIENT
Start: 2017-02-15 | End: 2017-02-15

## 2017-02-15 RX ORDER — L.ACID,PARA/B.BIFIDUM/S.THERM 8B CELL
1 CAPSULE ORAL DAILY
Qty: 30 CAPSULE | Refills: 0 | Status: ON HOLD | OUTPATIENT
Start: 2017-02-15 | End: 2017-01-01

## 2017-02-15 RX ORDER — GUAIFENESIN 600 MG/1
600 TABLET, EXTENDED RELEASE ORAL 2 TIMES DAILY
Start: 2017-02-15 | End: 2017-03-14

## 2017-02-15 RX ADMIN — AMOXICILLIN AND CLAVULANATE POTASSIUM 1 TABLET: 875; 125 TABLET, FILM COATED ORAL at 09:01

## 2017-02-15 RX ADMIN — GUAIFENESIN 600 MG: 600 TABLET, EXTENDED RELEASE ORAL at 09:02

## 2017-02-15 RX ADMIN — GUAIFENESIN 600 MG: 600 TABLET, EXTENDED RELEASE ORAL at 17:17

## 2017-02-15 RX ADMIN — POTASSIUM CHLORIDE 40 MEQ: 1.5 POWDER, FOR SOLUTION ORAL at 11:37

## 2017-02-15 RX ADMIN — POTASSIUM CHLORIDE 10 MEQ: 10 INJECTION, SOLUTION INTRAVENOUS at 13:25

## 2017-02-15 RX ADMIN — POTASSIUM CHLORIDE 40 MEQ: 1.5 POWDER, FOR SOLUTION ORAL at 13:25

## 2017-02-15 RX ADMIN — MULTIPLE VITAMINS W/ MINERALS TAB 1 TABLET: TAB at 09:02

## 2017-02-15 RX ADMIN — CLOTRIMAZOLE AND BETAMETHASONE DIPROPIONATE 1 APPLICATION: 10; .5 CREAM TOPICAL at 09:02

## 2017-02-15 RX ADMIN — IRBESARTAN 300 MG: 150 TABLET ORAL at 09:01

## 2017-02-15 RX ADMIN — POTASSIUM CHLORIDE 10 MEQ: 10 INJECTION, SOLUTION INTRAVENOUS at 11:38

## 2017-02-15 RX ADMIN — MAGNESIUM SULFATE HEPTAHYDRATE 1 G: 1 INJECTION, SOLUTION INTRAVENOUS at 15:15

## 2017-02-15 RX ADMIN — Medication 1 CAPSULE: at 09:02

## 2017-02-15 RX ADMIN — ACETAMINOPHEN 400 MCG: 400 TABLET ORAL at 09:01

## 2017-02-15 NOTE — PROGRESS NOTES
"    HOSPITALIST SERVICES  @ [place], [place]            HOSPITALIST TEAM   PROGRESS NOTE      Patient Care Team:  David Cedillo MD as PCP - General (Family Medicine)  Anupam Green MD as Consulting Physician (Hematology and Oncology)        Chief Complaint:        Abdominal pain and low grade fevers      Subjective      Mr. Jason Zelaya is an 84 y/o  male with hypertension, grade II diastolic dysfunction, Mild to moderate cardiac valvular disease, chronic urinary retention and straight caths 3-4 x/day and suspected low grade MDS was admitted directly from Dr. Green's office secondary to acute diverticulitis with abscess.       Interval History and ROS:     Patient States abdominal pain is better and he is not running any fever  Patient Complaints: No new complaints  Patient Denies:  Any shortness of breath, n/v or cheat pain  History taken from: patient and his wife      Objective    Vital Signs  Temp:  [97.4 °F (36.3 °C)-98.7 °F (37.1 °C)] 97.6 °F (36.4 °C)  Heart Rate:  [54-73] 72  Resp:  [18] 18  BP: (118-137)/(61-69) 118/67    Flowsheet Rows         First Filed Value    Admission Height  70\" (177.8 cm) Documented at 02/07/2017 1859    Admission Weight  149 lb (67.6 kg) Documented at 02/07/2017 1859              Physical Exam:      Vital signs: As per Nurses' notes   General: Mr. Zelaya is a  male in no acute distress.  HEENT: Normocephalic. PERRL  Lymph nodes: None palpable  Neck: Supple  Cardiovascular: Regular rate and rhythm; normal S1, S2; no murmurs, rubs, or gallops  Lungs: Lungs clear to auscultation bilaterally; No wheezes or crackles  Abdominal:  • Abdomen soft and non-distended with no scars or striations  • No pulsatile masses, no abdominal bruits ascultated  • Spleen not palpable, liver not palpable  Neurologic: No focal deficits   Muskuloskeletal: Unremarkable          Results Review:     I reviewed the patient's new clinical results.    Lab Results (last " 24 hours)     Procedure Component Value Units Date/Time    CBC Auto Differential [[id number]]  (Abnormal) Collected:  02/14/17 0829    Specimen:  Blood Updated:  02/14/17 0903     WBC 3.97 (L) 10*3/mm3      RBC 3.07 (L) 10*6/mm3      Hemoglobin 10.3 (L) g/dL      Hematocrit 33.0 (L) %      .5 (H) fL      MCH 33.6 (H) pg      MCHC 31.2 g/dL      RDW 20.3 (H) %      RDW-SD 78.8 (H) fl      MPV 11.4 (H) fL      Platelets 156 10*3/mm3      Neutrophil % 32.1 (L) %      Lymphocyte % 30.0 %      Monocyte % 34.3 (H) %      Eosinophil % 0.0 %      Basophil % 0.3 %      Immature Grans % 3.3 (H) %      Neutrophils, Absolute 1.28 (L) 10*3/mm3      Lymphocytes, Absolute 1.19 10*3/mm3      Monocytes, Absolute 1.36 (H) 10*3/mm3      Eosinophils, Absolute 0.00 (L) 10*3/mm3      Basophils, Absolute 0.01 10*3/mm3      Immature Grans, Absolute 0.13 (H) 10*3/mm3      nRBC 0.0 /100 WBC     CBC & Differential [[id number]] Collected:  02/14/17 0829    Specimen:  Blood Updated:  02/14/17 1038    Narrative:       The following orders were created for panel order CBC & Differential.  Procedure                               Abnormality         Status                     ---------                               -----------         ------                     Manual Differential[[id number]]           Abnormal            Final result               Scan Slide[[id number]]                                                                   CBC Auto Differential[[id number]]         Abnormal            Final result                 Please view results for these tests on the individual orders.    Manual Differential [[id number]]  (Abnormal) Collected:  02/14/17 0829    Specimen:  Blood Updated:  02/14/17 1038     Neutrophil % 27.0 (L) %      Lymphocyte % 29.0 %      Monocyte % 36.0 (H) %      Bands %  4.0 %      Metamyelocyte % 3.0 (H) %      Myelocyte % 1.0 (H) %      Neutrophils Absolute 1.23 (L) 10*3/mm3      Lymphocytes Absolute 1.15 10*3/mm3      Monocytes  Absolute 1.43 (H) 10*3/mm3      Anisocytosis Slight/1+      Macrocytes Slight/1+      WBC Morphology Normal      Large Platelets Slight/1+     POC Glucose Fingerstick [[id number]]  (Normal) Collected:  02/14/17 1147    Specimen:  Blood Updated:  02/14/17 1238     Glucose 127 mg/dL     Narrative:       Meter: [id number] : [id number] Tut[facility] [place] PCA    POC Glucose Fingerstick [[id number]]  (Normal) Collected:  02/14/17 1700    Specimen:  Blood Updated:  02/14/17 1709     Glucose 108 mg/dL     Narrative:       Meter: [id number] : [id number] [facility] [place] PCA          Imaging Results (last 24 hours)     ** No results found for the last 24 hours. **          Xray not reviewed personally by physician.      ECG not reviewed personally by physician  ECG/EMG Results (most recent)     None          Medication Review:   I have reviewed the patient's current medication list    Current Facility-Administered Medications:   •  acetaminophen (TYLENOL) tablet 650 mg, 650 mg, Oral, Q6H PRN, Kenya Marks MD  •  clotrimazole-betamethasone (LOTRISONE) 1-0.05 % cream 1 application, 1 application, Topical, Daily, Kenya Marks MD, 1 application at 02/14/17 1007  •  folic acid (FOLVITE) tablet 400 mcg, 400 mcg, Oral, Daily, Kenya Marks MD, 400 mcg at 02/14/17 1008  •  guaiFENesin (MUCINEX) 12 hr tablet 600 mg, 600 mg, Oral, BID, Kenya Marks MD, 600 mg at 02/14/17 1814  •  HYDROcodone-acetaminophen (NORCO) 5-325 MG per tablet 1 tablet, 1 tablet, Oral, Q6H PRN, Kenya Marks MD, 1 tablet at 02/11/17 2258  •  irbesartan (AVAPRO) tablet 300 mg, 300 mg, Oral, Q24H, Kenya Marks MD, 300 mg at 02/14/17 1008  •  lactobacillus acidophilus (RISAQUAD) capsule 1 capsule, 1 capsule, Oral, Daily, Bridger Amado MD, 1 capsule at 02/14/17 1008  •  multivitamin with minerals 1 tablet, 1 tablet, Oral, Daily, Kenya Stewart  MD Kendrick, 1 tablet at 02/14/17 1008  •  piperacillin-tazobactam (ZOSYN) 4.5 g/100 mL 0.9% NS IVPB (mbp), 4.5 g, Intravenous, Q8H, Marvin aM MD, Stopped at 02/14/17 1618  •  potassium chloride (K-DUR,KLOR-CON) ER tablet 40 mEq, 40 mEq, Oral, PRN, 40 mEq at 02/12/17 1734 **OR** potassium chloride (KLOR-CON) packet 40 mEq, 40 mEq, Oral, PRN **OR** potassium chloride 10 mEq in 100 mL IVPB, 10 mEq, Intravenous, Q1H PRN, Kaylie Granados MD  •  sodium chloride 0.9 % flush 1-10 mL, 1-10 mL, Intravenous, PRN, Kenya Marks MD      Assessment/Plan       ASSESSMENT AND PLAN:       SUMMARY:    ?   PROPHYLAXIS:   -DVT Prophylaxis: On SCDs   -Ayoub Catheter: Not indicated at this time    NUTRITION AND FLUIDS:  -Diet/ Nutrition: Low residue diet   -Fluid Status/Electrolytes: Saline Lock       SOCIAL ISSUES:    -Behavioral/ Agitation Issues: NONE   -Social Issues: Lives at home with his family.       THERAPEUTIC:    -ANTIBIOTICS: Augmentin PO  -PAIN MANAGEMENT: Norco 5-325mg              PLAN:    -Labs and diagnostic tests reviewed: WBC 3.97, Hb 10.2, Plt 156, Gluc 108,     -Diagnostic tests reviewed:    None done in last 24 hours      -Consultations: Being followed by Dr. Ma and Dr Amado    -Any new recommendations: Switch to oral Augmentin    -New Labs ordered: N/A    -New diagnostic tests ordered: N/A    -Any changes in medications: D/C Inj Zosyn    -Discharge planning issues: Patient should be able to go back home once ready for discharge    -Placement issues: N/A    -Patient is clinically and hemodynamically stable    -To continue current management and supportive care    -Will follow patient closely    -Nothing new to add for right now              PRIMARY DIAGNOSES:    1. Acute diverticulitis with Abscess: C-diff was negative so I've stopped the Flagyl. Appears clinically better. Appreciate consulting services. Since tolerating PO, I D/Cd Inj Zosyn and changed to PO Augmentin.      2.  Acute blood loss on Chronic anemia with suspected low grade MDS: Hgb appears stable. Given recovery, and lack of continued bleeding, would not check any further serial labs.      3. Hypertension: Near goal on exam. Continue to hold HCTZ. BP is 118/67 today      4. Grade II diastolic dysfunction and mild-mod cardiac valvular disease: Nothing acute.      5. Chronic urinary retention: Will need Urology follow-up at discharge.  ?     SECONDARY DIAGNOSES:  ?     Hx of Myelodysplastic Syndrome      SURGICAL DIAGNOSES:      S/P Fusion Back surgery 2008, S/P Cholecystectomy, S/P Colonoscopy 2/1/2017, S/P Herniorrhaphy          PLAN NARRATIVE: Patient is improving. Was admitted with Acute Cholecystitis. Has been started on oral Augmentin and Inj Zosyn has been stopped.              Plan for disposition: patient should be able to go home once ready for discharge    Rian Dean MD  02/14/17  7:24 PM            Rian Dean M.D., FACP  Internal Medicine/ Hospitalist          Time:       EMR Dragon/Transcription disclaimer:      Much of this encounter note is an electronic transcription/translation of spoken language to printed text. The electronic translation of spoken language may permit erroneous, or at times, nonsensical words or phrases to be inadvertently transcribed; Although I have reviewed the note for such errors, some may still exist.

## 2017-02-15 NOTE — DISCHARGE SUMMARY
Jason Zelaya  1934  [id number]        Hospitalists Discharge Summary    Date of Admission: 2/7/2017  Date of Discharge:  2/15/2017    Primary Discharge Diagnoses:   1. Acute diverticulitis with Abscess and GI bleeding    Secondary Discharge Diagnoses:   2. 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 Hypertension    4. Grade II diastolic dysfunction and mild-mod cardiac valvular disease    5. Chronic urinary retention    6. Hypokalemia and hypomagnesemia    PCP  Patient Care Team:  David Cedillo MD as PCP - General (Family Medicine)  Anupam Green MD as Consulting Physician (Hematology and Oncology)    Consults:   Consults     Date and Time Order Name Status Description    2/7/2017 2040 Inpatient Consult to General Surgery Completed     2/7/2017 2040 Inpatient Consult to Gastroenterology Completed           Operations and Procedures Performed:       Ct Abdomen Pelvis With Contrast    Result Date: 2/7/2017  Narrative: CT ABDOMEN AND PELVIS WITH CONTRAST, 02/07/2017:  HISTORY: 83-year-old male with recent history of acute sigmoid colitis. Colonoscopy on 02/01/2017. One week history of lower abdomen pain and constipation.  TECHNIQUE: CT examination of the abdomen and pelvis with oral and IV contrast. Radiation dose reduction techniques were utilized, including automated exposure control and exposure modulation based on body size.  COMPARISON: *  CT abdomen/pelvis, noncontrast, 01/04/2017.  ABDOMEN FINDINGS: The exam shows severe long segment sigmoid diverticulitis involving the mid and upper sigmoid colon in the left mid pelvis. There is evidence of intramural and small pericolonic abscess formation along the lateral margin of the involved sigmoid colon, but no drainable abscess is present. Extensive surrounding inflammatory soft tissue stranding in the left side of the pelvis, and likely inflammatory wall thickening involving the urinary bladder where it abuts the inflamed  colon. Similar findings were present on the previous examination but have increased in severity on the current exam. No evidence of bowel obstruction or free intraperitoneal air.  Liver, pancreas, kidneys and adrenal glands are within normal limits. Mild splenomegaly. Previous midline ventral hernia repair surgery. Normal caliber abdominal aorta.  PELVIS FINDINGS: Prostate and rectum are unremarkable. Limited lung base images show mild-to-moderate diffuse chronic interstitial pulmonary fibrosis.      Impression: 1. Severe long segment acute sigmoid diverticulitis with extensive linear intramural abscess and tiny pericolonic abscess. No drainable fluid collection is present. Severity of inflammation has increased significantly since 01/04/2017. 2. Probable secondary inflammation of the urinary bladder where it abuts the inflamed sigmoid colon. No air within the urinary bladder to suggest fistula. 3. No evidence of bowel obstruction or free intraperitoneal air. 4. Cholecystectomy and ventral hernia repair. 5. Interstitial pulmonary fibrosis. 6. Stat final copy of this report was sent to the ordering physician's office immediately following this dictation with telephone notification and documentation.  This report was finalized on 2/7/2017 4:55 PM by Dr. Chencho Henderson MD.        Allergies:  has No Known Allergies.    Song  reviewed    Discharge Medications:   Jason Zelaya   Home Medication Instructions TALI:[id number]    Printed on:02/15/17 2884   Medication Information                      acetaminophen (TYLENOL) 325 MG tablet  Take 650 mg by mouth every 6 (six) hours as needed for mild pain (1-3).             amoxicillin-clavulanate (AUGMENTIN) 875-125 MG per tablet  Take 1 tablet by mouth Every 12 (Twelve) Hours for 7 days. Indications: Infection Within the Abdomen             clotrimazole-betamethasone (LOTRISONE) 1-0.05 % cream  Apply 1 application topically Daily.             cyanocobalamin 1000  MCG/ML injection  1,000 mcg Every 14 (Fourteen) Days. Cyanocobalamin SOLN; Patient Sig: Cyanocobalamin SOLN ; 0; 06-Jun-2014; Active             folic acid (FOLVITE) 400 MCG tablet  Take 400 mcg by mouth Daily.             glucosamine-chondroitin 500-400 MG capsule capsule  Take 1 capsule by mouth Daily.             guaiFENesin (MUCINEX) 600 MG 12 hr tablet  Take 1 tablet by mouth 2 (Two) Times a Day.             irbesartan (AVAPRO) 300 MG tablet  Take 1 tablet by mouth Daily.             lactobacillus acidophilus (RISAQUAD) capsule capsule  Take 1 capsule by mouth Daily.             MULTIPLE VITAMINS-MINERALS PO  Take 1 tablet by mouth Daily.             potassium chloride (K-DUR) 10 MEQ CR tablet  Take 1 tablet by mouth Daily.                 History of Present Illness:  84 y/o male with hypertension, grade II diastolic dysfunction, Mild to moderate cardiac valvular disease, chronic urinary retention and straight caths 3-4 x/day and suspected low grade MDS is being admitted directly from Dr. Green's office secondary to acute diverticulitis with abscess. The patient was here back in early January, seen in ER and treated outpatient for acute diverticulitis with levoquin and flagyl. He followed up with Dr. Amado and had an outpatient colonoscopy on 2/1/17 that showed pan-diverticulosis and multiple polyps. The patient notes that the original abdominal pain her had back in January did improve with antibiotics but never completely resolved. Then about 2 days after his colonoscopy patient started having sweats at night and fever to 102. He denies any N/V/D, CP, SOA or heart palpitations. Patient notes poor appetite since his initial infection in January and probably (per patient) a 10lb weight loss due to his decreased appetite. Patient saw Dr. Green today for labs and a procrit injection and a CT abdomen was ordered secondary to his symptoms which showed Acute diverticulitis and abscess and Dr. Green spoke with  myself about the findings and the patient was direct admitted for treatment.     Hospital Course  1. Acute diverticulitis with Abscess and GI bleeding: Dr. Ma and Dr. Amado followed here. Initially the patient was started back on levoquin and flagyl but after speaking with Dr. Ma he changed the levoquin to zosyn. Patient remained NPO on IVFs, then diet was slowly advanced until abdominal pain had resolved and patient was tolerating low residue diet.  IVFs were discontinued.  Patient remained afebrile.  C-diff was negative so flagyl was stopped and patient was changed to po augmentin to complete a 2 week course.  F/U with Dr. Ma in office next Tuesday.      2. 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 Green and received procrit outpatient today prior to admission. Patient required transfusion here secondary to acute bleeding, see #1 above.  At the time of discharge bleeding had resolved and stools were brown.  Patient has f/U with Dr. Green next Tuesday.  Will re-check CBC on Friday with results to Dr. Green. Hb stable today.     3. Hypertension: continued patient's home avapro but HCTZ on hold. BP is WNL. Would not resume HCTZ until f/U with PCP.     4. Grade II diastolic dysfunction and mild-mod cardiac valvular disease: No acute issues here.     5. Chronic urinary retention: Patient to continue to straight cath 3-4 times per day as per home regimen. CT showed some inflammation of the bladder where it abuts the inflamed sigmoid colon. Dr. Ma spoke with Dr. Prince at admission and he would like to see patient in f/U as outpatient. Will have patient f/U in 2 weeks.    6. Hypokalemia and hypomagnesemia: Secondary to diarrhea, replaced here.  Will send home on po supplementation (low dose) for now and have patient get BMP on Friday with results to Dr. Cedillo.     6. DVT prophy: SCDs. No AC secondary to the bleeding he had here.    Last Lab Results:    Lab Results (most recent)     Procedure Component Value Units Date/Time    Comprehensive Metabolic Panel [[id number]]  (Abnormal) Collected:  02/07/17 2107    Specimen:  Blood Updated:  02/07/17 2130     Glucose 92 mg/dL      BUN 15 mg/dL      Creatinine 0.87 mg/dL      Sodium 133 (L) mmol/L      Potassium 3.7 mmol/L      Chloride 96 (L) mmol/L      CO2 23.3 mmol/L      Calcium 8.4 (L) mg/dL      Total Protein 7.1 g/dL      Albumin 2.90 (L) g/dL      ALT (SGPT) 46 (H) U/L      AST (SGOT) 21 U/L      Alkaline Phosphatase 186 (H) U/L      Total Bilirubin 0.7 mg/dL      eGFR Non African Amer 84 mL/min/1.73      Globulin 4.2 gm/dL      A/G Ratio 0.7 g/dL      BUN/Creatinine Ratio 17.2      Anion Gap 13.7 mmol/L     Narrative:       The MDRD GFR formula is only valid for adults with stable renal function between ages 18 and 70.    Basic Metabolic Panel [[id number]]  (Abnormal) Collected:  02/08/17 0349    Specimen:  Blood Updated:  02/08/17 0527     Glucose 88 mg/dL      BUN 14 mg/dL      Creatinine 0.92 mg/dL      Sodium 139 mmol/L      Potassium 3.9 mmol/L      Chloride 100 mmol/L      CO2 25.1 mmol/L      Calcium 8.6 (L) mg/dL      eGFR Non African Amer 79 mL/min/1.73      BUN/Creatinine Ratio 15.2      Anion Gap 13.9 mmol/L     Narrative:       The MDRD GFR formula is only valid for adults with stable renal function between ages 18 and 70.    CBC Auto Differential [[id number]]  (Abnormal) Collected:  02/08/17 0349    Specimen:  Blood Updated:  02/08/17 0618     WBC 8.86 10*3/mm3      RBC 2.16 (L) 10*6/mm3      Hemoglobin 7.6 (L) g/dL      Hematocrit 25.4 (L) %      .6 (H) fL      MCH 35.2 (H) pg      MCHC 29.9 (L) g/dL      RDW 15.1 (H) %      RDW-SD 65.5 (H) fl      MPV 12.0 (H) fL      Platelets 97 (L) 10*3/mm3     Scan Slide [[id number]] Collected:  02/08/17 0349    Specimen:  Blood Updated:  02/08/17 0618     Scan Slide --       See Manual Differential Results       Manual Differential [[id number]]  (Abnormal)  Collected:  02/08/17 0349    Specimen:  Blood Updated:  02/08/17 0618     Neutrophil % 52.0 %      Lymphocyte % 13.0 (L) %      Monocyte % 26.0 (H) %      Myelocyte % 2.0 (H) %      Atypical Lymphocyte % 5.0 %      Other Cells % 2.0 (H) %      Neutrophils Absolute 4.61 10*3/mm3      Lymphocytes Absolute 1.15 10*3/mm3      Monocytes Absolute 2.30 (H) 10*3/mm3      Anisocytosis Mod/2+      Macrocytes Mod/2+      WBC Morphology Normal      Platelet Estimate Decreased       Platelets appear mildly decreased on smear        Narrative:       Other cells appear to be promonocytes      Slide was not sent to pathologist.  If review by path is wanted, please notify lab staff.     Hemoglobin & Hematocrit, Blood [[id number]]  (Abnormal) Collected:  02/08/17 1143    Specimen:  Blood Updated:  02/08/17 1146     Hemoglobin 7.7 (L) g/dL      Hematocrit 25.0 (L) %     CBC Auto Differential [[id number]]  (Abnormal) Collected:  02/09/17 0620    Specimen:  Blood Updated:  02/09/17 0721     WBC 8.64 10*3/mm3      RBC 2.87 (L) 10*6/mm3      Hemoglobin 9.7 (L) g/dL      Hematocrit 31.1 (L) %      .4 (H) fL      MCH 33.8 (H) pg      MCHC 31.2 g/dL      RDW 20.9 (H) %      RDW-SD 81.8 (H) fl      MPV 12.1 (H) fL      Platelets 101 (L) 10*3/mm3      Neutrophil % 49.3 %      Lymphocyte % 13.5 (L) %      Monocyte % 31.9 (H) %      Eosinophil % 0.0 %      Basophil % 0.1 %      Immature Grans % 5.2 (H) %      Neutrophils, Absolute 4.25 10*3/mm3      Lymphocytes, Absolute 1.17 10*3/mm3      Monocytes, Absolute 2.76 (H) 10*3/mm3      Eosinophils, Absolute 0.00 (L) 10*3/mm3      Basophils, Absolute 0.01 10*3/mm3      Immature Grans, Absolute 0.45 (H) 10*3/mm3      nRBC 0.0 /100 WBC     Basic Metabolic Panel [[id number]]  (Abnormal) Collected:  02/09/17 0620    Specimen:  Blood Updated:  02/09/17 0728     Glucose 77 mg/dL      BUN 12 mg/dL      Creatinine 0.94 mg/dL      Sodium 136 mmol/L      Potassium 3.5 mmol/L      Chloride 100 mmol/L      CO2  20.9 (L) mmol/L      Calcium 8.5 (L) mg/dL      eGFR Non African Amer 77 mL/min/1.73      BUN/Creatinine Ratio 12.8      Anion Gap 15.1 mmol/L     Narrative:       The MDRD GFR formula is only valid for adults with stable renal function between ages 18 and 70.    CBC & Differential [[id number]] Collected:  02/09/17 0620    Specimen:  Blood Updated:  02/09/17 0907    Narrative:       The following orders were created for panel order CBC & Differential.  Procedure                               Abnormality         Status                     ---------                               -----------         ------                     Manual Differential[[id number]]                                                          Scan Slide[[id number]]                                                                   CBC Auto Differential[[id number]]         Abnormal            Final result                 Please view results for these tests on the individual orders.    CBC Auto Differential [[id number]]  (Abnormal) Collected:  02/10/17 1529    Specimen:  Blood Updated:  02/10/17 1603     WBC 4.60 (L) 10*3/mm3      RBC 2.40 (L) 10*6/mm3      Hemoglobin 8.2 (L) g/dL      Hematocrit 26.2 (L) %      .2 (H) fL      MCH 34.2 (H) pg      MCHC 31.3 g/dL      RDW 20.1 (H) %      RDW-SD 80.5 (H) fl      MPV 10.9 (H) fL      Platelets 112 (L) 10*3/mm3     Manual Differential [[id number]]  (Abnormal) Collected:  02/10/17 1529    Specimen:  Blood Updated:  02/10/17 1603     Neutrophil % 48.0 %      Lymphocyte % 11.0 (L) %      Monocyte % 36.0 (H) %      Bands %  2.0 %      Metamyelocyte % 3.0 (H) %      Neutrophils Absolute 2.30 10*3/mm3      Lymphocytes Absolute 0.51 (L) 10*3/mm3      Monocytes Absolute 1.66 (H) 10*3/mm3      Anisocytosis Mod/2+      Macrocytes Mod/2+      WBC Morphology Normal      Platelet Estimate Adequate      Large Platelets Slight/1+     CBC & Differential [[id number]] Collected:  02/10/17 1529    Specimen:  Blood Updated:   02/10/17 1603    Narrative:       The following orders were created for panel order CBC & Differential.  Procedure                               Abnormality         Status                     ---------                               -----------         ------                     Manual Differential[[id number]]           Abnormal            Final result               Scan Slide[[id number]]                                                                   CBC Auto Differential[[id number]]         Abnormal            Final result                 Please view results for these tests on the individual orders.    CBC Auto Differential [[id number]]  (Abnormal) Collected:  02/11/17 0328    Specimen:  Blood Updated:  02/11/17 0508     WBC 3.67 (L) 10*3/mm3      RBC 2.26 (L) 10*6/mm3      Hemoglobin 7.7 (L) g/dL      Hematocrit 25.0 (L) %      .6 (H) fL      MCH 34.1 (H) pg      MCHC 30.8 (L) g/dL      RDW 19.7 (H) %      RDW-SD 78.6 (H) fl      MPV 11.6 (H) fL      Platelets 104 (L) 10*3/mm3      Neutrophil % 27.0 (L) %      Lymphocyte % 20.4 %      Monocyte % 45.8 (H) %      Eosinophil % 0.0 %      Basophil % 0.0 %      Immature Grans % 6.8 (H) %      Neutrophils, Absolute 0.99 (L) 10*3/mm3      Lymphocytes, Absolute 0.75 10*3/mm3      Monocytes, Absolute 1.68 (H) 10*3/mm3      Eosinophils, Absolute 0.00 (L) 10*3/mm3      Basophils, Absolute 0.00 10*3/mm3      Immature Grans, Absolute 0.25 (H) 10*3/mm3      nRBC 0.0 /100 WBC     Comprehensive Metabolic Panel [[id number]]  (Abnormal) Collected:  02/11/17 0328    Specimen:  Blood Updated:  02/11/17 0521     Glucose 88 mg/dL      BUN 11 mg/dL      Creatinine 0.69 (L) mg/dL      Sodium 142 mmol/L      Potassium 2.9 (L) mmol/L      Chloride 110 (H) mmol/L      CO2 21.4 (L) mmol/L      Calcium 7.6 (L) mg/dL      Total Protein 5.8 (L) g/dL      Albumin 2.30 (L) g/dL      ALT (SGPT) 17 U/L      AST (SGOT) 13 U/L      Alkaline Phosphatase 116 U/L      Total Bilirubin 0.6 mg/dL      eGFR  Non African Amer 110 mL/min/1.73      Globulin 3.5 gm/dL      A/G Ratio 0.7 g/dL      BUN/Creatinine Ratio 15.9      Anion Gap 10.6 mmol/L     Narrative:       The MDRD GFR formula is only valid for adults with stable renal function between ages 18 and 70.    Manual Differential [[id number]]  (Abnormal) Collected:  02/11/17 0328    Specimen:  Blood Updated:  02/11/17 0545     Neutrophil % 28.0 (L) %      Lymphocyte % 30.0 %      Monocyte % 31.0 (H) %      Bands %  4.0 %      Metamyelocyte % 2.0 (H) %      Myelocyte % 3.0 (H) %      Promyelocyte % 2.0 (H) %      Neutrophils Absolute 1.17 (L) 10*3/mm3      Lymphocytes Absolute 1.10 10*3/mm3      Monocytes Absolute 1.14 (H) 10*3/mm3      Anisocytosis Slight/1+      Hypochromia Slight/1+      Macrocytes Slight/1+      Poikilocytes Slight/1+      WBC Morphology Normal      Platelet Estimate Decreased     CBC & Differential [[id number]] Collected:  02/11/17 0328    Specimen:  Blood Updated:  02/11/17 0545    Narrative:       The following orders were created for panel order CBC & Differential.  Procedure                               Abnormality         Status                     ---------                               -----------         ------                     Manual Differential[[id number]]           Abnormal            Final result               Scan Slide[[id number]]                                        Final result               CBC Auto Differential[[id number]]         Abnormal            Final result                 Please view results for these tests on the individual orders.    Scan Slide [[id number]] Collected:  02/11/17 0328    Specimen:  Blood Updated:  02/11/17 0545     Scan Slide --       See Manual Differential Results       Magnesium [[id number]]  (Normal) Collected:  02/11/17 0328    Specimen:  Blood Updated:  02/11/17 1604     Magnesium 2.0 mg/dL     Hemoglobin & Hematocrit, Blood [[id number]]  (Abnormal) Collected:  02/11/17 1626    Specimen:  Blood Updated:   02/11/17 1634     Hemoglobin 9.0 (L) g/dL      Hematocrit 28.9 (L) %     Basic Metabolic Panel [[id number]]  (Abnormal) Collected:  02/12/17 0359    Specimen:  Blood Updated:  02/12/17 0539     Glucose 89 mg/dL      BUN 7 (L) mg/dL      Creatinine 0.66 (L) mg/dL      Sodium 141 mmol/L      Potassium 3.6 mmol/L      Chloride 108 (H) mmol/L      CO2 19.7 (L) mmol/L      Calcium 7.7 (L) mg/dL      eGFR Non African Amer 115 mL/min/1.73      BUN/Creatinine Ratio 10.6      Anion Gap 13.3 mmol/L     Narrative:       The MDRD GFR formula is only valid for adults with stable renal function between ages 18 and 70.    CBC & Differential [[id number]] Collected:  02/12/17 0359    Specimen:  Blood Updated:  02/12/17 0654    Narrative:       The following orders were created for panel order CBC & Differential.  Procedure                               Abnormality         Status                     ---------                               -----------         ------                     Scan Slide[[id number]]                                                                   CBC Auto Differential[[id number]]         Abnormal            Final result                 Please view results for these tests on the individual orders.    CBC Auto Differential [[id number]]  (Abnormal) Collected:  02/12/17 0606    Specimen:  Blood Updated:  02/12/17 0654     WBC 3.27 (L) 10*3/mm3      RBC 2.13 (L) 10*6/mm3      Hemoglobin 7.1 (C) g/dL      Hematocrit 24.1 (L) %      .1 (H) fL      MCH 33.3 (H) pg      MCHC 29.5 (L) g/dL      RDW 19.9 (H) %      RDW-SD 81.1 (H) fl      MPV 11.5 (H) fL      Platelets 114 (L) 10*3/mm3      Neutrophil % 27.5 (L) %      Lymphocyte % 22.0 %      Monocyte % 45.3 (H) %      Eosinophil % 0.3 %      Basophil % 0.0 %      Immature Grans % 4.9 (H) %      Neutrophils, Absolute 0.90 (L) 10*3/mm3      Lymphocytes, Absolute 0.72 10*3/mm3      Monocytes, Absolute 1.48 (H) 10*3/mm3      Eosinophils, Absolute 0.01 (L) 10*3/mm3       Basophils, Absolute 0.00 10*3/mm3      Immature Grans, Absolute 0.16 (H) 10*3/mm3      nRBC 0.0 /100 WBC     Hemoglobin & Hematocrit, Blood [[id number]]  (Abnormal) Collected:  02/12/17 0819    Specimen:  Blood Updated:  02/12/17 0823     Hemoglobin 7.4 (C) g/dL      Hematocrit 24.2 (L) %     CBC (No Diff) [[id number]]  (Abnormal) Collected:  02/13/17 0334    Specimen:  Blood Updated:  02/13/17 0526     WBC 3.68 (L) 10*3/mm3      RBC 2.55 (L) 10*6/mm3      Hemoglobin 8.5 (L) g/dL      Hematocrit 27.8 (L) %      .0 (H) fL      MCH 33.3 (H) pg      MCHC 30.6 (L) g/dL      RDW 20.2 (H) %      RDW-SD 79.2 (H) fl      MPV 11.5 (H) fL      Platelets 118 (L) 10*3/mm3     Basic Metabolic Panel [[id number]]  (Abnormal) Collected:  02/13/17 0334    Specimen:  Blood Updated:  02/13/17 0528     Glucose 90 mg/dL      BUN 4 (L) mg/dL      Creatinine 0.71 (L) mg/dL      Sodium 140 mmol/L      Potassium 3.7 mmol/L      Chloride 108 (H) mmol/L      CO2 19.8 (L) mmol/L      Calcium 7.9 (L) mg/dL      eGFR Non African Amer 106 mL/min/1.73      BUN/Creatinine Ratio 5.6 (L)      Anion Gap 12.2 mmol/L     Narrative:       The MDRD GFR formula is only valid for adults with stable renal function between ages 18 and 70.    Clostridium Difficile Toxin, PCR [[id number]]  (Normal) Collected:  02/12/17 2327    Specimen:  Stool from Per Rectum Updated:  02/13/17 1100     C. Difficile Toxins by PCR Negative     CBC Auto Differential [[id number]]  (Abnormal) Collected:  02/14/17 0829    Specimen:  Blood Updated:  02/14/17 0903     WBC 3.97 (L) 10*3/mm3      RBC 3.07 (L) 10*6/mm3      Hemoglobin 10.3 (L) g/dL      Hematocrit 33.0 (L) %      .5 (H) fL      MCH 33.6 (H) pg      MCHC 31.2 g/dL      RDW 20.3 (H) %      RDW-SD 78.8 (H) fl      MPV 11.4 (H) fL      Platelets 156 10*3/mm3      Neutrophil % 32.1 (L) %      Lymphocyte % 30.0 %      Monocyte % 34.3 (H) %      Eosinophil % 0.0 %      Basophil % 0.3 %      Immature Grans % 3.3 (H) %       Neutrophils, Absolute 1.28 (L) 10*3/mm3      Lymphocytes, Absolute 1.19 10*3/mm3      Monocytes, Absolute 1.36 (H) 10*3/mm3      Eosinophils, Absolute 0.00 (L) 10*3/mm3      Basophils, Absolute 0.01 10*3/mm3      Immature Grans, Absolute 0.13 (H) 10*3/mm3      nRBC 0.0 /100 WBC     CBC & Differential [[id number]] Collected:  02/14/17 0829    Specimen:  Blood Updated:  02/14/17 1038    Narrative:       The following orders were created for panel order CBC & Differential.  Procedure                               Abnormality         Status                     ---------                               -----------         ------                     Manual Differential[[id number]]           Abnormal            Final result               Scan Slide[[id number]]                                                                   CBC Auto Differential[[id number]]         Abnormal            Final result                 Please view results for these tests on the individual orders.    Manual Differential [[id number]]  (Abnormal) Collected:  02/14/17 0829    Specimen:  Blood Updated:  02/14/17 1038     Neutrophil % 27.0 (L) %      Lymphocyte % 29.0 %      Monocyte % 36.0 (H) %      Bands %  4.0 %      Metamyelocyte % 3.0 (H) %      Myelocyte % 1.0 (H) %      Neutrophils Absolute 1.23 (L) 10*3/mm3      Lymphocytes Absolute 1.15 10*3/mm3      Monocytes Absolute 1.43 (H) 10*3/mm3      Anisocytosis Slight/1+      Macrocytes Slight/1+      WBC Morphology Normal      Large Platelets Slight/1+     POC Glucose Fingerstick [[id number]]  (Normal) Collected:  02/14/17 1147    Specimen:  Blood Updated:  02/14/17 1238     Glucose 127 mg/dL     Narrative:       Meter: [id number] : [id number] Tutu Maryuri PCA    POC Glucose Fingerstick [[id number]]  (Normal) Collected:  02/14/17 1700    Specimen:  Blood Updated:  02/14/17 1709     Glucose 108 mg/dL     Narrative:       Meter: [id number] : [id number] Tutu Maryuri PCA        Imaging Results  (most recent)     None          PROCEDURES      Condition on Discharge:  Stable    Physical Exam at Discharge  Vital Signs  Temp:  [98 °F (36.7 °C)-98.8 °F (37.1 °C)] 98.8 °F (37.1 °C)  Heart Rate:  [75-88] 82  Resp:  [18-20] 18  BP: (132-146)/(66-69) 137/67    Physical Exam:  Physical Exam   Constitutional: Patient appears well-developed and well-nourished and in no acute distress   HEENT:   Head: Normocephalic and atraumatic.   Eyes:  Pupils are equal, round, and reactive to light. EOM are intact. Sclera are anicteric and non-injected.  Mouth and Throat: Patient has moist mucous membranes. Oropharynx is clear of any erythema or exudate.     Neck: Neck supple. No JVD present. No thyromegaly present. No lymphadenopathy present.  Cardiovascular: Regular rate, regular rhythm, S1 normal and S2 normal.  Exam reveals no gallop and no friction rub.  No murmur heard.  Pulmonary/Chest: Lungs are clear to auscultation bilaterally. No respiratory distress. No wheezes. No rhonchi. No rales.   Abdominal: Soft. Bowel sounds are normal. No distension and no mass. There is no hepatosplenomegaly. There is no tenderness.   Musculoskeletal: Normal Muscle tone  Extremities: No edema. Pulses are palpable in all 4 extremities.  Neurological: Patient is alert and oriented to person, place, and time. Cranial nerves II-XII are grossly intact with no focal deficits.  Skin: Skin is warm. No rash noted. Nails show no clubbing.  No cyanosis or erythema.    Discharge Disposition  Home    Visiting Nurse:    No     Home PT/OT:  No     Home Safety Evaluation:  No     DME  None    Discharge Diet:           Dietary Orders            Start     Ordered    02/14/17 0927  Diet Regular; Low Residue  Diet Effective Now     Question Answer Comment   Diet Texture / Consistency Regular    Other Modifiers: Low Residue        02/14/17 0927 02/14/17 0800  Dietary Nutrition Supplement: Ensure Plus  Daily With Breakfast, Lunch & Dinner     Question:  Select  Supplement:  Answer:  Ensure Plus    02/14/17 0718          Activity at Discharge:  As tolerated.    Pre-discharge education  Medications and F/U      Follow-up Appointments  Future Appointments  Date Time Provider Department Center   2/21/2017 9:40 AM LAB CHAIR 1 [facility]  LAB [facility]   2/21/2017 10:00 AM CHAIR 4 [facility] INFUS [facility]   2/22/2017 1:45 PM Marvin Ma MD [facility] None   3/14/2017 10:20 AM LAB CHAIR 1 [facility]  LAB [facility]   3/14/2017 10:40 AM Anupam Green MD [facility] [place]   3/14/2017 11:00 AM CHAIR 4 [facility] BH INFUS [facility]     Referrals and Follow-ups to Schedule     Additional Follow-Up    As directed    1-2 weeks   Follow Up Details:  Dr. David Cedillo       Follow-Up    As directed    Tuesday 2/21/17   Follow Up Details:  Dr. Ma       Schedule Discharge Follow Up    As directed    Follow Up:  1 Week   Special Instructions:  Next week on tuesday with Dr. Green at already scheduled appt.             F/U Dr. Prince (urology) in 2 weeks. (Patient notified of appt. Following discharge)    Test Results Pending at Discharge       Kenya Marks MD  02/15/17  6:09 PM    Time: Discharge >30 min Secondary to discussion of patient with surgery, replacement and monitoring of electrolytes, discussion of plan with patient and documentation and orders.

## 2017-02-15 NOTE — DISCHARGE INSTR - APPOINTMENTS
This patient has discharge follow-up on February 23,2017 @9:50am.  David Cedillo [facility]   [place] [address] [phone]        This patient has discharge follow-up on March 9,2017 @ 10:00am.  [facility]   Doctor in [address], [place], [place] [address] [phone]

## 2017-02-15 NOTE — PROGRESS NOTES
GI Daily Progress Note    Assessment/Plan:    Active Problems:    Acute diverticulitis       LOS: 8 days     Jason Zelaya is a 83 y.o. male who was admitted with Diverticulitis. He reports his symptoms are improving with treatment. Small BM this Am and tolerating diet. Is waiting on repeat [place] prior to going home per Pt and wife.    Subjective:    Patient expresses abdominal pain  Patient denies constipation and bloody stools    Objective:    Vital signs in last 24 hours:  Temp:  [98 °F (36.7 °C)-98.8 °F (37.1 °C)] 98.8 °F (37.1 °C)  Heart Rate:  [75-88] 82  Resp:  [18-20] 18  BP: (132-146)/(66-69) 137/67    Intake/Output last 3 shifts:  I/O last 3 completed shifts:  In: 1480 [P.O.:1080; IV Piggyback:400]  Out: 2750 [Urine:2750]  Intake/Output this shift:  I/O this shift:  In: 900 [P.O.:600; I.V.:100; IV Piggyback:200]  Out: 800 [Urine:800]    Results from last 7 days  Lab Units 02/15/17  0956   WBC 10*3/mm3 2.36*   HEMOGLOBIN g/dL 9.3*   HEMATOCRIT % 29.4*   PLATELETS 10*3/mm3 116*       Physical Exam:Abdomen  Sounds Normal Active Bowel Sounds   Distension Soft   Tenderness Mildly Tender     Diverticulitis  Anemia/rectal bleeding/MDS  Self cathing Bladder with increasing resistance    Appears to be ready to go home on po abx with F/U with CBC, Dr Ma and Dr Prince, will be glad to see as needed but no scheduled f/u at this time

## 2017-02-15 NOTE — PROGRESS NOTES
Denies any further bloody bowel movements.  He said his stool was soft and brown this morning.  He was switched over to Augmentin yesterday and is tolerating that.  His abdomen is soft and nontender.  I will need to see him back in the office next week.  I would continue his antibiotics for a total of 2 weeks.  Dr. Prince wanted to see him in the office in approximately 2 weeks.

## 2017-02-17 ENCOUNTER — TRANSCRIBE ORDERS (OUTPATIENT)
Dept: ADMINISTRATIVE | Facility: HOSPITAL | Age: 82
End: 2017-02-17

## 2017-02-17 ENCOUNTER — LAB (OUTPATIENT)
Dept: LAB | Facility: HOSPITAL | Age: 82
End: 2017-02-17
Attending: HOSPITALIST

## 2017-02-17 DIAGNOSIS — D64.9 ANEMIA, UNSPECIFIED TYPE: Primary | ICD-10-CM

## 2017-02-17 DIAGNOSIS — D64.9 ANEMIA, UNSPECIFIED TYPE: ICD-10-CM

## 2017-02-17 DIAGNOSIS — D46.9 MDS (MYELODYSPLASTIC SYNDROME) (HCC): ICD-10-CM

## 2017-02-17 LAB
ANION GAP SERPL CALCULATED.3IONS-SCNC: 16.3 MMOL/L
BASOPHILS # BLD AUTO: 0.01 10*3/MM3 (ref 0–0.2)
BASOPHILS NFR BLD AUTO: 0.4 % (ref 0–2)
BUN BLD-MCNC: 7 MG/DL (ref 8–23)
BUN/CREAT SERPL: 9 (ref 7–25)
CALCIUM SPEC-SCNC: 8.8 MG/DL (ref 8.8–10.5)
CHLORIDE SERPL-SCNC: 100 MMOL/L (ref 98–107)
CO2 SERPL-SCNC: 22.7 MMOL/L (ref 22–29)
CREAT BLD-MCNC: 0.78 MG/DL (ref 0.76–1.27)
DEPRECATED RDW RBC AUTO: 77.9 FL (ref 37–54)
EOSINOPHIL # BLD AUTO: 0 10*3/MM3 (ref 0.1–0.3)
EOSINOPHIL NFR BLD AUTO: 0 % (ref 0–4)
ERYTHROCYTE [DISTWIDTH] IN BLOOD BY AUTOMATED COUNT: 20.8 % (ref 11.5–14.5)
GFR SERPL CREATININE-BSD FRML MDRD: 95 ML/MIN/1.73
GLUCOSE BLD-MCNC: 153 MG/DL (ref 65–99)
HCT VFR BLD AUTO: 31.9 % (ref 42–52)
HGB BLD-MCNC: 9.7 G/DL (ref 14–18)
IMM GRANULOCYTES # BLD: 0.02 10*3/MM3 (ref 0–0.03)
IMM GRANULOCYTES NFR BLD: 0.7 % (ref 0–0.5)
LYMPHOCYTES # BLD AUTO: 0.79 10*3/MM3 (ref 0.6–4.8)
LYMPHOCYTES NFR BLD AUTO: 28.6 % (ref 20–45)
MCH RBC QN AUTO: 33.4 PG (ref 27–31)
MCHC RBC AUTO-ENTMCNC: 30.4 G/DL (ref 31–37)
MCV RBC AUTO: 110 FL (ref 80–94)
MONOCYTES # BLD AUTO: 1.14 10*3/MM3 (ref 0–1)
MONOCYTES NFR BLD AUTO: 41.3 % (ref 3–8)
NEUTROPHILS # BLD AUTO: 0.8 10*3/MM3 (ref 1.5–8.3)
NEUTROPHILS NFR BLD AUTO: 29 % (ref 45–70)
NRBC BLD MANUAL-RTO: 0 /100 WBC (ref 0–0)
PLATELET # BLD AUTO: 126 10*3/MM3 (ref 140–500)
PMV BLD AUTO: 11.6 FL (ref 7.4–10.4)
POTASSIUM BLD-SCNC: 3.7 MMOL/L (ref 3.5–5.2)
RBC # BLD AUTO: 2.9 10*6/MM3 (ref 4.7–6.1)
SODIUM BLD-SCNC: 139 MMOL/L (ref 136–145)
WBC NRBC COR # BLD: 2.76 10*3/MM3 (ref 4.8–10.8)

## 2017-02-17 PROCEDURE — 85025 COMPLETE CBC W/AUTO DIFF WBC: CPT

## 2017-02-17 PROCEDURE — 80048 BASIC METABOLIC PNL TOTAL CA: CPT

## 2017-02-17 PROCEDURE — 36415 COLL VENOUS BLD VENIPUNCTURE: CPT

## 2017-02-21 ENCOUNTER — LAB (OUTPATIENT)
Dept: LAB | Facility: HOSPITAL | Age: 82
End: 2017-02-21

## 2017-02-21 ENCOUNTER — INFUSION (OUTPATIENT)
Dept: ONCOLOGY | Facility: HOSPITAL | Age: 82
End: 2017-02-21

## 2017-02-21 VITALS
SYSTOLIC BLOOD PRESSURE: 121 MMHG | DIASTOLIC BLOOD PRESSURE: 72 MMHG | TEMPERATURE: 98.4 F | OXYGEN SATURATION: 97 % | HEART RATE: 96 BPM

## 2017-02-21 DIAGNOSIS — D46.20 MDS (MYELODYSPLASTIC SYNDROME), LOW GRADE (HCC): Primary | ICD-10-CM

## 2017-02-21 LAB
BASOPHILS # BLD AUTO: 0.01 10*3/MM3 (ref 0–0.2)
BASOPHILS NFR BLD AUTO: 0.3 % (ref 0–2)
DEPRECATED RDW RBC AUTO: 80.1 FL (ref 37–54)
EOSINOPHIL # BLD AUTO: 0 10*3/MM3 (ref 0.1–0.3)
EOSINOPHIL NFR BLD AUTO: 0 % (ref 0–4)
ERYTHROCYTE [DISTWIDTH] IN BLOOD BY AUTOMATED COUNT: 21 % (ref 11.5–14.5)
HCT VFR BLD AUTO: 32.4 % (ref 42–52)
HGB BLD-MCNC: 10.5 G/DL (ref 14–18)
IMM GRANULOCYTES # BLD: 0.02 10*3/MM3 (ref 0–0.03)
IMM GRANULOCYTES NFR BLD: 0.5 % (ref 0–0.5)
LYMPHOCYTES # BLD AUTO: 1.09 10*3/MM3 (ref 0.6–4.8)
LYMPHOCYTES NFR BLD AUTO: 29.2 % (ref 20–45)
MCH RBC QN AUTO: 34.5 PG (ref 27–31)
MCHC RBC AUTO-ENTMCNC: 32.4 G/DL (ref 31–37)
MCV RBC AUTO: 106.6 FL (ref 80–94)
MONOCYTES # BLD AUTO: 1.79 10*3/MM3 (ref 0–1)
MONOCYTES NFR BLD AUTO: 48 % (ref 3–8)
NEUTROPHILS # BLD AUTO: 0.82 10*3/MM3 (ref 1.5–8.3)
NEUTROPHILS NFR BLD AUTO: 22 % (ref 45–70)
NRBC BLD MANUAL-RTO: 0 /100 WBC (ref 0–0)
PLATELET # BLD AUTO: 107 10*3/MM3 (ref 140–500)
PMV BLD AUTO: 12 FL (ref 7.4–10.4)
RBC # BLD AUTO: 3.04 10*6/MM3 (ref 4.7–6.1)
WBC NRBC COR # BLD: 3.73 10*3/MM3 (ref 4.8–10.8)

## 2017-02-21 PROCEDURE — 36415 COLL VENOUS BLD VENIPUNCTURE: CPT | Performed by: INTERNAL MEDICINE

## 2017-02-21 PROCEDURE — 85025 COMPLETE CBC W/AUTO DIFF WBC: CPT | Performed by: INTERNAL MEDICINE

## 2017-02-21 NOTE — PROGRESS NOTES
CBC r/w pt and caregiver (?son) by pt's side. Pt was informed that EPO will not be given today because hgb is 10.5. ANC is consistently low at 820; pt was reminded about neutropenic precautions. Platelets continue to be low at 107. Pt states that he feels much better than he did when here in the office two weeks ago and before his hospital admit for colon abscess and diverticulitis. The pt was urged to call us if his fatigue and/or SOA worsens before his next appointment in 3 weeks. Pt v/u. Pt was escorted out of the office via wheelchair by his caregiver.

## 2017-02-22 ENCOUNTER — OFFICE VISIT (OUTPATIENT)
Dept: SURGERY | Facility: CLINIC | Age: 82
End: 2017-02-22

## 2017-02-22 VITALS
DIASTOLIC BLOOD PRESSURE: 72 MMHG | SYSTOLIC BLOOD PRESSURE: 118 MMHG | BODY MASS INDEX: 20.04 KG/M2 | HEIGHT: 70 IN | WEIGHT: 140 LBS

## 2017-02-22 DIAGNOSIS — K57.33 DIVERTICULITIS OF LARGE INTESTINE WITHOUT PERFORATION OR ABSCESS WITH BLEEDING: Primary | ICD-10-CM

## 2017-02-22 PROBLEM — R06.89 DIFFICULTY BREATHING: Status: ACTIVE | Noted: 2017-02-22

## 2017-02-22 PROBLEM — R07.9 CHEST PAIN: Status: ACTIVE | Noted: 2017-02-22

## 2017-02-22 PROBLEM — R53.83 FATIGUE: Status: ACTIVE | Noted: 2017-02-22

## 2017-02-22 PROCEDURE — 99212 OFFICE O/P EST SF 10 MIN: CPT | Performed by: SURGERY

## 2017-02-22 RX ORDER — IBUPROFEN 800 MG/1
TABLET ORAL
COMMUNITY
Start: 2017-02-12 | End: 2017-03-14

## 2017-02-22 NOTE — PROGRESS NOTES
F/U DIVERTICULITIS, PT SEEN IN ER, FATIGUED AND WEAK  He was not seen in the emergency room.  He went to his hematology appointment.  He does complain of some fatigue.  His diet has slightly improved.  He denies any fevers or chills.  He is here for follow-up on his sigmoid diverticulitis.  He completes his antibiotic course today.  His abdomen is soft with some very minimal tenderness in the left lower quadrant.  He is due to follow up with Dr. Prince on March 9.  I would like to recheck a CT scan on him of the abdomen and pelvis in 1 month and see him back after that study is complete.  He had blood work drawn at [facility] medical oncology his white blood count is were normally is as is his hemoglobin which is 10.5.

## 2017-02-28 ENCOUNTER — APPOINTMENT (OUTPATIENT)
Dept: CT IMAGING | Facility: HOSPITAL | Age: 82
End: 2017-02-28
Attending: SURGERY

## 2017-03-14 ENCOUNTER — LAB (OUTPATIENT)
Dept: LAB | Facility: HOSPITAL | Age: 82
End: 2017-03-14

## 2017-03-14 ENCOUNTER — INFUSION (OUTPATIENT)
Dept: ONCOLOGY | Facility: HOSPITAL | Age: 82
End: 2017-03-14

## 2017-03-14 ENCOUNTER — OFFICE VISIT (OUTPATIENT)
Dept: ONCOLOGY | Facility: CLINIC | Age: 82
End: 2017-03-14

## 2017-03-14 VITALS
DIASTOLIC BLOOD PRESSURE: 65 MMHG | BODY MASS INDEX: 20.67 KG/M2 | RESPIRATION RATE: 18 BRPM | HEIGHT: 70 IN | WEIGHT: 144.4 LBS | OXYGEN SATURATION: 96 % | TEMPERATURE: 97.5 F | HEART RATE: 87 BPM | SYSTOLIC BLOOD PRESSURE: 128 MMHG

## 2017-03-14 DIAGNOSIS — E53.8 VITAMIN B 12 DEFICIENCY: ICD-10-CM

## 2017-03-14 DIAGNOSIS — D46.20 MDS (MYELODYSPLASTIC SYNDROME), LOW GRADE (HCC): ICD-10-CM

## 2017-03-14 DIAGNOSIS — E53.8 VITAMIN B 12 DEFICIENCY: Primary | ICD-10-CM

## 2017-03-14 DIAGNOSIS — D46.20 MDS (MYELODYSPLASTIC SYNDROME), LOW GRADE (HCC): Primary | ICD-10-CM

## 2017-03-14 LAB
BASOPHILS # BLD AUTO: 0.01 10*3/MM3 (ref 0–0.2)
BASOPHILS NFR BLD AUTO: 0.2 % (ref 0–2)
DEPRECATED RDW RBC AUTO: 84.9 FL (ref 37–54)
EOSINOPHIL # BLD AUTO: 0.01 10*3/MM3 (ref 0.1–0.3)
EOSINOPHIL NFR BLD AUTO: 0.2 % (ref 0–4)
ERYTHROCYTE [DISTWIDTH] IN BLOOD BY AUTOMATED COUNT: 21.7 % (ref 11.5–14.5)
FERRITIN SERPL-MCNC: 543.3 NG/ML (ref 30–400)
HCT VFR BLD AUTO: 29 % (ref 42–52)
HGB BLD-MCNC: 9.6 G/DL (ref 14–18)
HOLD SPECIMEN: NORMAL
IMM GRANULOCYTES # BLD: 0.05 10*3/MM3 (ref 0–0.03)
IMM GRANULOCYTES NFR BLD: 1.1 % (ref 0–0.5)
IRON 24H UR-MRATE: 86 MCG/DL (ref 59–158)
IRON SATN MFR SERPL: 41 %
LYMPHOCYTES # BLD AUTO: 1.74 10*3/MM3 (ref 0.6–4.8)
LYMPHOCYTES NFR BLD AUTO: 39.8 % (ref 20–45)
MCH RBC QN AUTO: 35.6 PG (ref 27–31)
MCHC RBC AUTO-ENTMCNC: 33.1 G/DL (ref 31–37)
MCV RBC AUTO: 107.4 FL (ref 80–94)
MONOCYTES # BLD AUTO: 1.72 10*3/MM3 (ref 0–1)
MONOCYTES NFR BLD AUTO: 39.4 % (ref 3–8)
NEUTROPHILS # BLD AUTO: 0.84 10*3/MM3 (ref 1.5–8.3)
NEUTROPHILS NFR BLD AUTO: 19.3 % (ref 45–70)
NRBC BLD MANUAL-RTO: 0 /100 WBC (ref 0–0)
PLATELET # BLD AUTO: 122 10*3/MM3 (ref 140–500)
PMV BLD AUTO: 12.1 FL (ref 7.4–10.4)
RBC # BLD AUTO: 2.7 10*6/MM3 (ref 4.7–6.1)
TIBC SERPL-MCNC: 212 MCG/DL (ref 261–498)
UIBC SERPL-MCNC: 126 MCG/DL (ref 112–346)
WBC NRBC COR # BLD: 4.37 10*3/MM3 (ref 4.8–10.8)

## 2017-03-14 PROCEDURE — 63510000001 EPOETIN ALFA PER 1000 UNITS: Performed by: INTERNAL MEDICINE

## 2017-03-14 PROCEDURE — 96372 THER/PROPH/DIAG INJ SC/IM: CPT

## 2017-03-14 PROCEDURE — 85025 COMPLETE CBC W/AUTO DIFF WBC: CPT | Performed by: INTERNAL MEDICINE

## 2017-03-14 PROCEDURE — 99213 OFFICE O/P EST LOW 20 MIN: CPT | Performed by: INTERNAL MEDICINE

## 2017-03-14 PROCEDURE — 83540 ASSAY OF IRON: CPT | Performed by: INTERNAL MEDICINE

## 2017-03-14 PROCEDURE — 25010000002 CYANOCOBALAMIN PER 1000 MCG: Performed by: INTERNAL MEDICINE

## 2017-03-14 PROCEDURE — 82728 ASSAY OF FERRITIN: CPT | Performed by: INTERNAL MEDICINE

## 2017-03-14 PROCEDURE — 83550 IRON BINDING TEST: CPT | Performed by: INTERNAL MEDICINE

## 2017-03-14 PROCEDURE — 36415 COLL VENOUS BLD VENIPUNCTURE: CPT | Performed by: INTERNAL MEDICINE

## 2017-03-14 RX ORDER — CYANOCOBALAMIN 1000 UG/ML
1000 INJECTION, SOLUTION INTRAMUSCULAR; SUBCUTANEOUS ONCE
Status: COMPLETED | OUTPATIENT
Start: 2017-03-14 | End: 2017-03-14

## 2017-03-14 RX ORDER — CYANOCOBALAMIN 1000 UG/ML
1000 INJECTION, SOLUTION INTRAMUSCULAR; SUBCUTANEOUS ONCE
Status: CANCELLED | OUTPATIENT
Start: 2017-04-04

## 2017-03-14 RX ADMIN — CYANOCOBALAMIN 1000 MCG: 1000 INJECTION, SOLUTION INTRAMUSCULAR; SUBCUTANEOUS at 10:57

## 2017-03-14 RX ADMIN — ERYTHROPOIETIN 40000 UNITS: 40000 INJECTION, SOLUTION INTRAVENOUS; SUBCUTANEOUS at 10:58

## 2017-03-14 NOTE — PROGRESS NOTES
Subjective    REASONS FOR FOLLOW-UP:   Low-grade myelodysplastic syndrome with chronic pancytopenia.       History of Present Illness    Mr. Zelaya is a pleasant 83-year-old man returning for follow-up of his myelodysplastic syndrome with chronic pancytopenia.  He currently is receiving monthly B12 injections and Procrit as needed when the hemoglobin drops below 10.0.  He was seen in the office on 2/7/17 with fever and abdominal pain.  A CT of the abdomen was performed which showed a long segment of severe sigmoid diverticulitis with pericolonic abscesses.  He was admitted to the hospitalist service and treated with broad-spectrum antibiotics.  He did require transfusion of 3 units packed red blood cells for worsening anemia related to his mild dysplastic syndrome and GI blood loss.    He continues to feel quite weak since his extended hospital stay but is making improvement.  He is having no abdominal pain or nausea or diarrhea.  He still has low-grade fever every now and then which resolves with Tylenol.  He has a follow-up CT of the abdomen with Dr. Ma next week.    PAST MEDICAL HISTORY:    1. Arthritis.  2. Urinary retention.  3. Aortic insufficiency  4. Myelodysplastic syndrome    PAST SURGICAL HISTORY:    1. Back fusion in 2008.    2. Hernia surgery.    3. Cholecystectomy.  4. Knee arthroscopic surgery.      SOCIAL HISTORY:  .  Retired, worked in sheet metals.  Never smoked.  Alcohol consumption of approximately a 6 pack per week.  No risk factors for HIV.    FAMILY HISTORY: Negative for hematologic disorders. Sister had brain tumor. A brother had liver cancer.    Review of Systems   Constitutional: Positive for fatigue. Negative for activity change, fever and unexpected weight change.   Respiratory: Negative for cough and shortness of breath.    Cardiovascular: Negative for palpitations and leg swelling.   Gastrointestinal: Negative for abdominal distention, abdominal pain, diarrhea, nausea,  rectal pain and vomiting.   Endocrine: Positive for cold intolerance. Negative for heat intolerance.   Musculoskeletal: Positive for arthralgias and gait problem.        Right shoulder pain   Neurological: Positive for weakness.   Hematological: Negative for adenopathy. Does not bruise/bleed easily.      A comprehensive 14 point review of systems was performed and was negative except as mentioned.    Medications:  The current medication list was reviewed in the EMR    ALLERGIES:  No Known Allergies    Objective    Temp 100.8  /61  Sat 95% RA        Physical Exam   Constitutional: No distress.   Looks somewhat ill but not necessarily toxic; generally weak   HENT:   Head: Atraumatic.   Eyes: No scleral icterus.   Cardiovascular: Normal rate.    Murmur heard.  Pulmonary/Chest: Effort normal and breath sounds normal. No respiratory distress.   Abdominal: Soft. He exhibits no distension and no mass. There is no tenderness. There is no rebound and no guarding.   Musculoskeletal:   Decreased ROM right shoulder   Skin: Skin is warm. No erythema. There is pallor.   Psychiatric: He has a normal mood and affect.          RECENT LABS:  Hematology WBC   Date Value Ref Range Status   02/21/2017 3.73 (L) 4.80 - 10.80 10*3/mm3 Final   03/01/2016 6.15 4.80 - 10.80 K/Cumm Final     RBC   Date Value Ref Range Status   02/21/2017 3.04 (L) 4.70 - 6.10 10*6/mm3 Final   03/01/2016 3.19 (L) 4.70 - 6.10 Million Final     HEMOGLOBIN   Date Value Ref Range Status   02/21/2017 10.5 (L) 14.0 - 18.0 g/dL Final   03/01/2016 11.7 (L) 14.0 - 18.0 g/dL Final     HEMATOCRIT   Date Value Ref Range Status   02/21/2017 32.4 (L) 42.0 - 52.0 % Final   03/01/2016 34.8 (L) 42.0 - 52.0 % Final     PLATELETS   Date Value Ref Range Status   02/21/2017 107 (L) 140 - 500 10*3/mm3 Final   03/01/2016 108 (L) 140 - 500 K/Cumm Final       Lab Results   Component Value Date    GLUCOSE 153 (H) 02/17/2017    BUN 7 (L) 02/17/2017    CREATININE 0.78  02/17/2017    EGFRIFNONA 95 02/17/2017    EGFRIFAFRI  09/01/2016      Comment:      <15 Indicative of kidney failure.    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  He continues a monthly B12 injection for B12 deficiency.  We will bring him in every 2 weeks for CBC and Procrit for hemoglobin 10 or less.  It is due I'm to reassess his iron studies which were requested for 2 week visit.  He seems to be doing better from diverticulitis and has a follow-up CT ordered next week from Dr. Ma.  He remains neutropenic but is having no temperatures over 100.5.  His platelet count is moderately low but having no bleeding problems.            3/14/2017      CC:

## 2017-03-20 ENCOUNTER — HOSPITAL ENCOUNTER (OUTPATIENT)
Dept: CT IMAGING | Facility: HOSPITAL | Age: 82
Discharge: HOME | End: 2017-03-20
Attending: SURGERY | Admitting: SURGERY

## 2017-03-20 DIAGNOSIS — K57.33 DIVERTICULITIS OF LARGE INTESTINE WITHOUT PERFORATION OR ABSCESS WITH BLEEDING: ICD-10-CM

## 2017-03-20 PROCEDURE — 74177 CT ABD & PELVIS W/CONTRAST: CPT

## 2017-03-20 PROCEDURE — 0 IOPAMIDOL PER 1 ML: Performed by: SURGERY

## 2017-03-20 RX ADMIN — IOPAMIDOL 100 ML: 755 INJECTION, SOLUTION INTRAVENOUS at 09:16

## 2017-03-22 ENCOUNTER — OFFICE VISIT (OUTPATIENT)
Dept: SURGERY | Facility: CLINIC | Age: 82
End: 2017-03-22

## 2017-03-22 VITALS
WEIGHT: 144 LBS | DIASTOLIC BLOOD PRESSURE: 74 MMHG | BODY MASS INDEX: 20.62 KG/M2 | SYSTOLIC BLOOD PRESSURE: 120 MMHG | HEIGHT: 70 IN | TEMPERATURE: 99 F

## 2017-03-22 DIAGNOSIS — K57.32 DIVERTICULITIS OF LARGE INTESTINE WITHOUT PERFORATION OR ABSCESS WITHOUT BLEEDING: Primary | ICD-10-CM

## 2017-03-22 PROCEDURE — 99212 OFFICE O/P EST SF 10 MIN: CPT | Performed by: SURGERY

## 2017-03-22 NOTE — PROGRESS NOTES
F/U DIVERTICULITIS, CT DONE 3/20/17, C/O COLD SWEATS, FEVER, LOWER ABD PAIN  Started having left lower quadrant pain again yesterday.  His CT scan performed.  He is here for for follow-up on his diverticular disease.  He did have some nausea and vomited once yesterday but has been able to keep things down today.  He is able to continue straight catheter himself and has a normal urine output.  He recently had blood work [facility] medical oncology and that showed a mildly low platelet and white blood counts.  CT scan of the abdomen was reviewed by me and shows actually improved from 4 weeks ago.  Intramural abscess has resolved but there is still ongoing diverticulitis and a long segment of an formation in the sigmoid colon adjacent to the bladder.  He is mild tenderness in the left lower quadrant.  I discussed the risks benefits and options with him in detail another trial of antibiotics on an outpatient basis versus admitting the hospital and starting him on intravenous antibiotics versus proceeding with surgery.  I think that he is going to need a sigmoid colectomy but I would like to try to get him over this acute episode so we could prep him prior to surgery to decrease the chance that he would affect have a colostomy.  I wrote him a prescription for Augmentin 875 mg by mouth twice a day 28 were dispensed without refills.  He's been advised to keep his oral intake up.  If he worsens he is going to give us a call or go to the emergency room.  I will see him back in the office next week.

## 2017-03-28 ENCOUNTER — OFFICE VISIT (OUTPATIENT)
Dept: SURGERY | Facility: CLINIC | Age: 82
End: 2017-03-28

## 2017-03-28 VITALS
WEIGHT: 144 LBS | HEIGHT: 70 IN | DIASTOLIC BLOOD PRESSURE: 72 MMHG | SYSTOLIC BLOOD PRESSURE: 118 MMHG | BODY MASS INDEX: 20.62 KG/M2

## 2017-03-28 DIAGNOSIS — K57.92 ACUTE DIVERTICULITIS: Primary | ICD-10-CM

## 2017-03-28 PROCEDURE — 99212 OFFICE O/P EST SF 10 MIN: CPT | Performed by: SURGERY

## 2017-03-28 RX ORDER — AMOXICILLIN AND CLAVULANATE POTASSIUM 875; 125 MG/1; MG/1
TABLET, FILM COATED ORAL
COMMUNITY
Start: 2017-03-22 | End: 2017-04-11

## 2017-03-28 NOTE — PROGRESS NOTES
F/U DIVERTICULITIS, STILL ON ABX, PT IS WELL AND IS DOING BETTER  He is feeling better.  He has approximately 1 more weeks of antibiotics to complete.  He denies any nausea or vomiting or fevers or chills.  He says his pain is much improved.  This alert white male in no active distress his abdomen is soft and nontender today.  I feel he will likely need a colectomy because of his recurrent problems.  I would like to be able to try to prep him to minimize the chances that he would need a colostomy.  I also spoke with Dr. Prince and we discussed possibly trying to coordinate the surgery so he would be at the facility when we were doing the procedure in case he was needed for handling of the urinary bladder.

## 2017-03-30 ENCOUNTER — INFUSION (OUTPATIENT)
Dept: ONCOLOGY | Facility: HOSPITAL | Age: 82
End: 2017-03-30

## 2017-03-30 ENCOUNTER — LAB (OUTPATIENT)
Dept: LAB | Facility: HOSPITAL | Age: 82
End: 2017-03-30

## 2017-03-30 VITALS — TEMPERATURE: 98.4 F

## 2017-03-30 DIAGNOSIS — D46.20 MDS (MYELODYSPLASTIC SYNDROME), LOW GRADE (HCC): Primary | ICD-10-CM

## 2017-03-30 DIAGNOSIS — E53.8 VITAMIN B 12 DEFICIENCY: ICD-10-CM

## 2017-03-30 DIAGNOSIS — D46.20 MDS (MYELODYSPLASTIC SYNDROME), LOW GRADE (HCC): ICD-10-CM

## 2017-03-30 LAB
BASOPHILS # BLD AUTO: 0.02 10*3/MM3 (ref 0–0.2)
BASOPHILS NFR BLD AUTO: 0.6 % (ref 0–2)
DEPRECATED RDW RBC AUTO: 74.8 FL (ref 37–54)
EOSINOPHIL # BLD AUTO: 0 10*3/MM3 (ref 0.1–0.3)
EOSINOPHIL NFR BLD AUTO: 0 % (ref 0–4)
ERYTHROCYTE [DISTWIDTH] IN BLOOD BY AUTOMATED COUNT: 19.4 % (ref 11.5–14.5)
HCT VFR BLD AUTO: 28 % (ref 42–52)
HGB BLD-MCNC: 9.4 G/DL (ref 14–18)
IMM GRANULOCYTES # BLD: 0.07 10*3/MM3 (ref 0–0.03)
IMM GRANULOCYTES NFR BLD: 2.1 % (ref 0–0.5)
LYMPHOCYTES # BLD AUTO: 1.47 10*3/MM3 (ref 0.6–4.8)
LYMPHOCYTES NFR BLD AUTO: 44.8 % (ref 20–45)
MCH RBC QN AUTO: 36.3 PG (ref 27–31)
MCHC RBC AUTO-ENTMCNC: 33.6 G/DL (ref 31–37)
MCV RBC AUTO: 108.1 FL (ref 80–94)
MONOCYTES # BLD AUTO: 1.14 10*3/MM3 (ref 0–1)
MONOCYTES NFR BLD AUTO: 34.8 % (ref 3–8)
NEUTROPHILS # BLD AUTO: 0.58 10*3/MM3 (ref 1.5–8.3)
NEUTROPHILS NFR BLD AUTO: 17.7 % (ref 45–70)
NRBC BLD MANUAL-RTO: 0 /100 WBC (ref 0–0)
PLATELET # BLD AUTO: 165 10*3/MM3 (ref 140–500)
PMV BLD AUTO: 11.5 FL (ref 7.4–10.4)
RBC # BLD AUTO: 2.59 10*6/MM3 (ref 4.7–6.1)
WBC NRBC COR # BLD: 3.28 10*3/MM3 (ref 4.8–10.8)

## 2017-03-30 PROCEDURE — 36415 COLL VENOUS BLD VENIPUNCTURE: CPT

## 2017-03-30 PROCEDURE — 63510000001 EPOETIN ALFA PER 1000 UNITS: Performed by: NURSE PRACTITIONER

## 2017-03-30 PROCEDURE — 96372 THER/PROPH/DIAG INJ SC/IM: CPT | Performed by: NURSE PRACTITIONER

## 2017-03-30 PROCEDURE — 85025 COMPLETE CBC W/AUTO DIFF WBC: CPT | Performed by: INTERNAL MEDICINE

## 2017-03-30 RX ADMIN — ERYTHROPOIETIN 40000 UNITS: 40000 INJECTION, SOLUTION INTRAVENOUS; SUBCUTANEOUS at 11:40

## 2017-04-11 ENCOUNTER — OFFICE VISIT (OUTPATIENT)
Dept: SURGERY | Facility: CLINIC | Age: 82
End: 2017-04-11

## 2017-04-11 VITALS
SYSTOLIC BLOOD PRESSURE: 122 MMHG | WEIGHT: 144 LBS | BODY MASS INDEX: 20.62 KG/M2 | DIASTOLIC BLOOD PRESSURE: 74 MMHG | HEIGHT: 70 IN

## 2017-04-11 DIAGNOSIS — K57.92 ACUTE DIVERTICULITIS: Primary | ICD-10-CM

## 2017-04-11 PROCEDURE — 99212 OFFICE O/P EST SF 10 MIN: CPT | Performed by: SURGERY

## 2017-04-11 NOTE — PROGRESS NOTES
PATIENT INFORMATION  Jason Zelaya  2 wk f/u diverticulitis, pt has completed ABX, pt is eating much better per wife     - 1934    CHIEF COMPLAINT  Chief Complaint   Patient presents with   • Follow-up       HISTORY OF PRESENT ILLNESS  HPI he feels well he states he has not had any fever his abdominal pain is resolved he is off antibiotics.  He denies any changes in his bowel movements.        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]   • MDS (myelodysplastic syndrome), low grade [D46.20]   • Vitamin B 12 deficiency [E53.8]   • Benign prostatic hyperplasia [N40.0]   • Hypertension [I10]   • Knee pain [M25.569]         PAST MEDICAL HISTORY  Past Medical History:   Diagnosis Date   • Aortic regurgitation    • Aortic valve insufficiency    • Arthritis     Bilateral knee   • Chest pain    • Diastolic dysfunction    • History of urinary retention    • Hypertension    • MDS (myelodysplastic syndrome)    • RBBB (right bundle branch block)    • Self-catheterizes urinary bladder     3-4 times aday   • Skin cancer          SURGICAL HISTORY  Past Surgical History:   Procedure Laterality Date   • BACK SURGERY      fusion   • CHOLECYSTECTOMY     • COLONOSCOPY N/A 2017    Procedure: COLONOSCOPY;  Surgeon: Bridger Amado MD;  Location: [facility];  Service:    • HERNIA REPAIR           FAMILY HISTORY  Family History   Problem Relation Age of Onset   • Diabetes Brother    • Liver cancer Brother    • Heart disease Other    • Heart disease Mother    • Heart attack Mother    • Other Sister      Brain tumor   • Emphysema Brother    • Alcohol abuse Brother          SOCIAL HISTORY  Social History     Occupational History   •  Retired  "    Social History Main Topics   • Smoking status: Never Smoker   • Smokeless tobacco: Never Used   • Alcohol use 1.2 - 1.8 oz/week     2 - 3 Cans of beer per week      Comment: hasnt drank since christmas    • Drug use: No   • Sexual activity: Defer         CURRENT MEDICATIONS    Current Outpatient Prescriptions:   •  acetaminophen (TYLENOL) 325 MG tablet, Take 650 mg by mouth every 6 (six) hours as needed for mild pain (1-3)., Disp: , Rfl:   •  clotrimazole-betamethasone (LOTRISONE) 1-0.05 % cream, Apply 1 application topically Daily., Disp: , Rfl:   •  cyanocobalamin 1000 MCG/ML injection, 1,000 mcg Every 14 (Fourteen) Days. Cyanocobalamin SOLN; Patient Sig: Cyanocobalamin SOLN ; 0; 06-Jun-2014; Active, Disp: , Rfl:   •  folic acid (FOLVITE) 400 MCG tablet, Take 400 mcg by mouth Daily., Disp: , Rfl:   •  glucosamine-chondroitin 500-400 MG capsule capsule, Take 1 capsule by mouth Daily., Disp: , Rfl:   •  irbesartan (AVAPRO) 300 MG tablet, Take 1 tablet by mouth Daily., Disp: 30 tablet, Rfl: 0  •  lactobacillus acidophilus (RISAQUAD) capsule capsule, Take 1 capsule by mouth Daily., Disp: 30 capsule, Rfl: 0  •  MULTIPLE VITAMINS-MINERALS PO, Take 1 tablet by mouth Daily., Disp: , Rfl:   •  potassium chloride (K-DUR) 10 MEQ CR tablet, Take 1 tablet by mouth Daily., Disp: 30 tablet, Rfl: 0    ALLERGIES  Review of patient's allergies indicates no known allergies.    VITALS  Vitals:    04/11/17 0839   BP: 122/74   Weight: 144 lb (65.3 kg)   Height: 70\" (177.8 cm)       LAST RESULTS   Lab on 03/30/2017   Component Date Value Ref Range Status   • WBC 03/30/2017 3.28* 4.80 - 10.80 10*3/mm3 Final   • RBC 03/30/2017 2.59* 4.70 - 6.10 10*6/mm3 Final   • Hemoglobin 03/30/2017 9.4* 14.0 - 18.0 g/dL Final   • Hematocrit 03/30/2017 28.0* 42.0 - 52.0 % Final   • MCV 03/30/2017 108.1* 80.0 - 94.0 fL Final   • MCH 03/30/2017 36.3* 27.0 - 31.0 pg Final   • MCHC 03/30/2017 33.6  31.0 - 37.0 g/dL Final   • RDW 03/30/2017 19.4* 11.5 - " 14.5 % Final   • RDW-SD 03/30/2017 74.8* 37.0 - 54.0 fl Final   • MPV 03/30/2017 11.5* 7.4 - 10.4 fL Final   • Platelets 03/30/2017 165  140 - 500 10*3/mm3 Final   • Neutrophil % 03/30/2017 17.7* 45.0 - 70.0 % Final   • Lymphocyte % 03/30/2017 44.8  20.0 - 45.0 % Final   • Monocyte % 03/30/2017 34.8* 3.0 - 8.0 % Final   • Eosinophil % 03/30/2017 0.0  0.0 - 4.0 % Final   • Basophil % 03/30/2017 0.6  0.0 - 2.0 % Final   • Immature Grans % 03/30/2017 2.1* 0.0 - 0.5 % Final   • Neutrophils, Absolute 03/30/2017 0.58* 1.50 - 8.30 10*3/mm3 Final   • Lymphocytes, Absolute 03/30/2017 1.47  0.60 - 4.80 10*3/mm3 Final   • Monocytes, Absolute 03/30/2017 1.14* 0.00 - 1.00 10*3/mm3 Final   • Eosinophils, Absolute 03/30/2017 0.00* 0.10 - 0.30 10*3/mm3 Final   • Basophils, Absolute 03/30/2017 0.02  0.00 - 0.20 10*3/mm3 Final   • Immature Grans, Absolute 03/30/2017 0.07* 0.00 - 0.03 10*3/mm3 Final   • nRBC 03/30/2017 0.0  0.0 - 0.0 /100 WBC Final     Ct Abdomen Pelvis With Contrast    Result Date: 3/20/2017  Narrative: EXAM: CT abdomen and pelvis with contrast  DATE: 03/20/2017  HISTORY: Generalized abdominal pain today. Follow-up diverticulitis with HISTORY of antibiotic treatment. Additional history of cholecystectomy, appendectomy and hernia repair.  COMPARISON: CT abdomen and pelvis with contrast 02/07/2017.  PROCEDURE: 5 mm axial images from the lung bases through the lesser trochanters after intravenous and enteric contrast administration. Sagittal and coronal reformatted images were obtained.  Radiation dose reduction techniques were utilized including automated exposure control and exposure modulation based on body size.  Abdomen findings:  Advanced interstitial pulmonary fibrosis is redemonstrated in the lung bases. A noncalcified nodule at the subpleural margin of the left lower lobe measures approximately 9 mm, and is unchanged compared to the most remote CT abdomen from 01/04/2017.  Dense coronary calcifications are  present. Proximal ascending thoracic aorta is mildly aneurysmal at 4.2 cm. The mid to distal descending thoracic aorta is ectatic at 2.9 cm. Mild cardiomegaly..  Cholecystectomy. No abnormal biliary dilation. Liver, spleen, pancreas, left adrenal and right kidney are normal. Tiny left renal cyst.  2.4 x 1.2 cm right adrenal nodule (postcontrast Hounsfield Hounsfield units 87, previous precontrast Hounsfield units 18.8), stable since 01/04/2017 but remains indeterminate. Dense calcific atherosclerosis is seen within the abdominal aorta and mesenteric arteries. Signs of intra-abdominal abdominal hernia repair without hernia recurrence. No pathologic adenopathy. No free air or free fluid.  Extensive diverticular changes are present throughout the colon. There is abnormal thickening and inflammatory change involving a long segment of mid sigmoid colon. FINDINGS are thought to represent changes of acute sigmoid diverticulitis, but these findings appear improved compared to both 1/4/2017 and 2/7/2017. No abscess is identified. There may be some secondary reactive type thickening of the adjacent urinary bladder. However, no air is seen within the bladder to suggest a fistula.  Additional pelvis findings: Mild prostatic enlargement protruding into the urinary bladder base. Rectum is normal. No free fluid.  Degenerative facet and endplate changes within the lumbar spine. No acute osseous abnormalities.      Impression: 1. Acute mid sigmoid diverticulitis. The inflammation persists but is improved compared to 7 2017 and 01/04/2017. No intramural abscess is seen on today's study.  2. Probable secondary reactive inflammatory change of the urinary bladder adjacent to the inflamed sigmoid segment. No evidence of fistula communication. 3. Stable 2.3 x 1.2 similar right adrenal nodule. It does not meet pre and postcontrast CT criteria this time for an adenoma, although it could represent a lipid poor adenoma. This could be further  evaluate with CT or MRI abdomen adrenal mass protocol. 4.   9 mm noncalcified left lower lobe pulmonary nodule is stable since 01/04/2017. Consider short term CT chest follow-up in 3-6 months. Alternatively, correlation with outside CT chest imaging studies would be recommended, if available, to document stability. 5. Please refer to the body report for additional CT findings.  This report was finalized on 3/20/2017 10:12 AM by Dr. Samantha Keita MD.        PHYSICAL EXAM  Physical Exam alert white male in no active distress.  His abdominal exam is benign nontender without mass.    ASSESSMENT  Diverticulitis      PLAN  I discussed the risks benefits and options with he and his wife in detail.  I feel that he will have recurrent problems.  He feels well now and has deferred on any surgical intervention at this time.  I will see him back in several months.  If he has problems before he will call.

## 2017-04-13 ENCOUNTER — LAB (OUTPATIENT)
Dept: LAB | Facility: HOSPITAL | Age: 82
End: 2017-04-13

## 2017-04-13 ENCOUNTER — INFUSION (OUTPATIENT)
Dept: ONCOLOGY | Facility: HOSPITAL | Age: 82
End: 2017-04-13

## 2017-04-13 VITALS
DIASTOLIC BLOOD PRESSURE: 78 MMHG | SYSTOLIC BLOOD PRESSURE: 152 MMHG | HEART RATE: 74 BPM | TEMPERATURE: 97.8 F | WEIGHT: 151.7 LBS | BODY MASS INDEX: 21.77 KG/M2 | OXYGEN SATURATION: 93 %

## 2017-04-13 DIAGNOSIS — D46.20 MDS (MYELODYSPLASTIC SYNDROME), LOW GRADE (HCC): ICD-10-CM

## 2017-04-13 DIAGNOSIS — E53.8 VITAMIN B 12 DEFICIENCY: ICD-10-CM

## 2017-04-13 DIAGNOSIS — D46.20 MDS (MYELODYSPLASTIC SYNDROME), LOW GRADE (HCC): Primary | ICD-10-CM

## 2017-04-13 LAB
BASOPHILS # BLD AUTO: 0.01 10*3/MM3 (ref 0–0.2)
BASOPHILS NFR BLD AUTO: 0.4 % (ref 0–2)
DEPRECATED RDW RBC AUTO: 78.9 FL (ref 37–54)
EOSINOPHIL # BLD AUTO: 0.01 10*3/MM3 (ref 0.1–0.3)
EOSINOPHIL NFR BLD AUTO: 0.4 % (ref 0–4)
ERYTHROCYTE [DISTWIDTH] IN BLOOD BY AUTOMATED COUNT: 18.2 % (ref 11.5–14.5)
FERRITIN SERPL-MCNC: 286.4 NG/ML (ref 30–400)
HCT VFR BLD AUTO: 31.2 % (ref 42–52)
HGB BLD-MCNC: 9.9 G/DL (ref 14–18)
IMM GRANULOCYTES # BLD: 0.02 10*3/MM3 (ref 0–0.03)
IMM GRANULOCYTES NFR BLD: 0.7 % (ref 0–0.5)
IRON 24H UR-MRATE: 88 MCG/DL (ref 59–158)
IRON SATN MFR SERPL: 37 %
LYMPHOCYTES # BLD AUTO: 1.14 10*3/MM3 (ref 0.6–4.8)
LYMPHOCYTES NFR BLD AUTO: 40.4 % (ref 20–45)
MCH RBC QN AUTO: 36.9 PG (ref 27–31)
MCHC RBC AUTO-ENTMCNC: 31.7 G/DL (ref 31–37)
MCV RBC AUTO: 116.4 FL (ref 80–94)
MONOCYTES # BLD AUTO: 1.12 10*3/MM3 (ref 0–1)
MONOCYTES NFR BLD AUTO: 39.7 % (ref 3–8)
NEUTROPHILS # BLD AUTO: 0.52 10*3/MM3 (ref 1.5–8.3)
NEUTROPHILS NFR BLD AUTO: 18.4 % (ref 45–70)
NRBC BLD MANUAL-RTO: 0 /100 WBC (ref 0–0)
PLATELET # BLD AUTO: 88 10*3/MM3 (ref 140–500)
PMV BLD AUTO: 12 FL (ref 7.4–10.4)
RBC # BLD AUTO: 2.68 10*6/MM3 (ref 4.7–6.1)
TIBC SERPL-MCNC: 237 MCG/DL (ref 261–498)
UIBC SERPL-MCNC: 149 MCG/DL (ref 112–346)
WBC NRBC COR # BLD: 2.82 10*3/MM3 (ref 4.8–10.8)

## 2017-04-13 PROCEDURE — 63510000001 EPOETIN ALFA PER 1000 UNITS: Performed by: INTERNAL MEDICINE

## 2017-04-13 PROCEDURE — 82728 ASSAY OF FERRITIN: CPT | Performed by: INTERNAL MEDICINE

## 2017-04-13 PROCEDURE — 36415 COLL VENOUS BLD VENIPUNCTURE: CPT

## 2017-04-13 PROCEDURE — 96372 THER/PROPH/DIAG INJ SC/IM: CPT

## 2017-04-13 PROCEDURE — 83550 IRON BINDING TEST: CPT | Performed by: INTERNAL MEDICINE

## 2017-04-13 PROCEDURE — 85025 COMPLETE CBC W/AUTO DIFF WBC: CPT | Performed by: INTERNAL MEDICINE

## 2017-04-13 PROCEDURE — 25010000002 CYANOCOBALAMIN PER 1000 MCG: Performed by: INTERNAL MEDICINE

## 2017-04-13 PROCEDURE — 83540 ASSAY OF IRON: CPT | Performed by: INTERNAL MEDICINE

## 2017-04-13 RX ORDER — CYANOCOBALAMIN 1000 UG/ML
1000 INJECTION, SOLUTION INTRAMUSCULAR; SUBCUTANEOUS ONCE
Status: COMPLETED | OUTPATIENT
Start: 2017-04-13 | End: 2017-04-13

## 2017-04-13 RX ORDER — CYANOCOBALAMIN 1000 UG/ML
1000 INJECTION, SOLUTION INTRAMUSCULAR; SUBCUTANEOUS ONCE
Status: CANCELLED | OUTPATIENT
Start: 2017-01-01

## 2017-04-13 RX ADMIN — CYANOCOBALAMIN 1000 MCG: 1000 INJECTION, SOLUTION INTRAMUSCULAR; SUBCUTANEOUS at 10:49

## 2017-04-13 RX ADMIN — ERYTHROPOIETIN 40000 UNITS: 40000 INJECTION, SOLUTION INTRAVENOUS; SUBCUTANEOUS at 10:47

## 2017-04-13 NOTE — PROGRESS NOTES
CBC was r/w the pt and his wife. Results are stable with ANC of 520; hgb of 9.9; and slightly lower than usual platelets of 88. Reviewed bleeding and neutropenic precautions with the pt and his spouse. Both v/u.

## 2017-04-27 NOTE — PROGRESS NOTES
EPO held today due to hgb of 10.5. Pt states that he is feeling well and is currently on oral antibiotic (Cephalexin) for left hand infection following lesion excision by dermatologist.The pt was instructed to call our office for other s/s of infection including fever > 100.4 degrees fahrenheit due to ANC of 380. Pt was instructed also to  followup as scheduled in two weeks.Pt and wife v/u.

## 2017-05-22 PROBLEM — K62.5 RECTAL BLEED: Status: ACTIVE | Noted: 2017-01-01

## 2017-05-23 PROBLEM — D62 ACUTE BLOOD LOSS ANEMIA: Status: ACTIVE | Noted: 2017-01-01

## 2017-05-30 PROBLEM — K57.92 ACUTE DIVERTICULITIS: Status: RESOLVED | Noted: 2017-02-07 | Resolved: 2017-01-01

## 2017-05-30 PROBLEM — R06.89 DIFFICULTY BREATHING: Status: RESOLVED | Noted: 2017-02-22 | Resolved: 2017-01-01

## 2017-05-30 PROBLEM — I48.0 PAF (PAROXYSMAL ATRIAL FIBRILLATION) (HCC): Status: ACTIVE | Noted: 2017-01-01

## 2017-05-30 PROBLEM — R07.9 CHEST PAIN: Status: RESOLVED | Noted: 2017-02-22 | Resolved: 2017-01-01

## 2017-05-30 PROBLEM — I47.1 PSVT (PAROXYSMAL SUPRAVENTRICULAR TACHYCARDIA) (HCC): Status: ACTIVE | Noted: 2017-01-01

## 2017-06-07 NOTE — TELEPHONE ENCOUNTER
SPOKE WITH RACHEL FROM HOME HEALTH; SHE WASN'T SURE WHAT TO DO WITH PT'S ABD DRESSINGS? PT WAS SENT HOME WITH BETADINE

## 2017-06-13 NOTE — PROGRESS NOTES
Subjective    REASONS FOR FOLLOW-UP:   Low-grade myelodysplastic syndrome with chronic pancytopenia.       History of Present Illness    Mr. Zelaya is a pleasant 83-year-old man returning for follow-up of his myelodysplastic syndrome with chronic pancytopenia.  He currently is receiving monthly B12 injections and Procrit as needed when the hemoglobin drops below 10.0.   The patient was recently hospitalized and discharged 6/6/17 after experiencing recurrent diverticular bleeding eventually requiring subtotal colectomy with ileorectal anastomosis by Dr. Granados.  He required multiple packed red blood cell transfusions during the hospital stay.  He was discharged with a hemoglobin of 8.2 on 6/6/17.  The hemoglobin today has improved to 9.4.  He is currently non-neutropenic and with normal platelet count.  He does continue to have some drainage from the abdominal wound which his wife reports is mostly serosanguineous.  He has surgery follow-up tomorrow.  Overall he is making very slow recovery.    PAST MEDICAL HISTORY:    1. Arthritis.  2. Urinary retention.  3. Aortic insufficiency  4. Myelodysplastic syndrome    PAST SURGICAL HISTORY:    1. Back fusion in 2008.    2. Hernia surgery.    3. Cholecystectomy.  4. Knee arthroscopic surgery.      SOCIAL HISTORY:  .  Retired, worked in sheet metals.  Never smoked.  Alcohol consumption of approximately a 6 pack per week.  No risk factors for HIV.    FAMILY HISTORY: Negative for hematologic disorders. Sister had brain tumor. A brother had liver cancer.    Review of Systems   Constitutional: Positive for fatigue and unexpected weight change. Negative for activity change and fever.   Respiratory: Negative for cough and shortness of breath.    Cardiovascular: Negative for palpitations and leg swelling.   Gastrointestinal: Positive for abdominal pain. Negative for abdominal distention, diarrhea, nausea, rectal pain and vomiting.        Poor appetite   Endocrine: Positive  for cold intolerance. Negative for heat intolerance.   Musculoskeletal: Positive for arthralgias and gait problem.        Right shoulder pain   Neurological: Positive for weakness.   Hematological: Negative for adenopathy. Does not bruise/bleed easily.      A comprehensive 14 point review of systems was performed and was negative except as mentioned.    Medications:  The current medication list was reviewed in the EMR    ALLERGIES:  No Known Allergies    Objective    Temp 100.8  /61  Sat 95% RA        Physical Exam   Constitutional: No distress.   Ill and weak appearing   HENT:   Head: Atraumatic.   Eyes: No scleral icterus.   Cardiovascular: Normal rate.    Murmur heard.  Pulmonary/Chest: Effort normal and breath sounds normal. No respiratory distress.   Abdominal: Soft. He exhibits no distension and no mass. There is no tenderness. There is no rebound and no guarding.   Abdominal wound dressed with drainage   Musculoskeletal:   Decreased ROM right shoulder   Skin: Skin is warm. No erythema. There is pallor.   Psychiatric: He has a normal mood and affect.          RECENT LABS:  Hematology WBC   Date Value Ref Range Status   06/13/2017 7.73 4.80 - 10.80 10*3/mm3 Final     RBC   Date Value Ref Range Status   06/13/2017 3.06 (L) 4.70 - 6.10 10*6/mm3 Final     Hemoglobin   Date Value Ref Range Status   06/13/2017 9.4 (L) 14.0 - 18.0 g/dL Final     Hematocrit   Date Value Ref Range Status   06/13/2017 29.2 (L) 42.0 - 52.0 % Final     Platelets   Date Value Ref Range Status   06/13/2017 198 140 - 500 10*3/mm3 Final       Lab Results   Component Value Date    GLUCOSE 101 (H) 06/06/2017    BUN 5 (L) 06/06/2017    CREATININE 0.68 (L) 06/06/2017    EGFRIFNONA 111 06/06/2017    EGFRIFAFRI  09/01/2016      Comment:      <15 Indicative of kidney failure.    BCR 7.4 06/06/2017    CO2 22.6 06/06/2017    CALCIUM 7.4 (L) 06/06/2017    ALBUMIN 2.30 (L) 06/06/2017    LABIL2 0.7 06/06/2017    AST 9 06/06/2017    ALT 6  06/06/2017            Assessment/Plan    Chronic pancytopenia secondary to low-grade myelodysplastic syndrome most consistent with myelomonocytic leukemia:  The patient was discharged from the hospital one week ago after experiencing recurrent diverticular bleeding eventually requiring a subtotal colectomy.  He required multiple transfusions of packed red blood cells perioperatively.  The hemoglobin today has improved 1 g to 9.4.  He has a normal neutrophil and a normal platelet count.    We will proceed with a Procrit injection today and also B-12 injection.  I recommended that we continue to monitor his CBC for now every 2 weeks with Procrit as needed and a monthly B12 injection.    He is scheduled to see Dr. Seay tomorrow for follow-up of his surgery and abdominal wound.            6/13/2017      CC:

## 2017-06-14 NOTE — PROGRESS NOTES
PATIENT INFORMATION  Jason Zelaya   - 1934    CHIEF COMPLAINT  Chief Complaint   Patient presents with   • Post-op Follow-up   2 WKS 5 DAYS S/P EXP LAP, SUBTOTAL COLECTOMY, SORENESS IN LOWER ABD, KNEE PAIN    HISTORY OF PRESENT ILLNESS  HPI  Patient presents to the office today for his first postoperative visit.  He is status post emergent exploratory laparotomy with subtotal colectomy for recurrent lower GI bleed.  He was seen by the medical oncologist and did get procrit and erythropoietin shots.  His most recent hemoglobin is 9.7 chest improved by 1 g since hospital discharge.  He has myelodysplastic syndrome with pancytopenia. He did see Dr. Green yesterday.  I have reviewed his notes.  He is making slow progress.  He reports his appetite is definitely improved.  He is now having 2-3 bowel movements on a daily basis -  initially he did have problems with diarrhea as expected in someone with an ileorectal anastomosis.  He has modified his diet.  He denies any nausea, vomiting, fevers chills or redness from his surgical incision.  He is complaining of some incisional abdominal pain in the periumbilical region.  He had a previous umbilical hernia repair with mesh and the mesh had to be removed at the time of the emergent surgery.  He has had some serosanguinous drainage from the site.  And after discussion with home health nurse we've been doing Betadine dressings.  He is also complaining of fatigue and severe bilateral knee and right shoulder pain.  His son informed me that he was evaluated by orthopedic surgery but deemed a nonoperative candidate.  He has received corticosteroid shots by his primary care physician.  He does do home physical therapy.    REVIEW OF SYSTEMS  As per HPI      PROBLEMS  Patient Active Problem List    Diagnosis   • PAF (paroxysmal atrial fibrillation) [I48.0]   • PSVT (paroxysmal supraventricular tachycardia) [I47.1]   • Acute blood loss anemia [D62]   • Rectal bleed  [K62.5]   • AI (aortic incompetence) [I35.1]   • Bundle branch block, right [I45.10]   • MDS (myelodysplastic syndrome), low grade [D46.20]   • Vitamin B 12 deficiency [E53.8]   • Benign prostatic hyperplasia [N40.0]   • Hypertension [I10]   • Knee pain [M25.569]         PAST MEDICAL HISTORY  Past Medical History:   Diagnosis Date   • Aortic regurgitation    • Aortic valve insufficiency    • Arthritis     Bilateral knee   • Chest pain    • Diastolic dysfunction    • History of transfusion    • History of urinary retention    • Hypertension    • MDS (myelodysplastic syndrome)    • RBBB (right bundle branch block)    • Self-catheterizes urinary bladder     3-4 times aday   • Skin cancer          SURGICAL HISTORY  Past Surgical History:   Procedure Laterality Date   • APPENDECTOMY     • BACK SURGERY  2008    fusion   • CHOLECYSTECTOMY     • COLON RESECTION N/A 5/26/2017    Procedure: sub-total colectomy with ileo-rectal anastamosis, mobilization of splenic  INTRAOPERATIVE COLONOSCOPY  ([id number]), rigid sigmoidoscopy;  Surgeon: Kaylie Granados MD;  Location: [facility] OR;  Service:    • COLONOSCOPY N/A 2/1/2017    Procedure: COLONOSCOPY;  Surgeon: Bridger Amado MD;  Location: [facility] OR;  Service:    • COLONOSCOPY N/A 5/24/2017    Procedure: COLONOSCOPY w/ polypectomy;  Surgeon: Bridger Amado MD;  Location: [facility] OR;  Service:    • COLONOSCOPY N/A 5/24/2017    Procedure: COLONOSCOPY-return to surgery 2nd procedure, sclerotherapy with epi injection @ 40cm, placement of resolution clips X 2;  Surgeon: Bridger Amado MD;  Location: [facility] OR;  Service:    • ENDOSCOPY N/A 5/23/2017    Procedure: ESOPHAGOGASTRODUODENOSCOPY;  Surgeon: Bridger Amado MD;  Location: [facility] OR;  Service:    • EXPLORATORY LAPAROTOMY N/A 5/26/2017    Procedure: LAPAROTOMY EXPLORATORY - Explantation of old hernia mesh;  Surgeon: Kaylie Granados MD;  Location: [facility] OR;  Service:    • HERNIA  REPAIR      x 2         FAMILY HISTORY  Family History   Problem Relation Age of Onset   • Diabetes Brother    • Liver cancer Brother    • Heart disease Mother    • Heart attack Mother    • Diabetes Mother    • Heart attack Father    • Other Sister      Brain tumor   • Cancer Sister    • Emphysema Brother    • Cancer Brother    • Alcohol abuse Brother    • Cancer Brother          SOCIAL HISTORY  Social History     Occupational History   •  Retired     Social History Main Topics   • Smoking status: Never Smoker   • Smokeless tobacco: Never Used   • Alcohol use 1.2 - 1.8 oz/week     2 - 3 Cans of beer per week   • Drug use: No   • Sexual activity: Defer         CURRENT MEDICATIONS    Current Outpatient Prescriptions:   •  acetaminophen (TYLENOL) 325 MG tablet, Take 650 mg by mouth every 6 (six) hours as needed for mild pain (1-3)., Disp: , Rfl:   •  clotrimazole-betamethasone (LOTRISONE) 1-0.05 % cream, Apply 1 application topically Daily., Disp: , Rfl:   •  cyanocobalamin 1000 MCG/ML injection, 1,000 mcg Every 28 (Twenty-Eight) Days. Cyanocobalamin SOLN; Patient Sig: Cyanocobalamin SOLN ; 0; 06-Jun-2014; Active, Disp: , Rfl:   •  folic acid (FOLVITE) 400 MCG tablet, Take 400 mcg by mouth Daily., Disp: , Rfl:   •  lactobacillus acidophilus (RISAQUAD) capsule capsule, Take 1 capsule by mouth Daily., Disp: 30 capsule, Rfl: 0  •  metoprolol tartrate (LOPRESSOR) 25 MG tablet, Take 1 tablet by mouth Every 12 (Twelve) Hours., Disp: 60 tablet, Rfl: 1  •  MULTIPLE VITAMINS-MINERALS PO, Take 1 tablet by mouth Daily., Disp: , Rfl:   •  ondansetron (ZOFRAN) 4 MG tablet, Take 1 tablet by mouth Every 6 (Six) Hours As Needed for Nausea or Vomiting., Disp: 20 tablet, Rfl: 0  •  oxyCODONE-acetaminophen (PERCOCET) 5-325 MG per tablet, take 1 tablet by mouth every 6 hours if needed for MODERATE or severe pain, Disp: , Rfl: 0  •  pantoprazole (PROTONIX) 40 MG EC tablet, Take 1 tablet by mouth Daily., Disp: 30 tablet,  "Rfl: 1    ALLERGIES  Review of patient's allergies indicates no known allergies.    VITALS  Vitals:    06/14/17 1112   BP: 112/64   Weight: 142 lb (64.4 kg)   Height: 70\" (177.8 cm)       LAST RESULTS   Lab on 06/13/2017   Component Date Value Ref Range Status   • Ferritin 06/13/2017 453.00* 30.00 - 400.00 ng/mL Final   • Iron 06/13/2017 48* 59 - 158 mcg/dL Final   • Iron Saturation 06/13/2017 24  % Final   • UIBC 06/13/2017 149  112 - 346 mcg/dL Final   • TIBC 06/13/2017 197* 261 - 498 mcg/dL Final   • Extra Tube 06/13/2017 Hold for add-ons.   Final    Auto resulted.   • WBC 06/13/2017 7.73  4.80 - 10.80 10*3/mm3 Final   • RBC 06/13/2017 3.06* 4.70 - 6.10 10*6/mm3 Final   • Hemoglobin 06/13/2017 9.4* 14.0 - 18.0 g/dL Final   • Hematocrit 06/13/2017 29.2* 42.0 - 52.0 % Final   • MCV 06/13/2017 95.4* 80.0 - 94.0 fL Final   • MCH 06/13/2017 30.7  27.0 - 31.0 pg Final   • MCHC 06/13/2017 32.2  31.0 - 37.0 g/dL Final   • RDW 06/13/2017 22.4* 11.5 - 14.5 % Final   • RDW-SD 06/13/2017 76.3* 37.0 - 54.0 fl Final   • MPV 06/13/2017 11.7* 7.4 - 10.4 fL Final   • Platelets 06/13/2017 198  140 - 500 10*3/mm3 Final   • Neutrophil % 06/13/2017 50.3  45.0 - 70.0 % Final   • Lymphocyte % 06/13/2017 11.8* 20.0 - 45.0 % Final   • Monocyte % 06/13/2017 35.8* 3.0 - 8.0 % Final   • Eosinophil % 06/13/2017 0.0  0.0 - 4.0 % Final   • Basophil % 06/13/2017 0.3  0.0 - 2.0 % Final   • Immature Grans % 06/13/2017 1.8* 0.0 - 0.5 % Final   • Neutrophils, Absolute 06/13/2017 3.89  1.50 - 8.30 10*3/mm3 Final   • Lymphocytes, Absolute 06/13/2017 0.91  0.60 - 4.80 10*3/mm3 Final   • Monocytes, Absolute 06/13/2017 2.77* 0.00 - 1.00 10*3/mm3 Final   • Eosinophils, Absolute 06/13/2017 0.00* 0.10 - 0.30 10*3/mm3 Final   • Basophils, Absolute 06/13/2017 0.02  0.00 - 0.20 10*3/mm3 Final   • Immature Grans, Absolute 06/13/2017 0.14* 0.00 - 0.03 10*3/mm3 Final   • nRBC 06/13/2017 0.0  0.0 - 0.0 /100 WBC Final     Xr Abdomen 2 View With Chest 1 " View    Result Date: 6/6/2017  Narrative: SUPINE AND UPRIGHT VIEWS OF THE ABDOMEN 06/06/2017  HISTORY: Follow up abdominal pain after colon surgery on 06/02/2017.  COMPARISON: CT abdomen and pelvis 06/05/2017. CT abdomen and pelvis 03/20/2017. AP portal chest 05/28/2017.  FINDINGS: Free air is seen underneath each diaphragm, right greater than left, likely related to previous abdominal surgery. There is mild bibasilar atelectasis. No dense lung consolidations are seen. Trace right pleural effusion. Heart size within normal limits.  Midline laparotomy staples are present. Cholecystectomy changes are present. On the supine image, small amount of free air is seen underneath the right hepatic margin. There is nonspecific but nonobstructing appearing bowel gas pattern. Mild gaseous distention of the stomach.  Presumed Ayoub catheter over the midline pelvis. Calcified phleboliths in the pelvis on the right. Moderate bilateral hip joint space narrowing.      Impression: 1. Small-to-moderate quantity of free air is seen underneath each diaphragmatic margin. This may be related to history of recent surgical intervention. Similar findings were seen on yesterday's CT abdomen and pelvis. 2. Mild bibasilar atelectasis and questionable trace right pleural effusion.  This report was finalized on 6/6/2017 10:57 AM by Dr. Samantha Keita MD.      Ct Abdomen Pelvis With Contrast    Result Date: 6/5/2017  Narrative: EXAM: CT ABDOMEN AND PELVIS WITH CONTRAST  DATE: 06/05/2017  HISTORY: STATUS POST COLECTOMY LAST WEEK. WORSENING ABDOMINAL PAIN FOR 2 DAYS.  COMPARISON: CT ABDOMEN AND PELVIS 03/20/2017.  PROCEDURE: 5 MM AXIAL IMAGES FROM THE LUNG BASES THROUGH THE LESSER TROCHANTERS AFTER INTRAVENOUS AND ENTERIC CONTRAST ADMINISTRATION. SAGITTAL AND CORONAL REFORMATTED IMAGES WERE OBTAINED.  Radiation dose reduction techniques were utilized, including automated exposure control and exposure modulation based on body size.  ABDOMEN FINDINGS:  There is a mild to moderate free intraperitoneal air within the abdomen abdominal and diaphragm, right greater than left. Small quantity of free fluid is seen, predominantly adjacent to the liver. No well-defined abscess is seen. Midline laparotomy staples are present.  The liver, spleen, pancreas, adrenals and right kidney are within normal limits. Incidental note is made of a 1 cm low density lesion in theleft mid kidney exophytically and posteriorly, statistically representing a cyst, although nonspecific.  Surgical changes of colectomy are present with ilio anal anastomosis. There is air-fluid surrounding the anastomotic site (series 2 image 62); however, no enteric contrast extravasation is seen in this collection surrounding the anastomosis, and may represent some loculated postop air and fluid at the anastomotic site. Abscess is thought unlikely, given the lack of associated history of fever or elevated white count. No evidence of high-grade bowel obstruction.  Features of interstitial fibrosis in the lung bases.  Pelvis findings: Ayoub catheter decompression of the urinary bladder. Enteric contrast is seen within the rectum. Prostate gland is not enlarged.  Advanced degenerative disc and endplate changes and facet arthropathy in the lower lumbar spine.       Impression: 1. Surgical changes of recent colectomy with ileoanal anastomosis. Small amount of loculated air and fluid is seen surrounding the anastomotic site, but no oral contrast extravasation is seen. Free air and fluid is also seen underneath the diaphragm, right greater left. Given the lack of associated fever and elevated white blood cell count, this may simply be within the realm of normal postop retained air and fluid. 2. Interstitial fibrotic changes in the lung bases  This report was finalized on 6/5/2017 1:13 PM by Dr. Samantha Keita MD.      Xr Chest 1 View    Result Date: 5/29/2017  Narrative: Portable chest  INDICATION: Fever and recent  colon surgery. Compared with 09/01/2016.  FINDINGS: There is consolidation or atelectasis within the left lung base. Heart size stable. Free air under the diaphragm likely from recent surgery. NG tube projects over the stomach.  This report was finalized on 5/29/2017 7:48 AM by Dr. Dexter Irene MD.        PHYSICAL EXAM  Physical Exam   Constitutional: He appears well-developed and well-nourished.   Cardiovascular: Normal rate, regular rhythm and normal heart sounds.    Pulmonary/Chest: Breath sounds normal.   Abdominal:   Soft, nondistended, mild tenderness around the periumbilical area.  Incision well healed chest.  To the umbilicus there was some serous drainage I opened it with a sterile Q-tip and a small seroma was drained.  The drainage was mostly serous.  I do not appreciate an enterocutaneous fistula.  A culture was obtained. Wound was packed and wet-to-dry dressings done.   Nursing note and vitals reviewed.      ASSESSMENT    Status post emergent exploratory laparotomy and subtotal colectomy for recurrent diverticular bleed  History of myelodysplastic syndrome with pancytopenia    A wound culture was obtained.  We'll follow-up on that and make further recommendations regarding antibiotics once results are in.    We'll contact the home health nurse and discuss to change dressings to wet-to-dry daily dressings.    I had a lengthy discussion with him regarding nutrition and post subtotal colectomy diet.  Extensive counseling was done with him and his son.    I also advised him to contact his PCP and orthopedic surgeon regarding the arthritis.    He will need aggressive physical therapy/rehabilitation this was discussed and will be communicated to the home health.    Diagnoses and all orders for this visit:    Wound drainage  -     Wound Culture      PLAN  Follow-up one week.  Patient was advised to call the office sooner should he have worsening symptoms or go to the nearest emergency room.

## 2017-06-23 NOTE — PROGRESS NOTES
PATIENT INFORMATION  Jason Zelaya   - 1934    CHIEF COMPLAINT  Chief Complaint   Patient presents with   • Post-op Follow-up     4 WKS S/P SUBTOTAL COLECTOMY, pt has finished ABX    HISTORY OF PRESENT ILLNESS  HPI  Patient was as the office today for a second postoperative visit.  He looks much better at the office visit today he has been up out of the wheelchair and ambulating without much assistance.  He reports his appetite has improved.  He is now having 3 bowel movements a day and these are mostly solid.  He denies any fevers chills redness.  He reports the drainage from his wound is significantly decreased.  The wound is being packed with wet saline wet-to-dry dressing daily with the help of home health.    Cultures was positive for Pseudomonas and Enterococcus faecalis.  He did take ciprofloxacin.  I spoke with the lab and it appears that the enterococcus faecalis may not be susceptible to ciprofloxacin.      REVIEW OF SYSTEMS  Review of Systems  As per history of present illness    ACTIVE PROBLEMS  Patient Active Problem List    Diagnosis   • PAF (paroxysmal atrial fibrillation) [I48.0]   • PSVT (paroxysmal supraventricular tachycardia) [I47.1]   • Acute blood loss anemia [D62]   • Rectal bleed [K62.5]   • AI (aortic incompetence) [I35.1]   • Bundle branch block, right [I45.10]   • MDS (myelodysplastic syndrome), low grade [D46.20]   • Vitamin B 12 deficiency [E53.8]   • Benign prostatic hyperplasia [N40.0]   • Hypertension [I10]   • Knee pain [M25.569]         PAST MEDICAL HISTORY  Past Medical History:   Diagnosis Date   • Aortic regurgitation    • Aortic valve insufficiency    • Arthritis     Bilateral knee   • Chest pain    • Diastolic dysfunction    • History of transfusion    • History of urinary retention    • Hypertension    • MDS (myelodysplastic syndrome)    • RBBB (right bundle branch block)    • Self-catheterizes urinary bladder     3-4 times aday   • Skin cancer           SURGICAL HISTORY  Past Surgical History:   Procedure Laterality Date   • APPENDECTOMY     • BACK SURGERY  2008    fusion   • CHOLECYSTECTOMY     • COLON RESECTION N/A 5/26/2017    Procedure: sub-total colectomy with ileo-rectal anastamosis, mobilization of splenic  INTRAOPERATIVE COLONOSCOPY  ([id number]), rigid sigmoidoscopy;  Surgeon: Kaylie Granados MD;  Location: [facility] OR;  Service:    • COLONOSCOPY N/A 2/1/2017    Procedure: COLONOSCOPY;  Surgeon: Bridger Amado MD;  Location: [facility] OR;  Service:    • COLONOSCOPY N/A 5/24/2017    Procedure: COLONOSCOPY w/ polypectomy;  Surgeon: Bridger Amado MD;  Location: [facility] OR;  Service:    • COLONOSCOPY N/A 5/24/2017    Procedure: COLONOSCOPY-return to surgery 2nd procedure, sclerotherapy with epi injection @ 40cm, placement of resolution clips X 2;  Surgeon: Bridger Amado MD;  Location: [facility] OR;  Service:    • ENDOSCOPY N/A 5/23/2017    Procedure: ESOPHAGOGASTRODUODENOSCOPY;  Surgeon: Bridger Amado MD;  Location: [facility] OR;  Service:    • EXPLORATORY LAPAROTOMY N/A 5/26/2017    Procedure: LAPAROTOMY EXPLORATORY - Explantation of old hernia mesh;  Surgeon: Kaylie Granados MD;  Location: [facility] OR;  Service:    • HERNIA REPAIR      x 2         FAMILY HISTORY  Family History   Problem Relation Age of Onset   • Diabetes Brother    • Liver cancer Brother    • Heart disease Mother    • Heart attack Mother    • Diabetes Mother    • Heart attack Father    • Other Sister      Brain tumor   • Cancer Sister    • Emphysema Brother    • Cancer Brother    • Alcohol abuse Brother    • Cancer Brother          SOCIAL HISTORY  Social History     Occupational History   •  Retired     Social History Main Topics   • Smoking status: Never Smoker   • Smokeless tobacco: Never Used   • Alcohol use 1.2 - 1.8 oz/week     2 - 3 Cans of beer per week   • Drug use: No   • Sexual activity: Defer         CURRENT  "MEDICATIONS    Current Outpatient Prescriptions:   •  acetaminophen (TYLENOL) 325 MG tablet, Take 650 mg by mouth every 6 (six) hours as needed for mild pain (1-3)., Disp: , Rfl:   •  ampicillin (PRINCIPEN) 500 MG capsule, Take 1 capsule by mouth 4 (Four) Times a Day for 7 days., Disp: 28 capsule, Rfl: 0  •  ciprofloxacin (CIPRO) 500 MG tablet, Take 1 tablet by mouth 2 (Two) Times a Day for 7 days., Disp: 14 tablet, Rfl: 0  •  clotrimazole-betamethasone (LOTRISONE) 1-0.05 % cream, Apply 1 application topically Daily., Disp: , Rfl:   •  cyanocobalamin 1000 MCG/ML injection, 1,000 mcg Every 28 (Twenty-Eight) Days. Cyanocobalamin SOLN; Patient Sig: Cyanocobalamin SOLN ; 0; 06-Jun-2014; Active, Disp: , Rfl:   •  folic acid (FOLVITE) 400 MCG tablet, Take 400 mcg by mouth Daily., Disp: , Rfl:   •  lactobacillus acidophilus (RISAQUAD) capsule capsule, Take 1 capsule by mouth Daily., Disp: 30 capsule, Rfl: 0  •  metoprolol tartrate (LOPRESSOR) 25 MG tablet, Take 1 tablet by mouth Every 12 (Twelve) Hours., Disp: 60 tablet, Rfl: 1  •  MULTIPLE VITAMINS-MINERALS PO, Take 1 tablet by mouth Daily., Disp: , Rfl:   •  ondansetron (ZOFRAN) 4 MG tablet, Take 1 tablet by mouth Every 6 (Six) Hours As Needed for Nausea or Vomiting., Disp: 20 tablet, Rfl: 0  •  oxyCODONE-acetaminophen (PERCOCET) 5-325 MG per tablet, take 1 tablet by mouth every 6 hours if needed for MODERATE or severe pain, Disp: , Rfl: 0  •  oxyCODONE-acetaminophen (PERCOCET) 5-325 MG per tablet, Take 1 tablet by mouth Every 6 (Six) Hours As Needed for Severe Pain (7-10)., Disp: 30 tablet, Rfl: 0  •  pantoprazole (PROTONIX) 40 MG EC tablet, Take 1 tablet by mouth Daily., Disp: 30 tablet, Rfl: 1    ALLERGIES  Review of patient's allergies indicates no known allergies.    VITALS  Vitals:    06/23/17 1147   BP: 132/78   Weight: 142 lb (64.4 kg)   Height: 70\" (177.8 cm)       LAST RESULTS   Office Visit on 06/14/2017   Component Date Value Ref Range Status   • Wound " Culture 06/14/2017 Scant growth (1+) Pseudomonas aeruginosa*  Final   • Wound Culture 06/14/2017 Scant growth (1+) Enterococcus faecalis*  Final   • Gram Stain Result 06/14/2017 Rare (1+) WBCs seen   Final   • Gram Stain Result 06/14/2017 Few (2+) Gram negative bacilli   Final     Xr Abdomen 2 View With Chest 1 View    Result Date: 6/6/2017  Narrative: SUPINE AND UPRIGHT VIEWS OF THE ABDOMEN 06/06/2017  HISTORY: Follow up abdominal pain after colon surgery on 06/02/2017.  COMPARISON: CT abdomen and pelvis 06/05/2017. CT abdomen and pelvis 03/20/2017. AP portal chest 05/28/2017.  FINDINGS: Free air is seen underneath each diaphragm, right greater than left, likely related to previous abdominal surgery. There is mild bibasilar atelectasis. No dense lung consolidations are seen. Trace right pleural effusion. Heart size within normal limits.  Midline laparotomy staples are present. Cholecystectomy changes are present. On the supine image, small amount of free air is seen underneath the right hepatic margin. There is nonspecific but nonobstructing appearing bowel gas pattern. Mild gaseous distention of the stomach.  Presumed Ayoub catheter over the midline pelvis. Calcified phleboliths in the pelvis on the right. Moderate bilateral hip joint space narrowing.      Impression: 1. Small-to-moderate quantity of free air is seen underneath each diaphragmatic margin. This may be related to history of recent surgical intervention. Similar findings were seen on yesterday's CT abdomen and pelvis. 2. Mild bibasilar atelectasis and questionable trace right pleural effusion.  This report was finalized on 6/6/2017 10:57 AM by Dr. Samantha Keita MD.      Ct Abdomen Pelvis With Contrast    Result Date: 6/5/2017  Narrative: EXAM: CT ABDOMEN AND PELVIS WITH CONTRAST  DATE: 06/05/2017  HISTORY: STATUS POST COLECTOMY LAST WEEK. WORSENING ABDOMINAL PAIN FOR 2 DAYS.  COMPARISON: CT ABDOMEN AND PELVIS 03/20/2017.  PROCEDURE: 5 MM AXIAL IMAGES  FROM THE LUNG BASES THROUGH THE LESSER TROCHANTERS AFTER INTRAVENOUS AND ENTERIC CONTRAST ADMINISTRATION. SAGITTAL AND CORONAL REFORMATTED IMAGES WERE OBTAINED.  Radiation dose reduction techniques were utilized, including automated exposure control and exposure modulation based on body size.  ABDOMEN FINDINGS: There is a mild to moderate free intraperitoneal air within the abdomen abdominal and diaphragm, right greater than left. Small quantity of free fluid is seen, predominantly adjacent to the liver. No well-defined abscess is seen. Midline laparotomy staples are present.  The liver, spleen, pancreas, adrenals and right kidney are within normal limits. Incidental note is made of a 1 cm low density lesion in theleft mid kidney exophytically and posteriorly, statistically representing a cyst, although nonspecific.  Surgical changes of colectomy are present with ilio anal anastomosis. There is air-fluid surrounding the anastomotic site (series 2 image 62); however, no enteric contrast extravasation is seen in this collection surrounding the anastomosis, and may represent some loculated postop air and fluid at the anastomotic site. Abscess is thought unlikely, given the lack of associated history of fever or elevated white count. No evidence of high-grade bowel obstruction.  Features of interstitial fibrosis in the lung bases.  Pelvis findings: Ayoub catheter decompression of the urinary bladder. Enteric contrast is seen within the rectum. Prostate gland is not enlarged.  Advanced degenerative disc and endplate changes and facet arthropathy in the lower lumbar spine.       Impression: 1. Surgical changes of recent colectomy with ileoanal anastomosis. Small amount of loculated air and fluid is seen surrounding the anastomotic site, but no oral contrast extravasation is seen. Free air and fluid is also seen underneath the diaphragm, right greater left. Given the lack of associated fever and elevated white blood cell  count, this may simply be within the realm of normal postop retained air and fluid. 2. Interstitial fibrotic changes in the lung bases  This report was finalized on 6/5/2017 1:13 PM by Dr. Samantha Keita MD.      Xr Chest 1 View    Result Date: 5/29/2017  Narrative: Portable chest  INDICATION: Fever and recent colon surgery. Compared with 09/01/2016.  FINDINGS: There is consolidation or atelectasis within the left lung base. Heart size stable. Free air under the diaphragm likely from recent surgery. NG tube projects over the stomach.  This report was finalized on 5/29/2017 7:48 AM by Dr. Dexter Irene MD.        PHYSICAL EXAM  Physical Exam   Constitutional: He is oriented to person, place, and time. He appears well-developed and well-nourished.   Cardiovascular: Normal rate, regular rhythm and normal heart sounds.    Pulmonary/Chest: Breath sounds normal.   Abdominal:   Soft, nondistended, nontender positive bowel sounds in all 4 quadrants.  Wound edges clean, positive granulation tissue, there was some fibrinous exudate that was debrided.  A stitch is visible but I do not see any dehiscence or bowel evisceration.  Saline wet-to-dry dressing was done   Neurological: He is alert and oriented to person, place, and time.   Skin: Skin is warm and dry.   Psychiatric: He has a normal mood and affect. His behavior is normal.   Nursing note and vitals reviewed.      ASSESSMENT  Status post emergent subtotal colectomy for recurrent lower GI bleed-diverticular  Myelodysplastic syndrome with pancytopenia    Postoperative wound infection.  Discussed with patient and his home health nurse will switch to Dakin's quarter percent strength wet-to-dry dressing daily.  Also based on cultures and sensitivities I have E scribed ciprofloxacin and ampicillin ×7 days.  A prescription for Percocet 5/325 mg 1 by mouth every 6 when necessary severe pain #30 no refills was given to the patient.  Continue home health and home PT.  Continue  enteral supplements.    PLAN  Follow-up 2 weeks.  Patient was advised to call the office sooner should he have worsening symptoms or go to the nearest emergency room.

## 2017-06-27 NOTE — PROGRESS NOTES
HGB 8.4. Pt denies any bleeding or worsening SOA. Drainage from colostomy wound is clear and without blood. Per Dr. Green, ok for pt to receive EPO today and return in one week for recheck. Pt and spouse informed and v/u. Pt already has an appointment on 7/5/17.

## 2017-07-05 NOTE — PROGRESS NOTES
PATIENT INFORMATION  Jason Zelaya   - 1934    CHIEF COMPLAINT  Chief Complaint   Patient presents with   • Post-op Follow-up   5 wks 5 days s/p colon resection, wound is healing well    HISTORY OF PRESENT ILLNESS  HPI  Patient was as the office today for follow-up visit.  He reports he is overall doing well.  His wife however is concerned about his poor appetite and low energy levels.  She reports that he has not been taking any enteral supplements (doesn't like the Ensure).  He did complete the antibiotics.  Home health has been doing daily dressing change with 0.25% Dakin solution.  He reports he is still experiencing a lot of shoulder discomfort he however has been out of bed ambulating well and has been mobile and ambulating without a cane/walker.  He denies any abdominal pain nausea and vomiting.  He reports 3-4 bowel movements per day.  Denies any melena or hematochezia.      REVIEW OF SYSTEMS  Review of Systems  As per history of present illness    ACTIVE PROBLEMS  Patient Active Problem List    Diagnosis   • PAF (paroxysmal atrial fibrillation) [I48.0]   • PSVT (paroxysmal supraventricular tachycardia) [I47.1]   • Acute blood loss anemia [D62]   • Rectal bleed [K62.5]   • AI (aortic incompetence) [I35.1]   • Bundle branch block, right [I45.10]   • MDS (myelodysplastic syndrome), low grade [D46.20]   • Vitamin B 12 deficiency [E53.8]   • Benign prostatic hyperplasia [N40.0]   • Hypertension [I10]   • Knee pain [M25.569]         PAST MEDICAL HISTORY  Past Medical History:   Diagnosis Date   • Aortic regurgitation    • Aortic valve insufficiency    • Arthritis     Bilateral knee   • Chest pain    • Diastolic dysfunction    • History of transfusion    • History of urinary retention    • Hypertension    • MDS (myelodysplastic syndrome)    • RBBB (right bundle branch block)    • Self-catheterizes urinary bladder     3-4 times aday   • Skin cancer          SURGICAL HISTORY  Past Surgical History:    Procedure Laterality Date   • APPENDECTOMY     • BACK SURGERY  2008    fusion   • CHOLECYSTECTOMY     • COLON RESECTION N/A 5/26/2017    Procedure: sub-total colectomy with ileo-rectal anastamosis, mobilization of splenic  INTRAOPERATIVE COLONOSCOPY  ([id number]), rigid sigmoidoscopy;  Surgeon: Kaylie Granados MD;  Location: [facility] OR;  Service:    • COLONOSCOPY N/A 2/1/2017    Procedure: COLONOSCOPY;  Surgeon: Bridger Amado MD;  Location: [facility] OR;  Service:    • COLONOSCOPY N/A 5/24/2017    Procedure: COLONOSCOPY w/ polypectomy;  Surgeon: Bridger Amado MD;  Location: [facility] OR;  Service:    • COLONOSCOPY N/A 5/24/2017    Procedure: COLONOSCOPY-return to surgery 2nd procedure, sclerotherapy with epi injection @ 40cm, placement of resolution clips X 2;  Surgeon: Bridger Amado MD;  Location: [facility] OR;  Service:    • ENDOSCOPY N/A 5/23/2017    Procedure: ESOPHAGOGASTRODUODENOSCOPY;  Surgeon: Bridger Amado MD;  Location: [facility] OR;  Service:    • EXPLORATORY LAPAROTOMY N/A 5/26/2017    Procedure: LAPAROTOMY EXPLORATORY - Explantation of old hernia mesh;  Surgeon: Kaylie Granados MD;  Location: [facility] OR;  Service:    • HERNIA REPAIR      x 2         FAMILY HISTORY  Family History   Problem Relation Age of Onset   • Diabetes Brother    • Liver cancer Brother    • Heart disease Mother    • Heart attack Mother    • Diabetes Mother    • Heart attack Father    • Other Sister      Brain tumor   • Cancer Sister    • Emphysema Brother    • Cancer Brother    • Alcohol abuse Brother    • Cancer Brother          SOCIAL HISTORY  Social History     Occupational History   •  Retired     Social History Main Topics   • Smoking status: Never Smoker   • Smokeless tobacco: Never Used   • Alcohol use 1.2 - 1.8 oz/week     2 - 3 Cans of beer per week   • Drug use: No   • Sexual activity: Defer         CURRENT MEDICATIONS    Current Outpatient Prescriptions:   •   "acetaminophen (TYLENOL) 325 MG tablet, Take 650 mg by mouth every 6 (six) hours as needed for mild pain (1-3)., Disp: , Rfl:   •  clotrimazole-betamethasone (LOTRISONE) 1-0.05 % cream, Apply 1 application topically Daily., Disp: , Rfl:   •  cyanocobalamin 1000 MCG/ML injection, 1,000 mcg Every 28 (Twenty-Eight) Days. Cyanocobalamin SOLN; Patient Sig: Cyanocobalamin SOLN ; 0; 06-Jun-2014; Active, Disp: , Rfl:   •  folic acid (FOLVITE) 400 MCG tablet, Take 400 mcg by mouth Daily., Disp: , Rfl:   •  lactobacillus acidophilus (RISAQUAD) capsule capsule, Take 1 capsule by mouth Daily., Disp: 30 capsule, Rfl: 0  •  metoprolol tartrate (LOPRESSOR) 25 MG tablet, Take 1 tablet by mouth Every 12 (Twelve) Hours., Disp: 60 tablet, Rfl: 1  •  MULTIPLE VITAMINS-MINERALS PO, Take 1 tablet by mouth Daily., Disp: , Rfl:   •  ondansetron (ZOFRAN) 4 MG tablet, Take 1 tablet by mouth Every 6 (Six) Hours As Needed for Nausea or Vomiting., Disp: 20 tablet, Rfl: 0  •  oxyCODONE-acetaminophen (PERCOCET) 5-325 MG per tablet, Take 1 tablet by mouth Every 6 (Six) Hours As Needed for Severe Pain (7-10)., Disp: 30 tablet, Rfl: 0  •  pantoprazole (PROTONIX) 40 MG EC tablet, Take 1 tablet by mouth Daily., Disp: 30 tablet, Rfl: 1  No current facility-administered medications for this visit.     ALLERGIES  Review of patient's allergies indicates no known allergies.    VITALS  Vitals:    07/05/17 0830   BP: 122/68   Weight: 142 lb (64.4 kg)   Height: 70\" (177.8 cm)       LAST RESULTS   Lab on 06/27/2017   Component Date Value Ref Range Status   • WBC 06/27/2017 3.30* 4.80 - 10.80 10*3/mm3 Final   • RBC 06/27/2017 2.66* 4.70 - 6.10 10*6/mm3 Final   • Hemoglobin 06/27/2017 8.4* 14.0 - 18.0 g/dL Final   • Hematocrit 06/27/2017 26.0* 42.0 - 52.0 % Final   • MCV 06/27/2017 97.7* 80.0 - 94.0 fL Final   • MCH 06/27/2017 31.6* 27.0 - 31.0 pg Final   • MCHC 06/27/2017 32.3  31.0 - 37.0 g/dL Final   • RDW 06/27/2017 23.9* 11.5 - 14.5 % Final   • RDW-SD " 06/27/2017 82.8* 37.0 - 54.0 fl Final   • MPV 06/27/2017 12.4* 7.4 - 10.4 fL Final   • Platelets 06/27/2017 121* 140 - 500 10*3/mm3 Final   • Neutrophil % 06/27/2017 30.3* 45.0 - 70.0 % Final   • Lymphocyte % 06/27/2017 31.8  20.0 - 45.0 % Final   • Monocyte % 06/27/2017 36.4* 3.0 - 8.0 % Final   • Eosinophil % 06/27/2017 0.0  0.0 - 4.0 % Final   • Basophil % 06/27/2017 0.3  0.0 - 2.0 % Final   • Immature Grans % 06/27/2017 1.2* 0.0 - 0.5 % Final   • Neutrophils, Absolute 06/27/2017 1.00* 1.50 - 8.30 10*3/mm3 Final   • Lymphocytes, Absolute 06/27/2017 1.05  0.60 - 4.80 10*3/mm3 Final   • Monocytes, Absolute 06/27/2017 1.20* 0.00 - 1.00 10*3/mm3 Final   • Eosinophils, Absolute 06/27/2017 0.00* 0.10 - 0.30 10*3/mm3 Final   • Basophils, Absolute 06/27/2017 0.01  0.00 - 0.20 10*3/mm3 Final   • Immature Grans, Absolute 06/27/2017 0.04* 0.00 - 0.03 10*3/mm3 Final   • nRBC 06/27/2017 0.0  0.0 - 0.0 /100 WBC Final     Xr Abdomen 2 View With Chest 1 View    Result Date: 6/6/2017  Narrative: SUPINE AND UPRIGHT VIEWS OF THE ABDOMEN 06/06/2017  HISTORY: Follow up abdominal pain after colon surgery on 06/02/2017.  COMPARISON: CT abdomen and pelvis 06/05/2017. CT abdomen and pelvis 03/20/2017. AP portal chest 05/28/2017.  FINDINGS: Free air is seen underneath each diaphragm, right greater than left, likely related to previous abdominal surgery. There is mild bibasilar atelectasis. No dense lung consolidations are seen. Trace right pleural effusion. Heart size within normal limits.  Midline laparotomy staples are present. Cholecystectomy changes are present. On the supine image, small amount of free air is seen underneath the right hepatic margin. There is nonspecific but nonobstructing appearing bowel gas pattern. Mild gaseous distention of the stomach.  Presumed Ayoub catheter over the midline pelvis. Calcified phleboliths in the pelvis on the right. Moderate bilateral hip joint space narrowing.      Impression: 1.  Small-to-moderate quantity of free air is seen underneath each diaphragmatic margin. This may be related to history of recent surgical intervention. Similar findings were seen on yesterday's CT abdomen and pelvis. 2. Mild bibasilar atelectasis and questionable trace right pleural effusion.  This report was finalized on 6/6/2017 10:57 AM by Dr. Samantha Keita MD.        PHYSICAL EXAM  Physical Exam   Constitutional: He is oriented to person, place, and time. He appears well-developed and well-nourished.   Cardiovascular: Normal rate, regular rhythm and normal heart sounds.    Pulmonary/Chest: Breath sounds normal.   Abdominal:   Soft, nondistended, nontender positive bowel sounds in all 4 quadrants.    Wound-edges clean, positive granulation tissue, scant fibrinous date.  Wound was debrided in a non-excisional fashion.   Musculoskeletal: He exhibits no edema.   Neurological: He is alert and oriented to person, place, and time.   Nursing note and vitals reviewed.      ASSESSMENT    Status post emergent subtotal colectomy for recurrent lower GI bleed-diverticular  Myelodysplastic syndrome with pancytopenia     Postoperative wound infection.  Healing well via secondary intention.  Continue - Dakin's quarter percent strength wet-to-dry dressing daily.  Presently he has seen no gross signs of infection we'll hold off on any further antibiotic therapy at this time.    Continue home health and home PT.      Protein calorie malnutrition-discussed supplementing with boost or Ensure.  Patient is agreeable to trying boost.  I have also recommended to him and we will arrange for a outpatient referral to nutrition for counseling and assessment.    PLAN  Follow-up 2 weeks.  Patient was advised to call the office sooner should he have worsening symptoms or go to the nearest emergency room.

## 2017-07-05 NOTE — PROGRESS NOTES
CBC r/w pt and spouse.. Results stable with hgb of 8.3. Pt states he feels well and denies worsening fatigue or SOA. He just saw Dr. Granados for surgical followup and denies known blood loss. Pt was offered an appointment in one week for labs and possible EPO; he declined but understands to call us if a lab appointment with possible EPO injection is needed next week. The pt will otherwise followup in 2 weeks.

## 2017-07-18 NOTE — PROGRESS NOTES
CBC was r/w pt and spouse. Hgb is about the same as previous check 2 weeks ago at 8.2. Platelets are 134 and ANC is 1.58. Pt states that he is feeling relatively well except he did have stomach upset last night which he attributed to the two antibiotics he is on (Doxycycline and Levaquin) for wound MRSA. The pt was instructed to eat prior to taking the antibiotics and to take the two antibiotics at different times. This should be easy for the pt to do since the Levaquin is QD and can be taken around noon and the Doxycycline is BID which can be taken in the morning and at night. The pt and wife were reminded to call us for a sooner appointment if needed for worsening symptoms such as SOA, dizziness, or worsening fatigue since the pt again did not want to schedule a lab check for next week and wanted to instead keep his next appointment as scheduled on 8/1/17. Pt and wife v/u of above.

## 2017-07-19 NOTE — PROGRESS NOTES
"    PATIENT INFORMATION  Jason Zelaya   - 1934    CHIEF COMPLAINT  Chief Complaint   Patient presents with   • Post-op Follow-up   7 WKS 5 DAYS S/P SUBTOTAL COLON RESECTION, PT STATES THE ABX HAVE BEEN HURTING HIS STOMACH AND HE CAN'T SLEEP BECAUSE OF IT, WOUND HEALING WELL    HISTORY OF PRESENT ILLNESS  HPI  Patient was sent to the office today for routine follow-up.  He reports he is overall doing well.  He has had some \"stomach upset\" with the antibiotics.  He denies any fevers shows nausea vomiting.  He just complains of not having an appetite and a bad taste in his mouth.  According to his wife is helping him with the wound care the wound is healing well that has been scant drainage no redness erythema or purulence noted.  He has been drinking boost/enteral supplements.  He has been referred to infectious disease and [facility].  We are still awaiting the consultation.  He is currently on Levaquin and doxycycline for pseudomonas and MRSA.  He reports he has 3-4 bowel movements daily however recently with being on the antibiotics sometimes he can have 5-6 bowel movements.  We have discussed diet modification and avoiding triggers such as spicy, fatty and fried foods.      He has myelodysplastic syndrome-his most recent hemoglobin was 8.2.  He did receive Procrit yesterday.    REVIEW OF SYSTEMS  Review of Systems  As per history of present illness    ACTIVE PROBLEMS  Patient Active Problem List    Diagnosis   • PAF (paroxysmal atrial fibrillation) [I48.0]   • PSVT (paroxysmal supraventricular tachycardia) [I47.1]   • Acute blood loss anemia [D62]   • Rectal bleed [K62.5]   • AI (aortic incompetence) [I35.1]   • Bundle branch block, right [I45.10]   • MDS (myelodysplastic syndrome), low grade [D46.20]   • Vitamin B 12 deficiency [E53.8]   • Benign prostatic hyperplasia [N40.0]   • Hypertension [I10]   • Knee pain [M25.569]         PAST MEDICAL HISTORY  Past Medical History:   Diagnosis Date   • " Aortic regurgitation    • Aortic valve insufficiency    • Arthritis     Bilateral knee   • Chest pain    • Diastolic dysfunction    • History of transfusion    • History of urinary retention    • Hypertension    • MDS (myelodysplastic syndrome)    • RBBB (right bundle branch block)    • Self-catheterizes urinary bladder     3-4 times aday   • Skin cancer          SURGICAL HISTORY  Past Surgical History:   Procedure Laterality Date   • APPENDECTOMY     • BACK SURGERY  2008    fusion   • CHOLECYSTECTOMY     • COLON RESECTION N/A 5/26/2017    Procedure: sub-total colectomy with ileo-rectal anastamosis, mobilization of splenic  INTRAOPERATIVE COLONOSCOPY  ([id number]), rigid sigmoidoscopy;  Surgeon: Kaylie Granados MD;  Location: [facility] OR;  Service:    • COLONOSCOPY N/A 2/1/2017    Procedure: COLONOSCOPY;  Surgeon: Bridger Amado MD;  Location: [facility] OR;  Service:    • COLONOSCOPY N/A 5/24/2017    Procedure: COLONOSCOPY w/ polypectomy;  Surgeon: Bridger Amado MD;  Location: [facility] OR;  Service:    • COLONOSCOPY N/A 5/24/2017    Procedure: COLONOSCOPY-return to surgery 2nd procedure, sclerotherapy with epi injection @ 40cm, placement of resolution clips X 2;  Surgeon: Bridger Amado MD;  Location: [facility] OR;  Service:    • ENDOSCOPY N/A 5/23/2017    Procedure: ESOPHAGOGASTRODUODENOSCOPY;  Surgeon: Bridger Amado MD;  Location: [facility] OR;  Service:    • EXPLORATORY LAPAROTOMY N/A 5/26/2017    Procedure: LAPAROTOMY EXPLORATORY - Explantation of old hernia mesh;  Surgeon: Kaylie Granados MD;  Location: [facility] OR;  Service:    • HERNIA REPAIR      x 2         FAMILY HISTORY  Family History   Problem Relation Age of Onset   • Diabetes Brother    • Liver cancer Brother    • Heart disease Mother    • Heart attack Mother    • Diabetes Mother    • Heart attack Father    • Other Sister      Brain tumor   • Cancer Sister    • Emphysema Brother    • Cancer Brother    • Alcohol  abuse Brother    • Cancer Brother          SOCIAL HISTORY  Social History     Occupational History   •  Retired     Social History Main Topics   • Smoking status: Never Smoker   • Smokeless tobacco: Never Used   • Alcohol use 1.2 - 1.8 oz/week     2 - 3 Cans of beer per week   • Drug use: No   • Sexual activity: Defer         CURRENT MEDICATIONS    Current Outpatient Prescriptions:   •  acetaminophen (TYLENOL) 325 MG tablet, Take 650 mg by mouth every 6 (six) hours as needed for mild pain (1-3)., Disp: , Rfl:   •  clotrimazole-betamethasone (LOTRISONE) 1-0.05 % cream, Apply 1 application topically Daily., Disp: , Rfl:   •  cyanocobalamin 1000 MCG/ML injection, 1,000 mcg Every 28 (Twenty-Eight) Days. Cyanocobalamin SOLN; Patient Sig: Cyanocobalamin SOLN ; 0; 06-Jun-2014; Active, Disp: , Rfl:   •  doxycycline (VIBRAMYCIN) 50 MG capsule, Take 2 capsules by mouth 2 (Two) Times a Day for 10 days., Disp: 40 capsule, Rfl: 0  •  folic acid (FOLVITE) 400 MCG tablet, Take 400 mcg by mouth Daily., Disp: , Rfl:   •  lactobacillus acidophilus (RISAQUAD) capsule capsule, Take 1 capsule by mouth Daily., Disp: 30 capsule, Rfl: 0  •  levoFLOXacin (LEVAQUIN) 500 MG tablet, Take 1 tablet by mouth Daily for 10 days., Disp: 10 tablet, Rfl: 0  •  metoprolol tartrate (LOPRESSOR) 25 MG tablet, Take 1 tablet by mouth Every 12 (Twelve) Hours., Disp: 60 tablet, Rfl: 1  •  MULTIPLE VITAMINS-MINERALS PO, Take 1 tablet by mouth Daily., Disp: , Rfl:   •  ondansetron (ZOFRAN) 4 MG tablet, Take 1 tablet by mouth Every 6 (Six) Hours As Needed for Nausea or Vomiting., Disp: 20 tablet, Rfl: 0  •  oxyCODONE-acetaminophen (PERCOCET) 5-325 MG per tablet, Take 1 tablet by mouth Every 6 (Six) Hours As Needed for Severe Pain ., Disp: 30 tablet, Rfl: 0  •  pantoprazole (PROTONIX) 40 MG EC tablet, Take 1 tablet by mouth Daily., Disp: 30 tablet, Rfl: 1  No current facility-administered medications for this visit.     ALLERGIES  Review of  "patient's allergies indicates no known allergies.    VITALS  Vitals:    07/19/17 0902   BP: 100/62   Weight: 141 lb (64 kg)   Height: 70\" (177.8 cm)       LAST RESULTS   Lab on 07/18/2017   Component Date Value Ref Range Status   • WBC 07/18/2017 3.88* 4.80 - 10.80 10*3/mm3 Final   • RBC 07/18/2017 2.54* 4.70 - 6.10 10*6/mm3 Final   • Hemoglobin 07/18/2017 8.2* 14.0 - 18.0 g/dL Final   • Hematocrit 07/18/2017 25.9* 42.0 - 52.0 % Final   • MCV 07/18/2017 102.0* 80.0 - 94.0 fL Final   • MCH 07/18/2017 32.3* 27.0 - 31.0 pg Final   • MCHC 07/18/2017 31.7  31.0 - 37.0 g/dL Final   • RDW 07/18/2017 22.5* 11.5 - 14.5 % Final   • RDW-SD 07/18/2017 83.4* 37.0 - 54.0 fl Final   • MPV 07/18/2017 12.1* 7.4 - 10.4 fL Final   • Platelets 07/18/2017 134* 140 - 500 10*3/mm3 Final   • Neutrophil % 07/18/2017 40.6* 45.0 - 70.0 % Final   • Lymphocyte % 07/18/2017 27.1  20.0 - 45.0 % Final   • Monocyte % 07/18/2017 30.7* 3.0 - 8.0 % Final   • Eosinophil % 07/18/2017 0.0  0.0 - 4.0 % Final   • Basophil % 07/18/2017 0.3  0.0 - 2.0 % Final   • Immature Grans % 07/18/2017 1.3* 0.0 - 0.5 % Final   • Neutrophils, Absolute 07/18/2017 1.58  1.50 - 8.30 10*3/mm3 Final   • Lymphocytes, Absolute 07/18/2017 1.05  0.60 - 4.80 10*3/mm3 Final   • Monocytes, Absolute 07/18/2017 1.19* 0.00 - 1.00 10*3/mm3 Final   • Eosinophils, Absolute 07/18/2017 0.00* 0.10 - 0.30 10*3/mm3 Final   • Basophils, Absolute 07/18/2017 0.01  0.00 - 0.20 10*3/mm3 Final   • Immature Grans, Absolute 07/18/2017 0.05* 0.00 - 0.03 10*3/mm3 Final   • nRBC 07/18/2017 0.0  0.0 - 0.0 /100 WBC Final     No results found.    PHYSICAL EXAM  Physical Exam   Constitutional: He appears well-developed and well-nourished.   Cardiovascular: Normal rate, regular rhythm and normal heart sounds.    Pulmonary/Chest: Breath sounds normal.   Abdominal:   Soft nondistended nontender positive bowel sounds in all 4 quadrants.  The abdominal wound is healing well, there is granulation tissue, wound " edges are clean, scant fibrinous exudate, wound was irrigated with peroxide and saline wet to dry dressing done.   Nursing note and vitals reviewed.      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  Healing well via secondary intention. Will switch to saline wet-to-dry dressings daily. We will contact home health and inform them.  Infectious disease consult pending    Continue home health and home PT.     Protein calorie malnutrition-discussed supplementing with boost or Ensure.   Patient canceled outpatient referral to nutrition reports he would like to hold off for now    PLAN  Follow-up 2 weeks.  Patient is advised to call the office sooner should he have worsening symptoms or go to the nearest emergency room

## 2017-07-20 NOTE — TELEPHONE ENCOUNTER
Sonia from [facility] called in regards to Mr. Zelaya. She had a few concerns. One being she thinks there may be a stitch protruding from the bottom portion of his incision and she wasn't sure what to do about that. Second, she was wondering if we could start using Santyl on the open wound. Third, in the middle region of the incision there is some raw tissue that is draining serous drainage; she wasn't sure if we should do a wet to dry on that area or what? Sonia [phone]

## 2017-07-20 NOTE — TELEPHONE ENCOUNTER
Attempted to reach pt to schedule a new pt appt with Dr Sade Ma. Phone was ringing busy. Will try back tomorrow, 7/21. This is call 1 of Dilcia Zambrano

## 2017-07-24 NOTE — TELEPHONE ENCOUNTER
Kacy with [facility].  She is out of nursing visits, needs new order to continue weekly visit for 4 weeks.  Please call her back once the order has been written at [phone].

## 2017-07-31 NOTE — PROGRESS NOTES
PATIENT INFORMATION  Jason Zelaya   - 1934    CHIEF COMPLAINT  Chief Complaint   Patient presents with   • Post-op Follow-up   9 wks 3 day s/p colon resection, wound is healing well      HISTORY OF PRESENT ILLNESS  HPI  Patient presents to the office today for follow-up.  Since his last office visit with me he has completed the antibiotics as of this past weekend.  He reports he is doing fair.  His appetite has been fair.  He has had episodes of diarrhea.  He has tried to modify his diet. We had arranged a referral to the outpatient nutritionist which the patient declined.  As far as the wound is concerned it is healing well.  We are applying Santyl at the base and packing it with normal saline wet-to-dry.  According to the patient's wife who has been changing the wound dressing daily the wound is healing well.  She denies erythema, drainage.  She reports he had one episode a few days ago when he spiked a temperature of 101.8, she gave him a Tylenol and the fever subsided.  She denies any cough, shortness of breath, nausea, vomiting, fevers, chills, urinary symptoms or foul order to the diarrhea.  He reports he will have a 4-5 bowel movements daily.  He usually does experience some cramping abdominal pain preceding the diarrhea.  He is scheduled to see Dr. Sade Ma on  17 and the [facility] for further blood work tomorrow.  He has myelodysplastic syndrome.  His hemoglobin seems to be at its baseline at this point.  He does get Procrit.    REVIEW OF SYSTEMS  Review of Systems  As per history of present illness    ACTIVE PROBLEMS  Patient Active Problem List    Diagnosis   • PAF (paroxysmal atrial fibrillation) [I48.0]   • PSVT (paroxysmal supraventricular tachycardia) [I47.1]   • Acute blood loss anemia [D62]   • Rectal bleed [K62.5]   • AI (aortic incompetence) [I35.1]   • Bundle branch block, right [I45.10]   • MDS (myelodysplastic syndrome), low grade [D46.20]   • Vitamin B 12 deficiency  [E53.8]   • Benign prostatic hyperplasia [N40.0]   • Hypertension [I10]   • Knee pain [M25.569]         PAST MEDICAL HISTORY  Past Medical History:   Diagnosis Date   • Aortic regurgitation    • Aortic valve insufficiency    • Arthritis     Bilateral knee   • Chest pain    • Diastolic dysfunction    • History of transfusion    • History of urinary retention    • Hypertension    • MDS (myelodysplastic syndrome)    • RBBB (right bundle branch block)    • Self-catheterizes urinary bladder     3-4 times aday   • Skin cancer          SURGICAL HISTORY  Past Surgical History:   Procedure Laterality Date   • APPENDECTOMY     • BACK SURGERY  2008    fusion   • CHOLECYSTECTOMY     • COLON RESECTION N/A 5/26/2017    Procedure: sub-total colectomy with ileo-rectal anastamosis, mobilization of splenic  INTRAOPERATIVE COLONOSCOPY  ([id number]), rigid sigmoidoscopy;  Surgeon: Kaylie Granados MD;  Location: [facility] OR;  Service:    • COLONOSCOPY N/A 2/1/2017    Procedure: COLONOSCOPY;  Surgeon: Bridger Amado MD;  Location: [facility] OR;  Service:    • COLONOSCOPY N/A 5/24/2017    Procedure: COLONOSCOPY w/ polypectomy;  Surgeon: Bridger Amado MD;  Location: [facility] OR;  Service:    • COLONOSCOPY N/A 5/24/2017    Procedure: COLONOSCOPY-return to surgery 2nd procedure, sclerotherapy with epi injection @ 40cm, placement of resolution clips X 2;  Surgeon: Bridger Amado MD;  Location: [facility] OR;  Service:    • ENDOSCOPY N/A 5/23/2017    Procedure: ESOPHAGOGASTRODUODENOSCOPY;  Surgeon: Bridger Amado MD;  Location: [facility] OR;  Service:    • EXPLORATORY LAPAROTOMY N/A 5/26/2017    Procedure: LAPAROTOMY EXPLORATORY - Explantation of old hernia mesh;  Surgeon: Kaylie Granados MD;  Location: [facility] OR;  Service:    • HERNIA REPAIR      x 2         FAMILY HISTORY  Family History   Problem Relation Age of Onset   • Diabetes Brother    • Liver cancer Brother    • Heart disease Mother    • Heart  attack Mother    • Diabetes Mother    • Heart attack Father    • Other Sister      Brain tumor   • Cancer Sister    • Emphysema Brother    • Cancer Brother    • Alcohol abuse Brother    • Cancer Brother          SOCIAL HISTORY  Social History     Occupational History   •  Retired     Social History Main Topics   • Smoking status: Never Smoker   • Smokeless tobacco: Never Used   • Alcohol use 1.2 - 1.8 oz/week     2 - 3 Cans of beer per week   • Drug use: No   • Sexual activity: Defer         CURRENT MEDICATIONS    Current Outpatient Prescriptions:   •  acetaminophen (TYLENOL) 325 MG tablet, Take 650 mg by mouth every 6 (six) hours as needed for mild pain (1-3)., Disp: , Rfl:   •  clotrimazole-betamethasone (LOTRISONE) 1-0.05 % cream, Apply 1 application topically Daily., Disp: , Rfl:   •  cyanocobalamin 1000 MCG/ML injection, 1,000 mcg Every 28 (Twenty-Eight) Days. Cyanocobalamin SOLN; Patient Sig: Cyanocobalamin SOLN ; 0; 06-Jun-2014; Active, Disp: , Rfl:   •  folic acid (FOLVITE) 400 MCG tablet, Take 400 mcg by mouth Daily., Disp: , Rfl:   •  lactobacillus acidophilus (RISAQUAD) capsule capsule, Take 1 capsule by mouth Daily., Disp: 30 capsule, Rfl: 0  •  loperamide (IMODIUM) 2 MG capsule, Take 1 capsule by mouth 3 (Three) Times a Day As Needed for Diarrhea for up to 14 days., Disp: 42 capsule, Rfl: 0  •  metoprolol tartrate (LOPRESSOR) 25 MG tablet, Take 1 tablet by mouth Every 12 (Twelve) Hours., Disp: 60 tablet, Rfl: 1  •  MULTIPLE VITAMINS-MINERALS PO, Take 1 tablet by mouth Daily., Disp: , Rfl:   •  ondansetron (ZOFRAN) 4 MG tablet, Take 1 tablet by mouth Every 6 (Six) Hours As Needed for Nausea or Vomiting., Disp: 20 tablet, Rfl: 0  •  oxyCODONE-acetaminophen (PERCOCET) 5-325 MG per tablet, Take 1 tablet by mouth Every 8 (Eight) Hours As Needed for Severe Pain  (7-10)., Disp: 20 tablet, Rfl: 0  •  pantoprazole (PROTONIX) 40 MG EC tablet, Take 1 tablet by mouth Daily., Disp: 30 tablet, Rfl:  "1  •  SANTYL 250 UNIT/GM ointment, , Disp: , Rfl: 0    ALLERGIES  Review of patient's allergies indicates no known allergies.    VITALS  Vitals:    07/31/17 0944   BP: 118/72   Weight: 137 lb (62.1 kg)   Height: 70\" (177.8 cm)       LAST RESULTS   Lab on 07/18/2017   Component Date Value Ref Range Status   • WBC 07/18/2017 3.88* 4.80 - 10.80 10*3/mm3 Final   • RBC 07/18/2017 2.54* 4.70 - 6.10 10*6/mm3 Final   • Hemoglobin 07/18/2017 8.2* 14.0 - 18.0 g/dL Final   • Hematocrit 07/18/2017 25.9* 42.0 - 52.0 % Final   • MCV 07/18/2017 102.0* 80.0 - 94.0 fL Final   • MCH 07/18/2017 32.3* 27.0 - 31.0 pg Final   • MCHC 07/18/2017 31.7  31.0 - 37.0 g/dL Final   • RDW 07/18/2017 22.5* 11.5 - 14.5 % Final   • RDW-SD 07/18/2017 83.4* 37.0 - 54.0 fl Final   • MPV 07/18/2017 12.1* 7.4 - 10.4 fL Final   • Platelets 07/18/2017 134* 140 - 500 10*3/mm3 Final   • Neutrophil % 07/18/2017 40.6* 45.0 - 70.0 % Final   • Lymphocyte % 07/18/2017 27.1  20.0 - 45.0 % Final   • Monocyte % 07/18/2017 30.7* 3.0 - 8.0 % Final   • Eosinophil % 07/18/2017 0.0  0.0 - 4.0 % Final   • Basophil % 07/18/2017 0.3  0.0 - 2.0 % Final   • Immature Grans % 07/18/2017 1.3* 0.0 - 0.5 % Final   • Neutrophils, Absolute 07/18/2017 1.58  1.50 - 8.30 10*3/mm3 Final   • Lymphocytes, Absolute 07/18/2017 1.05  0.60 - 4.80 10*3/mm3 Final   • Monocytes, Absolute 07/18/2017 1.19* 0.00 - 1.00 10*3/mm3 Final   • Eosinophils, Absolute 07/18/2017 0.00* 0.10 - 0.30 10*3/mm3 Final   • Basophils, Absolute 07/18/2017 0.01  0.00 - 0.20 10*3/mm3 Final   • Immature Grans, Absolute 07/18/2017 0.05* 0.00 - 0.03 10*3/mm3 Final   • nRBC 07/18/2017 0.0  0.0 - 0.0 /100 WBC Final     No results found.    PHYSICAL EXAM  Physical Exam   Constitutional: He is oriented to person, place, and time. He appears well-developed and well-nourished.   Cardiovascular: Normal rate, regular rhythm and normal heart sounds.    Pulmonary/Chest: Breath sounds normal.   Abdominal:   Soft, nondistended, " nontender positive bowel sounds in all 4 quadrants.  The upper abdominal wound-edges clean, granulation tissue, scant fibrin exudate, no erythema, no drainage  Wound was irrigated with peroxide.  Santyl was applied at the base followed by saline wet-to-dry dressing.    Inferiorly this appears to be a small suture granuloma.  I was able to excise this at the bedside.  The wound was irrigated with peroxide and antibiotic ointment applied.   Neurological: He is alert and oriented to person, place, and time.   Nursing note and vitals reviewed.      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  Wound healing well via secondary intention.  Continue Santyl at the wound base and saline wet-to-dry dressings daily.   Currently I see no signs of gross infection.  Will hold off on any further antibiotics at this time.  Infectious disease consult pending.  Will await their input.     Continue home health and home PT.      Protein calorie malnutrition-discussed supplementing with boost or Ensure.   Patient canceled outpatient referral to nutrition -- reports he would like to hold off for now    Diarrhea s/p subtotal colectomy --   discussed with patient and his wife in detail -- diet modification.  I have also E scribed Imodium to the pharmacy.  I have explained to them that they can take the Imodium 3- 4 times a day if needed and titrate as per bowel movement.  I did caution them that they should not take more than 4 doses in a 24-hour period.  Once again extensive dietary counseling was done and patient was advised to avoid triggers such as fatty foods.    Prescription for Protonix was also E scribed to their pharmacy.    PLAN  Follow-up one week with one of my associates Dr. Zavala as I will be out of town.  Patient was advised to call the office sooner should he worsening symptoms or go to the  nearest emergency room.

## 2017-08-01 NOTE — PROGRESS NOTES
Subjective    REASONS FOR FOLLOW-UP:   Low-grade myelodysplastic syndrome with chronic pancytopenia.       History of Present Illness    Mr. Zelaya is a pleasant 83-year-old man returning for follow-up of his myelodysplastic syndrome with chronic pancytopenia.  He currently is receiving monthly B12 injections and Procrit as needed when the hemoglobin drops below 10.0.   The patient was recently hospitalized and discharged 6/6/17 after experiencing recurrent diverticular bleeding eventually requiring subtotal colectomy with ileorectal anastomosis by Dr. Granados.  He required multiple packed red blood cell transfusions during the hospital stay.  He was discharged with a hemoglobin of 8.2 on 6/6/17.      He returns today for follow-up.  He has been having issues with an abdominal wound currently apparently with MRSA.  He is scheduled to see infectious disease tomorrow.  He continues to feel weak.  He had a fever last week which resolved with Tylenol.  He denies bleeding.    PAST MEDICAL HISTORY:    1. Arthritis.  2. Urinary retention.  3. Aortic insufficiency  4. Myelodysplastic syndrome    PAST SURGICAL HISTORY:    1. Back fusion in 2008.    2. Hernia surgery.    3. Cholecystectomy.  4. Knee arthroscopic surgery.      SOCIAL HISTORY:  .  Retired, worked in sheet metals.  Never smoked.  Alcohol consumption of approximately a 6 pack per week.  No risk factors for HIV.    FAMILY HISTORY: Negative for hematologic disorders. Sister had brain tumor. A brother had liver cancer.    Review of Systems   Constitutional: Positive for fatigue and unexpected weight change. Negative for activity change and fever.   Respiratory: Negative for cough and shortness of breath.    Cardiovascular: Negative for palpitations and leg swelling.   Gastrointestinal: Positive for abdominal pain. Negative for abdominal distention, diarrhea, nausea, rectal pain and vomiting.        Poor appetite   Endocrine: Positive for cold intolerance.  Negative for heat intolerance.   Musculoskeletal: Positive for arthralgias and gait problem.        Right shoulder pain   Neurological: Positive for weakness.   Hematological: Negative for adenopathy. Does not bruise/bleed easily.      A comprehensive 14 point review of systems was performed and was negative except as mentioned.    Medications:  The current medication list was reviewed in the EMR    ALLERGIES:  No Known Allergies    Objective    Temp 100.8  /61  Sat 95% RA        Physical Exam   Constitutional: No distress.   Ill and weak appearing   HENT:   Head: Atraumatic.   Eyes: No scleral icterus.   Cardiovascular: Normal rate.    Murmur heard.  Pulmonary/Chest: Effort normal and breath sounds normal. No respiratory distress.   Abdominal: Soft. He exhibits no distension and no mass. There is no tenderness. There is no rebound and no guarding.   Abdominal wound dressed    Musculoskeletal:   Decreased ROM right shoulder   Skin: Skin is warm. No erythema. There is pallor.   Psychiatric: He has a normal mood and affect.          RECENT LABS:  Hematology WBC   Date Value Ref Range Status   08/01/2017 4.22 (L) 4.80 - 10.80 10*3/mm3 Final     RBC   Date Value Ref Range Status   08/01/2017 2.30 (L) 4.70 - 6.10 10*6/mm3 Final     Hemoglobin   Date Value Ref Range Status   08/01/2017 7.6 (L) 14.0 - 18.0 g/dL Final     Hematocrit   Date Value Ref Range Status   08/01/2017 24.7 (L) 42.0 - 52.0 % Final     Platelets   Date Value Ref Range Status   08/01/2017 149 140 - 500 10*3/mm3 Final       Lab Results   Component Value Date    GLUCOSE 101 (H) 06/06/2017    BUN 5 (L) 06/06/2017    CREATININE 0.68 (L) 06/06/2017    EGFRIFNONA 111 06/06/2017    EGFRIFAFRI  09/01/2016      Comment:      <15 Indicative of kidney failure.    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  He still having issues with wound infection.  He is not neutropenic or thrombocytopenic, but the hemoglobin has declined to 7.6.  I recommended that we proceed with transfusion of 2 units of packed red blood cells this week.  We will continue to monitor his CBC every 2 weeks with Procrit as needed and monthly B12 injections.            8/1/2017      CC:

## 2017-08-02 NOTE — PROGRESS NOTES
Referring Provider: Kaylie Granados MD  [address] [facility] [address]  [place] [address]  Reason for clinic visits: Initial infectious disease clinic visit for surgical site infection    HPI: Jason Zelaya is a 83 y.o. male with myelodysplastic syndrome status post subtotal colectomy with ileorectal anastomosis and explantation of old hernia mesh on 5/26/2017 who presents to infectious disease clinic for evaluation of his surgical site infection.  The patient was discharged on June 6.  At that time per the medical record the patient had his staples removed and had Steri-Strips in place.  Sometime between June 6 and June 14 he developed serosanguineous drainage from his incision and wound dehiscence.  A wound culture was obtained on June 14 grew pseudomonas and Enterococcus faecalis.  The patient was started on ciprofloxacin and ampicillin.  Per the patient and his wife he continued to have drainage although they're not 100% sure of this.  Another culture was obtained on July 12th and grew pseudomonas and MRSA.  The patient was treated with Levaquin and doxycycline which he completed this past weekend.  He has been seen by Dr. Granados and per her report the wound is healing well with no signs of infection.  The patient and his wife also agreed that the wound continues to look better with less and less drainage.  He states that since his discharge she had a bout 2-3 episodes of fever with the last one being 3-4 nights ago with obtaining max of 101.8.  He took Tylenol and had no more recurrence of his fevers.  He continues to report abdominal pain but this is overall decreasing.  He denies any nausea or vomiting.  He does report diarrhea ever since leaving the hospital which has slowed down since stopping the antibiotics.  He denies any rashes or skin lesions.  He reports some dyspnea on exertion.  No cough rhinorrhea or sore throat.    Past Medical History:   Diagnosis Date   • Aortic valve insufficiency    •  Arthritis    • Diastolic dysfunction    • Urinary retention - in/out self cath    • Hypertension    • MDS (myelodysplastic syndrome)    • RBBB (right bundle branch block)    - Diverticular disease status post subtotal colectomy with ileorectal anastomosis and explantation of old hernia mesh 5/26/2017    Past Surgical History:   Procedure Laterality Date   • APPENDECTOMY     • BACK SURGERY  2008    fusion   • CHOLECYSTECTOMY     • COLON RESECTION N/A 5/26/2017    Procedure: sub-total colectomy with ileo-rectal anastamosis, mobilization of splenic  INTRAOPERATIVE COLONOSCOPY  ([id number]), rigid sigmoidoscopy;  Surgeon: Kaylie Granados MD;  Location: [facility] OR;  Service:    • EXPLORATORY LAPAROTOMY N/A 5/26/2017    Procedure: LAPAROTOMY EXPLORATORY - Explantation of old hernia mesh;  Surgeon: Kaylie Granados MD;  Location: [facility] OR;  Service:    • HERNIA REPAIR      x 2       Social History   reports that he has never smoked. He has never used smokeless tobacco. He reports that he drinks about 1.2 - 1.8 oz of alcohol per week  He reports that he does not use illicit drugs.    Family History  family history includes Alcohol abuse in his brother; Cancer in his brother, brother, and sister; Diabetes in his brother and mother; Emphysema in his brother; Heart attack in his father and mother; Heart disease in his mother; Liver cancer in his brother; Other in his sister.    No Known Allergies    The medication list has been reviewed and updated.     Review of Systems  Pertinent items are noted in HPI, all other systems reviewed and negative    Vital Signs   Temp:  [97.5 °F (36.4 °C)] 97.5 °F (36.4 °C)  Heart Rate:  [64] 64  Resp:  [12] 12  BP: (128)/(59) 128/59    Physical Exam:   General: In no acute distress  HEENT: Normocephalic, atraumatic, PERRL, EOMI, no scleral icterus. Oropharynx is clear and moist  Neck: Supple, trachea is midline  Cardiovascular: Normal rate, regular rhythm, mariah S1 and S2, positive systolic  murmur   Respiratory: Crackles at the bases bilaterally otherwise clear to auscultation   GI: Abdomen is soft, non-tender, non-distended, positive bowel sounds bilaterally, surgical incision with the superior margin oh been about a half a centimeter no tunneling, no purulence, no erythema another area where per the patient suture was removed with very mild erythema no purulence  Musculoskeletal: , no edema, tenderness or deformity  Skin: No rashes or lesions  LE: no E/C/C  Neurological: Alert and oriented, moving all 4 extremities  Psychiatric: Normal mood and affect     Lab Results   Component Value Date    WBC 4.22 (L) 08/01/2017    HGB 7.6 (L) 08/01/2017    HCT 24.7 (L) 08/01/2017    .4 (H) 08/01/2017     08/01/2017       Lab Results   Component Value Date    GLUCOSE 101 (H) 06/06/2017    BUN 5 (L) 06/06/2017    CREATININE 0.68 (L) 06/06/2017    EGFRIFNONA 111 06/06/2017    EGFRIFAFRI  09/01/2016      Comment:      <15 Indicative of kidney failure.    BCR 7.4 06/06/2017    CO2 22.6 06/06/2017    CALCIUM 7.4 (L) 06/06/2017    ALBUMIN 2.30 (L) 06/06/2017    LABIL2 0.7 06/06/2017    AST 9 06/06/2017    ALT 6 06/06/2017     Microbiology  6/14 Wound cx Pseudomonas, enterococcus faecalis  7/12 Wound cx pseudomonas MRSA    Radiology  6/5 CT A/P Surgical changes of recent colectomy with ileoanal anastomosis. Small amount of loculated air and fluid is seen surrounding the anastomoticsite, but no oral contrast extravasation is seen. Free air and fluid is also seen underneath the diaphragm, right greater left. Given the lack of associated fever and elevated white blood cell count, this may simply be within the realm of normal postop retained air and fluid.  Interstitial fibrotic changes in the lung bases    Assessment:  This is a 83 y.o. male with myelodysplastic syndrome status post subtotal colectomy with ileorectal anastomosis and explantation of old hernia mesh on 5/26/2017 who presents to infectious  disease clinic for evaluation of his surgical site infection.  At this time the patient does not have any signs or symptoms consistent with an infection.  His fever 3-4 days ago is concerning for me and therefore I would like to obtain blood cultures ×2.  I asked the patient to keep that temperature diary for the next week and call me if his temperature exceeds 101.  As long as the patient does not have any more fevers and his wound continues to heal there is no need for further antibiotic therapy.  If on the other hand his wound starts looking worse again or he continues to have fevers further workup will be needed.  At that time I would like to obtain a CAT scan of the abdomen and pelvis to rule out deeper infection.  The patient and his wife understand and agreed to the plan.    1.  Pseudomonas and MRSA surgical site infection - resolved  2.  Fevers - resolved?  3.  Myelodysplastic syndrome complicating above    Plan:   1.  No need for additional antibiotics at this time  2.  Continue wound care as per   3.  Obtain blood cultures ×2  4.  Maintain a fever diary for the next week and call the infectious disease clinic with the results or sooner if the temperature exceeds 101  Return to Infectious Disease clinic in one month    Time: More than 50% of time spent in counseling and coordination of care:  Total face-to-face/floor time 60 min.  Time spent in counseling 60 min. Counseling included the following topics: Signs and symptoms of a surgical site infection, possible etiologies of fever, possible side effects of antibiotics, need for repeat CAT scan of the abdomen and pelvis if symptoms worsen

## 2017-08-03 NOTE — PATIENT INSTRUCTIONS
"  Call Dr. Anupam Green, [facility] at [phone] if you have any problems or concerns.    We know you have  Choice in healthcare and appreciate you using [facility].  Our purpose is to provide you \"Excellent Care\".  We hope that you will always choose us in the future and continue to recommend us to your family and friends.            Blood Transfusion, Care After  Refer to this sheet in the next few weeks. These instructions provide you with information about caring for yourself after your procedure. Your health care provider may also give you more specific instructions. Your treatment has been planned according to current medical practices, but problems sometimes occur. Call your health care provider if you have any problems or questions after your procedure.  WHAT TO EXPECT AFTER THE PROCEDURE  After your procedure, it is common to have:  · Bruising and soreness at the IV site.  · Chills or fever.  · Headache.  HOME CARE INSTRUCTIONS  · Take medicines only as directed by your health care provider. Ask your health care provider if you can take an over-the-counter pain reliever in case you have a fever or headache a day or two after your transfusion.  · Return to your normal activities as directed by your health care provider.  SEEK MEDICAL CARE IF:   · You develop redness or irritation at your IV site.  · You have persistent fever, chills, or headache.  · Your urine is darker than normal.  · Your urine turns pink, red, or brown.    · The white part of your eye turns yellow (jaundice).    · You feel weak after doing your normal activities.    SEEK IMMEDIATE MEDICAL CARE IF:   · You have trouble breathing.  · You have fever and chills along with:    Anxiety.    Chest or back pain.    Flushed skin.    Clammy skin.    A rapid heartbeat.    Nausea.     This information is not intended to replace advice given to you by your health care provider. Make sure you discuss any questions you have with your " health care provider.     Document Released: 01/08/2016 Document Reviewed: 01/08/2016  Elsevier Interactive Patient Education ©2017 [employer]

## 2017-08-03 NOTE — PROGRESS NOTES
1500 2ND UNIT PRBC'S INFUSED. NO S/S OF REACTION. VSS. AVS PRINTED & REVIEWED WITH PT. & SPOUSE, VERBALIZES UNDERSTANDING. 1525 DISCHARGED VIA W/C WITHOUT C/O. TScarlet WARREN RN

## 2017-08-03 NOTE — PROGRESS NOTES
NURSING PROGRESS NOTE: Patient arrived to [facility] per W/C at 0840 for scheduled transfusion.  Assisted patient to a bed.  Consent signed.  History and medications reviewed.  1`st unit PRBC started at 0920.  Patient resting quietly with his wife at bedside.  2nd unit PRBC started 1226, patient without c/o.  DHAILE Aguirre

## 2017-08-09 NOTE — PROGRESS NOTES
PATIENT INFORMATION  Jason Zelaya   10 wks 5 days s/p colon resection, wound is healing well     - 1934    CHIEF COMPLAINT  Chief Complaint   Patient presents with   • Post-op Follow-up       HISTORY OF PRESENT ILLNESS  HPI     Patient presents for follow-up status post excision of suture granuloma.  His incision is doing well.  Patient states that his closing without difficulty.  He has no other complaints.        REVIEW OF SYSTEMS  Review of Systems      ACTIVE PROBLEMS  Patient Active Problem List    Diagnosis   • PAF (paroxysmal atrial fibrillation) [I48.0]   • PSVT (paroxysmal supraventricular tachycardia) [I47.1]   • Acute blood loss anemia [D62]   • Rectal bleed [K62.5]   • AI (aortic incompetence) [I35.1]   • Bundle branch block, right [I45.10]   • MDS (myelodysplastic syndrome), low grade [D46.20]   • Vitamin B 12 deficiency [E53.8]   • Benign prostatic hyperplasia [N40.0]   • Hypertension [I10]   • Knee pain [M25.569]         PAST MEDICAL HISTORY  Past Medical History:   Diagnosis Date   • Aortic regurgitation    • Aortic valve insufficiency    • Arthritis     Bilateral knee   • Chest pain    • Diastolic dysfunction    • History of transfusion    • History of urinary retention    • Hypertension    • MDS (myelodysplastic syndrome)    • RBBB (right bundle branch block)    • Self-catheterizes urinary bladder     3-4 times aday   • Skin cancer          SURGICAL HISTORY  Past Surgical History:   Procedure Laterality Date   • APPENDECTOMY     • BACK SURGERY      fusion   • CHOLECYSTECTOMY     • COLON RESECTION N/A 2017    Procedure: sub-total colectomy with ileo-rectal anastamosis, mobilization of splenic  INTRAOPERATIVE COLONOSCOPY  ([id number]), rigid sigmoidoscopy;  Surgeon: Kaylie Granados MD;  Location: [facility] OR;  Service:    • COLONOSCOPY N/A 2017    Procedure: COLONOSCOPY;  Surgeon: Bridger Amado MD;  Location: [facility] OR;  Service:    • COLONOSCOPY N/A  5/24/2017    Procedure: COLONOSCOPY w/ polypectomy;  Surgeon: Bridger Amado MD;  Location:  [facility] OR;  Service:    • COLONOSCOPY N/A 5/24/2017    Procedure: COLONOSCOPY-return to surgery 2nd procedure, sclerotherapy with epi injection @ 40cm, placement of resolution clips X 2;  Surgeon: Bridger Amado MD;  Location:  [facility] OR;  Service:    • ENDOSCOPY N/A 5/23/2017    Procedure: ESOPHAGOGASTRODUODENOSCOPY;  Surgeon: Bridger Amado MD;  Location:  [facility] OR;  Service:    • EXPLORATORY LAPAROTOMY N/A 5/26/2017    Procedure: LAPAROTOMY EXPLORATORY - Explantation of old hernia mesh;  Surgeon: Kaylie Granados MD;  Location:  [facility] OR;  Service:    • HERNIA REPAIR      x 2         FAMILY HISTORY  Family History   Problem Relation Age of Onset   • Diabetes Brother    • Liver cancer Brother    • Heart disease Mother    • Heart attack Mother    • Diabetes Mother    • Heart attack Father    • Other Sister      Brain tumor   • Cancer Sister    • Emphysema Brother    • Cancer Brother    • Alcohol abuse Brother    • Cancer Brother          SOCIAL HISTORY  Social History     Occupational History   •  Retired     Social History Main Topics   • Smoking status: Never Smoker   • Smokeless tobacco: Never Used   • Alcohol use 1.2 - 1.8 oz/week     2 - 3 Cans of beer per week   • Drug use: No   • Sexual activity: Defer         CURRENT MEDICATIONS    Current Outpatient Prescriptions:   •  acetaminophen (TYLENOL) 325 MG tablet, Take 650 mg by mouth every 6 (six) hours as needed for mild pain (1-3)., Disp: , Rfl:   •  clotrimazole-betamethasone (LOTRISONE) 1-0.05 % cream, Apply 1 application topically Daily., Disp: , Rfl:   •  cyanocobalamin 1000 MCG/ML injection, 1,000 mcg Every 28 (Twenty-Eight) Days. Cyanocobalamin SOLN; Patient Sig: Cyanocobalamin SOLN ; 0; 06-Jun-2014; Active, Disp: , Rfl:   •  folic acid (FOLVITE) 400 MCG tablet, Take 400 mcg by mouth Daily., Disp: , Rfl:   •   lactobacillus acidophilus (RISAQUAD) capsule capsule, Take 1 capsule by mouth Daily., Disp: 30 capsule, Rfl: 0  •  loperamide (IMODIUM) 2 MG capsule, Take 1 capsule by mouth 3 (Three) Times a Day As Needed for Diarrhea for up to 14 days., Disp: 42 capsule, Rfl: 0  •  metoprolol tartrate (LOPRESSOR) 25 MG tablet, Take 1 tablet by mouth Every 12 (Twelve) Hours., Disp: 60 tablet, Rfl: 1  •  MULTIPLE VITAMINS-MINERALS PO, Take 1 tablet by mouth Daily., Disp: , Rfl:   •  ondansetron (ZOFRAN) 4 MG tablet, Take 1 tablet by mouth Every 6 (Six) Hours As Needed for Nausea or Vomiting., Disp: 20 tablet, Rfl: 0  •  oxyCODONE-acetaminophen (PERCOCET) 5-325 MG per tablet, Take 1 tablet by mouth Every 8 (Eight) Hours As Needed for Severe Pain  (7-10)., Disp: 20 tablet, Rfl: 0  •  pantoprazole (PROTONIX) 40 MG EC tablet, Take 1 tablet by mouth Daily., Disp: 30 tablet, Rfl: 1  •  SANTYL 250 UNIT/GM ointment, , Disp: , Rfl: 0    ALLERGIES  Review of patient's allergies indicates no known allergies.    VITALS  There were no vitals filed for this visit.    LAST RESULTS   Hospital Outpatient Visit on 08/03/2017   Component Date Value Ref Range Status   • Product Code 08/04/2017 E0654T19   Final   • Unit Number 08/04/2017 [id number]   Final   • UNIT  ABO 08/04/2017 O   Final   • UNIT  RH 08/04/2017 POS   Final   • CROSSMATCH 1 INTERPRETATION 08/04/2017 Compatible   Final   • Dispense Status 08/04/2017 PT   Final   • Blood Type 08/04/2017 OPOS   Final   • Blood Expiration Date 08/04/2017 [id number]   Final   • Blood Type Barcode 08/04/2017 5100   Final   • Product Code 08/04/2017 E3397L13   Final   • Unit Number 08/04/2017 [id number]   Final   • UNIT  ABO 08/04/2017 O   Final   • UNIT  RH 08/04/2017 POS   Final   • CROSSMATCH 1 INTERPRETATION 08/04/2017 Compatible   Final   • Dispense Status 08/04/2017 PT   Final   • Blood Type 08/04/2017 OPOS   Final   • Blood Expiration Date 08/04/2017 [id number]   Final   • Blood Type  Barcode 08/04/2017 5100   Final   • ABO Type 08/01/2017 O   Final   • RH type 08/01/2017 Positive   Final   • Antibody Screen 08/01/2017 Negative   Final     No results found.    PHYSICAL EXAM  Physical Exam     Incision healing well.  Good granulation base at site of prior granuloma.  No cellulitis.  Santyl and  dressings applied.      ASSESSMENT    Patient is continue current wound care.      PLAN    Follow-up with Dr. Seay one week.

## 2017-08-09 NOTE — TELEPHONE ENCOUNTER
His blood cx are negative. I will see him as scheduled but have him call us if he develops fevers >101 thanks

## 2017-08-09 NOTE — TELEPHONE ENCOUNTER
Patient notified Blood cultures negative. Follow up as scheduled and call for temp > 101 per Dr. Ma. Thanks, Kimberly Ignacio RN

## 2017-08-09 NOTE — TELEPHONE ENCOUNTER
Patient calling to let you know how his temperature's have been running for this past week. It has ranged anywhere from 96.9-98.6 all week. He also would like to know the results of his blood cultures that were done. Thanks, Kimberly

## 2017-08-11 PROBLEM — I48.0 PAF (PAROXYSMAL ATRIAL FIBRILLATION) (HCC): Status: RESOLVED | Noted: 2017-01-01 | Resolved: 2017-01-01

## 2017-08-11 PROBLEM — I47.1 PSVT (PAROXYSMAL SUPRAVENTRICULAR TACHYCARDIA) (HCC): Status: RESOLVED | Noted: 2017-01-01 | Resolved: 2017-01-01

## 2017-08-11 NOTE — PROGRESS NOTES
Date of Office Visit: 2017  Encounter Provider: Ronda Gustafson MD  Place of Service: [facility] CARDIOLOGY  Patient Name: Jason Zelaay  :1934      Patient ID:  Jason Zelaya is a 83 y.o. male is here for  followup for AI and RBBB.         History of Present Illness  He has a known history of myelodysplastic syndrome, and the patient follows with Dr. Anupam Green. This was found as anemia and low platelets by Dr. Ana Maria Coates, his PCP.  He was told in the past that a virus may have attacked his heart, but this was when he was  60 years old and is about 20 years ago. At that time, he had a heart catheterization  which was normal.      He is treated for hypertension.     When I initially saw the patient, on 2013 he was having chest pain, fatigue and  dyspnea. I set him up for a stress nuclear perfusion study which was performed on  2013 at our main office. This showed no evidence of ischemia or infarct. His  ejection fraction was 43% but his echocardiogram which was performed on 2013 showed  an ejection fraction of 61%, mild concentric left ventricular hypertrophy, grade I  diastolic dysfunction, mild left atrial enlargement, aortic root sclerosis, mild mitral  insufficiency, mild-to-moderate aortic insufficiency with an eccentric jet directed  anteriorly and mild aortic root dilation. I felt like his fatigue might be due to his  myelodysplastic syndrome because he was quite anemic but I did not make any medication  changes.          At his visit in , he was having chest pressure and dyspnea.  He had a stress nuclear perfusion study done in 2016 which showed no ischemia.  An echocardiogram done 2016 showed ejection fraction of 50%, moderate concentric left ventricular hypertrophy, grade 2 diastolic dysfunction, moderate aortic and mitral insufficiency, mild-to-moderate tricuspid insufficiency and mild pulmonic insufficiency.  He  had a Holter recording which showed sinus rhythm with frequent PACs and PVCs and one 3 beat run of atrial tachycardia.  His symptoms of dizziness did not correlate with arrhythmia.  This was done September 2016.    He had a colon resection with Dr. Granados for diverticular bleeding.  Since then he lost a lot of weight.  He lost his hearing which he thinks is due to the multiple antibiotics that he was on.  He has fairly chronic diarrhea and fairly chronic stomach pain.  He is having no chest pain or difficulty breathing.  He is weak and is now in a wheelchair.      Past Medical History:   Diagnosis Date   • Aortic regurgitation    • Aortic valve insufficiency    • Arthritis     Bilateral knee   • Chest pain    • Diastolic dysfunction    • History of transfusion    • History of urinary retention    • Hypertension    • MDS (myelodysplastic syndrome)    • RBBB (right bundle branch block)    • Self-catheterizes urinary bladder     3-4 times aday   • Skin cancer          Past Surgical History:   Procedure Laterality Date   • APPENDECTOMY     • BACK SURGERY  2008    fusion   • CHOLECYSTECTOMY     • COLON RESECTION N/A 5/26/2017    Procedure: sub-total colectomy with ileo-rectal anastamosis, mobilization of splenic  INTRAOPERATIVE COLONOSCOPY  ([id number]), rigid sigmoidoscopy;  Surgeon: Kaylie Granados MD;  Location: [facility] OR;  Service:    • COLONOSCOPY N/A 2/1/2017    Procedure: COLONOSCOPY;  Surgeon: Bridger Amado MD;  Location: [facility] OR;  Service:    • COLONOSCOPY N/A 5/24/2017    Procedure: COLONOSCOPY w/ polypectomy;  Surgeon: Bridger Amado MD;  Location: [facility] OR;  Service:    • COLONOSCOPY N/A 5/24/2017    Procedure: COLONOSCOPY-return to surgery 2nd procedure, sclerotherapy with epi injection @ 40cm, placement of resolution clips X 2;  Surgeon: Bridger Amado MD;  Location: [facility] OR;  Service:    • ENDOSCOPY N/A 5/23/2017    Procedure: ESOPHAGOGASTRODUODENOSCOPY;  Surgeon:  Bridger Amado MD;  Location:  [facility] OR;  Service:    • EXPLORATORY LAPAROTOMY N/A 5/26/2017    Procedure: LAPAROTOMY EXPLORATORY - Explantation of old hernia mesh;  Surgeon: Kaylie Granados MD;  Location:  [facility] OR;  Service:    • HERNIA REPAIR      x 2       Current Outpatient Prescriptions on File Prior to Visit   Medication Sig Dispense Refill   • acetaminophen (TYLENOL) 325 MG tablet Take 650 mg by mouth every 6 (six) hours as needed for mild pain (1-3).     • clotrimazole-betamethasone (LOTRISONE) 1-0.05 % cream Apply 1 application topically Daily.     • cyanocobalamin 1000 MCG/ML injection 1,000 mcg Every 28 (Twenty-Eight) Days. Cyanocobalamin SOLN; Patient Sig: Cyanocobalamin SOLN ; 0; 06-Jun-2014; Active     • folic acid (FOLVITE) 400 MCG tablet Take 400 mcg by mouth Daily.     • lactobacillus acidophilus (RISAQUAD) capsule capsule Take 1 capsule by mouth Daily. 30 capsule 0   • loperamide (IMODIUM) 2 MG capsule Take 1 capsule by mouth 3 (Three) Times a Day As Needed for Diarrhea for up to 14 days. 42 capsule 0   • metoprolol tartrate (LOPRESSOR) 25 MG tablet Take 1 tablet by mouth Every 12 (Twelve) Hours. 60 tablet 1   • MULTIPLE VITAMINS-MINERALS PO Take 1 tablet by mouth Daily.     • ondansetron (ZOFRAN) 4 MG tablet Take 1 tablet by mouth Every 6 (Six) Hours As Needed for Nausea or Vomiting. 20 tablet 0   • oxyCODONE-acetaminophen (PERCOCET) 5-325 MG per tablet Take 1 tablet by mouth Every 8 (Eight) Hours As Needed for Severe Pain  (7-10). 20 tablet 0   • pantoprazole (PROTONIX) 40 MG EC tablet Take 1 tablet by mouth Daily. 30 tablet 1   • SANTYL 250 UNIT/GM ointment   0     No current facility-administered medications on file prior to visit.        Social History     Social History   • Marital status:      Spouse name: Neris   • Number of children: N/A   • Years of education: 12     Occupational History   •  Retired     Social History Main Topics   • Smoking  "status: Never Smoker   • Smokeless tobacco: Never Used   • Alcohol use 1.2 - 1.8 oz/week     2 - 3 Cans of beer per week   • Drug use: No   • Sexual activity: Defer     Other Topics Concern   • Not on file     Social History Narrative           Review of Systems   Constitution: Negative.   HENT: Negative for congestion and headaches.    Eyes: Negative for vision loss in left eye and vision loss in right eye.   Respiratory: Negative.  Negative for cough, hemoptysis, shortness of breath, sleep disturbances due to breathing, snoring, sputum production and wheezing.    Endocrine: Negative.    Hematologic/Lymphatic: Negative.    Skin: Negative for poor wound healing and rash.   Musculoskeletal: Positive for joint pain. Negative for falls, gout, muscle cramps and myalgias.   Gastrointestinal: Negative for abdominal pain, diarrhea, dysphagia, hematemesis, melena, nausea and vomiting.   Neurological: Negative for excessive daytime sleepiness, dizziness, light-headedness, loss of balance, seizures and vertigo.   Psychiatric/Behavioral: Negative for depression and substance abuse. The patient is not nervous/anxious.        Procedures    ECG 12 Lead  Date/Time: 8/11/2017 9:33 AM  Performed by: CAL OROPEZA  Authorized by: CAL OROPEZA   Comparison: compared with previous ECG   Similar to previous ECG  Rhythm: sinus rhythm  Ectopy: atrial premature contractions  Conduction: right bundle branch block  Clinical impression: abnormal ECG               Objective:      Vitals:    08/11/17 0855   BP: 140/60   BP Location: Left arm   Patient Position: Sitting   Pulse: 80   Weight: 137 lb 3.2 oz (62.2 kg)   Height: 70\" (177.8 cm)     Body mass index is 19.69 kg/(m^2).    Physical Exam   Constitutional: He is oriented to person, place, and time. He appears well-developed and well-nourished. No distress.   HENT:   Head: Normocephalic and atraumatic.   Eyes: Conjunctivae are normal. No scleral icterus.   Neck: Neck supple. " No JVD present. Carotid bruit is not present. No thyromegaly present.   Cardiovascular: Normal rate, regular rhythm, S1 normal, S2 normal, normal heart sounds and intact distal pulses.   No extrasystoles are present. PMI is not displaced.  Exam reveals no gallop.    No murmur heard.  Pulses:       Carotid pulses are 2+ on the right side, and 2+ on the left side.       Radial pulses are 2+ on the right side, and 2+ on the left side.        Dorsalis pedis pulses are 2+ on the right side, and 2+ on the left side.        Posterior tibial pulses are 2+ on the right side, and 2+ on the left side.   Pulmonary/Chest: Effort normal and breath sounds normal. No respiratory distress. He has no wheezes. He has no rhonchi. He has no rales. He exhibits no tenderness.   Abdominal: Soft. Bowel sounds are normal. He exhibits no distension, no abdominal bruit and no mass. There is no tenderness.   Musculoskeletal: He exhibits no edema or deformity.   Lymphadenopathy:     He has no cervical adenopathy.   Neurological: He is alert and oriented to person, place, and time. No cranial nerve deficit.   Skin: Skin is warm and dry. No rash noted. He is not diaphoretic. No cyanosis. No pallor. Nails show no clubbing.   Psychiatric: He has a normal mood and affect. Judgment normal.   Vitals reviewed.      Lab Review:       Assessment:      Diagnosis Plan   1. Nonrheumatic aortic valve insufficiency     2. Bundle branch block, right     3. Essential hypertension     4. MDS (myelodysplastic syndrome), low grade       1. Chest pain with activity, which is worsening.  No ischemia on low level Lexiscan stress nuclear  perfusion study 4/8/2013. LVEF 61% on echo 4/8/2013. Normal Lexiscan nuclear stress study 6/2016.   2. Fatigue. Multifactoral.   3. Abnormal ECG showing a right bundle branch block.   4. Hypertension. Well controlled.   5. Hiatal hernia. Stable.   6. Moderate aortic insufficiency and MR.   7. S/p colon resection, weight loss and abd  pain now - he looks bad.      Plan:       See back in 1 year. No changes.

## 2017-08-15 NOTE — PROGRESS NOTES
Pt here for CBC and EPO injection. He is brought into the treatment area via wheelchair by his wife. The pt mentioned that he had fever of 101.4 degrees fahrenheit last night. The pt explained that it returned to WNL after he took 1 Tylenol, and it is now WNL in the office today. He and his wife were instructed to call Dr. Sade Ma (per her instructions) regarding last night's fever. The pt's wife stated that she will do so. The pt does c/o abdominal pain which he says Dr. Granados feels is due to the healing following the May colon surgery He said his abdomen hurts all the time but especially so after eating or drinking, so he is therefore ingesting less than usual..He sees Dr. Granados for followup of the healing abdominal wound tomorrow and was encouraged to speak to her about the abdominal pain again at that time. The pt and wife v/u of above.

## 2017-08-16 NOTE — PROGRESS NOTES
"    PATIENT INFORMATION  Jason Zelaya   - 1934    CHIEF COMPLAINT  Chief Complaint   Patient presents with   • Post-op Follow-up   11 wks 5 days s/p colon resection,c/o fever/sweats at night, is on ABX for kidneys    HISTORY OF PRESENT ILLNESS  HPI    Patient was as the office today for follow-up.  He was seen by one of my associates when I was out of town last week.  Also since his last office visit with me he was seen by infectious disease DrScarlet Ma.  I have reviewed her records.  He did have blood cultures ×2 done which have been negative.  According to him and his wife over the last one week he has had 3-4 episodes of fevers.  It happens usually in the evening.  It is acute in onset and his fever has been as high as 101.6°F patient and his wife informed me that every time that he gets a \"urinary tract infection\" he gets a fever.  He was advised by Dr. Ma to contact her office if he had any temperature spikes.  When I inquired the informed me that they did not call our office.  Last temperature spike was 2 nights ago.  It was 101.6.  He has a history of self catheterization.  He follows with Dr. Prince of urology.  And he started nitrofurantoin last night.  He otherwise reports he is doing fair.  His appetite is slowly getting better.  He is trying to take boost/nutritional supplements.  He does complain of diarrhea but he reports that it is 3-4 times a day.  He denies melena, hematochezia.  As far as the wound is concerned according to the wife and home health the wound is healing very well.    Also had labs done with CBC most recent hemoglobin 9.1    REVIEW OF SYSTEMS  Review of Systems  As per history of present illness    ACTIVE PROBLEMS  Patient Active Problem List    Diagnosis   • Acute blood loss anemia [D62]   • Rectal bleed [K62.5]   • AI (aortic incompetence) [I35.1]   • Bundle branch block, right [I45.10]   • MDS (myelodysplastic syndrome), low grade [D46.20]   • Vitamin B 12 " deficiency [E53.8]   • Benign prostatic hyperplasia [N40.0]   • Hypertension [I10]   • Knee pain [M25.569]         PAST MEDICAL HISTORY  Past Medical History:   Diagnosis Date   • Aortic regurgitation    • Aortic valve insufficiency    • Arthritis     Bilateral knee   • Chest pain    • Diastolic dysfunction    • History of transfusion    • History of urinary retention    • Hypertension    • MDS (myelodysplastic syndrome)    • RBBB (right bundle branch block)    • Self-catheterizes urinary bladder     3-4 times aday   • Skin cancer          SURGICAL HISTORY  Past Surgical History:   Procedure Laterality Date   • APPENDECTOMY     • BACK SURGERY  2008    fusion   • CHOLECYSTECTOMY     • COLON RESECTION N/A 5/26/2017    Procedure: sub-total colectomy with ileo-rectal anastamosis, mobilization of splenic  INTRAOPERATIVE COLONOSCOPY  ([id number]), rigid sigmoidoscopy;  Surgeon: Kaylie Granados MD;  Location: [facility] OR;  Service:    • COLONOSCOPY N/A 2/1/2017    Procedure: COLONOSCOPY;  Surgeon: Bridger Amado MD;  Location: [facility] OR;  Service:    • COLONOSCOPY N/A 5/24/2017    Procedure: COLONOSCOPY w/ polypectomy;  Surgeon: Bridger Amado MD;  Location: [facility] OR;  Service:    • COLONOSCOPY N/A 5/24/2017    Procedure: COLONOSCOPY-return to surgery 2nd procedure, sclerotherapy with epi injection @ 40cm, placement of resolution clips X 2;  Surgeon: Bridger Amado MD;  Location: [facility] OR;  Service:    • ENDOSCOPY N/A 5/23/2017    Procedure: ESOPHAGOGASTRODUODENOSCOPY;  Surgeon: Bridger Amado MD;  Location: [facility] OR;  Service:    • EXPLORATORY LAPAROTOMY N/A 5/26/2017    Procedure: LAPAROTOMY EXPLORATORY - Explantation of old hernia mesh;  Surgeon: Kaylie Granados MD;  Location: [facility] OR;  Service:    • HERNIA REPAIR      x 2         FAMILY HISTORY  Family History   Problem Relation Age of Onset   • Diabetes Brother    • Liver cancer Brother    • Heart disease Mother     • Heart attack Mother    • Diabetes Mother    • Heart attack Father    • Other Sister      Brain tumor   • Cancer Sister    • Emphysema Brother    • Cancer Brother    • Alcohol abuse Brother    • Cancer Brother          SOCIAL HISTORY  Social History     Occupational History   •  Retired     Social History Main Topics   • Smoking status: Never Smoker   • Smokeless tobacco: Never Used   • Alcohol use 1.2 - 1.8 oz/week     2 - 3 Cans of beer per week   • Drug use: No   • Sexual activity: Defer         CURRENT MEDICATIONS    Current Outpatient Prescriptions:   •  nitrofurantoin (MACRODANTIN) 100 MG capsule, Take 100 mg by mouth 4 (Four) Times a Day., Disp: , Rfl:   •  acetaminophen (TYLENOL) 325 MG tablet, Take 650 mg by mouth every 6 (six) hours as needed for mild pain (1-3)., Disp: , Rfl:   •  clotrimazole-betamethasone (LOTRISONE) 1-0.05 % cream, Apply 1 application topically Daily., Disp: , Rfl:   •  cyanocobalamin 1000 MCG/ML injection, 1,000 mcg Every 28 (Twenty-Eight) Days. Cyanocobalamin SOLN; Patient Sig: Cyanocobalamin SOLN ; 0; 06-Jun-2014; Active, Disp: , Rfl:   •  folic acid (FOLVITE) 400 MCG tablet, Take 400 mcg by mouth Daily., Disp: , Rfl:   •  lactobacillus acidophilus (RISAQUAD) capsule capsule, Take 1 capsule by mouth Daily., Disp: 30 capsule, Rfl: 0  •  metoprolol tartrate (LOPRESSOR) 25 MG tablet, Take 1 tablet by mouth Every 12 (Twelve) Hours., Disp: 60 tablet, Rfl: 1  •  MULTIPLE VITAMINS-MINERALS PO, Take 1 tablet by mouth Daily., Disp: , Rfl:   •  ondansetron (ZOFRAN) 4 MG tablet, Take 1 tablet by mouth Every 6 (Six) Hours As Needed for Nausea or Vomiting., Disp: 20 tablet, Rfl: 0  •  oxyCODONE-acetaminophen (PERCOCET) 5-325 MG per tablet, Take 1 tablet by mouth Every 8 (Eight) Hours As Needed for Severe Pain ., Disp: 20 tablet, Rfl: 0  •  pantoprazole (PROTONIX) 40 MG EC tablet, Take 1 tablet by mouth Daily., Disp: 30 tablet, Rfl: 1  •  SANTYL 250 UNIT/GM ointment, , Disp:  ", Rfl: 0  No current facility-administered medications for this visit.     ALLERGIES  Review of patient's allergies indicates no known allergies.    VITALS  Vitals:    08/16/17 0924   BP: 118/60   Weight: 137 lb (62.1 kg)   Height: 70\" (177.8 cm)       LAST RESULTS   Lab on 08/15/2017   Component Date Value Ref Range Status   • WBC 08/15/2017 6.97  4.80 - 10.80 10*3/mm3 Final   • RBC 08/15/2017 2.75* 4.70 - 6.10 10*6/mm3 Final   • Hemoglobin 08/15/2017 9.1* 14.0 - 18.0 g/dL Final   • Hematocrit 08/15/2017 30.2* 42.0 - 52.0 % Final   • MCV 08/15/2017 109.8* 80.0 - 94.0 fL Final   • MCH 08/15/2017 33.1* 27.0 - 31.0 pg Final   • MCHC 08/15/2017 30.1* 31.0 - 37.0 g/dL Final   • RDW 08/15/2017 21.3* 11.5 - 14.5 % Final   • RDW-SD 08/15/2017 85.2* 37.0 - 54.0 fl Final   • MPV 08/15/2017 11.6* 7.4 - 10.4 fL Final   • Platelets 08/15/2017 134* 140 - 500 10*3/mm3 Final   • Neutrophil % 08/15/2017 49.9  45.0 - 70.0 % Final   • Lymphocyte % 08/15/2017 19.8* 20.0 - 45.0 % Final   • Monocyte % 08/15/2017 29.1* 3.0 - 8.0 % Final   • Eosinophil % 08/15/2017 0.0  0.0 - 4.0 % Final   • Basophil % 08/15/2017 0.1  0.0 - 2.0 % Final   • Immature Grans % 08/15/2017 1.1* 0.0 - 0.5 % Final   • Neutrophils, Absolute 08/15/2017 3.47  1.50 - 8.30 10*3/mm3 Final   • Lymphocytes, Absolute 08/15/2017 1.38  0.60 - 4.80 10*3/mm3 Final   • Monocytes, Absolute 08/15/2017 2.03* 0.00 - 1.00 10*3/mm3 Final   • Eosinophils, Absolute 08/15/2017 0.00* 0.10 - 0.30 10*3/mm3 Final   • Basophils, Absolute 08/15/2017 0.01  0.00 - 0.20 10*3/mm3 Final   • Immature Grans, Absolute 08/15/2017 0.08* 0.00 - 0.03 10*3/mm3 Final   • nRBC 08/15/2017 0.0  0.0 - 0.0 /100 WBC Final     No results found.    PHYSICAL EXAM  Physical Exam   Cardiovascular: Normal rate, regular rhythm and normal heart sounds.    Pulmonary/Chest: Breath sounds normal.   Abdominal:   Soft, nondistended nontender positive bowel sounds in all 4 quadrants.  Superior wound healing well via " secondary intention, at edges clean positive granulation tissue scant fibrin.  Inferior wound/suture granuloma excision site also healing well via secondary intention.  No drainage noted.   Nursing note and vitals reviewed.      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  Wound healing well via secondary intention.  Continue Santyl at the wound base and saline wet-to-dry dressings daily.   Also follows with infectious disease.  Intermittent fevers-we'll check a UA orders were given and I will also send a message Dr. Ma.        Protein calorie malnutrition-discussed supplementing with boost or Ensure.   Patient canceled outpatient referral to nutrition -- reports he would like to hold off for now     Diarrhea s/p subtotal colectomy --   discussed with patient and his wife in detail -- diet modification and on Imodium.  Presently he reports he moves his bowels 3-4 times daily I have discussed with him that if he had more frequent bowel movements he could certainly increase the dosage of Imodium and then titrate. Once again extensive dietary counseling was done and patient was advised to avoid triggers such as fatty foods.     PLAN  Check urine analysis.  Follow up 2 weeks.  Patient was advised to call the office sooner should he have worsening symptoms or go to the nearest emergency room.

## 2017-08-18 NOTE — TELEPHONE ENCOUNTER
----- Message from Sade Ma MD sent at 8/18/2017  9:06 AM EDT -----  Can you call him and see when he had a fever and how high it was. Any symptoms? Abd pain, SOB, cough, sore throat, rhinorrhea, diarrhea, dysuria etc. Thanks    ----- Message -----     From: Kaylie Granados MD     Sent: 8/16/2017   5:03 PM       To: Sade Ma MD    Saw Mr. Simmons in office today.  His abdominal wound is healing well, I see no signs of infection.  He has reported intermittent fevers over the last 2 weeks. His highest fever has been 101.6 -- 2 days ago  I have ordered a UA.  He does self catheterize, he apparently started taking nitrofurantoin this morning (apparently has refills on it from his urologist).  I did advise him to contact your office.  Let me know if you have any other recommendations   thanks

## 2017-08-18 NOTE — TELEPHONE ENCOUNTER
"I called Mr. Zelaya and spoke to his wife. His is gone having an Abdominal US today. The said he is feeling better now that he has started the Macrobid. His temperature got to 101.4 at it's highest and he has no fever now. His stomach is always \"sore\" but no worse than usual and no other symptoms right now. Advised patient's wife to let us and/or PCP know if he develops a fever > 101 in the future. Thanks, Kimberly Ignacio RN  "

## 2017-08-30 NOTE — PROGRESS NOTES
PATIENT INFORMATION  Jason Zelaya   - 1934    CHIEF COMPLAINT  Chief Complaint   Patient presents with   • Follow-up   2 wk f/u, wound is healing well, pt thinks he may have a UTI is scheduled with urology today, requesting RF on pain meds      HISTORY OF PRESENT ILLNESS  HPI  Patient presents to the office today for follow-up.  He reports he is feeling much better.  Denies any nausea, vomiting, chills, or fevers in the last 2 weeks.  He thinks he may have another urinary tract infection.  He does have history of chronic recurrent urinary tract infections.  He does self catheterize.  He is established with Dr. Prince.  He has just completed a course of nitrofurantoin and was advised by Dr. Prince to start another week's worth.  He is scheduled to see him this afternoon.  He also had an ultrasound of the abdomen done as per his PCP.  I have reviewed those images.  He is established with Dr. Sade Ma of infectious disease also.  As far as his surgical wounds are concerned the inferior wound has completely healed at this visit.  In the superior one is almost completely healed.  According to his wife [employer] is not applying any Santyl at this time.  Patient reports he has about 3-4 bowel movements every day.  I had discussed with him that in a patient status post subtotal colectomy diarrhea was not unusual.  He has been advised to avoid food triggers such as fried fatty and spicy foods, he has also been advised to add Metamucil or MiraLAX to add bulk to the bowel movement and use Imodium as needed and to titrate the dosage based on the bowel movements.  I had given him a prescription for this and we have specifically discussed all this.  Patient reports that he uses the Imodium sparingly.   He is asking for refill on the pain medication.  He reports that helps with her dressing change.    He does have a lesion on the dorsal aspect of his left hand at the fourth metatarsal.  He's had a history of  a basal cell carcinoma excised in that area and would like to have this removed if possible.  He also had his recent blood work done and his hemoglobin is down to 8.2/27.4.  He did receive another dose of Procrit.  He has myelodysplastic syndrome    REVIEW OF SYSTEMS  Review of Systems   Constitutional: Negative for appetite change, chills, diaphoresis and fever.   Respiratory: Negative.    Cardiovascular: Negative.    Gastrointestinal: Positive for abdominal pain.   Genitourinary: Positive for dysuria and frequency.   Skin: Positive for wound.         ACTIVE PROBLEMS  Patient Active Problem List    Diagnosis   • Acute blood loss anemia [D62]   • Rectal bleed [K62.5]   • AI (aortic incompetence) [I35.1]   • Bundle branch block, right [I45.10]   • MDS (myelodysplastic syndrome), low grade [D46.20]   • Vitamin B 12 deficiency [E53.8]   • Benign prostatic hyperplasia [N40.0]   • Hypertension [I10]   • Knee pain [M25.569]         PAST MEDICAL HISTORY  Past Medical History:   Diagnosis Date   • Aortic regurgitation    • Aortic valve insufficiency    • Arthritis     Bilateral knee   • Chest pain    • Diastolic dysfunction    • History of transfusion    • History of urinary retention    • Hypertension    • MDS (myelodysplastic syndrome)    • RBBB (right bundle branch block)    • Self-catheterizes urinary bladder     3-4 times aday   • Skin cancer          SURGICAL HISTORY  Past Surgical History:   Procedure Laterality Date   • APPENDECTOMY     • BACK SURGERY  2008    fusion   • CHOLECYSTECTOMY     • COLON RESECTION N/A 5/26/2017    Procedure: sub-total colectomy with ileo-rectal anastamosis, mobilization of splenic  INTRAOPERATIVE COLONOSCOPY  ([id number]), rigid sigmoidoscopy;  Surgeon: Kaylie Granados MD;  Location: [facility] OR;  Service:    • COLONOSCOPY N/A 2/1/2017    Procedure: COLONOSCOPY;  Surgeon: Bridger Amado MD;  Location: [facility] OR;  Service:    • COLONOSCOPY N/A 5/24/2017    Procedure: COLONOSCOPY  w/ polypectomy;  Surgeon: Bridger Amado MD;  Location:  [facility] OR;  Service:    • COLONOSCOPY N/A 5/24/2017    Procedure: COLONOSCOPY-return to surgery 2nd procedure, sclerotherapy with epi injection @ 40cm, placement of resolution clips X 2;  Surgeon: Bridger Amado MD;  Location:  [facility] OR;  Service:    • ENDOSCOPY N/A 5/23/2017    Procedure: ESOPHAGOGASTRODUODENOSCOPY;  Surgeon: Bridger Amado MD;  Location:  [facility] OR;  Service:    • EXPLORATORY LAPAROTOMY N/A 5/26/2017    Procedure: LAPAROTOMY EXPLORATORY - Explantation of old hernia mesh;  Surgeon: Kaylie Granados MD;  Location:  [facility] OR;  Service:    • HERNIA REPAIR      x 2         FAMILY HISTORY  Family History   Problem Relation Age of Onset   • Diabetes Brother    • Liver cancer Brother    • Heart disease Mother    • Heart attack Mother    • Diabetes Mother    • Heart attack Father    • Other Sister      Brain tumor   • Cancer Sister    • Emphysema Brother    • Cancer Brother    • Alcohol abuse Brother    • Cancer Brother          SOCIAL HISTORY  Social History     Occupational History   •  Retired     Social History Main Topics   • Smoking status: Never Smoker   • Smokeless tobacco: Never Used   • Alcohol use 1.2 - 1.8 oz/week     2 - 3 Cans of beer per week   • Drug use: No   • Sexual activity: Defer         CURRENT MEDICATIONS    Current Outpatient Prescriptions:   •  acetaminophen (TYLENOL) 325 MG tablet, Take 650 mg by mouth every 6 (six) hours as needed for mild pain (1-3)., Disp: , Rfl:   •  clotrimazole-betamethasone (LOTRISONE) 1-0.05 % cream, Apply 1 application topically Daily., Disp: , Rfl:   •  cyanocobalamin 1000 MCG/ML injection, 1,000 mcg Every 28 (Twenty-Eight) Days. Cyanocobalamin SOLN; Patient Sig: Cyanocobalamin SOLN ; 0; 06-Jun-2014; Active, Disp: , Rfl:   •  folic acid (FOLVITE) 400 MCG tablet, Take 400 mcg by mouth Daily., Disp: , Rfl:   •  lactobacillus acidophilus (RISAQUAD)  "capsule capsule, Take 1 capsule by mouth Daily., Disp: 30 capsule, Rfl: 0  •  metoprolol tartrate (LOPRESSOR) 25 MG tablet, Take 1 tablet by mouth Every 12 (Twelve) Hours., Disp: 60 tablet, Rfl: 1  •  MULTIPLE VITAMINS-MINERALS PO, Take 1 tablet by mouth Daily., Disp: , Rfl:   •  nitrofurantoin (MACRODANTIN) 100 MG capsule, Take 100 mg by mouth 4 (Four) Times a Day., Disp: , Rfl:   •  ondansetron (ZOFRAN) 4 MG tablet, Take 1 tablet by mouth Every 6 (Six) Hours As Needed for Nausea or Vomiting., Disp: 20 tablet, Rfl: 0  •  oxyCODONE-acetaminophen (PERCOCET) 5-325 MG per tablet, Take 1 tablet by mouth Every 12 (Twelve) Hours As Needed for Severe Pain ., Disp: 20 tablet, Rfl: 0  •  pantoprazole (PROTONIX) 40 MG EC tablet, Take 1 tablet by mouth Daily., Disp: 30 tablet, Rfl: 1  •  SANTYL 250 UNIT/GM ointment, , Disp: , Rfl: 0    ALLERGIES  Review of patient's allergies indicates no known allergies.    VITALS  Vitals:    08/30/17 1005   BP: 128/70   Weight: 138 lb (62.6 kg)   Height: 70\" (177.8 cm)       LAST RESULTS   Lab on 08/29/2017   Component Date Value Ref Range Status   • WBC 08/29/2017 4.52* 4.80 - 10.80 10*3/mm3 Final   • RBC 08/29/2017 2.53* 4.70 - 6.10 10*6/mm3 Final   • Hemoglobin 08/29/2017 8.2* 14.0 - 18.0 g/dL Final   • Hematocrit 08/29/2017 27.4* 42.0 - 52.0 % Final   • MCV 08/29/2017 108.3* 80.0 - 94.0 fL Final   • MCH 08/29/2017 32.4* 27.0 - 31.0 pg Final   • MCHC 08/29/2017 29.9* 31.0 - 37.0 g/dL Final   • RDW 08/29/2017 21.2* 11.5 - 14.5 % Final   • RDW-SD 08/29/2017 84.1* 37.0 - 54.0 fl Final   • MPV 08/29/2017 11.5* 7.4 - 10.4 fL Final   • Platelets 08/29/2017 142  140 - 500 10*3/mm3 Final   • Neutrophil % 08/29/2017 42.7* 45.0 - 70.0 % Final   • Lymphocyte % 08/29/2017 23.9  20.0 - 45.0 % Final   • Monocyte % 08/29/2017 32.5* 3.0 - 8.0 % Final   • Eosinophil % 08/29/2017 0.0  0.0 - 4.0 % Final   • Basophil % 08/29/2017 0.2  0.0 - 2.0 % Final   • Immature Grans % 08/29/2017 0.7* 0.0 - 0.5 % Final "   • Neutrophils, Absolute 08/29/2017 1.93  1.50 - 8.30 10*3/mm3 Final   • Lymphocytes, Absolute 08/29/2017 1.08  0.60 - 4.80 10*3/mm3 Final   • Monocytes, Absolute 08/29/2017 1.47* 0.00 - 1.00 10*3/mm3 Final   • Eosinophils, Absolute 08/29/2017 0.00* 0.10 - 0.30 10*3/mm3 Final   • Basophils, Absolute 08/29/2017 0.01  0.00 - 0.20 10*3/mm3 Final   • Immature Grans, Absolute 08/29/2017 0.03  0.00 - 0.03 10*3/mm3 Final   • nRBC 08/29/2017 0.0  0.0 - 0.0 /100 WBC Final     Us Abdomen Complete    Result Date: 8/18/2017  Narrative: INDICATION: Mid to lower abdominal pain.  EXAM: Abdominal ultrasound, complete.  COMPARISON: CT abdomen 06/05/2017  FINDINGS: Visualized portions of the pancreas are within normal limits.  The echogenicity and echotexture of the hepatic parenchyma is within normal limits. No hepatic mass. The intrahepatic bile ducts are normal in caliber. The common duct is normal in size at the dorothy hepatis measuring 4 mm.  The gallbladder is normal. No gallstones.  The right kidney measures 11.2 cm. The left kidney measures 10.5 cm. Renal cortical thickness and echogenicity is normal. Small exophytic cyst off the mid left kidney measures 1 cm. No hydronephrosis.  The spleen is normal in size.   The abdominal aorta is normal in size.  The juxtahepatic IVC is within normal limits.  No ascites.      Impression: Negative abdominal ultrasound.  This report was finalized on 8/18/2017 9:46 AM by Dr. Rafiq Blue MD.        PHYSICAL EXAM  Physical Exam   Constitutional:   He is in good spirits.   HENT:   Head: Normocephalic and atraumatic.   Eyes: EOM are normal. Pupils are equal, round, and reactive to light. No scleral icterus.   Neck: Normal range of motion. Neck supple.   Cardiovascular: Normal rate, regular rhythm and normal heart sounds.    Pulmonary/Chest: Breath sounds normal.   Abdominal:   Soft nondistended nontender positive bowel sounds in all 4 quadrants.  Well-healed midline incision inferior wound  completely healed ( this was the site of the excision of suture granuloma)  Superior wound is extremely small 2 mm x 3 mm.  It was debrided in a non-excisional fashion, cleansed with peroxide and sterile dressing applied.   Lymphadenopathy:     He has no cervical adenopathy.   Nursing note and vitals reviewed.      ASSESSMENT  Status post emergent subtotal colectomy for recurrent lower GI bleed-diverticular  Myelodysplastic syndrome with pancytopenia      Postoperative wound infection.   Wound healing well via secondary intention.  Inferior wound is completely healed.  Superior one is much smaller.  Will continue with local wound care - stressed with NILTON De La Torre recommend that we continue to clean with peroxide and do a wet-to-dry dressing daily. Can discontinue Santyl at this point    Also follows with infectious disease.    History of intermittent fevers and recurrent UTI in patient with history of self-catheterization.  Most recent UA was negative.  He is scheduled to see Dr. Prince.  We'll defer to urology and infectious disease at this time.     Protein calorie malnutrition-discussed supplementing with boost or Ensure.   Patient canceled outpatient referral to nutrition -- reports he would like to hold off for now      Diarrhea s/p subtotal colectomy --   discussed with patient and his wife in detail -- diet modification and on Imodium.  Presently he reports he moves his bowels 3-4 times daily I have discussed with him that if he had more frequent bowel movements he could certainly increase the dosage of Imodium and then titrate. Once again extensive dietary counseling was done and patient was advised to avoid triggers such as fatty foods.    Right hand skin lesion previous history of basal cell cancer.    PLAN  Follow-up 2 weeks.  Will discuss excision of right hand skin lesion mass  Continue local wound care and dressing change.  Prescription for Percocet 5/25 mg 1 PO every 12 hours PRN severe pain #20 was given  to the patient (to be used prior to dressing change).  If once wounds are all healed patient is still complaining of pain we may need to consider a referral to pain management.  Patient was advised to call the office sooner should he have worsening symptoms or go to the nearest emergency room.

## 2017-09-12 NOTE — PROGRESS NOTES
1300 PT. HERE FOR SCHEDULED BLOOD TRANSFUSION. 1650 2ND UNIT OF BLOOD HUNG VSS, DENIES C/O. FAMILY HAS BEEN @ BS. REPORT TO M/S CHARGE JOGRE WARREN RN

## 2017-09-12 NOTE — PROGRESS NOTES
Hgb 7.7. Pt c/o increased fatigue.   Pt scheduled for 2 units prbc's today at 12:30 [facility] [place].

## 2017-09-12 NOTE — PATIENT INSTRUCTIONS
"  Call [facility] at [phone] if you have any problems or concerns.    We know you have  Choice in healthcare and appreciate you using [facility].  Our purpose is to provide you \"Excellent Care\".  We hope that you will always choose us in the future and continue to recommend us to your family and friends.              Blood Transfusion, Care After  These instructions give you information about caring for yourself after your procedure. Your doctor may also give you more specific instructions. Call your doctor if you have any problems or questions after your procedure.   HOME CARE  · Take medicines only as told by your doctor. Ask your doctor if you can take an over-the-counter pain reliever if you have a fever or headache a day or two after your procedure.  · Return to your normal activities as told by your doctor.  GET HELP IF:   · You develop redness or irritation at your IV site.  · You have a fever, chills, or a headache that does not go away.  · Your pee (urine) is darker than normal.  · Your urine turns:    Pink.    Red.    Brown.  · The white part of your eye turns yellow (jaundice).  · You feel weak after doing your normal activities.  GET HELP RIGHT AWAY IF:   · You have trouble breathing.  · You have fever and chills and you also have:    Anxiety.    Chest or back pain.    Flushed or pink skin.    Clammy or sweaty skin.    A fast heartbeat.    A sick feeling in your stomach (nausea).     This information is not intended to replace advice given to you by your health care provider. Make sure you discuss any questions you have with your health care provider.     Document Released: 01/08/2016 Document Reviewed: 01/08/2016  Elsevier Interactive Patient Education ©2017 [employer]    "

## 2017-09-13 NOTE — PROGRESS NOTES
09/12/17 1925 Blood transfusion complete without problems noted. VSS. Pt. Discharged and taken out by wheelchair to vehicle with wife.     Yeny BE

## 2017-09-13 NOTE — PROGRESS NOTES
PATIENT INFORMATION  Jason Zelaya   - 1934    CHIEF COMPLAINT  Chief Complaint   Patient presents with   • Follow-up   2 wk f/u, pt is w/o complaints, discuss removal of hand lesion     HISTORY OF PRESENT ILLNESS  HPI    Patient was as the office today for follow-up.  He reports he is doing well.  Denies any fevers chills nausea vomiting.  He reports 2-3 bowel movements daily.  He reports every time he has about 4-5 he will take an Imodium which seems to help.  Denies any abdominal pain.  He is scheduled to see urology.  He did complete the nitrofurantoin that was prescribed by urology.  According to his wife his abdominal wounds have completely healed.  They can no longer do the packing.  He is also here to discuss a lesion over the dorsum of the left hand.  He has a history of basal cell carcinoma that was excised by dermatology.  He reports his has increased in size and is painful and he has noted some drainage.  Denies any recent trauma.  He does have myelodysplastic syndrome.  Last H&H down to 7.7 did receive 2 units of PRBCs at the medical oncology office.    REVIEW OF SYSTEMS  Review of Systems   Constitutional: Negative.    Respiratory: Negative.    Cardiovascular: Negative.    Gastrointestinal: Negative.    Skin: Positive for color change and wound.         ACTIVE PROBLEMS  Patient Active Problem List    Diagnosis   • Acute blood loss anemia [D62]   • Rectal bleed [K62.5]   • AI (aortic incompetence) [I35.1]   • Bundle branch block, right [I45.10]   • MDS (myelodysplastic syndrome), low grade [D46.20]   • Vitamin B 12 deficiency [E53.8]   • Benign prostatic hyperplasia [N40.0]   • Hypertension [I10]   • Knee pain [M25.569]         PAST MEDICAL HISTORY  Past Medical History:   Diagnosis Date   • Aortic regurgitation    • Aortic valve insufficiency    • Arthritis     Bilateral knee   • Chest pain    • Diastolic dysfunction    • History of transfusion    • History of urinary retention    •  Hypertension    • MDS (myelodysplastic syndrome)    • RBBB (right bundle branch block)    • Self-catheterizes urinary bladder     3-4 times aday   • Skin cancer          SURGICAL HISTORY  Past Surgical History:   Procedure Laterality Date   • APPENDECTOMY     • BACK SURGERY  2008    fusion   • CHOLECYSTECTOMY     • COLON RESECTION N/A 5/26/2017    Procedure: sub-total colectomy with ileo-rectal anastamosis, mobilization of splenic  INTRAOPERATIVE COLONOSCOPY  ([id number]), rigid sigmoidoscopy;  Surgeon: Kaylie Granados MD;  Location:  [facility] OR;  Service:    • COLONOSCOPY N/A 2/1/2017    Procedure: COLONOSCOPY;  Surgeon: Bridger Amado MD;  Location: [facility] OR;  Service:    • COLONOSCOPY N/A 5/24/2017    Procedure: COLONOSCOPY w/ polypectomy;  Surgeon: Bridger Amado MD;  Location: [facility] OR;  Service:    • COLONOSCOPY N/A 5/24/2017    Procedure: COLONOSCOPY-return to surgery 2nd procedure, sclerotherapy with epi injection @ 40cm, placement of resolution clips X 2;  Surgeon: Bridger Amado MD;  Location: [facility] OR;  Service:    • ENDOSCOPY N/A 5/23/2017    Procedure: ESOPHAGOGASTRODUODENOSCOPY;  Surgeon: Bridger Amado MD;  Location: [facility] OR;  Service:    • EXPLORATORY LAPAROTOMY N/A 5/26/2017    Procedure: LAPAROTOMY EXPLORATORY - Explantation of old hernia mesh;  Surgeon: Kaylie Granados MD;  Location: [facility] OR;  Service:    • HERNIA REPAIR      x 2         FAMILY HISTORY  Family History   Problem Relation Age of Onset   • Diabetes Brother    • Liver cancer Brother    • Heart disease Mother    • Heart attack Mother    • Diabetes Mother    • Heart attack Father    • Other Sister      Brain tumor   • Cancer Sister    • Emphysema Brother    • Cancer Brother    • Alcohol abuse Brother    • Cancer Brother          SOCIAL HISTORY  Social History     Occupational History   •  Retired     Social History Main Topics   • Smoking status: Never Smoker   •  Smokeless tobacco: Never Used   • Alcohol use 1.2 - 1.8 oz/week     2 - 3 Cans of beer per week   • Drug use: No   • Sexual activity: Defer         CURRENT MEDICATIONS    Current Outpatient Prescriptions:   •  acetaminophen (TYLENOL) 325 MG tablet, Take 650 mg by mouth every 6 (six) hours as needed for mild pain (1-3)., Disp: , Rfl:   •  clotrimazole-betamethasone (LOTRISONE) 1-0.05 % cream, Apply 1 application topically Daily., Disp: , Rfl:   •  cyanocobalamin 1000 MCG/ML injection, 1,000 mcg Every 28 (Twenty-Eight) Days. Cyanocobalamin SOLN; Patient Sig: Cyanocobalamin SOLN ; 0; 06-Jun-2014; Active, Disp: , Rfl:   •  folic acid (FOLVITE) 400 MCG tablet, Take 400 mcg by mouth Daily., Disp: , Rfl:   •  lactobacillus acidophilus (RISAQUAD) capsule capsule, Take 1 capsule by mouth Daily., Disp: 30 capsule, Rfl: 0  •  metoprolol tartrate (LOPRESSOR) 25 MG tablet, Take 1 tablet by mouth Every 12 (Twelve) Hours., Disp: 60 tablet, Rfl: 1  •  MULTIPLE VITAMINS-MINERALS PO, Take 1 tablet by mouth Daily., Disp: , Rfl:   •  nitrofurantoin (MACRODANTIN) 100 MG capsule, Take 100 mg by mouth 4 (Four) Times a Day., Disp: , Rfl:   •  nitrofurantoin, macrocrystal-monohydrate, (MACROBID) 100 MG capsule, , Disp: , Rfl:   •  ondansetron (ZOFRAN) 4 MG tablet, Take 1 tablet by mouth Every 6 (Six) Hours As Needed for Nausea or Vomiting., Disp: 20 tablet, Rfl: 0  •  oxyCODONE-acetaminophen (PERCOCET) 5-325 MG per tablet, Take 1 tablet by mouth Every 12 (Twelve) Hours As Needed for Severe Pain ., Disp: 20 tablet, Rfl: 0  •  pantoprazole (PROTONIX) 40 MG EC tablet, Take 1 tablet by mouth Daily., Disp: 30 tablet, Rfl: 1  •  SANTYL 250 UNIT/GM ointment, , Disp: , Rfl: 0  •  sulfamethoxazole-trimethoprim (BACTRIM DS) 800-160 MG per tablet, Take 1 tablet by mouth 2 (Two) Times a Day for 7 days., Disp: 14 tablet, Rfl: 0  No current facility-administered medications for this visit.     ALLERGIES  Review of patient's allergies indicates no known  "allergies.    VITALS  Vitals:    09/13/17 0948   BP: 122/62   Weight: 138 lb (62.6 kg)   Height: 70\" (177.8 cm)       LAST RESULTS   Hospital Outpatient Visit on 09/12/2017   Component Date Value Ref Range Status   • Product Code 09/13/2017 I2766M87   Final   • Unit Number 09/13/2017 [id number]   Final   • UNIT  ABO 09/13/2017 O   Final   • UNIT  RH 09/13/2017 POS   Final   • CROSSMATCH 1 INTERPRETATION 09/13/2017 Compatible   Final   • Dispense Status 09/13/2017 PT   Final   • Blood Type 09/13/2017 OPOS   Final   • Blood Expiration Date 09/13/2017 201710122359   Final   • Blood Type Barcode 09/13/2017 5100   Final   • Product Code 09/13/2017 [id number]   Final   • Unit Number 09/13/2017 [id number]   Final   • UNIT  ABO 09/13/2017 O   Final   • UNIT  RH 09/13/2017 POS   Final   • CROSSMATCH 1 INTERPRETATION 09/13/2017 Compatible   Final   • Dispense Status 09/13/2017 PT   Final   • Blood Type 09/13/2017 OPOS   Final   • Blood Expiration Date 09/13/2017 201710122359   Final   • Blood Type Barcode 09/13/2017 5100   Final     Us Abdomen Complete    Result Date: 8/18/2017  Narrative: INDICATION: Mid to lower abdominal pain.  EXAM: Abdominal ultrasound, complete.  COMPARISON: CT abdomen 06/05/2017  FINDINGS: Visualized portions of the pancreas are within normal limits.  The echogenicity and echotexture of the hepatic parenchyma is within normal limits. No hepatic mass. The intrahepatic bile ducts are normal in caliber. The common duct is normal in size at the dorothy hepatis measuring 4 mm.  The gallbladder is normal. No gallstones.  The right kidney measures 11.2 cm. The left kidney measures 10.5 cm. Renal cortical thickness and echogenicity is normal. Small exophytic cyst off the mid left kidney measures 1 cm. No hydronephrosis.  The spleen is normal in size.   The abdominal aorta is normal in size.  The juxtahepatic IVC is within normal limits.  No ascites.      Impression: Negative abdominal ultrasound.  This " report was finalized on 8/18/2017 9:46 AM by Dr. Rafiq Blue MD.        PHYSICAL EXAM  Physical Exam   Constitutional: He is oriented to person, place, and time. He appears well-developed and well-nourished.   Cardiovascular: Normal rate, regular rhythm and normal heart sounds.    Pulmonary/Chest: Breath sounds normal.   Abdominal:   Soft, nondistended nontender positive bowel sounds in all 4 quadrants  Midline abdominal incision the small wounds have completely healed.  No incisional hernia palpable.   Musculoskeletal: He exhibits no edema.   Neurological: He is alert and oriented to person, place, and time.   Skin:   Left hand dorsum and the fourth and fifth metatarsal there is a 3 cm x 2 cm irregular shaped hard nodular mass.  Upon palpation there was some drainage obtained was cultured.  No erythema no induration.   Nursing note and vitals reviewed.      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 - Wounds have completely healed with secondary intention.  At this time I see no active/gross signs of infection.     History of intermittent fevers and recurrent UTI in patient with history of self-catheterization.  Most recent UA was negative.  He is scheduled to see Dr. Prince.  We'll defer to urology and infectious disease at this time.      Protein calorie malnutrition-discussed supplementing with boost or Ensure.   Patient canceled outpatient referral to nutrition -- reports he would like to hold off for now      Diarrhea s/p subtotal colectomy --   discussed with patient and his wife in detail -- diet modification and on Imodium.  Presently he reports he moves his bowels 3-4 times daily I have discussed with him that if he had more frequent bowel movements he could certainly increase the dosage of Imodium and then titrate. Once again extensive dietary counseling was done and  patient was advised to avoid triggers such as fatty foods.     Right hand skin lesion previous history of basal cell cancer.  Culture was obtained.  We'll follow-up and make recommendations regarding antibiotics.  Patient would like to have this excised.  Excision under local anesthesia discussed with the patient.  Pros and cons risks and benefits all discussed.  He would like to proceed.  We will schedule him at the [facility] under local anesthesia.  I did discuss the procedure, risks, benefits, complications including but not limited to risk of bleeding, infection, flap ischemia or necrosis, hematoma or seroma formation, risk of residual disease or recurrence requiring additional procedures as well as complications associated with local anesthetic.  Patient understood and gave verbal informed consent.     PLAN  We will schedule wide excision left hand skin mass under local anesthesia.  Bactrim DS 1 by mouth twice a day for 7 days was e- scribed to the pharmacy.  Will follow-up on cultures and tailor antibiotic therapy once results are available.  Follow-up after procedure.  Patient was advised to call the office sooner should he have worsening symptoms or go to the nearest emergency room.

## 2017-09-14 NOTE — H&P
PATIENT INFORMATION  Jason Zelaya   - 1934    CHIEF COMPLAINT  Chief Complaint   Patient presents with   • Follow-up   2 wk f/u, pt is w/o complaints, discuss removal of hand lesion     HISTORY OF PRESENT ILLNESS  HPI    Patient was as the office today for follow-up.  He reports he is doing well.  Denies any fevers chills nausea vomiting.  He reports 2-3 bowel movements daily.  He reports every time he has about 4-5 he will take an Imodium which seems to help.  Denies any abdominal pain.  He is scheduled to see urology.  He did complete the nitrofurantoin that was prescribed by urology.  According to his wife his abdominal wounds have completely healed.  They can no longer do the packing.  He is also here to discuss a lesion over the dorsum of the left hand.  He has a history of basal cell carcinoma that was excised by dermatology.  He reports his has increased in size and is painful and he has noted some drainage.  Denies any recent trauma.  He does have myelodysplastic syndrome.  Last H&H down to 7.7 did receive 2 units of PRBCs at the medical oncology office.    REVIEW OF SYSTEMS  Review of Systems   Constitutional: Negative.    Respiratory: Negative.    Cardiovascular: Negative.    Gastrointestinal: Negative.    Skin: Positive for color change and wound.         ACTIVE PROBLEMS  Patient Active Problem List    Diagnosis   • Acute blood loss anemia [D62]   • Rectal bleed [K62.5]   • AI (aortic incompetence) [I35.1]   • Bundle branch block, right [I45.10]   • MDS (myelodysplastic syndrome), low grade [D46.20]   • Vitamin B 12 deficiency [E53.8]   • Benign prostatic hyperplasia [N40.0]   • Hypertension [I10]   • Knee pain [M25.569]         PAST MEDICAL HISTORY  Past Medical History:   Diagnosis Date   • Aortic regurgitation    • Aortic valve insufficiency    • Arthritis     Bilateral knee   • Chest pain    • Diastolic dysfunction    • History of transfusion    • History of urinary retention    •  Hypertension    • MDS (myelodysplastic syndrome)    • RBBB (right bundle branch block)    • Self-catheterizes urinary bladder     3-4 times aday   • Skin cancer          SURGICAL HISTORY  Past Surgical History:   Procedure Laterality Date   • APPENDECTOMY     • BACK SURGERY  2008    fusion   • CHOLECYSTECTOMY     • COLON RESECTION N/A 5/26/2017    Procedure: sub-total colectomy with ileo-rectal anastamosis, mobilization of splenic  INTRAOPERATIVE COLONOSCOPY  ([id number]), rigid sigmoidoscopy;  Surgeon: Kaylie Granados MD;  Location:  [facility] OR;  Service:    • COLONOSCOPY N/A 2/1/2017    Procedure: COLONOSCOPY;  Surgeon: Bridger Amado MD;  Location: [facility] OR;  Service:    • COLONOSCOPY N/A 5/24/2017    Procedure: COLONOSCOPY w/ polypectomy;  Surgeon: Bridger Amado MD;  Location: [facility] OR;  Service:    • COLONOSCOPY N/A 5/24/2017    Procedure: COLONOSCOPY-return to surgery 2nd procedure, sclerotherapy with epi injection @ 40cm, placement of resolution clips X 2;  Surgeon: Bridger Amado MD;  Location: [facility] OR;  Service:    • ENDOSCOPY N/A 5/23/2017    Procedure: ESOPHAGOGASTRODUODENOSCOPY;  Surgeon: Bridger Amado MD;  Location: [facility] OR;  Service:    • EXPLORATORY LAPAROTOMY N/A 5/26/2017    Procedure: LAPAROTOMY EXPLORATORY - Explantation of old hernia mesh;  Surgeon: Kaylie Granados MD;  Location: [facility] OR;  Service:    • HERNIA REPAIR      x 2         FAMILY HISTORY  Family History   Problem Relation Age of Onset   • Diabetes Brother    • Liver cancer Brother    • Heart disease Mother    • Heart attack Mother    • Diabetes Mother    • Heart attack Father    • Other Sister      Brain tumor   • Cancer Sister    • Emphysema Brother    • Cancer Brother    • Alcohol abuse Brother    • Cancer Brother          SOCIAL HISTORY  Social History     Occupational History   •  Retired     Social History Main Topics   • Smoking status: Never Smoker   •  Smokeless tobacco: Never Used   • Alcohol use 1.2 - 1.8 oz/week     2 - 3 Cans of beer per week   • Drug use: No   • Sexual activity: Defer         CURRENT MEDICATIONS    Current Outpatient Prescriptions:   •  acetaminophen (TYLENOL) 325 MG tablet, Take 650 mg by mouth every 6 (six) hours as needed for mild pain (1-3)., Disp: , Rfl:   •  clotrimazole-betamethasone (LOTRISONE) 1-0.05 % cream, Apply 1 application topically Daily., Disp: , Rfl:   •  cyanocobalamin 1000 MCG/ML injection, 1,000 mcg Every 28 (Twenty-Eight) Days. Cyanocobalamin SOLN; Patient Sig: Cyanocobalamin SOLN ; 0; 06-Jun-2014; Active, Disp: , Rfl:   •  folic acid (FOLVITE) 400 MCG tablet, Take 400 mcg by mouth Daily., Disp: , Rfl:   •  lactobacillus acidophilus (RISAQUAD) capsule capsule, Take 1 capsule by mouth Daily., Disp: 30 capsule, Rfl: 0  •  metoprolol tartrate (LOPRESSOR) 25 MG tablet, Take 1 tablet by mouth Every 12 (Twelve) Hours., Disp: 60 tablet, Rfl: 1  •  MULTIPLE VITAMINS-MINERALS PO, Take 1 tablet by mouth Daily., Disp: , Rfl:   •  nitrofurantoin (MACRODANTIN) 100 MG capsule, Take 100 mg by mouth 4 (Four) Times a Day., Disp: , Rfl:   •  nitrofurantoin, macrocrystal-monohydrate, (MACROBID) 100 MG capsule, , Disp: , Rfl:   •  ondansetron (ZOFRAN) 4 MG tablet, Take 1 tablet by mouth Every 6 (Six) Hours As Needed for Nausea or Vomiting., Disp: 20 tablet, Rfl: 0  •  oxyCODONE-acetaminophen (PERCOCET) 5-325 MG per tablet, Take 1 tablet by mouth Every 12 (Twelve) Hours As Needed for Severe Pain ., Disp: 20 tablet, Rfl: 0  •  pantoprazole (PROTONIX) 40 MG EC tablet, Take 1 tablet by mouth Daily., Disp: 30 tablet, Rfl: 1  •  SANTYL 250 UNIT/GM ointment, , Disp: , Rfl: 0  •  sulfamethoxazole-trimethoprim (BACTRIM DS) 800-160 MG per tablet, Take 1 tablet by mouth 2 (Two) Times a Day for 7 days., Disp: 14 tablet, Rfl: 0  No current facility-administered medications for this visit.     ALLERGIES  Review of patient's allergies indicates no known  "allergies.    VITALS  Vitals:    09/13/17 0948   BP: 122/62   Weight: 138 lb (62.6 kg)   Height: 70\" (177.8 cm)       LAST RESULTS   Hospital Outpatient Visit on 09/12/2017   Component Date Value Ref Range Status   • Product Code 09/13/2017 U2642I13   Final   • Unit Number 09/13/2017 [id number]   Final   • UNIT  ABO 09/13/2017 O   Final   • UNIT  RH 09/13/2017 POS   Final   • CROSSMATCH 1 INTERPRETATION 09/13/2017 Compatible   Final   • Dispense Status 09/13/2017 PT   Final   • Blood Type 09/13/2017 OPOS   Final   • Blood Expiration Date 09/13/2017 201710122359   Final   • Blood Type Barcode 09/13/2017 5100   Final   • Product Code 09/13/2017 [id number]   Final   • Unit Number 09/13/2017 [id number]   Final   • UNIT  ABO 09/13/2017 O   Final   • UNIT  RH 09/13/2017 POS   Final   • CROSSMATCH 1 INTERPRETATION 09/13/2017 Compatible   Final   • Dispense Status 09/13/2017 PT   Final   • Blood Type 09/13/2017 OPOS   Final   • Blood Expiration Date 09/13/2017 201710122359   Final   • Blood Type Barcode 09/13/2017 5100   Final     Us Abdomen Complete    Result Date: 8/18/2017  Narrative: INDICATION: Mid to lower abdominal pain.  EXAM: Abdominal ultrasound, complete.  COMPARISON: CT abdomen 06/05/2017  FINDINGS: Visualized portions of the pancreas are within normal limits.  The echogenicity and echotexture of the hepatic parenchyma is within normal limits. No hepatic mass. The intrahepatic bile ducts are normal in caliber. The common duct is normal in size at the dorothy hepatis measuring 4 mm.  The gallbladder is normal. No gallstones.  The right kidney measures 11.2 cm. The left kidney measures 10.5 cm. Renal cortical thickness and echogenicity is normal. Small exophytic cyst off the mid left kidney measures 1 cm. No hydronephrosis.  The spleen is normal in size.   The abdominal aorta is normal in size.  The juxtahepatic IVC is within normal limits.  No ascites.      Impression: Negative abdominal ultrasound.  This " report was finalized on 8/18/2017 9:46 AM by Dr. Rafiq Blue MD.        PHYSICAL EXAM  Physical Exam   Constitutional: He is oriented to person, place, and time. He appears well-developed and well-nourished.   Cardiovascular: Normal rate, regular rhythm and normal heart sounds.    Pulmonary/Chest: Breath sounds normal.   Abdominal:   Soft, nondistended nontender positive bowel sounds in all 4 quadrants  Midline abdominal incision the small wounds have completely healed.  No incisional hernia palpable.   Musculoskeletal: He exhibits no edema.   Neurological: He is alert and oriented to person, place, and time.   Skin:   Left hand dorsum and the fourth and fifth metatarsal there is a 3 cm x 2 cm irregular shaped hard nodular mass.  Upon palpation there was some drainage obtained was cultured.  No erythema no induration.   Nursing note and vitals reviewed.      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 - Wounds have completely healed with secondary intention.  At this time I see no active/gross signs of infection.     History of intermittent fevers and recurrent UTI in patient with history of self-catheterization.  Most recent UA was negative.  He is scheduled to see Dr. Prince.  We'll defer to urology and infectious disease at this time.      Protein calorie malnutrition-discussed supplementing with boost or Ensure.   Patient canceled outpatient referral to nutrition -- reports he would like to hold off for now      Diarrhea s/p subtotal colectomy --   discussed with patient and his wife in detail -- diet modification and on Imodium.  Presently he reports he moves his bowels 3-4 times daily I have discussed with him that if he had more frequent bowel movements he could certainly increase the dosage of Imodium and then titrate. Once again extensive dietary counseling was done and  patient was advised to avoid triggers such as fatty foods.     Right hand skin lesion previous history of basal cell cancer.  Culture was obtained.  We'll follow-up and make recommendations regarding antibiotics.  Patient would like to have this excised.  Excision under local anesthesia discussed with the patient.  Pros and cons risks and benefits all discussed.  He would like to proceed.  We will schedule him at the [facility] under local anesthesia.  I did discuss the procedure, risks, benefits, complications including but not limited to risk of bleeding, infection, flap ischemia or necrosis, hematoma or seroma formation, risk of residual disease or recurrence requiring additional procedures as well as complications associated with local anesthetic.  Patient understood and gave verbal informed consent.     PLAN  We will schedule wide excision left hand skin mass under local anesthesia.  We'll follow-up on cultures and make recommendations regarding antibiotics once results are available.  Follow-up after procedure.  Patient was advised to call the office sooner should he have worsening symptoms or go to the nearest emergency room.

## 2017-09-15 PROBLEM — L98.9 HAND LESION: Status: ACTIVE | Noted: 2017-01-01

## 2017-09-15 NOTE — PAT
Pt history updated by phone, pt gave permission to review w/his wife.  Follows w/Dr Gustafson, lov 8/11/17.  Labs per guidelines/orders dos.

## 2017-09-19 PROBLEM — L98.9 HAND LESION: Status: ACTIVE | Noted: 2017-01-01

## 2017-09-19 NOTE — H&P (VIEW-ONLY)
PATIENT INFORMATION  Jason Zelaya   - 1934    CHIEF COMPLAINT  Chief Complaint   Patient presents with   • Follow-up   2 wk f/u, pt is w/o complaints, discuss removal of hand lesion     HISTORY OF PRESENT ILLNESS  HPI    Patient was as the office today for follow-up.  He reports he is doing well.  Denies any fevers chills nausea vomiting.  He reports 2-3 bowel movements daily.  He reports every time he has about 4-5 he will take an Imodium which seems to help.  Denies any abdominal pain.  He is scheduled to see urology.  He did complete the nitrofurantoin that was prescribed by urology.  According to his wife his abdominal wounds have completely healed.  They can no longer do the packing.  He is also here to discuss a lesion over the dorsum of the left hand.  He has a history of basal cell carcinoma that was excised by dermatology.  He reports his has increased in size and is painful and he has noted some drainage.  Denies any recent trauma.  He does have myelodysplastic syndrome.  Last H&H down to 7.7 did receive 2 units of PRBCs at the medical oncology office.    REVIEW OF SYSTEMS  Review of Systems   Constitutional: Negative.    Respiratory: Negative.    Cardiovascular: Negative.    Gastrointestinal: Negative.    Skin: Positive for color change and wound.         ACTIVE PROBLEMS  Patient Active Problem List    Diagnosis   • Acute blood loss anemia [D62]   • Rectal bleed [K62.5]   • AI (aortic incompetence) [I35.1]   • Bundle branch block, right [I45.10]   • MDS (myelodysplastic syndrome), low grade [D46.20]   • Vitamin B 12 deficiency [E53.8]   • Benign prostatic hyperplasia [N40.0]   • Hypertension [I10]   • Knee pain [M25.569]         PAST MEDICAL HISTORY  Past Medical History:   Diagnosis Date   • Aortic regurgitation    • Aortic valve insufficiency    • Arthritis     Bilateral knee   • Chest pain    • Diastolic dysfunction    • History of transfusion    • History of urinary retention    •  Hypertension    • MDS (myelodysplastic syndrome)    • RBBB (right bundle branch block)    • Self-catheterizes urinary bladder     3-4 times aday   • Skin cancer          SURGICAL HISTORY  Past Surgical History:   Procedure Laterality Date   • APPENDECTOMY     • BACK SURGERY  2008    fusion   • CHOLECYSTECTOMY     • COLON RESECTION N/A 5/26/2017    Procedure: sub-total colectomy with ileo-rectal anastamosis, mobilization of splenic  INTRAOPERATIVE COLONOSCOPY  ([id number]), rigid sigmoidoscopy;  Surgeon: Kaylie Granados MD;  Location:  [facility] OR;  Service:    • COLONOSCOPY N/A 2/1/2017    Procedure: COLONOSCOPY;  Surgeon: Bridger Aamdo MD;  Location: [facility] OR;  Service:    • COLONOSCOPY N/A 5/24/2017    Procedure: COLONOSCOPY w/ polypectomy;  Surgeon: Bridger Amado MD;  Location: [facility] OR;  Service:    • COLONOSCOPY N/A 5/24/2017    Procedure: COLONOSCOPY-return to surgery 2nd procedure, sclerotherapy with epi injection @ 40cm, placement of resolution clips X 2;  Surgeon: Bridger Amado MD;  Location: [facility] OR;  Service:    • ENDOSCOPY N/A 5/23/2017    Procedure: ESOPHAGOGASTRODUODENOSCOPY;  Surgeon: Bridger Amado MD;  Location: [facility] OR;  Service:    • EXPLORATORY LAPAROTOMY N/A 5/26/2017    Procedure: LAPAROTOMY EXPLORATORY - Explantation of old hernia mesh;  Surgeon: Kaylie Granados MD;  Location: [facility] OR;  Service:    • HERNIA REPAIR      x 2         FAMILY HISTORY  Family History   Problem Relation Age of Onset   • Diabetes Brother    • Liver cancer Brother    • Heart disease Mother    • Heart attack Mother    • Diabetes Mother    • Heart attack Father    • Other Sister      Brain tumor   • Cancer Sister    • Emphysema Brother    • Cancer Brother    • Alcohol abuse Brother    • Cancer Brother          SOCIAL HISTORY  Social History     Occupational History   •  Retired     Social History Main Topics   • Smoking status: Never Smoker   •  Smokeless tobacco: Never Used   • Alcohol use 1.2 - 1.8 oz/week     2 - 3 Cans of beer per week   • Drug use: No   • Sexual activity: Defer         CURRENT MEDICATIONS    Current Outpatient Prescriptions:   •  acetaminophen (TYLENOL) 325 MG tablet, Take 650 mg by mouth every 6 (six) hours as needed for mild pain (1-3)., Disp: , Rfl:   •  clotrimazole-betamethasone (LOTRISONE) 1-0.05 % cream, Apply 1 application topically Daily., Disp: , Rfl:   •  cyanocobalamin 1000 MCG/ML injection, 1,000 mcg Every 28 (Twenty-Eight) Days. Cyanocobalamin SOLN; Patient Sig: Cyanocobalamin SOLN ; 0; 06-Jun-2014; Active, Disp: , Rfl:   •  folic acid (FOLVITE) 400 MCG tablet, Take 400 mcg by mouth Daily., Disp: , Rfl:   •  lactobacillus acidophilus (RISAQUAD) capsule capsule, Take 1 capsule by mouth Daily., Disp: 30 capsule, Rfl: 0  •  metoprolol tartrate (LOPRESSOR) 25 MG tablet, Take 1 tablet by mouth Every 12 (Twelve) Hours., Disp: 60 tablet, Rfl: 1  •  MULTIPLE VITAMINS-MINERALS PO, Take 1 tablet by mouth Daily., Disp: , Rfl:   •  nitrofurantoin (MACRODANTIN) 100 MG capsule, Take 100 mg by mouth 4 (Four) Times a Day., Disp: , Rfl:   •  nitrofurantoin, macrocrystal-monohydrate, (MACROBID) 100 MG capsule, , Disp: , Rfl:   •  ondansetron (ZOFRAN) 4 MG tablet, Take 1 tablet by mouth Every 6 (Six) Hours As Needed for Nausea or Vomiting., Disp: 20 tablet, Rfl: 0  •  oxyCODONE-acetaminophen (PERCOCET) 5-325 MG per tablet, Take 1 tablet by mouth Every 12 (Twelve) Hours As Needed for Severe Pain ., Disp: 20 tablet, Rfl: 0  •  pantoprazole (PROTONIX) 40 MG EC tablet, Take 1 tablet by mouth Daily., Disp: 30 tablet, Rfl: 1  •  SANTYL 250 UNIT/GM ointment, , Disp: , Rfl: 0  •  sulfamethoxazole-trimethoprim (BACTRIM DS) 800-160 MG per tablet, Take 1 tablet by mouth 2 (Two) Times a Day for 7 days., Disp: 14 tablet, Rfl: 0  No current facility-administered medications for this visit.     ALLERGIES  Review of patient's allergies indicates no known  "allergies.    VITALS  Vitals:    09/13/17 0948   BP: 122/62   Weight: 138 lb (62.6 kg)   Height: 70\" (177.8 cm)       LAST RESULTS   Hospital Outpatient Visit on 09/12/2017   Component Date Value Ref Range Status   • Product Code 09/13/2017 [id number]   Final   • Unit Number 09/13/2017 [id number]   Final   • UNIT  ABO 09/13/2017 O   Final   • UNIT  RH 09/13/2017 POS   Final   • CROSSMATCH 1 INTERPRETATION 09/13/2017 Compatible   Final   • Dispense Status 09/13/2017 PT   Final   • Blood Type 09/13/2017 OPOS   Final   • Blood Expiration Date 09/13/2017 [id number]   Final   • Blood Type Barcode 09/13/2017 5100   Final   • Product Code 09/13/2017 H5923R01   Final   • Unit Number 09/13/2017 [id number]   Final   • UNIT  ABO 09/13/2017 O   Final   • UNIT  RH 09/13/2017 POS   Final   • CROSSMATCH 1 INTERPRETATION 09/13/2017 Compatible   Final   • Dispense Status 09/13/2017 PT   Final   • Blood Type 09/13/2017 OPOS   Final   • Blood Expiration Date 09/13/2017 [id number]   Final   • Blood Type Barcode 09/13/2017 5100   Final     Us Abdomen Complete    Result Date: 8/18/2017  Narrative: INDICATION: Mid to lower abdominal pain.  EXAM: Abdominal ultrasound, complete.  COMPARISON: CT abdomen 06/05/2017  FINDINGS: Visualized portions of the pancreas are within normal limits.  The echogenicity and echotexture of the hepatic parenchyma is within normal limits. No hepatic mass. The intrahepatic bile ducts are normal in caliber. The common duct is normal in size at the dorothy hepatis measuring 4 mm.  The gallbladder is normal. No gallstones.  The right kidney measures 11.2 cm. The left kidney measures 10.5 cm. Renal cortical thickness and echogenicity is normal. Small exophytic cyst off the mid left kidney measures 1 cm. No hydronephrosis.  The spleen is normal in size.   The abdominal aorta is normal in size.  The juxtahepatic IVC is within normal limits.  No ascites.      Impression: Negative abdominal ultrasound.  This " report was finalized on 8/18/2017 9:46 AM by Dr. Rafiq Blue MD.        PHYSICAL EXAM  Physical Exam   Constitutional: He is oriented to person, place, and time. He appears well-developed and well-nourished.   Cardiovascular: Normal rate, regular rhythm and normal heart sounds.    Pulmonary/Chest: Breath sounds normal.   Abdominal:   Soft, nondistended nontender positive bowel sounds in all 4 quadrants  Midline abdominal incision the small wounds have completely healed.  No incisional hernia palpable.   Musculoskeletal: He exhibits no edema.   Neurological: He is alert and oriented to person, place, and time.   Skin:   Left hand dorsum and the fourth and fifth metatarsal there is a 3 cm x 2 cm irregular shaped hard nodular mass.  Upon palpation there was some drainage obtained was cultured.  No erythema no induration.   Nursing note and vitals reviewed.      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 - Wounds have completely healed with secondary intention.  At this time I see no active/gross signs of infection.     History of intermittent fevers and recurrent UTI in patient with history of self-catheterization.  Most recent UA was negative.  He is scheduled to see Dr. Prince.  We'll defer to urology and infectious disease at this time.      Protein calorie malnutrition-discussed supplementing with boost or Ensure.   Patient canceled outpatient referral to nutrition -- reports he would like to hold off for now      Diarrhea s/p subtotal colectomy --   discussed with patient and his wife in detail -- diet modification and on Imodium.  Presently he reports he moves his bowels 3-4 times daily I have discussed with him that if he had more frequent bowel movements he could certainly increase the dosage of Imodium and then titrate. Once again extensive dietary counseling was done and  patient was advised to avoid triggers such as fatty foods.     Right hand skin lesion previous history of basal cell cancer.  Culture was obtained.  We'll follow-up and make recommendations regarding antibiotics.  Patient would like to have this excised.  Excision under local anesthesia discussed with the patient.  Pros and cons risks and benefits all discussed.  He would like to proceed.  We will schedule him at the [facility] under local anesthesia.  I did discuss the procedure, risks, benefits, complications including but not limited to risk of bleeding, infection, flap ischemia or necrosis, hematoma or seroma formation, risk of residual disease or recurrence requiring additional procedures as well as complications associated with local anesthetic.  Patient understood and gave verbal informed consent.     PLAN  We will schedule wide excision left hand skin mass under local anesthesia.  Bactrim DS 1 by mouth twice a day for 7 days was e- scribed to the pharmacy.  Will follow-up on cultures and tailor antibiotic therapy once results are available.  Follow-up after procedure.  Patient was advised to call the office sooner should he have worsening symptoms or go to the nearest emergency room.

## 2017-09-19 NOTE — PLAN OF CARE
Problem: Patient Care Overview (Adult)  Goal: Plan of Care Review  Outcome: Ongoing (interventions implemented as appropriate)    09/19/17 1315   Coping/Psychosocial Response Interventions   Plan Of Care Reviewed With patient;spouse   Patient Care Overview   Progress no change   Outcome Evaluation   Outcome Summary/Follow up Plan stable       Goal: Adult Individualization and Mutuality  Outcome: Ongoing (interventions implemented as appropriate)  Goal: Discharge Needs Assessment  Outcome: Ongoing (interventions implemented as appropriate)    Problem: Perioperative Period (Adult)  Goal: Signs and Symptoms of Listed Potential Problems Will be Absent or Manageable (Perioperative Period)  Outcome: Ongoing (interventions implemented as appropriate)

## 2017-09-19 NOTE — OP NOTE
Date of procedure: 9/19/17    Preoperative diagnosis:  Left hand mass history of basal cell carcinoma         MRSA infection left hand mass    Postoperative diagnosis: Same    Procedure: Wide excision left hand mass, creation of skin flaps and complex closure    Surgeon: Kaylie Granados M.D.  Assistant: Karthikeyan Cooper  Anesthesia: Local  EBL: 10 mL  Specimens: Left hand mass to permanent pathology  Drains: Quarter-inch Penrose  Condition: Stable  Complications: None    Indications for procedure: Patient is a 83-year-old male with a history of a left hand dorsal aspect basal cell carcinoma.  He was referred to general surgery for a large mass in this area with purulent drainage this was cultured and was positive for MRSA it was treated.  We then discussed surgical versus nonsurgical options.  Surgical option of a wide excision was discussed with the patient.  Procedure, risks, benefits, complications including but not limited to risk of bleeding, infection, hematoma formation, seroma formation, flap ischemia or necrosis, risk of residual disease, risk of recurrence as well as complications associated with anesthetic were thoroughly discussed with the patient understood and gave informed consent.  Please note due to his comorbidities it was decided to proceed with local anesthesia only.    Description of procedure: Patient was identified by me in the preoperative area brought to the operating suite.  Patient was laid supine in the operating table in arm was then placed on the arm table.  SCDs were applied bilateral lower extremity is.  After appropriate cardiopulmonary monitoring was instituted. Oxygen via nasal cannula was administered.  The left arm and hand were prepped and draped in usual sterile surgical fashion.  Local anesthetic was infiltrated.  A wide elliptical excision was performed.  The mass which was a tumor was on the dorsal aspect between the second and third metatarsal spaces.  The incision was deepened  through the dermis and into the subcutaneous tissue and the mass was then completely excised off of the tendon sheaths. The mass was a tumor. The mass measured 2.8 cm in length, 2.2 cm in width and 1 cm in depth.  It was ulcerated and irregular.  Wound was then irrigated and hemostasis was assured.  In light of the fact that this was positive for MRSA I opted to place a Penrose drain in the wound and also had to create flaps both laterally and medially as well as superiorly and inferiorly in order to be able to close the wound in a tension free manner.  Using Metzenbaum scissors superior, inferior, lateral and medial skin flaps were then created and enough mobilization was obtained that we were able to close the wound.  4-0 Vicryl was used in an interrupted fashion for subcutaneous closure and then 3-0 nylon was used for horizontal mattress sutures. The Penrose drain was fenestrated placed in the wound and exited through the inferior aspect and sutured to the skin with 3-0 nylon.  Wound was irrigated, cleansed and dried, hemostasis was assured antibiotic ointment and Vaseline gauze were applied to the incision followed then by application of a sterile wrap, a soft cast and Ace wrap were applied and patient was provided a sling.  Patient was then transferred to the operating room stretcher and taken to recovery room in stable condition.  Sponge, instrument, needle counts were all correct at the end of the procedure ×2.  Patient tolerated the procedure well.

## 2017-09-22 NOTE — TELEPHONE ENCOUNTER
FYI:  Drain site looks good and dressing changed and ace wrap was used, nursing care twice/week for two weeks.  Will re-evaluate then.

## 2017-09-26 NOTE — PROGRESS NOTES
Subjective    REASONS FOR FOLLOW-UP:   Low-grade myelodysplastic syndrome with chronic pancytopenia.       History of Present Illness    Mr. Zelaya is a pleasant 83-year-old man returning for follow-up of his myelodysplastic syndrome with chronic pancytopenia.  He currently is receiving monthly B12 injections and Procrit as needed when the hemoglobin drops below 10.0.     He comes in today continuing to do poorly.  He has abdominal pain, poor appetite, weight loss.  He required transfusion of 2 units packed red blood cells 2 weeks ago.  He has intermittent fever but no obvious infection.  He had a recent squamous cell skin cancer resected from the left hand.        PAST MEDICAL HISTORY:    1. Arthritis.  2. Urinary retention.  3. Aortic insufficiency  4. Myelodysplastic syndrome  5. Squamous cell skin cancer resected from the left hand September 2017    PAST SURGICAL HISTORY:    1. Back fusion in 2008.    2. Hernia surgery.    3. Cholecystectomy.  4. Knee arthroscopic surgery.      SOCIAL HISTORY:  .  Retired, worked in sheet metals.  Never smoked.  Alcohol consumption of approximately a 6 pack per week.  No risk factors for HIV.    FAMILY HISTORY: Negative for hematologic disorders. Sister had brain tumor. A brother had liver cancer.    Review of Systems   Constitutional: Positive for fatigue and unexpected weight change. Negative for activity change and fever.   Respiratory: Negative for cough and shortness of breath.    Cardiovascular: Negative for palpitations and leg swelling.   Gastrointestinal: Positive for abdominal pain. Negative for abdominal distention, diarrhea, nausea, rectal pain and vomiting.        Poor appetite   Endocrine: Positive for cold intolerance. Negative for heat intolerance.   Musculoskeletal: Positive for arthralgias and gait problem.        Right shoulder pain   Neurological: Positive for weakness.   Hematological: Negative for adenopathy. Does not bruise/bleed easily.      A  comprehensive 14 point review of systems was performed and was negative except as mentioned.    Medications:  The current medication list was reviewed in the EMR    ALLERGIES:  No Known Allergies    Objective    Temp 100.8  /61  Sat 95% RA        Physical Exam   Constitutional: No distress.   Ill and weak appearing   HENT:   Head: Atraumatic.   Eyes: No scleral icterus.   Cardiovascular: Normal rate.    Murmur heard.  Pulmonary/Chest: Effort normal and breath sounds normal. No respiratory distress.   Abdominal: Soft. He exhibits no distension and no mass. There is no tenderness. There is no rebound and no guarding.   Abdominal wound dressed    Musculoskeletal:   Decreased ROM right shoulder   Skin: Skin is warm. No erythema. There is pallor.   Left hand is bandaged   Psychiatric: He has a normal mood and affect.          RECENT LABS:  Hematology WBC   Date Value Ref Range Status   09/26/2017 5.29 4.80 - 10.80 10*3/mm3 Final     RBC   Date Value Ref Range Status   09/26/2017 2.76 (L) 4.70 - 6.10 10*6/mm3 Final     Hemoglobin   Date Value Ref Range Status   09/26/2017 9.7 (L) 14.0 - 18.0 g/dL Final     Hematocrit   Date Value Ref Range Status   09/26/2017 29.1 (L) 42.0 - 52.0 % Final     Platelets   Date Value Ref Range Status   09/26/2017 156 140 - 500 10*3/mm3 Final       Lab Results   Component Value Date    GLUCOSE 91 09/19/2017    BUN 13 09/19/2017    CREATININE 1.02 09/19/2017    EGFRIFNONA 70 09/19/2017    EGFRIFAFRI  09/01/2016      Comment:      <15 Indicative of kidney failure.    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    His hemoglobin is 9.7  today after receiving 2 units of packed red blood cells 2 weeks ago.  We will increase his Procrit dose to 50,000 units and continue to check the CBC every 2 weeks.  He is currently not neutropenic or thrombocytopenic.  I'll reassess his iron studies in 2 months.        9/26/2017      CC:

## 2017-09-27 NOTE — PROGRESS NOTES
PATIENT INFORMATION  Jason Zelaya   - 1934    CHIEF COMPLAINT  Chief Complaint   Patient presents with   • Post-op Follow-up   1 wk 1 day s/p exc hand lesion, c/o some swelling but otherwise well    HISTORY OF PRESENT ILLNESS  HPI  Patient was as the office today status post wide excision of a left hand skin lesion.  He reports he is doing well.  Denies any fevers, chills, nausea, vomiting.  He reports that has been some swelling.  There has been some scant serous drainage through the Penrose daily.  Home health has been helping with dressing change.  He is completing the antibiotics for MRSA wound infection in the skin lesion.      REVIEW OF SYSTEMS  Review of Systems  As per history of present illness    ACTIVE PROBLEMS  Patient Active Problem List    Diagnosis   • Hand lesion [L98.9]     Overview Note:     Added automatically from request for surgery [id number]     • Acute blood loss anemia [D62]   • Rectal bleed [K62.5]   • AI (aortic incompetence) [I35.1]   • Bundle branch block, right [I45.10]   • MDS (myelodysplastic syndrome), low grade [D46.20]   • Vitamin B 12 deficiency [E53.8]   • Benign prostatic hyperplasia [N40.0]   • Hypertension [I10]   • Knee pain [M25.569]         PAST MEDICAL HISTORY  Past Medical History:   Diagnosis Date   • Aortic regurgitation    • Aortic valve insufficiency    • Arthritis     Bilateral knee   • Basal cell carcinoma of left hand     sched excision   • Chest pain    • Diastolic dysfunction    • History of GI diverticular bleed 2017   • History of transfusion     last 17 2 units   • History of urinary retention    • History of vertigo    • Hx MRSA infection 2017    + culture abadominal wound   • Hypertension    • MDS (myelodysplastic syndrome)    • RBBB (right bundle branch block)    • Self-catheterizes urinary bladder     3-4 times aday   • Skin cancer          SURGICAL HISTORY  Past Surgical History:   Procedure Laterality Date   • APPENDECTOMY     •  BACK SURGERY  2008    fusion   • CHOLECYSTECTOMY     • COLON RESECTION N/A 5/26/2017    Procedure: sub-total colectomy with ileo-rectal anastamosis, mobilization of splenic  INTRAOPERATIVE COLONOSCOPY  ([id number]), rigid sigmoidoscopy;  Surgeon: Kaylie Granados MD;  Location:  [facility] OR;  Service:    • COLONOSCOPY N/A 2/1/2017    Procedure: COLONOSCOPY;  Surgeon: Bridger Amado MD;  Location: [facility] OR;  Service:    • COLONOSCOPY N/A 5/24/2017    Procedure: COLONOSCOPY w/ polypectomy;  Surgeon: Bridger Amado MD;  Location:  [facility] OR;  Service:    • COLONOSCOPY N/A 5/24/2017    Procedure: COLONOSCOPY-return to surgery 2nd procedure, sclerotherapy with epi injection @ 40cm, placement of resolution clips X 2;  Surgeon: Bridger Amado MD;  Location: [facility] OR;  Service:    • ENDOSCOPY N/A 5/23/2017    Procedure: ESOPHAGOGASTRODUODENOSCOPY;  Surgeon: Bridger Amado MD;  Location: [facility] OR;  Service:    • EXCISION MASS ARM Left 9/19/2017    Procedure: EXCISION MASS UPPER EXTREMITY  --  wide excision left hand mass;  Surgeon: Kaylie Granados MD;  Location: [facility] OR;  Service:    • EXPLORATORY LAPAROTOMY N/A 5/26/2017    Procedure: LAPAROTOMY EXPLORATORY - Explantation of old hernia mesh;  Surgeon: Kaylie Granados MD;  Location: [facility] OR;  Service:    • HERNIA REPAIR      umbilical, inguinal          FAMILY HISTORY  Family History   Problem Relation Age of Onset   • Diabetes Brother    • Liver cancer Brother    • Heart disease Mother    • Heart attack Mother    • Diabetes Mother    • Heart attack Father    • Other Sister      Brain tumor   • Cancer Sister    • Emphysema Brother    • Cancer Brother    • Alcohol abuse Brother    • Cancer Brother          SOCIAL HISTORY  Social History     Occupational History   •  Retired     Social History Main Topics   • Smoking status: Never Smoker   • Smokeless tobacco: Never Used   • Alcohol use 1.2 - 1.8 oz/week      2 - 3 Cans of beer per week   • Drug use: No   • Sexual activity: Defer         CURRENT MEDICATIONS    Current Outpatient Prescriptions:   •  acetaminophen (TYLENOL) 325 MG tablet, Take 650 mg by mouth every 6 (six) hours as needed for mild pain (1-3)., Disp: , Rfl:   •  clotrimazole-betamethasone (LOTRISONE) 1-0.05 % cream, Apply 1 application topically As Needed., Disp: , Rfl:   •  cyanocobalamin 1000 MCG/ML injection, 1,000 mcg Every 28 (Twenty-Eight) Days. Cyanocobalamin SOLN; Patient Sig: Cyanocobalamin SOLN ; 0; 06-Jun-2014; Active, Disp: , Rfl:   •  folic acid (FOLVITE) 400 MCG tablet, Take 400 mcg by mouth Daily., Disp: , Rfl:   •  lactobacillus acidophilus (RISAQUAD) capsule capsule, Take 1 capsule by mouth Daily., Disp: 30 capsule, Rfl: 0  •  metoprolol tartrate (LOPRESSOR) 25 MG tablet, Take 1 tablet by mouth Every 12 (Twelve) Hours., Disp: 60 tablet, Rfl: 1  •  MULTIPLE VITAMINS-MINERALS PO, Take 1 tablet by mouth Daily., Disp: , Rfl:   •  nitroglycerin (NITROSTAT) 0.4 MG SL tablet, Place 0.4 mg under the tongue Every 5 (Five) Minutes As Needed for Chest Pain. Take no more than 3 doses in 15 minutes., Disp: , Rfl:   •  ondansetron (ZOFRAN) 4 MG tablet, Take 1 tablet by mouth Every 6 (Six) Hours As Needed for Nausea or Vomiting., Disp: 20 tablet, Rfl: 0  •  ondansetron (ZOFRAN) 4 MG tablet, Take 1 tablet by mouth Every 8 (Eight) Hours As Needed for Nausea or Vomiting., Disp: 20 tablet, Rfl: 0  •  oxyCODONE-acetaminophen (PERCOCET) 5-325 MG per tablet, Take 1 tablet by mouth Every 12 (Twelve) Hours As Needed for Severe Pain ., Disp: 20 tablet, Rfl: 0  •  oxyCODONE-acetaminophen (PERCOCET) 5-325 MG per tablet, Take 1 tablet by mouth Every 8 (Eight) Hours As Needed for Severe Pain  (Pain)., Disp: 30 tablet, Rfl: 0  •  pantoprazole (PROTONIX) 40 MG EC tablet, Take 1 tablet by mouth Daily., Disp: 30 tablet, Rfl: 1  •  sulfamethoxazole-trimethoprim (BACTRIM DS) 800-160 MG per tablet, Take 1 tablet by mouth  "2 (Two) Times a Day for 7 days., Disp: 14 tablet, Rfl: 0    ALLERGIES  Review of patient's allergies indicates no known allergies.    VITALS  Vitals:    09/27/17 1129   BP: 98/52   Weight: 135 lb (61.2 kg)   Height: 70\" (177.8 cm)       LAST RESULTS   Lab on 09/26/2017   Component Date Value Ref Range Status   • WBC 09/26/2017 5.29  4.80 - 10.80 10*3/mm3 Final   • RBC 09/26/2017 2.76* 4.70 - 6.10 10*6/mm3 Final   • Hemoglobin 09/26/2017 9.7* 14.0 - 18.0 g/dL Final   • Hematocrit 09/26/2017 29.1* 42.0 - 52.0 % Final   • MCV 09/26/2017 105.4* 80.0 - 94.0 fL Final   • MCH 09/26/2017 35.1* 27.0 - 31.0 pg Final   • MCHC 09/26/2017 33.3  31.0 - 37.0 g/dL Final   • RDW 09/26/2017 20.9* 11.5 - 14.5 % Final   • RDW-SD 09/26/2017 79.3* 37.0 - 54.0 fl Final   • MPV 09/26/2017 11.5* 7.4 - 10.4 fL Final   • Platelets 09/26/2017 156  140 - 500 10*3/mm3 Final   • Neutrophil % 09/26/2017 40.9* 45.0 - 70.0 % Final   • Lymphocyte % 09/26/2017 25.3  20.0 - 45.0 % Final   • Monocyte % 09/26/2017 32.1* 3.0 - 8.0 % Final   • Eosinophil % 09/26/2017 0.0  0.0 - 4.0 % Final   • Basophil % 09/26/2017 0.4  0.0 - 2.0 % Final   • Immature Grans % 09/26/2017 1.3* 0.0 - 0.5 % Final   • Neutrophils, Absolute 09/26/2017 2.16  1.50 - 8.30 10*3/mm3 Final   • Lymphocytes, Absolute 09/26/2017 1.34  0.60 - 4.80 10*3/mm3 Final   • Monocytes, Absolute 09/26/2017 1.70* 0.00 - 1.00 10*3/mm3 Final   • Eosinophils, Absolute 09/26/2017 0.00* 0.10 - 0.30 10*3/mm3 Final   • Basophils, Absolute 09/26/2017 0.02  0.00 - 0.20 10*3/mm3 Final   • Immature Grans, Absolute 09/26/2017 0.07* 0.00 - 0.03 10*3/mm3 Final   • nRBC 09/26/2017 0.0  0.0 - 0.0 /100 WBC Final     No results found.    PHYSICAL EXAM  Physical Exam   Constitutional: He appears well-developed and well-nourished.   Cardiovascular: Normal rate, regular rhythm and normal heart sounds.    Pulmonary/Chest: Breath sounds normal.   Abdominal: Soft. Bowel sounds are normal.   Skin:   Left hand dorsal aspect " dressing removed incision is clean dry intact Penrose is intact there is some scant serosanguineous drainage.  There is some mild soft tissue swelling.  I elected not to remove any of the stitches at this time as I'm concerned about possible superficial wound dehiscence.  Wound was cleansed antibiotic ointment and dressings applied.   Nursing note and vitals reviewed.      ASSESSMENT  Status post wide excision of a left hand skin lesion.  Operative and pathology results discussed with the patient.  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    Continue local wound care.    PLAN  Follow-up 10/2/17.  Patient was advised to call the office sooner should he have worsening symptoms or go to the nearest emergency room.

## 2017-10-02 NOTE — PROGRESS NOTES
PATIENT INFORMATION  Jason Zelaya   - 1934    CHIEF COMPLAINT  Chief Complaint   Patient presents with   • Post-op Follow-up   1 wk 6 days s/p exc hand lesion, area is healing well    HISTORY OF PRESENT ILLNESS  HPI    Patient presents to the office today for follow-up.  He reports he is doing well.  He is completing the Bactrim.  He has resumed normal household activities.  Home health has been helping with dressing change.  His wife reports there is been no drainage from the Penrose drain.    REVIEW OF SYSTEMS  Review of Systems  As per history of present illness    ACTIVE PROBLEMS  Patient Active Problem List    Diagnosis   • Hand lesion [L98.9]     Overview Note:     Added automatically from request for surgery [id number]     • Acute blood loss anemia [D62]   • Rectal bleed [K62.5]   • AI (aortic incompetence) [I35.1]   • Bundle branch block, right [I45.10]   • MDS (myelodysplastic syndrome), low grade [D46.20]   • Vitamin B 12 deficiency [E53.8]   • Benign prostatic hyperplasia [N40.0]   • Hypertension [I10]   • Knee pain [M25.569]         PAST MEDICAL HISTORY  Past Medical History:   Diagnosis Date   • Aortic regurgitation    • Aortic valve insufficiency    • Arthritis     Bilateral knee   • Basal cell carcinoma of left hand     sched excision   • Chest pain    • Diastolic dysfunction    • History of GI diverticular bleed 2017   • History of transfusion     last 17 2 units   • History of urinary retention    • History of vertigo    • Hx MRSA infection 2017    + culture abadominal wound   • Hypertension    • MDS (myelodysplastic syndrome)    • RBBB (right bundle branch block)    • Self-catheterizes urinary bladder     3-4 times aday   • Skin cancer          SURGICAL HISTORY  Past Surgical History:   Procedure Laterality Date   • APPENDECTOMY     • BACK SURGERY      fusion   • CHOLECYSTECTOMY     • COLON RESECTION N/A 2017    Procedure: sub-total colectomy with ileo-rectal  anastamosis, mobilization of splenic  INTRAOPERATIVE COLONOSCOPY  ([id number]), rigid sigmoidoscopy;  Surgeon: Kaylie Granados MD;  Location: [facility] OR;  Service:    • COLONOSCOPY N/A 2/1/2017    Procedure: COLONOSCOPY;  Surgeon: Bridger Amado MD;  Location: [facility] OR;  Service:    • COLONOSCOPY N/A 5/24/2017    Procedure: COLONOSCOPY w/ polypectomy;  Surgeon: Bridger Amado MD;  Location: [facility] OR;  Service:    • COLONOSCOPY N/A 5/24/2017    Procedure: COLONOSCOPY-return to surgery 2nd procedure, sclerotherapy with epi injection @ 40cm, placement of resolution clips X 2;  Surgeon: Bridger Amado MD;  Location: [facility] OR;  Service:    • ENDOSCOPY N/A 5/23/2017    Procedure: ESOPHAGOGASTRODUODENOSCOPY;  Surgeon: Bridger Amado MD;  Location: [facility] OR;  Service:    • EXCISION MASS ARM Left 9/19/2017    Procedure: EXCISION MASS UPPER EXTREMITY  --  wide excision left hand mass;  Surgeon: Kaylie Granados MD;  Location: [facility] OR;  Service:    • EXPLORATORY LAPAROTOMY N/A 5/26/2017    Procedure: LAPAROTOMY EXPLORATORY - Explantation of old hernia mesh;  Surgeon: Kaylie Granados MD;  Location: [facility] OR;  Service:    • HERNIA REPAIR      umbilical, inguinal          FAMILY HISTORY  Family History   Problem Relation Age of Onset   • Diabetes Brother    • Liver cancer Brother    • Heart disease Mother    • Heart attack Mother    • Diabetes Mother    • Heart attack Father    • Other Sister      Brain tumor   • Cancer Sister    • Emphysema Brother    • Cancer Brother    • Alcohol abuse Brother    • Cancer Brother          SOCIAL HISTORY  Social History     Occupational History   •  Retired     Social History Main Topics   • Smoking status: Never Smoker   • Smokeless tobacco: Never Used   • Alcohol use 1.2 - 1.8 oz/week     2 - 3 Cans of beer per week   • Drug use: No   • Sexual activity: Defer         CURRENT MEDICATIONS    Current Outpatient  Prescriptions:   •  acetaminophen (TYLENOL) 325 MG tablet, Take 650 mg by mouth every 6 (six) hours as needed for mild pain (1-3)., Disp: , Rfl:   •  clotrimazole-betamethasone (LOTRISONE) 1-0.05 % cream, Apply 1 application topically As Needed., Disp: , Rfl:   •  cyanocobalamin 1000 MCG/ML injection, 1,000 mcg Every 28 (Twenty-Eight) Days. Cyanocobalamin SOLN; Patient Sig: Cyanocobalamin SOLN ; 0; 06-Jun-2014; Active, Disp: , Rfl:   •  folic acid (FOLVITE) 400 MCG tablet, Take 400 mcg by mouth Daily., Disp: , Rfl:   •  lactobacillus acidophilus (RISAQUAD) capsule capsule, Take 1 capsule by mouth Daily., Disp: 30 capsule, Rfl: 0  •  metoprolol tartrate (LOPRESSOR) 25 MG tablet, Take 1 tablet by mouth Every 12 (Twelve) Hours., Disp: 60 tablet, Rfl: 1  •  MULTIPLE VITAMINS-MINERALS PO, Take 1 tablet by mouth Daily., Disp: , Rfl:   •  nitroglycerin (NITROSTAT) 0.4 MG SL tablet, Place 0.4 mg under the tongue Every 5 (Five) Minutes As Needed for Chest Pain. Take no more than 3 doses in 15 minutes., Disp: , Rfl:   •  ondansetron (ZOFRAN) 4 MG tablet, Take 1 tablet by mouth Every 6 (Six) Hours As Needed for Nausea or Vomiting., Disp: 20 tablet, Rfl: 0  •  ondansetron (ZOFRAN) 4 MG tablet, Take 1 tablet by mouth Every 8 (Eight) Hours As Needed for Nausea or Vomiting., Disp: 20 tablet, Rfl: 0  •  oxyCODONE-acetaminophen (PERCOCET) 5-325 MG per tablet, Take 1 tablet by mouth Every 12 (Twelve) Hours As Needed for Severe Pain ., Disp: 20 tablet, Rfl: 0  •  oxyCODONE-acetaminophen (PERCOCET) 5-325 MG per tablet, Take 1 tablet by mouth Every 8 (Eight) Hours As Needed for Severe Pain  (Pain)., Disp: 30 tablet, Rfl: 0  •  pantoprazole (PROTONIX) 40 MG EC tablet, Take 1 tablet by mouth Daily., Disp: 30 tablet, Rfl: 1  •  sulfamethoxazole-trimethoprim (BACTRIM DS) 800-160 MG per tablet, Take 1 tablet by mouth 2 (Two) Times a Day for 7 days., Disp: 14 tablet, Rfl: 0    ALLERGIES  Review of patient's allergies indicates no known  "allergies.    VITALS  Vitals:    10/02/17 0943   BP: 110/72   Weight: 135 lb (61.2 kg)   Height: 70\" (177.8 cm)       LAST RESULTS   Lab on 09/26/2017   Component Date Value Ref Range Status   • WBC 09/26/2017 5.29  4.80 - 10.80 10*3/mm3 Final   • RBC 09/26/2017 2.76* 4.70 - 6.10 10*6/mm3 Final   • Hemoglobin 09/26/2017 9.7* 14.0 - 18.0 g/dL Final   • Hematocrit 09/26/2017 29.1* 42.0 - 52.0 % Final   • MCV 09/26/2017 105.4* 80.0 - 94.0 fL Final   • MCH 09/26/2017 35.1* 27.0 - 31.0 pg Final   • MCHC 09/26/2017 33.3  31.0 - 37.0 g/dL Final   • RDW 09/26/2017 20.9* 11.5 - 14.5 % Final   • RDW-SD 09/26/2017 79.3* 37.0 - 54.0 fl Final   • MPV 09/26/2017 11.5* 7.4 - 10.4 fL Final   • Platelets 09/26/2017 156  140 - 500 10*3/mm3 Final   • Neutrophil % 09/26/2017 40.9* 45.0 - 70.0 % Final   • Lymphocyte % 09/26/2017 25.3  20.0 - 45.0 % Final   • Monocyte % 09/26/2017 32.1* 3.0 - 8.0 % Final   • Eosinophil % 09/26/2017 0.0  0.0 - 4.0 % Final   • Basophil % 09/26/2017 0.4  0.0 - 2.0 % Final   • Immature Grans % 09/26/2017 1.3* 0.0 - 0.5 % Final   • Neutrophils, Absolute 09/26/2017 2.16  1.50 - 8.30 10*3/mm3 Final   • Lymphocytes, Absolute 09/26/2017 1.34  0.60 - 4.80 10*3/mm3 Final   • Monocytes, Absolute 09/26/2017 1.70* 0.00 - 1.00 10*3/mm3 Final   • Eosinophils, Absolute 09/26/2017 0.00* 0.10 - 0.30 10*3/mm3 Final   • Basophils, Absolute 09/26/2017 0.02  0.00 - 0.20 10*3/mm3 Final   • Immature Grans, Absolute 09/26/2017 0.07* 0.00 - 0.03 10*3/mm3 Final   • nRBC 09/26/2017 0.0  0.0 - 0.0 /100 WBC Final     No results found.    PHYSICAL EXAM  Physical Exam   Constitutional: He appears well-developed and well-nourished.   Cardiovascular: Normal rate, regular rhythm and normal heart sounds.    Pulmonary/Chest: Breath sounds normal.   Musculoskeletal:   Left hand dorsum incision healing well Penrose and stitches removed, Steri-Strips applied.   Nursing note and vitals reviewed.      ASSESSMENT    Status post wide excision of a " left hand skin lesion.  Operative and pathology results discussed with the patient.  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  He did have MRSA infection in that lesion.  He is completing Bactrim.  At this time I don't see any gross signs of infection.     Continue local wound care.    PLAN  Follow-up 2 weeks.  Patient was advised to call the office sooner should he have worsening symptoms or go to the nearest emergency room.

## 2017-10-16 NOTE — PROGRESS NOTES
PATIENT INFORMATION  Jason Zelaya   - 1934    CHIEF COMPLAINT  Chief Complaint   Patient presents with   • Follow-up     2 WK F/U, C/O NIGHT SWEATS, INCISION IS HEALING WELL    HISTORY OF PRESENT ILLNESS  HPI    Patient presents the office for follow-up.  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  He reports he is doing well.  The incision is healing well.  Reports the swelling has completely resolved.  Does complain of night sweats.  He does have myelodysplastic syndrome.  He follows with medical oncology.  Last hemoglobin was 9.7 and 29.1.  He also received Procrit.  He reports as far as his diarrhea is concerned its better he will have 3-4 bowel movements daily.  He does have abdominal cramping.  He reports occasionally the abdominal cramping is unbearable and he does need to take pain medication.  He has run out of his oral pain medicines.  He is requesting some pain medication of the office visit today.    REVIEW OF SYSTEMS  Review of Systems  As per history of present illness.    ACTIVE PROBLEMS  Patient Active Problem List    Diagnosis   • Hand lesion [L98.9]     Overview Note:     Added automatically from request for surgery [id number]     • Acute blood loss anemia [D62]   • Rectal bleed [K62.5]   • AI (aortic incompetence) [I35.1]   • Bundle branch block, right [I45.10]   • MDS (myelodysplastic syndrome), low grade [D46.20]   • Vitamin B 12 deficiency [E53.8]   • Benign prostatic hyperplasia [N40.0]   • Hypertension [I10]   • Knee pain [M25.569]         PAST MEDICAL HISTORY  Past Medical History:   Diagnosis Date   • Aortic regurgitation    • Aortic valve insufficiency    • Arthritis     Bilateral knee   • Basal cell carcinoma of left hand     sched excision   • Chest pain    • Diastolic dysfunction    • History of GI diverticular bleed 2017   • History of transfusion     last 17 2 units   • History of urinary retention    • History of vertigo    •  Hx MRSA infection 07/2017    + culture abadominal wound   • Hypertension    • MDS (myelodysplastic syndrome)    • RBBB (right bundle branch block)    • Self-catheterizes urinary bladder     3-4 times aday   • Skin cancer          SURGICAL HISTORY  Past Surgical History:   Procedure Laterality Date   • APPENDECTOMY     • BACK SURGERY  2008    fusion   • CHOLECYSTECTOMY     • COLON RESECTION N/A 5/26/2017    Procedure: sub-total colectomy with ileo-rectal anastamosis, mobilization of splenic  INTRAOPERATIVE COLONOSCOPY  ([id number]), rigid sigmoidoscopy;  Surgeon: Kaylie Granados MD;  Location: [facility] OR;  Service:    • COLONOSCOPY N/A 2/1/2017    Procedure: COLONOSCOPY;  Surgeon: Bridger Amado MD;  Location: [facility] OR;  Service:    • COLONOSCOPY N/A 5/24/2017    Procedure: COLONOSCOPY w/ polypectomy;  Surgeon: Bridger Amado MD;  Location: [facility] OR;  Service:    • COLONOSCOPY N/A 5/24/2017    Procedure: COLONOSCOPY-return to surgery 2nd procedure, sclerotherapy with epi injection @ 40cm, placement of resolution clips X 2;  Surgeon: Bridger Amado MD;  Location: [facility] OR;  Service:    • ENDOSCOPY N/A 5/23/2017    Procedure: ESOPHAGOGASTRODUODENOSCOPY;  Surgeon: Bridger Amado MD;  Location: [facility] OR;  Service:    • EXCISION MASS ARM Left 9/19/2017    Procedure: EXCISION MASS UPPER EXTREMITY  --  wide excision left hand mass;  Surgeon: Kaylie Granados MD;  Location: [facility] OR;  Service:    • EXPLORATORY LAPAROTOMY N/A 5/26/2017    Procedure: LAPAROTOMY EXPLORATORY - Explantation of old hernia mesh;  Surgeon: Kaylie Granados MD;  Location: [facility] OR;  Service:    • HERNIA REPAIR      umbilical, inguinal          FAMILY HISTORY  Family History   Problem Relation Age of Onset   • Diabetes Brother    • Liver cancer Brother    • Heart disease Mother    • Heart attack Mother    • Diabetes Mother    • Heart attack Father    • Other Sister      Brain tumor   • Cancer  "Sister    • Emphysema Brother    • Cancer Brother    • Alcohol abuse Brother    • Cancer Brother          SOCIAL HISTORY  Social History     Occupational History   •  Retired     Social History Main Topics   • Smoking status: Never Smoker   • Smokeless tobacco: Never Used   • Alcohol use 1.2 - 1.8 oz/week     2 - 3 Cans of beer per week   • Drug use: No   • Sexual activity: Defer         CURRENT MEDICATIONS    Current Outpatient Prescriptions:   •  acetaminophen (TYLENOL) 325 MG tablet, Take 650 mg by mouth every 6 (six) hours as needed for mild pain (1-3)., Disp: , Rfl:   •  clotrimazole-betamethasone (LOTRISONE) 1-0.05 % cream, Apply 1 application topically As Needed., Disp: , Rfl:   •  cyanocobalamin 1000 MCG/ML injection, 1,000 mcg Every 28 (Twenty-Eight) Days. Cyanocobalamin SOLN; Patient Sig: Cyanocobalamin SOLN ; 0; 06-Jun-2014; Active, Disp: , Rfl:   •  folic acid (FOLVITE) 400 MCG tablet, Take 400 mcg by mouth Daily., Disp: , Rfl:   •  lactobacillus acidophilus (RISAQUAD) capsule capsule, Take 1 capsule by mouth Daily., Disp: 30 capsule, Rfl: 0  •  metoprolol tartrate (LOPRESSOR) 25 MG tablet, Take 1 tablet by mouth Every 12 (Twelve) Hours., Disp: 60 tablet, Rfl: 1  •  MULTIPLE VITAMINS-MINERALS PO, Take 1 tablet by mouth Daily., Disp: , Rfl:   •  nitroglycerin (NITROSTAT) 0.4 MG SL tablet, Place 0.4 mg under the tongue Every 5 (Five) Minutes As Needed for Chest Pain. Take no more than 3 doses in 15 minutes., Disp: , Rfl:   •  oxyCODONE-acetaminophen (PERCOCET) 5-325 MG per tablet, Take 1 tablet by mouth Every 8 (Eight) Hours As Needed for Severe Pain  (Pain)., Disp: 10 tablet, Rfl: 0  •  pantoprazole (PROTONIX) 40 MG EC tablet, Take 1 tablet by mouth Daily., Disp: 30 tablet, Rfl: 1    ALLERGIES  Review of patient's allergies indicates no known allergies.    VITALS  Vitals:    10/16/17 0928   BP: 98/60   Weight: 136 lb (61.7 kg)   Height: 70\" (177.8 cm)       LAST RESULTS   Lab on " 10/10/2017   Component Date Value Ref Range Status   • WBC 10/10/2017 6.15  4.80 - 10.80 10*3/mm3 Final   • RBC 10/10/2017 2.49* 4.70 - 6.10 10*6/mm3 Final   • Hemoglobin 10/10/2017 8.8* 14.0 - 18.0 g/dL Final   • Hematocrit 10/10/2017 27.1* 42.0 - 52.0 % Final   • MCV 10/10/2017 108.8* 80.0 - 94.0 fL Final   • MCH 10/10/2017 35.3* 27.0 - 31.0 pg Final   • MCHC 10/10/2017 32.5  31.0 - 37.0 g/dL Final   • RDW 10/10/2017 22.0* 11.5 - 14.5 % Final   • RDW-SD 10/10/2017 87.1* 37.0 - 54.0 fl Final   • MPV 10/10/2017 11.0* 7.4 - 10.4 fL Final   • Platelets 10/10/2017 109* 140 - 500 10*3/mm3 Final   • Neutrophil % 10/10/2017 55.2  45.0 - 70.0 % Final   • Lymphocyte % 10/10/2017 17.4* 20.0 - 45.0 % Final   • Monocyte % 10/10/2017 26.3* 3.0 - 8.0 % Final   • Eosinophil % 10/10/2017 0.2  0.0 - 4.0 % Final   • Basophil % 10/10/2017 0.2  0.0 - 2.0 % Final   • Immature Grans % 10/10/2017 0.7* 0.0 - 0.5 % Final   • Neutrophils, Absolute 10/10/2017 3.40  1.50 - 8.30 10*3/mm3 Final   • Lymphocytes, Absolute 10/10/2017 1.07  0.60 - 4.80 10*3/mm3 Final   • Monocytes, Absolute 10/10/2017 1.62* 0.00 - 1.00 10*3/mm3 Final   • Eosinophils, Absolute 10/10/2017 0.01* 0.10 - 0.30 10*3/mm3 Final   • Basophils, Absolute 10/10/2017 0.01  0.00 - 0.20 10*3/mm3 Final   • Immature Grans, Absolute 10/10/2017 0.04* 0.00 - 0.03 10*3/mm3 Final   • nRBC 10/10/2017 0.0  0.0 - 0.0 /100 WBC Final     No results found.    PHYSICAL EXAM  Physical Exam   Constitutional: He is oriented to person, place, and time.   Cardiovascular: Normal rate, regular rhythm and normal heart sounds.    Pulmonary/Chest: Breath sounds normal.   Abdominal:   Soft, nondistended nontender positive bowel sounds in all 4 quadrants   Musculoskeletal:   Left hand dorsum-incision well healed, clean dry intact.  No recurrent tumor lesion or nodule palpable   Neurological: He is alert and oriented to person, place, and time.   Nursing note and vitals reviewed.      ASSESSMENT  Status  post wide excision left dorsal hand skin lesion-pathology consistent with poorly differentiated invasive squamous cell carcinoma, margins negative.  We'll continue to closely follow this time.  Myelodysplastic syndrome with pancytopenia  History of subtotal colectomy for recurrent GI bleed.  Once again discussed with patient post colectomy diarrhea is not unusual.  Once again I went over diet modification and advised him that if needed he could take Imodium.    I did give him a prescription for Percocet 5/325 mg one by mouth 4 times a day when necessary severe pain #10 no refills  I advised the patient to contact his primary care physician for further pain management and that we may need to consider a referral to pain specialist.  Patient verbalized understanding.    PLAN  Follow-up 2 months.  Patient was advised to call the office sooner should he have worsening symptoms or go to the nearest emergency room.

## 2017-11-07 NOTE — PROGRESS NOTES
Pt complains of abd pain, wife called Dr. Granados about pain in abd, which is worse today than normal. Dr. Granados office said  To go to ER if he could not tolerate the pain. Pt denied GI bleeding.     Pt given 50,000 units of procrit. HGB 8.1 today. Per Dr. Green pt to return next week for a RN review with [facility]. Spouse was instructed to call 911 or go to ER if pt develop's increased symptoms, SOA or S/S of bleeding. Pt and Spouse V/U.   Lab Results   Component Value Date    WBC 7.58 11/07/2017    HGB 8.1 (L) 11/07/2017    HCT 26.3 (L) 11/07/2017    .4 (H) 11/07/2017     (L) 11/07/2017

## 2017-11-10 NOTE — PROGRESS NOTES
PATIENT INFORMATION  Jason Zelaya   - 1934    CHIEF COMPLAINT  Chief Complaint   Patient presents with   • Post-op Follow-up   • Abdominal Pain   ABD pain radiating from side to side    HISTORY OF PRESENT ILLNESS  HPI  Patient presents to the office today for follow-up.  He complains of periodic abdominal pain radiating to bilateral flanks.  He reports the pain is a constant dull ache and lasts pretty much throughout the day.  He reports he only time that it resolves as when he lays down but takes any narcotic pain medication.  He denies any aggravating or relieving factors.  He denies any associated nausea vomiting alternating bowel movements.  He is status post subtotal colectomy does have on an average 3 bowel movements per day that he describes as being semisolid.  He denies any loose watery stools/diarrhea and denies melena hematochezia.  He does have history of recurrent urinary tract infections.  He does self catheterize but denies any urinary symptoms at this time He does complain of fatigue.  His wife is concerned as his appetite has been poor and he has lost weight recently.  He has myelodysplastic syndrome with chronic pancytopenia.  He follows with Dr. Green.  Most recent H&H noted.  He is on Procrit every weekly.  He most recently also had basal cell carcinoma left hand excised-margins were negative.    REVIEW OF SYSTEMS  Review of Systems   Constitutional: Positive for fatigue and unexpected weight change.   Respiratory: Negative.    Cardiovascular: Negative.    Gastrointestinal: Positive for abdominal pain. Negative for anal bleeding, constipation, diarrhea, nausea and vomiting.   Genitourinary: Negative.    Musculoskeletal: Positive for back pain.   Neurological: Negative.    Hematological: Bruises/bleeds easily.         ACTIVE PROBLEMS  Patient Active Problem List    Diagnosis   • Hand lesion [L98.9]     Overview Note:     Added automatically from request for surgery [id number]     •  Acute blood loss anemia [D62]   • Rectal bleed [K62.5]   • AI (aortic incompetence) [I35.1]   • Bundle branch block, right [I45.10]   • MDS (myelodysplastic syndrome), low grade [D46.20]   • Vitamin B 12 deficiency [E53.8]   • Benign prostatic hyperplasia [N40.0]   • Hypertension [I10]   • Knee pain [M25.569]         PAST MEDICAL HISTORY  Past Medical History:   Diagnosis Date   • Aortic regurgitation    • Aortic valve insufficiency    • Arthritis     Bilateral knee   • Basal cell carcinoma of left hand     sched excision   • Chest pain    • Diastolic dysfunction    • History of GI diverticular bleed 05/2017   • History of transfusion     last 9/12/17 2 units   • History of urinary retention    • History of vertigo    • Hx MRSA infection 07/2017    + culture abadominal wound   • Hypertension    • MDS (myelodysplastic syndrome)    • RBBB (right bundle branch block)    • Self-catheterizes urinary bladder     3-4 times aday   • Skin cancer          SURGICAL HISTORY  Past Surgical History:   Procedure Laterality Date   • APPENDECTOMY     • BACK SURGERY  2008    fusion   • CHOLECYSTECTOMY     • COLON RESECTION N/A 5/26/2017    Procedure: sub-total colectomy with ileo-rectal anastamosis, mobilization of splenic  INTRAOPERATIVE COLONOSCOPY  ([id number]), rigid sigmoidoscopy;  Surgeon: Kaylie Granados MD;  Location: [facility] OR;  Service:    • COLONOSCOPY N/A 2/1/2017    Procedure: COLONOSCOPY;  Surgeon: Bridger Amado MD;  Location: [facility] OR;  Service:    • COLONOSCOPY N/A 5/24/2017    Procedure: COLONOSCOPY w/ polypectomy;  Surgeon: Bridger Amado MD;  Location: [facility] OR;  Service:    • COLONOSCOPY N/A 5/24/2017    Procedure: COLONOSCOPY-return to surgery 2nd procedure, sclerotherapy with epi injection @ 40cm, placement of resolution clips X 2;  Surgeon: Bridger Amado MD;  Location: [facility] OR;  Service:    • ENDOSCOPY N/A 5/23/2017    Procedure: ESOPHAGOGASTRODUODENOSCOPY;  Surgeon:  Bridger Amado MD;  Location: [facility] OR;  Service:    • EXCISION MASS ARM Left 9/19/2017    Procedure: EXCISION MASS UPPER EXTREMITY  --  wide excision left hand mass;  Surgeon: Kaylie Granados MD;  Location:  [facility] OR;  Service:    • EXPLORATORY LAPAROTOMY N/A 5/26/2017    Procedure: LAPAROTOMY EXPLORATORY - Explantation of old hernia mesh;  Surgeon: Kaylie Granados MD;  Location:  [facility] OR;  Service:    • HERNIA REPAIR      umbilical, inguinal          FAMILY HISTORY  Family History   Problem Relation Age of Onset   • Diabetes Brother    • Liver cancer Brother    • Heart disease Mother    • Heart attack Mother    • Diabetes Mother    • Heart attack Father    • Other Sister      Brain tumor   • Cancer Sister    • Emphysema Brother    • Cancer Brother    • Alcohol abuse Brother    • Cancer Brother          SOCIAL HISTORY  Social History     Occupational History   •  Retired     Social History Main Topics   • Smoking status: Never Smoker   • Smokeless tobacco: Never Used   • Alcohol use 1.2 - 1.8 oz/week     2 - 3 Cans of beer per week   • Drug use: No   • Sexual activity: Defer         CURRENT MEDICATIONS    Current Outpatient Prescriptions:   •  acetaminophen (TYLENOL) 325 MG tablet, Take 650 mg by mouth every 6 (six) hours as needed for mild pain (1-3)., Disp: , Rfl:   •  clotrimazole-betamethasone (LOTRISONE) 1-0.05 % cream, Apply 1 application topically As Needed., Disp: , Rfl:   •  cyanocobalamin 1000 MCG/ML injection, 1,000 mcg Every 28 (Twenty-Eight) Days. Cyanocobalamin SOLN; Patient Sig: Cyanocobalamin SOLN ; 0; 06-Jun-2014; Active, Disp: , Rfl:   •  folic acid (FOLVITE) 400 MCG tablet, Take 400 mcg by mouth Daily., Disp: , Rfl:   •  hyoscyamine (ANASPAZ,LEVSIN) 0.125 MG tablet, , Disp: , Rfl:   •  lactobacillus acidophilus (RISAQUAD) capsule capsule, Take 1 capsule by mouth Daily., Disp: 30 capsule, Rfl: 0  •  metoprolol tartrate (LOPRESSOR) 25 MG tablet, Take 1 tablet by  "mouth Every 12 (Twelve) Hours., Disp: 60 tablet, Rfl: 1  •  MULTIPLE VITAMINS-MINERALS PO, Take 1 tablet by mouth Daily., Disp: , Rfl:   •  nitrofurantoin, macrocrystal-monohydrate, (MACROBID) 100 MG capsule, , Disp: , Rfl:   •  oxyCODONE-acetaminophen (PERCOCET) 5-325 MG per tablet, Take 1 tablet by mouth Every 8 (Eight) Hours As Needed for Severe Pain  (Pain)., Disp: 10 tablet, Rfl: 0  •  pantoprazole (PROTONIX) 40 MG EC tablet, Take 1 tablet by mouth Daily., Disp: 30 tablet, Rfl: 1    ALLERGIES  Review of patient's allergies indicates no known allergies.    VITALS  Vitals:    11/10/17 0857   BP: 92/58   Weight: 132 lb (59.9 kg)   Height: 70\" (177.8 cm)       LAST RESULTS   Lab on 11/07/2017   Component Date Value Ref Range Status   • WBC 11/07/2017 7.58  4.80 - 10.80 10*3/mm3 Final   • RBC 11/07/2017 2.26* 4.70 - 6.10 10*6/mm3 Final   • Hemoglobin 11/07/2017 8.1* 14.0 - 18.0 g/dL Final   • Hematocrit 11/07/2017 26.3* 42.0 - 52.0 % Final   • MCV 11/07/2017 116.4* 80.0 - 94.0 fL Final   • MCH 11/07/2017 35.8* 27.0 - 31.0 pg Final   • MCHC 11/07/2017 30.8* 31.0 - 37.0 g/dL Final   • RDW 11/07/2017 21.3* 11.5 - 14.5 % Final   • RDW-SD 11/07/2017 88.3* 37.0 - 54.0 fl Final   • MPV 11/07/2017 11.8* 7.4 - 10.4 fL Final   • Platelets 11/07/2017 122* 140 - 500 10*3/mm3 Final   • Neutrophil % 11/07/2017 54.3  45.0 - 70.0 % Final   • Lymphocyte % 11/07/2017 17.4* 20.0 - 45.0 % Final   • Monocyte % 11/07/2017 26.9* 3.0 - 8.0 % Final   • Eosinophil % 11/07/2017 0.0  0.0 - 4.0 % Final   • Basophil % 11/07/2017 0.3  0.0 - 2.0 % Final   • Immature Grans % 11/07/2017 1.1* 0.0 - 0.5 % Final   • Neutrophils, Absolute 11/07/2017 4.12  1.50 - 8.30 10*3/mm3 Final   • Lymphocytes, Absolute 11/07/2017 1.32  0.60 - 4.80 10*3/mm3 Final   • Monocytes, Absolute 11/07/2017 2.04* 0.00 - 1.00 10*3/mm3 Final   • Eosinophils, Absolute 11/07/2017 0.00* 0.10 - 0.30 10*3/mm3 Final   • Basophils, Absolute 11/07/2017 0.02  0.00 - 0.20 10*3/mm3 Final "   • Immature Grans, Absolute 11/07/2017 0.08* 0.00 - 0.03 10*3/mm3 Final   • nRBC 11/07/2017 0.0  0.0 - 0.0 /100 WBC Final     No results found.    PHYSICAL EXAM  Physical Exam   Constitutional: He is oriented to person, place, and time.   Chronically ill-appearing   HENT:   Head: Normocephalic and atraumatic.   Eyes: No scleral icterus.   Neck: Normal range of motion. Neck supple.   Cardiovascular: Normal rate, regular rhythm and normal heart sounds.    Pulmonary/Chest: Breath sounds normal.   Abdominal:   Soft, nondistended, mild tenderness around the umbilicus.  No rebound, no guarding no rigidity.  Well-healed surgical scar   Neurological: He is alert and oriented to person, place, and time.   Psychiatric: He has a normal mood and affect. His behavior is normal.   Nursing note and vitals reviewed.      ASSESSMENT  Abdominal pain  Status post subtotal colectomy with ileorectal anastomosis for recurrent lower GI bleed-diverticular bleed  Myelodysplastic syndrome  Pancytopenia  Protein calorie malnutrition (patient has refused nutrition consult)  Weight loss    Discussed with patient and wife will obtain CT scan abdomen and pelvis with oral and IV contrast  Will check BMP prior to that.    RX for megace was e-scribed     PLAN  Follow-up after CT.  Patient was advised to call the office sooner should he have worsening symptoms or go to the nearest emergency room.

## 2017-11-14 NOTE — PROGRESS NOTES
NURSING PROGRESS NOTE: Patient arrived to [facility] for Blood Transfusion at 1200.  Patient assisted to bed and made comfortable.  Consent for transfusion signed.  Medications and history reviewed and updated in MD office this HAJA Hudson RN

## 2017-11-14 NOTE — PROGRESS NOTES
1630 1ST UNIT OF BLOOD COMPLETE WITHOUT PROBLEMS. 2ND UNIT OF BLOOD STARTED. REPORT TO PHYLLIS ANTUNEZ RN. PT. DENIES C/O. AVS PRINTED & REVIEWED WITH SPOUSE. PHYLLIS WARREN RN

## 2017-11-14 NOTE — PATIENT INSTRUCTIONS
"  Call Dr. Anupam Green, [facility] at [phone] if you have any problems or concerns.    We know you have a Choice in healthcare and appreciate you using [facility].  Our purpose is to provide you \"Excellent Care\".  We hope that you will always choose us in the future and continue to recommend us to your family and friends.                Blood Transfusion, Care After  These instructions give you information about caring for yourself after your procedure. Your doctor may also give you more specific instructions. Call your doctor if you have any problems or questions after your procedure.   HOME CARE  · Take medicines only as told by your doctor. Ask your doctor if you can take an over-the-counter pain reliever if you have a fever or headache a day or two after your procedure.  · Return to your normal activities as told by your doctor.  GET HELP IF:   · You develop redness or irritation at your IV site.  · You have a fever, chills, or a headache that does not go away.  · Your pee (urine) is darker than normal.  · Your urine turns:    Pink.    Red.    Brown.  · The white part of your eye turns yellow (jaundice).  · You feel weak after doing your normal activities.  GET HELP RIGHT AWAY IF:   · You have trouble breathing.  · You have fever and chills and you also have:    Anxiety.    Chest or back pain.    Flushed or pink skin.    Clammy or sweaty skin.    A fast heartbeat.    A sick feeling in your stomach (nausea).     This information is not intended to replace advice given to you by your health care provider. Make sure you discuss any questions you have with your health care provider.     Document Released: 01/08/2016 Document Reviewed: 01/08/2016  Elsevier Interactive Patient Education ©2017 [employer]      "

## 2017-11-14 NOTE — PROGRESS NOTES
Pt here for CBC review. Because hgb is 7.1, EPO injection was held and the pt was instead scheduled for a blood transfusion at [facility] this afternoon. Pt will then have a CT scan tomorrow and then see Dr. Granados on Friday, 11/17/17 for CT results and followup of abdominal pain.The pt will followup as scheduled in one week for recheck of CBC with possible procrit injection.Pt and spouse v/u of above.  Lab Results   Component Value Date    WBC 5.40 11/14/2017    HGB 7.1 (C) 11/14/2017    HCT 23.7 (C) 11/14/2017    .2 (H) 11/14/2017     11/14/2017

## 2017-11-15 NOTE — PROGRESS NOTES
Patient blood completed without issue, vitals obtained, IV discontinued, patient given AVS & discharged home in wheelchair with spouse via private auto. Norris BE

## 2017-11-15 NOTE — PROGRESS NOTES
Assessment/Plan   Assessment & Plan    Subjective   Subjective  Temp:  [97.3 °F (36.3 °C)-98 °F (36.7 °C)] 98 °F (36.7 °C)  Heart Rate:  [52-59] 55  Resp:  [16-18] 16  BP: (106-127)/(52-61) 127/55  @XICPBZ9KJZJFC()@  @IOTHISSHIFT()@    Objective   Objective  Active Problems:    * No active hospital problems. *

## 2017-11-16 NOTE — PROGRESS NOTES
Just informed by lab that stool sample was positive for C. Difficile colitis.  Flagyl was e- scribed yesterday for 10 days.  I have e scribed an additional 11 days to make it a 21 day course.  Patient was informed.

## 2017-11-17 NOTE — TELEPHONE ENCOUNTER
Shanti confirmed that pt was taking flagyl and levofloxacin for c. Diff, gave her appt date and time for Monday, however, 15 minutes or so later she calls back and states patient does not want to come to [place] for the appt. Just an FYI. [place]

## 2017-11-17 NOTE — PATIENT INSTRUCTIONS
Patient has been scheduled with Dr. Prince in [place] on 12/28/17 @ 10:45. Due to patients driving restrictions. I informed them they could be seen sooner at another location.

## 2017-11-17 NOTE — TELEPHONE ENCOUNTER
Can you please let Dr. Granados's office know that he is refusing follow up with us and that c diff treatment is flagyl 500mg PO TID x 14 days or vancomycin 125mg PO QID x 14 days. No need for levaquin. Thanks

## 2017-11-17 NOTE — TELEPHONE ENCOUNTER
Ok to schedule for 11/20 - can you please verufy that the pt was started on c diff treatment - thanks

## 2017-11-17 NOTE — TELEPHONE ENCOUNTER
Spoke with pt's wife, per Dr. Granados we had set up appt with Dr. Sade Ma on Monday November 20th @ 10:40 and pt refused to go to appt so I called Dr. Ma's office to cancel.

## 2017-11-17 NOTE — TELEPHONE ENCOUNTER
Dr. Granados is requesting a follow up appt with you for this patient. He states patient has recently developed c. Diff. Your next available is Monday, 11/20 or 11/29. Thanks. [place]

## 2017-11-17 NOTE — PROGRESS NOTES
PATIENT INFORMATION  Jason Zelaya   - 1934    CHIEF COMPLAINT  Chief Complaint   Patient presents with   • Follow-up     F/U CT AND LABS    HISTORY OF PRESENT ILLNESS  HPI  Patient was as the office today for follow-up and to discuss CT scan and laboratory results.  He complains of cramping abdominal pain.  He reports it is mostly infraumbilical.  He is having 3-5 bowel movements a day at the he describes them as being semisolid/loose.  Denies any mucus, melena or hematochezia.  Denies any nausea vomiting, fevers chills.  CT scan was concerning for proctitis and ileitis involving terminal ileum and at the site of his ileorectal anastomosis, also of note there is significant bladder wall thickening concerning for cystitis.  He does have a history of chronic urinary  retention-and does straight catheter 3 times a day.  He follows with Dr. Prince of urology.  I did order further testing i.e. urine analysis, stool cultures.  His stool culture has been positive for C. difficile colitis.  He is currently on Flagyl 500 mg by mouth 3 times a day ×21 days.  He was also started on a 10 day course of Levaquin in light of recurrent urinary tract infection.        REVIEW OF SYSTEMS  Review of Systems   Constitutional: Negative.    Respiratory: Negative.    Cardiovascular: Negative.    Gastrointestinal: Positive for abdominal pain and diarrhea.         ACTIVE PROBLEMS  Patient Active Problem List    Diagnosis   • Hand lesion [L98.9]     Overview Note:     Added automatically from request for surgery [id number]     • Acute blood loss anemia [D62]   • Rectal bleed [K62.5]   • AI (aortic incompetence) [I35.1]   • Bundle branch block, right [I45.10]   • MDS (myelodysplastic syndrome), low grade [D46.20]   • Vitamin B 12 deficiency [E53.8]   • Benign prostatic hyperplasia [N40.0]   • Hypertension [I10]   • Knee pain [M25.569]         PAST MEDICAL HISTORY  Past Medical History:   Diagnosis Date   • Aortic regurgitation     • Aortic valve insufficiency    • Arthritis     Bilateral knee   • Basal cell carcinoma of left hand     sched excision   • Chest pain    • Diastolic dysfunction    • History of GI diverticular bleed 05/2017   • History of transfusion     last 9/12/17 2 units   • History of urinary retention    • History of vertigo    • Hx MRSA infection 07/2017    + culture abadominal wound   • Hypertension    • MDS (myelodysplastic syndrome)    • RBBB (right bundle branch block)    • Self-catheterizes urinary bladder     3-4 times aday   • Skin cancer          SURGICAL HISTORY  Past Surgical History:   Procedure Laterality Date   • APPENDECTOMY     • BACK SURGERY  2008    fusion   • CHOLECYSTECTOMY     • COLON RESECTION N/A 5/26/2017    Procedure: sub-total colectomy with ileo-rectal anastamosis, mobilization of splenic  INTRAOPERATIVE COLONOSCOPY  ([id number]), rigid sigmoidoscopy;  Surgeon: Kaylie Granados MD;  Location: [facility] OR;  Service:    • COLONOSCOPY N/A 2/1/2017    Procedure: COLONOSCOPY;  Surgeon: Bridger Amado MD;  Location: [facility] OR;  Service:    • COLONOSCOPY N/A 5/24/2017    Procedure: COLONOSCOPY w/ polypectomy;  Surgeon: Birdger Amado MD;  Location: [facility] OR;  Service:    • COLONOSCOPY N/A 5/24/2017    Procedure: COLONOSCOPY-return to surgery 2nd procedure, sclerotherapy with epi injection @ 40cm, placement of resolution clips X 2;  Surgeon: Bridger Amado MD;  Location: [facility] OR;  Service:    • ENDOSCOPY N/A 5/23/2017    Procedure: ESOPHAGOGASTRODUODENOSCOPY;  Surgeon: Bridger Amado MD;  Location: [facility] OR;  Service:    • EXCISION MASS ARM Left 9/19/2017    Procedure: EXCISION MASS UPPER EXTREMITY  --  wide excision left hand mass;  Surgeon: Kaylie Granados MD;  Location: [facility] OR;  Service:    • EXPLORATORY LAPAROTOMY N/A 5/26/2017    Procedure: LAPAROTOMY EXPLORATORY - Explantation of old hernia mesh;  Surgeon: Kaylie Granados MD;  Location: [facility]  OR;  Service:    • HERNIA REPAIR      umbilical, inguinal          FAMILY HISTORY  Family History   Problem Relation Age of Onset   • Diabetes Brother    • Liver cancer Brother    • Heart disease Mother    • Heart attack Mother    • Diabetes Mother    • Heart attack Father    • Other Sister      Brain tumor   • Cancer Sister    • Emphysema Brother    • Cancer Brother    • Alcohol abuse Brother    • Cancer Brother          SOCIAL HISTORY  Social History     Occupational History   •  Retired     Social History Main Topics   • Smoking status: Never Smoker   • Smokeless tobacco: Never Used   • Alcohol use 1.2 - 1.8 oz/week     2 - 3 Cans of beer per week   • Drug use: No   • Sexual activity: Defer         CURRENT MEDICATIONS    Current Outpatient Prescriptions:   •  acetaminophen (TYLENOL) 325 MG tablet, Take 650 mg by mouth every 6 (six) hours as needed for mild pain (1-3)., Disp: , Rfl:   •  clotrimazole-betamethasone (LOTRISONE) 1-0.05 % cream, Apply 1 application topically As Needed., Disp: , Rfl:   •  cyanocobalamin 1000 MCG/ML injection, 1,000 mcg Every 28 (Twenty-Eight) Days. Cyanocobalamin SOLN; Patient Sig: Cyanocobalamin SOLN ; 0; 06-Jun-2014; Active, Disp: , Rfl:   •  folic acid (FOLVITE) 400 MCG tablet, Take 400 mcg by mouth Daily., Disp: , Rfl:   •  hyoscyamine (ANASPAZ,LEVSIN) 0.125 MG tablet, , Disp: , Rfl:   •  lactobacillus acidophilus (RISAQUAD) capsule capsule, Take 1 capsule by mouth Daily., Disp: 30 capsule, Rfl: 0  •  levoFLOXacin (LEVAQUIN) 500 MG tablet, Take 1 tablet by mouth Daily for 10 days., Disp: 10 tablet, Rfl: 0  •  megestrol (MEGACE ORAL) 40 MG/ML suspension, Take 20 mL by mouth Daily for 30 days., Disp: 600 mL, Rfl: 0  •  metoprolol tartrate (LOPRESSOR) 25 MG tablet, Take 1 tablet by mouth Every 12 (Twelve) Hours., Disp: 60 tablet, Rfl: 1  •  metroNIDAZOLE (FLAGYL) 500 MG tablet, Take 1 tablet by mouth 3 (Three) Times a Day for 11 days., Disp: 33 tablet, Rfl: 0  •   "MULTIPLE VITAMINS-MINERALS PO, Take 1 tablet by mouth Daily., Disp: , Rfl:   •  nitrofurantoin, macrocrystal-monohydrate, (MACROBID) 100 MG capsule, , Disp: , Rfl:   •  oxyCODONE-acetaminophen (PERCOCET) 5-325 MG per tablet, Take 1 tablet by mouth Every 8 (Eight) Hours As Needed for Severe Pain  (Pain)., Disp: 10 tablet, Rfl: 0  •  pantoprazole (PROTONIX) 40 MG EC tablet, Take 1 tablet by mouth Daily., Disp: 30 tablet, Rfl: 1    ALLERGIES  Review of patient's allergies indicates no known allergies.    VITALS  Vitals:    11/17/17 1013   BP: 102/60   Weight: 131 lb (59.4 kg)   Height: 70\" (177.8 cm)       LAST RESULTS   Lab on 11/16/2017   Component Date Value Ref Range Status   • Color, UA 11/16/2017 Dark Yellow* Yellow, Straw Final   • Appearance, UA 11/16/2017 Clear  Clear Final   • pH, UA 11/16/2017 6.5  4.5 - 8.0 Final   • Specific Gravity, UA 11/16/2017 1.010  1.003 - 1.030 Final   • Glucose, UA 11/16/2017 Negative  Negative Final   • Ketones, UA 11/16/2017 Negative  Negative, 80 mg/dL (3+), >=160 mg/dL (4+) Final   • Bilirubin, UA 11/16/2017 Negative  Negative Final   • Blood, UA 11/16/2017 Small (1+)* Negative Final   • Protein, UA 11/16/2017 100 mg/dL (2+)* Negative Final   • Leuk Esterase, UA 11/16/2017 Negative  Negative Final   • Nitrite, UA 11/16/2017 Negative  Negative Final   • Urobilinogen, UA 11/16/2017 1.0 E.U./dL  0.2 - 1.0 E.U./dL Final   • RBC, UA 11/16/2017 6-12* None Seen /HPF Final   • WBC, UA 11/16/2017 0-2* None Seen /HPF Final   • Bacteria, UA 11/16/2017 1+* None Seen /HPF Final   • Squamous Epithelial Cells, UA 11/16/2017 None Seen  None Seen, 0-2 /HPF Final   • Hyaline Casts, UA 11/16/2017 0-2  None Seen /LPF Final   • Methodology 11/16/2017 Manual Light Microscopy   Final   • Urine Culture 11/16/2017 Culture in progress   Preliminary     Ct Abdomen Pelvis With Contrast    Result Date: 11/15/2017  Narrative: CT ABDOMEN AND PELVIS WITH CONTRAST, 11/15/2017:  HISTORY: 83-year-old male status " post subtotal colectomy with ileorectal anastomosis May 2017 four bleeding colonic diverticular disease. He has been having intermittent abdominal pain and flank pain since his surgery. Weight loss. He has a history of myelodysplastic syndrome.  TECHNIQUE: CT examination of the abdomen and pelvis Radiation dose reduction techniques were utilized, including automated exposure control and exposure modulation based on body size.  ABDOMEN FINDINGS: Postoperative changes of subtotal colectomy with ileorectal anastomosis in the mid pelvis. Extensive inflammatory soft tissue stranding is present throughout the lower abdomen and pelvis, and there is significant wall thickening involving the rectosigmoid colon below the anastomosis with soft tissue stranding in pericolonic fat. Distal ileal segments within the pelvis also show wall thickening. The findings suggest active proctocolitis and distal ileitis. No definite abscess or other drainable fluid collection is visible. GI contrast material is not present within the distal ileum or colon. There is no proximal bowel dilatation to suggest obstruction.  Mild splenomegaly appears slightly increased since 06/05/2017 the spleen previously measuring about 11.9 cm and currently measuring about 13.1 cm. Liver, pancreas, spleen and kidneys are otherwise unremarkable. Small right adrenal nodule is stable.  PELVIS FINDINGS: Diffuse wall thickening involving the urinary bladder may reflect primary or secondary inflammation. No evidence of upper urinary tract obstruction. Mild prostate enlargement.  Limited lung base images show mild-to-moderate diffuse pulmonary fibrosis.      Impression: 1. Postop changes near total colectomy with ileorectal anastomosis. 2. Active proctocolitis and likely distal ileitis with moderately severe inflammatory changes throughout the lower abdomen and pelvis. No abscess or other drainable fluid collection is visible. No free intraperitoneal air. No evidence  of bowel obstruction. 3. Diffuse thickening of the urinary bladder wall may reflect primary or secondary cystitis. No upper urinary tract obstruction. 4. Mild splenomegaly has increased since 06/05/2017. History of myelodysplastic syndrome. 5. Stat final copy of this report was sent to the ordering physician's office immediately following this dictation with telephone notification and documentation.  This report was finalized on 11/15/2017 4:26 PM by Dr. Chencho Henderson MD.        PHYSICAL EXAM  Physical Exam   Constitutional: He appears well-developed and well-nourished.   Eyes: No scleral icterus.   Cardiovascular: Normal rate, regular rhythm and normal heart sounds.    Pulmonary/Chest: Breath sounds normal.   Abdominal:   Soft, nondistended nontender positive bowel sounds in all 4 quadrants.  Well-healed midline scar   Musculoskeletal: He exhibits no edema.   Nursing note and vitals reviewed.      ASSESSMENT  C. difficile colitis  Ileal proctitis per CT scan  Continue by mouth Flagyl  At probiotic-list of over-the-counter probiotics was given to the patient and his wife  Discussed with him that we should probably get into seen both Dr. Sade Ma infectious disease as well as Dr. Amado.  Patient is agreeable.    Cystitis,  History of recurrent urinary tract infections  History of self-catheterization  UTI-continue Levaquin ×10 days  Final culture still pending-we'll follow-up on those  Discussed with patient will also arrange for him to see Dr. Prince    PLAN  Follow-up 3 weeks after therapy for C. difficile completed.  Patient was advised to call the office sooner should he have worsening symptoms or go to the nearest emergency room.

## 2017-11-18 NOTE — TELEPHONE ENCOUNTER
Informed by office staff that due to driving restrictions patient is refusing to go to [facility] to see Dr. Sade Ma.

## 2017-11-20 NOTE — TELEPHONE ENCOUNTER
Passed along Dr Ma's message to Shanti, Dr. Granados's M.A., she said she would pass that alond to Dr. Granados. [place]

## 2017-12-15 PROBLEM — N39.0 ACUTE UTI: Status: ACTIVE | Noted: 2017-01-01

## 2017-12-16 PROBLEM — N39.0 ACUTE LOWER UTI (URINARY TRACT INFECTION): Status: ACTIVE | Noted: 2017-01-01

## 2017-12-20 PROBLEM — K63.2 ENTEROENTERIC FISTULA: Status: ACTIVE | Noted: 2017-01-01

## 2017-12-20 PROBLEM — N32.1 COLOVESICAL FISTULA: Status: ACTIVE | Noted: 2017-01-01

## 2017-12-21 NOTE — ANESTHESIA PREPROCEDURE EVALUATION
Anesthesia Evaluation     Patient summary reviewed and Nursing notes reviewed   no history of anesthetic complications:  NPO Solid Status: < 2 hours  NPO Liquid Status: > 8 hours     Airway   Mallampati: II  TM distance: >3 FB  Neck ROM: full  no difficulty expected  Dental    (+) edentulous    Pulmonary - normal exam    breath sounds clear to auscultation  (+) shortness of breath ( with exertion, walking long distance),   Cardiovascular - normal exam    ECG reviewed  Patient on routine beta blocker and Beta blocker given within 24 hours of surgery  Rhythm: regular  Rate: normal    (+) hypertension, valvular problems/murmurs ( aortic regurg, RBBB), dysrhythmias,       Neuro/Psych  GI/Hepatic/Renal/Endo    (+)  GERD well controlled,     Musculoskeletal     (+) back pain ( back surgery 10 years ago),   Abdominal    Substance History      OB/GYN negative ob/gyn ROS         Other   (+) blood dyscrasia ( myelodysplastic syndrome), arthritis   history of cancer ( pre-leukemia)    ROS/Med Hx Other: Pt reports eating  White life savers at noon per Dr. Granados's ok.    Right AC triple lumen picc line    SINUS RHYTHM  (RLAFB) . RBBB AND LAFB  (NSC) * NO SIGNIFICANT CHANGE FROM PREVIOUS ECG    Metoprolol at 0926 this am                                Anesthesia Plan    ASA 3     general     intravenous induction   Anesthetic plan and risks discussed with patient.  Use of blood products discussed with patient  Consented to blood products.

## 2017-12-21 NOTE — ANESTHESIA PROCEDURE NOTES
Airway  Urgency: elective    Airway not difficult    General Information and Staff    Patient location during procedure: OR  CRNA: BLANK [facility]    Indications and Patient Condition  Indications for airway management: airway protection    Preoxygenated: yes  MILS maintained throughout  Mask difficulty assessment: 1 - vent by mask    Final Airway Details  Final airway type: endotracheal airway      Successful airway: ETT  Cuffed: yes   Successful intubation technique: direct laryngoscopy  Facilitating devices/methods: cricoid pressure  Endotracheal tube insertion site: oral  Blade: Gloria  Blade size: #4  ETT size: 7.5 mm  Cormack-Lehane Classification: grade I - full view of glottis  Placement verified by: chest auscultation and capnometry   Measured from: lips  ETT to lips (cm): 21  Number of attempts at approach: 1    Additional Comments  To OR, monitors and O2 on. Smooth IV ind. - intubated x 1 attempt through clear cords + BBS. Tape OU. Pressure points checked and padded

## 2017-12-26 NOTE — ANESTHESIA POSTPROCEDURE EVALUATION
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]; 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]) Kaylie Granados MD; MD Bc Edwards CRNA          Anesthesia Type: general  Last vitals  BP   152/90 (12/26/17 1113)   Temp   97.8 °F (36.6 °C) (12/26/17 1113)   Pulse   81 (12/26/17 1113)   Resp   20 (12/26/17 1113)     SpO2   97 % (12/26/17 1113)     Post Anesthesia Care and Evaluation    Patient location during evaluation: bedside  Patient participation: complete - patient participated  Level of consciousness: awake and alert  Pain score: 0  Pain management: adequate  Airway patency: patent  Anesthetic complications: No anesthetic complications    Cardiovascular status: acceptable  Respiratory status: acceptable  Hydration status: acceptable

## 2017-12-29 PROBLEM — D68.9 COAGULOPATHY (HCC): Status: ACTIVE | Noted: 2017-01-01

## 2018-01-01 ENCOUNTER — APPOINTMENT (OUTPATIENT)
Dept: ULTRASOUND IMAGING | Facility: HOSPITAL | Age: 83
End: 2018-01-01

## 2018-01-01 ENCOUNTER — TELEPHONE (OUTPATIENT)
Dept: ONCOLOGY | Facility: CLINIC | Age: 83
End: 2018-01-01

## 2018-01-01 ENCOUNTER — APPOINTMENT (OUTPATIENT)
Dept: ONCOLOGY | Facility: HOSPITAL | Age: 83
End: 2018-01-01

## 2018-01-01 ENCOUNTER — APPOINTMENT (OUTPATIENT)
Dept: CT IMAGING | Facility: HOSPITAL | Age: 83
End: 2018-01-01

## 2018-01-01 ENCOUNTER — OUTSIDE FACILITY SERVICE (OUTPATIENT)
Dept: HOSPITALIST | Facility: HOSPITAL | Age: 83
End: 2018-01-01

## 2018-01-01 ENCOUNTER — TELEPHONE (OUTPATIENT)
Dept: ONCOLOGY | Facility: HOSPITAL | Age: 83
End: 2018-01-01

## 2018-01-01 ENCOUNTER — APPOINTMENT (OUTPATIENT)
Dept: LAB | Facility: HOSPITAL | Age: 83
End: 2018-01-01

## 2018-01-01 ENCOUNTER — TELEPHONE (OUTPATIENT)
Dept: GENERAL RADIOLOGY | Facility: HOSPITAL | Age: 83
End: 2018-01-01

## 2018-01-01 ENCOUNTER — APPOINTMENT (OUTPATIENT)
Dept: ONCOLOGY | Facility: CLINIC | Age: 83
End: 2018-01-01

## 2018-01-01 ENCOUNTER — LAB (OUTPATIENT)
Dept: LAB | Facility: HOSPITAL | Age: 83
End: 2018-01-01

## 2018-01-01 ENCOUNTER — HOSPITAL ENCOUNTER (OUTPATIENT)
Dept: INFUSION THERAPY | Facility: HOSPITAL | Age: 83
Discharge: HOME | End: 2018-02-14
Attending: INTERNAL MEDICINE | Admitting: INTERNAL MEDICINE

## 2018-01-01 ENCOUNTER — HOSPITAL ENCOUNTER (INPATIENT)
Facility: HOSPITAL | Age: 83
LOS: 6 days | Discharge: NURSING, REHAB OR LONG-TERM CARE | End: 2018-03-05
Attending: EMERGENCY MEDICINE | Admitting: INTERNAL MEDICINE

## 2018-01-01 ENCOUNTER — HOSPITAL ENCOUNTER (INPATIENT)
Facility: HOSPITAL | Age: 83
LOS: 3 days | End: 2018-04-12
Attending: EMERGENCY MEDICINE | Admitting: HOSPITALIST

## 2018-01-01 ENCOUNTER — OFFICE VISIT (OUTPATIENT)
Dept: SURGERY | Facility: CLINIC | Age: 83
End: 2018-01-01

## 2018-01-01 ENCOUNTER — LAB REQUISITION (OUTPATIENT)
Dept: LAB | Facility: HOSPITAL | Age: 83
End: 2018-01-01

## 2018-01-01 ENCOUNTER — HOSPITAL ENCOUNTER (OUTPATIENT)
Dept: INFUSION THERAPY | Facility: HOSPITAL | Age: 83
Discharge: HOME | End: 2018-02-05
Admitting: HOSPITALIST

## 2018-01-01 ENCOUNTER — APPOINTMENT (OUTPATIENT)
Dept: GENERAL RADIOLOGY | Facility: HOSPITAL | Age: 83
End: 2018-01-01

## 2018-01-01 ENCOUNTER — OFFICE VISIT (OUTPATIENT)
Dept: ORTHOPEDIC SURGERY | Facility: CLINIC | Age: 83
End: 2018-01-01

## 2018-01-01 ENCOUNTER — TELEPHONE (OUTPATIENT)
Dept: SURGERY | Facility: CLINIC | Age: 83
End: 2018-01-01

## 2018-01-01 ENCOUNTER — HOSPITAL ENCOUNTER (INPATIENT)
Facility: HOSPITAL | Age: 83
LOS: 10 days | End: 2018-04-22
Attending: HOSPITALIST | Admitting: FAMILY MEDICINE

## 2018-01-01 ENCOUNTER — OFFICE VISIT (OUTPATIENT)
Dept: ONCOLOGY | Facility: CLINIC | Age: 83
End: 2018-01-01

## 2018-01-01 VITALS
OXYGEN SATURATION: 71 % | HEART RATE: 117 BPM | DIASTOLIC BLOOD PRESSURE: 43 MMHG | BODY MASS INDEX: 20.42 KG/M2 | TEMPERATURE: 102.3 F | WEIGHT: 142.64 LBS | SYSTOLIC BLOOD PRESSURE: 91 MMHG | RESPIRATION RATE: 24 BRPM | HEIGHT: 70 IN

## 2018-01-01 VITALS
SYSTOLIC BLOOD PRESSURE: 102 MMHG | HEIGHT: 70 IN | DIASTOLIC BLOOD PRESSURE: 62 MMHG | WEIGHT: 141 LBS | BODY MASS INDEX: 20.19 KG/M2

## 2018-01-01 VITALS
SYSTOLIC BLOOD PRESSURE: 136 MMHG | OXYGEN SATURATION: 100 % | BODY MASS INDEX: 17.12 KG/M2 | DIASTOLIC BLOOD PRESSURE: 68 MMHG | TEMPERATURE: 98.7 F | HEIGHT: 70 IN | WEIGHT: 119.6 LBS | HEART RATE: 58 BPM | RESPIRATION RATE: 18 BRPM

## 2018-01-01 VITALS
RESPIRATION RATE: 16 BRPM | BODY MASS INDEX: 17.32 KG/M2 | HEIGHT: 70 IN | WEIGHT: 121 LBS | SYSTOLIC BLOOD PRESSURE: 108 MMHG | OXYGEN SATURATION: 98 % | TEMPERATURE: 97.9 F | DIASTOLIC BLOOD PRESSURE: 63 MMHG | HEART RATE: 76 BPM

## 2018-01-01 VITALS
BODY MASS INDEX: 17.38 KG/M2 | HEIGHT: 70 IN | OXYGEN SATURATION: 100 % | TEMPERATURE: 97.7 F | RESPIRATION RATE: 16 BRPM | WEIGHT: 121.4 LBS | HEART RATE: 75 BPM | DIASTOLIC BLOOD PRESSURE: 55 MMHG | SYSTOLIC BLOOD PRESSURE: 99 MMHG

## 2018-01-01 VITALS
DIASTOLIC BLOOD PRESSURE: 62 MMHG | BODY MASS INDEX: 20.19 KG/M2 | HEIGHT: 70 IN | WEIGHT: 141 LBS | SYSTOLIC BLOOD PRESSURE: 112 MMHG

## 2018-01-01 VITALS
HEART RATE: 83 BPM | WEIGHT: 141 LBS | SYSTOLIC BLOOD PRESSURE: 128 MMHG | TEMPERATURE: 97.5 F | OXYGEN SATURATION: 96 % | BODY MASS INDEX: 20.19 KG/M2 | DIASTOLIC BLOOD PRESSURE: 66 MMHG | RESPIRATION RATE: 18 BRPM | HEIGHT: 70 IN

## 2018-01-01 VITALS
HEART RATE: 81 BPM | BODY MASS INDEX: 20.19 KG/M2 | HEIGHT: 70 IN | DIASTOLIC BLOOD PRESSURE: 63 MMHG | SYSTOLIC BLOOD PRESSURE: 118 MMHG | WEIGHT: 141 LBS

## 2018-01-01 VITALS — DIASTOLIC BLOOD PRESSURE: 82 MMHG | SYSTOLIC BLOOD PRESSURE: 132 MMHG | HEIGHT: 70 IN

## 2018-01-01 VITALS
HEIGHT: 70 IN | WEIGHT: 121 LBS | SYSTOLIC BLOOD PRESSURE: 102 MMHG | BODY MASS INDEX: 17.32 KG/M2 | DIASTOLIC BLOOD PRESSURE: 62 MMHG

## 2018-01-01 VITALS
DIASTOLIC BLOOD PRESSURE: 60 MMHG | OXYGEN SATURATION: 99 % | SYSTOLIC BLOOD PRESSURE: 120 MMHG | HEIGHT: 70 IN | BODY MASS INDEX: 17.44 KG/M2 | HEART RATE: 84 BPM | WEIGHT: 121.8 LBS | RESPIRATION RATE: 14 BRPM | TEMPERATURE: 97.7 F

## 2018-01-01 VITALS
DIASTOLIC BLOOD PRESSURE: 65 MMHG | HEART RATE: 83 BPM | WEIGHT: 142.64 LBS | OXYGEN SATURATION: 95 % | RESPIRATION RATE: 18 BRPM | TEMPERATURE: 97.8 F | HEIGHT: 70 IN | SYSTOLIC BLOOD PRESSURE: 107 MMHG | BODY MASS INDEX: 20.42 KG/M2

## 2018-01-01 DIAGNOSIS — R65.10 SIRS (SYSTEMIC INFLAMMATORY RESPONSE SYNDROME) (HCC): ICD-10-CM

## 2018-01-01 DIAGNOSIS — D64.9 CHRONIC ANEMIA: ICD-10-CM

## 2018-01-01 DIAGNOSIS — D46.9 ANEMIA ASSOC WITH MYELODYSPLASTIC SYNDROME TREATED WITH ERYTHROPOIETIN (HCC): ICD-10-CM

## 2018-01-01 DIAGNOSIS — D46.20 MDS (MYELODYSPLASTIC SYNDROME), LOW GRADE (HCC): ICD-10-CM

## 2018-01-01 DIAGNOSIS — K63.2 ENTEROCUTANEOUS FISTULA: Primary | ICD-10-CM

## 2018-01-01 DIAGNOSIS — E87.1 HYPONATREMIA: ICD-10-CM

## 2018-01-01 DIAGNOSIS — N39.0 ACUTE UTI: ICD-10-CM

## 2018-01-01 DIAGNOSIS — K63.2 ABDOMINAL FISTULA: ICD-10-CM

## 2018-01-01 DIAGNOSIS — Z09 SURGERY FOLLOW-UP: Primary | ICD-10-CM

## 2018-01-01 DIAGNOSIS — M17.0 PRIMARY OSTEOARTHRITIS OF BOTH KNEES: Primary | ICD-10-CM

## 2018-01-01 DIAGNOSIS — D46.20 MDS (MYELODYSPLASTIC SYNDROME), LOW GRADE (HCC): Primary | ICD-10-CM

## 2018-01-01 DIAGNOSIS — Z76.0 ENCOUNTER FOR ISSUE OF REPEAT PRESCRIPTION: ICD-10-CM

## 2018-01-01 DIAGNOSIS — Z43.2 ENCOUNTER FOR ATTENTION TO ILEOSTOMY (HCC): ICD-10-CM

## 2018-01-01 DIAGNOSIS — L24.A9 WOUND DRAINAGE: Primary | ICD-10-CM

## 2018-01-01 DIAGNOSIS — R10.9 ACUTE ABDOMINAL PAIN: ICD-10-CM

## 2018-01-01 DIAGNOSIS — D46.9 MDS (MYELODYSPLASTIC SYNDROME) (HCC): Primary | ICD-10-CM

## 2018-01-01 DIAGNOSIS — E53.8 VITAMIN B 12 DEFICIENCY: ICD-10-CM

## 2018-01-01 DIAGNOSIS — D46.9 MYELODYSPLASTIC SYNDROME (HCC): ICD-10-CM

## 2018-01-01 DIAGNOSIS — R50.9 FEVER IN ADULT: Primary | ICD-10-CM

## 2018-01-01 DIAGNOSIS — Z93.2 ILEOSTOMY STATUS (HCC): ICD-10-CM

## 2018-01-01 DIAGNOSIS — T83.511D INFECTION AND INFLAMMATORY REACTION DUE TO INDWELLING URETHRAL CATHETER, SUBSEQUENT ENCOUNTER: ICD-10-CM

## 2018-01-01 DIAGNOSIS — D63.0 ANEMIA ASSOC WITH MYELODYSPLASTIC SYNDROME TREATED WITH ERYTHROPOIETIN (HCC): ICD-10-CM

## 2018-01-01 LAB
ABO + RH BLD: NORMAL
ABO GROUP BLD: NORMAL
ALBUMIN SERPL-MCNC: 2.3 G/DL (ref 3.5–5.2)
ALBUMIN SERPL-MCNC: 2.4 G/DL (ref 3.5–5.2)
ALBUMIN SERPL-MCNC: 2.5 G/DL (ref 3.5–5.2)
ALBUMIN SERPL-MCNC: 2.5 G/DL (ref 3.5–5.2)
ALBUMIN SERPL-MCNC: 3 G/DL (ref 3.5–5.2)
ALBUMIN SERPL-MCNC: 3 G/DL (ref 3.5–5.2)
ALBUMIN SERPL-MCNC: 3.1 G/DL (ref 3.5–5.2)
ALBUMIN SERPL-MCNC: 3.1 G/DL (ref 3.5–5.2)
ALBUMIN SERPL-MCNC: 3.2 G/DL (ref 3.5–5.2)
ALBUMIN SERPL-MCNC: 3.2 G/DL (ref 3.5–5.2)
ALBUMIN SERPL-MCNC: 3.4 G/DL (ref 3.5–5.2)
ALBUMIN SERPL-MCNC: 3.6 G/DL (ref 3.5–5.2)
ALBUMIN/GLOB SERPL: 0.6 G/DL
ALBUMIN/GLOB SERPL: 0.7 G/DL
ALBUMIN/GLOB SERPL: 0.8 G/DL
ALP SERPL-CCNC: 112 U/L (ref 40–129)
ALP SERPL-CCNC: 130 U/L (ref 40–129)
ALP SERPL-CCNC: 174 U/L (ref 40–129)
ALP SERPL-CCNC: 243 U/L (ref 40–129)
ALP SERPL-CCNC: 261 U/L (ref 40–129)
ALP SERPL-CCNC: 264 U/L (ref 40–129)
ALP SERPL-CCNC: 269 U/L (ref 40–129)
ALP SERPL-CCNC: 298 U/L (ref 40–129)
ALP SERPL-CCNC: 82 U/L (ref 40–129)
ALP SERPL-CCNC: 82 U/L (ref 40–129)
ALP SERPL-CCNC: 87 U/L (ref 40–129)
ALP SERPL-CCNC: 98 U/L (ref 40–129)
ALT SERPL W P-5'-P-CCNC: 10 U/L (ref 5–41)
ALT SERPL W P-5'-P-CCNC: 13 U/L (ref 5–41)
ALT SERPL W P-5'-P-CCNC: 26 U/L (ref 5–41)
ALT SERPL W P-5'-P-CCNC: 46 U/L (ref 5–41)
ALT SERPL W P-5'-P-CCNC: 68 U/L (ref 5–41)
ALT SERPL W P-5'-P-CCNC: 68 U/L (ref 5–41)
ALT SERPL W P-5'-P-CCNC: 69 U/L (ref 5–41)
ALT SERPL W P-5'-P-CCNC: 69 U/L (ref 5–41)
ALT SERPL W P-5'-P-CCNC: 77 U/L (ref 5–41)
ALT SERPL W P-5'-P-CCNC: <5 U/L (ref 5–41)
ANION GAP SERPL CALCULATED.3IONS-SCNC: 10.3 MMOL/L
ANION GAP SERPL CALCULATED.3IONS-SCNC: 10.5 MMOL/L
ANION GAP SERPL CALCULATED.3IONS-SCNC: 10.6 MMOL/L
ANION GAP SERPL CALCULATED.3IONS-SCNC: 11.4 MMOL/L
ANION GAP SERPL CALCULATED.3IONS-SCNC: 12.4 MMOL/L
ANION GAP SERPL CALCULATED.3IONS-SCNC: 14.1 MMOL/L
ANION GAP SERPL CALCULATED.3IONS-SCNC: 14.2 MMOL/L
ANION GAP SERPL CALCULATED.3IONS-SCNC: 14.2 MMOL/L
ANION GAP SERPL CALCULATED.3IONS-SCNC: 14.3 MMOL/L
ANION GAP SERPL CALCULATED.3IONS-SCNC: 14.9 MMOL/L
ANION GAP SERPL CALCULATED.3IONS-SCNC: 6.9 MMOL/L
ANION GAP SERPL CALCULATED.3IONS-SCNC: 7.3 MMOL/L
ANION GAP SERPL CALCULATED.3IONS-SCNC: 7.8 MMOL/L
ANION GAP SERPL CALCULATED.3IONS-SCNC: 8.1 MMOL/L
ANION GAP SERPL CALCULATED.3IONS-SCNC: 8.7 MMOL/L
ANION GAP SERPL CALCULATED.3IONS-SCNC: 8.7 MMOL/L
ANION GAP SERPL CALCULATED.3IONS-SCNC: 8.9 MMOL/L
ANION GAP SERPL CALCULATED.3IONS-SCNC: 9 MMOL/L
ANION GAP SERPL CALCULATED.3IONS-SCNC: 9.3 MMOL/L
ANISOCYTOSIS BLD QL: NORMAL
APTT PPP: 40 SECONDS (ref 24.3–38.1)
AST SERPL-CCNC: 11 U/L (ref 5–40)
AST SERPL-CCNC: 12 U/L (ref 5–40)
AST SERPL-CCNC: 12 U/L (ref 5–40)
AST SERPL-CCNC: 14 U/L (ref 5–40)
AST SERPL-CCNC: 21 U/L (ref 5–40)
AST SERPL-CCNC: 22 U/L (ref 5–40)
AST SERPL-CCNC: 35 U/L (ref 5–40)
AST SERPL-CCNC: 47 U/L (ref 5–40)
AST SERPL-CCNC: 48 U/L (ref 5–40)
AST SERPL-CCNC: 55 U/L (ref 5–40)
AST SERPL-CCNC: 58 U/L (ref 5–40)
AST SERPL-CCNC: 60 U/L (ref 5–40)
B PERT DNA SPEC QL NAA+PROBE: NOT DETECTED
BACTERIA SPEC AEROBE CULT: ABNORMAL
BACTERIA SPEC AEROBE CULT: NORMAL
BACTERIA UR QL AUTO: ABNORMAL /HPF
BACTERIA UR QL AUTO: ABNORMAL /HPF
BASOPHILS # BLD AUTO: 0 10*3/MM3 (ref 0–0.2)
BASOPHILS # BLD AUTO: 0.01 10*3/MM3 (ref 0–0.2)
BASOPHILS NFR BLD AUTO: 0 % (ref 0–2)
BASOPHILS NFR BLD AUTO: 0.1 % (ref 0–2)
BASOPHILS NFR BLD AUTO: 0.1 % (ref 0–2)
BASOPHILS NFR BLD AUTO: 0.2 % (ref 0–2)
BASOPHILS NFR BLD AUTO: 0.3 % (ref 0–2)
BASOPHILS NFR BLD AUTO: 0.3 % (ref 0–2)
BH BB BLOOD EXPIRATION DATE: NORMAL
BH BB BLOOD TYPE BARCODE: 5100
BH BB DISPENSE STATUS: NORMAL
BH BB PRODUCT CODE: NORMAL
BH BB UNIT NUMBER: NORMAL
BILIRUB SERPL-MCNC: 0.2 MG/DL (ref 0.2–1.2)
BILIRUB SERPL-MCNC: 0.3 MG/DL (ref 0.2–1.2)
BILIRUB SERPL-MCNC: 0.4 MG/DL (ref 0.2–1.2)
BILIRUB SERPL-MCNC: 0.4 MG/DL (ref 0.2–1.2)
BILIRUB SERPL-MCNC: 0.5 MG/DL (ref 0.2–1.2)
BILIRUB SERPL-MCNC: 0.6 MG/DL (ref 0.2–1.2)
BILIRUB SERPL-MCNC: 0.8 MG/DL (ref 0.2–1.2)
BILIRUB SERPL-MCNC: 1 MG/DL (ref 0.2–1.2)
BILIRUB SERPL-MCNC: 1.2 MG/DL (ref 0.2–1.2)
BILIRUB SERPL-MCNC: 1.5 MG/DL (ref 0.2–1.2)
BILIRUB SERPL-MCNC: 1.6 MG/DL (ref 0.2–1.2)
BILIRUB SERPL-MCNC: 1.8 MG/DL (ref 0.2–1.2)
BILIRUB UR QL STRIP: ABNORMAL
BILIRUB UR QL STRIP: NEGATIVE
BLD GP AB SCN SERPL QL: NEGATIVE
BUN BLD-MCNC: 11 MG/DL (ref 8–23)
BUN BLD-MCNC: 11 MG/DL (ref 8–23)
BUN BLD-MCNC: 15 MG/DL (ref 8–23)
BUN BLD-MCNC: 16 MG/DL (ref 8–23)
BUN BLD-MCNC: 17 MG/DL (ref 8–23)
BUN BLD-MCNC: 24 MG/DL (ref 8–23)
BUN BLD-MCNC: 26 MG/DL (ref 8–23)
BUN BLD-MCNC: 26 MG/DL (ref 8–23)
BUN BLD-MCNC: 27 MG/DL (ref 8–23)
BUN BLD-MCNC: 30 MG/DL (ref 8–23)
BUN BLD-MCNC: 32 MG/DL (ref 8–23)
BUN BLD-MCNC: 32 MG/DL (ref 8–23)
BUN BLD-MCNC: 6 MG/DL (ref 8–23)
BUN BLD-MCNC: 7 MG/DL (ref 8–23)
BUN BLD-MCNC: 8 MG/DL (ref 8–23)
BUN/CREAT SERPL: 12.5 (ref 7–25)
BUN/CREAT SERPL: 13.3 (ref 7–25)
BUN/CREAT SERPL: 14.9 (ref 7–25)
BUN/CREAT SERPL: 18.1 (ref 7–25)
BUN/CREAT SERPL: 18.6 (ref 7–25)
BUN/CREAT SERPL: 20 (ref 7–25)
BUN/CREAT SERPL: 21.6 (ref 7–25)
BUN/CREAT SERPL: 24.6 (ref 7–25)
BUN/CREAT SERPL: 24.6 (ref 7–25)
BUN/CREAT SERPL: 25.4 (ref 7–25)
BUN/CREAT SERPL: 25.8 (ref 7–25)
BUN/CREAT SERPL: 28.1 (ref 7–25)
BUN/CREAT SERPL: 28.1 (ref 7–25)
BUN/CREAT SERPL: 28.3 (ref 7–25)
BUN/CREAT SERPL: 28.8 (ref 7–25)
BUN/CREAT SERPL: 29.2 (ref 7–25)
BUN/CREAT SERPL: 39 (ref 7–25)
BUN/CREAT SERPL: 43.8 (ref 7–25)
BUN/CREAT SERPL: 53.3 (ref 7–25)
C PNEUM DNA NPH QL NAA+NON-PROBE: NOT DETECTED
CA-I BLD-MCNC: 4.37 MG/DL (ref 4.6–5.4)
CA-I BLD-MCNC: 4.78 MG/DL (ref 4.6–5.4)
CALCIUM SPEC-SCNC: 7.8 MG/DL (ref 8.8–10.5)
CALCIUM SPEC-SCNC: 7.9 MG/DL (ref 8.8–10.5)
CALCIUM SPEC-SCNC: 8.1 MG/DL (ref 8.8–10.5)
CALCIUM SPEC-SCNC: 8.1 MG/DL (ref 8.8–10.5)
CALCIUM SPEC-SCNC: 8.2 MG/DL (ref 8.8–10.5)
CALCIUM SPEC-SCNC: 8.8 MG/DL (ref 8.8–10.5)
CALCIUM SPEC-SCNC: 8.9 MG/DL (ref 8.8–10.5)
CALCIUM SPEC-SCNC: 9.1 MG/DL (ref 8.8–10.5)
CALCIUM SPEC-SCNC: 9.4 MG/DL (ref 8.8–10.5)
CALCIUM SPEC-SCNC: 9.4 MG/DL (ref 8.8–10.5)
CALCIUM SPEC-SCNC: 9.5 MG/DL (ref 8.8–10.5)
CHLORIDE SERPL-SCNC: 100 MMOL/L (ref 98–107)
CHLORIDE SERPL-SCNC: 100 MMOL/L (ref 98–107)
CHLORIDE SERPL-SCNC: 101 MMOL/L (ref 98–107)
CHLORIDE SERPL-SCNC: 102 MMOL/L (ref 98–107)
CHLORIDE SERPL-SCNC: 103 MMOL/L (ref 98–107)
CHLORIDE SERPL-SCNC: 105 MMOL/L (ref 98–107)
CHLORIDE SERPL-SCNC: 105 MMOL/L (ref 98–107)
CHLORIDE SERPL-SCNC: 107 MMOL/L (ref 98–107)
CHLORIDE SERPL-SCNC: 108 MMOL/L (ref 98–107)
CHLORIDE SERPL-SCNC: 110 MMOL/L (ref 98–107)
CHLORIDE SERPL-SCNC: 110 MMOL/L (ref 98–107)
CHLORIDE SERPL-SCNC: 112 MMOL/L (ref 98–107)
CHLORIDE SERPL-SCNC: 96 MMOL/L (ref 98–107)
CHLORIDE SERPL-SCNC: 98 MMOL/L (ref 98–107)
CHLORIDE SERPL-SCNC: 99 MMOL/L (ref 98–107)
CLARITY UR: ABNORMAL
CLARITY UR: CLEAR
CO2 SERPL-SCNC: 18.7 MMOL/L (ref 22–29)
CO2 SERPL-SCNC: 19.1 MMOL/L (ref 22–29)
CO2 SERPL-SCNC: 19.3 MMOL/L (ref 22–29)
CO2 SERPL-SCNC: 19.6 MMOL/L (ref 22–29)
CO2 SERPL-SCNC: 19.8 MMOL/L (ref 22–29)
CO2 SERPL-SCNC: 19.9 MMOL/L (ref 22–29)
CO2 SERPL-SCNC: 20.5 MMOL/L (ref 22–29)
CO2 SERPL-SCNC: 20.8 MMOL/L (ref 22–29)
CO2 SERPL-SCNC: 21.6 MMOL/L (ref 22–29)
CO2 SERPL-SCNC: 22.4 MMOL/L (ref 22–29)
CO2 SERPL-SCNC: 22.7 MMOL/L (ref 22–29)
CO2 SERPL-SCNC: 23 MMOL/L (ref 22–29)
CO2 SERPL-SCNC: 25.1 MMOL/L (ref 22–29)
CO2 SERPL-SCNC: 25.7 MMOL/L (ref 22–29)
CO2 SERPL-SCNC: 25.7 MMOL/L (ref 22–29)
CO2 SERPL-SCNC: 26.1 MMOL/L (ref 22–29)
CO2 SERPL-SCNC: 26.3 MMOL/L (ref 22–29)
CO2 SERPL-SCNC: 26.9 MMOL/L (ref 22–29)
CO2 SERPL-SCNC: 27.2 MMOL/L (ref 22–29)
COLOR UR: YELLOW
COLOR UR: YELLOW
CREAT BLD-MCNC: 0.47 MG/DL (ref 0.76–1.27)
CREAT BLD-MCNC: 0.48 MG/DL (ref 0.76–1.27)
CREAT BLD-MCNC: 0.51 MG/DL (ref 0.76–1.27)
CREAT BLD-MCNC: 0.57 MG/DL (ref 0.76–1.27)
CREAT BLD-MCNC: 0.59 MG/DL (ref 0.76–1.27)
CREAT BLD-MCNC: 0.59 MG/DL (ref 0.76–1.27)
CREAT BLD-MCNC: 0.6 MG/DL (ref 0.76–1.27)
CREAT BLD-MCNC: 0.6 MG/DL (ref 0.76–1.27)
CREAT BLD-MCNC: 0.61 MG/DL (ref 0.76–1.27)
CREAT BLD-MCNC: 0.63 MG/DL (ref 0.76–1.27)
CREAT BLD-MCNC: 0.65 MG/DL (ref 0.76–1.27)
CREAT BLD-MCNC: 0.73 MG/DL (ref 0.76–1.27)
CREAT BLD-MCNC: 0.77 MG/DL (ref 0.76–1.27)
CREAT BLD-MCNC: 0.85 MG/DL (ref 0.76–1.27)
CREAT BLD-MCNC: 0.89 MG/DL (ref 0.76–1.27)
CREAT BLD-MCNC: 0.92 MG/DL (ref 0.76–1.27)
CREAT BLD-MCNC: 0.93 MG/DL (ref 0.76–1.27)
CREAT BLD-MCNC: 0.94 MG/DL (ref 0.76–1.27)
CREAT BLD-MCNC: 0.96 MG/DL (ref 0.76–1.27)
CRP SERPL-MCNC: 1.15 MG/DL (ref 0–0.5)
CRP SERPL-MCNC: 1.57 MG/DL (ref 0–0.5)
D-LACTATE SERPL-SCNC: 1.1 MMOL/L (ref 0.5–2)
D-LACTATE SERPL-SCNC: 1.4 MMOL/L (ref 0.5–2)
D-LACTATE SERPL-SCNC: 1.9 MMOL/L (ref 0.5–2)
DACRYOCYTES BLD QL SMEAR: NORMAL
DEPRECATED RDW RBC AUTO: 72.1 FL (ref 37–54)
DEPRECATED RDW RBC AUTO: 73.3 FL (ref 37–54)
DEPRECATED RDW RBC AUTO: 73.5 FL (ref 37–54)
DEPRECATED RDW RBC AUTO: 73.5 FL (ref 37–54)
DEPRECATED RDW RBC AUTO: 74.6 FL (ref 37–54)
DEPRECATED RDW RBC AUTO: 76 FL (ref 37–54)
DEPRECATED RDW RBC AUTO: 77 FL (ref 37–54)
DEPRECATED RDW RBC AUTO: 77.2 FL (ref 37–54)
DEPRECATED RDW RBC AUTO: 77.6 FL (ref 37–54)
DEPRECATED RDW RBC AUTO: 78.2 FL (ref 37–54)
DEPRECATED RDW RBC AUTO: 78.4 FL (ref 37–54)
DEPRECATED RDW RBC AUTO: 78.6 FL (ref 37–54)
DEPRECATED RDW RBC AUTO: 78.7 FL (ref 37–54)
DEPRECATED RDW RBC AUTO: 79.9 FL (ref 37–54)
DEPRECATED RDW RBC AUTO: 81 FL (ref 37–54)
DEPRECATED RDW RBC AUTO: 81.8 FL (ref 37–54)
DEPRECATED RDW RBC AUTO: 82 FL (ref 37–54)
DEPRECATED RDW RBC AUTO: 82.1 FL (ref 37–54)
DEPRECATED RDW RBC AUTO: 83.4 FL (ref 37–54)
DEPRECATED RDW RBC AUTO: 85.9 FL (ref 37–54)
EOSINOPHIL # BLD AUTO: 0 10*3/MM3 (ref 0.1–0.3)
EOSINOPHIL # BLD AUTO: 0.01 10*3/MM3 (ref 0.1–0.3)
EOSINOPHIL # BLD AUTO: 0.02 10*3/MM3 (ref 0.1–0.3)
EOSINOPHIL # BLD AUTO: 0.03 10*3/MM3 (ref 0.1–0.3)
EOSINOPHIL NFR BLD AUTO: 0 % (ref 0–4)
EOSINOPHIL NFR BLD AUTO: 0.2 % (ref 0–4)
EOSINOPHIL NFR BLD AUTO: 0.2 % (ref 0–4)
EOSINOPHIL NFR BLD AUTO: 0.3 % (ref 0–4)
EOSINOPHIL NFR BLD AUTO: 0.3 % (ref 0–4)
EOSINOPHIL NFR BLD AUTO: 0.4 % (ref 0–4)
EOSINOPHIL NFR BLD AUTO: 0.6 % (ref 0–4)
ERYTHROCYTE [DISTWIDTH] IN BLOOD BY AUTOMATED COUNT: 20.8 % (ref 11.5–14.5)
ERYTHROCYTE [DISTWIDTH] IN BLOOD BY AUTOMATED COUNT: 21.6 % (ref 11.5–14.5)
ERYTHROCYTE [DISTWIDTH] IN BLOOD BY AUTOMATED COUNT: 21.6 % (ref 11.5–14.5)
ERYTHROCYTE [DISTWIDTH] IN BLOOD BY AUTOMATED COUNT: 21.7 % (ref 11.5–14.5)
ERYTHROCYTE [DISTWIDTH] IN BLOOD BY AUTOMATED COUNT: 21.9 % (ref 11.5–14.5)
ERYTHROCYTE [DISTWIDTH] IN BLOOD BY AUTOMATED COUNT: 21.9 % (ref 11.5–14.5)
ERYTHROCYTE [DISTWIDTH] IN BLOOD BY AUTOMATED COUNT: 22.5 % (ref 11.5–14.5)
ERYTHROCYTE [DISTWIDTH] IN BLOOD BY AUTOMATED COUNT: 22.6 % (ref 11.5–14.5)
ERYTHROCYTE [DISTWIDTH] IN BLOOD BY AUTOMATED COUNT: 22.7 % (ref 11.5–14.5)
ERYTHROCYTE [DISTWIDTH] IN BLOOD BY AUTOMATED COUNT: 22.8 % (ref 11.5–14.5)
ERYTHROCYTE [DISTWIDTH] IN BLOOD BY AUTOMATED COUNT: 22.8 % (ref 11.5–14.5)
ERYTHROCYTE [DISTWIDTH] IN BLOOD BY AUTOMATED COUNT: 22.9 % (ref 11.5–14.5)
ERYTHROCYTE [DISTWIDTH] IN BLOOD BY AUTOMATED COUNT: 23 % (ref 11.5–14.5)
ERYTHROCYTE [DISTWIDTH] IN BLOOD BY AUTOMATED COUNT: 23.3 % (ref 11.5–14.5)
ERYTHROCYTE [DISTWIDTH] IN BLOOD BY AUTOMATED COUNT: 23.7 % (ref 11.5–14.5)
ERYTHROCYTE [DISTWIDTH] IN BLOOD BY AUTOMATED COUNT: 23.7 % (ref 11.5–14.5)
ERYTHROCYTE [DISTWIDTH] IN BLOOD BY AUTOMATED COUNT: 24.1 % (ref 11.5–14.5)
FERRITIN SERPL-MCNC: 822 NG/ML (ref 30–400)
FLUAV AG NPH QL: NEGATIVE
FLUAV H1 2009 PAND RNA NPH QL NAA+PROBE: NOT DETECTED
FLUAV H1 HA GENE NPH QL NAA+PROBE: NOT DETECTED
FLUAV H3 RNA NPH QL NAA+PROBE: NOT DETECTED
FLUAV SUBTYP SPEC NAA+PROBE: NOT DETECTED
FLUBV AG NPH QL IA: NEGATIVE
FLUBV RNA ISLT QL NAA+PROBE: NOT DETECTED
GFR SERPL CREATININE-BSD FRML MDRD: 102 ML/MIN/1.73
GFR SERPL CREATININE-BSD FRML MDRD: 117 ML/MIN/1.73
GFR SERPL CREATININE-BSD FRML MDRD: 122 ML/MIN/1.73
GFR SERPL CREATININE-BSD FRML MDRD: 126 ML/MIN/1.73
GFR SERPL CREATININE-BSD FRML MDRD: 128 ML/MIN/1.73
GFR SERPL CREATININE-BSD FRML MDRD: 128 ML/MIN/1.73
GFR SERPL CREATININE-BSD FRML MDRD: 131 ML/MIN/1.73
GFR SERPL CREATININE-BSD FRML MDRD: 131 ML/MIN/1.73
GFR SERPL CREATININE-BSD FRML MDRD: 137 ML/MIN/1.73
GFR SERPL CREATININE-BSD FRML MDRD: 75 ML/MIN/1.73
GFR SERPL CREATININE-BSD FRML MDRD: 76 ML/MIN/1.73
GFR SERPL CREATININE-BSD FRML MDRD: 77 ML/MIN/1.73
GFR SERPL CREATININE-BSD FRML MDRD: 78 ML/MIN/1.73
GFR SERPL CREATININE-BSD FRML MDRD: 81 ML/MIN/1.73
GFR SERPL CREATININE-BSD FRML MDRD: 86 ML/MIN/1.73
GFR SERPL CREATININE-BSD FRML MDRD: 96 ML/MIN/1.73
GFR SERPL CREATININE-BSD FRML MDRD: >150 ML/MIN/1.73
GLOBULIN UR ELPH-MCNC: 3.8 GM/DL
GLOBULIN UR ELPH-MCNC: 3.9 GM/DL
GLOBULIN UR ELPH-MCNC: 4 GM/DL
GLOBULIN UR ELPH-MCNC: 4.2 GM/DL
GLOBULIN UR ELPH-MCNC: 4.3 GM/DL
GLOBULIN UR ELPH-MCNC: 4.4 GM/DL
GLOBULIN UR ELPH-MCNC: 4.6 GM/DL
GLOBULIN UR ELPH-MCNC: 4.9 GM/DL
GLOBULIN UR ELPH-MCNC: 5.1 GM/DL
GLOBULIN UR ELPH-MCNC: 5.1 GM/DL
GLOBULIN UR ELPH-MCNC: 5.2 GM/DL
GLOBULIN UR ELPH-MCNC: 5.5 GM/DL
GLUCOSE BLD-MCNC: 105 MG/DL (ref 65–99)
GLUCOSE BLD-MCNC: 107 MG/DL (ref 65–99)
GLUCOSE BLD-MCNC: 108 MG/DL (ref 65–99)
GLUCOSE BLD-MCNC: 117 MG/DL (ref 65–99)
GLUCOSE BLD-MCNC: 123 MG/DL (ref 65–99)
GLUCOSE BLD-MCNC: 131 MG/DL (ref 65–99)
GLUCOSE BLD-MCNC: 72 MG/DL (ref 65–99)
GLUCOSE BLD-MCNC: 78 MG/DL (ref 65–99)
GLUCOSE BLD-MCNC: 78 MG/DL (ref 65–99)
GLUCOSE BLD-MCNC: 83 MG/DL (ref 65–99)
GLUCOSE BLD-MCNC: 88 MG/DL (ref 65–99)
GLUCOSE BLD-MCNC: 88 MG/DL (ref 65–99)
GLUCOSE BLD-MCNC: 91 MG/DL (ref 65–99)
GLUCOSE BLD-MCNC: 93 MG/DL (ref 65–99)
GLUCOSE BLD-MCNC: 93 MG/DL (ref 65–99)
GLUCOSE BLD-MCNC: 95 MG/DL (ref 65–99)
GLUCOSE BLD-MCNC: 97 MG/DL (ref 65–99)
GLUCOSE BLD-MCNC: 97 MG/DL (ref 65–99)
GLUCOSE BLD-MCNC: 99 MG/DL (ref 65–99)
GLUCOSE BLDC GLUCOMTR-MCNC: 101 MG/DL (ref 70–130)
GLUCOSE BLDC GLUCOMTR-MCNC: 102 MG/DL (ref 70–130)
GLUCOSE BLDC GLUCOMTR-MCNC: 104 MG/DL (ref 70–130)
GLUCOSE BLDC GLUCOMTR-MCNC: 110 MG/DL (ref 70–130)
GLUCOSE BLDC GLUCOMTR-MCNC: 111 MG/DL (ref 70–130)
GLUCOSE BLDC GLUCOMTR-MCNC: 111 MG/DL (ref 70–130)
GLUCOSE BLDC GLUCOMTR-MCNC: 115 MG/DL (ref 70–130)
GLUCOSE BLDC GLUCOMTR-MCNC: 116 MG/DL (ref 70–130)
GLUCOSE BLDC GLUCOMTR-MCNC: 118 MG/DL (ref 70–130)
GLUCOSE BLDC GLUCOMTR-MCNC: 121 MG/DL (ref 70–130)
GLUCOSE BLDC GLUCOMTR-MCNC: 121 MG/DL (ref 70–130)
GLUCOSE BLDC GLUCOMTR-MCNC: 123 MG/DL (ref 70–130)
GLUCOSE BLDC GLUCOMTR-MCNC: 123 MG/DL (ref 70–130)
GLUCOSE BLDC GLUCOMTR-MCNC: 126 MG/DL (ref 70–130)
GLUCOSE BLDC GLUCOMTR-MCNC: 129 MG/DL (ref 70–130)
GLUCOSE BLDC GLUCOMTR-MCNC: 129 MG/DL (ref 70–130)
GLUCOSE BLDC GLUCOMTR-MCNC: 130 MG/DL (ref 70–130)
GLUCOSE BLDC GLUCOMTR-MCNC: 132 MG/DL (ref 70–130)
GLUCOSE BLDC GLUCOMTR-MCNC: 136 MG/DL (ref 70–130)
GLUCOSE BLDC GLUCOMTR-MCNC: 137 MG/DL (ref 70–130)
GLUCOSE BLDC GLUCOMTR-MCNC: 144 MG/DL (ref 70–130)
GLUCOSE BLDC GLUCOMTR-MCNC: 148 MG/DL (ref 70–130)
GLUCOSE BLDC GLUCOMTR-MCNC: 160 MG/DL (ref 70–130)
GLUCOSE BLDC GLUCOMTR-MCNC: 167 MG/DL (ref 70–130)
GLUCOSE BLDC GLUCOMTR-MCNC: 180 MG/DL (ref 70–130)
GLUCOSE BLDC GLUCOMTR-MCNC: 197 MG/DL (ref 70–130)
GLUCOSE BLDC GLUCOMTR-MCNC: 78 MG/DL (ref 70–130)
GLUCOSE BLDC GLUCOMTR-MCNC: 85 MG/DL (ref 70–130)
GLUCOSE BLDC GLUCOMTR-MCNC: 86 MG/DL (ref 70–130)
GLUCOSE BLDC GLUCOMTR-MCNC: 93 MG/DL (ref 70–130)
GLUCOSE BLDC GLUCOMTR-MCNC: 94 MG/DL (ref 70–130)
GLUCOSE UR STRIP-MCNC: NEGATIVE MG/DL
GLUCOSE UR STRIP-MCNC: NEGATIVE MG/DL
GRAM STN SPEC: ABNORMAL
HADV DNA SPEC NAA+PROBE: NOT DETECTED
HCOV 229E RNA SPEC QL NAA+PROBE: NOT DETECTED
HCOV HKU1 RNA SPEC QL NAA+PROBE: NOT DETECTED
HCOV NL63 RNA SPEC QL NAA+PROBE: NOT DETECTED
HCOV OC43 RNA SPEC QL NAA+PROBE: NOT DETECTED
HCT VFR BLD AUTO: 20.5 % (ref 42–52)
HCT VFR BLD AUTO: 21.2 % (ref 42–52)
HCT VFR BLD AUTO: 21.9 % (ref 42–52)
HCT VFR BLD AUTO: 22.3 % (ref 42–52)
HCT VFR BLD AUTO: 22.3 % (ref 42–52)
HCT VFR BLD AUTO: 22.4 % (ref 42–52)
HCT VFR BLD AUTO: 22.8 % (ref 42–52)
HCT VFR BLD AUTO: 23.1 % (ref 42–52)
HCT VFR BLD AUTO: 23.2 % (ref 42–52)
HCT VFR BLD AUTO: 23.3 % (ref 42–52)
HCT VFR BLD AUTO: 23.6 % (ref 42–52)
HCT VFR BLD AUTO: 24.1 % (ref 42–52)
HCT VFR BLD AUTO: 25.2 % (ref 42–52)
HCT VFR BLD AUTO: 25.5 % (ref 42–52)
HCT VFR BLD AUTO: 26.1 % (ref 42–52)
HCT VFR BLD AUTO: 26.6 % (ref 42–52)
HCT VFR BLD AUTO: 26.9 % (ref 42–52)
HCT VFR BLD AUTO: 27.6 % (ref 42–52)
HCT VFR BLD AUTO: 28.7 % (ref 42–52)
HCT VFR BLD AUTO: 29 % (ref 42–52)
HCT VFR BLD AUTO: 30.6 % (ref 42–52)
HGB BLD-MCNC: 6.6 G/DL (ref 14–18)
HGB BLD-MCNC: 6.9 G/DL (ref 14–18)
HGB BLD-MCNC: 7 G/DL (ref 14–18)
HGB BLD-MCNC: 7.1 G/DL (ref 14–18)
HGB BLD-MCNC: 7.2 G/DL (ref 14–18)
HGB BLD-MCNC: 7.2 G/DL (ref 14–18)
HGB BLD-MCNC: 7.3 G/DL (ref 14–18)
HGB BLD-MCNC: 7.3 G/DL (ref 14–18)
HGB BLD-MCNC: 7.6 G/DL (ref 14–18)
HGB BLD-MCNC: 7.7 G/DL (ref 14–18)
HGB BLD-MCNC: 8.3 G/DL (ref 14–18)
HGB BLD-MCNC: 8.4 G/DL (ref 14–18)
HGB BLD-MCNC: 8.6 G/DL (ref 14–18)
HGB BLD-MCNC: 8.7 G/DL (ref 14–18)
HGB BLD-MCNC: 9.1 G/DL (ref 14–18)
HGB BLD-MCNC: 9.3 G/DL (ref 14–18)
HGB BLD-MCNC: 9.8 G/DL (ref 14–18)
HGB UR QL STRIP.AUTO: ABNORMAL
HGB UR QL STRIP.AUTO: ABNORMAL
HMPV RNA NPH QL NAA+NON-PROBE: NOT DETECTED
HOLD SPECIMEN: NORMAL
HOLD SPECIMEN: NORMAL
HPIV1 RNA SPEC QL NAA+PROBE: NOT DETECTED
HPIV2 RNA SPEC QL NAA+PROBE: NOT DETECTED
HPIV3 RNA NPH QL NAA+PROBE: NOT DETECTED
HPIV4 P GENE NPH QL NAA+PROBE: NOT DETECTED
HYALINE CASTS UR QL AUTO: ABNORMAL /LPF
HYALINE CASTS UR QL AUTO: ABNORMAL /LPF
HYPOCHROMIA BLD QL: NORMAL
HYPOCHROMIA BLD QL: NORMAL
IMM GRANULOCYTES # BLD: 0.03 10*3/MM3 (ref 0–0.03)
IMM GRANULOCYTES # BLD: 0.04 10*3/MM3 (ref 0–0.03)
IMM GRANULOCYTES # BLD: 0.04 10*3/MM3 (ref 0–0.03)
IMM GRANULOCYTES # BLD: 0.05 10*3/MM3 (ref 0–0.03)
IMM GRANULOCYTES # BLD: 0.05 10*3/MM3 (ref 0–0.03)
IMM GRANULOCYTES # BLD: 0.06 10*3/MM3 (ref 0–0.03)
IMM GRANULOCYTES # BLD: 0.06 10*3/MM3 (ref 0–0.03)
IMM GRANULOCYTES # BLD: 0.08 10*3/MM3 (ref 0–0.03)
IMM GRANULOCYTES # BLD: 0.14 10*3/MM3 (ref 0–0.03)
IMM GRANULOCYTES # BLD: 0.19 10*3/MM3 (ref 0–0.03)
IMM GRANULOCYTES # BLD: 0.2 10*3/MM3 (ref 0–0.03)
IMM GRANULOCYTES # BLD: 0.21 10*3/MM3 (ref 0–0.03)
IMM GRANULOCYTES # BLD: 0.23 10*3/MM3 (ref 0–0.03)
IMM GRANULOCYTES # BLD: 0.25 10*3/MM3 (ref 0–0.03)
IMM GRANULOCYTES NFR BLD: 0.8 % (ref 0–0.5)
IMM GRANULOCYTES NFR BLD: 1 % (ref 0–0.5)
IMM GRANULOCYTES NFR BLD: 1.2 % (ref 0–0.5)
IMM GRANULOCYTES NFR BLD: 1.3 % (ref 0–0.5)
IMM GRANULOCYTES NFR BLD: 1.6 % (ref 0–0.5)
IMM GRANULOCYTES NFR BLD: 1.7 % (ref 0–0.5)
IMM GRANULOCYTES NFR BLD: 1.9 % (ref 0–0.5)
IMM GRANULOCYTES NFR BLD: 2.2 % (ref 0–0.5)
IMM GRANULOCYTES NFR BLD: 3.1 % (ref 0–0.5)
IMM GRANULOCYTES NFR BLD: 3.7 % (ref 0–0.5)
IMM GRANULOCYTES NFR BLD: 4.1 % (ref 0–0.5)
IMM GRANULOCYTES NFR BLD: 5.2 % (ref 0–0.5)
IMM GRANULOCYTES NFR BLD: 5.2 % (ref 0–0.5)
IMM GRANULOCYTES NFR BLD: 5.7 % (ref 0–0.5)
INR PPP: 1.58 (ref 0.9–1.1)
IRON 24H UR-MRATE: 209 MCG/DL (ref 59–158)
IRON SATN MFR SERPL: ABNORMAL %
KETONES UR QL STRIP: NEGATIVE
KETONES UR QL STRIP: NEGATIVE
LARGE PLATELETS: NORMAL
LEUKOCYTE ESTERASE UR QL STRIP.AUTO: ABNORMAL
LEUKOCYTE ESTERASE UR QL STRIP.AUTO: ABNORMAL
LIPASE SERPL-CCNC: 9 U/L (ref 13–60)
LYMPHOCYTES # BLD AUTO: 0.27 10*3/MM3 (ref 0.6–4.8)
LYMPHOCYTES # BLD AUTO: 0.32 10*3/MM3 (ref 0.6–4.8)
LYMPHOCYTES # BLD AUTO: 0.33 10*3/MM3 (ref 0.6–4.8)
LYMPHOCYTES # BLD AUTO: 0.45 10*3/MM3 (ref 0.6–4.8)
LYMPHOCYTES # BLD AUTO: 0.53 10*3/MM3 (ref 0.6–4.8)
LYMPHOCYTES # BLD AUTO: 0.69 10*3/MM3 (ref 0.6–4.8)
LYMPHOCYTES # BLD AUTO: 0.71 10*3/MM3 (ref 0.6–4.8)
LYMPHOCYTES # BLD AUTO: 0.81 10*3/MM3 (ref 0.6–4.8)
LYMPHOCYTES # BLD AUTO: 0.82 10*3/MM3 (ref 0.6–4.8)
LYMPHOCYTES # BLD AUTO: 0.82 10*3/MM3 (ref 0.6–4.8)
LYMPHOCYTES # BLD AUTO: 0.88 10*3/MM3 (ref 0.6–4.8)
LYMPHOCYTES # BLD AUTO: 0.92 10*3/MM3 (ref 0.6–4.8)
LYMPHOCYTES # BLD AUTO: 0.95 10*3/MM3 (ref 0.6–4.8)
LYMPHOCYTES # BLD AUTO: 0.99 10*3/MM3 (ref 0.6–4.8)
LYMPHOCYTES # BLD AUTO: 1.02 10*3/MM3 (ref 0.6–4.8)
LYMPHOCYTES # BLD AUTO: 1.04 10*3/MM3 (ref 0.6–4.8)
LYMPHOCYTES # BLD AUTO: 1.05 10*3/MM3 (ref 0.6–4.8)
LYMPHOCYTES # BLD AUTO: 1.06 10*3/MM3 (ref 0.6–4.8)
LYMPHOCYTES NFR BLD AUTO: 13.8 % (ref 20–45)
LYMPHOCYTES NFR BLD AUTO: 17.7 % (ref 20–45)
LYMPHOCYTES NFR BLD AUTO: 17.9 % (ref 20–45)
LYMPHOCYTES NFR BLD AUTO: 18.2 % (ref 20–45)
LYMPHOCYTES NFR BLD AUTO: 18.3 % (ref 20–45)
LYMPHOCYTES NFR BLD AUTO: 18.6 % (ref 20–45)
LYMPHOCYTES NFR BLD AUTO: 20.5 % (ref 20–45)
LYMPHOCYTES NFR BLD AUTO: 20.8 % (ref 20–45)
LYMPHOCYTES NFR BLD AUTO: 20.9 % (ref 20–45)
LYMPHOCYTES NFR BLD AUTO: 23.4 % (ref 20–45)
LYMPHOCYTES NFR BLD AUTO: 23.5 % (ref 20–45)
LYMPHOCYTES NFR BLD AUTO: 25.2 % (ref 20–45)
LYMPHOCYTES NFR BLD AUTO: 25.5 % (ref 20–45)
LYMPHOCYTES NFR BLD AUTO: 27.6 % (ref 20–45)
LYMPHOCYTES NFR BLD AUTO: 28.6 % (ref 20–45)
LYMPHOCYTES NFR BLD AUTO: 42.1 % (ref 20–45)
LYMPHOCYTES NFR BLD AUTO: 6.7 % (ref 20–45)
LYMPHOCYTES NFR BLD AUTO: 9.5 % (ref 20–45)
Lab: ABNORMAL
Lab: NORMAL
M PNEUMO IGG SER IA-ACNC: NOT DETECTED
MACROCYTES BLD QL SMEAR: NORMAL
MAGNESIUM SERPL-MCNC: 1.5 MG/DL (ref 1.7–2.5)
MAGNESIUM SERPL-MCNC: 1.6 MG/DL (ref 1.7–2.5)
MAGNESIUM SERPL-MCNC: 1.7 MG/DL (ref 1.7–2.5)
MAGNESIUM SERPL-MCNC: 1.8 MG/DL (ref 1.7–2.5)
MAGNESIUM SERPL-MCNC: 1.9 MG/DL (ref 1.7–2.5)
MAGNESIUM SERPL-MCNC: 1.9 MG/DL (ref 1.7–2.5)
MAGNESIUM SERPL-MCNC: 2 MG/DL (ref 1.7–2.5)
MAGNESIUM SERPL-MCNC: 2 MG/DL (ref 1.7–2.5)
MAGNESIUM SERPL-MCNC: 2.2 MG/DL (ref 1.7–2.5)
MCH RBC QN AUTO: 30.9 PG (ref 27–31)
MCH RBC QN AUTO: 31 PG (ref 27–31)
MCH RBC QN AUTO: 31.2 PG (ref 27–31)
MCH RBC QN AUTO: 31.2 PG (ref 27–31)
MCH RBC QN AUTO: 31.3 PG (ref 27–31)
MCH RBC QN AUTO: 31.5 PG (ref 27–31)
MCH RBC QN AUTO: 31.6 PG (ref 27–31)
MCH RBC QN AUTO: 31.8 PG (ref 27–31)
MCH RBC QN AUTO: 31.9 PG (ref 27–31)
MCH RBC QN AUTO: 32 PG (ref 27–31)
MCH RBC QN AUTO: 32 PG (ref 27–31)
MCH RBC QN AUTO: 32.1 PG (ref 27–31)
MCH RBC QN AUTO: 32.2 PG (ref 27–31)
MCH RBC QN AUTO: 32.3 PG (ref 27–31)
MCH RBC QN AUTO: 32.4 PG (ref 27–31)
MCH RBC QN AUTO: 32.5 PG (ref 27–31)
MCH RBC QN AUTO: 32.5 PG (ref 27–31)
MCHC RBC AUTO-ENTMCNC: 31.5 G/DL (ref 31–37)
MCHC RBC AUTO-ENTMCNC: 31.6 G/DL (ref 31–37)
MCHC RBC AUTO-ENTMCNC: 31.6 G/DL (ref 31–37)
MCHC RBC AUTO-ENTMCNC: 31.7 G/DL (ref 31–37)
MCHC RBC AUTO-ENTMCNC: 31.8 G/DL (ref 31–37)
MCHC RBC AUTO-ENTMCNC: 32 G/DL (ref 31–37)
MCHC RBC AUTO-ENTMCNC: 32.1 G/DL (ref 31–37)
MCHC RBC AUTO-ENTMCNC: 32.1 G/DL (ref 31–37)
MCHC RBC AUTO-ENTMCNC: 32.2 G/DL (ref 31–37)
MCHC RBC AUTO-ENTMCNC: 32.2 G/DL (ref 31–37)
MCHC RBC AUTO-ENTMCNC: 32.3 G/DL (ref 31–37)
MCHC RBC AUTO-ENTMCNC: 32.5 G/DL (ref 31–37)
MCHC RBC AUTO-ENTMCNC: 32.6 G/DL (ref 31–37)
MCHC RBC AUTO-ENTMCNC: 32.6 G/DL (ref 31–37)
MCHC RBC AUTO-ENTMCNC: 32.9 G/DL (ref 31–37)
MCHC RBC AUTO-ENTMCNC: 32.9 G/DL (ref 31–37)
MCHC RBC AUTO-ENTMCNC: 33.2 G/DL (ref 31–37)
MCV RBC AUTO: 100 FL (ref 80–94)
MCV RBC AUTO: 100.4 FL (ref 80–94)
MCV RBC AUTO: 101.7 FL (ref 80–94)
MCV RBC AUTO: 95.1 FL (ref 80–94)
MCV RBC AUTO: 95.9 FL (ref 80–94)
MCV RBC AUTO: 96.5 FL (ref 80–94)
MCV RBC AUTO: 97 FL (ref 80–94)
MCV RBC AUTO: 97 FL (ref 80–94)
MCV RBC AUTO: 97.5 FL (ref 80–94)
MCV RBC AUTO: 98.1 FL (ref 80–94)
MCV RBC AUTO: 98.1 FL (ref 80–94)
MCV RBC AUTO: 98.6 FL (ref 80–94)
MCV RBC AUTO: 98.7 FL (ref 80–94)
MCV RBC AUTO: 98.9 FL (ref 80–94)
MCV RBC AUTO: 99.6 FL (ref 80–94)
MCV RBC AUTO: 99.7 FL (ref 80–94)
MONOCYTES # BLD AUTO: 0.32 10*3/MM3 (ref 0–1)
MONOCYTES # BLD AUTO: 0.45 10*3/MM3 (ref 0–1)
MONOCYTES # BLD AUTO: 0.6 10*3/MM3 (ref 0–1)
MONOCYTES # BLD AUTO: 0.66 10*3/MM3 (ref 0–1)
MONOCYTES # BLD AUTO: 0.68 10*3/MM3 (ref 0–1)
MONOCYTES # BLD AUTO: 0.72 10*3/MM3 (ref 0–1)
MONOCYTES # BLD AUTO: 0.72 10*3/MM3 (ref 0–1)
MONOCYTES # BLD AUTO: 0.8 10*3/MM3 (ref 0–1)
MONOCYTES # BLD AUTO: 0.84 10*3/MM3 (ref 0–1)
MONOCYTES # BLD AUTO: 0.85 10*3/MM3 (ref 0–1)
MONOCYTES # BLD AUTO: 0.9 10*3/MM3 (ref 0–1)
MONOCYTES # BLD AUTO: 1.18 10*3/MM3 (ref 0–1)
MONOCYTES # BLD AUTO: 1.35 10*3/MM3 (ref 0–1)
MONOCYTES # BLD AUTO: 1.91 10*3/MM3 (ref 0–1)
MONOCYTES # BLD AUTO: 2.06 10*3/MM3 (ref 0–1)
MONOCYTES # BLD AUTO: 2.15 10*3/MM3 (ref 0–1)
MONOCYTES # BLD AUTO: 2.22 10*3/MM3 (ref 0–1)
MONOCYTES # BLD AUTO: 2.82 10*3/MM3 (ref 0–1)
MONOCYTES NFR BLD AUTO: 18.3 % (ref 3–8)
MONOCYTES NFR BLD AUTO: 18.4 % (ref 3–8)
MONOCYTES NFR BLD AUTO: 19.4 % (ref 3–8)
MONOCYTES NFR BLD AUTO: 20.7 % (ref 3–8)
MONOCYTES NFR BLD AUTO: 23.4 % (ref 3–8)
MONOCYTES NFR BLD AUTO: 24.1 % (ref 3–8)
MONOCYTES NFR BLD AUTO: 25.4 % (ref 3–8)
MONOCYTES NFR BLD AUTO: 26.3 % (ref 3–8)
MONOCYTES NFR BLD AUTO: 26.8 % (ref 3–8)
MONOCYTES NFR BLD AUTO: 29.1 % (ref 3–8)
MONOCYTES NFR BLD AUTO: 29.9 % (ref 3–8)
MONOCYTES NFR BLD AUTO: 30.4 % (ref 3–8)
MONOCYTES NFR BLD AUTO: 31 % (ref 3–8)
MONOCYTES NFR BLD AUTO: 32.7 % (ref 3–8)
MONOCYTES NFR BLD AUTO: 37.8 % (ref 3–8)
MONOCYTES NFR BLD AUTO: 39.2 % (ref 3–8)
MONOCYTES NFR BLD AUTO: 41.8 % (ref 3–8)
MONOCYTES NFR BLD AUTO: 42.6 % (ref 3–8)
NEUTROPHILS # BLD AUTO: 0.36 10*3/MM3 (ref 1.5–8.3)
NEUTROPHILS # BLD AUTO: 0.53 10*3/MM3 (ref 1.5–8.3)
NEUTROPHILS # BLD AUTO: 0.68 10*3/MM3 (ref 1.5–8.3)
NEUTROPHILS # BLD AUTO: 0.96 10*3/MM3 (ref 1.5–8.3)
NEUTROPHILS # BLD AUTO: 0.97 10*3/MM3 (ref 1.5–8.3)
NEUTROPHILS # BLD AUTO: 1.22 10*3/MM3 (ref 1.5–8.3)
NEUTROPHILS # BLD AUTO: 1.31 10*3/MM3 (ref 1.5–8.3)
NEUTROPHILS # BLD AUTO: 1.65 10*3/MM3 (ref 1.5–8.3)
NEUTROPHILS # BLD AUTO: 1.8 10*3/MM3 (ref 1.5–8.3)
NEUTROPHILS # BLD AUTO: 1.8 10*3/MM3 (ref 1.5–8.3)
NEUTROPHILS # BLD AUTO: 1.96 10*3/MM3 (ref 1.5–8.3)
NEUTROPHILS # BLD AUTO: 1.97 10*3/MM3 (ref 1.5–8.3)
NEUTROPHILS # BLD AUTO: 2.01 10*3/MM3 (ref 1.5–8.3)
NEUTROPHILS # BLD AUTO: 2.2 10*3/MM3 (ref 1.5–8.3)
NEUTROPHILS # BLD AUTO: 2.78 10*3/MM3 (ref 1.5–8.3)
NEUTROPHILS # BLD AUTO: 2.8 10*3/MM3 (ref 1.5–8.3)
NEUTROPHILS # BLD AUTO: 7.8 10*3/MM3 (ref 1.5–8.3)
NEUTROPHILS # BLD AUTO: 8.18 10*3/MM3 (ref 1.5–8.3)
NEUTROPHILS NFR BLD AUTO: 27.6 % (ref 45–70)
NEUTROPHILS NFR BLD AUTO: 34.7 % (ref 45–70)
NEUTROPHILS NFR BLD AUTO: 36.6 % (ref 45–70)
NEUTROPHILS NFR BLD AUTO: 37.6 % (ref 45–70)
NEUTROPHILS NFR BLD AUTO: 39.8 % (ref 45–70)
NEUTROPHILS NFR BLD AUTO: 40.3 % (ref 45–70)
NEUTROPHILS NFR BLD AUTO: 42.5 % (ref 45–70)
NEUTROPHILS NFR BLD AUTO: 42.8 % (ref 45–70)
NEUTROPHILS NFR BLD AUTO: 47.3 % (ref 45–70)
NEUTROPHILS NFR BLD AUTO: 48.1 % (ref 45–70)
NEUTROPHILS NFR BLD AUTO: 48.7 % (ref 45–70)
NEUTROPHILS NFR BLD AUTO: 51.2 % (ref 45–70)
NEUTROPHILS NFR BLD AUTO: 53.3 % (ref 45–70)
NEUTROPHILS NFR BLD AUTO: 54.3 % (ref 45–70)
NEUTROPHILS NFR BLD AUTO: 54.4 % (ref 45–70)
NEUTROPHILS NFR BLD AUTO: 59.9 % (ref 45–70)
NEUTROPHILS NFR BLD AUTO: 67.9 % (ref 45–70)
NEUTROPHILS NFR BLD AUTO: 69.8 % (ref 45–70)
NITRITE UR QL STRIP: NEGATIVE
NITRITE UR QL STRIP: POSITIVE
NRBC BLD MANUAL-RTO: 0 /100 WBC (ref 0–0)
PH UR STRIP.AUTO: 5.5 [PH] (ref 4.5–8)
PH UR STRIP.AUTO: 6 [PH] (ref 4.5–8)
PHOSPHATE SERPL-MCNC: 2.3 MG/DL (ref 2.7–4.5)
PHOSPHATE SERPL-MCNC: 2.5 MG/DL (ref 2.7–4.5)
PHOSPHATE SERPL-MCNC: 2.6 MG/DL (ref 2.7–4.5)
PHOSPHATE SERPL-MCNC: 2.7 MG/DL (ref 2.7–4.5)
PHOSPHATE SERPL-MCNC: 2.7 MG/DL (ref 2.7–4.5)
PHOSPHATE SERPL-MCNC: 3.2 MG/DL (ref 2.7–4.5)
PHOSPHATE SERPL-MCNC: 3.2 MG/DL (ref 2.7–4.5)
PHOSPHATE SERPL-MCNC: 3.4 MG/DL (ref 2.7–4.5)
PHOSPHATE SERPL-MCNC: 3.5 MG/DL (ref 2.7–4.5)
PHOSPHATE SERPL-MCNC: 3.5 MG/DL (ref 2.7–4.5)
PHOSPHATE SERPL-MCNC: 4 MG/DL (ref 2.7–4.5)
PLAT MORPH BLD: NORMAL
PLAT MORPH BLD: NORMAL
PLATELET # BLD AUTO: 102 10*3/MM3 (ref 140–500)
PLATELET # BLD AUTO: 102 10*3/MM3 (ref 140–500)
PLATELET # BLD AUTO: 103 10*3/MM3 (ref 140–500)
PLATELET # BLD AUTO: 107 10*3/MM3 (ref 140–500)
PLATELET # BLD AUTO: 108 10*3/MM3 (ref 140–500)
PLATELET # BLD AUTO: 108 10*3/MM3 (ref 140–500)
PLATELET # BLD AUTO: 110 10*3/MM3 (ref 140–500)
PLATELET # BLD AUTO: 135 10*3/MM3 (ref 140–500)
PLATELET # BLD AUTO: 37 10*3/MM3 (ref 140–500)
PLATELET # BLD AUTO: 41 10*3/MM3 (ref 140–500)
PLATELET # BLD AUTO: 45 10*3/MM3 (ref 140–500)
PLATELET # BLD AUTO: 48 10*3/MM3 (ref 140–500)
PLATELET # BLD AUTO: 52 10*3/MM3 (ref 140–500)
PLATELET # BLD AUTO: 55 10*3/MM3 (ref 140–500)
PLATELET # BLD AUTO: 75 10*3/MM3 (ref 140–500)
PLATELET # BLD AUTO: 77 10*3/MM3 (ref 140–500)
PLATELET # BLD AUTO: 81 10*3/MM3 (ref 140–500)
PLATELET # BLD AUTO: 83 10*3/MM3 (ref 140–500)
PLATELET # BLD AUTO: 99 10*3/MM3 (ref 140–500)
PLATELET # BLD AUTO: 99 10*3/MM3 (ref 140–500)
PMV BLD AUTO: 10.1 FL (ref 7.4–10.4)
PMV BLD AUTO: 10.9 FL (ref 7.4–10.4)
PMV BLD AUTO: 11 FL (ref 7.4–10.4)
PMV BLD AUTO: 11.2 FL (ref 7.4–10.4)
PMV BLD AUTO: 11.2 FL (ref 7.4–10.4)
PMV BLD AUTO: 11.3 FL (ref 7.4–10.4)
PMV BLD AUTO: 11.4 FL (ref 7.4–10.4)
PMV BLD AUTO: 11.5 FL (ref 7.4–10.4)
PMV BLD AUTO: 11.5 FL (ref 7.4–10.4)
PMV BLD AUTO: 11.8 FL (ref 7.4–10.4)
PMV BLD AUTO: 12.8 FL (ref 7.4–10.4)
PMV BLD AUTO: 12.8 FL (ref 7.4–10.4)
PMV BLD AUTO: 12.9 FL (ref 7.4–10.4)
PMV BLD AUTO: 13 FL (ref 7.4–10.4)
PMV BLD AUTO: 13 FL (ref 7.4–10.4)
PMV BLD AUTO: 13.2 FL (ref 7.4–10.4)
PMV BLD AUTO: 13.2 FL (ref 7.4–10.4)
PMV BLD AUTO: 13.4 FL (ref 7.4–10.4)
POLYCHROMASIA BLD QL SMEAR: NORMAL
POTASSIUM BLD-SCNC: 3.1 MMOL/L (ref 3.5–5.2)
POTASSIUM BLD-SCNC: 3.2 MMOL/L (ref 3.5–5.2)
POTASSIUM BLD-SCNC: 3.5 MMOL/L (ref 3.5–5.2)
POTASSIUM BLD-SCNC: 3.5 MMOL/L (ref 3.5–5.2)
POTASSIUM BLD-SCNC: 3.6 MMOL/L (ref 3.5–5.2)
POTASSIUM BLD-SCNC: 3.8 MMOL/L (ref 3.5–5.2)
POTASSIUM BLD-SCNC: 3.9 MMOL/L (ref 3.5–5.2)
POTASSIUM BLD-SCNC: 4.1 MMOL/L (ref 3.5–5.2)
POTASSIUM BLD-SCNC: 4.1 MMOL/L (ref 3.5–5.2)
POTASSIUM BLD-SCNC: 4.2 MMOL/L (ref 3.5–5.2)
POTASSIUM BLD-SCNC: 4.4 MMOL/L (ref 3.5–5.2)
POTASSIUM BLD-SCNC: 4.4 MMOL/L (ref 3.5–5.2)
POTASSIUM BLD-SCNC: 4.5 MMOL/L (ref 3.5–5.2)
POTASSIUM BLD-SCNC: 4.5 MMOL/L (ref 3.5–5.2)
POTASSIUM BLD-SCNC: 4.6 MMOL/L (ref 3.5–5.2)
PREALB SERPL-MCNC: 13 MG/DL (ref 20–40)
PREALB SERPL-MCNC: 16.4 MG/DL (ref 20–40)
PREALB SERPL-MCNC: 21 MG/DL (ref 20–40)
PROCALCITONIN SERPL-MCNC: 0.64 NG/ML (ref 0.1–0.25)
PROT SERPL-MCNC: 6.1 G/DL (ref 6–8.5)
PROT SERPL-MCNC: 6.4 G/DL (ref 6–8.5)
PROT SERPL-MCNC: 6.4 G/DL (ref 6–8.5)
PROT SERPL-MCNC: 6.7 G/DL (ref 6–8.5)
PROT SERPL-MCNC: 7.4 G/DL (ref 6–8.5)
PROT SERPL-MCNC: 7.7 G/DL (ref 6–8.5)
PROT SERPL-MCNC: 7.8 G/DL (ref 6–8.5)
PROT SERPL-MCNC: 8 G/DL (ref 6–8.5)
PROT SERPL-MCNC: 8.1 G/DL (ref 6–8.5)
PROT SERPL-MCNC: 8.2 G/DL (ref 6–8.5)
PROT SERPL-MCNC: 8.7 G/DL (ref 6–8.5)
PROT SERPL-MCNC: 8.8 G/DL (ref 6–8.5)
PROT UR QL STRIP: ABNORMAL
PROT UR QL STRIP: ABNORMAL
PROTHROMBIN TIME: 19 SECONDS (ref 12.1–15)
RBC # BLD AUTO: 2.09 10*6/MM3 (ref 4.7–6.1)
RBC # BLD AUTO: 2.12 10*6/MM3 (ref 4.7–6.1)
RBC # BLD AUTO: 2.19 10*6/MM3 (ref 4.7–6.1)
RBC # BLD AUTO: 2.3 10*6/MM3 (ref 4.7–6.1)
RBC # BLD AUTO: 2.31 10*6/MM3 (ref 4.7–6.1)
RBC # BLD AUTO: 2.37 10*6/MM3 (ref 4.7–6.1)
RBC # BLD AUTO: 2.38 10*6/MM3 (ref 4.7–6.1)
RBC # BLD AUTO: 2.39 10*6/MM3 (ref 4.7–6.1)
RBC # BLD AUTO: 2.45 10*6/MM3 (ref 4.7–6.1)
RBC # BLD AUTO: 2.57 10*6/MM3 (ref 4.7–6.1)
RBC # BLD AUTO: 2.64 10*6/MM3 (ref 4.7–6.1)
RBC # BLD AUTO: 2.66 10*6/MM3 (ref 4.7–6.1)
RBC # BLD AUTO: 2.67 10*6/MM3 (ref 4.7–6.1)
RBC # BLD AUTO: 2.69 10*6/MM3 (ref 4.7–6.1)
RBC # BLD AUTO: 2.75 10*6/MM3 (ref 4.7–6.1)
RBC # BLD AUTO: 2.9 10*6/MM3 (ref 4.7–6.1)
RBC # BLD AUTO: 2.91 10*6/MM3 (ref 4.7–6.1)
RBC # BLD AUTO: 3.07 10*6/MM3 (ref 4.7–6.1)
RBC # UR: ABNORMAL /HPF
RBC # UR: ABNORMAL /HPF
REF LAB TEST METHOD: ABNORMAL
REF LAB TEST METHOD: ABNORMAL
RH BLD: POSITIVE
RHINOVIRUS RNA SPEC NAA+PROBE: NOT DETECTED
RSV RNA NPH QL NAA+NON-PROBE: NOT DETECTED
SMALL PLATELETS BLD QL SMEAR: NORMAL
SODIUM BLD-SCNC: 130 MMOL/L (ref 136–145)
SODIUM BLD-SCNC: 133 MMOL/L (ref 136–145)
SODIUM BLD-SCNC: 134 MMOL/L (ref 136–145)
SODIUM BLD-SCNC: 135 MMOL/L (ref 136–145)
SODIUM BLD-SCNC: 135 MMOL/L (ref 136–145)
SODIUM BLD-SCNC: 136 MMOL/L (ref 136–145)
SODIUM BLD-SCNC: 136 MMOL/L (ref 136–145)
SODIUM BLD-SCNC: 137 MMOL/L (ref 136–145)
SODIUM BLD-SCNC: 137 MMOL/L (ref 136–145)
SODIUM BLD-SCNC: 138 MMOL/L (ref 136–145)
SODIUM BLD-SCNC: 138 MMOL/L (ref 136–145)
SODIUM BLD-SCNC: 140 MMOL/L (ref 136–145)
SODIUM BLD-SCNC: 141 MMOL/L (ref 136–145)
SODIUM BLD-SCNC: 143 MMOL/L (ref 136–145)
SP GR UR STRIP: 1.01 (ref 1–1.03)
SP GR UR STRIP: 1.02 (ref 1–1.03)
SQUAMOUS #/AREA URNS HPF: ABNORMAL /HPF
SQUAMOUS #/AREA URNS HPF: ABNORMAL /HPF
T&S EXPIRATION DATE: NORMAL
TIBC SERPL-MCNC: ABNORMAL MCG/DL (ref 261–498)
UIBC SERPL-MCNC: <16 MCG/DL (ref 112–346)
UNIT  ABO: NORMAL
UNIT  RH: NORMAL
UROBILINOGEN UR QL STRIP: ABNORMAL
UROBILINOGEN UR QL STRIP: ABNORMAL
VANCOMYCIN SERPL-MCNC: 15.5 MCG/ML (ref 5–40)
VANCOMYCIN SERPL-MCNC: 20.6 MCG/ML (ref 5–40)
VANCOMYCIN SERPL-MCNC: 22.1 MCG/ML (ref 5–40)
VANCOMYCIN SERPL-MCNC: 9.5 MCG/ML (ref 5–40)
WBC MORPH BLD: NORMAL
WBC NRBC COR # BLD: 0.98 10*3/MM3 (ref 4.8–10.8)
WBC NRBC COR # BLD: 1.53 10*3/MM3 (ref 4.8–10.8)
WBC NRBC COR # BLD: 1.77 10*3/MM3 (ref 4.8–10.8)
WBC NRBC COR # BLD: 11.18 10*3/MM3 (ref 4.8–10.8)
WBC NRBC COR # BLD: 12.05 10*3/MM3 (ref 4.8–10.8)
WBC NRBC COR # BLD: 2.41 10*3/MM3 (ref 4.8–10.8)
WBC NRBC COR # BLD: 2.46 10*3/MM3 (ref 4.8–10.8)
WBC NRBC COR # BLD: 2.47 10*3/MM3 (ref 4.8–10.8)
WBC NRBC COR # BLD: 2.58 10*3/MM3 (ref 4.8–10.8)
WBC NRBC COR # BLD: 3.51 10*3/MM3 (ref 4.8–10.8)
WBC NRBC COR # BLD: 3.57 10*3/MM3 (ref 4.8–10.8)
WBC NRBC COR # BLD: 3.74 10*3/MM3 (ref 4.8–10.8)
WBC NRBC COR # BLD: 3.77 10*3/MM3 (ref 4.8–10.8)
WBC NRBC COR # BLD: 3.88 10*3/MM3 (ref 4.8–10.8)
WBC NRBC COR # BLD: 4.05 10*3/MM3 (ref 4.8–10.8)
WBC NRBC COR # BLD: 4.64 10*3/MM3 (ref 4.8–10.8)
WBC NRBC COR # BLD: 5.05 10*3/MM3 (ref 4.8–10.8)
WBC NRBC COR # BLD: 5.14 10*3/MM3 (ref 4.8–10.8)
WBC NRBC COR # BLD: 5.14 10*3/MM3 (ref 4.8–10.8)
WBC NRBC COR # BLD: 5.21 10*3/MM3 (ref 4.8–10.8)
WBC UR QL AUTO: ABNORMAL /HPF
WBC UR QL AUTO: ABNORMAL /HPF
WHOLE BLOOD HOLD SPECIMEN: NORMAL
WHOLE BLOOD HOLD SPECIMEN: NORMAL

## 2018-01-01 PROCEDURE — 82962 GLUCOSE BLOOD TEST: CPT

## 2018-01-01 PROCEDURE — 99232 SBSQ HOSP IP/OBS MODERATE 35: CPT | Performed by: HOSPITALIST

## 2018-01-01 PROCEDURE — 83735 ASSAY OF MAGNESIUM: CPT | Performed by: SURGERY

## 2018-01-01 PROCEDURE — 85025 COMPLETE CBC W/AUTO DIFF WBC: CPT | Performed by: INTERNAL MEDICINE

## 2018-01-01 PROCEDURE — 99231 SBSQ HOSP IP/OBS SF/LOW 25: CPT | Performed by: HOSPITALIST

## 2018-01-01 PROCEDURE — A9270 NON-COVERED ITEM OR SERVICE: HCPCS

## 2018-01-01 PROCEDURE — 94799 UNLISTED PULMONARY SVC/PX: CPT

## 2018-01-01 PROCEDURE — 25010000002 ALTEPLASE 2 MG RECONSTITUTED SOLUTION 1 EACH VIAL: Performed by: HOSPITALIST

## 2018-01-01 PROCEDURE — 71045 X-RAY EXAM CHEST 1 VIEW: CPT

## 2018-01-01 PROCEDURE — 85027 COMPLETE CBC AUTOMATED: CPT | Performed by: HOSPITALIST

## 2018-01-01 PROCEDURE — 25010000002 VANCOMYCIN PER 500 MG: Performed by: INTERNAL MEDICINE

## 2018-01-01 PROCEDURE — 25010000002 FENTANYL CITRATE (PF) 100 MCG/2ML SOLUTION

## 2018-01-01 PROCEDURE — 25010000002 PIPERACILLIN SOD-TAZOBACTAM PER 1 G: Performed by: SURGERY

## 2018-01-01 PROCEDURE — 99233 SBSQ HOSP IP/OBS HIGH 50: CPT | Performed by: INTERNAL MEDICINE

## 2018-01-01 PROCEDURE — 99308 SBSQ NF CARE LOW MDM 20: CPT | Performed by: HOSPITALIST

## 2018-01-01 PROCEDURE — 63710000001 DIPHENHYDRAMINE PER 50 MG

## 2018-01-01 PROCEDURE — 85007 BL SMEAR W/DIFF WBC COUNT: CPT | Performed by: HOSPITALIST

## 2018-01-01 PROCEDURE — 80053 COMPREHEN METABOLIC PANEL: CPT | Performed by: EMERGENCY MEDICINE

## 2018-01-01 PROCEDURE — 99232 SBSQ HOSP IP/OBS MODERATE 35: CPT | Performed by: SURGERY

## 2018-01-01 PROCEDURE — 84100 ASSAY OF PHOSPHORUS: CPT | Performed by: HOSPITALIST

## 2018-01-01 PROCEDURE — 83605 ASSAY OF LACTIC ACID: CPT | Performed by: EMERGENCY MEDICINE

## 2018-01-01 PROCEDURE — 86901 BLOOD TYPING SEROLOGIC RH(D): CPT | Performed by: HOSPITALIST

## 2018-01-01 PROCEDURE — 82728 ASSAY OF FERRITIN: CPT | Performed by: INTERNAL MEDICINE

## 2018-01-01 PROCEDURE — 85610 PROTHROMBIN TIME: CPT | Performed by: INTERNAL MEDICINE

## 2018-01-01 PROCEDURE — 80202 ASSAY OF VANCOMYCIN: CPT | Performed by: INTERNAL MEDICINE

## 2018-01-01 PROCEDURE — 80053 COMPREHEN METABOLIC PANEL: CPT | Performed by: INTERNAL MEDICINE

## 2018-01-01 PROCEDURE — 0 IOPAMIDOL 61 % SOLUTION: Performed by: EMERGENCY MEDICINE

## 2018-01-01 PROCEDURE — 87077 CULTURE AEROBIC IDENTIFY: CPT | Performed by: SURGERY

## 2018-01-01 PROCEDURE — 25010000002 VANCOMYCIN PER 500 MG: Performed by: EMERGENCY MEDICINE

## 2018-01-01 PROCEDURE — 99232 SBSQ HOSP IP/OBS MODERATE 35: CPT | Performed by: INTERNAL MEDICINE

## 2018-01-01 PROCEDURE — 25010000002 HYDROMORPHONE PER 4 MG: Performed by: NURSE PRACTITIONER

## 2018-01-01 PROCEDURE — 99222 1ST HOSP IP/OBS MODERATE 55: CPT | Performed by: INTERNAL MEDICINE

## 2018-01-01 PROCEDURE — 25010000003 HYDROMORPHONE PER 4 MG: Performed by: HOSPITALIST

## 2018-01-01 PROCEDURE — 25010000002 HYDROMORPHONE PER 4 MG: Performed by: SURGERY

## 2018-01-01 PROCEDURE — 87186 SC STD MICRODIL/AGAR DIL: CPT | Performed by: EMERGENCY MEDICINE

## 2018-01-01 PROCEDURE — 86850 RBC ANTIBODY SCREEN: CPT | Performed by: INTERNAL MEDICINE

## 2018-01-01 PROCEDURE — 84100 ASSAY OF PHOSPHORUS: CPT | Performed by: INTERNAL MEDICINE

## 2018-01-01 PROCEDURE — 86900 BLOOD TYPING SEROLOGIC ABO: CPT

## 2018-01-01 PROCEDURE — 85025 COMPLETE CBC W/AUTO DIFF WBC: CPT | Performed by: NURSE PRACTITIONER

## 2018-01-01 PROCEDURE — 25010000003 HYDROMORPHONE PER 4 MG

## 2018-01-01 PROCEDURE — 83735 ASSAY OF MAGNESIUM: CPT | Performed by: INTERNAL MEDICINE

## 2018-01-01 PROCEDURE — 80053 COMPREHEN METABOLIC PANEL: CPT | Performed by: SURGERY

## 2018-01-01 PROCEDURE — 25010000002 CYANOCOBALAMIN PER 1000 MCG: Performed by: INTERNAL MEDICINE

## 2018-01-01 PROCEDURE — 36430 TRANSFUSION BLD/BLD COMPNT: CPT

## 2018-01-01 PROCEDURE — 99024 POSTOP FOLLOW-UP VISIT: CPT | Performed by: SURGERY

## 2018-01-01 PROCEDURE — 99231 SBSQ HOSP IP/OBS SF/LOW 25: CPT | Performed by: NURSE PRACTITIONER

## 2018-01-01 PROCEDURE — 99223 1ST HOSP IP/OBS HIGH 75: CPT | Performed by: INTERNAL MEDICINE

## 2018-01-01 PROCEDURE — 87205 SMEAR GRAM STAIN: CPT | Performed by: SURGERY

## 2018-01-01 PROCEDURE — 25010000002 MAGNESIUM SULFATE PER 500 MG OF MAGNESIUM: Performed by: NURSE PRACTITIONER

## 2018-01-01 PROCEDURE — 25010000002 POTASSIUM CHLORIDE PER 2 MEQ OF POTASSIUM: Performed by: NURSE PRACTITIONER

## 2018-01-01 PROCEDURE — 97110 THERAPEUTIC EXERCISES: CPT

## 2018-01-01 PROCEDURE — 25010000002 PIPERACILLIN-TAZOBACTAM: Performed by: INTERNAL MEDICINE

## 2018-01-01 PROCEDURE — 25010000002 POTASSIUM CHLORIDE PER 2 MEQ OF POTASSIUM: Performed by: SURGERY

## 2018-01-01 PROCEDURE — 86850 RBC ANTIBODY SCREEN: CPT | Performed by: HOSPITALIST

## 2018-01-01 PROCEDURE — 25010000002 FENTANYL CITRATE (PF) 100 MCG/2ML SOLUTION: Performed by: EMERGENCY MEDICINE

## 2018-01-01 PROCEDURE — 99304 1ST NF CARE SF/LOW MDM 25: CPT | Performed by: HOSPITALIST

## 2018-01-01 PROCEDURE — G0463 HOSPITAL OUTPT CLINIC VISIT: HCPCS | Performed by: INTERNAL MEDICINE

## 2018-01-01 PROCEDURE — 25010000002 CALCIUM GLUCONATE PER 10 ML: Performed by: NURSE PRACTITIONER

## 2018-01-01 PROCEDURE — 63710000001 ACETAMINOPHEN 325 MG TABLET

## 2018-01-01 PROCEDURE — 87070 CULTURE OTHR SPECIMN AEROBIC: CPT | Performed by: SURGERY

## 2018-01-01 PROCEDURE — 25010000002 MAGNESIUM SULFATE PER 500 MG OF MAGNESIUM: Performed by: SURGERY

## 2018-01-01 PROCEDURE — 82330 ASSAY OF CALCIUM: CPT

## 2018-01-01 PROCEDURE — 86901 BLOOD TYPING SEROLOGIC RH(D): CPT | Performed by: INTERNAL MEDICINE

## 2018-01-01 PROCEDURE — 99239 HOSP IP/OBS DSCHRG MGMT >30: CPT | Performed by: HOSPITALIST

## 2018-01-01 PROCEDURE — 86140 C-REACTIVE PROTEIN: CPT | Performed by: SURGERY

## 2018-01-01 PROCEDURE — 87186 SC STD MICRODIL/AGAR DIL: CPT | Performed by: SURGERY

## 2018-01-01 PROCEDURE — 36415 COLL VENOUS BLD VENIPUNCTURE: CPT | Performed by: INTERNAL MEDICINE

## 2018-01-01 PROCEDURE — 84134 ASSAY OF PREALBUMIN: CPT | Performed by: SURGERY

## 2018-01-01 PROCEDURE — 25010000002 HYDROMORPHONE PER 4 MG: Performed by: INTERNAL MEDICINE

## 2018-01-01 PROCEDURE — 25010000002 PIPERACILLIN SOD-TAZOBACTAM PER 1 G: Performed by: HOSPITALIST

## 2018-01-01 PROCEDURE — 3E0336Z INTRODUCTION OF NUTRITIONAL SUBSTANCE INTO PERIPHERAL VEIN, PERCUTANEOUS APPROACH: ICD-10-PCS | Performed by: HOSPITALIST

## 2018-01-01 PROCEDURE — 99231 SBSQ HOSP IP/OBS SF/LOW 25: CPT | Performed by: SURGERY

## 2018-01-01 PROCEDURE — 25010000002 PIPERACILLIN-TAZOBACTAM: Performed by: EMERGENCY MEDICINE

## 2018-01-01 PROCEDURE — 25010000002 PIPERACILLIN SOD-TAZOBACTAM PER 1 G

## 2018-01-01 PROCEDURE — 86923 COMPATIBILITY TEST ELECTRIC: CPT

## 2018-01-01 PROCEDURE — 87798 DETECT AGENT NOS DNA AMP: CPT | Performed by: HOSPITALIST

## 2018-01-01 PROCEDURE — 25010000002 CALCIUM GLUCONATE PER 10 ML: Performed by: SURGERY

## 2018-01-01 PROCEDURE — 25010000002 VANCOMYCIN 750 MG RECONSTITUTED SOLUTION 1 EACH VIAL: Performed by: HOSPITALIST

## 2018-01-01 PROCEDURE — 99307 SBSQ NF CARE SF MDM 10: CPT | Performed by: HOSPITALIST

## 2018-01-01 PROCEDURE — 80202 ASSAY OF VANCOMYCIN: CPT | Performed by: HOSPITALIST

## 2018-01-01 PROCEDURE — 86900 BLOOD TYPING SEROLOGIC ABO: CPT | Performed by: INTERNAL MEDICINE

## 2018-01-01 PROCEDURE — 80048 BASIC METABOLIC PNL TOTAL CA: CPT | Performed by: HOSPITALIST

## 2018-01-01 PROCEDURE — 25010000002 PIPERACILLIN SOD-TAZOBACTAM PER 1 G: Performed by: INTERNAL MEDICINE

## 2018-01-01 PROCEDURE — 83735 ASSAY OF MAGNESIUM: CPT | Performed by: NURSE PRACTITIONER

## 2018-01-01 PROCEDURE — 81001 URINALYSIS AUTO W/SCOPE: CPT | Performed by: EMERGENCY MEDICINE

## 2018-01-01 PROCEDURE — 85025 COMPLETE CBC W/AUTO DIFF WBC: CPT | Performed by: SURGERY

## 2018-01-01 PROCEDURE — 83540 ASSAY OF IRON: CPT | Performed by: INTERNAL MEDICINE

## 2018-01-01 PROCEDURE — 99285 EMERGENCY DEPT VISIT HI MDM: CPT

## 2018-01-01 PROCEDURE — 87086 URINE CULTURE/COLONY COUNT: CPT | Performed by: EMERGENCY MEDICINE

## 2018-01-01 PROCEDURE — 99222 1ST HOSP IP/OBS MODERATE 55: CPT | Performed by: HOSPITALIST

## 2018-01-01 PROCEDURE — 85007 BL SMEAR W/DIFF WBC COUNT: CPT | Performed by: INTERNAL MEDICINE

## 2018-01-01 PROCEDURE — 63710000001 INSULIN ASPART PER 5 UNITS: Performed by: SURGERY

## 2018-01-01 PROCEDURE — 85730 THROMBOPLASTIN TIME PARTIAL: CPT | Performed by: INTERNAL MEDICINE

## 2018-01-01 PROCEDURE — 76705 ECHO EXAM OF ABDOMEN: CPT

## 2018-01-01 PROCEDURE — 85025 COMPLETE CBC W/AUTO DIFF WBC: CPT | Performed by: EMERGENCY MEDICINE

## 2018-01-01 PROCEDURE — 86900 BLOOD TYPING SEROLOGIC ABO: CPT | Performed by: HOSPITALIST

## 2018-01-01 PROCEDURE — 80053 COMPREHEN METABOLIC PANEL: CPT | Performed by: NURSE PRACTITIONER

## 2018-01-01 PROCEDURE — 87581 M.PNEUMON DNA AMP PROBE: CPT | Performed by: HOSPITALIST

## 2018-01-01 PROCEDURE — 25010000002 VANCOMYCIN PER 500 MG

## 2018-01-01 PROCEDURE — 87633 RESP VIRUS 12-25 TARGETS: CPT | Performed by: HOSPITALIST

## 2018-01-01 PROCEDURE — 20610 DRAIN/INJ JOINT/BURSA W/O US: CPT | Performed by: NURSE PRACTITIONER

## 2018-01-01 PROCEDURE — 99238 HOSP IP/OBS DSCHRG MGMT 30/<: CPT | Performed by: INTERNAL MEDICINE

## 2018-01-01 PROCEDURE — 99203 OFFICE O/P NEW LOW 30 MIN: CPT | Performed by: NURSE PRACTITIONER

## 2018-01-01 PROCEDURE — 99232 SBSQ HOSP IP/OBS MODERATE 35: CPT | Performed by: ORTHOPAEDIC SURGERY

## 2018-01-01 PROCEDURE — P9016 RBC LEUKOCYTES REDUCED: HCPCS

## 2018-01-01 PROCEDURE — 83550 IRON BINDING TEST: CPT | Performed by: INTERNAL MEDICINE

## 2018-01-01 PROCEDURE — 25010000002 MAGNESIUM SULFATE 2 GM/50ML SOLUTION: Performed by: HOSPITALIST

## 2018-01-01 PROCEDURE — 83605 ASSAY OF LACTIC ACID: CPT | Performed by: INTERNAL MEDICINE

## 2018-01-01 PROCEDURE — 25010000002 IOPAMIDOL 61 % SOLUTION: Performed by: EMERGENCY MEDICINE

## 2018-01-01 PROCEDURE — 80048 BASIC METABOLIC PNL TOTAL CA: CPT | Performed by: SURGERY

## 2018-01-01 PROCEDURE — 25010000002 MAGNESIUM SULFATE 2 GM/50ML SOLUTION

## 2018-01-01 PROCEDURE — 85025 COMPLETE CBC W/AUTO DIFF WBC: CPT | Performed by: HOSPITALIST

## 2018-01-01 PROCEDURE — 87147 CULTURE TYPE IMMUNOLOGIC: CPT | Performed by: SURGERY

## 2018-01-01 PROCEDURE — 73130 X-RAY EXAM OF HAND: CPT

## 2018-01-01 PROCEDURE — 83690 ASSAY OF LIPASE: CPT | Performed by: EMERGENCY MEDICINE

## 2018-01-01 PROCEDURE — 99221 1ST HOSP IP/OBS SF/LOW 40: CPT | Performed by: ORTHOPAEDIC SURGERY

## 2018-01-01 PROCEDURE — 85027 COMPLETE CBC AUTOMATED: CPT | Performed by: SURGERY

## 2018-01-01 PROCEDURE — 25010000002 MAGNESIUM SULFATE 2 GM/50ML SOLUTION: Performed by: NURSE PRACTITIONER

## 2018-01-01 PROCEDURE — 99239 HOSP IP/OBS DSCHRG MGMT >30: CPT | Performed by: NURSE PRACTITIONER

## 2018-01-01 PROCEDURE — 84100 ASSAY OF PHOSPHORUS: CPT | Performed by: SURGERY

## 2018-01-01 PROCEDURE — 99232 SBSQ HOSP IP/OBS MODERATE 35: CPT | Performed by: NURSE PRACTITIONER

## 2018-01-01 PROCEDURE — 99285 EMERGENCY DEPT VISIT HI MDM: CPT | Performed by: EMERGENCY MEDICINE

## 2018-01-01 PROCEDURE — 87040 BLOOD CULTURE FOR BACTERIA: CPT | Performed by: NURSE PRACTITIONER

## 2018-01-01 PROCEDURE — 74177 CT ABD & PELVIS W/CONTRAST: CPT

## 2018-01-01 PROCEDURE — 87804 INFLUENZA ASSAY W/OPTIC: CPT | Performed by: EMERGENCY MEDICINE

## 2018-01-01 PROCEDURE — 25010000002 LORAZEPAM PER 2 MG: Performed by: HOSPITALIST

## 2018-01-01 PROCEDURE — 25010000003 HYDROMORPHONE PER 4 MG: Performed by: INTERNAL MEDICINE

## 2018-01-01 PROCEDURE — 97165 OT EVAL LOW COMPLEX 30 MIN: CPT

## 2018-01-01 PROCEDURE — 3E0436Z INTRODUCTION OF NUTRITIONAL SUBSTANCE INTO CENTRAL VEIN, PERCUTANEOUS APPROACH: ICD-10-PCS | Performed by: SURGERY

## 2018-01-01 PROCEDURE — 93010 ELECTROCARDIOGRAM REPORT: CPT | Performed by: INTERNAL MEDICINE

## 2018-01-01 PROCEDURE — 99284 EMERGENCY DEPT VISIT MOD MDM: CPT

## 2018-01-01 PROCEDURE — 87486 CHLMYD PNEUM DNA AMP PROBE: CPT | Performed by: HOSPITALIST

## 2018-01-01 PROCEDURE — 84132 ASSAY OF SERUM POTASSIUM: CPT | Performed by: HOSPITALIST

## 2018-01-01 PROCEDURE — 25010000002 CEFEPIME PER 500 MG: Performed by: NURSE PRACTITIONER

## 2018-01-01 PROCEDURE — 83735 ASSAY OF MAGNESIUM: CPT | Performed by: HOSPITALIST

## 2018-01-01 PROCEDURE — 25010000003 HYDROMORPHONE PER 4 MG: Performed by: NURSE PRACTITIONER

## 2018-01-01 PROCEDURE — 93005 ELECTROCARDIOGRAM TRACING: CPT | Performed by: EMERGENCY MEDICINE

## 2018-01-01 PROCEDURE — 97161 PT EVAL LOW COMPLEX 20 MIN: CPT

## 2018-01-01 PROCEDURE — 99284 EMERGENCY DEPT VISIT MOD MDM: CPT | Performed by: EMERGENCY MEDICINE

## 2018-01-01 PROCEDURE — 25010000002 VANCOMYCIN PER 500 MG: Performed by: HOSPITALIST

## 2018-01-01 PROCEDURE — 85014 HEMATOCRIT: CPT | Performed by: NURSE PRACTITIONER

## 2018-01-01 PROCEDURE — 25010000002 CYANOCOBALAMIN PER 1000 MCG: Performed by: HOSPITALIST

## 2018-01-01 PROCEDURE — 02HV33Z INSERTION OF INFUSION DEVICE INTO SUPERIOR VENA CAVA, PERCUTANEOUS APPROACH: ICD-10-PCS | Performed by: SURGERY

## 2018-01-01 PROCEDURE — 84145 PROCALCITONIN (PCT): CPT | Performed by: NURSE PRACTITIONER

## 2018-01-01 PROCEDURE — 80053 COMPREHEN METABOLIC PANEL: CPT | Performed by: HOSPITALIST

## 2018-01-01 PROCEDURE — 25010000002 PIPERACILLIN-TAZOBACTAM: Performed by: HOSPITALIST

## 2018-01-01 PROCEDURE — C1751 CATH, INF, PER/CENT/MIDLINE: HCPCS

## 2018-01-01 PROCEDURE — 87040 BLOOD CULTURE FOR BACTERIA: CPT | Performed by: EMERGENCY MEDICINE

## 2018-01-01 PROCEDURE — 25010000002 ONDANSETRON PER 1 MG: Performed by: EMERGENCY MEDICINE

## 2018-01-01 PROCEDURE — 99214 OFFICE O/P EST MOD 30 MIN: CPT | Performed by: INTERNAL MEDICINE

## 2018-01-01 PROCEDURE — 85018 HEMOGLOBIN: CPT | Performed by: NURSE PRACTITIONER

## 2018-01-01 PROCEDURE — 99231 SBSQ HOSP IP/OBS SF/LOW 25: CPT | Performed by: FAMILY MEDICINE

## 2018-01-01 RX ORDER — SODIUM CHLORIDE, SODIUM LACTATE, POTASSIUM CHLORIDE, CALCIUM CHLORIDE 600; 310; 30; 20 MG/100ML; MG/100ML; MG/100ML; MG/100ML
30 INJECTION, SOLUTION INTRAVENOUS CONTINUOUS
Status: CANCELLED | OUTPATIENT
Start: 2018-01-01

## 2018-01-01 RX ORDER — LORAZEPAM 2 MG/ML
1 CONCENTRATE ORAL
Status: CANCELLED | OUTPATIENT
Start: 2018-01-01 | End: 2018-01-01

## 2018-01-01 RX ORDER — MULTIPLE VITAMINS W/ MINERALS TAB 9MG-400MCG
1 TAB ORAL DAILY
Status: DISCONTINUED | OUTPATIENT
Start: 2018-01-01 | End: 2018-01-01

## 2018-01-01 RX ORDER — LORAZEPAM 2 MG/ML
1 CONCENTRATE ORAL
Status: DISCONTINUED | OUTPATIENT
Start: 2018-01-01 | End: 2018-01-01

## 2018-01-01 RX ORDER — GLYCOPYRROLATE 0.2 MG/ML
0.4 INJECTION INTRAMUSCULAR; INTRAVENOUS
Status: DISCONTINUED | OUTPATIENT
Start: 2018-01-01 | End: 2018-01-01 | Stop reason: HOSPADM

## 2018-01-01 RX ORDER — PETROLATUM 42 G/100G
OINTMENT TOPICAL EVERY 12 HOURS SCHEDULED
Start: 2018-01-01

## 2018-01-01 RX ORDER — SODIUM CHLORIDE 9 MG/ML
INJECTION, SOLUTION INTRAVENOUS
Status: COMPLETED
Start: 2018-01-01 | End: 2018-01-01

## 2018-01-01 RX ORDER — L.ACID,PARA/B.BIFIDUM/S.THERM 8B CELL
1 CAPSULE ORAL DAILY
Status: DISCONTINUED | OUTPATIENT
Start: 2018-01-01 | End: 2018-01-01 | Stop reason: HOSPADM

## 2018-01-01 RX ORDER — ONDANSETRON 4 MG/1
4 TABLET, FILM COATED ORAL EVERY 6 HOURS PRN
Status: DISCONTINUED | OUTPATIENT
Start: 2018-01-01 | End: 2018-01-01 | Stop reason: HOSPADM

## 2018-01-01 RX ORDER — CYANOCOBALAMIN 1000 UG/ML
1000 INJECTION, SOLUTION INTRAMUSCULAR; SUBCUTANEOUS
Status: DISCONTINUED | OUTPATIENT
Start: 2018-01-01 | End: 2018-01-01 | Stop reason: HOSPADM

## 2018-01-01 RX ORDER — SODIUM CHLORIDE 0.9 % (FLUSH) 0.9 %
1-10 SYRINGE (ML) INJECTION AS NEEDED
Status: DISCONTINUED | OUTPATIENT
Start: 2018-01-01 | End: 2018-01-01 | Stop reason: HOSPADM

## 2018-01-01 RX ORDER — LORAZEPAM 1 MG/1
2 TABLET ORAL
Status: DISCONTINUED | OUTPATIENT
Start: 2018-01-01 | End: 2018-01-01 | Stop reason: HOSPADM

## 2018-01-01 RX ORDER — LORAZEPAM 2 MG/ML
0.5 CONCENTRATE ORAL
Status: DISCONTINUED | OUTPATIENT
Start: 2018-01-01 | End: 2018-01-01 | Stop reason: HOSPADM

## 2018-01-01 RX ORDER — GABAPENTIN 100 MG/1
100 CAPSULE ORAL 3 TIMES DAILY
Status: DISCONTINUED | OUTPATIENT
Start: 2018-01-01 | End: 2018-01-01

## 2018-01-01 RX ORDER — DIPHENOXYLATE HYDROCHLORIDE AND ATROPINE SULFATE 2.5; .025 MG/1; MG/1
1 TABLET ORAL
Status: CANCELLED | OUTPATIENT
Start: 2018-01-01

## 2018-01-01 RX ORDER — ONDANSETRON 2 MG/ML
4 INJECTION INTRAMUSCULAR; INTRAVENOUS EVERY 6 HOURS PRN
Status: DISCONTINUED | OUTPATIENT
Start: 2018-01-01 | End: 2018-01-01 | Stop reason: HOSPADM

## 2018-01-01 RX ORDER — MULTIPLE VITAMINS W/ MINERALS TAB 9MG-400MCG
1 TAB ORAL DAILY
Status: DISCONTINUED | OUTPATIENT
Start: 2018-01-01 | End: 2018-01-01 | Stop reason: HOSPADM

## 2018-01-01 RX ORDER — SODIUM CHLORIDE 9 MG/ML
250 INJECTION, SOLUTION INTRAVENOUS AS NEEDED
Status: DISCONTINUED | OUTPATIENT
Start: 2018-01-01 | End: 2018-01-01

## 2018-01-01 RX ORDER — MAGNESIUM SULFATE HEPTAHYDRATE 40 MG/ML
2 INJECTION, SOLUTION INTRAVENOUS AS NEEDED
Status: DISCONTINUED | OUTPATIENT
Start: 2018-01-01 | End: 2018-01-01

## 2018-01-01 RX ORDER — PETROLATUM 42 G/100G
OINTMENT TOPICAL EVERY 12 HOURS SCHEDULED
Status: DISCONTINUED | OUTPATIENT
Start: 2018-01-01 | End: 2018-01-01 | Stop reason: HOSPADM

## 2018-01-01 RX ORDER — LORAZEPAM 2 MG/ML
2 CONCENTRATE ORAL
Status: DISCONTINUED | OUTPATIENT
Start: 2018-01-01 | End: 2018-01-01

## 2018-01-01 RX ORDER — CHOLESTYRAMINE 4 G/9G
1 POWDER, FOR SUSPENSION ORAL
COMMUNITY

## 2018-01-01 RX ORDER — ONDANSETRON 2 MG/ML
4 INJECTION INTRAMUSCULAR; INTRAVENOUS ONCE
Status: COMPLETED | OUTPATIENT
Start: 2018-01-01 | End: 2018-01-01

## 2018-01-01 RX ORDER — COLCHICINE 0.6 MG/1
1.2 TABLET ORAL ONCE
Status: COMPLETED | OUTPATIENT
Start: 2018-01-01 | End: 2018-01-01

## 2018-01-01 RX ORDER — GABAPENTIN 100 MG/1
100 CAPSULE ORAL 3 TIMES DAILY
COMMUNITY

## 2018-01-01 RX ORDER — DEXTROSE MONOHYDRATE 100 MG/ML
50 INJECTION, SOLUTION INTRAVENOUS CONTINUOUS
Status: DISCONTINUED | OUTPATIENT
Start: 2018-01-01 | End: 2018-01-01

## 2018-01-01 RX ORDER — AMPICILLIN 500 MG/1
500 CAPSULE ORAL 4 TIMES DAILY
Qty: 56 CAPSULE | Refills: 0 | Status: SHIPPED | OUTPATIENT
Start: 2018-01-01 | End: 2018-01-01

## 2018-01-01 RX ORDER — MAGNESIUM SULFATE HEPTAHYDRATE 40 MG/ML
2 INJECTION, SOLUTION INTRAVENOUS AS NEEDED
Status: DISCONTINUED | OUTPATIENT
Start: 2018-01-01 | End: 2018-01-01 | Stop reason: HOSPADM

## 2018-01-01 RX ORDER — OXYCODONE HYDROCHLORIDE AND ACETAMINOPHEN 5; 325 MG/1; MG/1
2 TABLET ORAL EVERY 6 HOURS PRN
Status: DISCONTINUED | OUTPATIENT
Start: 2018-01-01 | End: 2018-01-01

## 2018-01-01 RX ORDER — ACETAMINOPHEN 650 MG/1
650 SUPPOSITORY RECTAL EVERY 4 HOURS PRN
Status: CANCELLED | OUTPATIENT
Start: 2018-01-01

## 2018-01-01 RX ORDER — CYANOCOBALAMIN 1000 UG/ML
1000 INJECTION, SOLUTION INTRAMUSCULAR; SUBCUTANEOUS
Start: 2018-01-01 | End: 2018-01-01 | Stop reason: HOSPADM

## 2018-01-01 RX ORDER — FENTANYL CITRATE 50 UG/ML
50 INJECTION, SOLUTION INTRAMUSCULAR; INTRAVENOUS ONCE
Status: COMPLETED | OUTPATIENT
Start: 2018-01-01 | End: 2018-01-01

## 2018-01-01 RX ORDER — LIDOCAINE 50 MG/G
1 PATCH TOPICAL EVERY 24 HOURS
Status: CANCELLED | OUTPATIENT
Start: 2018-01-01

## 2018-01-01 RX ORDER — COLCHICINE 0.6 MG/1
0.6 TABLET ORAL 2 TIMES DAILY
Start: 2018-01-01

## 2018-01-01 RX ORDER — DIPHENHYDRAMINE HCL 25 MG
25 CAPSULE ORAL EVERY 6 HOURS PRN
Status: DISCONTINUED | OUTPATIENT
Start: 2018-01-01 | End: 2018-01-01 | Stop reason: HOSPADM

## 2018-01-01 RX ORDER — DIPHENHYDRAMINE HCL 25 MG
25 CAPSULE ORAL ONCE
Status: CANCELLED | OUTPATIENT
Start: 2018-01-01 | End: 2018-01-01

## 2018-01-01 RX ORDER — CLOTRIMAZOLE AND BETAMETHASONE DIPROPIONATE 10; .64 MG/G; MG/G
CREAM TOPICAL EVERY 12 HOURS SCHEDULED
Status: DISCONTINUED | OUTPATIENT
Start: 2018-01-01 | End: 2018-01-01

## 2018-01-01 RX ORDER — SUCRALFATE 1 G/1
1 TABLET ORAL
Start: 2018-01-01

## 2018-01-01 RX ORDER — MAGNESIUM SULFATE HEPTAHYDRATE 40 MG/ML
4 INJECTION, SOLUTION INTRAVENOUS AS NEEDED
Status: DISCONTINUED | OUTPATIENT
Start: 2018-01-01 | End: 2018-01-01 | Stop reason: HOSPADM

## 2018-01-01 RX ORDER — SODIUM CHLORIDE 9 MG/ML
INJECTION, SOLUTION INTRAVENOUS
Status: DISPENSED
Start: 2018-01-01 | End: 2018-01-01

## 2018-01-01 RX ORDER — DIPHENOXYLATE HYDROCHLORIDE AND ATROPINE SULFATE 2.5; .025 MG/1; MG/1
1 TABLET ORAL
Status: DISCONTINUED | OUTPATIENT
Start: 2018-01-01 | End: 2018-01-01 | Stop reason: HOSPADM

## 2018-01-01 RX ORDER — NYSTATIN 100000 [USP'U]/G
POWDER TOPICAL EVERY 12 HOURS SCHEDULED
Status: DISCONTINUED | OUTPATIENT
Start: 2018-01-01 | End: 2018-01-01 | Stop reason: HOSPADM

## 2018-01-01 RX ORDER — LANOLIN ALCOHOL/MO/W.PET/CERES
400 CREAM (GRAM) TOPICAL DAILY
Status: DISCONTINUED | OUTPATIENT
Start: 2018-01-01 | End: 2018-01-01 | Stop reason: HOSPADM

## 2018-01-01 RX ORDER — POTASSIUM CHLORIDE 7.45 MG/ML
10 INJECTION INTRAVENOUS
Status: DISCONTINUED | OUTPATIENT
Start: 2018-01-01 | End: 2018-01-01 | Stop reason: HOSPADM

## 2018-01-01 RX ORDER — SCOLOPAMINE TRANSDERMAL SYSTEM 1 MG/1
1 PATCH, EXTENDED RELEASE TRANSDERMAL
Status: DISCONTINUED | OUTPATIENT
Start: 2018-01-01 | End: 2018-01-01 | Stop reason: HOSPADM

## 2018-01-01 RX ORDER — LOPERAMIDE HYDROCHLORIDE 2 MG/1
2 CAPSULE ORAL 4 TIMES DAILY PRN
COMMUNITY
End: 2018-01-01 | Stop reason: HOSPADM

## 2018-01-01 RX ORDER — SULFAMETHOXAZOLE AND TRIMETHOPRIM 800; 160 MG/1; MG/1
1 TABLET ORAL 2 TIMES DAILY
Qty: 28 TABLET | Refills: 0 | Status: SHIPPED | OUTPATIENT
Start: 2018-01-01 | End: 2018-01-01

## 2018-01-01 RX ORDER — LACTULOSE 20 G/30ML
20 SOLUTION ORAL DAILY
Status: DISCONTINUED | OUTPATIENT
Start: 2018-01-01 | End: 2018-01-01

## 2018-01-01 RX ORDER — SODIUM CHLORIDE 9 MG/ML
250 INJECTION, SOLUTION INTRAVENOUS AS NEEDED
Status: DISCONTINUED | OUTPATIENT
Start: 2018-01-01 | End: 2018-01-01 | Stop reason: HOSPADM

## 2018-01-01 RX ORDER — POTASSIUM CHLORIDE 20 MEQ/1
40 TABLET, EXTENDED RELEASE ORAL AS NEEDED
Status: DISCONTINUED | OUTPATIENT
Start: 2018-01-01 | End: 2018-01-01

## 2018-01-01 RX ORDER — GLYCOPYRROLATE 0.2 MG/ML
0.2 INJECTION INTRAMUSCULAR; INTRAVENOUS
Status: DISCONTINUED | OUTPATIENT
Start: 2018-01-01 | End: 2018-01-01 | Stop reason: HOSPADM

## 2018-01-01 RX ORDER — LORAZEPAM 2 MG/ML
2 CONCENTRATE ORAL
Status: CANCELLED | OUTPATIENT
Start: 2018-01-01 | End: 2018-01-01

## 2018-01-01 RX ORDER — L.ACID,PARA/B.BIFIDUM/S.THERM 8B CELL
1 CAPSULE ORAL DAILY
Status: DISCONTINUED | OUTPATIENT
Start: 2018-01-01 | End: 2018-01-01

## 2018-01-01 RX ORDER — LORAZEPAM 2 MG/ML
0.5 CONCENTRATE ORAL
Status: CANCELLED | OUTPATIENT
Start: 2018-01-01 | End: 2018-01-01

## 2018-01-01 RX ORDER — ONDANSETRON 4 MG/1
4 TABLET, FILM COATED ORAL EVERY 6 HOURS PRN
Status: DISCONTINUED | OUTPATIENT
Start: 2018-01-01 | End: 2018-01-01

## 2018-01-01 RX ORDER — ONDANSETRON 4 MG/1
4 TABLET, FILM COATED ORAL EVERY 6 HOURS PRN
Start: 2018-01-01

## 2018-01-01 RX ORDER — SODIUM CHLORIDE 9 MG/ML
500 INJECTION, SOLUTION INTRAVENOUS ONCE
Status: COMPLETED | OUTPATIENT
Start: 2018-01-01 | End: 2018-01-01

## 2018-01-01 RX ORDER — ACETAMINOPHEN 325 MG/1
650 TABLET ORAL EVERY 6 HOURS PRN
Start: 2018-01-01

## 2018-01-01 RX ORDER — HYDROMORPHONE HCL 110MG/55ML
1 PATIENT CONTROLLED ANALGESIA SYRINGE INTRAVENOUS EVERY 4 HOURS PRN
Status: DISCONTINUED | OUTPATIENT
Start: 2018-01-01 | End: 2018-01-01 | Stop reason: HOSPADM

## 2018-01-01 RX ORDER — COLCHICINE 0.6 MG/1
0.6 TABLET ORAL ONCE
Status: COMPLETED | OUTPATIENT
Start: 2018-01-01 | End: 2018-01-01

## 2018-01-01 RX ORDER — HYDROMORPHONE HCL 110MG/55ML
1 PATIENT CONTROLLED ANALGESIA SYRINGE INTRAVENOUS
Status: DISCONTINUED | OUTPATIENT
Start: 2018-01-01 | End: 2018-01-01

## 2018-01-01 RX ORDER — SODIUM CHLORIDE 9 MG/ML
250 INJECTION, SOLUTION INTRAVENOUS AS NEEDED
Status: CANCELLED | OUTPATIENT
Start: 2018-01-01

## 2018-01-01 RX ORDER — DIPHENHYDRAMINE HCL 25 MG
25 CAPSULE ORAL ONCE
Status: DISCONTINUED | OUTPATIENT
Start: 2018-01-01 | End: 2018-01-01

## 2018-01-01 RX ORDER — SODIUM CHLORIDE 0.9 % (FLUSH) 0.9 %
10 SYRINGE (ML) INJECTION AS NEEDED
Status: DISCONTINUED | OUTPATIENT
Start: 2018-01-01 | End: 2018-01-01 | Stop reason: HOSPADM

## 2018-01-01 RX ORDER — ONDANSETRON 4 MG/1
4 TABLET, FILM COATED ORAL EVERY 6 HOURS PRN
Start: 2018-01-01 | End: 2018-01-01 | Stop reason: HOSPADM

## 2018-01-01 RX ORDER — PANTOPRAZOLE SODIUM 40 MG/1
40 TABLET, DELAYED RELEASE ORAL DAILY
Status: DISCONTINUED | OUTPATIENT
Start: 2018-01-01 | End: 2018-01-01

## 2018-01-01 RX ORDER — POTASSIUM CHLORIDE 1.5 G/1.77G
40 POWDER, FOR SOLUTION ORAL AS NEEDED
Status: DISCONTINUED | OUTPATIENT
Start: 2018-01-01 | End: 2018-01-01

## 2018-01-01 RX ORDER — ACETAMINOPHEN 325 MG/1
650 TABLET ORAL EVERY 6 HOURS PRN
Status: DISCONTINUED | OUTPATIENT
Start: 2018-01-01 | End: 2018-01-01 | Stop reason: HOSPADM

## 2018-01-01 RX ORDER — LORAZEPAM 2 MG/ML
0.5 CONCENTRATE ORAL
Status: DISCONTINUED | OUTPATIENT
Start: 2018-01-01 | End: 2018-01-01 | Stop reason: CLARIF

## 2018-01-01 RX ORDER — SODIUM CHLORIDE 9 MG/ML
20 INJECTION, SOLUTION INTRAVENOUS CONTINUOUS
Start: 2018-01-01

## 2018-01-01 RX ORDER — OXYCODONE AND ACETAMINOPHEN 7.5; 325 MG/1; MG/1
TABLET ORAL
Status: COMPLETED
Start: 2018-01-01 | End: 2018-01-01

## 2018-01-01 RX ORDER — MULTIPLE VITAMINS W/ MINERALS TAB 9MG-400MCG
1 TAB ORAL DAILY
Start: 2018-01-01

## 2018-01-01 RX ORDER — DIPHENHYDRAMINE HCL 25 MG
CAPSULE ORAL
Status: COMPLETED
Start: 2018-01-01 | End: 2018-01-01

## 2018-01-01 RX ORDER — POTASSIUM CHLORIDE 20 MEQ/1
40 TABLET, EXTENDED RELEASE ORAL AS NEEDED
Status: DISCONTINUED | OUTPATIENT
Start: 2018-01-01 | End: 2018-01-01 | Stop reason: HOSPADM

## 2018-01-01 RX ORDER — ACETAMINOPHEN 325 MG/1
650 TABLET ORAL EVERY 4 HOURS PRN
Status: CANCELLED | OUTPATIENT
Start: 2018-01-01

## 2018-01-01 RX ORDER — SUCRALFATE 1 G/1
1 TABLET ORAL
Status: DISCONTINUED | OUTPATIENT
Start: 2018-01-01 | End: 2018-01-01

## 2018-01-01 RX ORDER — MAGNESIUM SULFATE 1 G/100ML
1 INJECTION INTRAVENOUS AS NEEDED
Status: DISCONTINUED | OUTPATIENT
Start: 2018-01-01 | End: 2018-01-01

## 2018-01-01 RX ORDER — MAGNESIUM SULFATE HEPTAHYDRATE 40 MG/ML
INJECTION, SOLUTION INTRAVENOUS
Status: COMPLETED
Start: 2018-01-01 | End: 2018-01-01

## 2018-01-01 RX ORDER — COLCHICINE 0.6 MG/1
0.6 TABLET ORAL DAILY
Status: DISCONTINUED | OUTPATIENT
Start: 2018-01-01 | End: 2018-01-01

## 2018-01-01 RX ORDER — DIPHENHYDRAMINE HCL 25 MG
25 CAPSULE ORAL EVERY 6 HOURS PRN
Status: CANCELLED | OUTPATIENT
Start: 2018-01-01

## 2018-01-01 RX ORDER — PANTOPRAZOLE SODIUM 40 MG/1
40 TABLET, DELAYED RELEASE ORAL
Status: DISCONTINUED | OUTPATIENT
Start: 2018-01-01 | End: 2018-01-01 | Stop reason: HOSPADM

## 2018-01-01 RX ORDER — LIDOCAINE HYDROCHLORIDE 10 MG/ML
4 INJECTION, SOLUTION EPIDURAL; INFILTRATION; INTRACAUDAL; PERINEURAL
Status: COMPLETED | OUTPATIENT
Start: 2018-01-01 | End: 2018-01-01

## 2018-01-01 RX ORDER — DIPHENHYDRAMINE HYDROCHLORIDE 50 MG/ML
25 INJECTION INTRAMUSCULAR; INTRAVENOUS EVERY 6 HOURS PRN
Status: DISCONTINUED | OUTPATIENT
Start: 2018-01-01 | End: 2018-01-01 | Stop reason: HOSPADM

## 2018-01-01 RX ORDER — NYSTATIN 100000 [USP'U]/G
POWDER TOPICAL EVERY 12 HOURS SCHEDULED
Start: 2018-01-01 | End: 2019-01-23

## 2018-01-01 RX ORDER — SODIUM CHLORIDE 9 MG/ML
250 INJECTION, SOLUTION INTRAVENOUS DAILY PRN
Status: DISCONTINUED | OUTPATIENT
Start: 2018-01-01 | End: 2018-01-01 | Stop reason: HOSPADM

## 2018-01-01 RX ORDER — CLOTRIMAZOLE AND BETAMETHASONE DIPROPIONATE 10; .64 MG/G; MG/G
CREAM TOPICAL 2 TIMES DAILY
COMMUNITY

## 2018-01-01 RX ORDER — PETROLATUM 42 G/100G
OINTMENT TOPICAL EVERY 12 HOURS SCHEDULED
Status: DISCONTINUED | OUTPATIENT
Start: 2018-01-01 | End: 2018-01-01

## 2018-01-01 RX ORDER — OXYCODONE HYDROCHLORIDE AND ACETAMINOPHEN 5; 325 MG/1; MG/1
1 TABLET ORAL EVERY 6 HOURS PRN
Qty: 40 TABLET | Refills: 0 | Status: SHIPPED | OUTPATIENT
Start: 2018-01-01 | End: 2018-01-01 | Stop reason: HOSPADM

## 2018-01-01 RX ORDER — FENTANYL CITRATE 50 UG/ML
INJECTION, SOLUTION INTRAMUSCULAR; INTRAVENOUS
Status: COMPLETED
Start: 2018-01-01 | End: 2018-01-01

## 2018-01-01 RX ORDER — ACETAMINOPHEN 650 MG/1
650 SUPPOSITORY RECTAL EVERY 4 HOURS PRN
Status: DISCONTINUED | OUTPATIENT
Start: 2018-01-01 | End: 2018-01-01 | Stop reason: HOSPADM

## 2018-01-01 RX ORDER — LORAZEPAM 2 MG/ML
2 CONCENTRATE ORAL
Status: DISCONTINUED | OUTPATIENT
Start: 2018-01-01 | End: 2018-01-01 | Stop reason: HOSPADM

## 2018-01-01 RX ORDER — CLOTRIMAZOLE AND BETAMETHASONE DIPROPIONATE 10; .64 MG/G; MG/G
1 CREAM TOPICAL EVERY 12 HOURS SCHEDULED
Start: 2018-01-01 | End: 2018-01-01 | Stop reason: HOSPADM

## 2018-01-01 RX ORDER — OXYCODONE AND ACETAMINOPHEN 7.5; 325 MG/1; MG/1
1 TABLET ORAL EVERY 6 HOURS PRN
COMMUNITY
End: 2018-01-01 | Stop reason: HOSPADM

## 2018-01-01 RX ORDER — OXYCODONE AND ACETAMINOPHEN 7.5; 325 MG/1; MG/1
1 TABLET ORAL EVERY 4 HOURS PRN
Status: DISCONTINUED | OUTPATIENT
Start: 2018-01-01 | End: 2018-01-01 | Stop reason: HOSPADM

## 2018-01-01 RX ORDER — ACETAMINOPHEN 325 MG/1
650 TABLET ORAL EVERY 6 HOURS PRN
Status: DISCONTINUED | OUTPATIENT
Start: 2018-01-01 | End: 2018-01-01

## 2018-01-01 RX ORDER — TRIAMCINOLONE ACETONIDE 40 MG/ML
80 INJECTION, SUSPENSION INTRA-ARTICULAR; INTRAMUSCULAR
Status: COMPLETED | OUTPATIENT
Start: 2018-01-01 | End: 2018-01-01

## 2018-01-01 RX ORDER — SODIUM CHLORIDE 9 MG/ML
20 INJECTION, SOLUTION INTRAVENOUS CONTINUOUS
Status: DISCONTINUED | OUTPATIENT
Start: 2018-01-01 | End: 2018-01-01 | Stop reason: HOSPADM

## 2018-01-01 RX ORDER — ACETAMINOPHEN 160 MG/5ML
650 SOLUTION ORAL EVERY 4 HOURS PRN
Status: DISCONTINUED | OUTPATIENT
Start: 2018-01-01 | End: 2018-01-01 | Stop reason: HOSPADM

## 2018-01-01 RX ORDER — ACETAMINOPHEN 325 MG/1
650 TABLET ORAL EVERY 4 HOURS PRN
Status: DISCONTINUED | OUTPATIENT
Start: 2018-01-01 | End: 2018-01-01 | Stop reason: HOSPADM

## 2018-01-01 RX ORDER — SODIUM CHLORIDE 0.9 % (FLUSH) 0.9 %
1-10 SYRINGE (ML) INJECTION AS NEEDED
Status: CANCELLED | OUTPATIENT
Start: 2018-01-01

## 2018-01-01 RX ORDER — NALOXONE HCL 0.4 MG/ML
0.1 VIAL (ML) INJECTION
Status: DISCONTINUED | OUTPATIENT
Start: 2018-01-01 | End: 2018-01-01

## 2018-01-01 RX ORDER — SUCRALFATE 1 G/1
1 TABLET ORAL
Status: DISCONTINUED | OUTPATIENT
Start: 2018-01-01 | End: 2018-01-01 | Stop reason: HOSPADM

## 2018-01-01 RX ORDER — PANTOPRAZOLE SODIUM 40 MG/1
40 TABLET, DELAYED RELEASE ORAL DAILY
Start: 2018-01-01

## 2018-01-01 RX ORDER — CYANOCOBALAMIN 1000 UG/ML
1000 INJECTION, SOLUTION INTRAMUSCULAR; SUBCUTANEOUS
Start: 2018-01-01

## 2018-01-01 RX ORDER — LIDOCAINE 50 MG/G
1 PATCH TOPICAL EVERY 24 HOURS
COMMUNITY

## 2018-01-01 RX ORDER — LORAZEPAM 1 MG/1
2 TABLET ORAL
Status: CANCELLED | OUTPATIENT
Start: 2018-01-01 | End: 2018-01-01

## 2018-01-01 RX ORDER — BUPIVACAINE HYDROCHLORIDE 5 MG/ML
4 INJECTION, SOLUTION EPIDURAL; INTRACAUDAL
Status: COMPLETED | OUTPATIENT
Start: 2018-01-01 | End: 2018-01-01

## 2018-01-01 RX ORDER — SODIUM CHLORIDE 0.9 % (FLUSH) 0.9 %
10 SYRINGE (ML) INJECTION AS NEEDED
Status: CANCELLED | OUTPATIENT
Start: 2018-01-01

## 2018-01-01 RX ORDER — LORAZEPAM 2 MG/ML
1 INJECTION INTRAMUSCULAR EVERY 4 HOURS PRN
Status: DISCONTINUED | OUTPATIENT
Start: 2018-01-01 | End: 2018-01-01 | Stop reason: HOSPADM

## 2018-01-01 RX ORDER — PETROLATUM 42 G/100G
OINTMENT TOPICAL EVERY 12 HOURS SCHEDULED
Status: CANCELLED | OUTPATIENT
Start: 2018-01-01

## 2018-01-01 RX ORDER — GLYCOPYRROLATE 0.2 MG/ML
0.4 INJECTION INTRAMUSCULAR; INTRAVENOUS
Status: CANCELLED | OUTPATIENT
Start: 2018-01-01

## 2018-01-01 RX ORDER — SODIUM CHLORIDE 9 MG/ML
40 INJECTION, SOLUTION INTRAVENOUS AS NEEDED
Status: CANCELLED | OUTPATIENT
Start: 2018-01-01

## 2018-01-01 RX ORDER — ACETAMINOPHEN 325 MG/1
650 TABLET ORAL ONCE
Status: CANCELLED | OUTPATIENT
Start: 2018-01-01 | End: 2018-01-01

## 2018-01-01 RX ORDER — ACETAMINOPHEN 325 MG/1
TABLET ORAL
Status: COMPLETED
Start: 2018-01-01 | End: 2018-01-01

## 2018-01-01 RX ORDER — SODIUM CHLORIDE, SODIUM LACTATE, POTASSIUM CHLORIDE, CALCIUM CHLORIDE 600; 310; 30; 20 MG/100ML; MG/100ML; MG/100ML; MG/100ML
30 INJECTION, SOLUTION INTRAVENOUS CONTINUOUS
Status: DISCONTINUED | OUTPATIENT
Start: 2018-01-01 | End: 2018-01-01 | Stop reason: HOSPADM

## 2018-01-01 RX ORDER — COLCHICINE 0.6 MG/1
0.6 TABLET ORAL 2 TIMES DAILY
Status: DISCONTINUED | OUTPATIENT
Start: 2018-01-01 | End: 2018-01-01 | Stop reason: HOSPADM

## 2018-01-01 RX ORDER — SODIUM CHLORIDE 9 MG/ML
40 INJECTION, SOLUTION INTRAVENOUS AS NEEDED
Status: DISCONTINUED | OUTPATIENT
Start: 2018-01-01 | End: 2018-01-01 | Stop reason: HOSPADM

## 2018-01-01 RX ORDER — LORAZEPAM 0.5 MG/1
0.5 TABLET ORAL
Status: DISCONTINUED | OUTPATIENT
Start: 2018-01-01 | End: 2018-01-01

## 2018-01-01 RX ORDER — MAGNESIUM SULFATE HEPTAHYDRATE 40 MG/ML
4 INJECTION, SOLUTION INTRAVENOUS AS NEEDED
Status: DISCONTINUED | OUTPATIENT
Start: 2018-01-01 | End: 2018-01-01

## 2018-01-01 RX ORDER — DIPHENHYDRAMINE HYDROCHLORIDE 50 MG/ML
25 INJECTION INTRAMUSCULAR; INTRAVENOUS EVERY 6 HOURS PRN
Status: CANCELLED | OUTPATIENT
Start: 2018-01-01

## 2018-01-01 RX ORDER — COLCHICINE 0.6 MG/1
0.6 TABLET ORAL DAILY
Status: DISCONTINUED | OUTPATIENT
Start: 2018-01-01 | End: 2018-01-01 | Stop reason: HOSPADM

## 2018-01-01 RX ORDER — LIDOCAINE 50 MG/G
1 PATCH TOPICAL EVERY 24 HOURS
Status: DISCONTINUED | OUTPATIENT
Start: 2018-01-01 | End: 2018-01-01 | Stop reason: HOSPADM

## 2018-01-01 RX ORDER — POTASSIUM CHLORIDE 1.5 G/1.77G
40 POWDER, FOR SOLUTION ORAL AS NEEDED
Status: DISCONTINUED | OUTPATIENT
Start: 2018-01-01 | End: 2018-01-01 | Stop reason: HOSPADM

## 2018-01-01 RX ORDER — GLYCOPYRROLATE 0.2 MG/ML
0.2 INJECTION INTRAMUSCULAR; INTRAVENOUS
Status: CANCELLED | OUTPATIENT
Start: 2018-01-01

## 2018-01-01 RX ORDER — HYDROMORPHONE HCL 110MG/55ML
1 PATIENT CONTROLLED ANALGESIA SYRINGE INTRAVENOUS ONCE
Status: COMPLETED | OUTPATIENT
Start: 2018-01-01 | End: 2018-01-01

## 2018-01-01 RX ORDER — BISACODYL 10 MG
10 SUPPOSITORY, RECTAL RECTAL DAILY PRN
COMMUNITY
End: 2018-01-01 | Stop reason: HOSPADM

## 2018-01-01 RX ORDER — MAGNESIUM HYDROXIDE 1200 MG/15ML
LIQUID ORAL
Status: DISPENSED
Start: 2018-01-01 | End: 2018-01-01

## 2018-01-01 RX ORDER — ACETAMINOPHEN 325 MG/1
325 TABLET ORAL ONCE
Status: COMPLETED | OUTPATIENT
Start: 2018-01-01 | End: 2018-01-01

## 2018-01-01 RX ORDER — COLCHICINE 0.6 MG/1
0.6 TABLET ORAL 2 TIMES DAILY
Status: DISCONTINUED | OUTPATIENT
Start: 2018-01-01 | End: 2018-01-01

## 2018-01-01 RX ORDER — OXYCODONE HYDROCHLORIDE AND ACETAMINOPHEN 5; 325 MG/1; MG/1
1 TABLET ORAL EVERY 6 HOURS PRN
Qty: 40 TABLET | Refills: 0 | Status: SHIPPED | OUTPATIENT
Start: 2018-01-01 | End: 2018-01-01

## 2018-01-01 RX ORDER — NYSTATIN 100000 [USP'U]/G
POWDER TOPICAL
Qty: 30 G | Refills: 0 | Status: SHIPPED | OUTPATIENT
Start: 2018-01-01 | End: 2018-01-01 | Stop reason: HOSPADM

## 2018-01-01 RX ORDER — HYDROMORPHONE HCL 110MG/55ML
1 PATIENT CONTROLLED ANALGESIA SYRINGE INTRAVENOUS EVERY 4 HOURS PRN
Status: DISCONTINUED | OUTPATIENT
Start: 2018-01-01 | End: 2018-01-01

## 2018-01-01 RX ORDER — POTASSIUM CHLORIDE 20 MEQ/1
40 TABLET, EXTENDED RELEASE ORAL EVERY 4 HOURS
Status: DISPENSED | OUTPATIENT
Start: 2018-01-01 | End: 2018-01-01

## 2018-01-01 RX ORDER — LORAZEPAM 0.5 MG/1
0.5 TABLET ORAL
Status: DISCONTINUED | OUTPATIENT
Start: 2018-01-01 | End: 2018-01-01 | Stop reason: HOSPADM

## 2018-01-01 RX ORDER — COLCHICINE 0.6 MG/1
0.6 TABLET ORAL ONCE
Status: DISCONTINUED | OUTPATIENT
Start: 2018-01-01 | End: 2018-01-01 | Stop reason: HOSPADM

## 2018-01-01 RX ORDER — LACTULOSE 10 G/15ML
30 SOLUTION ORAL DAILY PRN
COMMUNITY
End: 2018-01-01 | Stop reason: HOSPADM

## 2018-01-01 RX ORDER — SODIUM CHLORIDE 9 MG/ML
125 INJECTION, SOLUTION INTRAVENOUS CONTINUOUS
Status: DISCONTINUED | OUTPATIENT
Start: 2018-01-01 | End: 2018-01-01

## 2018-01-01 RX ORDER — LORAZEPAM 1 MG/1
1 TABLET ORAL
Status: DISCONTINUED | OUTPATIENT
Start: 2018-01-01 | End: 2018-01-01

## 2018-01-01 RX ORDER — ACETAMINOPHEN 160 MG/5ML
650 SOLUTION ORAL EVERY 4 HOURS PRN
Status: CANCELLED | OUTPATIENT
Start: 2018-01-01

## 2018-01-01 RX ORDER — MAGNESIUM SULFATE 1 G/100ML
1 INJECTION INTRAVENOUS AS NEEDED
Status: DISCONTINUED | OUTPATIENT
Start: 2018-01-01 | End: 2018-01-01 | Stop reason: HOSPADM

## 2018-01-01 RX ORDER — SODIUM CHLORIDE, SODIUM LACTATE, POTASSIUM CHLORIDE, CALCIUM CHLORIDE 600; 310; 30; 20 MG/100ML; MG/100ML; MG/100ML; MG/100ML
50 INJECTION, SOLUTION INTRAVENOUS CONTINUOUS
Status: DISCONTINUED | OUTPATIENT
Start: 2018-01-01 | End: 2018-01-01

## 2018-01-01 RX ORDER — SCOLOPAMINE TRANSDERMAL SYSTEM 1 MG/1
1 PATCH, EXTENDED RELEASE TRANSDERMAL
Status: CANCELLED | OUTPATIENT
Start: 2018-01-01

## 2018-01-01 RX ORDER — ACETAMINOPHEN 325 MG/1
650 TABLET ORAL EVERY 8 HOURS PRN
Status: DISCONTINUED | OUTPATIENT
Start: 2018-01-01 | End: 2018-01-01

## 2018-01-01 RX ORDER — CHOLESTYRAMINE 4 G/9G
1 POWDER, FOR SUSPENSION ORAL
Status: DISCONTINUED | OUTPATIENT
Start: 2018-01-01 | End: 2018-01-01

## 2018-01-01 RX ORDER — OXYCODONE AND ACETAMINOPHEN 7.5; 325 MG/1; MG/1
1 TABLET ORAL EVERY 4 HOURS PRN
Status: DISCONTINUED | OUTPATIENT
Start: 2018-01-01 | End: 2018-01-01

## 2018-01-01 RX ORDER — LORAZEPAM 1 MG/1
1 TABLET ORAL
Status: CANCELLED | OUTPATIENT
Start: 2018-01-01 | End: 2018-01-01

## 2018-01-01 RX ORDER — OXYCODONE HYDROCHLORIDE AND ACETAMINOPHEN 5; 325 MG/1; MG/1
TABLET ORAL
Status: COMPLETED
Start: 2018-01-01 | End: 2018-01-01

## 2018-01-01 RX ORDER — NYSTATIN 100000 [USP'U]/G
POWDER TOPICAL DAILY PRN
Status: DISCONTINUED | OUTPATIENT
Start: 2018-01-01 | End: 2018-01-01

## 2018-01-01 RX ORDER — OXYCODONE AND ACETAMINOPHEN 7.5; 325 MG/1; MG/1
1 TABLET ORAL EVERY 4 HOURS PRN
Start: 2018-01-01

## 2018-01-01 RX ORDER — OXYCODONE AND ACETAMINOPHEN 7.5; 325 MG/1; MG/1
1 TABLET ORAL EVERY 6 HOURS PRN
Status: DISCONTINUED | OUTPATIENT
Start: 2018-01-01 | End: 2018-01-01

## 2018-01-01 RX ORDER — LORAZEPAM 2 MG/ML
0.5 CONCENTRATE ORAL
Status: DISCONTINUED | OUTPATIENT
Start: 2018-01-01 | End: 2018-01-01

## 2018-01-01 RX ORDER — ONDANSETRON 2 MG/ML
4 INJECTION INTRAMUSCULAR; INTRAVENOUS EVERY 6 HOURS PRN
Status: CANCELLED | OUTPATIENT
Start: 2018-01-01

## 2018-01-01 RX ORDER — LORAZEPAM 1 MG/1
2 TABLET ORAL
Status: DISCONTINUED | OUTPATIENT
Start: 2018-01-01 | End: 2018-01-01

## 2018-01-01 RX ORDER — LORAZEPAM 0.5 MG/1
0.5 TABLET ORAL
Status: CANCELLED | OUTPATIENT
Start: 2018-01-01 | End: 2018-01-01

## 2018-01-01 RX ORDER — PETROLATUM 42 G/100G
OINTMENT TOPICAL EVERY 12 HOURS SCHEDULED
Start: 2018-01-01 | End: 2018-01-01 | Stop reason: HOSPADM

## 2018-01-01 RX ORDER — PANTOPRAZOLE SODIUM 40 MG/1
40 TABLET, DELAYED RELEASE ORAL DAILY
Status: DISCONTINUED | OUTPATIENT
Start: 2018-01-01 | End: 2018-01-01 | Stop reason: HOSPADM

## 2018-01-01 RX ORDER — ACETAMINOPHEN 325 MG/1
650 TABLET ORAL ONCE
Status: DISCONTINUED | OUTPATIENT
Start: 2018-01-01 | End: 2018-01-01

## 2018-01-01 RX ORDER — ACETAMINOPHEN 325 MG/1
325 TABLET ORAL ONCE
Status: CANCELLED | OUTPATIENT
Start: 2018-01-01 | End: 2018-01-01

## 2018-01-01 RX ORDER — PIPERACILLIN SODIUM, TAZOBACTAM SODIUM 3; .375 G/15ML; G/15ML
INJECTION, POWDER, LYOPHILIZED, FOR SOLUTION INTRAVENOUS
Status: COMPLETED
Start: 2018-01-01 | End: 2018-01-01

## 2018-01-01 RX ORDER — SUCRALFATE 1 G/1
1 TABLET ORAL
Start: 2018-01-01 | End: 2018-01-01 | Stop reason: HOSPADM

## 2018-01-01 RX ORDER — POTASSIUM CHLORIDE 7.45 MG/ML
10 INJECTION INTRAVENOUS
Status: DISCONTINUED | OUTPATIENT
Start: 2018-01-01 | End: 2018-01-01

## 2018-01-01 RX ORDER — LANOLIN ALCOHOL/MO/W.PET/CERES
400 CREAM (GRAM) TOPICAL DAILY
Status: DISCONTINUED | OUTPATIENT
Start: 2018-01-01 | End: 2018-01-01

## 2018-01-01 RX ORDER — L.ACID,PARA/B.BIFIDUM/S.THERM 8B CELL
1 CAPSULE ORAL DAILY
Start: 2018-01-01

## 2018-01-01 RX ORDER — LORAZEPAM 2 MG/ML
1 CONCENTRATE ORAL
Status: DISCONTINUED | OUTPATIENT
Start: 2018-01-01 | End: 2018-01-01 | Stop reason: HOSPADM

## 2018-01-01 RX ORDER — NYSTATIN 100000 [USP'U]/G
POWDER TOPICAL EVERY 12 HOURS SCHEDULED
Status: DISCONTINUED | OUTPATIENT
Start: 2018-01-01 | End: 2018-01-01

## 2018-01-01 RX ORDER — LORAZEPAM 1 MG/1
1 TABLET ORAL
Status: DISCONTINUED | OUTPATIENT
Start: 2018-01-01 | End: 2018-01-01 | Stop reason: HOSPADM

## 2018-01-01 RX ORDER — OXYCODONE HYDROCHLORIDE AND ACETAMINOPHEN 5; 325 MG/1; MG/1
1 TABLET ORAL EVERY 6 HOURS PRN
Status: DISCONTINUED | OUTPATIENT
Start: 2018-01-01 | End: 2018-01-01

## 2018-01-01 RX ADMIN — Medication 1 CAPSULE: at 10:46

## 2018-01-01 RX ADMIN — DICLOFENAC 2 G: 10 GEL TOPICAL at 07:42

## 2018-01-01 RX ADMIN — POTASSIUM PHOSPHATE, MONOBASIC AND POTASSIUM PHOSPHATE, DIBASIC: 224; 236 INJECTION, SOLUTION INTRAVENOUS at 11:15

## 2018-01-01 RX ADMIN — DICLOFENAC 2 G: 10 GEL TOPICAL at 07:46

## 2018-01-01 RX ADMIN — HYDROMORPHONE HYDROCHLORIDE 1 MG: 2 INJECTION INTRAMUSCULAR; INTRAVENOUS; SUBCUTANEOUS at 17:44

## 2018-01-01 RX ADMIN — PETROLATUM 1 APPLICATION: 42 OINTMENT TOPICAL at 08:13

## 2018-01-01 RX ADMIN — ACETAMINOPHEN 650 MG: 650 SUPPOSITORY RECTAL at 20:26

## 2018-01-01 RX ADMIN — OXYCODONE HYDROCHLORIDE AND ACETAMINOPHEN 2 TABLET: 5; 325 TABLET ORAL at 01:24

## 2018-01-01 RX ADMIN — ACETAMINOPHEN 400 MCG: 400 TABLET ORAL at 09:36

## 2018-01-01 RX ADMIN — PETROLATUM: 42 OINTMENT TOPICAL at 08:30

## 2018-01-01 RX ADMIN — LIDOCAINE 1 PATCH: 50 PATCH CUTANEOUS at 07:05

## 2018-01-01 RX ADMIN — SODIUM CHLORIDE 100 ML: 9 INJECTION, SOLUTION INTRAVENOUS at 11:37

## 2018-01-01 RX ADMIN — PANTOPRAZOLE SODIUM 40 MG: 40 TABLET, DELAYED RELEASE ORAL at 06:55

## 2018-01-01 RX ADMIN — ACETAMINOPHEN 650 MG: 325 SOLUTION ORAL at 09:41

## 2018-01-01 RX ADMIN — POTASSIUM PHOSPHATE, MONOBASIC AND POTASSIUM PHOSPHATE, DIBASIC: 224; 236 INJECTION, SOLUTION INTRAVENOUS at 11:47

## 2018-01-01 RX ADMIN — PIPERACILLIN SODIUM AND TAZOBACTAM SODIUM 3.38 G: 3; .375 INJECTION, POWDER, LYOPHILIZED, FOR SOLUTION INTRAVENOUS at 00:08

## 2018-01-01 RX ADMIN — PETROLATUM: 42 OINTMENT TOPICAL at 09:42

## 2018-01-01 RX ADMIN — METOPROLOL TARTRATE 12.5 MG: 25 TABLET ORAL at 08:24

## 2018-01-01 RX ADMIN — SODIUM CHLORIDE 250 ML: 9 INJECTION, SOLUTION INTRAVENOUS at 13:11

## 2018-01-01 RX ADMIN — IOPAMIDOL 100 ML: 612 INJECTION, SOLUTION INTRAVENOUS at 18:19

## 2018-01-01 RX ADMIN — PIPERACILLIN SODIUM AND TAZOBACTAM SODIUM 3.38 G: 3; .375 INJECTION, POWDER, LYOPHILIZED, FOR SOLUTION INTRAVENOUS at 00:34

## 2018-01-01 RX ADMIN — I.V. FAT EMULSION 14.4 G: 20 EMULSION INTRAVENOUS at 17:58

## 2018-01-01 RX ADMIN — CLOTRIMAZOLE AND BETAMETHASONE DIPROPIONATE 1 APPLICATION: 10; .5 CREAM TOPICAL at 20:57

## 2018-01-01 RX ADMIN — NYSTATIN: 100000 POWDER TOPICAL at 09:37

## 2018-01-01 RX ADMIN — SODIUM CHLORIDE, POTASSIUM CHLORIDE, SODIUM LACTATE AND CALCIUM CHLORIDE 30 ML/HR: 600; 310; 30; 20 INJECTION, SOLUTION INTRAVENOUS at 02:23

## 2018-01-01 RX ADMIN — OXYCODONE HYDROCHLORIDE AND ACETAMINOPHEN 1 TABLET: 5; 325 TABLET ORAL at 11:09

## 2018-01-01 RX ADMIN — PETROLATUM: 42 OINTMENT TOPICAL at 08:39

## 2018-01-01 RX ADMIN — SODIUM CHLORIDE 4.5 G: 9 INJECTION, SOLUTION INTRAVENOUS at 10:59

## 2018-01-01 RX ADMIN — DICLOFENAC 2 G: 10 GEL TOPICAL at 08:59

## 2018-01-01 RX ADMIN — HYDROMORPHONE HYDROCHLORIDE 0.5 MG: 2 INJECTION INTRAMUSCULAR; INTRAVENOUS; SUBCUTANEOUS at 12:30

## 2018-01-01 RX ADMIN — PETROLATUM: 42 OINTMENT TOPICAL at 08:57

## 2018-01-01 RX ADMIN — NYSTATIN: 100000 POWDER TOPICAL at 10:50

## 2018-01-01 RX ADMIN — PIPERACILLIN SODIUM AND TAZOBACTAM SODIUM 3.38 G: 3; .375 INJECTION, POWDER, LYOPHILIZED, FOR SOLUTION INTRAVENOUS at 17:21

## 2018-01-01 RX ADMIN — HYDROMORPHONE HYDROCHLORIDE: 10 INJECTION INTRAMUSCULAR; INTRAVENOUS; SUBCUTANEOUS at 08:46

## 2018-01-01 RX ADMIN — SUCRALFATE 1 G: 1 TABLET ORAL at 11:56

## 2018-01-01 RX ADMIN — SUCRALFATE 1 G: 1 TABLET ORAL at 19:04

## 2018-01-01 RX ADMIN — HYDROMORPHONE HYDROCHLORIDE: 10 INJECTION INTRAMUSCULAR; INTRAVENOUS; SUBCUTANEOUS at 11:58

## 2018-01-01 RX ADMIN — CLOTRIMAZOLE AND BETAMETHASONE DIPROPIONATE: 10; .5 CREAM TOPICAL at 20:04

## 2018-01-01 RX ADMIN — SODIUM CHLORIDE 4.5 G: 9 INJECTION, SOLUTION INTRAVENOUS at 01:43

## 2018-01-01 RX ADMIN — POTASSIUM PHOSPHATE, MONOBASIC AND POTASSIUM PHOSPHATE, DIBASIC 10 MMOL: 224; 236 INJECTION, SOLUTION INTRAVENOUS at 13:04

## 2018-01-01 RX ADMIN — POTASSIUM CHLORIDE 40 MEQ: 1500 TABLET, EXTENDED RELEASE ORAL at 14:02

## 2018-01-01 RX ADMIN — HYDROMORPHONE HYDROCHLORIDE 0.5 MG: 2 INJECTION INTRAMUSCULAR; INTRAVENOUS; SUBCUTANEOUS at 14:45

## 2018-01-01 RX ADMIN — DICLOFENAC 2 G: 10 GEL TOPICAL at 22:28

## 2018-01-01 RX ADMIN — LIDOCAINE 1 PATCH: 50 PATCH CUTANEOUS at 06:21

## 2018-01-01 RX ADMIN — HYDROMORPHONE HYDROCHLORIDE 0.5 MG: 2 INJECTION INTRAMUSCULAR; INTRAVENOUS; SUBCUTANEOUS at 18:07

## 2018-01-01 RX ADMIN — MAGNESIUM OXIDE TAB 400 MG (241.3 MG ELEMENTAL MG) 400 MG: 400 (241.3 MG) TAB at 09:37

## 2018-01-01 RX ADMIN — HYDROMORPHONE HYDROCHLORIDE: 10 INJECTION, SOLUTION INTRAMUSCULAR; INTRAVENOUS; SUBCUTANEOUS at 07:06

## 2018-01-01 RX ADMIN — SODIUM CHLORIDE 750 MG: 900 INJECTION, SOLUTION INTRAVENOUS at 10:15

## 2018-01-01 RX ADMIN — DICLOFENAC 2 G: 10 GEL TOPICAL at 11:29

## 2018-01-01 RX ADMIN — SUCRALFATE 1 G: 1 TABLET ORAL at 20:54

## 2018-01-01 RX ADMIN — TRIAMCINOLONE ACETONIDE 80 MG: 40 INJECTION, SUSPENSION INTRA-ARTICULAR; INTRAMUSCULAR at 12:15

## 2018-01-01 RX ADMIN — DICLOFENAC 2 G: 10 GEL TOPICAL at 22:06

## 2018-01-01 RX ADMIN — PIPERACILLIN SODIUM AND TAZOBACTAM SODIUM 3.38 G: 3; .375 INJECTION, POWDER, LYOPHILIZED, FOR SOLUTION INTRAVENOUS at 08:52

## 2018-01-01 RX ADMIN — COLCHICINE 0.6 MG: 0.6 TABLET, FILM COATED ORAL at 08:41

## 2018-01-01 RX ADMIN — COLCHICINE 0.6 MG: 0.6 TABLET, FILM COATED ORAL at 08:40

## 2018-01-01 RX ADMIN — SODIUM CHLORIDE 1000 MG: 900 INJECTION, SOLUTION INTRAVENOUS at 01:57

## 2018-01-01 RX ADMIN — LIDOCAINE 1 PATCH: 50 PATCH CUTANEOUS at 06:07

## 2018-01-01 RX ADMIN — NYSTATIN: 100000 POWDER TOPICAL at 20:35

## 2018-01-01 RX ADMIN — PETROLATUM: 42 OINTMENT TOPICAL at 08:42

## 2018-01-01 RX ADMIN — NYSTATIN: 100000 POWDER TOPICAL at 08:29

## 2018-01-01 RX ADMIN — SUCRALFATE 1 G: 1 TABLET ORAL at 18:33

## 2018-01-01 RX ADMIN — METOPROLOL TARTRATE 12.5 MG: 25 TABLET ORAL at 20:54

## 2018-01-01 RX ADMIN — CALCIUM GLUCONATE: 98 INJECTION, SOLUTION INTRAVENOUS at 07:27

## 2018-01-01 RX ADMIN — SODIUM CHLORIDE 30 ML/HR: 9 INJECTION, SOLUTION INTRAVENOUS at 10:40

## 2018-01-01 RX ADMIN — PETROLATUM: 42 OINTMENT TOPICAL at 09:15

## 2018-01-01 RX ADMIN — LIDOCAINE 1 PATCH: 50 PATCH CUTANEOUS at 23:03

## 2018-01-01 RX ADMIN — HYDROMORPHONE HYDROCHLORIDE 0.5 MG: 2 INJECTION INTRAMUSCULAR; INTRAVENOUS; SUBCUTANEOUS at 03:34

## 2018-01-01 RX ADMIN — PANTOPRAZOLE SODIUM 40 MG: 40 INJECTION, POWDER, FOR SOLUTION INTRAVENOUS at 20:41

## 2018-01-01 RX ADMIN — SUCRALFATE 1 G: 1 TABLET ORAL at 20:44

## 2018-01-01 RX ADMIN — SUCRALFATE 1 G: 1 TABLET ORAL at 23:52

## 2018-01-01 RX ADMIN — HYDROMORPHONE HYDROCHLORIDE: 10 INJECTION INTRAMUSCULAR; INTRAVENOUS; SUBCUTANEOUS at 00:08

## 2018-01-01 RX ADMIN — PETROLATUM: 42 OINTMENT TOPICAL at 09:37

## 2018-01-01 RX ADMIN — COLCHICINE 0.6 MG: 0.6 TABLET, FILM COATED ORAL at 09:09

## 2018-01-01 RX ADMIN — PETROLATUM: 42 OINTMENT TOPICAL at 08:24

## 2018-01-01 RX ADMIN — OXYCODONE HYDROCHLORIDE AND ACETAMINOPHEN 1 TABLET: 7.5; 325 TABLET ORAL at 08:33

## 2018-01-01 RX ADMIN — Medication 1 CAPSULE: at 09:13

## 2018-01-01 RX ADMIN — PETROLATUM: 42 OINTMENT TOPICAL at 09:02

## 2018-01-01 RX ADMIN — METOPROLOL TARTRATE 2.5 MG: 5 INJECTION INTRAVENOUS at 15:57

## 2018-01-01 RX ADMIN — DICLOFENAC 2 G: 10 GEL TOPICAL at 13:24

## 2018-01-01 RX ADMIN — INSULIN ASPART 2 UNITS: 100 INJECTION, SOLUTION INTRAVENOUS; SUBCUTANEOUS at 01:43

## 2018-01-01 RX ADMIN — PIPERACILLIN SODIUM,TAZOBACTAM SODIUM 3.38 G: 3; .375 INJECTION, POWDER, FOR SOLUTION INTRAVENOUS at 12:34

## 2018-01-01 RX ADMIN — PETROLATUM: 42 OINTMENT TOPICAL at 21:23

## 2018-01-01 RX ADMIN — HYDROMORPHONE HYDROCHLORIDE 0.5 MG: 2 INJECTION INTRAMUSCULAR; INTRAVENOUS; SUBCUTANEOUS at 21:06

## 2018-01-01 RX ADMIN — OXYCODONE HYDROCHLORIDE AND ACETAMINOPHEN 1 TABLET: 5; 325 TABLET ORAL at 06:57

## 2018-01-01 RX ADMIN — PETROLATUM: 42 OINTMENT TOPICAL at 21:29

## 2018-01-01 RX ADMIN — HYDROMORPHONE HYDROCHLORIDE: 10 INJECTION INTRAMUSCULAR; INTRAVENOUS; SUBCUTANEOUS at 09:06

## 2018-01-01 RX ADMIN — SUCRALFATE 1 G: 1 TABLET ORAL at 08:29

## 2018-01-01 RX ADMIN — DICLOFENAC 2 G: 10 GEL TOPICAL at 18:00

## 2018-01-01 RX ADMIN — Medication 1 CAPSULE: at 08:41

## 2018-01-01 RX ADMIN — METOPROLOL TARTRATE 12.5 MG: 25 TABLET ORAL at 08:40

## 2018-01-01 RX ADMIN — OXYCODONE HYDROCHLORIDE AND ACETAMINOPHEN 1 TABLET: 7.5; 325 TABLET ORAL at 17:12

## 2018-01-01 RX ADMIN — CLOTRIMAZOLE AND BETAMETHASONE DIPROPIONATE 1 APPLICATION: 10; .5 CREAM TOPICAL at 09:13

## 2018-01-01 RX ADMIN — HYDROMORPHONE HYDROCHLORIDE: 10 INJECTION INTRAMUSCULAR; INTRAVENOUS; SUBCUTANEOUS at 21:54

## 2018-01-01 RX ADMIN — HYDROMORPHONE HYDROCHLORIDE: 10 INJECTION INTRAMUSCULAR; INTRAVENOUS; SUBCUTANEOUS at 17:16

## 2018-01-01 RX ADMIN — DICLOFENAC 2 G: 10 GEL TOPICAL at 11:58

## 2018-01-01 RX ADMIN — CYANOCOBALAMIN 1000 MCG: 1000 INJECTION, SOLUTION INTRAMUSCULAR; SUBCUTANEOUS at 10:50

## 2018-01-01 RX ADMIN — HYDROMORPHONE HYDROCHLORIDE: 10 INJECTION INTRAMUSCULAR; INTRAVENOUS; SUBCUTANEOUS at 23:43

## 2018-01-01 RX ADMIN — I.V. FAT EMULSION 14.4 G: 20 EMULSION INTRAVENOUS at 17:52

## 2018-01-01 RX ADMIN — DICLOFENAC 2 G: 10 GEL TOPICAL at 17:22

## 2018-01-01 RX ADMIN — INSULIN ASPART 2 UNITS: 100 INJECTION, SOLUTION INTRAVENOUS; SUBCUTANEOUS at 01:12

## 2018-01-01 RX ADMIN — SODIUM CHLORIDE 1000 MG: 900 INJECTION, SOLUTION INTRAVENOUS at 13:57

## 2018-01-01 RX ADMIN — OXYCODONE HYDROCHLORIDE AND ACETAMINOPHEN 1 TABLET: 7.5; 325 TABLET ORAL at 00:28

## 2018-01-01 RX ADMIN — SUCRALFATE 1 G: 1 TABLET ORAL at 10:30

## 2018-01-01 RX ADMIN — DICLOFENAC 2 G: 10 GEL TOPICAL at 18:26

## 2018-01-01 RX ADMIN — Medication 1 CAPSULE: at 10:24

## 2018-01-01 RX ADMIN — OXYCODONE HYDROCHLORIDE AND ACETAMINOPHEN 1 TABLET: 5; 325 TABLET ORAL at 20:54

## 2018-01-01 RX ADMIN — DICLOFENAC 2 G: 10 GEL TOPICAL at 08:10

## 2018-01-01 RX ADMIN — NYSTATIN: 100000 POWDER TOPICAL at 08:38

## 2018-01-01 RX ADMIN — SUCRALFATE 1 G: 1 TABLET ORAL at 17:15

## 2018-01-01 RX ADMIN — HYDROMORPHONE HYDROCHLORIDE: 10 INJECTION INTRAMUSCULAR; INTRAVENOUS; SUBCUTANEOUS at 11:50

## 2018-01-01 RX ADMIN — GABAPENTIN 100 MG: 100 CAPSULE ORAL at 16:03

## 2018-01-01 RX ADMIN — GABAPENTIN 100 MG: 100 CAPSULE ORAL at 10:17

## 2018-01-01 RX ADMIN — HYDROMORPHONE HYDROCHLORIDE: 10 INJECTION INTRAMUSCULAR; INTRAVENOUS; SUBCUTANEOUS at 00:59

## 2018-01-01 RX ADMIN — POTASSIUM CHLORIDE 40 MEQ: 1500 TABLET, EXTENDED RELEASE ORAL at 08:54

## 2018-01-01 RX ADMIN — SODIUM CHLORIDE 4.5 G: 9 INJECTION, SOLUTION INTRAVENOUS at 08:55

## 2018-01-01 RX ADMIN — CLOTRIMAZOLE AND BETAMETHASONE DIPROPIONATE 1 APPLICATION: 10; .5 CREAM TOPICAL at 08:31

## 2018-01-01 RX ADMIN — DICLOFENAC 2 G: 10 GEL TOPICAL at 09:14

## 2018-01-01 RX ADMIN — OXYCODONE HYDROCHLORIDE AND ACETAMINOPHEN 1 TABLET: 5; 325 TABLET ORAL at 01:33

## 2018-01-01 RX ADMIN — DICLOFENAC 2 G: 10 GEL TOPICAL at 09:01

## 2018-01-01 RX ADMIN — SUCRALFATE 1 G: 1 TABLET ORAL at 17:36

## 2018-01-01 RX ADMIN — PANTOPRAZOLE SODIUM 40 MG: 40 TABLET, DELAYED RELEASE ORAL at 06:24

## 2018-01-01 RX ADMIN — OXYCODONE HYDROCHLORIDE AND ACETAMINOPHEN 1 TABLET: 7.5; 325 TABLET ORAL at 17:20

## 2018-01-01 RX ADMIN — ACETYLCYSTEINE 3240 MG: 200 INJECTION INTRAVENOUS at 14:54

## 2018-01-01 RX ADMIN — DICLOFENAC 2 G: 10 GEL TOPICAL at 21:29

## 2018-01-01 RX ADMIN — SUCRALFATE 1 G: 1 TABLET ORAL at 11:36

## 2018-01-01 RX ADMIN — METOPROLOL TARTRATE 12.5 MG: 25 TABLET ORAL at 08:58

## 2018-01-01 RX ADMIN — OXYCODONE HYDROCHLORIDE AND ACETAMINOPHEN 1 TABLET: 7.5; 325 TABLET ORAL at 22:27

## 2018-01-01 RX ADMIN — HYDROMORPHONE HYDROCHLORIDE 0.5 MG: 2 INJECTION INTRAMUSCULAR; INTRAVENOUS; SUBCUTANEOUS at 08:29

## 2018-01-01 RX ADMIN — SUCRALFATE 1 G: 1 TABLET ORAL at 11:50

## 2018-01-01 RX ADMIN — DICLOFENAC 2 G: 10 GEL TOPICAL at 12:00

## 2018-01-01 RX ADMIN — ASCORBIC ACID: 500 INJECTION, SOLUTION INTRAMUSCULAR; INTRAVENOUS; SUBCUTANEOUS at 17:52

## 2018-01-01 RX ADMIN — SUCRALFATE 1 G: 1 TABLET ORAL at 17:22

## 2018-01-01 RX ADMIN — HYDROMORPHONE HYDROCHLORIDE: 10 INJECTION INTRAMUSCULAR; INTRAVENOUS; SUBCUTANEOUS at 23:06

## 2018-01-01 RX ADMIN — PIPERACILLIN SODIUM,TAZOBACTAM SODIUM 3.38 G: 3; .375 INJECTION, POWDER, FOR SOLUTION INTRAVENOUS at 18:30

## 2018-01-01 RX ADMIN — PIPERACILLIN SODIUM AND TAZOBACTAM SODIUM 3.38 G: 3; .375 INJECTION, POWDER, LYOPHILIZED, FOR SOLUTION INTRAVENOUS at 17:18

## 2018-01-01 RX ADMIN — SUCRALFATE 1 G: 1 TABLET ORAL at 21:29

## 2018-01-01 RX ADMIN — CLOTRIMAZOLE AND BETAMETHASONE DIPROPIONATE 1 APPLICATION: 10; .5 CREAM TOPICAL at 16:16

## 2018-01-01 RX ADMIN — Medication 1 TABLET: at 08:41

## 2018-01-01 RX ADMIN — OXYCODONE HYDROCHLORIDE AND ACETAMINOPHEN 1 TABLET: 7.5; 325 TABLET ORAL at 22:18

## 2018-01-01 RX ADMIN — HYDROMORPHONE HYDROCHLORIDE: 10 INJECTION INTRAMUSCULAR; INTRAVENOUS; SUBCUTANEOUS at 11:28

## 2018-01-01 RX ADMIN — COLCHICINE 1.2 MG: 0.6 TABLET, FILM COATED ORAL at 14:47

## 2018-01-01 RX ADMIN — HYDROMORPHONE HYDROCHLORIDE 0.5 MG: 2 INJECTION INTRAMUSCULAR; INTRAVENOUS; SUBCUTANEOUS at 12:03

## 2018-01-01 RX ADMIN — DICLOFENAC 2 G: 10 GEL TOPICAL at 20:23

## 2018-01-01 RX ADMIN — OXYCODONE HYDROCHLORIDE AND ACETAMINOPHEN 1 TABLET: 5; 325 TABLET ORAL at 03:19

## 2018-01-01 RX ADMIN — HYDROMORPHONE HYDROCHLORIDE: 10 INJECTION INTRAMUSCULAR; INTRAVENOUS; SUBCUTANEOUS at 01:20

## 2018-01-01 RX ADMIN — METRONIDAZOLE 500 MG: 500 INJECTION, SOLUTION INTRAVENOUS at 06:10

## 2018-01-01 RX ADMIN — PETROLATUM: 42 OINTMENT TOPICAL at 20:23

## 2018-01-01 RX ADMIN — PETROLATUM: 42 OINTMENT TOPICAL at 21:06

## 2018-01-01 RX ADMIN — Medication 1 CAPSULE: at 09:36

## 2018-01-01 RX ADMIN — METOPROLOL TARTRATE 2.5 MG: 5 INJECTION INTRAVENOUS at 08:32

## 2018-01-01 RX ADMIN — FENTANYL CITRATE 50 MCG: 50 INJECTION, SOLUTION INTRAMUSCULAR; INTRAVENOUS at 23:58

## 2018-01-01 RX ADMIN — PIPERACILLIN SODIUM,TAZOBACTAM SODIUM 3.38 G: 3; .375 INJECTION, POWDER, FOR SOLUTION INTRAVENOUS at 03:53

## 2018-01-01 RX ADMIN — METRONIDAZOLE 500 MG: 500 INJECTION, SOLUTION INTRAVENOUS at 05:00

## 2018-01-01 RX ADMIN — SODIUM CHLORIDE 125 ML/HR: 9 INJECTION, SOLUTION INTRAVENOUS at 05:53

## 2018-01-01 RX ADMIN — PIPERACILLIN SODIUM AND TAZOBACTAM SODIUM 3.38 G: 3; .375 INJECTION, POWDER, LYOPHILIZED, FOR SOLUTION INTRAVENOUS at 08:30

## 2018-01-01 RX ADMIN — DICLOFENAC 2 G: 10 GEL TOPICAL at 17:34

## 2018-01-01 RX ADMIN — HYDROMORPHONE HYDROCHLORIDE 1 MG: 2 INJECTION INTRAMUSCULAR; INTRAVENOUS; SUBCUTANEOUS at 01:35

## 2018-01-01 RX ADMIN — HYDROMORPHONE HYDROCHLORIDE: 10 INJECTION INTRAMUSCULAR; INTRAVENOUS; SUBCUTANEOUS at 18:40

## 2018-01-01 RX ADMIN — METRONIDAZOLE 500 MG: 500 INJECTION, SOLUTION INTRAVENOUS at 05:53

## 2018-01-01 RX ADMIN — POTASSIUM PHOSPHATE, MONOBASIC AND POTASSIUM PHOSPHATE, DIBASIC 20 MMOL: 224; 236 INJECTION, SOLUTION INTRAVENOUS at 09:50

## 2018-01-01 RX ADMIN — OXYCODONE HYDROCHLORIDE AND ACETAMINOPHEN 1 TABLET: 7.5; 325 TABLET ORAL at 03:24

## 2018-01-01 RX ADMIN — DICLOFENAC 2 G: 10 GEL TOPICAL at 13:28

## 2018-01-01 RX ADMIN — SUCRALFATE 1 G: 1 TABLET ORAL at 17:44

## 2018-01-01 RX ADMIN — OXYCODONE HYDROCHLORIDE AND ACETAMINOPHEN 1 TABLET: 7.5; 325 TABLET ORAL at 23:23

## 2018-01-01 RX ADMIN — METRONIDAZOLE 500 MG: 500 INJECTION, SOLUTION INTRAVENOUS at 07:43

## 2018-01-01 RX ADMIN — INSULIN ASPART 2 UNITS: 100 INJECTION, SOLUTION INTRAVENOUS; SUBCUTANEOUS at 17:45

## 2018-01-01 RX ADMIN — I.V. FAT EMULSION 47.04 G: 20 EMULSION INTRAVENOUS at 17:53

## 2018-01-01 RX ADMIN — HYDROMORPHONE HYDROCHLORIDE 0.5 MG: 2 INJECTION INTRAMUSCULAR; INTRAVENOUS; SUBCUTANEOUS at 23:06

## 2018-01-01 RX ADMIN — SODIUM CHLORIDE, POTASSIUM CHLORIDE, SODIUM LACTATE AND CALCIUM CHLORIDE 30 ML/HR: 600; 310; 30; 20 INJECTION, SOLUTION INTRAVENOUS at 01:20

## 2018-01-01 RX ADMIN — SUCRALFATE 1 G: 1 TABLET ORAL at 09:13

## 2018-01-01 RX ADMIN — MAGNESIUM OXIDE TAB 400 MG (241.3 MG ELEMENTAL MG) 400 MG: 400 (241.3 MG) TAB at 08:29

## 2018-01-01 RX ADMIN — ALTEPLASE 2 MG: 2.2 INJECTION, POWDER, LYOPHILIZED, FOR SOLUTION INTRAVENOUS at 11:58

## 2018-01-01 RX ADMIN — HYDROMORPHONE HYDROCHLORIDE: 10 INJECTION INTRAMUSCULAR; INTRAVENOUS; SUBCUTANEOUS at 12:59

## 2018-01-01 RX ADMIN — ACETYLCYSTEINE 9700 MG: 200 INJECTION INTRAVENOUS at 14:01

## 2018-01-01 RX ADMIN — DICLOFENAC 2 G: 10 GEL TOPICAL at 13:56

## 2018-01-01 RX ADMIN — HYDROMORPHONE HYDROCHLORIDE: 10 INJECTION INTRAMUSCULAR; INTRAVENOUS; SUBCUTANEOUS at 09:14

## 2018-01-01 RX ADMIN — COLCHICINE 0.6 MG: 0.6 TABLET, FILM COATED ORAL at 10:24

## 2018-01-01 RX ADMIN — OXYCODONE HYDROCHLORIDE AND ACETAMINOPHEN 1 TABLET: 5; 325 TABLET ORAL at 08:53

## 2018-01-01 RX ADMIN — ACETAMINOPHEN 400 MCG: 400 TABLET ORAL at 08:58

## 2018-01-01 RX ADMIN — IOPAMIDOL 100 ML: 612 INJECTION, SOLUTION INTRAVENOUS at 22:40

## 2018-01-01 RX ADMIN — GABAPENTIN 100 MG: 100 CAPSULE ORAL at 23:52

## 2018-01-01 RX ADMIN — DICLOFENAC 2 G: 10 GEL TOPICAL at 20:36

## 2018-01-01 RX ADMIN — PANTOPRAZOLE SODIUM 40 MG: 40 TABLET, DELAYED RELEASE ORAL at 10:18

## 2018-01-01 RX ADMIN — HYDROMORPHONE HYDROCHLORIDE: 10 INJECTION INTRAMUSCULAR; INTRAVENOUS; SUBCUTANEOUS at 17:32

## 2018-01-01 RX ADMIN — PANTOPRAZOLE SODIUM 40 MG: 40 INJECTION, POWDER, FOR SOLUTION INTRAVENOUS at 09:08

## 2018-01-01 RX ADMIN — SODIUM CHLORIDE, POTASSIUM CHLORIDE, SODIUM LACTATE AND CALCIUM CHLORIDE 30 ML/HR: 600; 310; 30; 20 INJECTION, SOLUTION INTRAVENOUS at 06:22

## 2018-01-01 RX ADMIN — HYDROMORPHONE HYDROCHLORIDE: 10 INJECTION INTRAMUSCULAR; INTRAVENOUS; SUBCUTANEOUS at 15:27

## 2018-01-01 RX ADMIN — POTASSIUM CHLORIDE 10 MEQ: 149 INJECTION, SOLUTION, CONCENTRATE INTRAVENOUS at 08:35

## 2018-01-01 RX ADMIN — HYDROMORPHONE HYDROCHLORIDE 0.5 MG: 2 INJECTION INTRAMUSCULAR; INTRAVENOUS; SUBCUTANEOUS at 01:23

## 2018-01-01 RX ADMIN — PETROLATUM: 42 OINTMENT TOPICAL at 10:48

## 2018-01-01 RX ADMIN — SODIUM CHLORIDE 100 ML/HR: 9 INJECTION, SOLUTION INTRAVENOUS at 15:26

## 2018-01-01 RX ADMIN — SUCRALFATE 1 G: 1 TABLET ORAL at 20:36

## 2018-01-01 RX ADMIN — DICLOFENAC 2 G: 10 GEL TOPICAL at 21:23

## 2018-01-01 RX ADMIN — OXYCODONE HYDROCHLORIDE AND ACETAMINOPHEN 1 TABLET: 7.5; 325 TABLET ORAL at 09:01

## 2018-01-01 RX ADMIN — METRONIDAZOLE 500 MG: 500 INJECTION, SOLUTION INTRAVENOUS at 14:29

## 2018-01-01 RX ADMIN — SODIUM CHLORIDE 1000 MG: 900 INJECTION, SOLUTION INTRAVENOUS at 15:02

## 2018-01-01 RX ADMIN — SODIUM CHLORIDE 500 ML: 9 INJECTION, SOLUTION INTRAVENOUS at 17:45

## 2018-01-01 RX ADMIN — HYDROMORPHONE HYDROCHLORIDE 1 MG: 2 INJECTION INTRAMUSCULAR; INTRAVENOUS; SUBCUTANEOUS at 11:12

## 2018-01-01 RX ADMIN — PETROLATUM: 42 OINTMENT TOPICAL at 08:41

## 2018-01-01 RX ADMIN — DICLOFENAC 2 G: 10 GEL TOPICAL at 12:37

## 2018-01-01 RX ADMIN — LORAZEPAM 1 MG: 2 INJECTION INTRAMUSCULAR; INTRAVENOUS at 10:49

## 2018-01-01 RX ADMIN — Medication 1 CAPSULE: at 08:58

## 2018-01-01 RX ADMIN — OXYCODONE HYDROCHLORIDE AND ACETAMINOPHEN 1 TABLET: 5; 325 TABLET ORAL at 01:50

## 2018-01-01 RX ADMIN — ONDANSETRON 4 MG: 2 INJECTION, SOLUTION INTRAMUSCULAR; INTRAVENOUS at 20:46

## 2018-01-01 RX ADMIN — LIDOCAINE 1 PATCH: 50 PATCH CUTANEOUS at 23:55

## 2018-01-01 RX ADMIN — ACETAMINOPHEN 400 MCG: 400 TABLET ORAL at 09:13

## 2018-01-01 RX ADMIN — GLYCOPYRROLATE 0.4 MG: 0.2 INJECTION INTRAMUSCULAR; INTRAVENOUS at 18:46

## 2018-01-01 RX ADMIN — HYDROMORPHONE HYDROCHLORIDE 0.5 MG: 2 INJECTION INTRAMUSCULAR; INTRAVENOUS; SUBCUTANEOUS at 10:23

## 2018-01-01 RX ADMIN — SODIUM CHLORIDE 1000 MG: 900 INJECTION, SOLUTION INTRAVENOUS at 13:26

## 2018-01-01 RX ADMIN — METRONIDAZOLE 500 MG: 500 INJECTION, SOLUTION INTRAVENOUS at 20:54

## 2018-01-01 RX ADMIN — MAGNESIUM OXIDE TAB 400 MG (241.3 MG ELEMENTAL MG) 400 MG: 400 (241.3 MG) TAB at 10:24

## 2018-01-01 RX ADMIN — DICLOFENAC 2 G: 10 GEL TOPICAL at 17:20

## 2018-01-01 RX ADMIN — Medication 1 CAPSULE: at 08:21

## 2018-01-01 RX ADMIN — NYSTATIN: 100000 POWDER TOPICAL at 23:52

## 2018-01-01 RX ADMIN — SUCRALFATE 1 G: 1 TABLET ORAL at 18:03

## 2018-01-01 RX ADMIN — POTASSIUM PHOSPHATE, MONOBASIC AND POTASSIUM PHOSPHATE, DIBASIC: 224; 236 INJECTION, SOLUTION INTRAVENOUS at 18:52

## 2018-01-01 RX ADMIN — DICLOFENAC 2 G: 10 GEL TOPICAL at 12:39

## 2018-01-01 RX ADMIN — MAGNESIUM SULFATE HEPTAHYDRATE 2 G: 40 INJECTION, SOLUTION INTRAVENOUS at 12:32

## 2018-01-01 RX ADMIN — HYDROMORPHONE HYDROCHLORIDE 1 MG: 2 INJECTION, SOLUTION INTRAMUSCULAR; INTRAVENOUS; SUBCUTANEOUS at 18:18

## 2018-01-01 RX ADMIN — FENTANYL CITRATE 50 MCG: 50 INJECTION, SOLUTION INTRAMUSCULAR; INTRAVENOUS at 19:24

## 2018-01-01 RX ADMIN — HYDROMORPHONE HYDROCHLORIDE: 10 INJECTION INTRAMUSCULAR; INTRAVENOUS; SUBCUTANEOUS at 17:46

## 2018-01-01 RX ADMIN — DICLOFENAC 2 G: 10 GEL TOPICAL at 12:30

## 2018-01-01 RX ADMIN — HYDROMORPHONE HYDROCHLORIDE 1 MG: 2 INJECTION INTRAMUSCULAR; INTRAVENOUS; SUBCUTANEOUS at 07:40

## 2018-01-01 RX ADMIN — SODIUM CHLORIDE 4.5 G: 9 INJECTION, SOLUTION INTRAVENOUS at 00:08

## 2018-01-01 RX ADMIN — CLOTRIMAZOLE AND BETAMETHASONE DIPROPIONATE 1 APPLICATION: 10; .5 CREAM TOPICAL at 09:35

## 2018-01-01 RX ADMIN — HYDROMORPHONE HYDROCHLORIDE: 10 INJECTION INTRAMUSCULAR; INTRAVENOUS; SUBCUTANEOUS at 07:40

## 2018-01-01 RX ADMIN — MAGNESIUM SULFATE IN WATER 2 G: 40 INJECTION, SOLUTION INTRAVENOUS at 12:30

## 2018-01-01 RX ADMIN — PIPERACILLIN SODIUM AND TAZOBACTAM SODIUM 3.38 G: 3; .375 INJECTION, POWDER, LYOPHILIZED, FOR SOLUTION INTRAVENOUS at 16:00

## 2018-01-01 RX ADMIN — HYDROMORPHONE HYDROCHLORIDE: 10 INJECTION INTRAMUSCULAR; INTRAVENOUS; SUBCUTANEOUS at 11:10

## 2018-01-01 RX ADMIN — SUCRALFATE 1 G: 1 TABLET ORAL at 11:09

## 2018-01-01 RX ADMIN — PIPERACILLIN SODIUM AND TAZOBACTAM SODIUM 3.38 G: 3; .375 INJECTION, POWDER, LYOPHILIZED, FOR SOLUTION INTRAVENOUS at 00:46

## 2018-01-01 RX ADMIN — ACETAMINOPHEN 325 MG: 325 TABLET, FILM COATED ORAL at 10:23

## 2018-01-01 RX ADMIN — HYDROMORPHONE HYDROCHLORIDE: 10 INJECTION INTRAMUSCULAR; INTRAVENOUS; SUBCUTANEOUS at 00:40

## 2018-01-01 RX ADMIN — SUCRALFATE 1 G: 1 TABLET ORAL at 18:02

## 2018-01-01 RX ADMIN — COLCHICINE 0.6 MG: 0.6 TABLET, FILM COATED ORAL at 11:42

## 2018-01-01 RX ADMIN — DICLOFENAC 2 G: 10 GEL TOPICAL at 12:01

## 2018-01-01 RX ADMIN — POTASSIUM CHLORIDE 40 MEQ: 1500 TABLET, EXTENDED RELEASE ORAL at 21:24

## 2018-01-01 RX ADMIN — LORAZEPAM 1 MG: 2 INJECTION INTRAMUSCULAR; INTRAVENOUS at 23:27

## 2018-01-01 RX ADMIN — OXYCODONE HYDROCHLORIDE AND ACETAMINOPHEN 1 TABLET: 7.5; 325 TABLET ORAL at 16:01

## 2018-01-01 RX ADMIN — NYSTATIN: 100000 POWDER TOPICAL at 08:39

## 2018-01-01 RX ADMIN — NYSTATIN: 100000 POWDER TOPICAL at 21:23

## 2018-01-01 RX ADMIN — PETROLATUM: 42 OINTMENT TOPICAL at 21:50

## 2018-01-01 RX ADMIN — SODIUM CHLORIDE 4.5 G: 9 INJECTION, SOLUTION INTRAVENOUS at 01:18

## 2018-01-01 RX ADMIN — SODIUM CHLORIDE 1000 MG: 900 INJECTION, SOLUTION INTRAVENOUS at 01:30

## 2018-01-01 RX ADMIN — DICLOFENAC 2 G: 10 GEL TOPICAL at 11:18

## 2018-01-01 RX ADMIN — DICLOFENAC 2 G: 10 GEL TOPICAL at 18:28

## 2018-01-01 RX ADMIN — SODIUM CHLORIDE, POTASSIUM CHLORIDE, SODIUM LACTATE AND CALCIUM CHLORIDE 30 ML/HR: 600; 310; 30; 20 INJECTION, SOLUTION INTRAVENOUS at 06:33

## 2018-01-01 RX ADMIN — PANTOPRAZOLE SODIUM 40 MG: 40 INJECTION, POWDER, FOR SOLUTION INTRAVENOUS at 08:24

## 2018-01-01 RX ADMIN — COLCHICINE 0.6 MG: 0.6 TABLET, FILM COATED ORAL at 20:04

## 2018-01-01 RX ADMIN — SUCRALFATE 1 G: 1 TABLET ORAL at 11:42

## 2018-01-01 RX ADMIN — SODIUM CHLORIDE 125 ML/HR: 9 INJECTION, SOLUTION INTRAVENOUS at 13:49

## 2018-01-01 RX ADMIN — SODIUM CHLORIDE 4.5 G: 9 INJECTION, SOLUTION INTRAVENOUS at 01:12

## 2018-01-01 RX ADMIN — SUCRALFATE 1 G: 1 TABLET ORAL at 08:41

## 2018-01-01 RX ADMIN — DICLOFENAC 2 G: 10 GEL TOPICAL at 08:00

## 2018-01-01 RX ADMIN — HYDROMORPHONE HYDROCHLORIDE: 10 INJECTION INTRAMUSCULAR; INTRAVENOUS; SUBCUTANEOUS at 22:25

## 2018-01-01 RX ADMIN — SUCRALFATE 1 G: 1 TABLET ORAL at 12:34

## 2018-01-01 RX ADMIN — SODIUM CHLORIDE 750 MG: 900 INJECTION, SOLUTION INTRAVENOUS at 11:02

## 2018-01-01 RX ADMIN — SODIUM CHLORIDE 4.5 G: 9 INJECTION, SOLUTION INTRAVENOUS at 18:05

## 2018-01-01 RX ADMIN — PIPERACILLIN SODIUM AND TAZOBACTAM SODIUM 3.38 G: 3; .375 INJECTION, POWDER, LYOPHILIZED, FOR SOLUTION INTRAVENOUS at 17:15

## 2018-01-01 RX ADMIN — SODIUM CHLORIDE 4.5 G: 9 INJECTION, SOLUTION INTRAVENOUS at 16:16

## 2018-01-01 RX ADMIN — HYDROMORPHONE HYDROCHLORIDE: 10 INJECTION INTRAMUSCULAR; INTRAVENOUS; SUBCUTANEOUS at 21:48

## 2018-01-01 RX ADMIN — OXYCODONE HYDROCHLORIDE AND ACETAMINOPHEN 1 TABLET: 7.5; 325 TABLET ORAL at 09:35

## 2018-01-01 RX ADMIN — CHOLESTYRAMINE 1 PACKET: 4 POWDER, FOR SUSPENSION ORAL at 17:11

## 2018-01-01 RX ADMIN — HYDROMORPHONE HYDROCHLORIDE: 10 INJECTION INTRAMUSCULAR; INTRAVENOUS; SUBCUTANEOUS at 17:33

## 2018-01-01 RX ADMIN — SUCRALFATE 1 G: 1 TABLET ORAL at 12:29

## 2018-01-01 RX ADMIN — HYDROMORPHONE HYDROCHLORIDE 0.5 MG: 2 INJECTION INTRAMUSCULAR; INTRAVENOUS; SUBCUTANEOUS at 10:04

## 2018-01-01 RX ADMIN — MAGNESIUM SULFATE HEPTAHYDRATE 4 G: 40 INJECTION, SOLUTION INTRAVENOUS at 09:00

## 2018-01-01 RX ADMIN — SODIUM CHLORIDE, POTASSIUM CHLORIDE, SODIUM LACTATE AND CALCIUM CHLORIDE 100 ML/HR: 600; 310; 30; 20 INJECTION, SOLUTION INTRAVENOUS at 07:27

## 2018-01-01 RX ADMIN — LORAZEPAM 1 MG: 2 LIQUID ORAL at 17:58

## 2018-01-01 RX ADMIN — PANTOPRAZOLE SODIUM 40 MG: 40 INJECTION, POWDER, FOR SOLUTION INTRAVENOUS at 22:28

## 2018-01-01 RX ADMIN — SUCRALFATE 1 G: 1 TABLET ORAL at 08:21

## 2018-01-01 RX ADMIN — SUCRALFATE 1 G: 1 TABLET ORAL at 09:35

## 2018-01-01 RX ADMIN — PETROLATUM: 42 OINTMENT TOPICAL at 11:40

## 2018-01-01 RX ADMIN — SUCRALFATE 1 G: 1 TABLET ORAL at 21:19

## 2018-01-01 RX ADMIN — PETROLATUM: 42 OINTMENT TOPICAL at 11:17

## 2018-01-01 RX ADMIN — CHOLESTYRAMINE 1 PACKET: 4 POWDER, FOR SUSPENSION ORAL at 11:25

## 2018-01-01 RX ADMIN — I.V. FAT EMULSION 14.4 G: 20 EMULSION INTRAVENOUS at 18:21

## 2018-01-01 RX ADMIN — PETROLATUM: 42 OINTMENT TOPICAL at 20:36

## 2018-01-01 RX ADMIN — SUCRALFATE 1 G: 1 TABLET ORAL at 06:37

## 2018-01-01 RX ADMIN — PIPERACILLIN SODIUM AND TAZOBACTAM SODIUM 3.38 G: 3; .375 INJECTION, POWDER, LYOPHILIZED, FOR SOLUTION INTRAVENOUS at 18:36

## 2018-01-01 RX ADMIN — HYDROMORPHONE HYDROCHLORIDE: 10 INJECTION INTRAMUSCULAR; INTRAVENOUS; SUBCUTANEOUS at 17:25

## 2018-01-01 RX ADMIN — DICLOFENAC 2 G: 10 GEL TOPICAL at 19:04

## 2018-01-01 RX ADMIN — OXYCODONE HYDROCHLORIDE AND ACETAMINOPHEN 1 TABLET: 5; 325 TABLET ORAL at 11:12

## 2018-01-01 RX ADMIN — LIDOCAINE HYDROCHLORIDE 4 ML: 10 INJECTION, SOLUTION EPIDURAL; INFILTRATION; INTRACAUDAL; PERINEURAL at 12:15

## 2018-01-01 RX ADMIN — POTASSIUM CHLORIDE 40 MEQ: 1500 TABLET, EXTENDED RELEASE ORAL at 18:35

## 2018-01-01 RX ADMIN — DICLOFENAC 2 G: 10 GEL TOPICAL at 11:51

## 2018-01-01 RX ADMIN — METRONIDAZOLE 500 MG: 500 INJECTION, SOLUTION INTRAVENOUS at 05:01

## 2018-01-01 RX ADMIN — DICLOFENAC 2 G: 10 GEL TOPICAL at 21:37

## 2018-01-01 RX ADMIN — POTASSIUM PHOSPHATE, MONOBASIC AND POTASSIUM PHOSPHATE, DIBASIC 15 MMOL: 224; 236 INJECTION, SOLUTION INTRAVENOUS at 11:55

## 2018-01-01 RX ADMIN — OXYCODONE HYDROCHLORIDE AND ACETAMINOPHEN 2 TABLET: 5; 325 TABLET ORAL at 17:27

## 2018-01-01 RX ADMIN — SODIUM CHLORIDE 4.5 G: 9 INJECTION, SOLUTION INTRAVENOUS at 17:43

## 2018-01-01 RX ADMIN — SUCRALFATE 1 G: 1 TABLET ORAL at 22:09

## 2018-01-01 RX ADMIN — PIPERACILLIN SODIUM,TAZOBACTAM SODIUM 3.38 G: 3; .375 INJECTION, POWDER, FOR SOLUTION INTRAVENOUS at 03:15

## 2018-01-01 RX ADMIN — POTASSIUM CHLORIDE 40 MEQ: 1500 TABLET, EXTENDED RELEASE ORAL at 12:29

## 2018-01-01 RX ADMIN — PANTOPRAZOLE SODIUM 40 MG: 40 INJECTION, POWDER, FOR SOLUTION INTRAVENOUS at 20:54

## 2018-01-01 RX ADMIN — PETROLATUM: 42 OINTMENT TOPICAL at 20:59

## 2018-01-01 RX ADMIN — Medication 1 TABLET: at 08:29

## 2018-01-01 RX ADMIN — SUCRALFATE 1 G: 1 TABLET ORAL at 17:00

## 2018-01-01 RX ADMIN — HYDROMORPHONE HYDROCHLORIDE: 10 INJECTION INTRAMUSCULAR; INTRAVENOUS; SUBCUTANEOUS at 07:14

## 2018-01-01 RX ADMIN — PETROLATUM: 42 OINTMENT TOPICAL at 22:08

## 2018-01-01 RX ADMIN — OXYCODONE HYDROCHLORIDE AND ACETAMINOPHEN 1 TABLET: 7.5; 325 TABLET ORAL at 18:20

## 2018-01-01 RX ADMIN — PETROLATUM: 42 OINTMENT TOPICAL at 10:25

## 2018-01-01 RX ADMIN — DICLOFENAC 2 G: 10 GEL TOPICAL at 10:29

## 2018-01-01 RX ADMIN — SODIUM CHLORIDE: 900 INJECTION, SOLUTION INTRAVENOUS at 10:19

## 2018-01-01 RX ADMIN — POTASSIUM PHOSPHATE, MONOBASIC AND POTASSIUM PHOSPHATE, DIBASIC: 224; 236 INJECTION, SOLUTION INTRAVENOUS at 17:53

## 2018-01-01 RX ADMIN — SODIUM CHLORIDE 4.5 G: 9 INJECTION, SOLUTION INTRAVENOUS at 08:40

## 2018-01-01 RX ADMIN — VANCOMYCIN HYDROCHLORIDE: 1 INJECTION, POWDER, LYOPHILIZED, FOR SOLUTION INTRAVENOUS at 21:30

## 2018-01-01 RX ADMIN — LIDOCAINE 1 PATCH: 50 PATCH CUTANEOUS at 04:50

## 2018-01-01 RX ADMIN — POTASSIUM PHOSPHATE, MONOBASIC AND POTASSIUM PHOSPHATE, DIBASIC: 224; 236 INJECTION, SOLUTION INTRAVENOUS at 12:36

## 2018-01-01 RX ADMIN — METRONIDAZOLE 500 MG: 500 INJECTION, SOLUTION INTRAVENOUS at 22:16

## 2018-01-01 RX ADMIN — SODIUM CHLORIDE 100 ML/HR: 9 INJECTION, SOLUTION INTRAVENOUS at 01:19

## 2018-01-01 RX ADMIN — OXYCODONE HYDROCHLORIDE AND ACETAMINOPHEN 1 TABLET: 7.5; 325 TABLET ORAL at 19:24

## 2018-01-01 RX ADMIN — PETROLATUM: 42 OINTMENT TOPICAL at 08:04

## 2018-01-01 RX ADMIN — NYSTATIN: 100000 POWDER TOPICAL at 22:07

## 2018-01-01 RX ADMIN — PANTOPRAZOLE SODIUM 40 MG: 40 TABLET, DELAYED RELEASE ORAL at 06:17

## 2018-01-01 RX ADMIN — DICLOFENAC 2 G: 10 GEL TOPICAL at 21:06

## 2018-01-01 RX ADMIN — SODIUM CHLORIDE 75 ML: 9 INJECTION, SOLUTION INTRAVENOUS at 14:59

## 2018-01-01 RX ADMIN — DICLOFENAC 2 G: 10 GEL TOPICAL at 10:47

## 2018-01-01 RX ADMIN — POTASSIUM CHLORIDE 40 MEQ: 1500 TABLET, EXTENDED RELEASE ORAL at 12:32

## 2018-01-01 RX ADMIN — COLCHICINE 0.6 MG: 0.6 TABLET, FILM COATED ORAL at 23:52

## 2018-01-01 RX ADMIN — POTASSIUM PHOSPHATE, MONOBASIC AND POTASSIUM PHOSPHATE, DIBASIC: 224; 236 INJECTION, SOLUTION INTRAVENOUS at 18:06

## 2018-01-01 RX ADMIN — MAGNESIUM OXIDE TAB 400 MG (241.3 MG ELEMENTAL MG) 400 MG: 400 (241.3 MG) TAB at 08:40

## 2018-01-01 RX ADMIN — MULTIPLE VITAMINS W/ MINERALS TAB 1 TABLET: TAB at 09:13

## 2018-01-01 RX ADMIN — PANTOPRAZOLE SODIUM 40 MG: 40 INJECTION, POWDER, FOR SOLUTION INTRAVENOUS at 08:57

## 2018-01-01 RX ADMIN — DICLOFENAC 2 G: 10 GEL TOPICAL at 20:47

## 2018-01-01 RX ADMIN — SUCRALFATE 1 G: 1 TABLET ORAL at 08:42

## 2018-01-01 RX ADMIN — HYDROMORPHONE HYDROCHLORIDE 0.5 MG: 2 INJECTION INTRAMUSCULAR; INTRAVENOUS; SUBCUTANEOUS at 20:39

## 2018-01-01 RX ADMIN — COLCHICINE 0.6 MG: 0.6 TABLET, FILM COATED ORAL at 09:36

## 2018-01-01 RX ADMIN — ASCORBIC ACID: 500 INJECTION, SOLUTION INTRAMUSCULAR; INTRAVENOUS; SUBCUTANEOUS at 18:01

## 2018-01-01 RX ADMIN — ACETAMINOPHEN 650 MG: 325 TABLET, FILM COATED ORAL at 13:18

## 2018-01-01 RX ADMIN — HYDROMORPHONE HYDROCHLORIDE 2 MG: 2 INJECTION INTRAMUSCULAR; INTRAVENOUS; SUBCUTANEOUS at 17:18

## 2018-01-01 RX ADMIN — MAGNESIUM OXIDE TAB 400 MG (241.3 MG ELEMENTAL MG) 400 MG: 400 (241.3 MG) TAB at 08:41

## 2018-01-01 RX ADMIN — CLOTRIMAZOLE AND BETAMETHASONE DIPROPIONATE 1 APPLICATION: 10; .5 CREAM TOPICAL at 22:28

## 2018-01-01 RX ADMIN — HYDROMORPHONE HYDROCHLORIDE 0.5 MG: 2 INJECTION INTRAMUSCULAR; INTRAVENOUS; SUBCUTANEOUS at 22:56

## 2018-01-01 RX ADMIN — PANTOPRAZOLE SODIUM 40 MG: 40 TABLET, DELAYED RELEASE ORAL at 08:34

## 2018-01-01 RX ADMIN — I.V. FAT EMULSION 47.04 G: 20 EMULSION INTRAVENOUS at 18:52

## 2018-01-01 RX ADMIN — METRONIDAZOLE 500 MG: 500 INJECTION, SOLUTION INTRAVENOUS at 13:40

## 2018-01-01 RX ADMIN — PETROLATUM: 42 OINTMENT TOPICAL at 20:05

## 2018-01-01 RX ADMIN — SODIUM CHLORIDE 1000 MG: 900 INJECTION, SOLUTION INTRAVENOUS at 01:49

## 2018-01-01 RX ADMIN — HYDROMORPHONE HYDROCHLORIDE: 10 INJECTION INTRAMUSCULAR; INTRAVENOUS; SUBCUTANEOUS at 20:24

## 2018-01-01 RX ADMIN — METRONIDAZOLE 500 MG: 500 INJECTION, SOLUTION INTRAVENOUS at 12:37

## 2018-01-01 RX ADMIN — METRONIDAZOLE 500 MG: 500 INJECTION, SOLUTION INTRAVENOUS at 05:56

## 2018-01-01 RX ADMIN — DICLOFENAC 2 G: 10 GEL TOPICAL at 08:38

## 2018-01-01 RX ADMIN — DICLOFENAC 2 G: 10 GEL TOPICAL at 09:36

## 2018-01-01 RX ADMIN — LIDOCAINE 1 PATCH: 50 PATCH CUTANEOUS at 05:56

## 2018-01-01 RX ADMIN — DICLOFENAC 2 G: 10 GEL TOPICAL at 20:49

## 2018-01-01 RX ADMIN — ACETAMINOPHEN 325 MG: 325 TABLET ORAL at 10:23

## 2018-01-01 RX ADMIN — CLOTRIMAZOLE AND BETAMETHASONE DIPROPIONATE: 10; .5 CREAM TOPICAL at 23:52

## 2018-01-01 RX ADMIN — SUCRALFATE 1 G: 1 TABLET ORAL at 20:04

## 2018-01-01 RX ADMIN — METOPROLOL TARTRATE 12.5 MG: 25 TABLET ORAL at 22:29

## 2018-01-01 RX ADMIN — CLOTRIMAZOLE AND BETAMETHASONE DIPROPIONATE: 10; .5 CREAM TOPICAL at 10:25

## 2018-01-01 RX ADMIN — SUCRALFATE 1 G: 1 TABLET ORAL at 11:40

## 2018-01-01 RX ADMIN — HYDROMORPHONE HYDROCHLORIDE 0.5 MG: 2 INJECTION INTRAMUSCULAR; INTRAVENOUS; SUBCUTANEOUS at 20:11

## 2018-01-01 RX ADMIN — I.V. FAT EMULSION 14.4 G: 20 EMULSION INTRAVENOUS at 18:01

## 2018-01-01 RX ADMIN — DICLOFENAC 2 G: 10 GEL TOPICAL at 21:00

## 2018-01-01 RX ADMIN — HYDROMORPHONE HYDROCHLORIDE: 10 INJECTION INTRAMUSCULAR; INTRAVENOUS; SUBCUTANEOUS at 22:09

## 2018-01-01 RX ADMIN — HYDROMORPHONE HYDROCHLORIDE: 10 INJECTION INTRAMUSCULAR; INTRAVENOUS; SUBCUTANEOUS at 07:06

## 2018-01-01 RX ADMIN — Medication 1 CAPSULE: at 08:42

## 2018-01-01 RX ADMIN — CLOTRIMAZOLE AND BETAMETHASONE DIPROPIONATE 1 APPLICATION: 10; .5 CREAM TOPICAL at 08:59

## 2018-01-01 RX ADMIN — METOPROLOL TARTRATE 12.5 MG: 25 TABLET ORAL at 20:48

## 2018-01-01 RX ADMIN — MAGNESIUM SULFATE HEPTAHYDRATE 2 G: 40 INJECTION, SOLUTION INTRAVENOUS at 10:16

## 2018-01-01 RX ADMIN — PETROLATUM 1 APPLICATION: 42 OINTMENT TOPICAL at 08:52

## 2018-01-01 RX ADMIN — CYANOCOBALAMIN 1000 MCG: 1000 INJECTION, SOLUTION INTRAMUSCULAR; SUBCUTANEOUS at 08:24

## 2018-01-01 RX ADMIN — POTASSIUM PHOSPHATE, MONOBASIC AND POTASSIUM PHOSPHATE, DIBASIC: 224; 236 INJECTION, SOLUTION INTRAVENOUS at 17:34

## 2018-01-01 RX ADMIN — HYDROMORPHONE HYDROCHLORIDE: 10 INJECTION INTRAMUSCULAR; INTRAVENOUS; SUBCUTANEOUS at 11:17

## 2018-01-01 RX ADMIN — NYSTATIN: 100000 POWDER TOPICAL at 21:29

## 2018-01-01 RX ADMIN — HYDROMORPHONE HYDROCHLORIDE 1 MG: 2 INJECTION INTRAMUSCULAR; INTRAVENOUS; SUBCUTANEOUS at 02:20

## 2018-01-01 RX ADMIN — Medication 1 TABLET: at 09:36

## 2018-01-01 RX ADMIN — GLYCOPYRROLATE 0.4 MG: 0.2 INJECTION INTRAMUSCULAR; INTRAVENOUS at 22:48

## 2018-01-01 RX ADMIN — SUCRALFATE 1 G: 1 TABLET ORAL at 12:38

## 2018-01-01 RX ADMIN — SODIUM CHLORIDE 4.5 G: 9 INJECTION, SOLUTION INTRAVENOUS at 17:22

## 2018-01-01 RX ADMIN — POTASSIUM PHOSPHATE, MONOBASIC AND POTASSIUM PHOSPHATE, DIBASIC: 224; 236 INJECTION, SOLUTION INTRAVENOUS at 18:20

## 2018-01-01 RX ADMIN — HYDROMORPHONE HYDROCHLORIDE: 10 INJECTION INTRAMUSCULAR; INTRAVENOUS; SUBCUTANEOUS at 01:39

## 2018-01-01 RX ADMIN — PANTOPRAZOLE SODIUM 40 MG: 40 TABLET, DELAYED RELEASE ORAL at 05:47

## 2018-01-01 RX ADMIN — VANCOMYCIN HYDROCHLORIDE 1250 MG: 1 INJECTION, POWDER, LYOPHILIZED, FOR SOLUTION INTRAVENOUS at 19:57

## 2018-01-01 RX ADMIN — HYDROMORPHONE HYDROCHLORIDE 0.5 MG: 2 INJECTION INTRAMUSCULAR; INTRAVENOUS; SUBCUTANEOUS at 06:24

## 2018-01-01 RX ADMIN — ACETAMINOPHEN 650 MG: 325 TABLET, FILM COATED ORAL at 20:03

## 2018-01-01 RX ADMIN — Medication 1 TABLET: at 08:39

## 2018-01-01 RX ADMIN — PIPERACILLIN SODIUM AND TAZOBACTAM SODIUM 3.38 G: 3; .375 INJECTION, POWDER, LYOPHILIZED, FOR SOLUTION INTRAVENOUS at 07:58

## 2018-01-01 RX ADMIN — HYDROMORPHONE HYDROCHLORIDE: 10 INJECTION INTRAMUSCULAR; INTRAVENOUS; SUBCUTANEOUS at 12:31

## 2018-01-01 RX ADMIN — SODIUM CHLORIDE 4.5 G: 9 INJECTION, SOLUTION INTRAVENOUS at 09:00

## 2018-01-01 RX ADMIN — METRONIDAZOLE 500 MG: 500 INJECTION, SOLUTION INTRAVENOUS at 11:09

## 2018-01-01 RX ADMIN — OXYCODONE HYDROCHLORIDE AND ACETAMINOPHEN 1 TABLET: 7.5; 325 TABLET ORAL at 21:07

## 2018-01-01 RX ADMIN — OXYCODONE HYDROCHLORIDE AND ACETAMINOPHEN 1 TABLET: 7.5; 325 TABLET ORAL at 04:07

## 2018-01-01 RX ADMIN — METOPROLOL TARTRATE 12.5 MG: 25 TABLET ORAL at 09:13

## 2018-01-01 RX ADMIN — METOPROLOL TARTRATE 2.5 MG: 5 INJECTION INTRAVENOUS at 04:32

## 2018-01-01 RX ADMIN — DICLOFENAC 2 G: 10 GEL TOPICAL at 09:42

## 2018-01-01 RX ADMIN — SODIUM CHLORIDE 750 MG: 900 INJECTION, SOLUTION INTRAVENOUS at 20:04

## 2018-01-01 RX ADMIN — DICLOFENAC 2 G: 10 GEL TOPICAL at 17:00

## 2018-01-01 RX ADMIN — CALCIUM GLUCONATE: 94 INJECTION, SOLUTION INTRAVENOUS at 19:50

## 2018-01-01 RX ADMIN — DICLOFENAC 2 G: 10 GEL TOPICAL at 12:34

## 2018-01-01 RX ADMIN — PIPERACILLIN SODIUM AND TAZOBACTAM SODIUM 3.38 G: 3; .375 INJECTION, POWDER, LYOPHILIZED, FOR SOLUTION INTRAVENOUS at 17:14

## 2018-01-01 RX ADMIN — Medication 10 ML: at 17:54

## 2018-01-01 RX ADMIN — DICLOFENAC 2 G: 10 GEL TOPICAL at 08:04

## 2018-01-01 RX ADMIN — HYDROMORPHONE HYDROCHLORIDE 0.5 MG: 2 INJECTION INTRAMUSCULAR; INTRAVENOUS; SUBCUTANEOUS at 13:23

## 2018-01-01 RX ADMIN — Medication 1 CAPSULE: at 08:28

## 2018-01-01 RX ADMIN — SODIUM CHLORIDE, POTASSIUM CHLORIDE, SODIUM LACTATE AND CALCIUM CHLORIDE 50 ML/HR: 600; 310; 30; 20 INJECTION, SOLUTION INTRAVENOUS at 16:56

## 2018-01-01 RX ADMIN — PIPERACILLIN SODIUM AND TAZOBACTAM SODIUM 3.38 G: 3; .375 INJECTION, POWDER, LYOPHILIZED, FOR SOLUTION INTRAVENOUS at 17:48

## 2018-01-01 RX ADMIN — PANTOPRAZOLE SODIUM 40 MG: 40 TABLET, DELAYED RELEASE ORAL at 10:24

## 2018-01-01 RX ADMIN — COLCHICINE 0.6 MG: 0.6 TABLET, FILM COATED ORAL at 20:35

## 2018-01-01 RX ADMIN — Medication 1 TABLET: at 10:24

## 2018-01-01 RX ADMIN — SODIUM CHLORIDE 125 ML/HR: 9 INJECTION, SOLUTION INTRAVENOUS at 22:05

## 2018-01-01 RX ADMIN — HYDROMORPHONE HYDROCHLORIDE: 10 INJECTION INTRAMUSCULAR; INTRAVENOUS; SUBCUTANEOUS at 16:13

## 2018-01-01 RX ADMIN — HYDROMORPHONE HYDROCHLORIDE: 10 INJECTION INTRAMUSCULAR; INTRAVENOUS; SUBCUTANEOUS at 01:40

## 2018-01-01 RX ADMIN — NYSTATIN: 100000 POWDER TOPICAL at 08:30

## 2018-01-01 RX ADMIN — PIPERACILLIN SODIUM AND TAZOBACTAM SODIUM 3.38 G: 3; .375 INJECTION, POWDER, LYOPHILIZED, FOR SOLUTION INTRAVENOUS at 08:39

## 2018-01-01 RX ADMIN — PIPERACILLIN SODIUM AND TAZOBACTAM SODIUM 3.38 G: 3; .375 INJECTION, POWDER, LYOPHILIZED, FOR SOLUTION INTRAVENOUS at 08:32

## 2018-01-01 RX ADMIN — SODIUM CHLORIDE 500 ML: 9 INJECTION, SOLUTION INTRAVENOUS at 20:46

## 2018-01-01 RX ADMIN — LIDOCAINE 1 PATCH: 50 PATCH CUTANEOUS at 13:19

## 2018-01-01 RX ADMIN — PETROLATUM: 42 OINTMENT TOPICAL at 08:31

## 2018-01-01 RX ADMIN — PANTOPRAZOLE SODIUM 40 MG: 40 INJECTION, POWDER, FOR SOLUTION INTRAVENOUS at 09:14

## 2018-01-01 RX ADMIN — LORAZEPAM 1 MG: 2 INJECTION INTRAMUSCULAR; INTRAVENOUS at 19:01

## 2018-01-01 RX ADMIN — SODIUM CHLORIDE, POTASSIUM CHLORIDE, SODIUM LACTATE AND CALCIUM CHLORIDE 30 ML/HR: 600; 310; 30; 20 INJECTION, SOLUTION INTRAVENOUS at 20:46

## 2018-01-01 RX ADMIN — PIPERACILLIN SODIUM AND TAZOBACTAM SODIUM 3.38 G: 3; .375 INJECTION, POWDER, LYOPHILIZED, FOR SOLUTION INTRAVENOUS at 00:15

## 2018-01-01 RX ADMIN — HYDROMORPHONE HYDROCHLORIDE 0.5 MG: 2 INJECTION INTRAMUSCULAR; INTRAVENOUS; SUBCUTANEOUS at 09:10

## 2018-01-01 RX ADMIN — LIDOCAINE 1 PATCH: 50 PATCH CUTANEOUS at 07:41

## 2018-01-01 RX ADMIN — DICLOFENAC 2 G: 10 GEL TOPICAL at 08:40

## 2018-01-01 RX ADMIN — VANCOMYCIN HYDROCHLORIDE 1000 MG: 1 INJECTION, POWDER, LYOPHILIZED, FOR SOLUTION INTRAVENOUS at 00:58

## 2018-01-01 RX ADMIN — SUCRALFATE 1 G: 1 TABLET ORAL at 12:36

## 2018-01-01 RX ADMIN — DICLOFENAC 2 G: 10 GEL TOPICAL at 20:28

## 2018-01-01 RX ADMIN — OXYCODONE HYDROCHLORIDE AND ACETAMINOPHEN 1 TABLET: 5; 325 TABLET ORAL at 17:56

## 2018-01-01 RX ADMIN — ACETAMINOPHEN 400 MCG: 400 TABLET ORAL at 08:42

## 2018-01-01 RX ADMIN — SUCRALFATE 1 G: 1 TABLET ORAL at 08:39

## 2018-01-01 RX ADMIN — GABAPENTIN 100 MG: 100 CAPSULE ORAL at 10:15

## 2018-01-01 RX ADMIN — PETROLATUM: 42 OINTMENT TOPICAL at 22:28

## 2018-01-01 RX ADMIN — DICLOFENAC 2 G: 10 GEL TOPICAL at 12:32

## 2018-01-01 RX ADMIN — PETROLATUM: 42 OINTMENT TOPICAL at 21:19

## 2018-01-01 RX ADMIN — DICLOFENAC 2 G: 10 GEL TOPICAL at 08:30

## 2018-01-01 RX ADMIN — DICLOFENAC 2 G: 10 GEL TOPICAL at 20:34

## 2018-01-01 RX ADMIN — NYSTATIN: 100000 POWDER TOPICAL at 08:42

## 2018-01-01 RX ADMIN — DIPHENHYDRAMINE HYDROCHLORIDE 25 MG: 25 CAPSULE ORAL at 10:23

## 2018-01-01 RX ADMIN — LIDOCAINE 1 PATCH: 50 PATCH CUTANEOUS at 06:22

## 2018-01-01 RX ADMIN — I.V. FAT EMULSION 14.4 G: 20 EMULSION INTRAVENOUS at 17:34

## 2018-01-01 RX ADMIN — DICLOFENAC 2 G: 10 GEL TOPICAL at 21:18

## 2018-01-01 RX ADMIN — HYDROMORPHONE HYDROCHLORIDE 1 MG: 2 INJECTION INTRAMUSCULAR; INTRAVENOUS; SUBCUTANEOUS at 14:52

## 2018-01-01 RX ADMIN — POTASSIUM PHOSPHATE, MONOBASIC 500 MG: 500 TABLET, SOLUBLE ORAL at 16:49

## 2018-01-01 RX ADMIN — HYDROMORPHONE HYDROCHLORIDE 0.5 MG: 2 INJECTION INTRAMUSCULAR; INTRAVENOUS; SUBCUTANEOUS at 16:24

## 2018-01-01 RX ADMIN — HYDROMORPHONE HYDROCHLORIDE: 10 INJECTION INTRAMUSCULAR; INTRAVENOUS; SUBCUTANEOUS at 06:02

## 2018-01-01 RX ADMIN — METRONIDAZOLE 500 MG: 500 INJECTION, SOLUTION INTRAVENOUS at 20:39

## 2018-01-01 RX ADMIN — BUPIVACAINE HYDROCHLORIDE 4 ML: 5 INJECTION, SOLUTION EPIDURAL; INTRACAUDAL at 12:15

## 2018-01-01 RX ADMIN — DICLOFENAC 2 G: 10 GEL TOPICAL at 11:40

## 2018-01-01 RX ADMIN — GABAPENTIN 100 MG: 100 CAPSULE ORAL at 16:25

## 2018-01-01 RX ADMIN — CALCIUM GLUCONATE: 98 INJECTION, SOLUTION INTRAVENOUS at 18:27

## 2018-01-01 RX ADMIN — NYSTATIN: 100000 POWDER TOPICAL at 21:19

## 2018-01-01 RX ADMIN — CLOTRIMAZOLE AND BETAMETHASONE DIPROPIONATE 1 APPLICATION: 10; .5 CREAM TOPICAL at 10:29

## 2018-01-01 RX ADMIN — HYDROMORPHONE HYDROCHLORIDE 1 MG: 2 INJECTION INTRAMUSCULAR; INTRAVENOUS; SUBCUTANEOUS at 05:34

## 2018-01-01 RX ADMIN — Medication 10 ML: at 08:38

## 2018-01-01 RX ADMIN — PETROLATUM: 42 OINTMENT TOPICAL at 08:38

## 2018-01-01 RX ADMIN — DICLOFENAC 2 G: 10 GEL TOPICAL at 18:33

## 2018-01-01 RX ADMIN — OXYCODONE HYDROCHLORIDE AND ACETAMINOPHEN 1 TABLET: 7.5; 325 TABLET ORAL at 13:28

## 2018-01-01 RX ADMIN — MAGNESIUM OXIDE TAB 400 MG (241.3 MG ELEMENTAL MG) 400 MG: 400 (241.3 MG) TAB at 16:49

## 2018-01-01 RX ADMIN — MULTIPLE VITAMINS W/ MINERALS TAB 1 TABLET: TAB at 08:21

## 2018-01-01 RX ADMIN — OXYCODONE HYDROCHLORIDE AND ACETAMINOPHEN 1 TABLET: 5; 325 TABLET ORAL at 01:31

## 2018-01-01 RX ADMIN — HYDROMORPHONE HYDROCHLORIDE 0.5 MG: 2 INJECTION INTRAMUSCULAR; INTRAVENOUS; SUBCUTANEOUS at 17:57

## 2018-01-01 RX ADMIN — DICLOFENAC 2 G: 10 GEL TOPICAL at 10:24

## 2018-01-01 RX ADMIN — Medication 1 CAPSULE: at 08:29

## 2018-01-01 RX ADMIN — SODIUM CHLORIDE 50 ML/HR: 9 INJECTION, SOLUTION INTRAVENOUS at 17:57

## 2018-01-01 RX ADMIN — METOPROLOL TARTRATE 2.5 MG: 5 INJECTION INTRAVENOUS at 20:54

## 2018-01-01 RX ADMIN — SUCRALFATE 1 G: 1 TABLET ORAL at 12:39

## 2018-01-01 RX ADMIN — ACETAMINOPHEN 650 MG: 650 SUPPOSITORY RECTAL at 13:25

## 2018-01-01 RX ADMIN — CEFEPIME 2 G: 2 INJECTION, POWDER, FOR SOLUTION INTRAVENOUS at 10:21

## 2018-01-01 RX ADMIN — PETROLATUM: 42 OINTMENT TOPICAL at 23:04

## 2018-01-01 RX ADMIN — TAZOBACTAM SODIUM AND PIPERACILLIN SODIUM 4.5 G: .5; 4 INJECTION, POWDER, LYOPHILIZED, FOR SOLUTION INTRAVENOUS at 19:03

## 2018-01-01 RX ADMIN — MULTIPLE VITAMINS W/ MINERALS TAB 1 TABLET: TAB at 08:58

## 2018-01-01 RX ADMIN — PETROLATUM: 42 OINTMENT TOPICAL at 21:37

## 2018-01-01 RX ADMIN — SUCRALFATE 1 G: 1 TABLET ORAL at 17:21

## 2018-01-01 RX ADMIN — PIPERACILLIN AND TAZOBACTAM 3.38 G: 3; .375 INJECTION, POWDER, LYOPHILIZED, FOR SOLUTION INTRAVENOUS; PARENTERAL at 03:15

## 2018-01-01 RX ADMIN — Medication 1 TABLET: at 10:46

## 2018-01-01 RX ADMIN — DICLOFENAC 2 G: 10 GEL TOPICAL at 08:41

## 2018-01-01 RX ADMIN — HYDROMORPHONE HYDROCHLORIDE: 10 INJECTION INTRAMUSCULAR; INTRAVENOUS; SUBCUTANEOUS at 08:03

## 2018-01-01 RX ADMIN — DICLOFENAC 2 G: 10 GEL TOPICAL at 17:15

## 2018-01-01 RX ADMIN — FENTANYL CITRATE 50 MCG: 50 INJECTION, SOLUTION INTRAMUSCULAR; INTRAVENOUS at 20:46

## 2018-01-01 RX ADMIN — HYDROMORPHONE HYDROCHLORIDE: 10 INJECTION INTRAMUSCULAR; INTRAVENOUS; SUBCUTANEOUS at 16:31

## 2018-01-01 RX ADMIN — ACETAMINOPHEN 400 MCG: 400 TABLET ORAL at 08:21

## 2018-01-01 RX ADMIN — DICLOFENAC 2 G: 10 GEL TOPICAL at 20:21

## 2018-01-01 RX ADMIN — SODIUM CHLORIDE 1000 MG: 900 INJECTION, SOLUTION INTRAVENOUS at 02:27

## 2018-01-01 RX ADMIN — HYDROMORPHONE HYDROCHLORIDE 1 MG: 2 INJECTION INTRAMUSCULAR; INTRAVENOUS; SUBCUTANEOUS at 21:26

## 2018-01-01 RX ADMIN — HYDROMORPHONE HYDROCHLORIDE 1 MG: 2 INJECTION INTRAMUSCULAR; INTRAVENOUS; SUBCUTANEOUS at 12:01

## 2018-01-01 RX ADMIN — Medication 1 CAPSULE: at 08:40

## 2018-01-01 RX ADMIN — SUCRALFATE 1 G: 1 TABLET ORAL at 08:28

## 2018-01-01 RX ADMIN — POTASSIUM CHLORIDE 40 MEQ: 1500 TABLET, EXTENDED RELEASE ORAL at 17:51

## 2018-01-01 RX ADMIN — DICLOFENAC 2 G: 10 GEL TOPICAL at 17:21

## 2018-01-01 RX ADMIN — DICLOFENAC 2 G: 10 GEL TOPICAL at 08:28

## 2018-01-01 RX ADMIN — SODIUM CHLORIDE 4.5 G: 9 INJECTION, SOLUTION INTRAVENOUS at 10:03

## 2018-01-01 RX ADMIN — MULTIPLE VITAMINS W/ MINERALS TAB 1 TABLET: TAB at 08:42

## 2018-01-01 RX ADMIN — ACETAMINOPHEN 650 MG: 325 TABLET, FILM COATED ORAL at 04:56

## 2018-01-01 RX ADMIN — SUCRALFATE 1 G: 1 TABLET ORAL at 17:53

## 2018-01-01 RX ADMIN — ACETAMINOPHEN 650 MG: 325 TABLET, FILM COATED ORAL at 10:17

## 2018-01-01 RX ADMIN — CHOLESTYRAMINE 1 PACKET: 4 POWDER, FOR SUSPENSION ORAL at 09:32

## 2018-01-01 RX ADMIN — MAGNESIUM SULFATE HEPTAHYDRATE 2 G: 40 INJECTION, SOLUTION INTRAVENOUS at 12:28

## 2018-01-01 RX ADMIN — HYDROMORPHONE HYDROCHLORIDE 0.5 MG: 2 INJECTION INTRAMUSCULAR; INTRAVENOUS; SUBCUTANEOUS at 09:13

## 2018-01-01 RX ADMIN — SUCRALFATE 1 G: 1 TABLET ORAL at 21:24

## 2018-01-01 RX ADMIN — DICLOFENAC 2 G: 10 GEL TOPICAL at 20:04

## 2018-01-01 RX ADMIN — PIPERACILLIN SODIUM AND TAZOBACTAM SODIUM 3.38 G: 3; .375 INJECTION, POWDER, LYOPHILIZED, FOR SOLUTION INTRAVENOUS at 08:53

## 2018-01-01 RX ADMIN — CLOTRIMAZOLE AND BETAMETHASONE DIPROPIONATE 1 APPLICATION: 10; .5 CREAM TOPICAL at 20:59

## 2018-01-01 RX ADMIN — PANTOPRAZOLE SODIUM 40 MG: 40 INJECTION, POWDER, FOR SOLUTION INTRAVENOUS at 08:31

## 2018-01-01 RX ADMIN — GABAPENTIN 100 MG: 100 CAPSULE ORAL at 20:04

## 2018-01-01 RX ADMIN — SUCRALFATE 1 G: 1 TABLET ORAL at 16:49

## 2018-01-01 RX ADMIN — HYDROMORPHONE HYDROCHLORIDE: 10 INJECTION, SOLUTION INTRAMUSCULAR; INTRAVENOUS; SUBCUTANEOUS at 20:24

## 2018-01-01 RX ADMIN — SUCRALFATE 1 G: 1 TABLET ORAL at 08:32

## 2018-01-01 RX ADMIN — ACETAMINOPHEN 650 MG: 325 TABLET, FILM COATED ORAL at 16:42

## 2018-01-01 RX ADMIN — OXYCODONE HYDROCHLORIDE AND ACETAMINOPHEN 2 TABLET: 5; 325 TABLET ORAL at 08:40

## 2018-01-01 RX ADMIN — DICLOFENAC 2 G: 10 GEL TOPICAL at 11:56

## 2018-01-01 RX ADMIN — PETROLATUM: 42 OINTMENT TOPICAL at 20:41

## 2018-01-01 RX ADMIN — DICLOFENAC 2 G: 10 GEL TOPICAL at 17:54

## 2018-01-01 RX ADMIN — SODIUM CHLORIDE 4.5 G: 9 INJECTION, SOLUTION INTRAVENOUS at 01:29

## 2018-01-01 RX ADMIN — OXYCODONE HYDROCHLORIDE AND ACETAMINOPHEN 2 TABLET: 5; 325 TABLET ORAL at 09:04

## 2018-01-01 RX ADMIN — DICLOFENAC 2 G: 10 GEL TOPICAL at 11:53

## 2018-01-01 RX ADMIN — OXYCODONE HYDROCHLORIDE AND ACETAMINOPHEN 1 TABLET: 5; 325 TABLET ORAL at 08:21

## 2018-01-01 RX ADMIN — CHOLESTYRAMINE 1 PACKET: 4 POWDER, FOR SUSPENSION ORAL at 10:23

## 2018-01-01 RX ADMIN — HYDROMORPHONE HYDROCHLORIDE 0.5 MG: 2 INJECTION INTRAMUSCULAR; INTRAVENOUS; SUBCUTANEOUS at 04:05

## 2018-01-01 RX ADMIN — ACETAMINOPHEN 400 MCG: 400 TABLET ORAL at 10:23

## 2018-01-01 RX ADMIN — PETROLATUM 1 APPLICATION: 42 OINTMENT TOPICAL at 09:14

## 2018-01-01 RX ADMIN — POTASSIUM PHOSPHATE, MONOBASIC AND POTASSIUM PHOSPHATE, DIBASIC: 224; 236 INJECTION, SOLUTION INTRAVENOUS at 09:20

## 2018-01-01 RX ADMIN — SUCRALFATE 1 G: 1 TABLET ORAL at 08:57

## 2018-01-01 RX ADMIN — DICLOFENAC 2 G: 10 GEL TOPICAL at 17:16

## 2018-01-01 RX ADMIN — SUCRALFATE 1 G: 1 TABLET ORAL at 10:47

## 2018-01-01 RX ADMIN — MAGNESIUM OXIDE TAB 400 MG (241.3 MG ELEMENTAL MG) 400 MG: 400 (241.3 MG) TAB at 10:46

## 2018-01-01 RX ADMIN — METRONIDAZOLE 500 MG: 500 INJECTION, SOLUTION INTRAVENOUS at 16:25

## 2018-01-01 RX ADMIN — HYDROMORPHONE HYDROCHLORIDE: 10 INJECTION INTRAMUSCULAR; INTRAVENOUS; SUBCUTANEOUS at 12:58

## 2018-01-01 RX ADMIN — PIPERACILLIN SODIUM AND TAZOBACTAM SODIUM 3.38 G: 3; .375 INJECTION, POWDER, LYOPHILIZED, FOR SOLUTION INTRAVENOUS at 00:57

## 2018-01-01 RX ADMIN — SODIUM CHLORIDE, POTASSIUM CHLORIDE, SODIUM LACTATE AND CALCIUM CHLORIDE 50 ML/HR: 600; 310; 30; 20 INJECTION, SOLUTION INTRAVENOUS at 23:24

## 2018-01-01 RX ADMIN — PANTOPRAZOLE SODIUM 40 MG: 40 TABLET, DELAYED RELEASE ORAL at 06:57

## 2018-01-01 RX ADMIN — DICLOFENAC 2 G: 10 GEL TOPICAL at 08:56

## 2018-01-01 RX ADMIN — OXYCODONE HYDROCHLORIDE AND ACETAMINOPHEN 1 TABLET: 7.5; 325 TABLET ORAL at 17:36

## 2018-01-01 RX ADMIN — PETROLATUM: 42 OINTMENT TOPICAL at 20:28

## 2018-01-01 RX ADMIN — SCOPALAMINE 1 PATCH: 1 PATCH, EXTENDED RELEASE TRANSDERMAL at 23:19

## 2018-01-01 RX ADMIN — METOPROLOL TARTRATE 2.5 MG: 5 INJECTION INTRAVENOUS at 03:00

## 2018-01-01 RX ADMIN — DICLOFENAC 2 G: 10 GEL TOPICAL at 21:50

## 2018-01-01 RX ADMIN — SUCRALFATE 1 G: 1 TABLET ORAL at 17:55

## 2018-01-01 RX ADMIN — ACETAMINOPHEN 650 MG: 650 SUPPOSITORY RECTAL at 20:54

## 2018-01-01 NOTE — PROGRESS NOTES
He complains of bilateral knee pain right greater than left.  He says this is a chronic problem.  Physical therapy says this is making it very difficult for him to ambulate.  His appetite is poor but it has been slightly better since he was started on full liquids.  His ostomy is functional.  His labs were noted.  TPN was ordered.  We advanced him to a regular diet and will start a calorie count.  He has not yet been seen by the ostomy nurse.  I will check with nursing regarding this.  X-rays showed chronic arthritic changes.  We will ask ortho to see him.  He cannot use nonsteroidals because of his myelodysplastic syndrome and history of GI bleeding

## 2018-01-01 NOTE — PROGRESS NOTES
Adult Nutrition  Assessment/PES    Patient Name:  Jason Zelaya  YOB: 1934  MRN: [id number]  Admit Date:  12/15/2017    Assessment Date:  1/1/2018    Comments:  Recommend- Calorie count to establish % intake with eventual goal to wean TPN/Il however pt needs  To be meeting at lest 75% of needs po.          Reason for Assessment       01/01/18 1022    Reason for Assessment    Reason For Assessment/Visit follow up protocol    Other diagnosis s/p exp lap, DONTAE, repair of multiple fistulas, ileostomy            Data Eval/ Notes       01/01/18 1023     Notes    Note Pt remains on TPn, now on fulls. tolerating some po. However notes c/o severe pain with eating in bellly per RN notes               Anthropometrics       01/01/18 1023    Anthropometrics    RD Documented Current Weight  62.4 kg (137 lb 9.1 oz)    Anthropometrics (Special Considerations)    RD Calculated BMI (kg/m2) 19.7    Body Mass Index (BMI)    BMI Grade 19.1 - 24.9 - normal            Labs/Tests/Procedures/Meds       01/01/18 1024    Labs/Tests/Procedures/Meds    Labs/Tests Review Reviewed;Glucose;Phos    Medication Review Reviewed, pertinent;Antibiotic;Insulin              Estimated/Assessed Needs       01/01/18 1024    Estimated/Assessed Energy Needs    Total estimated needs ([place]) --   needs remain same wt increase likely fluid related            Nutrition Prescription Ordered       01/01/18 1024    Nutrition Prescription PO    Current PO Diet Full Liquid    Supplement Ensure Plus    Supplement Frequency 3 times a day    Nutrition Prescription PN    PN Current Rate (mL/hr) 68 mL/hr    PN Goal Volume (mL) 1632 mL    Dextrose Concentration (%) 20 %    Dextrose (Kcal) 1110    Amino Acid Concentration (%) 5 %    Amino Acid (gm) 82    Lipid Volume (%) --   47 gm = 470 kcal     Lipid Frequency Daily            Evaluation of Received Nutrient/Fluid Intake       01/01/18 1026    Fluid Intake Evaluation     Oral Fluid (mL) 780    Total Fluid Intake (mL) 780    % Fluid Needs 52    PO Evaluation    Number of Meals 3    % PO Intake 42    PN Evaluation    PN Average delivery (mL/day)  1235 mL/day            Problem/Interventions:        Problem 1       01/01/18 1027    Nutrition Diagnoses Problem 1    Problem 1 Malnutrition    Etiology (related to) Medical Diagnosis;Factors Affecting Nutrition    Signs/Symptoms (evidenced by) Report/Observation                    Intervention Goal       01/01/18 1027    Intervention Goal    PO Increase intake;PO intake (%)    PO Intake % 50 %   pt will consume at least 50% of meals/fluids/supplements    Transition PN to PO    Weight No significant weight loss            Nutrition Intervention       01/01/18 1029    Nutrition Intervention    RD/Tech Action Follow Tx progress;Encourage intake            Nutrition Prescription       01/01/18 1029    Other Orders    Other once po well established and taking at least 75% of calories/pro po then wean TPN/Il,             Education/Evaluation       01/01/18 1029    Education    Education Other (comment)   None at this time. Will cont to follow and encourage    Monitor/Evaluation    Monitor Per protocol;I&O;PO intake;Supplement intake;Pertinent labs;Weight;Symptoms   Uncertain d/c needs at this time, will follow.         Electronically signed by:  Roseanne Clayton RD  01/01/18 10:30 AM

## 2018-01-01 NOTE — PLAN OF CARE
Problem: Patient Care Overview (Adult)  Goal: Plan of Care Review  Outcome: Ongoing (interventions implemented as appropriate)   01/01/18 0523   Coping/Psychosocial Response Interventions   Plan Of Care Reviewed With patient   Patient Care Overview   Progress improving       Problem: Fall Risk (Adult)  Goal: Absence of Falls  Outcome: Ongoing (interventions implemented as appropriate)   01/01/18 0523   Fall Risk (Adult)   Absence of Falls making progress toward outcome       Problem: Infection, Risk/Actual (Adult)  Goal: Infection Prevention/Resolution  Outcome: Ongoing (interventions implemented as appropriate)

## 2018-01-01 NOTE — THERAPY TREATMENT NOTE
Acute Care - Physical Therapy Treatment Note   Tiffanie Keith     Patient Name: Jason Zelaya  : 1934  MRN: [id number]  Today's Date: 2018  Onset of Illness/Injury or Date of Surgery Date: 12/15/17     Referring Physician: [email or web address]    Admit Date: 12/15/2017    Visit Dx:    ICD-10-CM ICD-9-CM   1. Acute lower UTI (urinary tract infection) N39.0 599.0   2. Generalized weakness R53.1 780.79   3. Hearing loss due to cerumen impaction, bilateral H61.23 389.8     380.4   4. 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     Patient Active Problem List   Diagnosis   • MDS (myelodysplastic syndrome), low grade   • Vitamin B 12 deficiency   • AI (aortic incompetence)   • Bundle branch block, right   • Benign prostatic hyperplasia   • Hypertension   • Knee pain   • Rectal bleed   • Acute blood loss anemia   • Hand lesion   • Acute UTI   • Acute lower UTI (urinary tract infection)   • Enteroenteric fistula   • Colovesical fistula   • Coagulopathy               Adult Rehabilitation Note       18 1000 17 0915       Rehab Assessment/Intervention    Discipline physical therapist  -LH physical therapist  -LN     Document Type therapy note (daily note)  - therapy note (daily note)  -LN     Subjective Information complains of;pain   right knee  - agree to therapy;complains of;weakness;pain  -LN     Patient Effort, Rehab Treatment adequate  -LH adequate  -LN     Symptoms Noted During/After Treatment increased pain  - fatigue;dizziness   minimal dizziness when he first stood up  -LN     Precautions/Limitations  fall precautions   CAYETANO drain, TPN, colostomy  -LN     Patient Response to Treatment Patient unable to progress/ambulate today due to increased right knee pain.  Nurse and MD aware.  -LH      Recorded by [LH] Jacquelin Friedman, PT [LN] Skylar Mccain, PT     Pain Assessment    Pain Assessment 0-10  -LH 0-10  -LN     Pain Score 4   at rest  - 4  -LN     Pain Type Acute  pain  -LH Acute pain;Surgical pain  -LN     Pain Location Knee  -LH Abdomen  -LN     Pain Orientation Right  -LH      Pain Intervention(s)  Medication (See MAR);Ambulation/increased activity;Repositioned  -LN     Response to Interventions able to tolerate only minimal standing today.  Crying out in pain with attempts at stepping forward.  Patient premedicated for treatment, however pain still limiting mobility.  Nurse and MD notified.  -LH tolerated  -LN     Recorded by [LH] Jacquelin Friedman, PT [LN] Skylar Mccain, PT     Cognitive Assessment/Intervention    Personal Safety Interventions  fall prevention program maintained;gait belt;supervised activity;nonskid shoes/slippers when out of bed  -LN     Recorded by  [LN] Skylar Mccain, PT     Bed Mobility, Assessment/Treatment    Bed Mobility, Assistive Device  head of bed elevated;draw sheet  -LN     Bed Mob, Supine to Sit, [place]  moderate assist (50% patient effort);2 person assist required;maximum assist (25% patient effort)  -LN     Bed Mobility, Comment deferred- patient up in chair  -LH      Recorded by [LH] Jacquelin Friedman, PT [LN] Skylar Mccain, PT     Transfer Assessment/Treatment    Transfers, Sit-Stand [place] minimum assist (75% patient effort);moderate assist (50% patient effort);2 person assist required  - minimum assist (75% patient effort);2 person assist required  -LN     Transfers, Stand-Sit [place] contact guard assist;1 person + 1 person to manage equipment  - minimum assist (75% patient effort);2 person assist required  -LN     Transfers, Sit-Stand-Sit, Assist Device rolling walker  -LH rolling walker  -LN     Transfer, Comment Patient performed sit to stand transfer from chair 2x.  First time, requiring min-mod A x 2, improved transfer second time, requiring only min A x 2.  Patient maintained standing 30 seconds x 2.  ATtempted to weight shift and take step forward, however unable to due to right  knee pain.  Maintained static standing with CGA and increased weightbearing on left LE.  - Verbal cues for hand placement. Elevated bed surface with sit to stand.  -LN     Recorded by [] Jacquelin Friedman, PT [LN] Skylar Mccain, PT     Gait Assessment/Treatment    Gait, [place] Level  minimum assist (75% patient effort);1 person + 1 person to manage equipment;verbal cues required  -LN     Gait, Assistive Device  rolling walker  -LN     Gait, Distance (Feet)  8   bed to chair  -LN     Gait, Gait Deviations  vivian decreased;forward flexed posture;narrow base;step length decreased  -LN     Gait, Impairments  strength decreased;pain  -LN     Gait, Comment deferred today due to pain.  - Patient fatigues quickly; distance limited also by all his equipment.   -LN     Recorded by [LH] Jacquelin Friedman, PT [LN] Skylar Mccain, PT     Therapy Exercises    Bilateral Lower Extremities --   patient declined due to knee pain  -LH      Recorded by [LH] Jacquelin Friedman PT      Positioning and Restraints    Pre-Treatment Position sitting in chair/recliner  -LH in bed  -LN     Post Treatment Position chair  - chair  -LN     In Chair notified nsg;sitting;call light within reach;encouraged to call for assist  -LH notified nsg;reclined;call light within reach;encouraged to call for assist;legs elevated  -LN     Recorded by [LH] Jacquelin Friedman, PT [LN] Skylar Mccain, PT       User Key  (r) = Recorded By, (t) = Taken By, (c) = Cosigned By    Initials Name Effective Dates     Jacquelin Friedman, PT 08/11/15 -     LN Skylar Mccain, PT 06/22/16 -                 IP PT Goals       12/23/17 1140          Bed Mobility PT STG    Bed Mobility PT STG, Date Established 12/23/17  -SP      Bed Mobility PT STG, Time to Achieve 3 days  -SP      Bed Mobility PT STG, Activity Type all bed mobility  -SP      Bed Mobility PT STG, [place] Level contact guard assist  -SP      Transfer Training PT  STG    Transfer Training PT STG, Date Established 12/23/17  -SP      Transfer Training PT STG, Time to Achieve 3 days  -SP      Transfer Training PT STG, Activity Type bed to chair /chair to bed;sit to stand/stand to sit  -SP      Transfer Training PT STG, [place] Level contact guard assist  -SP      Transfer Training PT STG, Assist Device --   with appropriate assistive device  -SP      Transfer Training PT LTG    Transfer Training PT LTG, Date Established 12/23/17  -SP      Transfer Training PT LTG, Time to Achieve 5 - 7 days  -SP      Transfer Training PT LTG, Activity Type all transfers  -SP      Transfer Training PT LTG, [place] Level contact guard assist;supervision required  -SP      Transfer Training PT LTG, Assist Device --   with appropriate assistive device  -SP      Gait Training PT LTG    Gait Training Goal PT LTG, Date Established 12/23/17  -SP      Gait Training Goal PT LTG, Time to Achieve 5 - 7 days  -SP      Gait Training Goal PT LTG, [place] Level contact guard assist  -SP      Gait Training Goal PT LTG, Assist Device walker, rolling  -SP      Gait Training Goal PT LTG, Distance to Achieve 100  -SP        User Key  (r) = Recorded By, (t) = Taken By, (c) = Cosigned By    Initials Name Provider Type    SP Angela Matias, PT Physical Therapist          Physical Therapy Education     Title: PT OT SLP Therapies (Done)     Topic: Physical Therapy (Done)     Point: Mobility training (Done)    Learning Progress Summary    Learner Readiness Method Response Comment Documented by Status   Patient Acceptance E VU Patient education on functional mobility & gait with RWX and importance of getting out of bed and sitting in chair for awhile. LN 12/30/17 1156 Done    Acceptance E VU  BP 12/29/17 1128 Done    Acceptance E VU  JW 12/28/17 1127 Done    Acceptance E VU  JW 12/27/17 1051 Done    Acceptance E VU  JW 12/26/17 1018 Done    Acceptance E VU Patient instructed in transfer and gait  techniques  12/23/17 1139 Done               Point: Home exercise program (Done)    Learning Progress Summary    Learner Readiness Method Response Comment Documented by Status   Patient Acceptance E VU Patient education on functional mobility & gait with RWX and importance of getting out of bed and sitting in chair for awhile. LN 12/30/17 1156 Done    Acceptance E VU  JW 12/28/17 1127 Done    Acceptance E VU  JW 12/27/17 1051 Done    Acceptance E VU   12/26/17 1018 Done                      User Key     Initials Effective Dates Name Provider Type Discipline    SP 08/11/15 -  Angela Matias, PT Physical Therapist PT    LN 06/22/16 -  Skylar Mccain, PT Physical Therapist PT    BP 12/01/15 -  Hermila St, PT Physical Therapist PT    JW 12/01/15 -  Sylvia Mccain, PT Physical Therapist PT                    PT Recommendation and Plan  Anticipated Discharge Disposition: long term acute care facility  Planned Therapy Interventions: balance training, bed mobility training, gait training, home exercise program  PT Frequency: daily, 5 times/wk  Plan of Care Review  Plan Of Care Reviewed With: patient  Progress: progress toward functional goals is gradual  Outcome Summary/Follow up Plan: PT:  Patient required min-mod assist for sit to stand transfer from recliner.  Unable to walk today due to increased pain in right knee.  Patient stood 30 seconds x 2 with rolling walker and contact guard assist.  Knee pain limiting further exercises/activity at this time.          Outcome Measures       01/01/18 1000 12/30/17 0915       How much help from another person do you currently need...    Turning from your back to your side while in flat bed without using bedrails? 2  -LH 2  -LN     Moving from lying on back to sitting on the side of a flat bed without bedrails? 2  -LH 2  -LN     Moving to and from a bed to a chair (including a wheelchair)? 2  -LH 3  -LN     Standing up from a chair using your arms (e.g.,  wheelchair, bedside chair)? 2  - 3  -LN     Climbing 3-5 steps with a railing? 1  - 1  -LN     To walk in hospital room? 2  - 3  -LN     AM-PAC 6 Clicks Score 11  - 14  -LN     Functional Assessment    Outcome Measure Options AM-PAC 6 Clicks Basic Mobility (PT)  -LH AM-PAC 6 Clicks Basic Mobility (PT)  -LN       User Key  (r) = Recorded By, (t) = Taken By, (c) = Cosigned By    Initials Name Provider Type     Jacquelin Friedman, PT Physical Therapist    LN Skylar Mccain, PT Physical Therapist           Time Calculation:         PT Charges       01/01/18 1040          Time Calculation    Start Time 1010  -      Stop Time 1034  -      Time Calculation (min) 24 min  -        User Key  (r) = Recorded By, (t) = Taken By, (c) = Cosigned By    Initials Name Provider Type     Jacquelin Friedman, PT Physical Therapist          Therapy Charges for Today     Code Description Service Date Service Provider Modifiers Qty    96878500815 HC PT THER SUPP EA 15 MIN 1/1/2018 Jacquelin Friedman, PT GP 1    03687742386 HC PT THER PROC EA 15 MIN 1/1/2018 Jacquelin Friedman, PT GP 1          PT G-Codes  Outcome Measure Options: AM-PAC 6 Clicks Basic Mobility (PT)    Jacquelin Friedman, PT  1/1/2018

## 2018-01-01 NOTE — PLAN OF CARE
Problem: Patient Care Overview (Adult)  Goal: Plan of Care Review  Outcome: Ongoing (interventions implemented as appropriate)      Problem: Fall Risk (Adult)  Goal: Absence of Falls  Outcome: Ongoing (interventions implemented as appropriate)      Problem: Urine Elimination, Impaired (Adult)  Goal: Effective Containment of Urine  Outcome: Ongoing (interventions implemented as appropriate)    Goal: Reduced Incontinence Episodes  Outcome: Ongoing (interventions implemented as appropriate)      Problem: Infection, Risk/Actual (Adult)  Goal: Infection Prevention/Resolution  Outcome: Ongoing (interventions implemented as appropriate)    Goal: Infection Prevention/Resolution  Outcome: Ongoing (interventions implemented as appropriate)      Problem: Pressure Ulcer Risk (Binh Scale) (Adult,Obstetrics,Pediatric)  Goal: Skin Integrity  Outcome: Ongoing (interventions implemented as appropriate)      Problem: Nutrition, Parenteral (Adult)  Goal: Signs and Symptoms of Listed Potential Problems Will be Absent or Manageable (Nutrition, Parenteral)  Outcome: Ongoing (interventions implemented as appropriate)      Problem: Ileostomy (Adult)  Goal: Signs and Symptoms of Listed Potential Problems Will be Absent or Manageable (Ileostomy)  Outcome: Ongoing (interventions implemented as appropriate)

## 2018-01-01 NOTE — PLAN OF CARE
Problem: Patient Care Overview (Adult)  Goal: Plan of Care Review  Outcome: Ongoing (interventions implemented as appropriate)   01/01/18 0553   Patient Care Overview   Progress progress towards functional goals is fair       Problem: Fall Risk (Adult)  Goal: Absence of Falls  Outcome: Ongoing (interventions implemented as appropriate)   01/01/18 0553   Fall Risk (Adult)   Absence of Falls making progress toward outcome       Problem: Infection, Risk/Actual (Adult)  Goal: Infection Prevention/Resolution  Outcome: Ongoing (interventions implemented as appropriate)

## 2018-01-01 NOTE — PROGRESS NOTES
"Hospitalist Team      Patient Care Team:  David Cedillo MD as PCP - General (Family Medicine)  Anupam Green MD as Consulting Physician (Hematology and Oncology)  Bennie Galo MD as Referring Physician (Orthopedic Surgery)        Chief Complaint:  Status post exploratory laparotomy and extensive lysis of adhesions, resection of multiple fistulas, small bowel resection and ileostomy.    Subjective    Interval History and ROS:     Patient States I am not getting better  Patient Complaints: Patient is feeling like he is not getting better.  However things are improving.  Patient Denies:  Nausea and vomiting  History taken from: patient and wife      Objective    Vital Signs  Temp:  [97.3 °F (36.3 °C)-97.8 °F (36.6 °C)] 97.8 °F (36.6 °C)  Heart Rate:  [83-90] 90  Resp:  [18] 18  BP: (118-144)/(67-72) 125/72  Oxygen Therapy  SpO2: 95 %  Pulse Oximetry Type: Intermittent  O2 Device: room air  Flow (L/min): 1  EtCO2 (mm Hg) (Respiratory Monitoring): 23}  Flowsheet Rows         First Filed Value    Admission Height  177.8 cm (70\") Documented at 12/15/2017 1300    Admission Weight  57.3 kg (126 lb 4.8 oz) Documented at 12/15/2017 1300        Body mass index is 19.74 kg/(m^2).      Physical Exam:    Physical Exam   Constitutional: Patient appears thin and weak.  He is disgusted.  He does not feel he is making progress.  However he truly is improved   HEENT:   Head: Normocephalic and atraumatic.   Eyes:  Pupils are equal, round, and reactive to light. EOM are intact. Sclera are anicteric and non-injected.  Mouth and Throat: Patient has moist mucous membranes. Oropharynx is clear of any erythema or exudate.     Neck: Neck supple. No JVD present. No thyromegaly present. No lymphadenopathy present.  Cardiovascular: Regular rate, regular rhythm, S1 normal and S2 normal.  Exam reveals no gallop and no friction rub.  No murmur heard.  Pulmonary/Chest: Lungs are clear to auscultation bilaterally. No respiratory " distress. No wheezes. No rhonchi. No rales.   Abdominal: Soft. Bowel sounds are normal. No distension and no mass. There is no hepatosplenomegaly. There is no tenderness.   Musculoskeletal: Normal Muscle tone  Extremities: No edema. Pulses are palpable in all 4 extremities.  Neurological: Patient is alert and oriented to person, place, and time. Cranial nerves II-XII are grossly intact with no focal deficits.  Skin: Skin is warm. No rash noted. Nails show no clubbing.  No cyanosis or erythema.         Results Review:     I reviewed the patient's new clinical results.    Lab Results (last 24 hours)     Procedure Component Value Units Date/Time    POC Glucose Once [[id number]]  (Abnormal) Collected:  12/31/17 1729    Specimen:  Blood Updated:  12/31/17 1737     Glucose 147 (H) mg/dL     Narrative:       Meter: [id number] : [id number] [facility]    POC Glucose Once [[id number]]  (Abnormal) Collected:  01/01/18 0037    Specimen:  Blood Updated:  01/01/18 0045     Glucose 160 (H) mg/dL     Narrative:       Meter: [id number] : [id number] Reji Sahni NURSING ASSISTANT    POC Glucose Once [[id number]]  (Normal) Collected:  01/01/18 0642    Specimen:  Blood Updated:  01/01/18 0649     Glucose 126 mg/dL     Narrative:       Meter: [id number] : [id number] Reji Kaitlyn NURSING ASSISTANT    Calcium, Ionized [[id number]]  (Abnormal) Collected:  01/01/18 0650    Specimen:  Blood Updated:  01/01/18 0658     Ionized Calcium 4.37 (L) mg/dL      Collected by [facility] from lab    aPTT [[id number]]  (Abnormal) Collected:  01/01/18 0652    Specimen:  Blood Updated:  01/01/18 0707     PTT 40.0 (H) seconds     Narrative:       PTT = The equivalent PTT values for the therapeutic range of heparin levels at 0.1 to 0.7 U/ml are 53 to 110 seconds.    Protime-INR [[id number]]  (Abnormal) Collected:  01/01/18 0652    Specimen:  Blood Updated:  01/01/18 0707     Protime 19.0 (H) Seconds      INR 1.58 (H)    Narrative:        Therapeutic Ranges for INR: 2.0-3.0 (PT 20-30)                              2.5-3.5 (PT 25-34)    CBC Auto Differential [[id number]]  (Abnormal) Collected:  01/01/18 0652    Specimen:  Blood Updated:  01/01/18 0717     WBC 5.05 10*3/mm3      RBC 3.07 (L) 10*6/mm3      Hemoglobin 9.8 (L) g/dL      Hematocrit 30.6 (L) %      MCV 99.7 (H) fL      MCH 31.9 (H) pg      MCHC 32.0 g/dL      RDW 22.9 (H) %      RDW-SD 77.6 (H) fl      MPV 12.9 (H) fL      Platelets 83 (L) 10*3/mm3      Neutrophil % 39.8 (L) %      Lymphocyte % 20.8 %      Monocyte % 37.8 (H) %      Eosinophil % 0.6 %      Basophil % 0.2 %      Immature Grans % 0.8 (H) %      Neutrophils, Absolute 2.01 10*3/mm3      Lymphocytes, Absolute 1.05 10*3/mm3      Monocytes, Absolute 1.91 (H) 10*3/mm3      Eosinophils, Absolute 0.03 (L) 10*3/mm3      Basophils, Absolute 0.01 10*3/mm3      Immature Grans, Absolute 0.04 (H) 10*3/mm3      nRBC 0.0 /100 WBC     CBC & Differential [[id number]] Collected:  01/01/18 0652    Specimen:  Blood Updated:  01/01/18 0717    Narrative:       The following orders were created for panel order CBC & Differential.  Procedure                               Abnormality         Status                     ---------                               -----------         ------                     Scan Slide[[id number]]                                                                  CBC Auto Differential[[id number]]        Abnormal            Final result                 Please view results for these tests on the individual orders.    Magnesium [[id number]]  (Normal) Collected:  01/01/18 0652    Specimen:  Blood Updated:  01/01/18 0721     Magnesium 2.0 mg/dL     Basic Metabolic Panel [[id number]]  (Abnormal) Collected:  01/01/18 0652    Specimen:  Blood Updated:  01/01/18 0721     Glucose 131 (H) mg/dL      BUN 16 mg/dL      Creatinine 0.65 (L) mg/dL      Sodium 140 mmol/L      Potassium 3.9 mmol/L      Chloride 105 mmol/L      CO2 26.9 mmol/L       Calcium 7.9 (L) mg/dL      eGFR Non African Amer 117 mL/min/1.73      BUN/Creatinine Ratio 24.6     Anion Gap 8.1 mmol/L     Narrative:       The MDRD GFR formula is only valid for adults with stable renal function between ages 18 and 70.    Phosphorus [[id number]]  (Normal) Collected:  01/01/18 0652    Specimen:  Blood Updated:  01/01/18 0721     Phosphorus 3.5 mg/dL     C-reactive Protein [[id number]]  (Abnormal) Collected:  01/01/18 0652    Specimen:  Blood Updated:  01/01/18 0919     C-Reactive Protein 1.57 (H) mg/dL     Prealbumin [[id number]]  (Abnormal) Collected:  01/01/18 0652    Specimen:  Blood Updated:  01/01/18 0920     Prealbumin 16.4 (L) mg/dL     POC Glucose Once [[id number]]  (Abnormal) Collected:  01/01/18 1151    Specimen:  Blood Updated:  01/01/18 1205     Glucose 148 (H) mg/dL     Narrative:       Meter: [id number] : [id number] [facility] Patricia NURSING ASSISTANT          Imaging Results (last 24 hours)     Procedure Component Value Units Date/Time    XR Knee 1 or 2 View Bilateral [[id number]] Collected:  01/01/18 0751     Updated:  01/01/18 0755    Narrative:       EXAM: 2 views right knee. 2 views left knee.     DATE: 12/31/2017     HISTORY: Bilateral knee pain for 10 years. No trauma.     COMPARISON: None     FINDINGS: Right knee demonstrates advanced medial compartment joint  space narrowing, moderate patellofemoral compartment joint space  narrowing. Prominent medial compartment and patellofemoral compartment  osteophyte formation. No acute fracture joint dislocation is seen.  Chondrocalcinosis changes in the lateral meniscus. Advanced calcific  atherosclerotic changes are present. No definite joint effusion.     Left knee demonstrates moderate patellofemoral compartment and  moderately advanced medial and lateral compartment joint space  narrowing. Prominent posterior patellar osteophyte formation with  smaller marginal ossified the medial and lateral compartments.  Subchondral cystic change in  the lateral compartment. No definite joint  effusion. Advanced calcific atherosclerosis.       Impression:       1. Moderate to moderately advanced at degenerative changes of the knees,  as described in detail in the report. No acute findings.     This report was finalized on 1/1/2018 7:53 AM by Dr. Samantha Keita MD.             Xray reviewed personally by physician.      ECG reviewed personally by physician  ECG/EMG Results (most recent)     Procedure Component Value Units Date/Time    ECG 12 Lead [[id number]] Collected:  12/18/17 0905     Updated:  12/18/17 1000    Narrative:       RR Interval= 909 ms  NC Interval= 169 ms  QRSD Interval= 146 ms  QT Interval= 472 ms  QTc Interval= 495 ms  Heart Rate= 66 ms  P Axis= 69 deg  QRS Axis= -14 deg  T Wave Axis= 18 deg  I: 40 Axis= 27 deg  T: 40 Axis= 234 deg  ST Axis= deg  SINUS RHYTHM  RBBB AND LAFB  NO SIGNIFICANT CHANGE FROM PREVIOUS ECG  Electronically Signed by:  Kam VyasJOY) ([facility]) 18-Dec-2017 09:59:48  Date and Time of Study: 2017-12-18 09:05:54    Adult Transthoracic Echo Complete W/ Cont if Necessary Per Protocol [[id number]] Collected:  12/19/17 1633     Updated:  12/19/17 2057     BSA 1.8 m^2      IVSd 1.2 cm      LVIDd 5.5 cm      LVIDs 4.0 cm      LVPWd 1.4 cm      IVS/LVPW 0.85     FS 26.6 %      EDV(Teich) 145.0 ml      ESV(Teich) 70.4 ml      EF(Teich) 51.4 %      EDV(cubed) 162.8 ml      ESV(cubed) 64.5 ml      EF(cubed) 60.4 %      LV mass(C)d 292.8 grams      LV mass(C)dI 166.8 grams/m^2      SV(Teich) 74.5 ml      SI(Teich) 42.5 ml/m^2      SV(cubed) 98.3 ml      SI(cubed) 56.0 ml/m^2      Ao root diam 4.5 cm      Ao root area 15.9 cm^2      ACS 2.1 cm      LVOT diam 2.1 cm      LVOT area 3.5 cm^2      LVOT area(traced) 3.5 cm^2      RVOT diam 1.8 cm      RVOT area 2.5 cm^2      LVLd ap4 8.4 cm      EDV(MOD-sp4) 156.0 ml      LVLs ap4 7.2 cm      ESV(MOD-sp4) 97.8 ml      EF(MOD-sp4) 37.3 %      LVLd ap2 7.8 cm      EDV(MOD-sp2) 146.0 ml       LVLs ap2 6.7 cm      ESV(MOD-sp2) 90.3 ml      EF(MOD-sp2) 38.2 %      SV(MOD-sp4) 58.2 ml      SI(MOD-sp4) 33.2 ml/m^2      SV(MOD-sp2) 55.7 ml      SI(MOD-sp2) 31.7 ml/m^2      Ao root area (BSA corrected) 2.6     Ao root area (BSA corrected) 38.2 ml/m^2      CONTRAST EF 4CH 37.3 ml/m^2      LV Diastolic corrected for BSA 88.9 ml/m^2      LV Systolic corrected for BSA 55.7 ml/m^2      MV A dur 0.1 sec      MV E max aren 70.3 cm/sec      MV A max aren 52.4 cm/sec      MV E/A 1.3     MV V2 max 63.1 cm/sec      MV max PG 1.6 mmHg      MV V2 mean 42.6 cm/sec      MV mean PG 1.0 mmHg      MV V2 VTI 19.1 cm      MVA(VTI) 2.9 cm^2      MV P1/2t max aren 67.9 cm/sec      MV P1/2t 72.6 msec      MVA(P1/2t) 3.0 cm^2      MV dec slope 274.0 cm/sec^2      MV dec time 0.23 sec      Ao pk aren 119.0 cm/sec      Ao max PG 5.7 mmHg      Ao max PG (full) 3.5 mmHg      Ao V2 mean 82.4 cm/sec      Ao mean PG 3.0 mmHg      Ao mean PG (full) 2.0 mmHg      Ao V2 VTI 25.7 cm      RICHIE(I,A) 2.2 cm^2      RICHIE(I,D) 2.2 cm^2      RICHIE(V,A) 2.2 cm^2      RICHIE(V,D) 2.2 cm^2      LV V1 max PG 2.2 mmHg      LV V1 mean PG 1.0 mmHg      LV V1 max 73.9 cm/sec      LV V1 mean 47.2 cm/sec      LV V1 VTI 16.1 cm      MR max aren 587.5 cm/sec      MR max .2 mmHg      SV(Ao) 408.7 ml      SI(Ao) 232.9 ml/m^2      SV(LVOT) 55.8 ml      SV(RVOT) 30.0 ml      SI(LVOT) 31.8 ml/m^2      PA V2 max 74.7 cm/sec      PA max PG 2.2 mmHg      PA max PG (full) 1.0 mmHg      BH CV ECHO JENNIFER - PVA(V,A) 1.9 cm^2      BH CV ECHO JENNIFER - PVA(V,D) 1.9 cm^2      PA acc time 0.13 sec      RV V1 max PG 1.2 mmHg      RV V1 mean PG 1.0 mmHg      RV V1 max 54.7 cm/sec      RV V1 mean 35.9 cm/sec      RV V1 VTI 11.8 cm      TR max aren 255.0 cm/sec      RVSP(TR) 34.0 mmHg      RAP systole 8.0 mmHg      PA pr(Accel) 21.9 mmHg      Pulm Sys Aren 53.4 cm/sec      Pulm Hernandez Aren 41.5 cm/sec      Pulm S/D 1.3     Qp/Qs 0.54     Pulm A Revs Dur 0.1 sec      Pulm A Revs Aren 23.7 cm/sec       MVA P1/2T LCG 3.2 cm^2      BH CV ECHO JENNIFER - BZI_BMI 19.1 kilograms/m^2      BH CV ECHO JENNIFER - BSA(HAYCOCK) 1.7 m^2      BH CV ECHO JENNIFER - BZI_METRIC_WEIGHT 60.3 kg      BH CV ECHO JENNIFER - BZI_METRIC_HEIGHT 177.8 cm      Target HR (85%) 116 bpm      Max. Pred. HR (100%) 137 bpm      BH CV VAS BP RIGHT /43 mmHg      TDI S' 1.40 cm/sec      RV Base 3.90 cm      E/E' ratio 14.0     LA Volume Index 24.0 mL/m2      Lat Peak E' Aren 6.0 cm/sec      Med Peak E' Aren 4.00 cm/sec      TAPSE (>1.6) 1.70 cm2      Echo EF Estimated 38 %     Narrative:       · Left ventricular wall thickness is consistent with mild concentric   hypertrophy.  · Left ventricular systolic function is moderately decreased. Estimated EF   = 38%.  · Mild aortic valve regurgitation is present.  · Moderate mitral valve regurgitation is present  · Mild tricuspid valve regurgitation is present.             Medication Review:   I have reviewed the patient's current medication list    Current Facility-Administered Medications:   •  Adult Central Clinimix TPN, , Intravenous, Continuous, Last Rate: 68 mL/hr at 01/01/18 0920 **AND** fat emulsion (INTRALIPID,LIPOSYN) 20 % infusion 47.04 g, 235.2 mL, Intravenous, Continuous, Kaylie Granados MD, Last Rate: 9.8 mL/hr at 12/31/17 1750, 47.04 g at 12/31/17 1750  •  Adult Central Clinimix TPN, , Intravenous, Continuous **AND** fat emulsion (INTRALIPID,LIPOSYN) 20 % infusion 47.04 g, 235.2 mL, Intravenous, Continuous, Marvin Ma MD  •  clotrimazole-betamethasone (LOTRISONE) 1-0.05 % cream 1 application, 1 application, Topical, Q12H, Serjio Reynolds MD, 1 application at 01/01/18 0831  •  dextrose (D50W) solution 25 g, 25 g, Intravenous, Q15 Min PRN, Kaylie Granados MD  •  dextrose (GLUTOSE) oral gel 15 g, 15 g, Oral, Q15 Min PRN, Kaylie Granados MD  •  diphenhydrAMINE (BENADRYL) injection 12.5 mg, 12.5 mg, Intravenous, Q15 Min PRN, Bc Stockton CRNA  •  glucagon (GLUCAGEN) injection 1 mg, 1  mg, Subcutaneous, Q15 Min PRN, Kaylie Granados MD  •  HYDROmorphone (DILAUDID) injection 0.5 mg, 0.5 mg, Intravenous, Q1H PRN, Kaylie Granados MD, 0.5 mg at 01/01/18 1203  •  hydrophor (AQUAPHOR) ointment, , Topical, Q12H, Muna Addison, APRN  •  insulin aspart (novoLOG) injection 0-7 Units, 0-7 Units, Subcutaneous, Q6H, Kaylie Granados MD, 2 Units at 01/01/18 0143  •  magnesium sulfate 4 gram infusion - Mg less than or equal to 1mg/dL, 4 g, Intravenous, PRN **OR** magnesium sulfate 3 gram infusion (1gm x 3) - Mg 1.1 - 1.5 mg/dL, 1 g, Intravenous, PRN, Last Rate: 100 mL/hr at 12/21/17 2342, 1 g at 12/21/17 2342 **OR** Magnesium Sulfate 2 gram infusion- Mg 1.6 - 1.9 mg/dL, 2 g, Intravenous, PRN, Muna Addison, APRN, Last Rate: 25 mL/hr at 12/31/17 1142, 2 g at 12/31/17 1142  •  metoprolol tartrate (LOPRESSOR) injection 2.5 mg, 2.5 mg, Intravenous, Q6H, Kenya Marks MD, 2.5 mg at 01/01/18 0832  •  metroNIDAZOLE (FLAGYL) IVPB 500 mg, 500 mg, Intravenous, Q8H, Kaylie Granados MD, Stopped at 01/01/18 1221  •  midazolam (VERSED) injection 1 mg, 1 mg, Intravenous, Q5 Min PRN, Bc Stockton CRNA  •  naloxone (NARCAN) injection 0.1 mg, 0.1 mg, Intravenous, Q5 Min PRN, Kaylie Granados MD  •  ondansetron (ZOFRAN) injection 4 mg, 4 mg, Intravenous, Q6H PRN, Muna Addison, APRN  •  ondansetron (ZOFRAN) injection 4 mg, 4 mg, Intravenous, Once PRN, Bc Stockton, CRNA  •  ondansetron (ZOFRAN) tablet 4 mg, 4 mg, Oral, Q6H PRN **OR** ondansetron ODT (ZOFRAN-ODT) disintegrating tablet 4 mg, 4 mg, Oral, Q6H PRN **OR** ondansetron (ZOFRAN) injection 4 mg, 4 mg, Intravenous, Q6H PRN, aKylie Granados MD, 4 mg at 12/22/17 0555  •  oxyCODONE-acetaminophen (PERCOCET) 5-325 MG per tablet 1 tablet, 1 tablet, Oral, Q4H PRN, Kaylie Granados MD, 1 tablet at 01/01/18 1109  •  pantoprazole (PROTONIX) injection 40 mg, 40 mg, Intravenous, BID, Radha Gibbs MD, 40 mg at 01/01/18 0831  •  phenol  (CHLORASEPTIC) 1.4 % liquid 2 spray, 2 spray, Mouth/Throat, Q2H PRN, Serjio Reynolds MD, 2 spray at 12/26/17 2334  •  piperacillin-tazobactam (ZOSYN) 4.5 g in sodium chloride 0.9 % 100 mL IVPB, 4.5 g, Intravenous, Q8H, Kaylie Granados MD, Stopped at 01/01/18 1000  •  potassium chloride 20 mEq in 50 mL IVPB, 20 mEq, Intravenous, Q1H PRN, Muna Addison, APRN, Last Rate: 50 mL/hr at 12/31/17 1220, 20 mEq at 12/31/17 1220  •  sodium chloride 0.9 % flush 1-10 mL, 1-10 mL, Intravenous, PRN, Serjio Reynolds MD, 10 mL at 12/24/17 0601  •  Insert peripheral IV, , , Once **AND** sodium chloride 0.9 % flush 10 mL, 10 mL, Intravenous, PRN, Vladimir Douglas MD, 10 mL at 12/25/17 0857  •  sodium chloride 0.9 % infusion 40 mL, 40 mL, Intravenous, PRN, Serjio Reynolds MD  •  sucralfate (CARAFATE) tablet 1 g, 1 g, Oral, 4x Daily AC & at Bedtime, Kenya Marks MD, 1 g at 01/01/18 1109      Assessment/Plan     1. Status post exploratory laparotomy, extensive lysis of adhesions, resection multiple fistulas, small bowel resection and ileostomy: management per surgery, urology  (5/26/17: S/P exploratory laparotomy/exploration of old hernia mesh/subtotal colectomy with ileorectal anastamosis/mobilization of splenic flexure/intraoperative colonoscopy and rigid sigmoidoscopy/proctoscopy secondary to recurrent diverticular bleeds)  On Zosyn and Flagyl. On TPN per surgery. On PPI  Patient on full liquid diet but having trouble taking po secondary to increased pain.  try carafate.   Patient is near end of course for Abx's WBC is WNL and patient is abebrile.    Virden evaluation/precert in process  I have ordered ostomy consult which has not been done yet per patient and nursing.  Sending a communication to Dr. Ma about antibiotics stopping and starting home P/O medications      2. Acute blood loss anemia with low grade myelodysplastic syndrome and chronic pancytopenia: h/o MDS, B12, Folate deficiencies,  extreme monocytosis, hypercoagulopathy and vitamin K deficiency: CBC group following S/P 2 units PRBCs on 12/24/17 and 1 unit PRBCs on 12/30/17  Vitamin K and FFP given per hematology,   No DIC, Hb up to 10 today. Monitor.       3. Chronic diastolic dysfunction, PAF, PSVT, Non-rheumatic aortic valve insufficiency, chronic RBBB, MR and AR: cardiology followed pre-op, now signed off. Remains in NSR on metoprolol 2.5 mg IV every 6 hours, no acute issues. Change to oral when tolerating po regularly.      4. Hypertension: Mostly at goal on IV metoprolol.      5. Severe chronic illness malnutrition: remains on TPN per surgery. Encouraging po.      6. Hypokalemia and hypophosphatemia (electrolyte imbalance): replacement through TPN and on potassium/mag protocols  Monitor      7. Bilateral knee pain secondary to OA: unable to give IV tylenol on floor, Continue ice/heat    Plan for disposition:  Per surgery    Barbara Turner DO  01/01/18  1:42 PM      Time: 20 min

## 2018-01-01 NOTE — PLAN OF CARE
Problem: Patient Care Overview (Adult)  Goal: Plan of Care Review  Outcome: Ongoing (interventions implemented as appropriate)   01/01/18 1038   Coping/Psychosocial Response Interventions   Plan Of Care Reviewed With patient   Patient Care Overview   Progress progress toward functional goals is gradual   Outcome Evaluation   Outcome Summary/Follow up Plan PT: Patient required min-mod assist for sit to stand transfer from recliner. Unable to walk today due to increased pain in right knee. Patient stood 30 seconds x 2 with rolling walker and contact guard assist. Knee pain limiting further exercises/activity at this time.

## 2018-01-01 NOTE — PROGRESS NOTES
CHIEF COMPLAINT:  MDS with pancytopenia    HISTORY OF PRESENT ILLNESS:   Mr. Zelaya is a very pleasant 83-year-old man with myelodysplastic syndrome and chronic pancytopenia.  He receives B12 and Procrit support in our office and occasional transfusion support of packed red blood cells.  He is currently in the ICU postoperative day 5 for a exploratory laparotomy with extensive lysis of adhesions and resection of small bowel fistulas.  The patient has required transfusion support, most recently packed red blood cells on 12/24/17.  He has also received platelets and FFP.  He is currently hemodynamically stable with no bleeding.  He is receiving TPN.  Patient was given vitamin K on 12/26/2017.      On 12/29/2017 patient has overall improvement, and NG-tube removed, he tolerated oral clear liquid.  TPN has been continued.  Patient remained is afebrile.  Laboratory study reported relatively stable hemoglobin 9.3 with some fluctuation in the past 48 hours.  However has worsening PTT at 49.2 seconds and relative stable INR 1.52.  Has elevated fibrinogen, no evidence of DIC.  Drainage catheter at the abdomen still continues to have small quantity of dark colored blood.      The patient was revisited on 1/1/18.  He continues to make slow progress.  His diet has been advanced but he is not able to tolerate much orally.  He still has a CAYETANO drain in place with serosanguineous drainage.  He has not required a packed red blood cell transfusion for several days.  PT and PTT remain mildly prolonged but stable.  He is having no significant bleeding.    Past Medical History, Past Surgical History, Social History, Family History have been reviewed and are without significant changes except as mentioned.    Review of Systems   A comprehensive 14 point review of systems was performed and was negative except as mentioned.  He complains of weakness, abdominal pain, depression    Medications:  The current medication list was reviewed in  the EMR    ALLERGIES:  No Known Allergies    Objective    Vitals:    12/31/17 2326 01/01/18 0334 01/01/18 0634 01/01/18 1122   BP: 122/67 118/67 144/72 125/72   BP Location: Left arm Left arm Left arm Left arm   Patient Position: Lying Lying Lying Sitting   Pulse: 88 83 85 90   Resp: 18 18 18 18   Temp: 97.5 °F (36.4 °C) 97.3 °F (36.3 °C) 97.5 °F (36.4 °C) 97.8 °F (36.6 °C)   TempSrc: Oral Oral Oral Oral   SpO2: 98% 97% 98% 95%   Weight:       Height:         Physical Exam    Gen: Pleasant, chronic ill appearing man, not in acute distress.  HEENT: No icterus  CV: regular rate, no murmur  CHEST: cta bilat  ABD: post op, ostomy, dressed, drains with small quantity bloody contents  MS: no sig edema  NEURO: globally weak  SKIN: pale    RECENT LABS:  Hematology   Results from last 7 days  Lab Units 01/01/18  0652 12/31/17  0637 12/30/17  0552   WBC 10*3/mm3 5.05 4.99 4.58*   HEMOGLOBIN g/dL 9.8* 10.0* 7.8*   HEMATOCRIT % 30.6* 32.8* 25.1*   PLATELETS 10*3/mm3 83* 88* 99*     Lab Results   Component Value Date    NEUTROABS 2.01 01/01/2018       Lab Results   Component Value Date    GLUCOSE 131 (H) 01/01/2018    BUN 16 01/01/2018    CREATININE 0.65 (L) 01/01/2018    EGFRIFNONA 117 01/01/2018    EGFRIFAFRI  09/01/2016      Comment:      <15 Indicative of kidney failure.    BCR 24.6 01/01/2018    CO2 26.9 01/01/2018    CALCIUM 7.9 (L) 01/01/2018    ALBUMIN 2.40 (L) 12/29/2017    LABIL2 0.6 12/29/2017    AST 11 12/29/2017    ALT <5 (L) 12/29/2017       Results from last 7 days  Lab Units 01/01/18  0652 12/31/17  0637 12/30/17  0553   INR  1.58* 1.48* 1.64*   APTT seconds 40.0* 38.8* 38.4*            Assessment/Plan     1.  Low-grade myelodysplastic syndrome with chronic pancytopenia:  The patient's CBC parameters have been stable the past several days, currently with the CBC 5.0, ANC 2.0, hemoglobin 9.8 and platelets 83,000.  I would recommend continued CBC check every 2-3 days.  The patient is generally transfused packed red  blood cells for hemoglobin less than 8.0.  Platelets could be transfused if the platelets drop below 20,000 or any significant bleeding.  If he is discharged to [facility] or other rehabilitation, the CBC should continue to be checked at least once weekly with transfusion as above.    2.  Mild coagulopathy:  The prolonged PT and PTT have not significantly improved with FFP or vitamin K.  I favor this likely being a side effect of the patient's poor nutrition and antibiotic therapy.  Given the stability of his PT and PTT and the lack of significant bleeding, I think we can stop checking daily PT/PTT unless the patient shows increased bleeding tendency.    Please call if other questions arise during the patient's hospitalization.    1/1/2018

## 2018-01-01 NOTE — PROGRESS NOTES
Adult Nutrition  Assessment/PES    Patient Name:  Jason Zelaya  YOB: 1934  MRN: [id number]  Admit Date:  12/15/2017    Note Date:  1/1/2018       Data Eval/ Notes       01/01/18 1349     Notes    Reason Routine Rounds    Patient Concerns Food Portions    Plan Preferences noted - now on Regular diet, drinking Ensure per pt./significant other  passed along to kitchen staff    Note pt. wants smaller portion, RD worked with pt./significant other regarding preferences      01/01/18 1023        Electronically signed by:  Anna Edwards RD  01/01/18 1:51 PM

## 2018-01-02 NOTE — PROGRESS NOTES
Gen Surg Postop Progress Note       Subjective: No acute events.  Tolerating regular diet.  Intake is very poor.  His drinking his entral supplements.  Complaining of bilateral knee pain.  Was seen by Dr. Gandara.  Denies nausea and vomiting.  Calorie counts are underway    Objective:    Vital Signs  Temp:  [97.2 °F (36.2 °C)-99.2 °F (37.3 °C)] 99.2 °F (37.3 °C)  Heart Rate:  [79-93] 83  Resp:  [18] 18  BP: (126-138)/(64-73) 128/68  Body mass index is 19.74 kg/(m^2).    Intake/Output Summary (Last 24 hours) at 01/02/18 1629  Last data filed at 01/02/18 1352   Gross per 24 hour   Intake          2893.93 ml   Output             1955 ml   Net           938.93 ml     I/O - noted       Physical Exam:   General: patient awake, alert and cooperative   Cardiovascular: regular rhythm and rate, no murmurs auscultated   Pulm: clear to auscultation bilaterally, regular and unlabored   Abdomen: soft, nontender, nondistended; normal bowel sounds, ileostomy viable and functioning   Extremities: no rash or edema      Results Review:     I reviewed the patient's new clinical results.        Results from last 7 days  Lab Units 01/01/18  0652   WBC 10*3/mm3 5.05   HEMOGLOBIN g/dL 9.8*   HEMATOCRIT % 30.6*   PLATELETS 10*3/mm3 83*       Results from last 7 days  Lab Units 01/02/18  1537  12/29/17  0524   SODIUM mmol/L 137  < > 139   POTASSIUM mmol/L 4.4  < > 3.3*   CHLORIDE mmol/L 102  < > 101   CO2 mmol/L 27.2  < > 29.8*   BUN mg/dL 17  < > 14   CREATININE mg/dL 0.59*  < > 0.63*   CALCIUM mg/dL 8.1*  < > 8.1*   BILIRUBIN mg/dL  --   --  0.6   ALK PHOS U/L  --   --  63   ALT (SGPT) U/L  --   --  <5*   AST (SGOT) U/L  --   --  11   GLUCOSE mg/dL 88  < > 131*   < > = values in this interval not displayed.      PT/INR:    Protime   Date Value Ref Range Status   01/01/2018 19.0 (H) 12.1 - 15.0 Seconds Final   12/31/2017 18.1 (H) 12.1 - 15.0 Seconds Final   /  INR   Date Value Ref Range Status   01/01/2018 1.58 (H) 0.90 - 1.10 Final    12/31/2017 1.48 (H) 0.90 - 1.10 Final       Calcium Calcium   Date Value Ref Range Status   01/02/2018 8.1 (L) 8.8 - 10.5 mg/dL Final   01/01/2018 7.9 (L) 8.8 - 10.5 mg/dL Final   12/31/2017 8.0 (L) 8.8 - 10.5 mg/dL Final      Magnesium  AST  ALT  Bilirubin, Total  AlkPhos  Albumin    Amylase  Lipase    Radiology: Magnesium   Date Value Ref Range Status   01/02/2018 1.8 1.7 - 2.5 mg/dL Final   01/01/2018 2.0 1.7 - 2.5 mg/dL Final   12/31/2017 1.8 1.7 - 2.5 mg/dL Final     No components found for: AST.*  No components found for: ALT.*  No components found for: BILIRUBIN, TOTAL.*    No components found for: ALKPHOS.*  No components found for: ALBUMIN.*      No components found for: AMYLASE.*  No components found for: LIPASE.*      Imaging Results (most recent)     Procedure Component Value Units Date/Time    CT Head Without Contrast [[id number]] Collected:  12/15/17 1509     Updated:  12/15/17 1513    Narrative:       UNENHANCED HEAD CT:  12/15/2017 3:09 PM     HISTORY: Fall. Found down. Slipped on a rug. Trauma.     TECHNIQUE: Axial unenhanced head CT. Radiation dose reduction techniques  were utilized, including automated exposure control and exposure  modulation based on body size.     COMPARISON: None     FINDINGS: No intracranial hemorrhage, mass, or infarct. No hydrocephalus  or extra-axial fluid collection. There is mild generalized atrophy and  chronic small vessel changes. The skull base, calvarium, and  extracranial soft tissues are normal.       Impression:       Normal, negative unenhanced head CT.                This report was finalized on 12/15/2017 3:10 PM by Dr. Rafiq Blue MD.       CT Abdomen Pelvis Without Contrast [[id number]] Collected:  12/18/17 0716     Updated:  12/18/17 0731    Narrative:       CT ABDOMEN AND PELVIS, NONCONTRAST, 12/17/2017:     HISTORY:  83-year-old male status post near-total colectomy with ileorectal  anastomosis May 2017 for bleeding colonic diverticular disease.  Recently  noted fecal matter within urine. 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 As a  history of myelodysplastic syndrome.     TECHNIQUE:  CT examination of the abdomen and pelvis was performed with oral  contrast only. IV contrast was not administered. Radiation dose  reduction techniques were utilized, including automated exposure control  and exposure modulation based on body size.     COMPARISON:  *  CT abdomen/pelvis, 11/15/2017 and 6/5/2017.     ABDOMEN FINDINGS:  Stable mild splenomegaly. Liver, pancreas, spleen and kidneys are  otherwise negative. No evidence of upper urinary tract obstruction.  Cholecystectomy. No bile duct dilatation. Normal caliber abdominal  aorta.     PELVIS FINDINGS:  Extensive inflammation is present throughout the mid and lower pelvis  centered on the patient's ileorectal anastomosis. Multiple loops of  inflamed small bowel appear tethered in the mid pelvic midline near the  anastomosis and show moderate dilatation and diffuse wall thickening.  There is also marked thickening of adjacent urinary bladder wall. Some  ill-defined linear tracts of oral contrast extends from the region of  the anastomosis toward several mid pelvic small bowel loops and could  potentially represent fistulae, although decompressed, inflamed small  bowel segments are also possible. There may be some very faint contrast  material within the urinary bladder near the Ayoub catheter balloon, but  a definitive bladder fistula is not identified. Repeat CT examination  with rectal contrast may be helpful to better delineate presumed  colovesical fistula and any additional fistulae near the ileorectal  anastomosis. Extensive inflammatory soft tissue stranding is present  throughout the pelvis, but no abscess or other drainable fluid  collection is identified.     Limited images of the lower chest show mild diffuse chronic interstitial  pulmonary fibrosis.       Impression:       1. Postop changes near total colectomy with  ileorectal anastomosis.  2. Extensive inflammation within the midline pelvis near the anastomosis  involving multiple small bowel loops and the urinary bladder.  3. Findings suspicious for small bowel fistulae and possible colorectal  fistula near the anastomosis. Repeat CT examination with rectal contrast  may be helpful.  4. No abscess or other undrained fluid collection.  5. Stable splenomegaly.  6. Pulmonary fibrosis.  7. Initial stat report to the ordering service from Dr. Rafiq Blue at  1428 hours on 12/17/2017.     This report was finalized on 12/18/2017 7:27 AM by Dr. Chencho Henderson MD.       CT Abdomen Pelvis With Contrast [[id number]] Collected:  12/18/17 1542     Updated:  12/18/17 1555    Narrative:       CT ABDOMEN AND PELVIS WITH IV AND RECTAL CONTRAST, 12/18/2017:     HISTORY:  83-year-old male status post near total colectomy and ileorectal  anastomosis May 2017. Recently noted stool in urine. Assess for  colovesical fistula.     TECHNIQUE:  CT examination of the abdomen and pelvis was performed with IV contrast  following installation of 500 cc rectal GI contrast. Radiation dose  reduction techniques were utilized, including automated exposure control  and exposure modulation based on body size.     COMPARISON:  *  CT abdomen/pelvis, 12/17/2017.     FINDINGS:  The examination shows small irregular linear collections of GI contrast  and air extending to the right and left from the rectal anastomosis in  the midline pelvis towards adjacent segments of tethered, dilated pelvic  small bowel segments. The appearance suggests contrast leak and likely  small bowel fistula formation from the anastomosis. No drainable abscess  or other fluid collection is demonstrated.     There is also a tiny amount of high attenuation contrast within the  lumen of a markedly thick-walled urinary bladder compatible with bladder  fistula. There is also mild dilatation of both ureters and both renal  collecting systems,  not visible on the earlier study.     Moderately severe dilatation of multiple small bowel loops within the  lower abdomen and pelvis likely representing adynamic ileus due or  partial obstruction due to pelvic inflammation.     Remainder the examination is unchanged since the earlier study. Moderate  splenomegaly. Liver and pancreas are unremarkable.       Impression:       1. CT findings compatible with ileorectal anastomosis leak and likely  small bowel fistula formation.  2. Faint GI contrast within the urinary bladder compatible with  colovesical fistula.  3. Extensive peritoneal inflammation throughout the lower abdomen and  pelvis. Moderate dilatation of pelvic small bowel segments, several of  which appear tethered to the region of the anastomosis.  4. Mild bilateral pyelocaliectasis and ureterectasis.  5. Splenomegaly.     This report was finalized on 12/18/2017 3:53 PM by Dr. Chencho Henderson MD.       XR Chest 1 View [[id number]] Collected:  12/19/17 0613     Updated:  12/19/17 0616    Narrative:       CHEST X-RAY, 12/18/2017:     HISTORY:   PICC placement.     TECHNIQUE:  AP portable chest x-ray.     FINDINGS:  Newly placed right arm PICC tip is in good position in the low SVC.     Diffuse fibrosis throughout both lungs, greatest at the lung bases. Low  lung volumes. Mild cardiomegaly.       Impression:       1. PICC in good position.  2. Requested initial stat report to the ordering service from Dr. Rickie Kent at 2113 hours on 12/18/2017.     This report was finalized on 12/19/2017 6:14 AM by Dr. Chencho Henderson MD.       XR Abdomen KUB [[id number]] Collected:  12/29/17 0852     Updated:  12/29/17 0921    Narrative:       KUB     DATE: 12/28/2017     HISTORY: Abdominal surgery 1 week ago. Abdominal distention. Blood  coming from NG tube. Verify NG tube placement. Possible ileus.     COMPARISON: CT abdomen and pelvis 12/18/2017.     FINDINGS: Midline laparotomy staples are present. NG tube tip  projects  into the region of the proximal gastric body, tip approximately 6 cm  below the EG junction. The sidehole of the NG tube is thought to be  located at the EG junction.     Drainage catheter projects over the left lateral abdomen.  Cholecystectomy changes are present. Mild generalized gaseous distention  of predominantly small bowel loops, favored to represent mild  postoperative ileus. No gross free air is seen. Probable minimal left  basilar atelectasis. Degenerative endplate changes in the imaged spine.  Calcified phleboliths in the pelvis to the right. Presumed Ayoub  catheter in place. Drainage catheter also is seen projecting just to the  right of midline within the pelvis.       Impression:       1. No acute findings in the abdomen.  2. Mild generalized gaseous distention of bowel, predominantly small  bowel, favored to represent mild postoperative ileus.  3. NG tube tip about 6 cm below the EG junction with sidehole at the  level of the EG junction.  4. Preliminary report provided by Dr. Kumari on 12/28/2017 at 1930  hours.     This report was finalized on 12/29/2017 9:19 AM by Dr. Samantha Keita MD.       XR Knee 1 or 2 View Bilateral [[id number]] Collected:  01/01/18 0751     Updated:  01/01/18 0755    Narrative:       EXAM: 2 views right knee. 2 views left knee.     DATE: 12/31/2017     HISTORY: Bilateral knee pain for 10 years. No trauma.     COMPARISON: None     FINDINGS: Right knee demonstrates advanced medial compartment joint  space narrowing, moderate patellofemoral compartment joint space  narrowing. Prominent medial compartment and patellofemoral compartment  osteophyte formation. No acute fracture joint dislocation is seen.  Chondrocalcinosis changes in the lateral meniscus. Advanced calcific  atherosclerotic changes are present. No definite joint effusion.     Left knee demonstrates moderate patellofemoral compartment and  moderately advanced medial and lateral compartment joint  space  narrowing. Prominent posterior patellar osteophyte formation with  smaller marginal ossified the medial and lateral compartments.  Subchondral cystic change in the lateral compartment. No definite joint  effusion. Advanced calcific atherosclerosis.       Impression:       1. Moderate to moderately advanced at degenerative changes of the knees,  as described in detail in the report. No acute findings.     This report was finalized on 1/1/2018 7:53 AM by Dr. Samantha Keita MD.                Assessment/Plan     Status post exploratory laparotomy, extensive lysis of adhesions, resection multiple fistulas, small bowel resection and ileostomy  Postoperative day 12  Continue to encourage PO diet and enteral supplements  Calorie counts underway  TPN was renewed today and hopefully will start weaning it off  Discussed with patient possibly placing a Dobbhoff feeding tube for enteral feedings but he is refusing.    Myelodysplastic syndrome with chronic pancytopenia    SCDs for VTE prophylaxis    Severe deconditioning  Needs aggressive rehabilitation    Plan of care discussed with patient, nursing staff and Dr. Yue Granados MD  01/02/18  4:29 PM

## 2018-01-02 NOTE — PLAN OF CARE
Problem: Patient Care Overview (Adult)  Goal: Discharge Needs Assessment  Outcome: Ongoing (interventions implemented as appropriate)   12/31/17 0321 01/02/18 1311   Discharge Needs Assessment   Concerns To Be Addressed denies needs/concerns at this time --    Readmission Within The Last 30 Days no previous admission in last 30 days --    Equipment Needed After Discharge none --    Discharge Planning Comments --  CM obtained permission to speak with son and wife present. [employer] denied patient to go to LTAC ([place]). Patient is now being weaned off of TPN and started on PO meds and clear diet. If he can continue these he would possibly be eligible to go to short term rehab. Family has requested the [facility]. CM contacted [facility] and left message for Johnna to please call regarding short term rehab. Will continue to follow.    Current Health   Anticipated Changes Related to Illness none --    Self-Care   Equipment Currently Used at Home cane, quad;walker, rolling --    Living Environment   Transportation Available family or friend will provide --

## 2018-01-02 NOTE — SIGNIFICANT NOTE
01/02/18 1040   Rehab Treatment   Discipline physical therapist   Treatment Not Performed (Initiated treatment. Patient screaming out in patient with any movement of B LE's. Reports 10/10 pain. Recommend erich lift today. Will check back tomorrow following initiation of Voltaren and pain medication. Pt agreeable. Notified RN of recommendations)

## 2018-01-02 NOTE — PROGRESS NOTES
Adult Nutrition  Assessment/PES    Patient Name:  Jason Zelaya  YOB: 1934  MRN: [id number]  Admit Date:  12/15/2017    Assessment Date:  1/2/2018          Reason for Assessment       01/02/18 1112    Reason for Assessment    Reason For Assessment/Visit calorie count order          Diet: Regular with ENSURE PLUS 3 per day    Estimated needs: 9004-8017 kcal,  gm pro  TPN continues Clinamex (D20 5% AA) 68 ml/hr + 47 gm IL     Day one:  410 kcal, 18 gm pro ( 1 meal, 1 ENSURE PLUS)          Electronically signed by:  Roseanne Clayton RD  01/02/18 11:14 AM

## 2018-01-02 NOTE — NURSING NOTE
Continued Stay Note  RENATA Wheatley     Patient Name: Jason Zelaya  MRN: [id number]  Today's Date: 1/2/2018    Admit Date: 12/15/2017          Discharge Plan       01/02/18 1307    Case Management/Social Work Plan    Additional Comments CM obtained permission to speak with son and wife present.  [employer] denied patient to go to LTAC ([facility]).  Patient is now being weaned off of TPN and started on PO meds and clear diet.  If  he can continue these he would possibly be eligible to go to short term rehab.  Family has requested the [facility].  CM contacted [facility] and left message for Johnna to please call regarding short term rehab.  Will continue to follow.                Discharge Codes     None            Horacio Swift RN

## 2018-01-02 NOTE — PROGRESS NOTES
Progress Note:    Outpatient Medications:  These are the changes I have made starting patient on home medications.           acetaminophen (TYLENOL) 325 MG tablet Yes/ ordered     cholestyramine (QUESTRAN) 4 g packet No     clotrimazole-betamethasone (LOTRISONE) 1-0.05 % cream No     cyanocobalamin 1000 MCG/ML injection Yes/ first dose today     folic acid (FOLVITE) 400 MCG tablet Yes     hyoscyamine (ANASPAZ,LEVSIN) 0.125 MG tablet No     lactobacillus acidophilus (RISAQUAD) capsule capsule Yes     metoprolol tartrate (LOPRESSOR) 25 MG tablet Changed to 12.5 bid     MULTIPLE VITAMINS-MINERALS PO yes     nitrofurantoin, macrocrystal-monohydrate, (MACROBID) 100 MG capsule no     oxyCODONE-acetaminophen (PERCOCET) 5-325 MG per tablet No/ surgeon to manage pain med     pantoprazole (PROTONIX) 40 MG EC tablet On carafate and protonix IV will not change yet             Hospitalist Team      Patient Care Team:  David Cedillo MD as PCP - General (Family Medicine)  Anupam Green MD as Consulting Physician (Hematology and Oncology)  Bennie Galo MD as Referring Physician (Orthopedic Surgery)        Chief Complaint:  Status post exploratory lap and extensive lysis of adhesions, resection of multiple fistulas, small bowel resection and ileostomy    Subjective    Interval History and ROS:     Patient is eating a diet and doing well with it.  I have changed his medications and ordered some of his home medications as above.  He does not feel he is getting better but he is more active and looks better to us and is tolerating his p/o diet.      Objective    Vital Signs  Temp:  [97.2 °F (36.2 °C)-98 °F (36.7 °C)] 97.2 °F (36.2 °C)  Heart Rate:  [79-93] 79  Resp:  [18] 18  BP: (125-165)/(64-74) 138/64  Oxygen Therapy  SpO2: 99 %  Pulse Oximetry Type: Intermittent  O2 Device: room air  Flow (L/min): 1  EtCO2 (mm Hg) (Respiratory Monitoring): 23}  Flowsheet Rows         First Filed Value    Admission Height  177.8 cm  "(70\") Documented at 12/15/2017 1300    Admission Weight  57.3 kg (126 lb 4.8 oz) Documented at 12/15/2017 1300        Body mass index is 19.74 kg/(m^2).      Physical Exam:    Physical Exam   Constitutional: Patient appears thin and chronically ill.  He is depressed and weak and tired.  HEENT:   Head: Normocephalic and atraumatic.   Eyes:  Pupils are equal, round, and reactive to light. EOM are intact. Sclera are anicteric and non-injected.  Mouth and Throat: Patient has moist mucous membranes. Oropharynx is clear of any erythema or exudate.     Neck: Neck supple. No JVD present. No thyromegaly present. No lymphadenopathy present.  Cardiovascular: Regular rate, regular rhythm, S1 normal and S2 normal.  Exam reveals no gallop and no friction rub.  No murmur heard.  Pulmonary/Chest: Lungs are clear to auscultation bilaterally. No respiratory distress. No wheezes. No rhonchi. No rales.   Abdominal: Soft. Bowel sounds are normal. No distension and no mass. There is no hepatosplenomegaly. There is no tenderness.   Musculoskeletal: Normal Muscle tone  Extremities: No edema. Pulses are palpable in all 4 extremities.  Neurological: Patient is alert and oriented to person, place, and time. Cranial nerves II-XII are grossly intact with no focal deficits.  Skin: Skin is warm. No rash noted. Nails show no clubbing.  No cyanosis or erythema.         Results Review:     I reviewed the patient's new clinical results.    Lab Results (last 24 hours)     Procedure Component Value Units Date/Time    CBC Auto Differential [[id number]]  (Abnormal) Collected:  01/01/18 0652    Specimen:  Blood Updated:  01/01/18 0717     WBC 5.05 10*3/mm3      RBC 3.07 (L) 10*6/mm3      Hemoglobin 9.8 (L) g/dL      Hematocrit 30.6 (L) %      MCV 99.7 (H) fL      MCH 31.9 (H) pg      MCHC 32.0 g/dL      RDW 22.9 (H) %      RDW-SD 77.6 (H) fl      MPV 12.9 (H) fL      Platelets 83 (L) 10*3/mm3      Neutrophil % 39.8 (L) %      Lymphocyte % 20.8 %      " Monocyte % 37.8 (H) %      Eosinophil % 0.6 %      Basophil % 0.2 %      Immature Grans % 0.8 (H) %      Neutrophils, Absolute 2.01 10*3/mm3      Lymphocytes, Absolute 1.05 10*3/mm3      Monocytes, Absolute 1.91 (H) 10*3/mm3      Eosinophils, Absolute 0.03 (L) 10*3/mm3      Basophils, Absolute 0.01 10*3/mm3      Immature Grans, Absolute 0.04 (H) 10*3/mm3      nRBC 0.0 /100 WBC     CBC & Differential [[id number]] Collected:  01/01/18 0652    Specimen:  Blood Updated:  01/01/18 0717    Narrative:       The following orders were created for panel order CBC & Differential.  Procedure                               Abnormality         Status                     ---------                               -----------         ------                     Scan Slide[[id number]]                                                                  CBC Auto Differential[[id number]]        Abnormal            Final result                 Please view results for these tests on the individual orders.    Magnesium [[id number]]  (Normal) Collected:  01/01/18 0652    Specimen:  Blood Updated:  01/01/18 0721     Magnesium 2.0 mg/dL     Basic Metabolic Panel [[id number]]  (Abnormal) Collected:  01/01/18 0652    Specimen:  Blood Updated:  01/01/18 0721     Glucose 131 (H) mg/dL      BUN 16 mg/dL      Creatinine 0.65 (L) mg/dL      Sodium 140 mmol/L      Potassium 3.9 mmol/L      Chloride 105 mmol/L      CO2 26.9 mmol/L      Calcium 7.9 (L) mg/dL      eGFR Non African Amer 117 mL/min/1.73      BUN/Creatinine Ratio 24.6     Anion Gap 8.1 mmol/L     Narrative:       The MDRD GFR formula is only valid for adults with stable renal function between ages 18 and 70.    Phosphorus [[id number]]  (Normal) Collected:  01/01/18 0652    Specimen:  Blood Updated:  01/01/18 0721     Phosphorus 3.5 mg/dL     C-reactive Protein [[id number]]  (Abnormal) Collected:  01/01/18 0652    Specimen:  Blood Updated:  01/01/18 0919     C-Reactive Protein 1.57 (H) mg/dL      Prealbumin [[id number]]  (Abnormal) Collected:  01/01/18 0652    Specimen:  Blood Updated:  01/01/18 0920     Prealbumin 16.4 (L) mg/dL     POC Glucose Once [[id number]]  (Abnormal) Collected:  01/01/18 1151    Specimen:  Blood Updated:  01/01/18 1205     Glucose 148 (H) mg/dL     Narrative:       Meter: [id number] : [id number] Mings Patricia NURSING ASSISTANT    POC Glucose Once [[id number]]  (Abnormal) Collected:  01/01/18 1733    Specimen:  Blood Updated:  01/01/18 1741     Glucose 167 (H) mg/dL     Narrative:       Meter: [id number] : [id number] Mings Patricia NURSING ASSISTANT    POC Glucose Once [[id number]]  (Abnormal) Collected:  01/02/18 0019    Specimen:  Blood Updated:  01/02/18 0026     Glucose 180 (H) mg/dL     Narrative:       Meter: [id number] : [id number] Tapan Interiano CNA    POC Glucose Once [[id number]]  (Normal) Collected:  01/02/18 0557    Specimen:  Blood Updated:  01/02/18 0604     Glucose 102 mg/dL     Narrative:       Meter: [id number] : [id number] Phelan Laisha CNA          Imaging Results (last 24 hours)     Procedure Component Value Units Date/Time    XR Knee 1 or 2 View Bilateral [[id number]] Collected:  01/01/18 0751     Updated:  01/01/18 0755    Narrative:       EXAM: 2 views right knee. 2 views left knee.     DATE: 12/31/2017     HISTORY: Bilateral knee pain for 10 years. No trauma.     COMPARISON: None     FINDINGS: Right knee demonstrates advanced medial compartment joint  space narrowing, moderate patellofemoral compartment joint space  narrowing. Prominent medial compartment and patellofemoral compartment  osteophyte formation. No acute fracture joint dislocation is seen.  Chondrocalcinosis changes in the lateral meniscus. Advanced calcific  atherosclerotic changes are present. No definite joint effusion.     Left knee demonstrates moderate patellofemoral compartment and  moderately advanced medial and lateral compartment joint space  narrowing. Prominent posterior patellar  osteophyte formation with  smaller marginal ossified the medial and lateral compartments.  Subchondral cystic change in the lateral compartment. No definite joint  effusion. Advanced calcific atherosclerosis.       Impression:       1. Moderate to moderately advanced at degenerative changes of the knees,  as described in detail in the report. No acute findings.     This report was finalized on 1/1/2018 7:53 AM by Dr. Samantha Keita MD.             Xray reviewed personally by physician.      ECG reviewed personally by physician  ECG/EMG Results (most recent)     Procedure Component Value Units Date/Time    ECG 12 Lead [[id number]] Collected:  12/18/17 0905     Updated:  12/18/17 1000    Narrative:       RR Interval= 909 ms  WA Interval= 169 ms  QRSD Interval= 146 ms  QT Interval= 472 ms  QTc Interval= 495 ms  Heart Rate= 66 ms  P Axis= 69 deg  QRS Axis= -14 deg  T Wave Axis= 18 deg  I: 40 Axis= 27 deg  T: 40 Axis= 234 deg  ST Axis= deg  SINUS RHYTHM  RBBB AND LAFB  NO SIGNIFICANT CHANGE FROM PREVIOUS ECG  Electronically Signed by:  [facility] 18-Dec-2017 09:59:48  Date and Time of Study: 2017-12-18 09:05:54    Adult Transthoracic Echo Complete W/ Cont if Necessary Per Protocol [[id number]] Collected:  12/19/17 1633     Updated:  12/19/17 2057     BSA 1.8 m^2      IVSd 1.2 cm      LVIDd 5.5 cm      LVIDs 4.0 cm      LVPWd 1.4 cm      IVS/LVPW 0.85     FS 26.6 %      EDV(Teich) 145.0 ml      ESV(Teich) 70.4 ml      EF(Teich) 51.4 %      EDV(cubed) 162.8 ml      ESV(cubed) 64.5 ml      EF(cubed) 60.4 %      LV mass(C)d 292.8 grams      LV mass(C)dI 166.8 grams/m^2      SV(Teich) 74.5 ml      SI(Teich) 42.5 ml/m^2      SV(cubed) 98.3 ml      SI(cubed) 56.0 ml/m^2      Ao root diam 4.5 cm      Ao root area 15.9 cm^2      ACS 2.1 cm      LVOT diam 2.1 cm      LVOT area 3.5 cm^2      LVOT area(traced) 3.5 cm^2      RVOT diam 1.8 cm      RVOT area 2.5 cm^2      LVLd ap4 8.4 cm      EDV(MOD-sp4) 156.0 ml       LVLs ap4 7.2 cm      ESV(MOD-sp4) 97.8 ml      EF(MOD-sp4) 37.3 %      LVLd ap2 7.8 cm      EDV(MOD-sp2) 146.0 ml      LVLs ap2 6.7 cm      ESV(MOD-sp2) 90.3 ml      EF(MOD-sp2) 38.2 %      SV(MOD-sp4) 58.2 ml      SI(MOD-sp4) 33.2 ml/m^2      SV(MOD-sp2) 55.7 ml      SI(MOD-sp2) 31.7 ml/m^2      Ao root area (BSA corrected) 2.6     Ao root area (BSA corrected) 38.2 ml/m^2      CONTRAST EF 4CH 37.3 ml/m^2      LV Diastolic corrected for BSA 88.9 ml/m^2      LV Systolic corrected for BSA 55.7 ml/m^2      MV A dur 0.1 sec      MV E max aren 70.3 cm/sec      MV A max aren 52.4 cm/sec      MV E/A 1.3     MV V2 max 63.1 cm/sec      MV max PG 1.6 mmHg      MV V2 mean 42.6 cm/sec      MV mean PG 1.0 mmHg      MV V2 VTI 19.1 cm      MVA(VTI) 2.9 cm^2      MV P1/2t max aren 67.9 cm/sec      MV P1/2t 72.6 msec      MVA(P1/2t) 3.0 cm^2      MV dec slope 274.0 cm/sec^2      MV dec time 0.23 sec      Ao pk aren 119.0 cm/sec      Ao max PG 5.7 mmHg      Ao max PG (full) 3.5 mmHg      Ao V2 mean 82.4 cm/sec      Ao mean PG 3.0 mmHg      Ao mean PG (full) 2.0 mmHg      Ao V2 VTI 25.7 cm      RICHIE(I,A) 2.2 cm^2      RICHIE(I,D) 2.2 cm^2      RICHIE(V,A) 2.2 cm^2      RICHIE(V,D) 2.2 cm^2      LV V1 max PG 2.2 mmHg      LV V1 mean PG 1.0 mmHg      LV V1 max 73.9 cm/sec      LV V1 mean 47.2 cm/sec      LV V1 VTI 16.1 cm      MR max aren 587.5 cm/sec      MR max .2 mmHg      SV(Ao) 408.7 ml      SI(Ao) 232.9 ml/m^2      SV(LVOT) 55.8 ml      SV(RVOT) 30.0 ml      SI(LVOT) 31.8 ml/m^2      PA V2 max 74.7 cm/sec      PA max PG 2.2 mmHg      PA max PG (full) 1.0 mmHg      BH CV ECHO JENNIFER - PVA(V,A) 1.9 cm^2      BH CV ECHO JENNIFER - PVA(V,D) 1.9 cm^2      PA acc time 0.13 sec      RV V1 max PG 1.2 mmHg      RV V1 mean PG 1.0 mmHg      RV V1 max 54.7 cm/sec      RV V1 mean 35.9 cm/sec      RV V1 VTI 11.8 cm      TR max aren 255.0 cm/sec      RVSP(TR) 34.0 mmHg      RAP systole 8.0 mmHg      PA pr(Accel) 21.9 mmHg      Pulm Sys Aren 53.4 cm/sec      Pulm  Hernandez Aren 41.5 cm/sec      Pulm S/D 1.3     Qp/Qs 0.54     Pulm A Revs Dur 0.1 sec      Pulm A Revs Aren 23.7 cm/sec      MVA P1/2T LCG 3.2 cm^2       CV ECHO JENNIFER - BZI_BMI 19.1 kilograms/m^2       CV ECHO JENNIFER - BSA(HAYCOCK) 1.7 m^2       CV ECHO JENNIFER - BZI_METRIC_WEIGHT 60.3 kg       CV ECHO JENNIFER - BZI_METRIC_HEIGHT 177.8 cm      Target HR (85%) 116 bpm      Max. Pred. HR (100%) 137 bpm       CV VAS BP RIGHT /43 mmHg      TDI S' 1.40 cm/sec      RV Base 3.90 cm      E/E' ratio 14.0     LA Volume Index 24.0 mL/m2      Lat Peak E' Aren 6.0 cm/sec      Med Peak E' Aren 4.00 cm/sec      TAPSE (>1.6) 1.70 cm2      Echo EF Estimated 38 %     Narrative:       · Left ventricular wall thickness is consistent with mild concentric   hypertrophy.  · Left ventricular systolic function is moderately decreased. Estimated EF   = 38%.  · Mild aortic valve regurgitation is present.  · Moderate mitral valve regurgitation is present  · Mild tricuspid valve regurgitation is present.             Medication Review:   I have reviewed the patient's current medication list    Current Facility-Administered Medications:   •  acetaminophen (TYLENOL) tablet 650 mg, 650 mg, Oral, Q6H PRN, Barbara Turner DO  •  Adult [facility] Clinimix TPN, , Intravenous, Continuous, Last Rate: 68 mL/hr at 01/01/18 1753 **AND** fat emulsion (INTRALIPID,LIPOSYN) 20 % infusion 47.04 g, 235.2 mL, Intravenous, Continuous, Marvin Ma MD, Last Rate: 9.8 mL/hr at 01/01/18 1753, 47.04 g at 01/01/18 1753  •  clotrimazole-betamethasone (LOTRISONE) 1-0.05 % cream 1 application, 1 application, Topical, Q12H, Serjio Reynolds MD, 1 application at 01/01/18 2059  •  cyanocobalamin injection 1,000 mcg, 1,000 mcg, Intramuscular, Q28 Days, Madonna Ringswald, DO  •  dextrose (D50W) solution 25 g, 25 g, Intravenous, Q15 Min PRN, Kaylie Granados MD  •  dextrose (GLUTOSE) oral gel 15 g, 15 g, Oral, Q15 Min PRN, Kaylie Granados MD  •  diphenhydrAMINE  (BENADRYL) injection 12.5 mg, 12.5 mg, Intravenous, Q15 Min PRN, Bc Stockton CRNA  •  folic acid (FOLVITE) tablet 400 mcg, 400 mcg, Oral, Daily, Barbara Turner DO  •  glucagon (GLUCAGEN) injection 1 mg, 1 mg, Subcutaneous, Q15 Min PRN, Kaylie Granados MD  •  HYDROmorphone (DILAUDID) injection 0.5 mg, 0.5 mg, Intravenous, Q1H PRN, Kaylie Granados MD, 0.5 mg at 01/01/18 2306  •  hydrophor (AQUAPHOR) ointment, , Topical, Q12H, Muna Addison APRN  •  insulin aspart (novoLOG) injection 0-7 Units, 0-7 Units, Subcutaneous, Q6H, Kaylie Granados MD, 2 Units at 01/02/18 0112  •  lactobacillus acidophilus (RISAQUAD) capsule 1 capsule, 1 capsule, Oral, Daily, Barbara Turner DO  •  magnesium sulfate 4 gram infusion - Mg less than or equal to 1mg/dL, 4 g, Intravenous, PRN **OR** magnesium sulfate 3 gram infusion (1gm x 3) - Mg 1.1 - 1.5 mg/dL, 1 g, Intravenous, PRN, Last Rate: 100 mL/hr at 12/21/17 2342, 1 g at 12/21/17 2342 **OR** Magnesium Sulfate 2 gram infusion- Mg 1.6 - 1.9 mg/dL, 2 g, Intravenous, PRN, Muna Addison, APRN, Last Rate: 25 mL/hr at 12/31/17 1142, 2 g at 12/31/17 1142  •  metoprolol tartrate (LOPRESSOR) tablet 12.5 mg, 12.5 mg, Oral, Q12H, Barbara Turner DO  •  metroNIDAZOLE (FLAGYL) IVPB 500 mg, 500 mg, Intravenous, Q8H, Kaylie Granados MD, Last Rate: 0 mL/hr at 01/01/18 1221, 500 mg at 01/02/18 0500  •  midazolam (VERSED) injection 1 mg, 1 mg, Intravenous, Q5 Min PRN, Bc Stockton CRNA  •  multivitamin with minerals 1 tablet, 1 tablet, Oral, Daily, Barbara Turner DO  •  naloxone (NARCAN) injection 0.1 mg, 0.1 mg, Intravenous, Q5 Min PRN, Kaylie Granados MD  •  ondansetron (ZOFRAN) injection 4 mg, 4 mg, Intravenous, Q6H PRN, BENJAMIN Webb  •  ondansetron (ZOFRAN) injection 4 mg, 4 mg, Intravenous, Once PRN, Bc Stockton CRNA  •  ondansetron (ZOFRAN) tablet 4 mg, 4 mg, Oral, Q6H PRN **OR** ondansetron ODT (ZOFRAN-ODT) disintegrating tablet 4 mg,  4 mg, Oral, Q6H PRN **OR** ondansetron (ZOFRAN) injection 4 mg, 4 mg, Intravenous, Q6H PRN, Kaylie Granados MD, 4 mg at 12/22/17 0555  •  oxyCODONE-acetaminophen (PERCOCET) 5-325 MG per tablet 1 tablet, 1 tablet, Oral, Q4H PRN, Kaylie Granados MD, 1 tablet at 01/02/18 0131  •  pantoprazole (PROTONIX) injection 40 mg, 40 mg, Intravenous, BID, Radha Gibbs MD, 40 mg at 01/01/18 2054  •  phenol (CHLORASEPTIC) 1.4 % liquid 2 spray, 2 spray, Mouth/Throat, Q2H PRN, Serjio Reynolds MD, 2 spray at 12/26/17 2334  •  piperacillin-tazobactam (ZOSYN) 4.5 g in sodium chloride 0.9 % 100 mL IVPB, 4.5 g, Intravenous, Q8H, Kaylie Granados MD, Stopped at 01/02/18 0500  •  potassium chloride 20 mEq in 50 mL IVPB, 20 mEq, Intravenous, Q1H PRN, BENJAMIN Webb, Last Rate: 50 mL/hr at 12/31/17 1220, 20 mEq at 12/31/17 1220  •  sodium chloride 0.9 % flush 1-10 mL, 1-10 mL, Intravenous, PRN, Serjio Reyonlds MD, 10 mL at 12/24/17 0601  •  Insert peripheral IV, , , Once **AND** sodium chloride 0.9 % flush 10 mL, 10 mL, Intravenous, PRN, Vladimir Douglas MD, 10 mL at 12/25/17 0857  •  sodium chloride 0.9 % infusion 40 mL, 40 mL, Intravenous, PRN, Serjio Reynolds MD  •  sucralfate (CARAFATE) tablet 1 g, 1 g, Oral, 4x Daily AC & at Bedtime, Kenya Marks MD, 1 g at 01/01/18 2054      Assessment/Plan     Status post Exploratory lap, extensive lysis of adhesions, resection of multiple fustulas, small bowel resection and ileostomy/  Management by surgery and urology/  Now on a diet and today home medications were reviewed and some resumed/  See the top of this progress note.  Antibiotics are to be managed by Surgery and are soon expiring.    Acute blood loss anemia/ hematology is following the patient.    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  Per hematology:  The patient's CBC parameters have been stable the past several days, currently with the CBC 5.0, ANC 2.0, hemoglobin 9.8 and  platelets 83,000.  I would recommend continued CBC check every 2-3 days.  The patient is generally transfused packed red blood cells for hemoglobin less than 8.0.  Platelets could be transfused if the platelets drop below 20,000 or any significant bleeding.  If he is discharged to [place] or other rehabilitation, the CBC should continue to be checked at least once weekly with transfusion as above.    Mild coagulopathy/  Per hematology he feels this is a side effect of the patient's poor nutrition and antibiotic therapy.  He recommends not to check PT and PTT unless the patient shows increased bleeding tendency.    Chronic diastolic dysfunction, PAF, PSVT, Non-rheumatic aortic valve insufficiency, Chronic RBBB, MR and AR/ cardiology followe and now signed off    Hypertension    Severe chronic illness/ malnutrition/ TPN per surgery.  Patient is eating now without issues.    Electrolyte imbalance/ replacement per surgery / TPN    Bilateral knee pain secondary to OA/  Acetaminophen ordered        Plan for disposition:  Slowly discontinue TPN    Barbara Turner DO  01/02/18  7:16 AM      Time: 30 min

## 2018-01-02 NOTE — CONSULTS
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 male admitted to [facility] to services of Serjio Reynolds MD with generalized weakness the UTI.  Patient subsequently developed abdominal pain and underwent an exploratory laparotomy with lysis of adhesions resection of part of the small bowel ileostomy.  He seen today for complaint of severe bilateral knee pain.  Patient has long-standing osteoarthritis of both knees and is seen another orthopedist and is undergone cortisone a viscus supplement injections all of which failed and 1. was scheduled for total knee replacement but due to his poor medical condition the surgery but canceled.  Since his been hospitalized in the past 2-3 weeks his pain in both knees in intensified and any movement causes severe pain and discomfort.  Prior to this admission he was ambulating short distances with a walker but now cannot do that because of the severe pain in both knees.  At this time he is unable to take oral anti-inflammatory agents.  Allergies: No Known Allergies    Medications:   Home Medications:  No current facility-administered medications on file prior to encounter.      Current Outpatient Prescriptions on File Prior to Encounter   Medication Sig   • acetaminophen (TYLENOL) 325 MG tablet Take 650 mg by mouth every 6 (six) hours as needed for mild pain (1-3).   • clotrimazole-betamethasone (LOTRISONE) 1-0.05 % cream Apply 1  application topically As Needed.   • cyanocobalamin 1000 MCG/ML injection 1,000 mcg Every 28 (Twenty-Eight) Days. Cyanocobalamin SOLN; Patient Sig: Cyanocobalamin SOLN ; 0; 06-Jun-2014; Active   • folic acid (FOLVITE) 400 MCG tablet Take 400 mcg by mouth Daily.   • hyoscyamine (ANASPAZ,LEVSIN) 0.125 MG tablet    • lactobacillus acidophilus (RISAQUAD) capsule capsule Take 1 capsule by mouth Daily.   • metoprolol tartrate (LOPRESSOR) 25 MG tablet Take 1 tablet by mouth Every 12 (Twelve) Hours.   • MULTIPLE VITAMINS-MINERALS PO Take 1 tablet by mouth Daily.   • nitrofurantoin, macrocrystal-monohydrate, (MACROBID) 100 MG capsule    • oxyCODONE-acetaminophen (PERCOCET) 5-325 MG per tablet Take 1 tablet by mouth Every 8 (Eight) Hours As Needed for Severe Pain  (Pain).   • pantoprazole (PROTONIX) 40 MG EC tablet Take 1 tablet by mouth Daily.     Current Medications:  Scheduled Meds:  clotrimazole-betamethasone 1 application Topical Q12H   cyanocobalamin 1,000 mcg Intramuscular Q28 Days   diclofenac 2 g Topical 4x Daily   folic acid 400 mcg Oral Daily   hydrophor  Topical Q12H   insulin aspart 0-7 Units Subcutaneous Q6H   lactobacillus acidophilus 1 capsule Oral Daily   metoprolol tartrate 12.5 mg Oral Q12H   metroNIDAZOLE 500 mg Intravenous Q8H   multivitamin with minerals 1 tablet Oral Daily   pantoprazole 40 mg Intravenous BID   piperacillin-tazobactam 4.5 g Intravenous Q8H   sucralfate 1 g Oral 4x Daily AC & at Bedtime     Continuous Infusions:  Adult [facility] Clinimix TPN  Last Rate: 68 mL/hr at 01/01/18 1753   And     fat emulsion 235.2 mL Last Rate: 47.04 g (01/01/18 1753)     PRN Meds:.•  acetaminophen  •  dextrose  •  dextrose  •  diphenhydrAMINE  •  glucagon (human recombinant)  •  HYDROmorphone  •  magnesium sulfate **OR** magnesium sulfate in D5W 1g/100mL (PREMIX) **OR** magnesium sulfate  •  midazolam  •  naloxone  •  ondansetron  •  ondansetron  •  ondansetron **OR** ondansetron ODT **OR** ondansetron  •   oxyCODONE-acetaminophen  •  phenol  •  potassium chloride  •  sodium chloride  •  Insert peripheral IV **AND** sodium chloride  •  sodium chloride    Past Medical History:   Diagnosis Date   • Aortic regurgitation    • Aortic valve insufficiency    • Arthritis     Bilateral knee   • Basal cell carcinoma of left hand     sched excision   • Chest pain    • Diastolic dysfunction    • History of GI diverticular bleed 05/2017   • History of transfusion     last 9/12/17 2 units   • History of urinary retention    • History of vertigo    • Hx MRSA infection 07/2017    + culture abadominal wound   • Hypertension    • MDS (myelodysplastic syndrome)    • RBBB (right bundle branch block)    • Self-catheterizes urinary bladder     3-4 times aday   • Skin cancer         Past Surgical History:   Procedure Laterality Date   • APPENDECTOMY     • BACK SURGERY  2008    fusion   • CHOLECYSTECTOMY     • COLON RESECTION N/A 5/26/2017    Procedure: sub-total colectomy with ileo-rectal anastamosis, mobilization of splenic  INTRAOPERATIVE COLONOSCOPY  ([id number]), rigid sigmoidoscopy;  Surgeon: Kaylie Granados MD;  Location: [facility] OR;  Service:    • COLONOSCOPY N/A 2/1/2017    Procedure: COLONOSCOPY;  Surgeon: Bridger Amado MD;  Location: [facility] OR;  Service:    • COLONOSCOPY N/A 5/24/2017    Procedure: COLONOSCOPY w/ polypectomy;  Surgeon: Bridger Amado MD;  Location: [facility] OR;  Service:    • COLONOSCOPY N/A 5/24/2017    Procedure: COLONOSCOPY-return to surgery 2nd procedure, sclerotherapy with epi injection @ 40cm, placement of resolution clips X 2;  Surgeon: Bridger Amado MD;  Location: [facility] OR;  Service:    • CYSTOSCOPY W/ URETERAL STENT PLACEMENT Bilateral 12/21/2017    Procedure: CYSTOSCOPY bilateral URETERAL CATHETER INSERTION;  Surgeon: Davy Irene MD;  Location: [facility] OR;  Service:    • ENDOSCOPY N/A 5/23/2017    Procedure: ESOPHAGOGASTRODUODENOSCOPY;  Surgeon: Bridger Pike  MD Ingris;  Location: [facility] OR;  Service:    • EXCISION MASS ARM Left 9/19/2017    Procedure: EXCISION MASS UPPER EXTREMITY  --  wide excision left hand mass;  Surgeon: Kaylie Granados MD;  Location: [facility] OR;  Service:    • EXPLORATORY LAPAROTOMY N/A 5/26/2017    Procedure: LAPAROTOMY EXPLORATORY - Explantation of old hernia mesh;  Surgeon: Kaylie Granados MD;  Location:  [facility] OR;  Service:    • EXPLORATORY LAPAROTOMY N/A 12/21/2017    Procedure: LAPAROTOMY EXPLORATORY -  placement of strattice 6x6 small bowel resection of fistulas and anastamosis, oversew of rectal stump ,  extensive lysis of adhesions,  resection of fistulas, small bowel resectionsx2 and side to side anatomosis, ileostomy placement ;  Surgeon: Kaylie Granados MD;  Location: [facility] OR;  Service:    • HERNIA REPAIR      umbilical, inguinal         Social History     Occupational History   •  Retired     Social History Main Topics   • Smoking status: Never Smoker   • Smokeless tobacco: Never Used   • Alcohol use 1.8 oz/week     1 Shots of liquor, 2 Cans of beer per week   • Drug use: No   • Sexual activity: Not on file    Social History     Social History Narrative        Family History   Problem Relation Age of Onset   • Diabetes Brother    • Liver cancer Brother    • Heart disease Mother    • Heart attack Mother    • Diabetes Mother    • Heart attack Father    • Other Sister      Brain tumor   • Cancer Sister    • Emphysema Brother    • Cancer Brother    • Alcohol abuse Brother    • Cancer Brother          Review of Systems:   HEENT: Patient denies any headaches, vision changes, change in hearing, or tinnitus, Patient denies any rhinorrhea,epistaxis, sinus pain, mouth or dental problems, sore throat or hoarseness, or dysphagia  Pulmonary: Patient denies any cough, congestion, SOA, or wheezing  Cardiovascular: Patient denies any chest pain, dyspnea, palpitations, weakness, intolerance of exercise, varicosities,  swelling of extremities, known murmur  Gastrointestinal:  Patient denies nausea, vomiting, diarrhea, constipation, loss  of appetite, change in appetite, dysphagia, gas, heartburn, melena, change in bowel habits, use of laxatives or other drugs to alter the function of the gastrointestinal tract.  Genital/Urinary: Patient denies dysuria, change in color of urine, change in frequency of urination, pain with urgency, incontinence, retention, or nocturia.  Musculoskeletal: Patient denies increased warmth; redness; or swelling of joints; limitation of function; deformity; crepitation: pain in a joint or an extremity, the neck, or the back, especially with movement.  Neurological: Patient denies dizziness, tremor, ataxia, difficulty in speaking, change in speech, paresthesia, loss of sensation, seizures, syncope, changes in memory.  Endocrine system: Patient denies tremors, palpitations, intolerance of heat or cold, polyuria, polydipsia, polyphagia, diaphoresis, exophthalmos, or goiter.  Psychological: Patient denies thoughts/plans or harming self or other; depression,  insomnia, night terrors, marcy, memory loss, disorientation.  Skin: Patient denies any bruising, rashes, discoloration, pruritus, wounds, ulcers, decubiti, changes in the hair or nails  Hematopoietic: Patient denies history of spontaneous or excessive bleeding, epistaxis, hematuria, melena, fatigue, enlarged or tender lymph nodes, pallor, history of anemia.    Physical Exam: 83 y.o. male  General Appearance:    Alert, cooperative, in no acute distress                 Vitals:    01/01/18 1900 01/01/18 2300 01/02/18 0300 01/02/18 0610   BP: 133/70 126/71 129/66 138/64   BP Location: Right arm Right arm Right arm Right arm   Patient Position: Lying Lying Lying Lying   Pulse: 93 88 87 79   Resp: 18 18 18 18   Temp: 98 °F (36.7 °C) 97.6 °F (36.4 °C) 97.2 °F (36.2 °C) 97.2 °F (36.2 °C)   TempSrc: Oral Oral Oral Oral   SpO2: 99% 97% 97% 99%   Weight:        Height:            Head:    Normocephalic, without obvious abnormality, atraumatic   Eyes:            Lids and lashes normal, conjunctivae and sclerae normal, no   icterus, no pallor, corneas clear, PERRLA   Ears:    Ears appear intact with no abnormalities noted   Throat:   No oral lesions, no thrush, oral mucosa moist   Neck:   No adenopathy, supple, trachea midline, no thyromegaly, no   carotid bruit, no JVD   Back:     No kyphosis present, no scoliosis present, no skin lesions,      erythema or scars, no tenderness to percussion or                   palpation,   range of motion normal   Lungs:     Clear to auscultation,respirations regular, even and                  unlabored    Heart:    Regular rhythm and normal rate, normal S1 and S2, no            murmur, no gallop, no rub, no click   Chest Wall:    No abnormalities observed   Abdomen:     Normal bowel sounds, no masses, no organomegaly, soft        non-tender, non-distended, no guarding, no rebound                tenderness   Rectal:     Deferred   Extremities:   Neither knee shows any swelling effusion erythema.  The skin is cool to touch.  Any movement greater than 5° causes severe pain.  The calf is nontender with a negative Homans.  His no sensory deficit is good capillary refill.    Pulses:   Pulses palpable and equal bilaterally   Skin:   No bleeding, bruising or rash   Lymph nodes:   No palpable adenopathy   Neurologic:   Cranial nerves 2 - 12 grossly intact, sensation intact, DTR       present and equal bilaterally           Diagnostic Tests:AP lateral view of both knee shows end-stage degenerative arthritis in both knees.  More severe on the left knee.  No prior x-rays for comparison.    [unfilled]      [unfilled]    Assessment:  Patient Active Problem List   Diagnosis   • MDS (myelodysplastic syndrome), low grade   • Vitamin B 12 deficiency   • AI (aortic incompetence)   • Bundle branch block, right   • Benign prostatic hyperplasia   •  Hypertension   • Knee pain   • Rectal bleed   • Acute blood loss anemia   • Hand lesion   • Acute UTI   • Acute lower UTI (urinary tract infection)   • Enteroenteric fistula   • Colovesical fistula   • Coagulopathy           Plan:  Reviewed the findings of the x-rays and physical findings with the patient.  Treatment options at this point is very limited due to his recent surgery.  He's failed cortisone injection viscus supplement injections that he cannot take oral agents at this time.  We will prescribe Voltaren gel to be applied to both knees 4 times a day and attempt to try to get him some relief.  His pain has worsened since his been in the hospital due to his inactivity.  Hopefully the medication will ease his pain a lot and become a little more mobile.  Discussed history implant with patient and he was in agreement.            Date: 1/2/2018    Akshat Gandara MD

## 2018-01-03 NOTE — PLAN OF CARE
"Problem: Patient Care Overview (Adult)  Goal: Plan of Care Review  Outcome: Ongoing (interventions implemented as appropriate)   01/03/18 1151   Coping/Psychosocial Response Interventions   Plan Of Care Reviewed With patient   Outcome Evaluation   Outcome Summary/Follow up Plan PT: mela reports knee pain is \"worst it has ever been\" and reports none of medication is helping with knee pain. Attempted out of bed mobility, however pt began screaming in pain and unable to actively participate for minimal abduction. Reviewed LE exercises of ankle pumps, gluteal sets, quad set and heel slides. Patient tolerates 3-4 reps of each exercise with assist required for heel slides. pt reports 10/10 pain in knees when any movement attempted. At this time, patient's overall mobility and progress with physical therapy remains significantly limited by knee pain. Recommend out of bed activity with erich lift, discussed with RN and CNA. Will attempt to see patient for further therapy if able, however it patient remains unable to actively participate with out of bed mobility, may consider discharge from PT at that time.         "

## 2018-01-03 NOTE — THERAPY TREATMENT NOTE
"Acute Care - Physical Therapy Treatment Note   Tiffanie Keith     Patient Name: Jason Zelaya  : 1934  MRN: [id number]  Today's Date: 1/3/2018  Onset of Illness/Injury or Date of Surgery Date: 12/15/17     Referring Physician: [email or web address]    Admit Date: 12/15/2017    Visit Dx:    ICD-10-CM ICD-9-CM   1. Acute lower UTI (urinary tract infection) N39.0 599.0   2. Generalized weakness R53.1 780.79   3. Hearing loss due to cerumen impaction, bilateral H61.23 389.8     380.4   4. 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     Patient Active Problem List   Diagnosis   • MDS (myelodysplastic syndrome), low grade   • Vitamin B 12 deficiency   • AI (aortic incompetence)   • Bundle branch block, right   • Benign prostatic hyperplasia   • Hypertension   • Knee pain   • Rectal bleed   • Acute blood loss anemia   • Hand lesion   • Acute UTI   • Acute lower UTI (urinary tract infection)   • Enteroenteric fistula   • Colovesical fistula   • Coagulopathy               Adult Rehabilitation Note       18 0942 18 1000       Rehab Assessment/Intervention    Discipline physical therapist  - physical therapist  -     Document Type therapy note (daily note)  - therapy note (daily note)  -     Subjective Information agree to therapy;complains of;pain   pt reports knee pain is \"the worst it has ever been\"  - complains of;pain   right knee  -     Patient Effort, Rehab Treatment  adequate  -     Symptoms Noted During/After Treatment increased pain  - increased pain  -     Precautions/Limitations fall precautions   multiple lines/tubes  -      Patient Response to Treatment significant knee pain continues to be overall limiting factor to progress mobility  - Patient unable to progress/ambulate today due to increased right knee pain.  Nurse and MD aware.  -LH     Recorded by [JW] Sylvia Mccain PT [] Jacquelin Friedman, PT     Pain Assessment    Pain Assessment 0-10  - 0-10  " -     Pain Score 7  - 4   at rest  -     Post Pain Score 10  -      Pain Type Chronic pain  -JW Acute pain  -     Pain Location Knee  -JW Knee  -     Pain Orientation Right;Left  -JW Right  -LH     Pain Intervention(s) Medication (See MAR);Heat applied  -      Response to Interventions pt pre-medicated for therapy, however reports medication is not helping knee pain  -JW able to tolerate only minimal standing today.  Crying out in pain with attempts at stepping forward.  Patient premedicated for treatment, however pain still limiting mobility.  Nurse and MD notified.  -LH     Recorded by [JW] Sylvia Mccain, PT [] Jacquelin Friedman PT     Bed Mobility, Assessment/Treatment    Bed Mobility, Comment attempted, however patient began screaming in pain as minimal abduction attempted.  pt unable to tolerate light touch to right knee  -JW deferred- patient up in chair  -LH     Recorded by [JW] Sylvia Mccain, PT [] Jacquelin Friedman PT     Transfer Assessment/Treatment    Transfers, Sit-Stand [place]  minimum assist (75% patient effort);moderate assist (50% patient effort);2 person assist required  -     Transfers, Stand-Sit [place]  contact guard assist;1 person + 1 person to manage equipment  -     Transfers, Sit-Stand-Sit, Assist Device  rolling walker  -     Transfer, Comment  Patient performed sit to stand transfer from chair 2x.  First time, requiring min-mod A x 2, improved transfer second time, requiring only min A x 2.  Patient maintained standing 30 seconds x 2.  ATtempted to weight shift and take step forward, however unable to due to right knee pain.  Maintained static standing with CGA and increased weightbearing on left LE.  -LH     Recorded by  [] Jacquelin Friedman PT     Gait Assessment/Treatment    Gait, Comment  deferred today due to pain.  -LH     Recorded by  [] Jacquelin Friedman, PT     Therapy Exercises    Bilateral Lower Extremities --   reviewed ankle pumps,  gluteal sets, quad sets, heel slides   -JW --   patient declined due to knee pain  -LH     BLE Resistance --   pt requires assistance with heel slides, tolerates 3 reps  -JW      Recorded by [JW] Sylvia Mccain, PT [] Jacquelin Friedman, PT     Positioning and Restraints    Pre-Treatment Position in bed  -JW sitting in chair/recliner  -LH     Post Treatment Position bed  - chair  -LH     In Bed supine;call light within reach;encouraged to call for assist;notified nsg   notified CNA and RN  -JW      In Chair  notified nsg;sitting;call light within reach;encouraged to call for assist  -     Recorded by [JW] Sylvia Mccain, PT [LH] Jacquelin Friedman, PT       User Key  (r) = Recorded By, (t) = Taken By, (c) = Cosigned By    Initials Name Effective Dates     Jacquelin Friedman, PT 08/11/15 -     JW Sylvia Mccain, PT 12/01/15 -                 IP PT Goals       12/23/17 1140          Bed Mobility PT STG    Bed Mobility PT STG, Date Established 12/23/17  -SP      Bed Mobility PT STG, Time to Achieve 3 days  -SP      Bed Mobility PT STG, Activity Type all bed mobility  -SP      Bed Mobility PT STG, [place] contact guard assist  -SP      Transfer Training PT STG    Transfer Training PT STG, Date Established 12/23/17  -SP      Transfer Training PT STG, Time to Achieve 3 days  -SP      Transfer Training PT STG, Activity Type bed to chair /chair to bed;sit to stand/stand to sit  -SP      Transfer Training PT STG, [place] contact guard assist  -SP      Transfer Training PT STG, Assist Device --   with appropriate assistive device  -SP      Transfer Training PT LTG    Transfer Training PT LTG, Date Established 12/23/17  -SP      Transfer Training PT LTG, Time to Achieve 5 - 7 days  -SP      Transfer Training PT LTG, Activity Type all transfers  -SP      Transfer Training PT LTG, [place] contact guard assist;supervision required  -SP      Transfer Training PT LTG, Assist Device --   with  appropriate assistive device  -SP      Gait Training PT LTG    Gait Training Goal PT LTG, Date Established 12/23/17  -SP      Gait Training Goal PT LTG, Time to Achieve 5 - 7 days  -SP      Gait Training Goal PT LTG, [place] Level contact guard assist  -SP      Gait Training Goal PT LTG, Assist Device walker, rolling  -SP      Gait Training Goal PT LTG, Distance to Achieve 100  -SP        User Key  (r) = Recorded By, (t) = Taken By, (c) = Cosigned By    Initials Name Provider Type    SP Angela Matias, PT Physical Therapist          Physical Therapy Education     Title: PT OT SLP Therapies (Done)     Topic: Physical Therapy (Done)     Point: Mobility training (Done)    Learning Progress Summary    Learner Readiness Method Response Comment Documented by Status   Patient Acceptance E VU Patient education on functional mobility & gait with RWX and importance of getting out of bed and sitting in chair for awhile. LN 12/30/17 1156 Done    Acceptance E VU  BP 12/29/17 1128 Done    Acceptance E VU  JW 12/28/17 1127 Done    Acceptance E VU  JW 12/27/17 1051 Done    Acceptance E VU  JW 12/26/17 1018 Done    Acceptance E VU Patient instructed in transfer and gait techniques SP 12/23/17 1139 Done               Point: Home exercise program (Done)    Learning Progress Summary    Learner Readiness Method Response Comment Documented by Status   Patient Acceptance E VU  JW 01/03/18 1151 Done    Acceptance E VU Patient education on functional mobility & gait with RWX and importance of getting out of bed and sitting in chair for awhile. LN 12/30/17 1156 Done    Acceptance E VU  JW 12/28/17 1127 Done    Acceptance E VU  JW 12/27/17 1051 Done    Acceptance E VU  JW 12/26/17 1018 Done                      User Key     Initials Effective Dates Name Provider Type Discipline    SP 08/11/15 -  Angela Matias, PT Physical Therapist PT    LN 06/22/16 -  Skylar Mccain, PT Physical Therapist PT    BP 12/01/15 -   "Hermila St, PT Physical Therapist PT     12/01/15 -  Sylvia Mccain, PT Physical Therapist PT                    PT Recommendation and Plan  Anticipated Discharge Disposition: extended care facility  Planned Therapy Interventions: balance training, bed mobility training, gait training, home exercise program  PT Frequency: daily, 5 times/wk  Plan of Care Review  Plan Of Care Reviewed With: patient  Outcome Summary/Follow up Plan: PT: deen reports knee pain is \"worst it has ever been\" and reports none of medication is helping with knee pain.  Attempted out of bed mobility, however pt began screaming in pain and unable to actively participate for minimal abduction.  Reviewed LE exercises of ankle pumps, gluteal sets, quad set and heel slides.  Patient tolerates 3-4 reps of each exercise with assist required for heel slides.  pt reports 10/10 pain in knees when any movement attempted.  At this time, patient's overall mobility and progress with physical therapy remains significantly limited by knee pain.  Recommend out of bed activity with erich lift, discussed with RN and CNA.  Will attempt to see patient for further therapy if able, however it patient remains unable to actively participate with out of bed mobility, may consider discharge from PT at that time.          Outcome Measures       01/03/18 0942 01/01/18 1000       How much help from another person do you currently need...    Turning from your back to your side while in flat bed without using bedrails? 2  - 2  -LH     Moving from lying on back to sitting on the side of a flat bed without bedrails? 1  - 2  -LH     Moving to and from a bed to a chair (including a wheelchair)? 1  - 2  -LH     Standing up from a chair using your arms (e.g., wheelchair, bedside chair)? 1  - 2  -LH     Climbing 3-5 steps with a railing? 1  - 1  -LH     To walk in hospital room? 1  - 2  -LH     AM-PAC 6 Clicks Score 7  - 11  -     Functional Assessment    " Outcome Measure Options AM-PAC 6 Clicks Basic Mobility (PT)  - AM-PAC 6 Clicks Basic Mobility (PT)  -       User Key  (r) = Recorded By, (t) = Taken By, (c) = Cosigned By    Initials Name Provider Type     Jacquelin Friedman, PT Physical Therapist    NOVA Mccain PT Physical Therapist           Time Calculation:         PT Charges       01/03/18 1156          Time Calculation    Start Time 0942  -NOVA      Stop Time 0952  -NOVA      Time Calculation (min) 10 min  -NOVA      PT Received On 01/03/18  -NOVA      PT - Next Appointment 01/04/18  -NOVA        User Key  (r) = Recorded By, (t) = Taken By, (c) = Cosigned By    Initials Name Provider Type    NOVA Mccain PT Physical Therapist          Therapy Charges for Today     Code Description Service Date Service Provider Modifiers Qty    35402583858 HC PT THER PROC EA 15 MIN 1/3/2018 Sylvia Mccain, PT GP 1          PT G-Codes  Outcome Measure Options: AM-PAC 6 Clicks Basic Mobility (PT)    Sylvia Mccain PT  1/3/2018

## 2018-01-03 NOTE — NURSING NOTE
"   01/03/18 1702   Pressure Ulcer 01/03/18 1600 Right medial coccyx Stage II   Date first assessed/Time first assessed: 01/03/18 1600   Present On Admission (Pressure Ulcer): no;picture taken  Side: Right  Orientation: medial  Location: coccyx  Stage: Stage II   Dressing Appearance other (see comments)  (emmett)   Pressure Ulcer Appearance clean;reddened;dry   Periwound Area intact;blanchable;redness   Length (Pressure Ulcer) (cm) 1   Width (Pressure Ulcer) (cm) 1   Depth (Pressure Ulcer) (cm) 0.1   Periwound Care barrier ointment applied   Dressing low-adherent;Dressing applied;other (see comments)  (Optifoam)   Picture taken yes   Ileostomy 12/21/17 1608 ileostomy   Placement Date/Time: 12/21/17 1608   Ileostomy Type: ileostomy   Stoma Appearance protruding above skin level;red;moist;round   Appliance 2-piece;drainable pouch;intact;changed;leakage;per protocol/policy   Accessories/Skin Care skin sealant;skin barrier ring;skin barrier powder   Stoma Function flatus;stool   Stool Color green;brown   Peristomal Skin dry;reddened   Tolerance no signs/symptoms of discomfort;did not assist with appliance change;did not assist with stoma care     Pouch changed performed.  Stoma functioning with flatus and stool.  Small area of irritation to peristomal skin from approximately 10-11 o'clock.  Applied stoma powder followed with skin barrier spray to protect and allow for healing.  Patient not involved in ostomy care. Complains of significant knee pain and states \"I hurt all over.\"  Family at bedside when I entered room and quickly exited when I introduced myself to let them know why I was seeing patient.  Would like the opportunity to speak with spouse at bedside prior to discharge but have been unable to see her.  Patient has been approved at [facility] following dc.  I would like to see him empty pouch at least once before his discharge, which I suspect will be in the next few days, according to his nurse, HAILE Epps.  Plan to " see Friday for another attempt for ostomy teaching.    Patient also presents with a Stage 2 PU to right buttock. See photo and wound measurement documentation above.  Recommend Z guard or Optifoam for barrier protection.  On 12/29 I did order waffle surfaces to be placed in chair and on bed because of his risk for PU.  They were placed by nursing staff at that time and are in place currently.  Will continue to follow.

## 2018-01-03 NOTE — PROGRESS NOTES
"Adult Nutrition  Assessment/PES    Patient Name:  Jason Zelaya  YOB: 1934  MRN: [id number]  Admit Date:  12/15/2017    Assessment Date:  1/3/2018    Comments:  Noted plans to wean off TPN.  Pt. defers tube feeding.  Working to increase po intake as tolerated.  See calorie count below:    Day 2   1,295 kcals, 54 grams protein (3 meals, drank all 3 Ensure)  Day 3    440 kcals, 16 grams protein (1 meal)    Pt. averaging 434 kcals with 18 grams of protein.  If continues with this intake will meet 72% of lower end of estimated energy needs and 61% of lower end of estimated protein needs.  Will continue to work with food preferences and encourage intake.        Reason for Assessment       01/03/18 1243    Reason for Assessment    Reason For Assessment/Visit calorie count order    Other diagnosis status post exploratory laparotomy, extensive lysis of adhesions/resection of multiple fistulas, small bowel resection and ileostomy                Anthropometrics       01/03/18 1245    Anthropometrics    Height 177.8 cm (70\")    RD Documented Current Weight  62.4 kg (137 lb 10 oz)   on 12-31-17    RD Documented Weight on Admission 57.3 kg (126 lb 5 oz)    Anthropometrics (Special Considerations)    RD Calculated BMI (kg/m2) 19.74    Ideal Body Weight (IBW)    Ideal Body Weight (IBW), Male (kg) 76.48    Body Mass Index (BMI)    BMI Grade 19.1 - 24.9 - normal            Labs/Tests/Procedures/Meds       01/03/18 1247    Labs/Tests/Procedures/Meds    Labs/Tests Review Reviewed    Medication Review Reviewed, pertinent                Nutrition Prescription Ordered       01/03/18 1250    Nutrition Prescription PO    Current PO Diet Regular    Supplement Ensure Plus    Supplement Frequency 3 times a day            Evaluation of Received Nutrient/Fluid Intake       01/03/18 1251    PO Evaluation    % PO Intake --   see calorie count information above            Problem/Interventions:        Problem 1       01/03/18 " 1251    Nutrition Diagnoses Problem 1    Problem 1 Malnutrition    Etiology (related to) Factors Affecting Nutrition    Signs/Symptoms (evidenced by) Report/Observation                    Intervention Goal       01/03/18 1252    Intervention Goal    PO Increase intake    PO Intake % 50 %   Or greater of meals and continue with mostly 100% of Ensure supplement    Weight No significant weight loss            Nutrition Intervention       01/03/18 1253    Nutrition Intervention    RD/Tech Action Interview for preference;Advise alternate selection;Encourage intake;Follow Tx progress   pt. defers between meal snacks at present              Education/Evaluation       01/03/18 1254    Education    Education Other (comment)   no education needs identified    Monitor/Evaluation    Monitor I&O;PO intake;Supplement intake;Pertinent labs;PN delivery/tolerance;Weight;Skin status.  Most likely will need to continue oral supplements at discharge to help supplement intake.        Electronically signed by:  Anna Edwards RD  01/03/18 12:55 PM

## 2018-01-03 NOTE — PROGRESS NOTES
Gen Surg Postop Progress Note       Subjective: No acute overnight events.  Tolerating regular diet.  Reports he wants to eat.  Denies nausea and vomiting.  Reports abdominal pain well controlled with oral medications      Objective:    Vital Signs  Temp:  [97.7 °F (36.5 °C)-99.3 °F (37.4 °C)] 98.9 °F (37.2 °C)  Heart Rate:  [78-91] 88  Resp:  [16-18] 16  BP: (116-132)/(60-73) 116/70  Body mass index is 19.74 kg/(m^2).    Intake/Output Summary (Last 24 hours) at 01/03/18 0841  Last data filed at 01/03/18 0600   Gross per 24 hour   Intake          3014.38 ml   Output             3265 ml   Net          -250.62 ml     CAYETANO-serosanguineous       Physical Exam:   General: patient awake, alert and cooperative   Cardiovascular: regular rhythm and rate, no murmurs auscultated   Pulm: clear to auscultation bilaterally, regular and unlabored   Abdomen: soft, nontender, nondistended; normal bowel sounds incision clean dry intact, ileostomy viable and functioning   Extremities: no rash or edema   Neurologic: Normal mood and behavior     Results Review:     I reviewed the patient's new clinical results.        Results from last 7 days  Lab Units 01/03/18  0655   WBC 10*3/mm3 3.77*   HEMOGLOBIN g/dL 8.7*   HEMATOCRIT % 27.6*   PLATELETS 10*3/mm3 75*       Results from last 7 days  Lab Units 01/03/18  0655  12/29/17  0524   SODIUM mmol/L 135*  < > 139   POTASSIUM mmol/L 4.1  < > 3.3*   CHLORIDE mmol/L 100  < > 101   CO2 mmol/L 26.1  < > 29.8*   BUN mg/dL 16  < > 14   CREATININE mg/dL 0.57*  < > 0.63*   CALCIUM mg/dL 7.8*  < > 8.1*   BILIRUBIN mg/dL  --   --  0.6   ALK PHOS U/L  --   --  63   ALT (SGPT) U/L  --   --  <5*   AST (SGOT) U/L  --   --  11   GLUCOSE mg/dL 93  < > 131*   < > = values in this interval not displayed.      PT/INR:    Protime   Date Value Ref Range Status   01/01/2018 19.0 (H) 12.1 - 15.0 Seconds Final   /  INR   Date Value Ref Range Status   01/01/2018 1.58 (H) 0.90 - 1.10 Final       Calcium Calcium   Date  Value Ref Range Status   01/03/2018 7.8 (L) 8.8 - 10.5 mg/dL Final   01/02/2018 8.1 (L) 8.8 - 10.5 mg/dL Final   01/01/2018 7.9 (L) 8.8 - 10.5 mg/dL Final      Magnesium  AST  ALT  Bilirubin, Total  AlkPhos  Albumin    Amylase  Lipase    Radiology: Magnesium   Date Value Ref Range Status   01/02/2018 1.8 1.7 - 2.5 mg/dL Final   01/01/2018 2.0 1.7 - 2.5 mg/dL Final     No components found for: AST.*  No components found for: ALT.*  No components found for: BILIRUBIN, TOTAL.*    No components found for: ALKPHOS.*  No components found for: ALBUMIN.*      No components found for: AMYLASE.*  No components found for: LIPASE.*      Imaging Results (most recent)     Procedure Component Value Units Date/Time    CT Head Without Contrast [[id number]] Collected:  12/15/17 1509     Updated:  12/15/17 1513    Narrative:       UNENHANCED HEAD CT:  12/15/2017 3:09 PM     HISTORY: Fall. Found down. Slipped on a rug. Trauma.     TECHNIQUE: Axial unenhanced head CT. Radiation dose reduction techniques  were utilized, including automated exposure control and exposure  modulation based on body size.     COMPARISON: None     FINDINGS: No intracranial hemorrhage, mass, or infarct. No hydrocephalus  or extra-axial fluid collection. There is mild generalized atrophy and  chronic small vessel changes. The skull base, calvarium, and  extracranial soft tissues are normal.       Impression:       Normal, negative unenhanced head CT.                This report was finalized on 12/15/2017 3:10 PM by Dr. Rafiq Blue MD.       CT Abdomen Pelvis Without Contrast [[id number]] Collected:  12/18/17 0716     Updated:  12/18/17 0731    Narrative:       CT ABDOMEN AND PELVIS, NONCONTRAST, 12/17/2017:     HISTORY:  83-year-old male status post near-total colectomy with ileorectal  anastomosis May 2017 for bleeding colonic diverticular disease. Recently  noted fecal matter within urine. 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 As a  history of myelodysplastic  syndrome.     TECHNIQUE:  CT examination of the abdomen and pelvis was performed with oral  contrast only. IV contrast was not administered. Radiation dose  reduction techniques were utilized, including automated exposure control  and exposure modulation based on body size.     COMPARISON:  *  CT abdomen/pelvis, 11/15/2017 and 6/5/2017.     ABDOMEN FINDINGS:  Stable mild splenomegaly. Liver, pancreas, spleen and kidneys are  otherwise negative. No evidence of upper urinary tract obstruction.  Cholecystectomy. No bile duct dilatation. Normal caliber abdominal  aorta.     PELVIS FINDINGS:  Extensive inflammation is present throughout the mid and lower pelvis  centered on the patient's ileorectal anastomosis. Multiple loops of  inflamed small bowel appear tethered in the mid pelvic midline near the  anastomosis and show moderate dilatation and diffuse wall thickening.  There is also marked thickening of adjacent urinary bladder wall. Some  ill-defined linear tracts of oral contrast extends from the region of  the anastomosis toward several mid pelvic small bowel loops and could  potentially represent fistulae, although decompressed, inflamed small  bowel segments are also possible. There may be some very faint contrast  material within the urinary bladder near the Ayoub catheter balloon, but  a definitive bladder fistula is not identified. Repeat CT examination  with rectal contrast may be helpful to better delineate presumed  colovesical fistula and any additional fistulae near the ileorectal  anastomosis. Extensive inflammatory soft tissue stranding is present  throughout the pelvis, but no abscess or other drainable fluid  collection is identified.     Limited images of the lower chest show mild diffuse chronic interstitial  pulmonary fibrosis.       Impression:       1. Postop changes near total colectomy with ileorectal anastomosis.  2. Extensive inflammation within the midline pelvis near the  anastomosis  involving multiple small bowel loops and the urinary bladder.  3. Findings suspicious for small bowel fistulae and possible colorectal  fistula near the anastomosis. Repeat CT examination with rectal contrast  may be helpful.  4. No abscess or other undrained fluid collection.  5. Stable splenomegaly.  6. Pulmonary fibrosis.  7. Initial stat report to the ordering service from Dr. Rafiq lBue at  1428 hours on 12/17/2017.     This report was finalized on 12/18/2017 7:27 AM by Dr. Chencho Henderson MD.       CT Abdomen Pelvis With Contrast [[id number]] Collected:  12/18/17 1542     Updated:  12/18/17 1555    Narrative:       CT ABDOMEN AND PELVIS WITH IV AND RECTAL CONTRAST, 12/18/2017:     HISTORY:  83-year-old male status post near total colectomy and ileorectal  anastomosis May 2017. Recently noted stool in urine. Assess for  colovesical fistula.     TECHNIQUE:  CT examination of the abdomen and pelvis was performed with IV contrast  following installation of 500 cc rectal GI contrast. Radiation dose  reduction techniques were utilized, including automated exposure control  and exposure modulation based on body size.     COMPARISON:  *  CT abdomen/pelvis, 12/17/2017.     FINDINGS:  The examination shows small irregular linear collections of GI contrast  and air extending to the right and left from the rectal anastomosis in  the midline pelvis towards adjacent segments of tethered, dilated pelvic  small bowel segments. The appearance suggests contrast leak and likely  small bowel fistula formation from the anastomosis. No drainable abscess  or other fluid collection is demonstrated.     There is also a tiny amount of high attenuation contrast within the  lumen of a markedly thick-walled urinary bladder compatible with bladder  fistula. There is also mild dilatation of both ureters and both renal  collecting systems, not visible on the earlier study.     Moderately severe dilatation of multiple small  bowel loops within the  lower abdomen and pelvis likely representing adynamic ileus due or  partial obstruction due to pelvic inflammation.     Remainder the examination is unchanged since the earlier study. Moderate  splenomegaly. Liver and pancreas are unremarkable.       Impression:       1. CT findings compatible with ileorectal anastomosis leak and likely  small bowel fistula formation.  2. Faint GI contrast within the urinary bladder compatible with  colovesical fistula.  3. Extensive peritoneal inflammation throughout the lower abdomen and  pelvis. Moderate dilatation of pelvic small bowel segments, several of  which appear tethered to the region of the anastomosis.  4. Mild bilateral pyelocaliectasis and ureterectasis.  5. Splenomegaly.     This report was finalized on 12/18/2017 3:53 PM by Dr. Chencho Henderson MD.       XR Chest 1 View [[id number]] Collected:  12/19/17 0613     Updated:  12/19/17 0616    Narrative:       CHEST X-RAY, 12/18/2017:     HISTORY:   PICC placement.     TECHNIQUE:  AP portable chest x-ray.     FINDINGS:  Newly placed right arm PICC tip is in good position in the low SVC.     Diffuse fibrosis throughout both lungs, greatest at the lung bases. Low  lung volumes. Mild cardiomegaly.       Impression:       1. PICC in good position.  2. Requested initial stat report to the ordering service from Dr. Rickie Kent at 2113 hours on 12/18/2017.     This report was finalized on 12/19/2017 6:14 AM by Dr. Chencho Henderson MD.       XR Abdomen KUB [[id number]] Collected:  12/29/17 0852     Updated:  12/29/17 0921    Narrative:       KUB     DATE: 12/28/2017     HISTORY: Abdominal surgery 1 week ago. Abdominal distention. Blood  coming from NG tube. Verify NG tube placement. Possible ileus.     COMPARISON: CT abdomen and pelvis 12/18/2017.     FINDINGS: Midline laparotomy staples are present. NG tube tip projects  into the region of the proximal gastric body, tip approximately 6 cm  below the  EG junction. The sidehole of the NG tube is thought to be  located at the EG junction.     Drainage catheter projects over the left lateral abdomen.  Cholecystectomy changes are present. Mild generalized gaseous distention  of predominantly small bowel loops, favored to represent mild  postoperative ileus. No gross free air is seen. Probable minimal left  basilar atelectasis. Degenerative endplate changes in the imaged spine.  Calcified phleboliths in the pelvis to the right. Presumed Ayoub  catheter in place. Drainage catheter also is seen projecting just to the  right of midline within the pelvis.       Impression:       1. No acute findings in the abdomen.  2. Mild generalized gaseous distention of bowel, predominantly small  bowel, favored to represent mild postoperative ileus.  3. NG tube tip about 6 cm below the EG junction with sidehole at the  level of the EG junction.  4. Preliminary report provided by Dr. Kumari on 12/28/2017 at 1930  hours.     This report was finalized on 12/29/2017 9:19 AM by Dr. Samantha Keita MD.       XR Knee 1 or 2 View Bilateral [[id number]] Collected:  01/01/18 0751     Updated:  01/01/18 0755    Narrative:       EXAM: 2 views right knee. 2 views left knee.     DATE: 12/31/2017     HISTORY: Bilateral knee pain for 10 years. No trauma.     COMPARISON: None     FINDINGS: Right knee demonstrates advanced medial compartment joint  space narrowing, moderate patellofemoral compartment joint space  narrowing. Prominent medial compartment and patellofemoral compartment  osteophyte formation. No acute fracture joint dislocation is seen.  Chondrocalcinosis changes in the lateral meniscus. Advanced calcific  atherosclerotic changes are present. No definite joint effusion.     Left knee demonstrates moderate patellofemoral compartment and  moderately advanced medial and lateral compartment joint space  narrowing. Prominent posterior patellar osteophyte formation with  smaller marginal  ossified the medial and lateral compartments.  Subchondral cystic change in the lateral compartment. No definite joint  effusion. Advanced calcific atherosclerosis.       Impression:       1. Moderate to moderately advanced at degenerative changes of the knees,  as described in detail in the report. No acute findings.     This report was finalized on 1/1/2018 7:53 AM by Dr. Samantha Keita MD.                Assessment/Plan     Status post exploratory laparotomy, extensive lysis of adhesions, resection multiple fistulas, small bowel resection and ileostomy  Postoperative day 13  Continue to encourage PO diet and enteral supplements  Calorie counts underway  Discussed with patient will plan to wean off TPN today and not renew after current bag is completed  Discussed with patient possibly placing a Dobbhoff feeding tube for enteral feedings but he is refusing.    Plan to keep on antibiotics for postoperative day 14 and hopefully discontinue/remove drains once tolerating PO well       Myelodysplastic syndrome with chronic pancytopenia his hemoglobin is down today will defer to hematology at this point     SCDs for VTE prophylaxis     Severe deconditioning  Needs aggressive rehabilitation     Plan of care discussed with patient, nursing staff and Dr. Yue Granados MD  01/03/18  8:41 AM    Time:

## 2018-01-03 NOTE — PLAN OF CARE
Problem: Patient Care Overview (Adult)  Goal: Discharge Needs Assessment  Outcome: Ongoing (interventions implemented as appropriate)   12/31/17 0321 01/02/18 2052 01/03/18 3204   Discharge Needs Assessment   Concerns To Be Addressed --  denies needs/concerns at this time --    Readmission Within The Last 30 Days no previous admission in last 30 days --  --    Equipment Needed After Discharge none --  --    Discharge Planning Comments --  --  Patient being weaned from TPN/Lipids. He has been accepted to [facility] when he is medically stable. Will continue to follow.    Current Health   Anticipated Changes Related to Illness none --  --    Self-Care   Equipment Currently Used at Home cane, quad;walker, rolling --  --    Living Environment   Transportation Available family or friend will provide --  --

## 2018-01-03 NOTE — PROGRESS NOTES
"Hospitalist Team      Patient Care Team:  David Cedillo MD as PCP - General (Family Medicine)  Anupam Green MD as Consulting Physician (Hematology and Oncology)  Bennie Galo MD as Referring Physician (Orthopedic Surgery)        Chief Complaint: F/U Status post exploratory laparotomy, extensive lysis of adhesions, resection multiple fistulas, small bowel resection and ileostomy     Subjective    Interval History and ROS:     Patient is still having increased pain in his abdomen after eating. Still having a hard time with PT/OT secondary to pain in his right knee. Denies any N/V. Afebrile.  Denies any SOA or CP. Felt hungry this AM but then notes he couldn't eat much but he is now tolerating his ensures.       Objective    Vital Signs  Temp:  [97.2 °F (36.2 °C)-99.3 °F (37.4 °C)] 97.2 °F (36.2 °C)  Heart Rate:  [78-97] 97  Resp:  [16-18] 18  BP: (116-128)/(60-70) 123/69  Oxygen Therapy  SpO2: 92 %  Pulse Oximetry Type: Continuous  O2 Device: room air      Flowsheet Rows         First Filed Value    Admission Height  177.8 cm (70\") Documented at 12/15/2017 1300    Admission Weight  57.3 kg (126 lb 4.8 oz) Documented at 12/15/2017 1300            Physical Exam:  Constitutional: Patient appears well-developed and in no acute distress   HEENT:   Head: Normocephalic and atraumatic.   Eyes:  Pupils are equal, round, and reactive to light. EOM are intact. Sclera are anicteric and non-injected.  Mouth and Throat: Patient has moist mucous membranes.   Neck: Neck supple. No JVD present.   Cardiovascular: Regular rate, regular rhythm, S1 normal and S2 normal.  Exam reveals no gallop and no friction rub.  No murmur heard.  Pulmonary/Chest: Lungs are clear to auscultation bilaterally. No respiratory distress. No wheezes. No rhonchi. No rales.   Abdominal: Soft. Bowel sounds are normal. Iliostomy is functioning and incision is C/D/I.   Extremities: No edema.   Neurological: Patient is alert and oriented to person, " place, and time.   Skin: Skin is warm. Nails show no clubbing. No cyanosis or erythema.    Results Review:     I reviewed the patient's new clinical results.    Lab Results (last 24 hours)     Procedure Component Value Units Date/Time    Basic Metabolic Panel [[id number]]  (Abnormal) Collected:  01/02/18 1537    Specimen:  Blood Updated:  01/02/18 1604     Glucose 88 mg/dL      BUN 17 mg/dL      Creatinine 0.59 (L) mg/dL      Sodium 137 mmol/L      Potassium 4.4 mmol/L      Chloride 102 mmol/L      CO2 27.2 mmol/L      Calcium 8.1 (L) mg/dL      eGFR Non African Amer 131 mL/min/1.73      BUN/Creatinine Ratio 28.8 (H)     Anion Gap 7.8 mmol/L     Narrative:       The MDRD GFR formula is only valid for adults with stable renal function between ages 18 and 70.    Magnesium [[id number]]  (Normal) Collected:  01/02/18 1537    Specimen:  Blood Updated:  01/02/18 1604     Magnesium 1.8 mg/dL     Phosphorus [[id number]]  (Normal) Collected:  01/02/18 1537    Specimen:  Blood Updated:  01/02/18 1604     Phosphorus 3.2 mg/dL     POC Glucose Once [[id number]]  (Normal) Collected:  01/02/18 1811    Specimen:  Blood Updated:  01/02/18 1818     Glucose 129 mg/dL     Narrative:       Meter: [id number] : [id number] Josi Barrera NURSING ASSISTANT    POC Glucose Once [[id number]]  (Abnormal) Collected:  01/03/18 0106    Specimen:  Blood Updated:  01/03/18 0112     Glucose 144 (H) mg/dL     Narrative:       Meter: [id number] : [id number] Justin Joaquin NURSING ASSISTANT    CBC (No Diff) [[id number]]  (Abnormal) Collected:  01/03/18 0655    Specimen:  Blood Updated:  01/03/18 0716     WBC 3.77 (L) 10*3/mm3      RBC 2.75 (L) 10*6/mm3      Hemoglobin 8.7 (L) g/dL      Hematocrit 27.6 (L) %      .4 (H) fL      MCH 31.6 (H) pg      MCHC 31.5 g/dL      RDW 23.3 (H) %      RDW-SD 82.1 (H) fl      MPV 12.8 (H) fL      Platelets 75 (L) 10*3/mm3     Basic Metabolic Panel [[id number]]  (Abnormal) Collected:  01/03/18 0655    Specimen:   Blood Updated:  01/03/18 0724     Glucose 93 mg/dL      BUN 16 mg/dL      Creatinine 0.57 (L) mg/dL      Sodium 135 (L) mmol/L      Potassium 4.1 mmol/L      Chloride 100 mmol/L      CO2 26.1 mmol/L      Calcium 7.8 (L) mg/dL      eGFR Non African Amer 137 mL/min/1.73      BUN/Creatinine Ratio 28.1 (H)     Anion Gap 8.9 mmol/L     Narrative:       The MDRD GFR formula is only valid for adults with stable renal function between ages 18 and 70.    POC Glucose Once [[id number]]  (Normal) Collected:  01/03/18 1152    Specimen:  Blood Updated:  01/03/18 1203     Glucose 121 mg/dL     Narrative:       Meter: [id number] : [id number] Tutu HOWARD CERT.          Imaging Results (last 24 hours)     ** No results found for the last 24 hours. **          ECG/EMG Results (most recent)     Procedure Component Value Units Date/Time    ECG 12 Lead [[id number]] Collected:  12/18/17 0905     Updated:  12/18/17 1000    Narrative:       RR Interval= 909 ms  VA Interval= 169 ms  QRSD Interval= 146 ms  QT Interval= 472 ms  QTc Interval= 495 ms  Heart Rate= 66 ms  P Axis= 69 deg  QRS Axis= -14 deg  T Wave Axis= 18 deg  I: 40 Axis= 27 deg  T: 40 Axis= 234 deg  ST Axis= deg  SINUS RHYTHM  RBBB AND LAFB  NO SIGNIFICANT CHANGE FROM PREVIOUS ECG  Electronically Signed by:  Kam VyasJOY) ([facility]) 18-Dec-2017 09:59:48  Date and Time of Study: 2017-12-18 09:05:54    Adult Transthoracic Echo Complete W/ Cont if Necessary Per Protocol [[id number]] Collected:  12/19/17 1633     Updated:  12/19/17 2057     BSA 1.8 m^2      IVSd 1.2 cm      LVIDd 5.5 cm      LVIDs 4.0 cm      LVPWd 1.4 cm      IVS/LVPW 0.85     FS 26.6 %      EDV(Teich) 145.0 ml      ESV(Teich) 70.4 ml      EF(Teich) 51.4 %      EDV(cubed) 162.8 ml      ESV(cubed) 64.5 ml      EF(cubed) 60.4 %      LV mass(C)d 292.8 grams      LV mass(C)dI 166.8 grams/m^2      SV(Teich) 74.5 ml      SI(Teich) 42.5 ml/m^2      SV(cubed) 98.3 ml      SI(cubed) 56.0 ml/m^2      Ao root  diam 4.5 cm      Ao root area 15.9 cm^2      ACS 2.1 cm      LVOT diam 2.1 cm      LVOT area 3.5 cm^2      LVOT area(traced) 3.5 cm^2      RVOT diam 1.8 cm      RVOT area 2.5 cm^2      LVLd ap4 8.4 cm      EDV(MOD-sp4) 156.0 ml      LVLs ap4 7.2 cm      ESV(MOD-sp4) 97.8 ml      EF(MOD-sp4) 37.3 %      LVLd ap2 7.8 cm      EDV(MOD-sp2) 146.0 ml      LVLs ap2 6.7 cm      ESV(MOD-sp2) 90.3 ml      EF(MOD-sp2) 38.2 %      SV(MOD-sp4) 58.2 ml      SI(MOD-sp4) 33.2 ml/m^2      SV(MOD-sp2) 55.7 ml      SI(MOD-sp2) 31.7 ml/m^2      Ao root area (BSA corrected) 2.6     Ao root area (BSA corrected) 38.2 ml/m^2      CONTRAST EF 4CH 37.3 ml/m^2      LV Diastolic corrected for BSA 88.9 ml/m^2      LV Systolic corrected for BSA 55.7 ml/m^2      MV A dur 0.1 sec      MV E max pj 70.3 cm/sec      MV A max pj 52.4 cm/sec      MV E/A 1.3     MV V2 max 63.1 cm/sec      MV max PG 1.6 mmHg      MV V2 mean 42.6 cm/sec      MV mean PG 1.0 mmHg      MV V2 VTI 19.1 cm      MVA(VTI) 2.9 cm^2      MV P1/2t max pj 67.9 cm/sec      MV P1/2t 72.6 msec      MVA(P1/2t) 3.0 cm^2      MV dec slope 274.0 cm/sec^2      MV dec time 0.23 sec      Ao pk pj 119.0 cm/sec      Ao max PG 5.7 mmHg      Ao max PG (full) 3.5 mmHg      Ao V2 mean 82.4 cm/sec      Ao mean PG 3.0 mmHg      Ao mean PG (full) 2.0 mmHg      Ao V2 VTI 25.7 cm      RICHIE(I,A) 2.2 cm^2      RICHIE(I,D) 2.2 cm^2      RICHIE(V,A) 2.2 cm^2      RICHIE(V,D) 2.2 cm^2      LV V1 max PG 2.2 mmHg      LV V1 mean PG 1.0 mmHg      LV V1 max 73.9 cm/sec      LV V1 mean 47.2 cm/sec      LV V1 VTI 16.1 cm      MR max pj 587.5 cm/sec      MR max .2 mmHg      SV(Ao) 408.7 ml      SI(Ao) 232.9 ml/m^2      SV(LVOT) 55.8 ml      SV(RVOT) 30.0 ml      SI(LVOT) 31.8 ml/m^2      PA V2 max 74.7 cm/sec      PA max PG 2.2 mmHg      PA max PG (full) 1.0 mmHg      BH CV ECHO JENNIFER - PVA(V,A) 1.9 cm^2      BH CV ECHO JENNIFER - PVA(V,D) 1.9 cm^2      PA acc time 0.13 sec      RV V1 max PG 1.2 mmHg      RV V1 mean PG  1.0 mmHg      RV V1 max 54.7 cm/sec      RV V1 mean 35.9 cm/sec      RV V1 VTI 11.8 cm      TR max aren 255.0 cm/sec      RVSP(TR) 34.0 mmHg      RAP systole 8.0 mmHg      PA pr(Accel) 21.9 mmHg      Pulm Sys Aren 53.4 cm/sec      Pulm Hernandez Aren 41.5 cm/sec      Pulm S/D 1.3     Qp/Qs 0.54     Pulm A Revs Dur 0.1 sec      Pulm A Revs Aren 23.7 cm/sec      MVA P1/2T LCG 3.2 cm^2       CV ECHO JENNIFER - BZI_BMI 19.1 kilograms/m^2       CV ECHO JENNIFER - BSA(HAYCOCK) 1.7 m^2       CV ECHO JENNIFER - BZI_METRIC_WEIGHT 60.3 kg       CV ECHO JENNIFER - BZI_METRIC_HEIGHT 177.8 cm      Target HR (85%) 116 bpm      Max. Pred. HR (100%) 137 bpm       CV VAS BP RIGHT /43 mmHg      TDI S' 1.40 cm/sec      RV Base 3.90 cm      E/E' ratio 14.0     LA Volume Index 24.0 mL/m2      Lat Peak E' Aren 6.0 cm/sec      Med Peak E' Aren 4.00 cm/sec      TAPSE (>1.6) 1.70 cm2      Echo EF Estimated 38 %     Narrative:       · Left ventricular wall thickness is consistent with mild concentric   hypertrophy.  · Left ventricular systolic function is moderately decreased. Estimated EF   = 38%.  · Mild aortic valve regurgitation is present.  · Moderate mitral valve regurgitation is present  · Mild tricuspid valve regurgitation is present.             Medication Review:   I have reviewed the patient's current medication list    Current Facility-Administered Medications:   •  acetaminophen (TYLENOL) tablet 650 mg, 650 mg, Oral, Q6H PRN, Babrara Turner DO  •  Adult [facility]im[facility] TPN, , Intravenous, Continuous, Last Rate: 68 mL/hr at 01/03/18 1147 **AND** fat emulsion (INTRALIPID,LIPOSYN) 20 % infusion 47.04 g, 235.2 mL, Intravenous, Continuous, Kaylie Granados MD, Last Rate: 9.8 mL/hr at 01/02/18 1852, 47.04 g at 01/02/18 1852  •  clotrimazole-betamethasone (LOTRISONE) 1-0.05 % cream 1 application, 1 application, Topical, Q12H, Serjio Reynolds MD, 1 application at 01/03/18 0913  •  cyanocobalamin injection 1,000 mcg, 1,000 mcg,  Intramuscular, Q28 Days, Barbara Turner DO, 1,000 mcg at 01/02/18 0824  •  dextrose (D50W) solution 25 g, 25 g, Intravenous, Q15 Min PRN, Kaylie Granados MD  •  dextrose (GLUTOSE) oral gel 15 g, 15 g, Oral, Q15 Min PRN, Kaylie Granados MD  •  diclofenac (VOLTAREN) 1 % gel 2 g, 2 g, Topical, 4x Daily, Akshat Gandara MD, 2 g at 01/03/18 1234  •  diphenhydrAMINE (BENADRYL) injection 12.5 mg, 12.5 mg, Intravenous, Q15 Min PRN, Bc Stockton CRNA  •  folic acid (FOLVITE) tablet 400 mcg, 400 mcg, Oral, Daily, Barbara Turner DO, 400 mcg at 01/03/18 0913  •  glucagon (GLUCAGEN) injection 1 mg, 1 mg, Subcutaneous, Q15 Min PRN, Kaylie Granados MD  •  HYDROmorphone (DILAUDID) injection 0.5 mg, 0.5 mg, Intravenous, Q1H PRN, Kaylie Granados MD, 0.5 mg at 01/03/18 1230  •  hydrophor (AQUAPHOR) ointment, , Topical, Q12H, Muna Addison, APRN, 1 application at 01/03/18 0914  •  insulin aspart (novoLOG) injection 0-7 Units, 0-7 Units, Subcutaneous, Q6H, Kaylie Granados MD, 2 Units at 01/02/18 0112  •  lactobacillus acidophilus (RISAQUAD) capsule 1 capsule, 1 capsule, Oral, Daily, Barbara Turner DO, 1 capsule at 01/03/18 0913  •  magnesium sulfate 4 gram infusion - Mg less than or equal to 1mg/dL, 4 g, Intravenous, PRN **OR** magnesium sulfate 3 gram infusion (1gm x 3) - Mg 1.1 - 1.5 mg/dL, 1 g, Intravenous, PRN, Last Rate: 100 mL/hr at 12/21/17 2342, 1 g at 12/21/17 2342 **OR** Magnesium Sulfate 2 gram infusion- Mg 1.6 - 1.9 mg/dL, 2 g, Intravenous, PRN, BENJAMIN Webb, Last Rate: 25 mL/hr at 12/31/17 1142, 2 g at 12/31/17 1142  •  metoprolol tartrate (LOPRESSOR) tablet 12.5 mg, 12.5 mg, Oral, Q12H, Barbara Turner DO, 12.5 mg at 01/03/18 0913  •  metroNIDAZOLE (FLAGYL) IVPB 500 mg, 500 mg, Intravenous, Q8H, Kaylie Granados MD, Last Rate: 0 mL/hr at 01/02/18 1616, 500 mg at 01/03/18 1237  •  midazolam (VERSED) injection 1 mg, 1 mg, Intravenous, Q5 Min PRN, Bc Stockton CRNA  •   multivitamin with minerals 1 tablet, 1 tablet, Oral, Daily, Barbara Turner DO, 1 tablet at 01/03/18 0913  •  naloxone (NARCAN) injection 0.1 mg, 0.1 mg, Intravenous, Q5 Min PRN, Kaylie Grnaados MD  •  ondansetron (ZOFRAN) injection 4 mg, 4 mg, Intravenous, Q6H PRN, Muna Addison, APRN  •  ondansetron (ZOFRAN) injection 4 mg, 4 mg, Intravenous, Once PRN, Bc Stockton CRNA  •  ondansetron (ZOFRAN) tablet 4 mg, 4 mg, Oral, Q6H PRN **OR** ondansetron ODT (ZOFRAN-ODT) disintegrating tablet 4 mg, 4 mg, Oral, Q6H PRN **OR** ondansetron (ZOFRAN) injection 4 mg, 4 mg, Intravenous, Q6H PRN, Kaylie Granados MD, 4 mg at 12/22/17 0555  •  oxyCODONE-acetaminophen (PERCOCET) 5-325 MG per tablet 1 tablet, 1 tablet, Oral, Q4H PRN, Kaylie Granados MD, 1 tablet at 01/02/18 0821  •  pantoprazole (PROTONIX) injection 40 mg, 40 mg, Intravenous, BID, Radha Gibbs MD, 40 mg at 01/03/18 0914  •  phenol (CHLORASEPTIC) 1.4 % liquid 2 spray, 2 spray, Mouth/Throat, Q2H PRN, Serjio Reynolds MD, 2 spray at 12/26/17 2334  •  piperacillin-tazobactam (ZOSYN) 4.5 g in sodium chloride 0.9 % 100 mL IVPB, 4.5 g, Intravenous, Q8H, Kaylie Granados MD, Last Rate: 0 mL/hr at 01/02/18 1410, 4.5 g at 01/03/18 1059  •  potassium chloride 20 mEq in 50 mL IVPB, 20 mEq, Intravenous, Q1H PRN, Muna Addison, APRN, Last Rate: 50 mL/hr at 12/31/17 1220, 20 mEq at 12/31/17 1220  •  sodium chloride 0.9 % flush 1-10 mL, 1-10 mL, Intravenous, PRN, Serjio Reynolds MD, 10 mL at 12/24/17 0601  •  Insert peripheral IV, , , Once **AND** sodium chloride 0.9 % flush 10 mL, 10 mL, Intravenous, PRN, Vladimir Douglas MD, 10 mL at 12/25/17 0857  •  sodium chloride 0.9 % infusion 40 mL, 40 mL, Intravenous, PRN, Serjio Reynolds MD  •  sucralfate (CARAFATE) tablet 1 g, 1 g, Oral, 4x Daily AC & at Bedtime, Kenya Marks MD, 1 g at 01/03/18 1234      Assessment/Plan     1. Status post exploratory laparotomy, extensive lysis of  adhesions, resection multiple fistulas, small bowel resection and ileostomy: management per surgery, urology  (5/26/17: S/P exploratory laparotomy/exploration of old hernia mesh/subtotal colectomy with ileorectal anastamosis/mobilization of splenic flexure/intraoperative colonoscopy and rigid sigmoidoscopy/proctoscopy secondary to recurrent diverticular bleeds)  On Zosyn and Flagyl (continue for know per surgery). On TPN which is being stopped after todays bag completed. On PPI  Patient on regular diet but having trouble taking po secondary to increased pain.  Is tolerating ensure drinks. Continue carafate.   WBC is not elevated and patient is abebrile. Plan to rehab at [place] once off TPN.      2. Acute blood loss anemia with low grade myelodysplastic syndrome and chronic pancytopenia: h/o MDS, B12, Folate deficiencies, extreme monocytosis, hypercoagulopathy and vitamin K deficiency: CBC group following S/P 2 units PRBCs on 12/24/17 and 1 unit PRBCs on 12/30/17  Vitamin K and FFP given per hematology,   No DIC, Hb falling again, monitor and await Heme/Onc recs.      3. Chronic diastolic dysfunction, PAF, PSVT, Non-rheumatic aortic valve insufficiency, chronic RBBB, MR and AR: cardiology followed pre-op, now signed off. Remains in NSR on metoprolol 12.5 po BID.  Monitor.    4. Hypertension: Mostly at goal on po metoprolol. Monitor.      5. Severe chronic illness malnutrition: TPN being tapered off. Encouraging po. Nutrition following. On supplements and calorie count in progress.      6. Hypokalemia and hypophosphatemia (electrolyte imbalance): On potassium/mag protocols  Monitor as patient is coming off TPN after today.      7. Bilateral knee pain secondary to OA: Continue ice/heat, Ortho saw patient and started voltaren gel. Not much improvement. Continue PT/OT. Limited treatment option at this point. Monitor.    Plan for disposition: To [place] once off TPN.    Kenya Marks MD  01/03/18  2:41  PM

## 2018-01-03 NOTE — PLAN OF CARE
Problem: Patient Care Overview (Adult)  Goal: Plan of Care Review  Outcome: Ongoing (interventions implemented as appropriate)   01/02/18 2052   Coping/Psychosocial Response Interventions   Plan Of Care Reviewed With patient   Patient Care Overview   Progress improving   Outcome Evaluation   Outcome Summary/Follow up Plan Patient on calorie count. Voltaren started QID for knee pain. Heat applied to knees. Pain meds given as requested. Cathflo used for one of patient's PICC line ports. All ports now have blood return. Hemoglobin stable.      Goal: Adult Individualization and Mutuality  Outcome: Ongoing (interventions implemented as appropriate)   01/02/18 2052   Individualization   Patient Specific Goals pain control on knee   Patient Specific Interventions po and iv pain meds given, heat applied, and Voltaren started     Goal: Discharge Needs Assessment  Outcome: Ongoing (interventions implemented as appropriate)   01/02/18 2052   Discharge Needs Assessment   Concerns To Be Addressed denies needs/concerns at this time       Problem: Fall Risk (Adult)  Goal: Absence of Falls  Outcome: Ongoing (interventions implemented as appropriate)      Problem: Urine Elimination, Impaired (Adult)  Goal: Effective Containment of Urine  Outcome: Ongoing (interventions implemented as appropriate)   01/02/18 2052   Urine Elimination, Impaired (Adult)   Effective Containment of Urine making progress toward outcome     Goal: Reduced Incontinence Episodes  Outcome: Ongoing (interventions implemented as appropriate)   01/02/18 2052   Urine Elimination, Impaired (Adult)   Reduced Incontinence Episodes making progress toward outcome       Problem: Infection, Risk/Actual (Adult)  Goal: Infection Prevention/Resolution  Outcome: Ongoing (interventions implemented as appropriate)   01/02/18 2052   Infection, Risk/Actual (Adult)   Infection Prevention/Resolution making progress toward outcome     Goal: Infection Prevention/Resolution  Outcome:  Ongoing (interventions implemented as appropriate)   01/02/18 2052   Infection, Risk/Actual (Adult)   Infection Prevention/Resolution making progress toward outcome       Problem: Pressure Ulcer Risk (Binh Scale) (Adult,Obstetrics,Pediatric)  Goal: Skin Integrity  Outcome: Ongoing (interventions implemented as appropriate)   01/02/18 2052   Pressure Ulcer Risk (Binh Scale) (Adult,Obstetrics,Pediatric)   Skin Integrity making progress toward outcome       Problem: Nutrition, Parenteral (Adult)  Goal: Signs and Symptoms of Listed Potential Problems Will be Absent or Manageable (Nutrition, Parenteral)  Outcome: Ongoing (interventions implemented as appropriate)   01/02/18 2052   Nutrition, Parenteral   Problems Assessed (Parenteral Nutrition) all   Problems Present (Parenteral Nutrition) none       Problem: Ileostomy (Adult)  Goal: Signs and Symptoms of Listed Potential Problems Will be Absent or Manageable (Ileostomy)  Outcome: Ongoing (interventions implemented as appropriate)   01/02/18 2052   Ileostomy   Problems Assessed (Ileostomy) all   Problems Present (Ileostomy) none

## 2018-01-04 NOTE — THERAPY TREATMENT NOTE
"Acute Care - Physical Therapy Treatment Note   Tiffanie Keith     Patient Name: Jason Zelaya  : 1934  MRN: [id number]  Today's Date: 2018  Onset of Illness/Injury or Date of Surgery Date: 12/15/17     Referring Physician: [email or web address]    Admit Date: 12/15/2017    Visit Dx:    ICD-10-CM ICD-9-CM   1. Acute lower UTI (urinary tract infection) N39.0 599.0   2. Generalized weakness R53.1 780.79   3. Hearing loss due to cerumen impaction, bilateral H61.23 389.8     380.4   4. 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     Patient Active Problem List   Diagnosis   • MDS (myelodysplastic syndrome), low grade   • Vitamin B 12 deficiency   • AI (aortic incompetence)   • Bundle branch block, right   • Benign prostatic hyperplasia   • Hypertension   • Knee pain   • Rectal bleed   • Acute blood loss anemia   • Hand lesion   • Acute UTI   • Acute lower UTI (urinary tract infection)   • Enteroenteric fistula   • Colovesical fistula   • Coagulopathy               Adult Rehabilitation Note       18 0958 18 0942       Rehab Assessment/Intervention    Discipline physical therapist  -JW physical therapist  -JW     Document Type therapy note (daily note)  -JW therapy note (daily note)  -JW     Subjective Information agree to therapy;complains of;fatigue;pain  -JW agree to therapy;complains of;pain   pt reports knee pain is \"the worst it has ever been\"  -JW     Symptoms Noted During/After Treatment increased pain  -JW increased pain  -JW     Precautions/Limitations fall precautions   multiple lines/tubes  -JW fall precautions   multiple lines/tubes  -JW     Patient Response to Treatment c/o continued knee pain, however states improved from previous sessions  -JW significant knee pain continues to be overall limiting factor to progress mobility  -JW     Recorded by [JW] Sylvia Mccain, PT [JW] Sylvia Mccain, PT     Pain Assessment    Pain Assessment 0-10  -JW 0-10  -JW     Pain Score 3  -JW " 7  -JW     Post Pain Score 6  -JW 10  -JW     Pain Type Chronic pain  -JW Chronic pain  -JW     Pain Location Knee  -JW Knee  -JW     Pain Orientation Right;Left  -JW Right;Left  -JW     Pain Intervention(s) Medication (See MAR);Heat applied;Ambulation/increased activity  -JW Medication (See MAR);Heat applied  -JW     Response to Interventions pt pre-medicated for therapy session  -JW pt pre-medicated for therapy, however reports medication is not helping knee pain  -JW     Recorded by [JW] Sylvia Mccain PT [JW] Sylvia Mccain, PT     Bed Mobility, Assessment/Treatment    Bed Mobility, Assistive Device bed rails;head of bed elevated  -JW      Bed Mob, Supine to Sit, [place] moderate assist (50% patient effort);2 person assist required;verbal cues required  -JW      Bed Mobility, Comment pt requires increased time to complete transfer to EOB  -JW attempted, however patient began screaming in pain as minimal abduction attempted.  pt unable to tolerate light touch to right knee  -JW     Recorded by [NOVA] Sylvia Mccain PT [JW] Sylvia Mccain PT     Transfer Assessment/Treatment    Transfers, Sit-Stand [place] minimum assist (75% patient effort);2 person assist required;verbal cues required  -JW      Transfers, Stand-Sit [place] minimum assist (75% patient effort);verbal cues required  -JW      Transfers, Sit-Stand-Sit, Assist Device rolling walker  -JW      Transfer, Comment pt requires verbal cues for proper hand/foot placement and assistance required to contol descent from standing to sitting.    -JW      Recorded by [JW] Sylvia Mccain PT      Gait Assessment/Treatment    Gait, Comment attempted forward steps, however patient unable to perform.  pt attempts stand pivot from bed to chair, however increased difficulty sequencing LE movement.  Bed moved from behind patient and chair placed behind patient.  -JW      Recorded by [JW] Sylvia Mccain, PT      Therapy Exercises    Bilateral Lower  Extremities  --   reviewed ankle pumps, gluteal sets, quad sets, heel slides   -JW     BLE Resistance  --   pt requires assistance with heel slides, tolerates 3 reps  -JW     Recorded by  [JW] Sylvia Mccain PT     Positioning and Restraints    Pre-Treatment Position in bed  -JW in bed  -JW     Post Treatment Position chair  -JW bed  -JW     In Bed  supine;call light within reach;encouraged to call for assist;notified nsg   notified CNA and RN  -JW     In Chair sitting;call light within reach;encouraged to call for assist  -JW      Recorded by [JW] Sylvia Mccain, PT [JW] Sylvia Mccain, PT       User Key  (r) = Recorded By, (t) = Taken By, (c) = Cosigned By    Initials Name Effective Dates    NOVA Mccain, PT 12/01/15 -                 IP PT Goals       12/23/17 1140          Bed Mobility PT STG    Bed Mobility PT STG, Date Established 12/23/17  -SP      Bed Mobility PT STG, Time to Achieve 3 days  -SP      Bed Mobility PT STG, Activity Type all bed mobility  -SP      Bed Mobility PT STG, [place] Level contact guard assist  -SP      Transfer Training PT STG    Transfer Training PT STG, Date Established 12/23/17  -SP      Transfer Training PT STG, Time to Achieve 3 days  -SP      Transfer Training PT STG, Activity Type bed to chair /chair to bed;sit to stand/stand to sit  -SP      Transfer Training PT STG, [place] Level contact guard assist  -SP      Transfer Training PT STG, Assist Device --   with appropriate assistive device  -SP      Transfer Training PT LTG    Transfer Training PT LTG, Date Established 12/23/17  -SP      Transfer Training PT LTG, Time to Achieve 5 - 7 days  -SP      Transfer Training PT LTG, Activity Type all transfers  -SP      Transfer Training PT LTG, [place] Level contact guard assist;supervision required  -SP      Transfer Training PT LTG, Assist Device --   with appropriate assistive device  -SP      Gait Training PT LTG    Gait Training Goal PT LTG, Date Established  12/23/17  -SP      Gait Training Goal PT LTG, Time to Achieve 5 - 7 days  -SP      Gait Training Goal PT LTG, [place] Level contact guard assist  -SP      Gait Training Goal PT LTG, Assist Device walker, rolling  -SP      Gait Training Goal PT LTG, Distance to Achieve 100  -SP        User Key  (r) = Recorded By, (t) = Taken By, (c) = Cosigned By    Initials Name Provider Type    SP Angela Matias, PT Physical Therapist          Physical Therapy Education     Title: PT OT SLP Therapies (Done)     Topic: Physical Therapy (Done)     Point: Mobility training (Done)    Learning Progress Summary    Learner Readiness Method Response Comment Documented by Status   Patient Acceptance E VU  JW 01/04/18 1024 Done    Acceptance E VU Patient education on functional mobility & gait with RWX and importance of getting out of bed and sitting in chair for awhile. LN 12/30/17 1156 Done    Acceptance E VU  BP 12/29/17 1128 Done    Acceptance E VU  JW 12/28/17 1127 Done    Acceptance E VU  JW 12/27/17 1051 Done    Acceptance E VU  JW 12/26/17 1018 Done    Acceptance E VU Patient instructed in transfer and gait techniques SP 12/23/17 1139 Done               Point: Home exercise program (Done)    Learning Progress Summary    Learner Readiness Method Response Comment Documented by Status   Patient Acceptance E VU  JW 01/04/18 1024 Done    Acceptance E VU  JW 01/03/18 1151 Done    Acceptance E VU Patient education on functional mobility & gait with RWX and importance of getting out of bed and sitting in chair for awhile. LN 12/30/17 1156 Done    Acceptance E VU  JW 12/28/17 1127 Done    Acceptance E VU  JW 12/27/17 1051 Done    Acceptance E VU  JW 12/26/17 1018 Done                      User Key     Initials Effective Dates Name Provider Type Discipline    SP 08/11/15 -  Angela Matias, PT Physical Therapist PT    LN 06/22/16 -  Skylar Mccain, PT Physical Therapist PT    BP 12/01/15 -  Hermila St, PT  Physical Therapist PT    NOVA 12/01/15 -  Sylvia Mccain PT Physical Therapist PT                    PT Recommendation and Plan  Anticipated Discharge Disposition: extended care facility  Planned Therapy Interventions: balance training, bed mobility training, gait training, home exercise program  PT Frequency: daily, 5 times/wk  Plan of Care Review  Plan Of Care Reviewed With: patient  Outcome Summary/Follow up Plan: PT: Patient reports continued knee pain, however states improved from previous sessions.  Patient requires mod assist x2 for supine to sit transfer and min assist x2 for sit to stand.  Patient unable to safely elicit steps once upright and requires movement of bed from behind patient and placement of recliner.  Will continue to see for therapy and progress activity as tolerated.          Outcome Measures       01/04/18 0958 01/03/18 0942       How much help from another person do you currently need...    Turning from your back to your side while in flat bed without using bedrails? 2  -JW 2  -JW     Moving from lying on back to sitting on the side of a flat bed without bedrails? 1  -JW 1  -JW     Moving to and from a bed to a chair (including a wheelchair)? 1  -JW 1  -JW     Standing up from a chair using your arms (e.g., wheelchair, bedside chair)? 2  -JW 1  -JW     Climbing 3-5 steps with a railing? 1  -JW 1  -JW     To walk in hospital room? 1  -JW 1  -JW     AM-PAC 6 Clicks Score 8  -JW 7  -     Functional Assessment    Outcome Measure Options AM-PAC 6 Clicks Basic Mobility (PT)  - AM-PAC 6 Clicks Basic Mobility (PT)  -       User Key  (r) = Recorded By, (t) = Taken By, (c) = Cosigned By    Initials Name Provider Type    NOVA Mccain PT Physical Therapist           Time Calculation:         PT Charges       01/04/18 1026          Time Calculation    Start Time 0958  -NOVA      Stop Time 1018  -NOVA      Time Calculation (min) 20 min  -NOVA      PT Received On 01/04/18  -NOVA      PT - Next  Appointment 01/05/18  -NOVA      PT Goal Re-Cert Due Date 01/06/18  -NOVA        User Key  (r) = Recorded By, (t) = Taken By, (c) = Cosigned By    Initials Name Provider Type    NOVA Mccain PT Physical Therapist          Therapy Charges for Today     Code Description Service Date Service Provider Modifiers Qty    98655296575 HC PT THER PROC EA 15 MIN 1/3/2018 Sylvia Mccain, PT GP 1    24415606909 HC PT THER SUPP EA 15 MIN 1/4/2018 Sylvia Mccain, PT GP 1    73835304897 HC PT THER PROC EA 15 MIN 1/4/2018 Sylvia Mccain, PT GP 1          PT G-Codes  Outcome Measure Options: AM-PAC 6 Clicks Basic Mobility (PT)    Sylvia Mccain PT  1/4/2018

## 2018-01-04 NOTE — NURSING NOTE
Continued Stay Note  RENATA Wheatley     Patient Name: Jason Zelaya  MRN: [id number]  Today's Date: 1/4/2018    Admit Date: 12/15/2017          Discharge Plan       01/04/18 0908    Case Management/Social Work Plan    Additional Comments spoke with Johnna Arnett r/t need for precert to be started today for skilled care. will continue to follow.               Discharge Codes     None            Phyllis Mayen RN

## 2018-01-04 NOTE — PROGRESS NOTES
Gen Surg Postop Progress Note       Subjective: No acute overnight events.  Tolerating regular diet reports his appetite is better and he wants to eat.  Denies nausea and vomiting.  Abdominal pain controlled with oral pain medications.  His biggest complaint this morning is of bilateral knee pain and left hand pain was seen by Dr. Gandara I have reviewed his notes.      Objective:    Vital Signs  Temp:  [97.2 °F (36.2 °C)-100.3 °F (37.9 °C)] 98.7 °F (37.1 °C)  Heart Rate:  [] 90  Resp:  [18] 18  BP: (101-143)/(61-69) 101/61  Body mass index is 19.74 kg/(m^2).    Intake/Output Summary (Last 24 hours) at 01/04/18 0938  Last data filed at 01/04/18 0453   Gross per 24 hour   Intake          1759.21 ml   Output             1735 ml   Net            24.21 ml     CAYETANO-serosanguineous       Physical Exam:   General: patient awake, alert and cooperative   Cardiovascular: regular rhythm and rate, no murmurs auscultated   Pulm: clear to auscultation bilaterally, regular and unlabored   Abdomen: soft, nontender, nondistended; normal bowel sounds, ileostomy viable and functioning   Extremities: Swelling both knees   Neurologic: Normal mood and behavior     Results Review:     I reviewed the patient's new clinical results.        Results from last 7 days  Lab Units 01/04/18  0450   WBC 10*3/mm3 5.14   HEMOGLOBIN g/dL 9.3*   HEMATOCRIT % 29.0*   PLATELETS 10*3/mm3 77*       Results from last 7 days  Lab Units 01/04/18 0450 12/29/17  0524   SODIUM mmol/L 136  < > 139   POTASSIUM mmol/L 3.9  < > 3.3*   CHLORIDE mmol/L 101  < > 101   CO2 mmol/L 25.7  < > 29.8*   BUN mg/dL 16  < > 14   CREATININE mg/dL 0.63*  < > 0.63*   CALCIUM mg/dL 8.1*  < > 8.1*   BILIRUBIN mg/dL  --   --  0.6   ALK PHOS U/L  --   --  63   ALT (SGPT) U/L  --   --  <5*   AST (SGOT) U/L  --   --  11   GLUCOSE mg/dL 91  < > 131*   < > = values in this interval not displayed.      PT/INR:  No results found for: PROTIME/No results found for: INR    Calcium Calcium    Date Value Ref Range Status   01/04/2018 8.1 (L) 8.8 - 10.5 mg/dL Final   01/03/2018 7.8 (L) 8.8 - 10.5 mg/dL Final   01/02/2018 8.1 (L) 8.8 - 10.5 mg/dL Final      Magnesium  AST  ALT  Bilirubin, Total  AlkPhos  Albumin    Amylase  Lipase    Radiology: Magnesium   Date Value Ref Range Status   01/04/2018 1.6 (L) 1.7 - 2.5 mg/dL Final   01/02/2018 1.8 1.7 - 2.5 mg/dL Final     No components found for: AST.*  No components found for: ALT.*  No components found for: BILIRUBIN, TOTAL.*    No components found for: ALKPHOS.*  No components found for: ALBUMIN.*      No components found for: AMYLASE.*  No components found for: LIPASE.*      Imaging Results (most recent)     Procedure Component Value Units Date/Time    CT Head Without Contrast [[id number]] Collected:  12/15/17 1509     Updated:  12/15/17 1513    Narrative:       UNENHANCED HEAD CT:  12/15/2017 3:09 PM     HISTORY: Fall. Found down. Slipped on a rug. Trauma.     TECHNIQUE: Axial unenhanced head CT. Radiation dose reduction techniques  were utilized, including automated exposure control and exposure  modulation based on body size.     COMPARISON: None     FINDINGS: No intracranial hemorrhage, mass, or infarct. No hydrocephalus  or extra-axial fluid collection. There is mild generalized atrophy and  chronic small vessel changes. The skull base, calvarium, and  extracranial soft tissues are normal.       Impression:       Normal, negative unenhanced head CT.                This report was finalized on 12/15/2017 3:10 PM by Dr. Rafiq Blue MD.       CT Abdomen Pelvis Without Contrast [[id number]] Collected:  12/18/17 0716     Updated:  12/18/17 0731    Narrative:       CT ABDOMEN AND PELVIS, NONCONTRAST, 12/17/2017:     HISTORY:  83-year-old male status post near-total colectomy with ileorectal  anastomosis May 2017 for bleeding colonic diverticular disease. Recently  noted fecal matter within urine. 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 As a  history of  myelodysplastic syndrome.     TECHNIQUE:  CT examination of the abdomen and pelvis was performed with oral  contrast only. IV contrast was not administered. Radiation dose  reduction techniques were utilized, including automated exposure control  and exposure modulation based on body size.     COMPARISON:  *  CT abdomen/pelvis, 11/15/2017 and 6/5/2017.     ABDOMEN FINDINGS:  Stable mild splenomegaly. Liver, pancreas, spleen and kidneys are  otherwise negative. No evidence of upper urinary tract obstruction.  Cholecystectomy. No bile duct dilatation. Normal caliber abdominal  aorta.     PELVIS FINDINGS:  Extensive inflammation is present throughout the mid and lower pelvis  centered on the patient's ileorectal anastomosis. Multiple loops of  inflamed small bowel appear tethered in the mid pelvic midline near the  anastomosis and show moderate dilatation and diffuse wall thickening.  There is also marked thickening of adjacent urinary bladder wall. Some  ill-defined linear tracts of oral contrast extends from the region of  the anastomosis toward several mid pelvic small bowel loops and could  potentially represent fistulae, although decompressed, inflamed small  bowel segments are also possible. There may be some very faint contrast  material within the urinary bladder near the Ayoub catheter balloon, but  a definitive bladder fistula is not identified. Repeat CT examination  with rectal contrast may be helpful to better delineate presumed  colovesical fistula and any additional fistulae near the ileorectal  anastomosis. Extensive inflammatory soft tissue stranding is present  throughout the pelvis, but no abscess or other drainable fluid  collection is identified.     Limited images of the lower chest show mild diffuse chronic interstitial  pulmonary fibrosis.       Impression:       1. Postop changes near total colectomy with ileorectal anastomosis.  2. Extensive inflammation within the midline pelvis near the  anastomosis  involving multiple small bowel loops and the urinary bladder.  3. Findings suspicious for small bowel fistulae and possible colorectal  fistula near the anastomosis. Repeat CT examination with rectal contrast  may be helpful.  4. No abscess or other undrained fluid collection.  5. Stable splenomegaly.  6. Pulmonary fibrosis.  7. Initial stat report to the ordering service from Dr. Rafiq Blue at  1428 hours on 12/17/2017.     This report was finalized on 12/18/2017 7:27 AM by Dr. Chencho Henderson MD.       CT Abdomen Pelvis With Contrast [[id number]] Collected:  12/18/17 1542     Updated:  12/18/17 1555    Narrative:       CT ABDOMEN AND PELVIS WITH IV AND RECTAL CONTRAST, 12/18/2017:     HISTORY:  83-year-old male status post near total colectomy and ileorectal  anastomosis May 2017. Recently noted stool in urine. Assess for  colovesical fistula.     TECHNIQUE:  CT examination of the abdomen and pelvis was performed with IV contrast  following installation of 500 cc rectal GI contrast. Radiation dose  reduction techniques were utilized, including automated exposure control  and exposure modulation based on body size.     COMPARISON:  *  CT abdomen/pelvis, 12/17/2017.     FINDINGS:  The examination shows small irregular linear collections of GI contrast  and air extending to the right and left from the rectal anastomosis in  the midline pelvis towards adjacent segments of tethered, dilated pelvic  small bowel segments. The appearance suggests contrast leak and likely  small bowel fistula formation from the anastomosis. No drainable abscess  or other fluid collection is demonstrated.     There is also a tiny amount of high attenuation contrast within the  lumen of a markedly thick-walled urinary bladder compatible with bladder  fistula. There is also mild dilatation of both ureters and both renal  collecting systems, not visible on the earlier study.     Moderately severe dilatation of multiple small  bowel loops within the  lower abdomen and pelvis likely representing adynamic ileus due or  partial obstruction due to pelvic inflammation.     Remainder the examination is unchanged since the earlier study. Moderate  splenomegaly. Liver and pancreas are unremarkable.       Impression:       1. CT findings compatible with ileorectal anastomosis leak and likely  small bowel fistula formation.  2. Faint GI contrast within the urinary bladder compatible with  colovesical fistula.  3. Extensive peritoneal inflammation throughout the lower abdomen and  pelvis. Moderate dilatation of pelvic small bowel segments, several of  which appear tethered to the region of the anastomosis.  4. Mild bilateral pyelocaliectasis and ureterectasis.  5. Splenomegaly.     This report was finalized on 12/18/2017 3:53 PM by Dr. Chencho Henderson MD.       XR Chest 1 View [[id number]] Collected:  12/19/17 0613     Updated:  12/19/17 0616    Narrative:       CHEST X-RAY, 12/18/2017:     HISTORY:   PICC placement.     TECHNIQUE:  AP portable chest x-ray.     FINDINGS:  Newly placed right arm PICC tip is in good position in the low SVC.     Diffuse fibrosis throughout both lungs, greatest at the lung bases. Low  lung volumes. Mild cardiomegaly.       Impression:       1. PICC in good position.  2. Requested initial stat report to the ordering service from Dr. Rickie Kent at 2113 hours on 12/18/2017.     This report was finalized on 12/19/2017 6:14 AM by Dr. Chencho Henderson MD.       XR Abdomen KUB [[id number]] Collected:  12/29/17 0852     Updated:  12/29/17 0921    Narrative:       KUB     DATE: 12/28/2017     HISTORY: Abdominal surgery 1 week ago. Abdominal distention. Blood  coming from NG tube. Verify NG tube placement. Possible ileus.     COMPARISON: CT abdomen and pelvis 12/18/2017.     FINDINGS: Midline laparotomy staples are present. NG tube tip projects  into the region of the proximal gastric body, tip approximately 6 cm  below the  EG junction. The sidehole of the NG tube is thought to be  located at the EG junction.     Drainage catheter projects over the left lateral abdomen.  Cholecystectomy changes are present. Mild generalized gaseous distention  of predominantly small bowel loops, favored to represent mild  postoperative ileus. No gross free air is seen. Probable minimal left  basilar atelectasis. Degenerative endplate changes in the imaged spine.  Calcified phleboliths in the pelvis to the right. Presumed Ayoub  catheter in place. Drainage catheter also is seen projecting just to the  right of midline within the pelvis.       Impression:       1. No acute findings in the abdomen.  2. Mild generalized gaseous distention of bowel, predominantly small  bowel, favored to represent mild postoperative ileus.  3. NG tube tip about 6 cm below the EG junction with sidehole at the  level of the EG junction.  4. Preliminary report provided by Dr. Kumari on 12/28/2017 at 1930  hours.     This report was finalized on 12/29/2017 9:19 AM by Dr. Samantha Keita MD.       XR Knee 1 or 2 View Bilateral [[id number]] Collected:  01/01/18 0751     Updated:  01/01/18 0755    Narrative:       EXAM: 2 views right knee. 2 views left knee.     DATE: 12/31/2017     HISTORY: Bilateral knee pain for 10 years. No trauma.     COMPARISON: None     FINDINGS: Right knee demonstrates advanced medial compartment joint  space narrowing, moderate patellofemoral compartment joint space  narrowing. Prominent medial compartment and patellofemoral compartment  osteophyte formation. No acute fracture joint dislocation is seen.  Chondrocalcinosis changes in the lateral meniscus. Advanced calcific  atherosclerotic changes are present. No definite joint effusion.     Left knee demonstrates moderate patellofemoral compartment and  moderately advanced medial and lateral compartment joint space  narrowing. Prominent posterior patellar osteophyte formation with  smaller marginal  ossified the medial and lateral compartments.  Subchondral cystic change in the lateral compartment. No definite joint  effusion. Advanced calcific atherosclerosis.       Impression:       1. Moderate to moderately advanced at degenerative changes of the knees,  as described in detail in the report. No acute findings.     This report was finalized on 1/1/2018 7:53 AM by Dr. Samantha Keita MD.       XR Hand 3+ View Left [[id number]] Collected:  01/04/18 0804     Updated:  01/04/18 0819    Narrative:       FIVE VIEWS OF THE LEFT HAND     DATE: 01/03/2018.     HISTORY: 83-year-old male with left thumb pain with any movement. No  known trauma. Pain has been present for months.     FINDINGS: No acute fracture or joint dislocation is seen. Advanced  osteoarthritic changes are present at the 1st carpometacarpal joint with  joint space narrowing, articular sclerosis, and prominent marginal  osteophyte formation. Moderate osteoarthritic type changes are also  demonstrated at the 2nd metacarpophalangeal joint. Mild-to-moderate  osteoarthritic type changes in the remainder of the metacarpophalangeal  joints and interphalangeal joints. Mild degenerative narrowing of the  radiocarpal joint. Advanced calcific atherosclerotic changes are  present. No retained radiopaque foreign body is seen.     IMPRESSION  Osteoarthritic changes within the left hand and wrist, greatest at the  1st carpometacarpal joint. No acute findings.     This report was finalized on 1/4/2018 8:17 AM by Dr. Samantha Keita MD.                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 Yue Granados MD  01/04/18  9:38 AM    Time:

## 2018-01-04 NOTE — PLAN OF CARE
Problem: Patient Care Overview (Adult)  Goal: Plan of Care Review  Outcome: Ongoing (interventions implemented as appropriate)   01/04/18 1024   Coping/Psychosocial Response Interventions   Plan Of Care Reviewed With patient   Outcome Evaluation   Outcome Summary/Follow up Plan PT: Patient reports continued knee pain, however states improved from previous sessions. Patient requires mod assist x2 for supine to sit transfer and min assist x2 for sit to stand. Patient unable to safely elicit steps once upright and requires movement of bed from behind patient and placement of recliner. Will continue to see for therapy and progress activity as tolerated.

## 2018-01-04 NOTE — PLAN OF CARE
Problem: Patient Care Overview (Adult)  Goal: Plan of Care Review  Outcome: Ongoing (interventions implemented as appropriate)   01/04/18 0318   Coping/Psychosocial Response Interventions   Plan Of Care Reviewed With patient   Patient Care Overview   Progress improving   Outcome Evaluation   Outcome Summary/Follow up Plan Tele SR, no SSI given, blood glucose within range, F/C, PICC, Ileostomy, and 2 CAYETANO drains, xray ordered of left hand, pending results, gel ordered for both knees, ortho to see in the AM, Mepilex to coccyx with dressing changes, labs ordered in AM      Goal: Adult Individualization and Mutuality  Outcome: Ongoing (interventions implemented as appropriate)    Goal: Discharge Needs Assessment  Outcome: Ongoing (interventions implemented as appropriate)      Problem: Fall Risk (Adult)  Goal: Absence of Falls  Outcome: Ongoing (interventions implemented as appropriate)      Problem: Urine Elimination, Impaired (Adult)  Goal: Effective Containment of Urine  Outcome: Ongoing (interventions implemented as appropriate)    Goal: Reduced Incontinence Episodes  Outcome: Ongoing (interventions implemented as appropriate)      Problem: Infection, Risk/Actual (Adult)  Goal: Infection Prevention/Resolution  Outcome: Ongoing (interventions implemented as appropriate)    Goal: Infection Prevention/Resolution  Outcome: Ongoing (interventions implemented as appropriate)      Problem: Pressure Ulcer Risk (Binh Scale) (Adult,Obstetrics,Pediatric)  Goal: Skin Integrity  Outcome: Ongoing (interventions implemented as appropriate)      Problem: Nutrition, Parenteral (Adult)  Goal: Signs and Symptoms of Listed Potential Problems Will be Absent or Manageable (Nutrition, Parenteral)  Outcome: Ongoing (interventions implemented as appropriate)      Problem: Ileostomy (Adult)  Goal: Signs and Symptoms of Listed Potential Problems Will be Absent or Manageable (Ileostomy)  Outcome: Ongoing (interventions implemented as  appropriate)

## 2018-01-04 NOTE — PROGRESS NOTES
Orthopedic Progress Note   Chief Complaint:  Osteoarthritis both knees, osteoarthritis left first metacarpocarpal joint    Subjective     Interval History: Patient seen in follow-up to arthritis of both knees.  Also complaining now of left thumb pain.  States is been present for several months.  His knees may be slightly better with the Voltaren gel.          Objective     Vital Signs  Temp:  [97.2 °F (36.2 °C)-100.3 °F (37.9 °C)] 98.7 °F (37.1 °C)  Heart Rate:  [] 90  Resp:  [18] 18  BP: (101-143)/(61-69) 101/61  Body mass index is 19.74 kg/(m^2).    Intake/Output Summary (Last 24 hours) at 01/04/18 0656  Last data filed at 01/04/18 0453   Gross per 24 hour   Intake          1999.21 ml   Output             1735 ml   Net           264.21 ml     I/O this shift:  In: -   Out: 1445 [Urine:1150; Other:45; Stool:250]       Physical Exam:   General: patient awake, alert and cooperative   Cardiovascular: regular rhythm and rate   Pulm: clear to auscultation bilaterally   Abdomen: Benign.  Soft bowel sounds   Extremities: The right knee shows a minimal effusion.  There is no erythema.  The left knee has no effusion no erythema.  Very slight improvement as far as range of motion without pain but minimal still in both knees.  The left thumb does show some swelling over the metacarpocarpal junction.  He has a positive grind test is pain with passive and active range of motion but there is no subluxation or dislocation.  No sensory loss good capillary refill.   Neurologic: Normal mood and behavior     Results Review:     I reviewed the patient's new clinical results.      WBC No results found for: WBCS   HGB Hemoglobin   Date Value Ref Range Status   01/03/2018 8.7 (L) 14.0 - 18.0 g/dL Final      HCT Hematocrit   Date Value Ref Range Status   01/03/2018 27.6 (L) 42.0 - 52.0 % Final      Platlets No results found for: LABPLAT     PT/INR:  No results found for: PROTIME/No results found for: INR    Sodium Sodium   Date Value  Ref Range Status   01/04/2018 136 136 - 145 mmol/L Final   01/03/2018 135 (L) 136 - 145 mmol/L Final   01/02/2018 137 136 - 145 mmol/L Final      Potassium Potassium   Date Value Ref Range Status   01/04/2018 3.9 3.5 - 5.2 mmol/L Final   01/03/2018 4.1 3.5 - 5.2 mmol/L Final   01/02/2018 4.4 3.5 - 5.2 mmol/L Final      Chloride Chloride   Date Value Ref Range Status   01/04/2018 101 98 - 107 mmol/L Final   01/03/2018 100 98 - 107 mmol/L Final   01/02/2018 102 98 - 107 mmol/L Final      Bicarbonate No results found for: PLASMABICARB   BUN BUN   Date Value Ref Range Status   01/04/2018 16 8 - 23 mg/dL Final   01/03/2018 16 8 - 23 mg/dL Final   01/02/2018 17 8 - 23 mg/dL Final      Creatinine Creatinine   Date Value Ref Range Status   01/04/2018 0.63 (L) 0.76 - 1.27 mg/dL Final   01/03/2018 0.57 (L) 0.76 - 1.27 mg/dL Final   01/02/2018 0.59 (L) 0.76 - 1.27 mg/dL Final      Calcium Calcium   Date Value Ref Range Status   01/04/2018 8.1 (L) 8.8 - 10.5 mg/dL Final   01/03/2018 7.8 (L) 8.8 - 10.5 mg/dL Final   01/02/2018 8.1 (L) 8.8 - 10.5 mg/dL Final      Magnesium  AST  ALT  Bilirubin, Total  AlkPhos  Albumin    Amylase  Lipase    Radiology: Magnesium   Date Value Ref Range Status   01/04/2018 1.6 (L) 1.7 - 2.5 mg/dL Final   01/02/2018 1.8 1.7 - 2.5 mg/dL Final     No components found for: AST.*  No components found for: ALT.*  No components found for: BILIRUBIN, TOTAL.*    No components found for: ALKPHOS.*  No components found for: ALBUMIN.*      No components found for: AMYLASE.*  No components found for: LIPASE.*            Imaging Results (most recent)     Procedure Component Value Units Date/Time    CT Head Without Contrast [[id number]] Collected:  12/15/17 1502     Updated:  12/15/17 1513    Narrative:       UNENHANCED HEAD CT:  12/15/2017 3:09 PM     HISTORY: Fall. Found down. Slipped on a rug. Trauma.     TECHNIQUE: Axial unenhanced head CT. Radiation dose reduction techniques  were utilized, including automated  exposure control and exposure  modulation based on body size.     COMPARISON: None     FINDINGS: No intracranial hemorrhage, mass, or infarct. No hydrocephalus  or extra-axial fluid collection. There is mild generalized atrophy and  chronic small vessel changes. The skull base, calvarium, and  extracranial soft tissues are normal.       Impression:       Normal, negative unenhanced head CT.                This report was finalized on 12/15/2017 3:10 PM by Dr. Rafiq Blue MD.       CT Abdomen Pelvis Without Contrast [[id number]] Collected:  12/18/17 0716     Updated:  12/18/17 0731    Narrative:       CT ABDOMEN AND PELVIS, NONCONTRAST, 12/17/2017:     HISTORY:  83-year-old male status post near-total colectomy with ileorectal  anastomosis May 2017 for bleeding colonic diverticular disease. Recently  noted fecal matter within urine. 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 As a  history of myelodysplastic syndrome.     TECHNIQUE:  CT examination of the abdomen and pelvis was performed with oral  contrast only. IV contrast was not administered. Radiation dose  reduction techniques were utilized, including automated exposure control  and exposure modulation based on body size.     COMPARISON:  *  CT abdomen/pelvis, 11/15/2017 and 6/5/2017.     ABDOMEN FINDINGS:  Stable mild splenomegaly. Liver, pancreas, spleen and kidneys are  otherwise negative. No evidence of upper urinary tract obstruction.  Cholecystectomy. No bile duct dilatation. Normal caliber abdominal  aorta.     PELVIS FINDINGS:  Extensive inflammation is present throughout the mid and lower pelvis  centered on the patient's ileorectal anastomosis. Multiple loops of  inflamed small bowel appear tethered in the mid pelvic midline near the  anastomosis and show moderate dilatation and diffuse wall thickening.  There is also marked thickening of adjacent urinary bladder wall. Some  ill-defined linear tracts of oral contrast extends from the region of  the anastomosis  toward several mid pelvic small bowel loops and could  potentially represent fistulae, although decompressed, inflamed small  bowel segments are also possible. There may be some very faint contrast  material within the urinary bladder near the Ayoub catheter balloon, but  a definitive bladder fistula is not identified. Repeat CT examination  with rectal contrast may be helpful to better delineate presumed  colovesical fistula and any additional fistulae near the ileorectal  anastomosis. Extensive inflammatory soft tissue stranding is present  throughout the pelvis, but no abscess or other drainable fluid  collection is identified.     Limited images of the lower chest show mild diffuse chronic interstitial  pulmonary fibrosis.       Impression:       1. Postop changes near total colectomy with ileorectal anastomosis.  2. Extensive inflammation within the midline pelvis near the anastomosis  involving multiple small bowel loops and the urinary bladder.  3. Findings suspicious for small bowel fistulae and possible colorectal  fistula near the anastomosis. Repeat CT examination with rectal contrast  may be helpful.  4. No abscess or other undrained fluid collection.  5. Stable splenomegaly.  6. Pulmonary fibrosis.  7. Initial stat report to the ordering service from Dr. Rafiq Blue at  1428 hours on 12/17/2017.     This report was finalized on 12/18/2017 7:27 AM by Dr. Chencho Henderson MD.       CT Abdomen Pelvis With Contrast [[id number]] Collected:  12/18/17 1542     Updated:  12/18/17 1555    Narrative:       CT ABDOMEN AND PELVIS WITH IV AND RECTAL CONTRAST, 12/18/2017:     HISTORY:  83-year-old male status post near total colectomy and ileorectal  anastomosis May 2017. Recently noted stool in urine. Assess for  colovesical fistula.     TECHNIQUE:  CT examination of the abdomen and pelvis was performed with IV contrast  following installation of 500 cc rectal GI contrast. Radiation dose  reduction techniques were  utilized, including automated exposure control  and exposure modulation based on body size.     COMPARISON:  *  CT abdomen/pelvis, 12/17/2017.     FINDINGS:  The examination shows small irregular linear collections of GI contrast  and air extending to the right and left from the rectal anastomosis in  the midline pelvis towards adjacent segments of tethered, dilated pelvic  small bowel segments. The appearance suggests contrast leak and likely  small bowel fistula formation from the anastomosis. No drainable abscess  or other fluid collection is demonstrated.     There is also a tiny amount of high attenuation contrast within the  lumen of a markedly thick-walled urinary bladder compatible with bladder  fistula. There is also mild dilatation of both ureters and both renal  collecting systems, not visible on the earlier study.     Moderately severe dilatation of multiple small bowel loops within the  lower abdomen and pelvis likely representing adynamic ileus due or  partial obstruction due to pelvic inflammation.     Remainder the examination is unchanged since the earlier study. Moderate  splenomegaly. Liver and pancreas are unremarkable.       Impression:       1. CT findings compatible with ileorectal anastomosis leak and likely  small bowel fistula formation.  2. Faint GI contrast within the urinary bladder compatible with  colovesical fistula.  3. Extensive peritoneal inflammation throughout the lower abdomen and  pelvis. Moderate dilatation of pelvic small bowel segments, several of  which appear tethered to the region of the anastomosis.  4. Mild bilateral pyelocaliectasis and ureterectasis.  5. Splenomegaly.     This report was finalized on 12/18/2017 3:53 PM by Dr. Chencho Hnederson MD.       XR Chest 1 View [[id number]] Collected:  12/19/17 0613     Updated:  12/19/17 0616    Narrative:       CHEST X-RAY, 12/18/2017:     HISTORY:   PICC placement.     TECHNIQUE:  AP portable chest x-ray.      FINDINGS:  Newly placed right arm PICC tip is in good position in the low SVC.     Diffuse fibrosis throughout both lungs, greatest at the lung bases. Low  lung volumes. Mild cardiomegaly.       Impression:       1. PICC in good position.  2. Requested initial stat report to the ordering service from Dr. Rickie Kent at 2113 hours on 12/18/2017.     This report was finalized on 12/19/2017 6:14 AM by Dr. Chencho Henderson MD.       XR Abdomen KUB [[id number]] Collected:  12/29/17 0852     Updated:  12/29/17 0921    Narrative:       KUB     DATE: 12/28/2017     HISTORY: Abdominal surgery 1 week ago. Abdominal distention. Blood  coming from NG tube. Verify NG tube placement. Possible ileus.     COMPARISON: CT abdomen and pelvis 12/18/2017.     FINDINGS: Midline laparotomy staples are present. NG tube tip projects  into the region of the proximal gastric body, tip approximately 6 cm  below the EG junction. The sidehole of the NG tube is thought to be  located at the EG junction.     Drainage catheter projects over the left lateral abdomen.  Cholecystectomy changes are present. Mild generalized gaseous distention  of predominantly small bowel loops, favored to represent mild  postoperative ileus. No gross free air is seen. Probable minimal left  basilar atelectasis. Degenerative endplate changes in the imaged spine.  Calcified phleboliths in the pelvis to the right. Presumed Ayoub  catheter in place. Drainage catheter also is seen projecting just to the  right of midline within the pelvis.       Impression:       1. No acute findings in the abdomen.  2. Mild generalized gaseous distention of bowel, predominantly small  bowel, favored to represent mild postoperative ileus.  3. NG tube tip about 6 cm below the EG junction with sidehole at the  level of the EG junction.  4. Preliminary report provided by Dr. Kumari on 12/28/2017 at 1930  hours.     This report was finalized on 12/29/2017 9:19 AM by Dr. Singh  MD Bossman.       XR Knee 1 or 2 View Bilateral [[id number]] Collected:  01/01/18 0751     Updated:  01/01/18 0755    Narrative:       EXAM: 2 views right knee. 2 views left knee.     DATE: 12/31/2017     HISTORY: Bilateral knee pain for 10 years. No trauma.     COMPARISON: None     FINDINGS: Right knee demonstrates advanced medial compartment joint  space narrowing, moderate patellofemoral compartment joint space  narrowing. Prominent medial compartment and patellofemoral compartment  osteophyte formation. No acute fracture joint dislocation is seen.  Chondrocalcinosis changes in the lateral meniscus. Advanced calcific  atherosclerotic changes are present. No definite joint effusion.     Left knee demonstrates moderate patellofemoral compartment and  moderately advanced medial and lateral compartment joint space  narrowing. Prominent posterior patellar osteophyte formation with  smaller marginal ossified the medial and lateral compartments.  Subchondral cystic change in the lateral compartment. No definite joint  effusion. Advanced calcific atherosclerosis.       Impression:       1. Moderate to moderately advanced at degenerative changes of the knees,  as described in detail in the report. No acute findings.     This report was finalized on 1/1/2018 7:53 AM by Dr. Samantha Keita MD.       XR Hand 3+ View Left [[id number]] Updated:  01/03/18 2008                    Assessment/Plan x-ray the left hand does show severe arthritis involving the metacarpocarpal joint of the left thumb.    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  He is more synovitis than free fluid in the knee.  Would continue the Voltaren gel to both knees and now also applied to the left thumb to obtain best results as far as relief of his pain and discomfort    Akshat Gandara MD  01/04/18  6:56 AM

## 2018-01-04 NOTE — PROGRESS NOTES
"Hospitalist Team      Patient Care Team:  David Cedillo MD as PCP - General (Family Medicine)  Anupam Green MD as Consulting Physician (Hematology and Oncology)  Bennie Galo MD as Referring Physician (Orthopedic Surgery)        Chief Complaint:  F/U Status post exploratory laparotomy, extensive lysis of adhesions, resection multiple fistulas, small bowel resection and ileostomy     Subjective    Interval History and ROS:     Patient's po intake is improving slowly, nutrition is following. Patient still notes increased abdominal pain after po intake that has not really improved with carafate and PPI. He is now off TPN. Low grade temp to 100.3 yesterday afternoon.  Denies any N/V, CP or SOA. Still with significant pain in knees and now left wrist.       Objective    Vital Signs  Temp:  [97.2 °F (36.2 °C)-100.3 °F (37.9 °C)] 98.7 °F (37.1 °C)  Heart Rate:  [] 90  Resp:  [18] 18  BP: (101-143)/(61-69) 101/61  Oxygen Therapy  SpO2: 95 %  Pulse Oximetry Type: Continuous  O2 Device: room air      Flowsheet Rows         First Filed Value    Admission Height  177.8 cm (70\") Documented at 12/15/2017 1300    Admission Weight  57.3 kg (126 lb 4.8 oz) Documented at 12/15/2017 1300            Physical Exam:  Constitutional: Patient appears well-developed and in no acute distress   HEENT:   Head: Normocephalic and atraumatic.   Eyes:  Pupils are equal, round, and reactive to light. EOM are intact. Sclera are anicteric and non-injected.  Mouth and Throat: Patient has moist mucous membranes.   Neck: Neck supple. No JVD present.   Cardiovascular: Regular rate, regular rhythm, S1 normal and S2 normal.  Exam reveals no gallop and no friction rub.  No murmur heard.  Pulmonary/Chest: Lungs are clear to auscultation bilaterally. No respiratory distress. No wheezes. No rhonchi. No rales.   Abdominal: Soft. Bowel sounds are normal. Iliostomy is functioning and incision is C/D/I.   Extremities: Mild edema anterior right " knee.   Neurological: Patient is alert and oriented to person, place, and time.   Skin: Skin is warm. Nails show no clubbing. No cyanosis or erythema.    Results Review:     I reviewed the patient's new clinical results.    Lab Results (last 24 hours)     Procedure Component Value Units Date/Time    POC Glucose Once [[id number]]  (Normal) Collected:  01/03/18 1152    Specimen:  Blood Updated:  01/03/18 1203     Glucose 121 mg/dL     Narrative:       Meter: [id number] : [id number] Tutu Maryuri NA CERT.    POC Glucose Once [[id number]]  (Normal) Collected:  01/03/18 1859    Specimen:  Blood Updated:  01/03/18 1906     Glucose 123 mg/dL     Narrative:       Meter: [id number] : [id number] Tutu Maryuri NA CERT.    POC Glucose Once [[id number]]  (Normal) Collected:  01/04/18 0016    Specimen:  Blood Updated:  01/04/18 0022     Glucose 115 mg/dL     Narrative:       Meter: [id number] : [id number] Tapan Interiano CNA    Basic Metabolic Panel [[id number]]  (Abnormal) Collected:  01/04/18 0450    Specimen:  Blood Updated:  01/04/18 0520     Glucose 91 mg/dL      BUN 16 mg/dL      Creatinine 0.63 (L) mg/dL      Sodium 136 mmol/L      Potassium 3.9 mmol/L      Chloride 101 mmol/L      CO2 25.7 mmol/L      Calcium 8.1 (L) mg/dL      eGFR Non African Amer 122 mL/min/1.73      BUN/Creatinine Ratio 25.4 (H)     Anion Gap 9.3 mmol/L     Narrative:       The MDRD GFR formula is only valid for adults with stable renal function between ages 18 and 70.    Magnesium [[id number]]  (Abnormal) Collected:  01/04/18 0450    Specimen:  Blood Updated:  01/04/18 0520     Magnesium 1.6 (L) mg/dL     Phosphorus [[id number]]  (Normal) Collected:  01/04/18 0450    Specimen:  Blood Updated:  01/04/18 0520     Phosphorus 2.7 mg/dL     POC Glucose Once [[id number]]  (Normal) Collected:  01/04/18 0644    Specimen:  Blood Updated:  01/04/18 0655     Glucose 93 mg/dL     Narrative:       Meter: [id number] : [id number] Tapan Interiano CNA     CBC & Differential [[id number]] Collected:  01/04/18 0450    Specimen:  Blood Updated:  01/04/18 0701    Narrative:       The following orders were created for panel order CBC & Differential.  Procedure                               Abnormality         Status                     ---------                               -----------         ------                     Scan Slide[[id number]]                                       Final result               CBC Auto Differential[[id number]]        Abnormal            Final result                 Please view results for these tests on the individual orders.    CBC Auto Differential [[id number]]  (Abnormal) Collected:  01/04/18 0450    Specimen:  Blood Updated:  01/04/18 0701     WBC 5.14 10*3/mm3      RBC 2.90 (L) 10*6/mm3      Hemoglobin 9.3 (L) g/dL      Hematocrit 29.0 (L) %      .0 (H) fL      MCH 32.1 (H) pg      MCHC 32.1 g/dL      RDW 23.7 (H) %      RDW-SD 83.4 (H) fl      MPV 13.4 (H) fL      Platelets 77 (L) 10*3/mm3      Neutrophil % 42.8 (L) %      Lymphocyte % 13.8 (L) %      Monocyte % 41.8 (H) %      Eosinophil % 0.2 %      Basophil % 0.2 %      Immature Grans % 1.2 (H) %      Neutrophils, Absolute 2.20 10*3/mm3      Lymphocytes, Absolute 0.71 10*3/mm3      Monocytes, Absolute 2.15 (H) 10*3/mm3      Eosinophils, Absolute 0.01 (L) 10*3/mm3      Basophils, Absolute 0.01 10*3/mm3      Immature Grans, Absolute 0.06 (H) 10*3/mm3      nRBC 0.0 /100 WBC     Scan Slide [[id number]] Collected:  01/04/18 0450    Specimen:  Blood Updated:  01/04/18 0701     Anisocytosis Slight/1+     Polychromasia Slight/1+     WBC Morphology Normal     Platelet Estimate Decreased          Imaging Results (last 24 hours)     Procedure Component Value Units Date/Time    XR Hand 3+ View Left [[id number]] Collected:  01/04/18 0804     Updated:  01/04/18 0819    Narrative:       FIVE VIEWS OF THE LEFT HAND     DATE: 01/03/2018.     HISTORY: 83-year-old male with left thumb pain with any  movement. No  known trauma. Pain has been present for months.     FINDINGS: No acute fracture or joint dislocation is seen. Advanced  osteoarthritic changes are present at the 1st carpometacarpal joint with  joint space narrowing, articular sclerosis, and prominent marginal  osteophyte formation. Moderate osteoarthritic type changes are also  demonstrated at the 2nd metacarpophalangeal joint. Mild-to-moderate  osteoarthritic type changes in the remainder of the metacarpophalangeal  joints and interphalangeal joints. Mild degenerative narrowing of the  radiocarpal joint. Advanced calcific atherosclerotic changes are  present. No retained radiopaque foreign body is seen.     IMPRESSION  Osteoarthritic changes within the left hand and wrist, greatest at the  1st carpometacarpal joint. No acute findings.     This report was finalized on 1/4/2018 8:17 AM by Dr. Samantha Keita MD.           ECG/EMG Results (most recent)     Procedure Component Value Units Date/Time    ECG 12 Lead [[id number]] Collected:  12/18/17 0905     Updated:  12/18/17 1000    Narrative:       RR Interval= 909 ms  WV Interval= 169 ms  QRSD Interval= 146 ms  QT Interval= 472 ms  QTc Interval= 495 ms  Heart Rate= 66 ms  P Axis= 69 deg  QRS Axis= -14 deg  T Wave Axis= 18 deg  I: 40 Axis= 27 deg  T: 40 Axis= 234 deg  ST Axis= deg  SINUS RHYTHM  RBBB AND LAFB  NO SIGNIFICANT CHANGE FROM PREVIOUS ECG  Electronically Signed by:  [facility] ([facility] 18-Dec-2017 09:59:48  Date and Time of Study: 2017-12-18 09:05:54    Adult Transthoracic Echo Complete W/ Cont if Necessary Per Protocol [[id number]] Collected:  12/19/17 1633     Updated:  12/19/17 2057     BSA 1.8 m^2      IVSd 1.2 cm      LVIDd 5.5 cm      LVIDs 4.0 cm      LVPWd 1.4 cm      IVS/LVPW 0.85     FS 26.6 %      EDV(Teich) 145.0 ml      ESV(Teich) 70.4 ml      EF(Teich) 51.4 %      EDV(cubed) 162.8 ml      ESV(cubed) 64.5 ml      EF(cubed) 60.4 %      LV mass(C)d 292.8 grams      LV mass(C)dI  166.8 grams/m^2      SV(Teich) 74.5 ml      SI(Teich) 42.5 ml/m^2      SV(cubed) 98.3 ml      SI(cubed) 56.0 ml/m^2      Ao root diam 4.5 cm      Ao root area 15.9 cm^2      ACS 2.1 cm      LVOT diam 2.1 cm      LVOT area 3.5 cm^2      LVOT area(traced) 3.5 cm^2      RVOT diam 1.8 cm      RVOT area 2.5 cm^2      LVLd ap4 8.4 cm      EDV(MOD-sp4) 156.0 ml      LVLs ap4 7.2 cm      ESV(MOD-sp4) 97.8 ml      EF(MOD-sp4) 37.3 %      LVLd ap2 7.8 cm      EDV(MOD-sp2) 146.0 ml      LVLs ap2 6.7 cm      ESV(MOD-sp2) 90.3 ml      EF(MOD-sp2) 38.2 %      SV(MOD-sp4) 58.2 ml      SI(MOD-sp4) 33.2 ml/m^2      SV(MOD-sp2) 55.7 ml      SI(MOD-sp2) 31.7 ml/m^2      Ao root area (BSA corrected) 2.6     Ao root area (BSA corrected) 38.2 ml/m^2      CONTRAST EF 4CH 37.3 ml/m^2      LV Diastolic corrected for BSA 88.9 ml/m^2      LV Systolic corrected for BSA 55.7 ml/m^2      MV A dur 0.1 sec      MV E max pj 70.3 cm/sec      MV A max pj 52.4 cm/sec      MV E/A 1.3     MV V2 max 63.1 cm/sec      MV max PG 1.6 mmHg      MV V2 mean 42.6 cm/sec      MV mean PG 1.0 mmHg      MV V2 VTI 19.1 cm      MVA(VTI) 2.9 cm^2      MV P1/2t max pj 67.9 cm/sec      MV P1/2t 72.6 msec      MVA(P1/2t) 3.0 cm^2      MV dec slope 274.0 cm/sec^2      MV dec time 0.23 sec      Ao pk pj 119.0 cm/sec      Ao max PG 5.7 mmHg      Ao max PG (full) 3.5 mmHg      Ao V2 mean 82.4 cm/sec      Ao mean PG 3.0 mmHg      Ao mean PG (full) 2.0 mmHg      Ao V2 VTI 25.7 cm      RICHIE(I,A) 2.2 cm^2      RICHIE(I,D) 2.2 cm^2      RICHIE(V,A) 2.2 cm^2      RICHIE(V,D) 2.2 cm^2      LV V1 max PG 2.2 mmHg      LV V1 mean PG 1.0 mmHg      LV V1 max 73.9 cm/sec      LV V1 mean 47.2 cm/sec      LV V1 VTI 16.1 cm      MR max pj 587.5 cm/sec      MR max .2 mmHg      SV(Ao) 408.7 ml      SI(Ao) 232.9 ml/m^2      SV(LVOT) 55.8 ml      SV(RVOT) 30.0 ml      SI(LVOT) 31.8 ml/m^2      PA V2 max 74.7 cm/sec      PA max PG 2.2 mmHg      PA max PG (full) 1.0 mmHg      BH CV ECHO JENNIFER -  PVA(V,A) 1.9 cm^2       CV ECHO JENNIFER - PVA(V,D) 1.9 cm^2      PA acc time 0.13 sec      RV V1 max PG 1.2 mmHg      RV V1 mean PG 1.0 mmHg      RV V1 max 54.7 cm/sec      RV V1 mean 35.9 cm/sec      RV V1 VTI 11.8 cm      TR max aren 255.0 cm/sec      RVSP(TR) 34.0 mmHg      RAP systole 8.0 mmHg      PA pr(Accel) 21.9 mmHg      Pulm Sys Aren 53.4 cm/sec      Pulm Hernandez Aren 41.5 cm/sec      Pulm S/D 1.3     Qp/Qs 0.54     Pulm A Revs Dur 0.1 sec      Pulm A Revs Aren 23.7 cm/sec      MVA P1/2T LCG 3.2 cm^2       CV ECHO JENNIFER - BZI_BMI 19.1 kilograms/m^2       CV ECHO JENNIFER - BSA(HAYCOCK) 1.7 m^2       CV ECHO JENNIFER - BZI_METRIC_WEIGHT 60.3 kg       CV ECHO JENNIFER - BZI_METRIC_HEIGHT 177.8 cm      Target HR (85%) 116 bpm      Max. Pred. HR (100%) 137 bpm       CV VAS BP RIGHT /43 mmHg      TDI S' 1.40 cm/sec      RV Base 3.90 cm      E/E' ratio 14.0     LA Volume Index 24.0 mL/m2      Lat Peak E' Aren 6.0 cm/sec      Med Peak E' Aren 4.00 cm/sec      TAPSE (>1.6) 1.70 cm2      Echo EF Estimated 38 %     Narrative:       · Left ventricular wall thickness is consistent with mild concentric   hypertrophy.  · Left ventricular systolic function is moderately decreased. Estimated EF   = 38%.  · Mild aortic valve regurgitation is present.  · Moderate mitral valve regurgitation is present  · Mild tricuspid valve regurgitation is present.             Medication Review:   I have reviewed the patient's current medication list    Current Facility-Administered Medications:   •  acetaminophen (TYLENOL) tablet 650 mg, 650 mg, Oral, Q6H PRN, Barbara Turner DO  •  clotrimazole-betamethasone (LOTRISONE) 1-0.05 % cream 1 application, 1 application, Topical, Q12H, Serjio Reynolds MD, 1 application at 01/04/18 0859  •  cyanocobalamin injection 1,000 mcg, 1,000 mcg, Intramuscular, Q28 Days, Barbara Turner DO, 1,000 mcg at 01/02/18 0824  •  dextrose (D50W) solution 25 g, 25 g, Intravenous, Q15 Min PRN, Kaylie Granados,  MD  •  dextrose (GLUTOSE) oral gel 15 g, 15 g, Oral, Q15 Min PRN, Kaylie Granados MD  •  diclofenac (VOLTAREN) 1 % gel 2 g, 2 g, Topical, 4x Daily, Akshat Gandara MD, 2 g at 01/04/18 0859  •  diphenhydrAMINE (BENADRYL) injection 12.5 mg, 12.5 mg, Intravenous, Q15 Min PRN, Bc Stockton CRNA  •  folic acid (FOLVITE) tablet 400 mcg, 400 mcg, Oral, Daily, Barbara Turner DO, 400 mcg at 01/04/18 0858  •  glucagon (GLUCAGEN) injection 1 mg, 1 mg, Subcutaneous, Q15 Min PRN, Kaylie Granados MD  •  HYDROmorphone (DILAUDID) injection 0.5 mg, 0.5 mg, Intravenous, Q1H PRN, Kaylie Granados MD, 0.5 mg at 01/04/18 0910  •  hydrophor (AQUAPHOR) ointment, , Topical, Q12H, Muna Addison, APRN  •  insulin aspart (novoLOG) injection 0-7 Units, 0-7 Units, Subcutaneous, Q6H, Kaylie Granados MD, 2 Units at 01/02/18 0112  •  lactobacillus acidophilus (RISAQUAD) capsule 1 capsule, 1 capsule, Oral, Daily, Barbara Turner DO, 1 capsule at 01/04/18 0858  •  Magnesium Sulfate 2 gram Bolus, followed by 8 gram infusion (total Mg dose 10 grams)- Mg less than or equal to 1mg/dL, 2 g, Intravenous, PRN **OR** Magnesium Sulfate 6 gram Infusion (2 gm x 3) -Mg 1.1 -1.5 mg/dL, 2 g, Intravenous, PRN **OR** magnesium sulfate 4 gram infusion- Mg 1.6-1.9 mg/dL, 4 g, Intravenous, PRN, Barbara Turner DO  •  magnesium sulfate 4 gram infusion - Mg less than or equal to 1mg/dL, 4 g, Intravenous, PRN, Last Rate: 25 mL/hr at 01/04/18 0900, 4 g at 01/04/18 0900 **OR** magnesium sulfate 3 gram infusion (1gm x 3) - Mg 1.1 - 1.5 mg/dL, 1 g, Intravenous, PRN, Last Rate: 100 mL/hr at 12/21/17 2342, 1 g at 12/21/17 2342 **OR** Magnesium Sulfate 2 gram infusion- Mg 1.6 - 1.9 mg/dL, 2 g, Intravenous, PRN, BENJAMIN Webb, Last Rate: 25 mL/hr at 12/31/17 1142, 2 g at 12/31/17 1142  •  metoprolol tartrate (LOPRESSOR) tablet 12.5 mg, 12.5 mg, Oral, Q12H, Barbara Turner DO, 12.5 mg at 01/04/18 0858  •  metroNIDAZOLE (FLAGYL) IVPB 500 mg,  500 mg, Intravenous, Q8H, Kaylie Granados MD, Last Rate: 0 mL/hr at 01/02/18 1616, 500 mg at 01/04/18 0553  •  midazolam (VERSED) injection 1 mg, 1 mg, Intravenous, Q5 Min PRN, Bc Stockton CRNA  •  multivitamin with minerals 1 tablet, 1 tablet, Oral, Daily, Barbara Turner DO, 1 tablet at 01/04/18 0858  •  naloxone (NARCAN) injection 0.1 mg, 0.1 mg, Intravenous, Q5 Min PRN, Kaylie Granados MD  •  ondansetron (ZOFRAN) injection 4 mg, 4 mg, Intravenous, Q6H PRN, BENJAMIN Webb  •  ondansetron (ZOFRAN) injection 4 mg, 4 mg, Intravenous, Once PRN, Bc Stockton CRNA  •  ondansetron (ZOFRAN) tablet 4 mg, 4 mg, Oral, Q6H PRN **OR** ondansetron ODT (ZOFRAN-ODT) disintegrating tablet 4 mg, 4 mg, Oral, Q6H PRN **OR** ondansetron (ZOFRAN) injection 4 mg, 4 mg, Intravenous, Q6H PRN, Kaylie Granados MD, 4 mg at 12/22/17 0555  •  oxyCODONE-acetaminophen (PERCOCET) 5-325 MG per tablet 1 tablet, 1 tablet, Oral, Q4H PRN, Kaylie Granados MD, 1 tablet at 01/02/18 0821  •  pantoprazole (PROTONIX) injection 40 mg, 40 mg, Intravenous, BID, Radha Gibbs MD, 40 mg at 01/04/18 0857  •  phenol (CHLORASEPTIC) 1.4 % liquid 2 spray, 2 spray, Mouth/Throat, Q2H PRN, Serjio Reynolds MD, 2 spray at 12/26/17 2334  •  piperacillin-tazobactam (ZOSYN) 4.5 g in sodium chloride 0.9 % 100 mL IVPB, 4.5 g, Intravenous, Q8H, Kaylie Granados MD, Last Rate: 0 mL/hr at 01/02/18 1410, 4.5 g at 01/04/18 0900  •  potassium chloride (K-DUR,KLOR-CON) CR tablet 40 mEq, 40 mEq, Oral, PRN, Barbara Turner, DO  •  potassium chloride (KLOR-CON) packet 40 mEq, 40 mEq, Oral, PRN, Barbara Turner, DO  •  potassium chloride 20 mEq in 50 mL IVPB, 20 mEq, Intravenous, Q1H PRN, Muna Addison, APRN, Last Rate: 50 mL/hr at 12/31/17 1220, 20 mEq at 12/31/17 1220  •  sodium chloride 0.9 % flush 1-10 mL, 1-10 mL, Intravenous, PRN, Serjio Reynolds MD, 10 mL at 12/24/17 0601  •  Insert peripheral IV, , , Once **AND** sodium chloride  0.9 % flush 10 mL, 10 mL, Intravenous, PRN, Vladimir Douglas MD, 10 mL at 12/25/17 0857  •  sodium chloride 0.9 % infusion 40 mL, 40 mL, Intravenous, PRN, Serjio Reynolds MD  •  sucralfate (CARAFATE) tablet 1 g, 1 g, Oral, 4x Daily AC & at Bedtime, Kenya Marks MD, 1 g at 01/04/18 0857      Assessment/Plan     1. Status post exploratory laparotomy, extensive lysis of adhesions, resection multiple fistulas, small bowel resection and ileostomy: management per surgery, urology  (5/26/17: S/P exploratory laparotomy/exploration of old hernia mesh/subtotal colectomy with ileorectal anastamosis/mobilization of splenic flexure/intraoperative colonoscopy and rigid sigmoidoscopy/proctoscopy secondary to recurrent diverticular bleeds)  On Zosyn and Flagyl (I spoke with Dr. Granados and patient >14 days of both Abx, will plan to D/C after drains pulled tomorrow). TPN has been stopped. On PPI  Patient on regular diet and intake is slowly improving.  Is tolerating ensure drinks. Continue carafate.   WBC is not elevated and patient with low grade temp overnight which I suspect is secondary to atelectasis. Encourage IS. Plan to rehab at [place] when approved.      2. Acute blood loss anemia with low grade myelodysplastic syndrome and chronic pancytopenia: h/o MDS, B12, Folate deficiencies, extreme monocytosis, hypercoagulopathy and vitamin K deficiency: CBC group following S/P 2 units PRBCs on 12/24/17 and 1 unit PRBCs on 12/30/17  Vitamin K and FFP given per hematology,   No DIC, Hb increased today, monitor and await any further Heme/Onc recs. Plt's stable.      3. Chronic diastolic dysfunction, PAF, PSVT, Non-rheumatic aortic valve insufficiency, chronic RBBB, MR and AR: cardiology followed pre-op, now signed off. Remains in NSR on metoprolol 12.5 po BID.  Monitor.     4. Hypertension: Mostly at goal on po metoprolol. Monitor.      5. Severe chronic illness malnutrition: TPN now. Encouraging po. Nutrition  following. On supplements and calorie count in progress.      6. Hypokalemia and hypophosphatemia (electrolyte imbalance): On potassium/mag protocols  Monitor closely as patient is now off TPN.      7. Bilateral knee and left wrist pain secondary to OA: Per Dr. Gandara more synovitis than effusion. Continue ice/heat, and voltaren gel. Not much improvement. Continue PT/OT. Limited treatment options at this point. Monitor.    Plan for disposition: To [facility].    Kenya Marks MD  01/04/18  9:44 AM

## 2018-01-04 NOTE — PROGRESS NOTES
Adult Nutrition  Assessment/PES    Patient Name:  Jason Zelaya  YOB: 1934  MRN: [id number]  Admit Date:  12/15/2017    Assessment Date:  1/4/2018              Reason for Assessment       01/04/18 1135    Reason for Assessment    Reason For Assessment/Visit calorie count order          Diet: Regular with ENSURE PLUS 3 per day    Estimated needs: 5428-6986 kcal,  gm pro    TPN: D/C    Day three:  1022 kcal, 34 gm pro ( 2 meals, 2 supplements)    53% kcal, 36% pro    Encourage po and oral supplement          Electronically signed by:  Roseanne Clayton RD  01/04/18 11:36 AM

## 2018-01-05 NOTE — NURSING NOTE
Continued Stay Note  RENATA Wheatley     Patient Name: Jason Zelaya  MRN: [id number]  Today's Date: 1/5/2018    Admit Date: 12/15/2017          Discharge Plan       01/05/18 1528    Case Management/Social Work Plan    Additional Comments spoke with Nita Arnett and she is aware that patient is discharging this afternoon. awaiting dc summary. please call report to [phone]. will continue to follow.               Discharge Codes     None        Expected Discharge Date and Time     Expected Discharge Date Expected Discharge Time    Jan 5, 2018             Phyllis Mayen RN

## 2018-01-05 NOTE — DISCHARGE INSTR - APPOINTMENTS
General Surgery -- Dr. COSMO Hebert      Follow Up 1 Week    Follow Up Details   Please call the office at [phone] to make the appointment

## 2018-01-05 NOTE — NURSING NOTE
Continued Stay Note  RENATA Wheatley     Patient Name: Jason Zelaya  MRN: [id number]  Today's Date: 1/5/2018    Admit Date: 12/15/2017          Discharge Plan       01/05/18 0933    Case Management/Social Work Plan    Plan Isaura obtained    Additional Comments spoke with Nita Arnett and they have obtained precert. patient can be accepted starting today through the weekend. spoke with Dr Marks and she is aware. Left mesage for Dr Granados, awaiting return call. will continue to follow.              Discharge Codes     None            Phyllis Mayen RN

## 2018-01-05 NOTE — SIGNIFICANT NOTE
01/05/18 1330   Rehab Treatment   Discipline physical therapist   Treatment Not Performed other (see comments)  (Pt being discharged to [facility] today. Pt in sig pain, requesting to transfer to bed. Attempted to initaite transfer however MD presented, states incision needed reinforcement prior to mobility. RN notified, transfer to be completed by nursing)

## 2018-01-05 NOTE — DISCHARGE SUMMARY
Jason Zelaya  1934  [id number]        Hospitalists Discharge Summary    Date of Admission: 12/15/2017  Date of Discharge:  1/5/2018    Primary Discharge Diagnoses:   1. Entero-enteric, entero-rectal and colovesical fistulas status post exploratory laparotomy, extensive lysis of adhesions, resection multiple fistulas, small bowel resection and ileostomy  2. Acute blood loss anemia with low grade myelodysplastic syndrome and chronic pancytopenia    Secondary Discharge Diagnoses:   3. Chronic diastolic dysfunction, PAF, PSVT, Non-rheumatic aortic valve insufficiency, chronic RBBB, MR and AR  4. Hypertension    5. Severe chronic illness malnutrition    6. Hypokalemia and hypophosphatemia (electrolyte imbalance)    7. Bilateral knee and left wrist pain secondary to OA    PCP  Patient Care Team:  David Cedillo MD as PCP - General (Family Medicine)  Anupam Green MD as Consulting Physician (Hematology and Oncology)  Bennie Galo MD as Referring Physician (Orthopedic Surgery)    Consults:   Consults     Date and Time Order Name Status Description    1/1/2018 1035 Inpatient Consult to Orthopedic Surgery      12/28/2017 1845 Inpatient Consult to Gastroenterology      12/24/2017 0755 Inpatient Consult to Hematology & Oncology Completed     12/18/2017 0841 Inpatient Consult to Cardiology Completed     12/17/2017 1523 Inpatient Consult to General Surgery Completed     12/17/2017 1052 Inpatient Consult to Urology Completed           Operations and Procedures Performed:  Procedure(s):  LAPAROTOMY EXPLORATORY -  placement of strattice 6x6 small bowel resection of fistulas and anastamosis, oversew of rectal stump ,  extensive lysis of adhesions,  resection of fistulas, small bowel resectionsx2 and side to side anatomosis, ileostomy placement   CYSTOSCOPY bilateral URETERAL CATHETER INSERTION     Ct Abdomen Pelvis Without Contrast    Result Date: 12/18/2017  Narrative: CT ABDOMEN AND PELVIS, NONCONTRAST,  12/17/2017:  HISTORY: 83-year-old male status post near-total colectomy with ileorectal anastomosis May 2017 for bleeding colonic diverticular disease. Recently noted fecal matter within urine. 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 As a history of myelodysplastic syndrome.  TECHNIQUE: CT examination of the abdomen and pelvis was performed with oral contrast only. IV contrast was not administered. Radiation dose reduction techniques were utilized, including automated exposure control and exposure modulation based on body size.  COMPARISON: *  CT abdomen/pelvis, 11/15/2017 and 6/5/2017.  ABDOMEN FINDINGS: Stable mild splenomegaly. Liver, pancreas, spleen and kidneys are otherwise negative. No evidence of upper urinary tract obstruction. Cholecystectomy. No bile duct dilatation. Normal caliber abdominal aorta.  PELVIS FINDINGS: Extensive inflammation is present throughout the mid and lower pelvis centered on the patient's ileorectal anastomosis. Multiple loops of inflamed small bowel appear tethered in the mid pelvic midline near the anastomosis and show moderate dilatation and diffuse wall thickening. There is also marked thickening of adjacent urinary bladder wall. Some ill-defined linear tracts of oral contrast extends from the region of the anastomosis toward several mid pelvic small bowel loops and could potentially represent fistulae, although decompressed, inflamed small bowel segments are also possible. There may be some very faint contrast material within the urinary bladder near the Ayoub catheter balloon, but a definitive bladder fistula is not identified. Repeat CT examination with rectal contrast may be helpful to better delineate presumed colovesical fistula and any additional fistulae near the ileorectal anastomosis. Extensive inflammatory soft tissue stranding is present throughout the pelvis, but no abscess or other drainable fluid collection is identified.  Limited images of the lower chest show mild diffuse  chronic interstitial pulmonary fibrosis.      Impression: 1. Postop changes near total colectomy with ileorectal anastomosis. 2. Extensive inflammation within the midline pelvis near the anastomosis involving multiple small bowel loops and the urinary bladder. 3. Findings suspicious for small bowel fistulae and possible colorectal fistula near the anastomosis. Repeat CT examination with rectal contrast may be helpful. 4. No abscess or other undrained fluid collection. 5. Stable splenomegaly. 6. Pulmonary fibrosis. 7. Initial stat report to the ordering service from Dr. Rafiq Blue at 1428 hours on 12/17/2017.  This report was finalized on 12/18/2017 7:27 AM by Dr. Chencho Henderson MD.      Xr Hand 3+ View Left    Result Date: 1/4/2018  Narrative: FIVE VIEWS OF THE LEFT HAND  DATE: 01/03/2018.  HISTORY: 83-year-old male with left thumb pain with any movement. No known trauma. Pain has been present for months.  FINDINGS: No acute fracture or joint dislocation is seen. Advanced osteoarthritic changes are present at the 1st carpometacarpal joint with joint space narrowing, articular sclerosis, and prominent marginal osteophyte formation. Moderate osteoarthritic type changes are also demonstrated at the 2nd metacarpophalangeal joint. Mild-to-moderate osteoarthritic type changes in the remainder of the metacarpophalangeal joints and interphalangeal joints. Mild degenerative narrowing of the radiocarpal joint. Advanced calcific atherosclerotic changes are present. No retained radiopaque foreign body is seen.  IMPRESSION Osteoarthritic changes within the left hand and wrist, greatest at the 1st carpometacarpal joint. No acute findings.  This report was finalized on 1/4/2018 8:17 AM by Dr. Samantha Keita MD.      Ct Head Without Contrast    Result Date: 12/15/2017  Narrative: UNENHANCED HEAD CT:  12/15/2017 3:09 PM  HISTORY: Fall. Found down. Slipped on a rug. Trauma.  TECHNIQUE: Axial unenhanced head CT. Radiation dose  reduction techniques were utilized, including automated exposure control and exposure modulation based on body size.  COMPARISON: None  FINDINGS: No intracranial hemorrhage, mass, or infarct. No hydrocephalus or extra-axial fluid collection. There is mild generalized atrophy and chronic small vessel changes. The skull base, calvarium, and extracranial soft tissues are normal.      Impression: Normal, negative unenhanced head CT.       This report was finalized on 12/15/2017 3:10 PM by Dr. Rafiq Blue MD.      Ct Abdomen Pelvis With Contrast    Result Date: 12/18/2017  Narrative: CT ABDOMEN AND PELVIS WITH IV AND RECTAL CONTRAST, 12/18/2017:  HISTORY: 83-year-old male status post near total colectomy and ileorectal anastomosis May 2017. Recently noted stool in urine. Assess for colovesical fistula.  TECHNIQUE: CT examination of the abdomen and pelvis was performed with IV contrast following installation of 500 cc rectal GI contrast. Radiation dose reduction techniques were utilized, including automated exposure control and exposure modulation based on body size.  COMPARISON: *  CT abdomen/pelvis, 12/17/2017.  FINDINGS: The examination shows small irregular linear collections of GI contrast and air extending to the right and left from the rectal anastomosis in the midline pelvis towards adjacent segments of tethered, dilated pelvic small bowel segments. The appearance suggests contrast leak and likely small bowel fistula formation from the anastomosis. No drainable abscess or other fluid collection is demonstrated.  There is also a tiny amount of high attenuation contrast within the lumen of a markedly thick-walled urinary bladder compatible with bladder fistula. There is also mild dilatation of both ureters and both renal collecting systems, not visible on the earlier study.  Moderately severe dilatation of multiple small bowel loops within the lower abdomen and pelvis likely representing adynamic ileus due or  partial obstruction due to pelvic inflammation.  Remainder the examination is unchanged since the earlier study. Moderate splenomegaly. Liver and pancreas are unremarkable.      Impression: 1. CT findings compatible with ileorectal anastomosis leak and likely small bowel fistula formation. 2. Faint GI contrast within the urinary bladder compatible with colovesical fistula. 3. Extensive peritoneal inflammation throughout the lower abdomen and pelvis. Moderate dilatation of pelvic small bowel segments, several of which appear tethered to the region of the anastomosis. 4. Mild bilateral pyelocaliectasis and ureterectasis. 5. Splenomegaly.  This report was finalized on 12/18/2017 3:53 PM by Dr. Chencho Henderson MD.      Xr Chest 1 View    Result Date: 12/19/2017  Narrative: CHEST X-RAY, 12/18/2017:  HISTORY: PICC placement.  TECHNIQUE: AP portable chest x-ray.  FINDINGS: Newly placed right arm PICC tip is in good position in the low SVC.  Diffuse fibrosis throughout both lungs, greatest at the lung bases. Low lung volumes. Mild cardiomegaly.      Impression: 1. PICC in good position. 2. Requested initial stat report to the ordering service from Dr. Rickie Kent at 2113 hours on 12/18/2017.  This report was finalized on 12/19/2017 6:14 AM by Dr. Chencho Henderson MD.      Xr Knee 1 Or 2 View Bilateral    Result Date: 1/1/2018  Narrative: EXAM: 2 views right knee. 2 views left knee.  DATE: 12/31/2017  HISTORY: Bilateral knee pain for 10 years. No trauma.  COMPARISON: None  FINDINGS: Right knee demonstrates advanced medial compartment joint space narrowing, moderate patellofemoral compartment joint space narrowing. Prominent medial compartment and patellofemoral compartment osteophyte formation. No acute fracture joint dislocation is seen. Chondrocalcinosis changes in the lateral meniscus. Advanced calcific atherosclerotic changes are present. No definite joint effusion.  Left knee demonstrates moderate patellofemoral  compartment and moderately advanced medial and lateral compartment joint space narrowing. Prominent posterior patellar osteophyte formation with smaller marginal ossified the medial and lateral compartments. Subchondral cystic change in the lateral compartment. No definite joint effusion. Advanced calcific atherosclerosis.      Impression: 1. Moderate to moderately advanced at degenerative changes of the knees, as described in detail in the report. No acute findings.  This report was finalized on 1/1/2018 7:53 AM by Dr. Samantha Keita MD.      Xr Abdomen Kub    Result Date: 12/29/2017  Narrative: KUB  DATE: 12/28/2017  HISTORY: Abdominal surgery 1 week ago. Abdominal distention. Blood coming from NG tube. Verify NG tube placement. Possible ileus.  COMPARISON: CT abdomen and pelvis 12/18/2017.  FINDINGS: Midline laparotomy staples are present. NG tube tip projects into the region of the proximal gastric body, tip approximately 6 cm below the EG junction. The sidehole of the NG tube is thought to be located at the EG junction.  Drainage catheter projects over the left lateral abdomen. Cholecystectomy changes are present. Mild generalized gaseous distention of predominantly small bowel loops, favored to represent mild postoperative ileus. No gross free air is seen. Probable minimal left basilar atelectasis. Degenerative endplate changes in the imaged spine. Calcified phleboliths in the pelvis to the right. Presumed Ayoub catheter in place. Drainage catheter also is seen projecting just to the right of midline within the pelvis.      Impression: 1. No acute findings in the abdomen. 2. Mild generalized gaseous distention of bowel, predominantly small bowel, favored to represent mild postoperative ileus. 3. NG tube tip about 6 cm below the EG junction with sidehole at the level of the EG junction. 4. Preliminary report provided by Dr. Kumari on 12/28/2017 at 1930 hours.  This report was finalized on 12/29/2017 9:19 AM by  Dr. Samantha Keita MD.        Allergies:  has No Known Allergies.    Song  reviewed    Discharge Medications:   Jason Zelaya   Home Medication Instructions TALI:[id number]    Printed on:01/05/18 9025   Medication Information                      acetaminophen (TYLENOL) 325 MG tablet  Take 650 mg by mouth every 6 (six) hours as needed for mild pain (1-3).             cholestyramine (QUESTRAN) 4 g packet  Take 1 packet by mouth 2 (Two) Times a Day With Meals.             clotrimazole-betamethasone (LOTRISONE) 1-0.05 % cream  Apply 1 application topically Every 12 (Twelve) Hours.             cyanocobalamin 1000 MCG/ML injection  Inject 1 mL into the shoulder, thigh, or buttocks Every 28 (Twenty-Eight) Days.             diclofenac (VOLTAREN) 1 % gel gel  Apply 2 g topically 4 (Four) Times a Day.             folic acid (FOLVITE) 400 MCG tablet  Take 400 mcg by mouth Daily.             hydrophor (AQUAPHOR) ointment ointment  Apply  topically Every 12 (Twelve) Hours.             lactobacillus acidophilus (RISAQUAD) capsule capsule  Take 1 capsule by mouth Daily.             magnesium oxide (MAGOX) 400 (241.3 Mg) MG tablet tablet  Take 1 tablet by mouth Daily.             metoprolol tartrate (LOPRESSOR) 25 MG tablet  Take 0.5 tablets by mouth Every 12 (Twelve) Hours.             MULTIPLE VITAMINS-MINERALS PO  Take 1 tablet by mouth Daily.             ondansetron (ZOFRAN) 4 MG tablet  Take 1 tablet by mouth Every 6 (Six) Hours As Needed for Nausea or Vomiting.             oxyCODONE-acetaminophen (PERCOCET) 5-325 MG per tablet  Take 1 tablet by mouth Every 6 (Six) Hours As Needed for Severe Pain  (Pain). May take 2 tablet prior to PT if needed             pantoprazole (PROTONIX) 40 MG EC tablet  Take 1 tablet by mouth Daily.             potassium phosphate, monobasic, (K-PHOS) 500 MG tablet  Take 1 tablet by mouth 2 (Two) Times a Day Before Meals.             sucralfate (CARAFATE) 1 g tablet  Take 1 tablet by mouth  4 (Four) Times a Day Before Meals & at Bedtime.                 History of Present Illness: (from H&P)  Patient complains of abnormal smelling urine, foul smelling urine, hesitancy, inability to void, incomplete bladder emptying and incontinence He has had symptoms for several months. Patient also complains of back pain. Patient denies fever. Patient does have a history of recurrent UTI.  Patient does have a history of pyelonephritis. Self caths urine at home  Fell to floor last night, could not get up until son lifted him up this am  chronically ill man in decline.    Hospital Course  1. Entero-enteric, entero-rectal and colovesical fistulas status post exploratory laparotomy, extensive lysis of adhesions, resection multiple fistulas, small bowel resection and ileostomy: management per surgery and urology here.  (of note 5/26/17: S/P exploratory laparotomy/exploration of old hernia mesh/subtotal colectomy with ileorectal anastamosis/mobilization of splenic flexure/intraoperative colonoscopy and rigid sigmoidoscopy/proctoscopy secondary to recurrent diverticular bleeds)  Patient was very ill initially and in ICU post-op. He has improved but continues to have issues with nutrition and with OA and joint pain that limits his mobility. On Zosyn and Flagyl here and completed a >14 day course.  I spoke with Dr. Granados today and will D/C Abx's. Patient was on TPN but that has now been stopped and patient is tolerating po. On PPI. Nutrition will need to follow at the [facility].   Patient on regular diet and intake is slowly improving.  Is tolerating ensure drinks. Continue carafate. Patient has increased abdominal pain s/p po intake which is improving some. Dr. Granados did offer dobhoff TFs as supplementation and the patient declined.  WBC is not elevated and patient is afebrile. Encourage IS. Plan to rehab at [place].  Patient to F/U with Dr. Granados in 1 week.      2. Acute blood loss anemia with low grade myelodysplastic  syndrome and chronic pancytopenia: h/o MDS, B12, Folate deficiencies, extreme monocytosis, Mild coagulopathy and vitamin K deficiency, CBC group following S/P 2 units PRBCs on 12/24/17 and 1 unit PRBCs on 12/30/17  Vitamin K and FFP given per hematology,  Continue B12 and folic acid supplementation.  No DIC, Hb currently stable (between 8-9), monitor. Plt's stable. Recheck CBC 1/8/18 at NH and F/U with Dr. Green in 1 week.      3. Chronic diastolic dysfunction, PAF, PSVT, Non-rheumatic aortic valve insufficiency, chronic RBBB, MR and AR: cardiology followed pre-op, now signed off. Remains in NSR on metoprolol 12.5 po BID.  Monitor. F/U with Dr. Gustafson in 1 month. No on chronic AC.      4. Hypertension: Mostly at goal on po metoprolol (dose was decreased here from previous home dose).       5. Severe chronic illness malnutrition: Patient did require TPN post-op which has now been stopped. Encouraging po. Nutrition to follow at NH. Continue supplements.      6. Hypokalemia and hypophosphatemia (electrolyte imbalance): On potassium/mag protocols here.  Will start oral K, Mag and phos supplementation and recheck labs on 1/8/18.      7. Bilateral knee and left wrist pain secondary to OA: Per Dr. Gandara more synovitis than effusion. Continue ice/heat, and voltaren gel. Not much improvement. Continue PT/OT. Limited treatment options at this point. Patient to F/U with Dr. Gandara outpatient.     8. Chronic urinary retention: Patient used straight Cath at home. With severe immobility glasgow will be left in place.  Dr. Irene will change glasgow prior to discharge and patient to F/U with  Dr. Prince outpatient.     Last Lab Results:   Lab Results (most recent)     Procedure Component Value Units Date/Time    CK [[id number]]  (Normal) Collected:  12/15/17 1429    Specimen:  Blood Updated:  12/15/17 1452     Creatine Kinase 57 U/L     CBC Auto Differential [[id number]]  (Abnormal) Collected:  12/15/17 1429    Specimen:  Blood  Updated:  12/15/17 1454     WBC 7.30 10*3/mm3      RBC 2.30 (L) 10*6/mm3      Hemoglobin 8.0 (L) g/dL      Hematocrit 26.0 (L) %      .0 (H) fL      MCH 34.8 (H) pg      MCHC 30.8 (L) g/dL      RDW 23.7 (H) %      RDW-SD 95.0 (H) fl      MPV 11.8 (H) fL      Platelets 118 (L) 10*3/mm3      Neutrophil % 62.5 %      Lymphocyte % 10.5 (L) %      Monocyte % 25.9 (H) %      Eosinophil % 0.0 %      Basophil % 0.0 %      Immature Grans % 1.1 (H) %      Neutrophils, Absolute 4.56 10*3/mm3      Lymphocytes, Absolute 0.77 10*3/mm3      Monocytes, Absolute 1.89 (H) 10*3/mm3      Eosinophils, Absolute 0.00 (L) 10*3/mm3      Basophils, Absolute 0.00 10*3/mm3      Immature Grans, Absolute 0.08 (H) 10*3/mm3     Comprehensive Metabolic Panel [[id number]]  (Abnormal) Collected:  12/15/17 1429    Specimen:  Blood Updated:  12/15/17 1512     Glucose 111 (H) mg/dL      BUN 10 mg/dL      Creatinine 0.73 (L) mg/dL      Sodium 137 mmol/L      Potassium 3.1 (L) mmol/L      Chloride 99 mmol/L      CO2 24.2 mmol/L      Calcium 8.7 (L) mg/dL      Total Protein 7.4 g/dL      Albumin 2.70 (L) g/dL      ALT (SGPT) <5 (L) U/L      AST (SGOT) 9 U/L      Alkaline Phosphatase 61 U/L      Total Bilirubin 0.8 mg/dL      eGFR Non African Amer 103 mL/min/1.73      Globulin 4.7 gm/dL      A/G Ratio 0.6 g/dL      BUN/Creatinine Ratio 13.7     Anion Gap 13.8 mmol/L     Narrative:       The MDRD GFR formula is only valid for adults with stable renal function between ages 18 and 70.    CBC & Differential [[id number]] Collected:  12/15/17 1429    Specimen:  Blood Updated:  12/15/17 1515    Narrative:       The following orders were created for panel order CBC & Differential.  Procedure                               Abnormality         Status                     ---------                               -----------         ------                     Manual Differential[[id number]]          Abnormal            Final result               Scan Slide[[id number]]                                                                   CBC Auto Differential[[id number]]        Abnormal            Final result                 Please view results for these tests on the individual orders.    Manual Differential [[id number]]  (Abnormal) Collected:  12/15/17 1429    Specimen:  Blood Updated:  12/15/17 1515     Neutrophil % 68.7 %      Lymphocyte % 9.1 (L) %      Monocyte % 22.2 (H) %      Neutrophils Absolute 5.01 10*3/mm3      Lymphocytes Absolute 0.66 10*3/mm3      Monocytes Absolute 1.62 (H) 10*3/mm3      Anisocytosis Slight/1+     Macrocytes Slight/1+     Polychromasia Slight/1+     WBC Morphology Normal     Platelet Estimate Decreased     Large Platelets Slight/1+    Urinalysis With / Culture If Indicated - Urine, Catheter [[id number]]  (Abnormal) Collected:  12/15/17 1639    Specimen:  Urine from Urine, Catheter Updated:  12/15/17 1656     Color, UA Other (A)     Appearance, UA Cloudy (A)     pH, UA 6.5     Specific Gravity, UA 1.020     Glucose, UA Negative     Ketones, UA 40 mg/dL (2+) (A)     Bilirubin, UA Moderate (2+) (A)     Blood, UA Large (3+) (A)     Protein, UA >=300 mg/dL (3+) (A)     Leuk Esterase, UA Large (3+) (A)     Nitrite, UA Positive (A)     Urobilinogen, UA 0.2 E.U./dL    Urinalysis, Microscopic Only - Urine, Clean Catch [[id number]]  (Abnormal) Collected:  12/15/17 1639    Specimen:  Urine from Urine, Catheter Updated:  12/15/17 1712     RBC, UA 13-20 (A) /HPF      WBC, UA Too Numerous to Count (A) /HPF      Bacteria, UA 4+ (A) /HPF      Squamous Epithelial Cells, UA None Seen /HPF      Hyaline Casts, UA None Seen /LPF      Methodology Manual Light Microscopy    Hemoglobin & Hematocrit, Blood [[id number]]  (Abnormal) Collected:  12/16/17 0355    Specimen:  Blood Updated:  12/16/17 0404     Hemoglobin 7.1 (C) g/dL      Hematocrit 23.5 (C) %     Basic Metabolic Panel [[id number]]  (Abnormal) Collected:  12/16/17 0355    Specimen:  Blood Updated:  12/16/17 0421     Glucose  87 mg/dL      BUN 10 mg/dL      Creatinine 0.66 (L) mg/dL      Sodium 139 mmol/L      Potassium 3.4 (L) mmol/L      Chloride 104 mmol/L      CO2 23.7 mmol/L      Calcium 8.0 (L) mg/dL      eGFR Non African Amer 115 mL/min/1.73      BUN/Creatinine Ratio 15.2     Anion Gap 11.3 mmol/L     Narrative:       The MDRD GFR formula is only valid for adults with stable renal function between ages 18 and 70.    Hemoglobin & Hematocrit, Blood [[id number]]  (Abnormal) Collected:  12/16/17 0455    Specimen:  Blood Updated:  12/16/17 0500     Hemoglobin 7.2 (C) g/dL      Hematocrit 23.5 (C) %     Urine Culture - Urine, Urine, Clean Catch [[id number]] Collected:  12/15/17 1639    Specimen:  Urine from Urine, Catheter Updated:  12/16/17 0758     Urine Culture --      >100,000 CFU/mL Mixed Moody Isolated    Narrative:         Specimen contains mixed organisms of questionable pathogenicity which indicates contamination with commensal moody.  Further identification is unlikely to provide clinically useful information.  Suggest recollection.    Hemoglobin & Hematocrit, Blood [[id number]]  (Abnormal) Collected:  12/16/17 1909    Specimen:  Blood Updated:  12/16/17 1922     Hemoglobin 8.2 (L) g/dL      Hematocrit 26.7 (L) %     Basic Metabolic Panel [[id number]]  (Abnormal) Collected:  12/18/17 0351    Specimen:  Blood Updated:  12/18/17 0422     Glucose 91 mg/dL      BUN 5 (L) mg/dL      Creatinine 0.62 (L) mg/dL      Sodium 136 mmol/L      Potassium 3.9 mmol/L      Chloride 103 mmol/L      CO2 24.6 mmol/L      Calcium 8.0 (L) mg/dL      eGFR Non African Amer 124 mL/min/1.73      BUN/Creatinine Ratio 8.1     Anion Gap 8.4 mmol/L     Narrative:       The MDRD GFR formula is only valid for adults with stable renal function between ages 18 and 70.    CBC Auto Differential [[id number]]  (Abnormal) Collected:  12/18/17 0858    Specimen:  Blood Updated:  12/18/17 0923     WBC 5.32 10*3/mm3      RBC 2.48 (L) 10*6/mm3      Hemoglobin 8.5 (L) g/dL       Hematocrit 27.7 (L) %      .7 (H) fL      MCH 34.3 (H) pg      MCHC 30.7 (L) g/dL      RDW -- %       Unable to calculate        RDW-SD -- fl       Unable to calculate        MPV 11.7 (H) fL      Platelets 110 (L) 10*3/mm3      Neutrophil % 56.7 %      Lymphocyte % 14.7 (L) %      Monocyte % 26.7 (H) %      Eosinophil % 0.0 %      Basophil % 0.2 %      Immature Grans % 1.7 (H) %      Neutrophils, Absolute 3.02 10*3/mm3      Lymphocytes, Absolute 0.78 10*3/mm3      Monocytes, Absolute 1.42 (H) 10*3/mm3      Eosinophils, Absolute 0.00 (L) 10*3/mm3      Basophils, Absolute 0.01 10*3/mm3      Immature Grans, Absolute 0.09 (H) 10*3/mm3      nRBC 0.0 /100 WBC     CBC & Differential [[id number]] Collected:  12/18/17 0858    Specimen:  Blood Updated:  12/18/17 0923    Narrative:       The following orders were created for panel order CBC & Differential.  Procedure                               Abnormality         Status                     ---------                               -----------         ------                     Scan Slide[[id number]]                                                                  CBC Auto Differential[[id number]]        Abnormal            Final result                 Please view results for these tests on the individual orders.    Procalcitonin [[id number]]  (Abnormal) Collected:  12/18/17 0858    Specimen:  Blood Updated:  12/18/17 0939     Procalcitonin 0.08 (L) ng/mL     Narrative:       As a Marker for Sepsis (Non-Neonates):   1. <0.5 ng/mL represents a low risk of severe sepsis and/or septic shock.  2. >2 ng/mL represents a high risk of severe sepsis and/or septic shock.    As a Marker for Lower Respiratory Tract Infections that require antibiotic therapy:    PCT on Admission     Antibiotic Therapy       6-12 Hrs later  > 0.5                Strongly Recommended             >0.25 - <0.5         Recommended  0.1 - 0.25           Discouraged              Remeasure/reassess PCT  <0.1                  Strongly Discouraged     Remeasure/reassess PCT                     PCT values of < 0.5 ng/mL do not exclude an infection, because localized infections (without systemic signs) may be associated with such low concentrations, or a systemic infection in its initial stages (< 6 hours). Furthermore, increased PCT can occur without infection. PCT concentrations between 0.5 and 2.0 ng/mL should be interpreted taking into account the patient's history. It is recommended to retest PCT within 6-24 hours if any concentrations < 2 ng/mL are obtained.    Prealbumin [[id number]]  (Abnormal) Collected:  12/18/17 0858    Specimen:  Blood Updated:  12/18/17 1532     Prealbumin 8.3 (L) mg/dL     CBC & Differential [[id number]] Collected:  12/19/17 0408    Specimen:  Blood Updated:  12/19/17 0429    Narrative:       The following orders were created for panel order CBC & Differential.  Procedure                               Abnormality         Status                     ---------                               -----------         ------                     Scan Slide[[id number]]                                                                  CBC Auto Differential[[id number]]        Abnormal            Final result                 Please view results for these tests on the individual orders.    CBC Auto Differential [[id number]]  (Abnormal) Collected:  12/19/17 0408    Specimen:  Blood Updated:  12/19/17 0429     WBC 6.27 10*3/mm3      RBC 2.51 (L) 10*6/mm3      Hemoglobin 8.6 (L) g/dL      Hematocrit 27.6 (L) %      .0 (H) fL      MCH 34.3 (H) pg      MCHC 31.2 g/dL      MPV 11.9 (H) fL      Platelets 107 (L) 10*3/mm3      Neutrophil % 55.7 %      Lymphocyte % 17.7 (L) %      Monocyte % 24.6 (H) %      Eosinophil % 0.2 %      Basophil % 0.2 %      Immature Grans % 1.6 (H) %      Neutrophils, Absolute 3.50 10*3/mm3      Lymphocytes, Absolute 1.11 10*3/mm3      Monocytes, Absolute 1.54 (H) 10*3/mm3      Eosinophils,  Absolute 0.01 (L) 10*3/mm3      Basophils, Absolute 0.01 10*3/mm3      Immature Grans, Absolute 0.10 (H) 10*3/mm3      nRBC 0.0 /100 WBC     Magnesium [[id number]]  (Abnormal) Collected:  12/19/17 0408    Specimen:  Blood Updated:  12/19/17 0454     Magnesium 1.5 (L) mg/dL     Phosphorus [[id number]]  (Normal) Collected:  12/19/17 0408    Specimen:  Blood Updated:  12/19/17 0454     Phosphorus 3.2 mg/dL     Basic Metabolic Panel [[id number]]  (Abnormal) Collected:  12/19/17 0408    Specimen:  Blood Updated:  12/19/17 0454     Glucose 93 mg/dL      BUN 3 (L) mg/dL      Creatinine 0.57 (L) mg/dL      Sodium 135 (L) mmol/L      Potassium 3.0 (L) mmol/L      Chloride 101 mmol/L      CO2 22.7 mmol/L      Calcium 7.9 (L) mg/dL      eGFR Non African Amer 137 mL/min/1.73      BUN/Creatinine Ratio 5.3 (L)     Anion Gap 11.3 mmol/L     Narrative:       The MDRD GFR formula is only valid for adults with stable renal function between ages 18 and 70.    POC Glucose Once [[id number]]  (Normal) Collected:  12/19/17 1155    Specimen:  Blood Updated:  12/19/17 1213     Glucose 86 mg/dL     Narrative:       Meter: [id number] : [id number] Tutu HOWARD CERT.    POC Glucose Once [[id number]]  (Normal) Collected:  12/19/17 1819    Specimen:  Blood Updated:  12/19/17 1827     Glucose 81 mg/dL     Narrative:       Meter: [id number] : [id number] Tapan Interiano CNA    POC Glucose Once [[id number]]  (Normal) Collected:  12/20/17 0026    Specimen:  Blood Updated:  12/20/17 0032     Glucose 121 mg/dL     Narrative:       Meter: [id number] : [id number] Justin Joaquin NURSING ASSISTANT    Basic Metabolic Panel [[id number]]  (Abnormal) Collected:  12/20/17 0340    Specimen:  Blood Updated:  12/20/17 0451     nRBC 0.0 /100 WBC     aPTT [[id number]]  (Abnormal) Collected:  12/31/17 0637    Specimen:  Blood Updated:  12/31/17 0726     PTT 38.8 (H) seconds     Narrative:       PTT = The equivalent PTT values for the therapeutic range of  heparin levels at 0.1 to 0.7 U/ml are 53 to 110 seconds.    Protime-INR [[id number]]  (Abnormal) Collected:  12/31/17 0637    Specimen:  Blood Updated:  12/31/17 0726     Protime 18.1 (H) Seconds      INR 1.48 (H)    Narrative:       Therapeutic Ranges for INR: 2.0-3.0 (PT 20-30)                              2.5-3.5 (PT 25-34)    CBC & Differential [[id number]] Collected:  12/31/17 0637    Specimen:  Blood Updated:  12/31/17 0813    Narrative:       The following orders were created for panel order CBC & Differential.  Procedure                               Abnormality         Status                     ---------                               -----------         ------                     Scan Slide[[id number]]                                       Final result               CBC Auto Differential[[id number]]        Abnormal            Final result                 Please view results for these tests on the individual orders.    Scan Slide [[id number]] Collected:  12/31/17 0637    Specimen:  Blood Updated:  12/31/17 0813     Anisocytosis Slight/1+     Macrocytes Mod/2+     WBC Morphology Normal     Platelet Estimate Decreased    Fibrin Split Products [[id number]]  (Abnormal) Collected:  12/29/17 0212    Specimen:  Blood Updated:  12/31/17 0851     Fibrin Split Products Greater than or equal to 5, but less than 20 mcg/mL (A)    Phosphorus [[id number]]  (Normal) Collected:  12/31/17 0805    Specimen:  Blood Updated:  12/31/17 0859     Phosphorus 3.6 mg/dL     Magnesium [[id number]]  (Normal) Collected:  12/31/17 0805    Specimen:  Blood Updated:  12/31/17 0859     Magnesium 1.8 mg/dL     Basic Metabolic Panel [[id number]]  (Abnormal) Collected:  12/31/17 0805    Specimen:  Blood Updated:  12/31/17 0901     Glucose 136 (H) mg/dL      BUN 16 mg/dL      Creatinine 0.61 (L) mg/dL      Sodium 140 mmol/L      Potassium 3.4 (L) mmol/L      Chloride 102 mmol/L      CO2 27.5 mmol/L      Calcium 8.0 (L) mg/dL      eGFR Non African Amer  126 mL/min/1.73      BUN/Creatinine Ratio 26.2 (H)     Anion Gap 10.5 mmol/L     Narrative:       The MDRD GFR formula is only valid for adults with stable renal function between ages 18 and 70.    POC Glucose Once [[id number]]  (Normal) Collected:  12/31/17 1153    Specimen:  Blood Updated:  12/31/17 1202     Glucose 123 mg/dL     Narrative:       Meter: [id number] : [id number] Hall Teri Mointor Tech    POC Glucose Once [[id number]]  (Abnormal) Collected:  12/31/17 1729    Specimen:  Blood Updated:  12/31/17 1737     Glucose 147 (H) mg/dL     Narrative:       Meter: [id number] : [id number] Hall Teri Mointor Tech    POC Glucose Once [[id number]]  (Abnormal) Collected:  01/01/18 0037    Specimen:  Blood Updated:  01/01/18 0045     Glucose 160 (H) mg/dL     Narrative:       Meter: [id number] : [id number] Reji Sahni NURSING ASSISTANT    POC Glucose Once [[id number]]  (Normal) Collected:  01/01/18 0642    Specimen:  Blood Updated:  01/01/18 0649     Glucose 126 mg/dL     Narrative:       Meter: [id number] : [id number] Reji Sahni NURSING ASSISTANT    Calcium, Ionized [[id number]]  (Abnormal) Collected:  01/01/18 0650    Specimen:  Blood Updated:  01/01/18 0658     Ionized Calcium 4.37 (L) mg/dL      Collected by TAMRA from lab    aPTT [[id number]]  (Abnormal) Collected:  01/01/18 0652    Specimen:  Blood Updated:  01/01/18 0707     PTT 40.0 (H) seconds     Narrative:       PTT = The equivalent PTT values for the therapeutic range of heparin levels at 0.1 to 0.7 U/ml are 53 to 110 seconds.    Protime-INR [[id number]]  (Abnormal) Collected:  01/01/18 0652    Specimen:  Blood Updated:  01/01/18 0707     Protime 19.0 (H) Seconds      INR 1.58 (H)    Narrative:       Therapeutic Ranges for INR: 2.0-3.0 (PT 20-30)                              2.5-3.5 (PT 25-34)    CBC Auto Differential [[id number]]  (Abnormal) Collected:  01/01/18 0652    Specimen:  Blood Updated:  01/01/18 0717     WBC 5.05 10*3/mm3      RBC  3.07 (L) 10*6/mm3      Hemoglobin 9.8 (L) g/dL      Hematocrit 30.6 (L) %      MCV 99.7 (H) fL      MCH 31.9 (H) pg      MCHC 32.0 g/dL      RDW 22.9 (H) %      RDW-SD 77.6 (H) fl      MPV 12.9 (H) fL      Platelets 83 (L) 10*3/mm3      Neutrophil % 39.8 (L) %      Lymphocyte % 20.8 %      Monocyte % 37.8 (H) %      Eosinophil % 0.6 %      Basophil % 0.2 %      Immature Grans % 0.8 (H) %      Neutrophils, Absolute 2.01 10*3/mm3      Lymphocytes, Absolute 1.05 10*3/mm3      Monocytes, Absolute 1.91 (H) 10*3/mm3      Eosinophils, Absolute 0.03 (L) 10*3/mm3      Basophils, Absolute 0.01 10*3/mm3      Immature Grans, Absolute 0.04 (H) 10*3/mm3      nRBC 0.0 /100 WBC     CBC & Differential [[id number]] Collected:  01/01/18 0652    Specimen:  Blood Updated:  01/01/18 0717    Narrative:       The following orders were created for panel order CBC & Differential.  Procedure                               Abnormality         Status                     ---------                               -----------         ------                     Scan Slide[[id number]]                                                                  CBC Auto Differential[[id number]]        Abnormal            Final result                 Please view results for these tests on the individual orders.    Magnesium [[id number]]  (Normal) Collected:  01/01/18 0652    Specimen:  Blood Updated:  01/01/18 0721     Magnesium 2.0 mg/dL     Basic Metabolic Panel [[id number]]  (Abnormal) Collected:  01/01/18 0652    Specimen:  Blood Updated:  01/01/18 0721     Glucose 131 (H) mg/dL      BUN 16 mg/dL      Creatinine 0.65 (L) mg/dL      Sodium 140 mmol/L      Potassium 3.9 mmol/L      Chloride 105 mmol/L      CO2 26.9 mmol/L      Calcium 7.9 (L) mg/dL      eGFR Non African Amer 117 mL/min/1.73      BUN/Creatinine Ratio 24.6     Anion Gap 8.1 mmol/L     Narrative:       The MDRD GFR formula is only valid for adults with stable renal function between ages 18 and 70.     Phosphorus [[id number]]  (Normal) Collected:  01/01/18 0652    Specimen:  Blood Updated:  01/01/18 0721     Phosphorus 3.5 mg/dL     C-reactive Protein [[id number]]  (Abnormal) Collected:  01/01/18 0652    Specimen:  Blood Updated:  01/01/18 0919     C-Reactive Protein 1.57 (H) mg/dL     Prealbumin [[id number]]  (Abnormal) Collected:  01/01/18 0652    Specimen:  Blood Updated:  01/01/18 0920     Prealbumin 16.4 (L) mg/dL     POC Glucose Once [[id number]]  (Abnormal) Collected:  01/01/18 1151    Specimen:  Blood Updated:  01/01/18 1205     Glucose 148 (H) mg/dL     Narrative:       Meter: [id number] : [id number] Becky Gomez NURSING ASSISTANT    POC Glucose Once [[id number]]  (Abnormal) Collected:  01/01/18 1733    Specimen:  Blood Updated:  01/01/18 1741     Glucose 167 (H) mg/dL     Narrative:       Meter: [id number] : [id number] Becky Gomez NURSING ASSISTANT    POC Glucose Once [[id number]]  (Abnormal) Collected:  01/02/18 0019    Specimen:  Blood Updated:  01/02/18 0026     Glucose 180 (H) mg/dL     Narrative:       Meter: [id number] : [id number] Tapan Interiano CNA    POC Glucose Once [[id number]]  (Normal) Collected:  01/02/18 0557    Specimen:  Blood Updated:  01/02/18 0604     Glucose 102 mg/dL     Narrative:       Meter: [id number] : [id number] Tapan Mckeonian CNA    POC Glucose Once [[id number]]  (Abnormal) Collected:  01/02/18 1150    Specimen:  Blood Updated:  01/02/18 1157     Glucose 137 (H) mg/dL     Narrative:       Meter: [id number] : [id number] Josi Barrera NURSING ASSISTANT    Basic Metabolic Panel [[id number]]  (Abnormal) Collected:  01/02/18 1537    Specimen:  Blood Updated:  01/02/18 1604     Glucose 88 mg/dL      BUN 17 mg/dL      Creatinine 0.59 (L) mg/dL      Sodium 137 mmol/L      Potassium 4.4 mmol/L      Chloride 102 mmol/L      CO2 27.2 mmol/L      Calcium 8.1 (L) mg/dL      eGFR Non African Amer 131 mL/min/1.73      BUN/Creatinine Ratio 28.8 (H)     Anion Gap 7.8 mmol/L      Narrative:       The MDRD GFR formula is only valid for adults with stable renal function between ages 18 and 70.    Magnesium [[id number]]  (Normal) Collected:  01/02/18 1537    Specimen:  Blood Updated:  01/02/18 1604     Magnesium 1.8 mg/dL     Phosphorus [[id number]]  (Normal) Collected:  01/02/18 1537    Specimen:  Blood Updated:  01/02/18 1604     Phosphorus 3.2 mg/dL     POC Glucose Once [[id number]]  (Normal) Collected:  01/02/18 1811    Specimen:  Blood Updated:  01/02/18 1818     Glucose 129 mg/dL     Narrative:       Meter: [id number] : [id number] Josi Barrera NURSING ASSISTANT    POC Glucose Once [[id number]]  (Abnormal) Collected:  01/03/18 0106    Specimen:  Blood Updated:  01/03/18 0112     Glucose 144 (H) mg/dL     Narrative:       Meter: [id number] : [id number] Justin Joaquin NURSING ASSISTANT    CBC (No Diff) [[id number]]  (Abnormal) Collected:  01/03/18 0655    Specimen:  Blood Updated:  01/03/18 0716     WBC 3.77 (L) 10*3/mm3      RBC 2.75 (L) 10*6/mm3      Hemoglobin 8.7 (L) g/dL      Hematocrit 27.6 (L) %      .4 (H) fL      MCH 31.6 (H) pg      MCHC 31.5 g/dL      RDW 23.3 (H) %      RDW-SD 82.1 (H) fl      MPV 12.8 (H) fL      Platelets 75 (L) 10*3/mm3     Basic Metabolic Panel [[id number]]  (Abnormal) Collected:  01/03/18 0655    Specimen:  Blood Updated:  01/03/18 0724     Glucose 93 mg/dL      BUN 16 mg/dL      Creatinine 0.57 (L) mg/dL      Sodium 135 (L) mmol/L      Potassium 4.1 mmol/L      Chloride 100 mmol/L      CO2 26.1 mmol/L      Calcium 7.8 (L) mg/dL      eGFR Non African Amer 137 mL/min/1.73      BUN/Creatinine Ratio 28.1 (H)     Anion Gap 8.9 mmol/L     Narrative:       The MDRD GFR formula is only valid for adults with stable renal function between ages 18 and 70.    POC Glucose Once [[id number]]  (Normal) Collected:  01/03/18 1152    Specimen:  Blood Updated:  01/03/18 1203     Glucose 121 mg/dL     Narrative:       Meter: [id number] : [id number] Tutu  Maryuri NA CERT.    POC Glucose Once [[id number]]  (Normal) Collected:  01/03/18 1859    Specimen:  Blood Updated:  01/03/18 1906     Glucose 123 mg/dL     Narrative:       Meter: [id number] : [id number] Tutu HOWARD CERT.    POC Glucose Once [[id number]]  (Normal) Collected:  01/04/18 0016    Specimen:  Blood Updated:  01/04/18 0022     Glucose 115 mg/dL     Narrative:       Meter: [id number] : [id number] Tapan Interiano CNA    Basic Metabolic Panel [[id number]]  (Abnormal) Collected:  01/04/18 0450    Specimen:  Blood Updated:  01/04/18 0520     Glucose 91 mg/dL      BUN 16 mg/dL      Creatinine 0.63 (L) mg/dL      Sodium 136 mmol/L      Potassium 3.9 mmol/L      Chloride 101 mmol/L      CO2 25.7 mmol/L      Calcium 8.1 (L) mg/dL      eGFR Non African Amer 122 mL/min/1.73      BUN/Creatinine Ratio 25.4 (H)     Anion Gap 9.3 mmol/L     Narrative:       The MDRD GFR formula is only valid for adults with stable renal function between ages 18 and 70.    Magnesium [[id number]]  (Abnormal) Collected:  01/04/18 0450    Specimen:  Blood Updated:  01/04/18 0520     Magnesium 1.6 (L) mg/dL     Phosphorus [[id number]]  (Normal) Collected:  01/04/18 0450    Specimen:  Blood Updated:  01/04/18 0520     Phosphorus 2.7 mg/dL     POC Glucose Once [[id number]]  (Normal) Collected:  01/04/18 0644    Specimen:  Blood Updated:  01/04/18 0655     Glucose 93 mg/dL     Narrative:       Meter: [id number] : [id number] Phelan Laisha CNA    CBC & Differential [[id number]] Collected:  01/04/18 0450    Specimen:  Blood Updated:  01/04/18 0701    Narrative:       The following orders were created for panel order CBC & Differential.  Procedure                               Abnormality         Status                     ---------                               -----------         ------                     Scan Slide[[id number]]                                       Final result               CBC Auto Differential[[id number]]         Abnormal            Final result                 Please view results for these tests on the individual orders.    CBC Auto Differential [[id number]]  (Abnormal) Collected:  01/04/18 0450    Specimen:  Blood Updated:  01/04/18 0701     WBC 5.14 10*3/mm3      RBC 2.90 (L) 10*6/mm3      Hemoglobin 9.3 (L) g/dL      Hematocrit 29.0 (L) %      .0 (H) fL      MCH 32.1 (H) pg      MCHC 32.1 g/dL      RDW 23.7 (H) %      RDW-SD 83.4 (H) fl      MPV 13.4 (H) fL      Platelets 77 (L) 10*3/mm3      Neutrophil % 42.8 (L) %      Lymphocyte % 13.8 (L) %      Monocyte % 41.8 (H) %      Eosinophil % 0.2 %      Basophil % 0.2 %      Immature Grans % 1.2 (H) %      Neutrophils, Absolute 2.20 10*3/mm3      Lymphocytes, Absolute 0.71 10*3/mm3      Monocytes, Absolute 2.15 (H) 10*3/mm3      Eosinophils, Absolute 0.01 (L) 10*3/mm3      Basophils, Absolute 0.01 10*3/mm3      Immature Grans, Absolute 0.06 (H) 10*3/mm3      nRBC 0.0 /100 WBC     Scan Slide [[id number]] Collected:  01/04/18 0450    Specimen:  Blood Updated:  01/04/18 0701     Anisocytosis Slight/1+     Polychromasia Slight/1+     WBC Morphology Normal     Platelet Estimate Decreased    CBC Auto Differential [[id number]]  (Abnormal) Collected:  01/05/18 0547    Specimen:  Blood Updated:  01/05/18 0601     WBC 5.21 10*3/mm3      RBC 2.69 (L) 10*6/mm3      Hemoglobin 8.6 (L) g/dL      Hematocrit 26.9 (L) %      .0 (H) fL      MCH 32.0 (H) pg      MCHC 32.0 g/dL      RDW 24.1 (H) %      RDW-SD 85.9 (H) fl      MPV 12.8 (H) fL      Platelets 81 (L) 10*3/mm3      Neutrophil % 37.6 (L) %      Lymphocyte % 18.2 (L) %      Monocyte % 42.6 (H) %      Eosinophil % 0.4 %      Basophil % 0.2 %      Immature Grans % 1.0 (H) %      Neutrophils, Absolute 1.96 10*3/mm3      Lymphocytes, Absolute 0.95 10*3/mm3      Monocytes, Absolute 2.22 (H) 10*3/mm3      Eosinophils, Absolute 0.02 (L) 10*3/mm3      Basophils, Absolute 0.01 10*3/mm3      Immature Grans, Absolute 0.05 (H) 10*3/mm3       nRBC 0.0 /100 WBC     CBC & Differential [[id number]] Collected:  01/05/18 0547    Specimen:  Blood Updated:  01/05/18 0602    Narrative:       The following orders were created for panel order CBC & Differential.  Procedure                               Abnormality         Status                     ---------                               -----------         ------                     Scan Slide[[id number]]                                                                  CBC Auto Differential[[id number]]        Abnormal            Final result                 Please view results for these tests on the individual orders.    Basic Metabolic Panel [[id number]]  (Abnormal) Collected:  01/05/18 0547    Specimen:  Blood Updated:  01/05/18 0616     Glucose 83 mg/dL      BUN 15 mg/dL      Creatinine 0.61 (L) mg/dL      Sodium 136 mmol/L      Potassium 3.6 mmol/L      Chloride 101 mmol/L      CO2 26.3 mmol/L      Calcium 7.8 (L) mg/dL      eGFR Non African Amer 126 mL/min/1.73      BUN/Creatinine Ratio 24.6     Anion Gap 8.7 mmol/L     Narrative:       The MDRD GFR formula is only valid for adults with stable renal function between ages 18 and 70.    Phosphorus [[id number]]  (Abnormal) Collected:  01/05/18 0547    Specimen:  Blood Updated:  01/05/18 0616     Phosphorus 2.5 (L) mg/dL     Magnesium [[id number]]  (Normal) Collected:  01/05/18 0547    Specimen:  Blood Updated:  01/05/18 0623     Magnesium 2.0 mg/dL         Imaging Results (most recent)     Procedure Component Value Units Date/Time    CT Head Without Contrast [[id number]] Collected:  12/15/17 1509     Updated:  12/15/17 1513    Narrative:       UNENHANCED HEAD CT:  12/15/2017 3:09 PM     HISTORY: Fall. Found down. Slipped on a rug. Trauma.     TECHNIQUE: Axial unenhanced head CT. Radiation dose reduction techniques  were utilized, including automated exposure control and exposure  modulation based on body size.     COMPARISON: None     FINDINGS: No intracranial  hemorrhage, mass, or infarct. No hydrocephalus  or extra-axial fluid collection. There is mild generalized atrophy and  chronic small vessel changes. The skull base, calvarium, and  extracranial soft tissues are normal.       Impression:       Normal, negative unenhanced head CT.                This report was finalized on 12/15/2017 3:10 PM by Dr. Rafiq Blue MD.       CT Abdomen Pelvis Without Contrast [[id number]] Collected:  12/18/17 0716     Updated:  12/18/17 0731    Narrative:       CT ABDOMEN AND PELVIS, NONCONTRAST, 12/17/2017:     HISTORY:  83-year-old male status post near-total colectomy with ileorectal  anastomosis May 2017 for bleeding colonic diverticular disease. Recently  noted fecal matter within urine. 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 As a  history of myelodysplastic syndrome.     TECHNIQUE:  CT examination of the abdomen and pelvis was performed with oral  contrast only. IV contrast was not administered. Radiation dose  reduction techniques were utilized, including automated exposure control  and exposure modulation based on body size.     COMPARISON:  *  CT abdomen/pelvis, 11/15/2017 and 6/5/2017.     ABDOMEN FINDINGS:  Stable mild splenomegaly. Liver, pancreas, spleen and kidneys are  otherwise negative. No evidence of upper urinary tract obstruction.  Cholecystectomy. No bile duct dilatation. Normal caliber abdominal  aorta.     PELVIS FINDINGS:  Extensive inflammation is present throughout the mid and lower pelvis  centered on the patient's ileorectal anastomosis. Multiple loops of  inflamed small bowel appear tethered in the mid pelvic midline near the  anastomosis and show moderate dilatation and diffuse wall thickening.  There is also marked thickening of adjacent urinary bladder wall. Some  ill-defined linear tracts of oral contrast extends from the region of  the anastomosis toward several mid pelvic small bowel loops and could  potentially represent fistulae, although decompressed,  inflamed small  bowel segments are also possible. There may be some very faint contrast  material within the urinary bladder near the Ayoub catheter balloon, but  a definitive bladder fistula is not identified. Repeat CT examination  with rectal contrast may be helpful to better delineate presumed  colovesical fistula and any additional fistulae near the ileorectal  anastomosis. Extensive inflammatory soft tissue stranding is present  throughout the pelvis, but no abscess or other drainable fluid  collection is identified.     Limited images of the lower chest show mild diffuse chronic interstitial  pulmonary fibrosis.       Impression:       1. Postop changes near total colectomy with ileorectal anastomosis.  2. Extensive inflammation within the midline pelvis near the anastomosis  involving multiple small bowel loops and the urinary bladder.  3. Findings suspicious for small bowel fistulae and possible colorectal  fistula near the anastomosis. Repeat CT examination with rectal contrast  may be helpful.  4. No abscess or other undrained fluid collection.  5. Stable splenomegaly.  6. Pulmonary fibrosis.  7. Initial stat report to the ordering service from Dr. Rafiq Blue at  1428 hours on 12/17/2017.     This report was finalized on 12/18/2017 7:27 AM by Dr. Chencho Henderson MD.       CT Abdomen Pelvis With Contrast [[id number]] Collected:  12/18/17 1542     Updated:  12/18/17 1555    Narrative:       CT ABDOMEN AND PELVIS WITH IV AND RECTAL CONTRAST, 12/18/2017:     HISTORY:  83-year-old male status post near total colectomy and ileorectal  anastomosis May 2017. Recently noted stool in urine. Assess for  colovesical fistula.     TECHNIQUE:  CT examination of the abdomen and pelvis was performed with IV contrast  following installation of 500 cc rectal GI contrast. Radiation dose  reduction techniques were utilized, including automated exposure control  and exposure modulation based on body size.      COMPARISON:  *  CT abdomen/pelvis, 12/17/2017.     FINDINGS:  The examination shows small irregular linear collections of GI contrast  and air extending to the right and left from the rectal anastomosis in  the midline pelvis towards adjacent segments of tethered, dilated pelvic  small bowel segments. The appearance suggests contrast leak and likely  small bowel fistula formation from the anastomosis. No drainable abscess  or other fluid collection is demonstrated.     There is also a tiny amount of high attenuation contrast within the  lumen of a markedly thick-walled urinary bladder compatible with bladder  fistula. There is also mild dilatation of both ureters and both renal  collecting systems, not visible on the earlier study.     Moderately severe dilatation of multiple small bowel loops within the  lower abdomen and pelvis likely representing adynamic ileus due or  partial obstruction due to pelvic inflammation.     Remainder the examination is unchanged since the earlier study. Moderate  splenomegaly. Liver and pancreas are unremarkable.       Impression:       1. CT findings compatible with ileorectal anastomosis leak and likely  small bowel fistula formation.  2. Faint GI contrast within the urinary bladder compatible with  colovesical fistula.  3. Extensive peritoneal inflammation throughout the lower abdomen and  pelvis. Moderate dilatation of pelvic small bowel segments, several of  which appear tethered to the region of the anastomosis.  4. Mild bilateral pyelocaliectasis and ureterectasis.  5. Splenomegaly.     This report was finalized on 12/18/2017 3:53 PM by Dr. Chencho Henderson MD.       XR Chest 1 View [[id number]] Collected:  12/19/17 0613     Updated:  12/19/17 0616    Narrative:       CHEST X-RAY, 12/18/2017:     HISTORY:   PICC placement.     TECHNIQUE:  AP portable chest x-ray.     FINDINGS:  Newly placed right arm PICC tip is in good position in the low SVC.     Diffuse fibrosis  throughout both lungs, greatest at the lung bases. Low  lung volumes. Mild cardiomegaly.       Impression:       1. PICC in good position.  2. Requested initial stat report to the ordering service from Dr. Rickie Kent at 2113 hours on 12/18/2017.     This report was finalized on 12/19/2017 6:14 AM by Dr. Chencho Henderson MD.       XR Abdomen KUB [[id number]] Collected:  12/29/17 0852     Updated:  12/29/17 0921    Narrative:       KUB     DATE: 12/28/2017     HISTORY: Abdominal surgery 1 week ago. Abdominal distention. Blood  coming from NG tube. Verify NG tube placement. Possible ileus.     COMPARISON: CT abdomen and pelvis 12/18/2017.     FINDINGS: Midline laparotomy staples are present. NG tube tip projects  into the region of the proximal gastric body, tip approximately 6 cm  below the EG junction. The sidehole of the NG tube is thought to be  located at the EG junction.     Drainage catheter projects over the left lateral abdomen.  Cholecystectomy changes are present. Mild generalized gaseous distention  of predominantly small bowel loops, favored to represent mild  postoperative ileus. No gross free air is seen. Probable minimal left  basilar atelectasis. Degenerative endplate changes in the imaged spine.  Calcified phleboliths in the pelvis to the right. Presumed Ayoub  catheter in place. Drainage catheter also is seen projecting just to the  right of midline within the pelvis.       Impression:       1. No acute findings in the abdomen.  2. Mild generalized gaseous distention of bowel, predominantly small  bowel, favored to represent mild postoperative ileus.  3. NG tube tip about 6 cm below the EG junction with sidehole at the  level of the EG junction.  4. Preliminary report provided by Dr. Kumari on 12/28/2017 at 1930  hours.     This report was finalized on 12/29/2017 9:19 AM by Dr. Samantha Keita MD.       XR Knee 1 or 2 View Bilateral [[id number]] Collected:  01/01/18 0751     Updated:  01/01/18  0755    Narrative:       EXAM: 2 views right knee. 2 views left knee.     DATE: 12/31/2017     HISTORY: Bilateral knee pain for 10 years. No trauma.     COMPARISON: None     FINDINGS: Right knee demonstrates advanced medial compartment joint  space narrowing, moderate patellofemoral compartment joint space  narrowing. Prominent medial compartment and patellofemoral compartment  osteophyte formation. No acute fracture joint dislocation is seen.  Chondrocalcinosis changes in the lateral meniscus. Advanced calcific  atherosclerotic changes are present. No definite joint effusion.     Left knee demonstrates moderate patellofemoral compartment and  moderately advanced medial and lateral compartment joint space  narrowing. Prominent posterior patellar osteophyte formation with  smaller marginal ossified the medial and lateral compartments.  Subchondral cystic change in the lateral compartment. No definite joint  effusion. Advanced calcific atherosclerosis.       Impression:       1. Moderate to moderately advanced at degenerative changes of the knees,  as described in detail in the report. No acute findings.     This report was finalized on 1/1/2018 7:53 AM by Dr. Samantha Keita MD.       XR Hand 3+ View Left [[id number]] Collected:  01/04/18 0804     Updated:  01/04/18 0819    Narrative:       FIVE VIEWS OF THE LEFT HAND     DATE: 01/03/2018.     HISTORY: 83-year-old male with left thumb pain with any movement. No  known trauma. Pain has been present for months.     FINDINGS: No acute fracture or joint dislocation is seen. Advanced  osteoarthritic changes are present at the 1st carpometacarpal joint with  joint space narrowing, articular sclerosis, and prominent marginal  osteophyte formation. Moderate osteoarthritic type changes are also  demonstrated at the 2nd metacarpophalangeal joint. Mild-to-moderate  osteoarthritic type changes in the remainder of the metacarpophalangeal  joints and interphalangeal joints. Mild  degenerative narrowing of the  radiocarpal joint. Advanced calcific atherosclerotic changes are  present. No retained radiopaque foreign body is seen.     IMPRESSION  Osteoarthritic changes within the left hand and wrist, greatest at the  1st carpometacarpal joint. No acute findings.     This report was finalized on 1/4/2018 8:17 AM by Dr. Samantha Keita MD.             PROCEDURES  Procedure(s):  LAPAROTOMY EXPLORATORY -  placement of strattice 6x6 small bowel resection of fistulas and anastamosis, oversew of rectal stump ,  extensive lysis of adhesions,  resection of fistulas, small bowel resectionsx2 and side to side anatomosis, ileostomy placement   CYSTOSCOPY bilateral URETERAL CATHETER INSERTION    Condition on Discharge:  Stable    Physical Exam at Discharge  Vital Signs  Temp:  [97.6 °F (36.4 °C)-98.8 °F (37.1 °C)] 97.6 °F (36.4 °C)  Heart Rate:  [80-91] 91  Resp:  [18-20] 18  BP: (120-139)/(60-73) 127/73    Physical Exam:  Physical Exam   Constitutional: Patient appears well-developed and in no acute distress   HEENT:   Head: Normocephalic and atraumatic.   Eyes:  Pupils are equal, round, and reactive to light. EOM are intact. Sclera are anicteric and non-injected.  Mouth and Throat: Patient has moist mucous membranes.   Neck: Neck supple. No JVD present.   Cardiovascular: Regular rate, regular rhythm, S1 normal and S2 normal.  Exam reveals no gallop and no friction rub.  No murmur heard.  Pulmonary/Chest: Lungs are clear to auscultation bilaterally. No respiratory distress. No wheezes. No rhonchi. No rales.   Abdominal: Soft. Bowel sounds are normal. Iliostomy is functioning and incision is C/D/I. (drain and staples will be removed by surgery prior to discharge today)  Extremities: Mild edema anterior right knee and left wrist.   Neurological: Patient is alert and oriented to person, place, and time.   Skin: Skin is warm. Nails show no clubbing. No cyanosis or erythema.    Discharge Disposition  [facility]    Visiting Nurse:    N/A    Home PT/OT:  N/A    Home Safety Evaluation:  N/A    DME  None    Discharge Diet:           Dietary Orders            Start     Ordered    01/01/18 1027  Diet Regular  Diet Effective Now     Question:  Diet Texture / Consistency  Answer:  Regular    01/01/18 1027    01/01/18 1027  Calorie Count  (Calorie Count)  Until Discontinued     Question:  Duration of Calorie Count  Answer:  3 Days    01/01/18 1027    12/31/17 1147  Dietary Nutrition Supplement: Ensure Plus  Once     Comments:  ENSURE PLUS 1 PO TID WITH MEALS AND PRN   Question:  Select Supplement:  Answer:  Ensure Plus    12/31/17 1146          Activity at Discharge:  As tolerated with assistance    Pre-discharge education/ Instruction  Medications and F/U for Pt.  CBC and BMP on 1/8/18  Change [place] [id number]  Encourage po intake      Follow-up Appointments  No future appointments.  Additional Instructions for the Follow-ups that You Need to Schedule     Discharge Follow-up with PCP    As directed    Follow Up Details:  one week following discharge from NH           Discharge Follow-up with Specialty: Dr. Gustafson; 1 Month    As directed    Specialty:  Dr. Gustafson    Follow Up:  1 Month           Discharge Follow-up with Specified Provider: Dr. Prince; 1 Week    As directed    To:  Dr. Prince    Follow Up:  1 Week           Discharge Follow-up with Specified Provider: Dr. Gandara; 1 Week    As directed    To:  Dr. Gandara    Follow Up:  1 Week           Discharge Follow-up with Specified Provider: Dr. Green; 1 Week    As directed    To:  Dr. Green    Follow Up:  1 Week           Discharge Follow-up with Specified Provider: General Surgery -- Dr. COSMO Hebert; 1 Week    As directed    To:  General Surgery -- Dr. COSMO Hebert    Follow Up:  1 Week    Follow Up Details:  Please call the office at [phone] to make the appointment                     Test Results Pending at Discharge       Kenya Marks  MD  01/05/18  3:18 PM    Time: Discharge >30 min Secondary to discussion of patient with Dr. Granados and Dr. Irene. Discussion of plan with patient and family.  Orders, exam and documentation.

## 2018-01-05 NOTE — NURSING NOTE
Continued Stay Note  RENATA Wheatley     Patient Name: Jason Zelaya  MRN: [id number]  Today's Date: 1/5/2018    Admit Date: 12/15/2017          Discharge Plan       01/05/18 1123    Case Management/Social Work Plan    Additional Comments Spoke with Dr Granados and she will see patient this afternoon. Dr Marks stated she has talked with Dr Granados. spoke with patient and wife at bedside r/t possible discharge to [facility] later this afternoon. updated IMM. will continue to follow.          01/05/18 0921    Case Management/Social Work Plan    Plan [facility] obtained    Additional Comments spoke with Nita @ [facility] and they have obtained precert. patient can be accepted starting today through the weekend. spoke with Dr Marks and she is aware. Left mesage for Dr Granados, awaiting return call. will continue to follow.              Discharge Codes     None            Phyllis Mayen RN

## 2018-01-05 NOTE — PROGRESS NOTES
Adult Nutrition  Assessment/PES    Patient Name:  Jason Zelaya  YOB: 1934  MRN: [id number]  Admit Date:  12/15/2017    Assessment Date:  1/5/2018              Reason for Assessment       01/05/18 1158    Reason for Assessment    Reason For Assessment/Visit calorie count order          Diet: Regular with ENSURE PLUS 3 per day    Estimated needs: 1666-2604 kcal,  gm pro    Day 4:   1456 kcal, 48 gm pro (3 meals, 3 supplements)    76% kcal, 51% pro    Pt with improved intake and 3 meals recorded.          Electronically signed by:  Roseanne Clayton RD  01/05/18 11:58 AM

## 2018-01-05 NOTE — PROGRESS NOTES
BGA/GI Progress Note   Chief Complaint:  Patient seen in follow-up resection of anastomosis and ileostomy.          Objective      Is doing well today.  He is tolerating his diet.  Ostomy is functioning well.  Plan is transfer to nursing home.    Vital Signs  Temp:  [97.6 °F (36.4 °C)-98.8 °F (37.1 °C)] 97.6 °F (36.4 °C)  Heart Rate:  [80-91] 91  Resp:  [18-20] 18  BP: (120-139)/(60-73) 127/73  Body mass index is 20.23 kg/(m^2).    Intake/Output Summary (Last 24 hours) at 01/05/18 1311  Last data filed at 01/05/18 1111   Gross per 24 hour   Intake             1020 ml   Output             2012 ml   Net             -992 ml     I/O this shift:  In: 220 [P.O.:120; IV Piggyback:100]  Out: 450 [Urine:350; Stool:100]       Physical Exam:   General: patient awake, alert and cooperative   Abdomen: soft, nontender, nondistended; normal bowel sounds                                 Incision is healing well.  No cellulitis or edema noted.  All staples removed.                                  CAYETANO drainage is minimal.  Both drains removed.   Extremities: no rash or edema   Neurologic: Normal mood and behavior     Results Review:     I reviewed the patient's new clinical results.      WBC No results found for: WBCS   HGB Hemoglobin   Date Value Ref Range Status   01/05/2018 8.6 (L) 14.0 - 18.0 g/dL Final   01/04/2018 9.3 (L) 14.0 - 18.0 g/dL Final   01/03/2018 8.7 (L) 14.0 - 18.0 g/dL Final      HCT Hematocrit   Date Value Ref Range Status   01/05/2018 26.9 (L) 42.0 - 52.0 % Final   01/04/2018 29.0 (L) 42.0 - 52.0 % Final   01/03/2018 27.6 (L) 42.0 - 52.0 % Final      Platlets No results found for: LABPLAT     PT/INR:  No results found for: PROTIME/No results found for: INR    Sodium Sodium   Date Value Ref Range Status   01/05/2018 136 136 - 145 mmol/L Final   01/04/2018 136 136 - 145 mmol/L Final   01/03/2018 135 (L) 136 - 145 mmol/L Final   01/02/2018 137 136 - 145 mmol/L Final      Potassium Potassium   Date Value Ref  Range Status   01/05/2018 3.6 3.5 - 5.2 mmol/L Final   01/04/2018 3.9 3.5 - 5.2 mmol/L Final   01/03/2018 4.1 3.5 - 5.2 mmol/L Final   01/02/2018 4.4 3.5 - 5.2 mmol/L Final      Chloride Chloride   Date Value Ref Range Status   01/05/2018 101 98 - 107 mmol/L Final   01/04/2018 101 98 - 107 mmol/L Final   01/03/2018 100 98 - 107 mmol/L Final   01/02/2018 102 98 - 107 mmol/L Final      Bicarbonate No results found for: PLASMABICARB   BUN BUN   Date Value Ref Range Status   01/05/2018 15 8 - 23 mg/dL Final   01/04/2018 16 8 - 23 mg/dL Final   01/03/2018 16 8 - 23 mg/dL Final   01/02/2018 17 8 - 23 mg/dL Final      Creatinine Creatinine   Date Value Ref Range Status   01/05/2018 0.61 (L) 0.76 - 1.27 mg/dL Final   01/04/2018 0.63 (L) 0.76 - 1.27 mg/dL Final   01/03/2018 0.57 (L) 0.76 - 1.27 mg/dL Final   01/02/2018 0.59 (L) 0.76 - 1.27 mg/dL Final      Calcium Calcium   Date Value Ref Range Status   01/05/2018 7.8 (L) 8.8 - 10.5 mg/dL Final   01/04/2018 8.1 (L) 8.8 - 10.5 mg/dL Final   01/03/2018 7.8 (L) 8.8 - 10.5 mg/dL Final   01/02/2018 8.1 (L) 8.8 - 10.5 mg/dL Final      Magnesium  AST  ALT  Bilirubin, Total  AlkPhos  Albumin    Amylase  Lipase    Radiology: Magnesium   Date Value Ref Range Status   01/05/2018 2.0 1.7 - 2.5 mg/dL Final   01/04/2018 1.6 (L) 1.7 - 2.5 mg/dL Final   01/02/2018 1.8 1.7 - 2.5 mg/dL Final     No components found for: AST.*  No components found for: ALT.*  No components found for: BILIRUBIN, TOTAL.*    No components found for: ALKPHOS.*  No components found for: ALBUMIN.*      No components found for: AMYLASE.*  No components found for: LIPASE.*            Imaging Results (most recent)     Procedure Component Value Units Date/Time    CT Head Without Contrast [[id number]] Collected:  12/15/17 1509     Updated:  12/15/17 1513    Narrative:       UNENHANCED HEAD CT:  12/15/2017 3:09 PM     HISTORY: Fall. Found down. Slipped on a rug. Trauma.     TECHNIQUE: Axial unenhanced head CT. Radiation  dose reduction techniques  were utilized, including automated exposure control and exposure  modulation based on body size.     COMPARISON: None     FINDINGS: No intracranial hemorrhage, mass, or infarct. No hydrocephalus  or extra-axial fluid collection. There is mild generalized atrophy and  chronic small vessel changes. The skull base, calvarium, and  extracranial soft tissues are normal.       Impression:       Normal, negative unenhanced head CT.                This report was finalized on 12/15/2017 3:10 PM by Dr. Rafiq Blue MD.       CT Abdomen Pelvis Without Contrast [[id number]] Collected:  12/18/17 0716     Updated:  12/18/17 0731    Narrative:       CT ABDOMEN AND PELVIS, NONCONTRAST, 12/17/2017:     HISTORY:  83-year-old male status post near-total colectomy with ileorectal  anastomosis May 2017 for bleeding colonic diverticular disease. Recently  noted fecal matter within urine. 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 As a  history of myelodysplastic syndrome.     TECHNIQUE:  CT examination of the abdomen and pelvis was performed with oral  contrast only. IV contrast was not administered. Radiation dose  reduction techniques were utilized, including automated exposure control  and exposure modulation based on body size.     COMPARISON:  *  CT abdomen/pelvis, 11/15/2017 and 6/5/2017.     ABDOMEN FINDINGS:  Stable mild splenomegaly. Liver, pancreas, spleen and kidneys are  otherwise negative. No evidence of upper urinary tract obstruction.  Cholecystectomy. No bile duct dilatation. Normal caliber abdominal  aorta.     PELVIS FINDINGS:  Extensive inflammation is present throughout the mid and lower pelvis  centered on the patient's ileorectal anastomosis. Multiple loops of  inflamed small bowel appear tethered in the mid pelvic midline near the  anastomosis and show moderate dilatation and diffuse wall thickening.  There is also marked thickening of adjacent urinary bladder wall. Some  ill-defined linear tracts  of oral contrast extends from the region of  the anastomosis toward several mid pelvic small bowel loops and could  potentially represent fistulae, although decompressed, inflamed small  bowel segments are also possible. There may be some very faint contrast  material within the urinary bladder near the Ayoub catheter balloon, but  a definitive bladder fistula is not identified. Repeat CT examination  with rectal contrast may be helpful to better delineate presumed  colovesical fistula and any additional fistulae near the ileorectal  anastomosis. Extensive inflammatory soft tissue stranding is present  throughout the pelvis, but no abscess or other drainable fluid  collection is identified.     Limited images of the lower chest show mild diffuse chronic interstitial  pulmonary fibrosis.       Impression:       1. Postop changes near total colectomy with ileorectal anastomosis.  2. Extensive inflammation within the midline pelvis near the anastomosis  involving multiple small bowel loops and the urinary bladder.  3. Findings suspicious for small bowel fistulae and possible colorectal  fistula near the anastomosis. Repeat CT examination with rectal contrast  may be helpful.  4. No abscess or other undrained fluid collection.  5. Stable splenomegaly.  6. Pulmonary fibrosis.  7. Initial stat report to the ordering service from Dr. Rafiq Blue at  1428 hours on 12/17/2017.     This report was finalized on 12/18/2017 7:27 AM by Dr. Chencho Henderson MD.       CT Abdomen Pelvis With Contrast [[id number]] Collected:  12/18/17 1542     Updated:  12/18/17 1555    Narrative:       CT ABDOMEN AND PELVIS WITH IV AND RECTAL CONTRAST, 12/18/2017:     HISTORY:  83-year-old male status post near total colectomy and ileorectal  anastomosis May 2017. Recently noted stool in urine. Assess for  colovesical fistula.     TECHNIQUE:  CT examination of the abdomen and pelvis was performed with IV contrast  following installation of 500 cc  rectal GI contrast. Radiation dose  reduction techniques were utilized, including automated exposure control  and exposure modulation based on body size.     COMPARISON:  *  CT abdomen/pelvis, 12/17/2017.     FINDINGS:  The examination shows small irregular linear collections of GI contrast  and air extending to the right and left from the rectal anastomosis in  the midline pelvis towards adjacent segments of tethered, dilated pelvic  small bowel segments. The appearance suggests contrast leak and likely  small bowel fistula formation from the anastomosis. No drainable abscess  or other fluid collection is demonstrated.     There is also a tiny amount of high attenuation contrast within the  lumen of a markedly thick-walled urinary bladder compatible with bladder  fistula. There is also mild dilatation of both ureters and both renal  collecting systems, not visible on the earlier study.     Moderately severe dilatation of multiple small bowel loops within the  lower abdomen and pelvis likely representing adynamic ileus due or  partial obstruction due to pelvic inflammation.     Remainder the examination is unchanged since the earlier study. Moderate  splenomegaly. Liver and pancreas are unremarkable.       Impression:       1. CT findings compatible with ileorectal anastomosis leak and likely  small bowel fistula formation.  2. Faint GI contrast within the urinary bladder compatible with  colovesical fistula.  3. Extensive peritoneal inflammation throughout the lower abdomen and  pelvis. Moderate dilatation of pelvic small bowel segments, several of  which appear tethered to the region of the anastomosis.  4. Mild bilateral pyelocaliectasis and ureterectasis.  5. Splenomegaly.     This report was finalized on 12/18/2017 3:53 PM by Dr. Chencho Henderson MD.       XR Chest 1 View [[id number]] Collected:  12/19/17 0613     Updated:  12/19/17 0616    Narrative:       CHEST X-RAY, 12/18/2017:     HISTORY:   PICC  placement.     TECHNIQUE:  AP portable chest x-ray.     FINDINGS:  Newly placed right arm PICC tip is in good position in the low SVC.     Diffuse fibrosis throughout both lungs, greatest at the lung bases. Low  lung volumes. Mild cardiomegaly.       Impression:       1. PICC in good position.  2. Requested initial stat report to the ordering service from Dr. Rickie Kent at 2113 hours on 12/18/2017.     This report was finalized on 12/19/2017 6:14 AM by Dr. Chencho Henderson MD.       XR Abdomen KUB [[id number]] Collected:  12/29/17 0852     Updated:  12/29/17 0921    Narrative:       KUB     DATE: 12/28/2017     HISTORY: Abdominal surgery 1 week ago. Abdominal distention. Blood  coming from NG tube. Verify NG tube placement. Possible ileus.     COMPARISON: CT abdomen and pelvis 12/18/2017.     FINDINGS: Midline laparotomy staples are present. NG tube tip projects  into the region of the proximal gastric body, tip approximately 6 cm  below the EG junction. The sidehole of the NG tube is thought to be  located at the EG junction.     Drainage catheter projects over the left lateral abdomen.  Cholecystectomy changes are present. Mild generalized gaseous distention  of predominantly small bowel loops, favored to represent mild  postoperative ileus. No gross free air is seen. Probable minimal left  basilar atelectasis. Degenerative endplate changes in the imaged spine.  Calcified phleboliths in the pelvis to the right. Presumed Ayoub  catheter in place. Drainage catheter also is seen projecting just to the  right of midline within the pelvis.       Impression:       1. No acute findings in the abdomen.  2. Mild generalized gaseous distention of bowel, predominantly small  bowel, favored to represent mild postoperative ileus.  3. NG tube tip about 6 cm below the EG junction with sidehole at the  level of the EG junction.  4. Preliminary report provided by Dr. Kumari on 12/28/2017 at 1930  hours.     This report was  finalized on 12/29/2017 9:19 AM by Dr. Samantha Keita MD.       XR Knee 1 or 2 View Bilateral [[id number]] Collected:  01/01/18 0751     Updated:  01/01/18 0755    Narrative:       EXAM: 2 views right knee. 2 views left knee.     DATE: 12/31/2017     HISTORY: Bilateral knee pain for 10 years. No trauma.     COMPARISON: None     FINDINGS: Right knee demonstrates advanced medial compartment joint  space narrowing, moderate patellofemoral compartment joint space  narrowing. Prominent medial compartment and patellofemoral compartment  osteophyte formation. No acute fracture joint dislocation is seen.  Chondrocalcinosis changes in the lateral meniscus. Advanced calcific  atherosclerotic changes are present. No definite joint effusion.     Left knee demonstrates moderate patellofemoral compartment and  moderately advanced medial and lateral compartment joint space  narrowing. Prominent posterior patellar osteophyte formation with  smaller marginal ossified the medial and lateral compartments.  Subchondral cystic change in the lateral compartment. No definite joint  effusion. Advanced calcific atherosclerosis.       Impression:       1. Moderate to moderately advanced at degenerative changes of the knees,  as described in detail in the report. No acute findings.     This report was finalized on 1/1/2018 7:53 AM by Dr. Samantha Keita MD.       XR Hand 3+ View Left [[id number]] Collected:  01/04/18 0804     Updated:  01/04/18 0819    Narrative:       FIVE VIEWS OF THE LEFT HAND     DATE: 01/03/2018.     HISTORY: 83-year-old male with left thumb pain with any movement. No  known trauma. Pain has been present for months.     FINDINGS: No acute fracture or joint dislocation is seen. Advanced  osteoarthritic changes are present at the 1st carpometacarpal joint with  joint space narrowing, articular sclerosis, and prominent marginal  osteophyte formation. Moderate osteoarthritic type changes are also  demonstrated at the 2nd  metacarpophalangeal joint. Mild-to-moderate  osteoarthritic type changes in the remainder of the metacarpophalangeal  joints and interphalangeal joints. Mild degenerative narrowing of the  radiocarpal joint. Advanced calcific atherosclerotic changes are  present. No retained radiopaque foreign body is seen.     IMPRESSION  Osteoarthritic changes within the left hand and wrist, greatest at the  1st carpometacarpal joint. No acute findings.     This report was finalized on 1/4/2018 8:17 AM by Dr. Samantha Keita MD.                                               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  Tolerating his diet.  Good ostomy output.  Hemoglobin and hematocrit are stable.  Staples and drains are been removed.  He should be ready for transfer to nursing home as per medicine.  He is to follow-up with Dr. Seay in one week.  We did discuss activities with the family.  They're to call the office with problems or questions.          Bhavesh Zavala MD  01/05/18  1:11 PM

## 2018-01-05 NOTE — PLAN OF CARE
"Problem: Patient Care Overview (Adult)  Goal: Plan of Care Review  Outcome: Ongoing (interventions implemented as appropriate)   01/05/18 0352   Coping/Psychosocial Response Interventions   Plan Of Care Reviewed With patient   Patient Care Overview   Progress improving   Outcome Evaluation   Outcome Summary/Follow up Plan continues on tele SR with BBB, pain is controlled with IV and PO pain meds, repositioning, on IV ABT, pt states \"feeling a little better.\" ileostomy and glasgow producing output.     Goal: Adult Individualization and Mutuality  Outcome: Ongoing (interventions implemented as appropriate)   01/05/18 0352   Individualization   Patient Specific Preferences none voiced       Problem: Fall Risk (Adult)  Goal: Absence of Falls  Outcome: Ongoing (interventions implemented as appropriate)   01/05/18 0352   Fall Risk (Adult)   Absence of Falls making progress toward outcome       Problem: Urine Elimination, Impaired (Adult)  Goal: Effective Containment of Urine  Outcome: Ongoing (interventions implemented as appropriate)   01/05/18 0352   Urine Elimination, Impaired (Adult)   Effective Containment of Urine making progress toward outcome     Goal: Reduced Incontinence Episodes  Outcome: Ongoing (interventions implemented as appropriate)   01/05/18 0352   Urine Elimination, Impaired (Adult)   Reduced Incontinence Episodes making progress toward outcome       Problem: Infection, Risk/Actual (Adult)  Goal: Infection Prevention/Resolution  Outcome: Ongoing (interventions implemented as appropriate)   01/05/18 0352   Infection, Risk/Actual (Adult)   Infection Prevention/Resolution making progress toward outcome     Goal: Infection Prevention/Resolution  Outcome: Ongoing (interventions implemented as appropriate)   01/05/18 0352   Infection, Risk/Actual (Adult)   Infection Prevention/Resolution making progress toward outcome       Problem: Pressure Ulcer Risk (Binh Scale) (Adult,Obstetrics,Pediatric)  Goal: Skin " Integrity  Outcome: Ongoing (interventions implemented as appropriate)   01/05/18 0352   Pressure Ulcer Risk (Binh Scale) (Adult,Obstetrics,Pediatric)   Skin Integrity making progress toward outcome       Problem: Ileostomy (Adult)  Goal: Signs and Symptoms of Listed Potential Problems Will be Absent or Manageable (Ileostomy)  Outcome: Ongoing (interventions implemented as appropriate)   01/05/18 0352   Ileostomy   Problems Assessed (Ileostomy) all   Problems Present (Ileostomy) none

## 2018-01-05 NOTE — PROGRESS NOTES
[facility] Progress Note    16f glasgow changed with no incident.  OK to discharge- he can see Dr. Prince in a few months- no rush- NH can change his glasgow q 3-4 weeks.

## 2018-01-05 NOTE — NURSING NOTE
Patient getting ready to be discharged and will go to [facility].  Ileostomy functioning with stool and gas.  2 piece pouch intact and without leakage.  Spoke with wife at bedside and encouraged her to contact me if any questions or problems once dc'd.  Left additional supplies and literature.  Enrolled patient in [place] and [facility] patient support programs.  They should receive free supply samples and educational support within several days.

## 2018-01-05 NOTE — SIGNIFICANT NOTE
01/05/18 1053   Rehab Treatment   Discipline physical therapist   Treatment Not Performed patient/family declined treatment  (pt reports his pain remains uncontrolled despite pain medication given prior to therapist arrival.  Will attempt to see patient in PM.)

## 2018-01-05 NOTE — PROGRESS NOTES
General surgery postop progress note    He is working with physical therapy.  Tolerating by mouth and enteral supplements.  His vital signs are stable and he is afebrile  Abdomen: Soft, nondistended, nontender positive bowel sounds in all 4 quadrants skin staples and CAYETANO drains were removed.  Ileostomy is viable and functioning.    Assessment and plan: Stable   Plans for discharge to nursing home.  Discharge instructions and prescriptions given

## 2018-01-05 NOTE — DISCHARGE INSTR - LAB
General Surgery -- Dr. COSMO Hebert        Follow Up 1 Week      Follow Up Details Please call the office at [phone] to make the appointment

## 2018-01-06 NOTE — PLAN OF CARE
Problem: Inpatient Physical Therapy  Goal: Bed Mobility Goal STG- PT  Outcome: Unable to achieve outcome(s) by discharge Date Met: 01/06/18 12/23/17 1140 01/06/18 0822   Bed Mobility PT STG   Bed Mobility PT STG, Date Established 12/23/17 --    Bed Mobility PT STG, Time to Achieve 3 days --    Bed Mobility PT STG, Activity Type all bed mobility --    Bed Mobility PT STG, [place] Level contact guard assist --    Bed Mobility PT STG, Date Goal Reviewed --  01/06/18   Bed Mobility PT STG, Outcome --  goal not met   Bed Mobility PT STG, Reason Goal Not Met --  discharged from facility     Goal: Transfer Training Goal 1 STG- PT  Outcome: Unable to achieve outcome(s) by discharge Date Met: 01/06/18 12/23/17 1140 01/06/18 0822   Transfer Training PT STG   Transfer Training PT STG, Date Established 12/23/17 --    Transfer Training PT STG, Time to Achieve 3 days --    Transfer Training PT STG, Activity Type bed to chair /chair to bed;sit to stand/stand to sit --    Transfer Training PT STG, [place] Level contact guard assist --    Transfer Training PT STG, Assist Device (with appropriate assistive device) --    Transfer Training PT STG, Date Goal Reviewed --  01/06/18   Transfer Training PT STG, Outcome --  goal not met   Transfer Training PT STG, Reason Goal Not Met --  discharged from facility     Goal: Transfer Training Goal 1 LTG- PT   12/23/17 1140 01/06/18 0822   Transfer Training PT LTG   Transfer Training PT LTG, Date Established 12/23/17 --    Transfer Training PT LTG, Time to Achieve 5 - 7 days --    Transfer Training PT LTG, Activity Type all transfers --    Transfer Training PT LTG, [place] Level contact guard assist;supervision required --    Transfer Training PT LTG, Assist Device (with appropriate assistive device) --    Transfer Training PT LTG, Date Goal Reviewed --  01/06/18   Transfer Training PT LTG, Outcome --  goal not met   Transfer Training PT LTG, Reason Goal Not Met --  discharged  from facility     Goal: Gait Training Goal LTG- PT   12/23/17 1140 01/06/18 0822   Gait Training PT LTG   Gait Training Goal PT LTG, Date Established 12/23/17 --    Gait Training Goal PT LTG, Time to Achieve 5 - 7 days --    Gait Training Goal PT LTG, [place] Level contact guard assist --    Gait Training Goal PT LTG, Assist Device walker, rolling --    Gait Training Goal PT LTG, Distance to Achieve 100 --    Gait Training Goal PT LTG, Date Goal Reviewed --  01/06/18   Gait Training Goal PT LTG, Outcome --  goal not met   Gait Training Goal PT LTG, Reason Goal Not Met --  discharged from facility

## 2018-01-06 NOTE — NURSING NOTE
Case Management Discharge Note    Final Note: discharged to [facility] level of care.    Discharge Placement     Facility/Agency Request Status Selected? Address Phone Number Fax Number    [facility] Accepted    Yes [address] [place] [address] [phone]        Horacio Swift RN 1/3/2018 13:35     Per Johnna - Patient has been accepted to [facility]   Insurance denied     [address] [facility] [address] [phone]        Horacio Swift RN 1/2/2018 13:10    Denied by [facility]                         Discharge Codes: 03  Discharged/transferred to skilled nursing facility (SNF) with Medicare certification in anticipation of skilled care

## 2018-01-06 NOTE — PLAN OF CARE
Problem: Inpatient Physical Therapy  Goal: Transfer Training Goal 1 LTG- PT  Outcome: Unable to achieve outcome(s) by discharge Date Met: 01/06/18    Goal: Gait Training Goal LTG- PT  Outcome: Unable to achieve outcome(s) by discharge Date Met: 01/06/18

## 2018-01-10 PROBLEM — M17.0 PRIMARY OSTEOARTHRITIS OF BOTH KNEES: Status: ACTIVE | Noted: 2018-01-01

## 2018-01-10 NOTE — PROGRESS NOTES
Subjective:     Patient ID: Jason Zelaya is a 84 y.o. male.    Chief Complaint: Bilateral knee pain, primary osteoarthritis bilateral knees    History of Present Illness    Mr. Zelaya is an 84-year-old male who presents with his spouse on stretcher from [facility]/rehabilitation [facility] for bilateral knee pain.  He has been recently hospitalized for other issue and was transferred to [facility]/rehabilitation.  Physical therapy has been attempting to work with the patient to get him up and walking however is unable to secondary to the pain that he is experiencing a bilateral knees.  Reports that the right knee pain is worse than the left and that he has had corticosteroid injections in the past as well as the Visco supplement injections.  Dr. Gandara was consulted while he was in the hospital in regards to his bilateral knee pain and recommended that he continue with the topical Voltaren gel that he had been previously prescribed.  She states that the Voltaren gel is not helpful and that he is unable to work with physical therapy including knee flexion activities while in bed secondary to the pain that he is experiencing.  Pain present over the medial and lateral joint lines as well as the patella.  Increased pain noted at right knee with light palpation.  He denies presence of numbness and tingling of bilateral lower extremities.  Has not received corticosteroid injections within the last 3-6 months.  Denies other concerns present this time.     Social History     Occupational History   •  Retired     Social History Main Topics   • Smoking status: Never Smoker   • Smokeless tobacco: Never Used   • Alcohol use 1.8 oz/week     2 Cans of beer, 1 Shots of liquor per week   • Drug use: No   • Sexual activity: Defer      Past Medical History:   Diagnosis Date   • Aortic regurgitation    • Aortic valve insufficiency    • Arthritis     Bilateral knee   • Basal cell carcinoma of left  hand     sched excision   • Chest pain    • Diastolic dysfunction    • History of GI diverticular bleed 05/2017   • History of transfusion     last 9/12/17 2 units   • History of urinary retention    • History of vertigo    • Hx MRSA infection 07/2017    + culture abadominal wound   • Hypertension    • MDS (myelodysplastic syndrome)    • RBBB (right bundle branch block)    • Self-catheterizes urinary bladder     3-4 times aday   • Skin cancer      Past Surgical History:   Procedure Laterality Date   • APPENDECTOMY     • BACK SURGERY  2008    fusion   • CHOLECYSTECTOMY     • COLON RESECTION N/A 5/26/2017    Procedure: sub-total colectomy with ileo-rectal anastamosis, mobilization of splenic  INTRAOPERATIVE COLONOSCOPY  ([id number]), rigid sigmoidoscopy;  Surgeon: Kaylie Granados MD;  Location: [facility] OR;  Service:    • COLONOSCOPY N/A 2/1/2017    Procedure: COLONOSCOPY;  Surgeon: Bridger Amado MD;  Location: [facility] OR;  Service:    • COLONOSCOPY N/A 5/24/2017    Procedure: COLONOSCOPY w/ polypectomy;  Surgeon: Bridger Amado MD;  Location: [facility] OR;  Service:    • COLONOSCOPY N/A 5/24/2017    Procedure: COLONOSCOPY-return to surgery 2nd procedure, sclerotherapy with epi injection @ 40cm, placement of resolution clips X 2;  Surgeon: Bridger Amado MD;  Location: [facility] OR;  Service:    • CYSTOSCOPY W/ URETERAL STENT PLACEMENT Bilateral 12/21/2017    Procedure: CYSTOSCOPY bilateral URETERAL CATHETER INSERTION;  Surgeon: Davy Irene MD;  Location: [facility] OR;  Service:    • ENDOSCOPY N/A 5/23/2017    Procedure: ESOPHAGOGASTRODUODENOSCOPY;  Surgeon: Bridger Amado MD;  Location: [facility] OR;  Service:    • EXCISION MASS ARM Left 9/19/2017    Procedure: EXCISION MASS UPPER EXTREMITY  --  wide excision left hand mass;  Surgeon: Kaylie Granados MD;  Location: [facility] OR;  Service:    • EXPLORATORY LAPAROTOMY N/A 5/26/2017    Procedure: LAPAROTOMY EXPLORATORY -  Explantation of old hernia mesh;  Surgeon: Kaylie Granados MD;  Location:  [facility] OR;  Service:    • EXPLORATORY LAPAROTOMY N/A 12/21/2017    Procedure: LAPAROTOMY EXPLORATORY -  placement of strattice 6x6 small bowel resection of fistulas and anastamosis, oversew of rectal stump ,  extensive lysis of adhesions,  resection of fistulas, small bowel resectionsx2 and side to side anatomosis, ileostomy placement ;  Surgeon: Kaylie Granados MD;  Location:  [facility] OR;  Service:    • HERNIA REPAIR      umbilical, inguinal        Family History   Problem Relation Age of Onset   • Diabetes Brother    • Liver cancer Brother    • Heart disease Mother    • Heart attack Mother    • Diabetes Mother    • Heart attack Father    • Other Sister      Brain tumor   • Cancer Sister    • Emphysema Brother    • Cancer Brother    • Alcohol abuse Brother    • Cancer Brother          Review of Systems   Constitutional: Negative for chills, diaphoresis, fever and unexpected weight change.   HENT: Negative for hearing loss, nosebleeds, sore throat and tinnitus.    Eyes: Negative for pain and visual disturbance.   Respiratory: Negative for cough, shortness of breath and wheezing.    Cardiovascular: Negative for chest pain and palpitations.   Gastrointestinal: Negative for abdominal pain, diarrhea, nausea and vomiting.   Endocrine: Negative for cold intolerance, heat intolerance and polydipsia.   Genitourinary: Negative for difficulty urinating, dysuria and hematuria.   Musculoskeletal: Positive for arthralgias, joint swelling and myalgias.   Skin: Negative for rash and wound.   Allergic/Immunologic: Negative for environmental allergies.   Neurological: Negative for dizziness, syncope and numbness.   Hematological: Does not bruise/bleed easily.   Psychiatric/Behavioral: Negative for dysphoric mood and sleep disturbance. The patient is not nervous/anxious.            Objective:  Physical Exam    Vital signs reviewed.   General: No acute  "distress.  Eyes: conjunctiva clear; pupils equally round and reactive  ENT: external ears and nose atraumatic; oropharynx clear  CV: no peripheral edema  Resp: normal respiratory effort  Skin: no rashes or wounds; normal turgor  Psych: mood and affect appropriate; recent and remote memory intact    Vitals:    01/10/18 1211   BP: 118/63   BP Location: Right arm   Pulse: 81   Weight: 64 kg (141 lb)   Height: 177.8 cm (70\")     Last 2 weights    01/10/18  1211   Weight: 64 kg (141 lb)     Body mass index is 20.23 kg/(m^2).     Right Knee Exam     Tenderness   The patient is experiencing tenderness in the medial joint line, lateral joint line and patella.    Range of Motion   Extension: 0   Flexion: 40     Other   Erythema: absent  Scars: absent  Sensation: normal  Pulse: present  Swelling: moderate  Other tests: no effusion present      Left Knee Exam     Tenderness   The patient is experiencing tenderness in the lateral joint line, medial joint line and patella.    Range of Motion   Extension: 0   Flexion: 40     Other   Erythema: absent  Scars: absent  Sensation: normal  Pulse: present  Swelling: moderate  Effusion: no effusion present              Imaging:  Reviewed x-ray imaging previously completed at [facility] bilateral knees:  Moderate to severe tricompartmental osteoarthritis bilateral knees    Assessment:       1. Primary osteoarthritis of both knees          Plan:  1.  Discussed plan of care with patient and his wife.  Wishes to proceed with corticosteroid injections at bilateral knees.  We'll continue with physical therapy working with patient to get her ambulating once again.  2.  We'll plan to see patient back in clinic after he is discharged from [facility]/rehabilitation facility.  He is not a surgical candidate at this time therefore will continue the steroid injections and possibly Visco supplement injections.  Patient also verbalizes understanding of all information agrees with " plan of care.  Denies other concerns present this time.    Large Joint Arthrocentesis  Date/Time: 1/10/2018 12:15 PM  Consent given by: patient  Site marked: site marked  Timeout: Immediately prior to procedure a time out was called to verify the correct patient, procedure, equipment, support staff and site/side marked as required   Supporting Documentation  Indications: pain   Procedure Details  Location: knee - Knee joint: BILATERAL KNEE.  Preparation: Patient was prepped and draped in the usual sterile fashion  Needle size: 22 G  Approach: superior  Medications administered: 4 mL lidocaine PF 1% 1 %; 4 mL bupivacaine (PF) 0.5 %; 80 mg triamcinolone acetonide 40 MG/ML  Patient tolerance: patient tolerated the procedure well with no immediate complications            Orders:  Orders Placed This Encounter   Procedures   • Large Joint Arthrocentesis       Dragon transcription disclaimer     Much of this encounter note is an electronic transcription/translation of spoken language to printed text. The electronic translation of spoken language may permit erroneous, or at times, nonsensical words or phrases to be inadvertently transcribed. Although I have reviewed the note for such errors, some may still exist.

## 2018-01-10 NOTE — PROGRESS NOTES
"    PATIENT INFORMATION  Jason Zelaya   - 1934    CHIEF COMPLAINT  Chief Complaint   Patient presents with   • Post-op Follow-up   2 wks 6 days s/p exp lap, pt states he can't eat and his knee hurts      HISTORY OF PRESENT ILLNESS  HPI  Patient was as the office today for first postoperative visit.  He is status post exploratory laparotomy with extensive lysis of adhesions, takedown of fistulas and end ileostomy.  He is currently at the [facility].  He has chronic malnutrition - during his hospitalization he was on a diet as well as hyperalimentation.  Plan was for him to be transferred to [place] to continue hyperalimentation nut his insurance declined.  We discussed placing a Dobbhoff feeding tube and/or a PEG tube for enteral feeding at night time as he eats during the day-patient declined and does not want any further surgical interventions.  According to his wife he refuses to take the enteral supplements i.e. ENSURE.      His biggest complaint today is that of severe bilateral knee pain.  He has severe arthritic pain.  Case was discussed with Dr. Turner who saw him at the nursing home and we discussed switching him to a  Voltaren patch, increasing his Percocet to 7.5 mg and also discussed with Dr. Gandara who saw him in the hospital to see if he would do a injection of the knees today.    Due to the severe arthritic knee pain patient has not been able to participate in physical therapy and even simple activities like transferring in the bed are extremely painful.  When the patient was transferred to the nursing home we were contacted regarding some old bloody drainage from his rectum.  This was discussed with Dr. Turner that it is  likely old retained mucus and blood and not unusual to be seen from the rectal stump.  When I asked him if he is having any more drainage/bleeding per rectum and he denied.      He appears very depressed.  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]     Overview Note:     Added automatically from request for surgery [id number]     • Hand lesion [L98.9]     Overview Note:     Added automatically from request for surgery [id number]     • Acute blood loss anemia [D62]   • Rectal bleed [K62.5]   • AI (aortic incompetence) [I35.1]   • Bundle branch block, right [I45.10]   • MDS (myelodysplastic syndrome), low grade [D46.20]   • Vitamin B 12 deficiency [E53.8]   • Benign prostatic hyperplasia [N40.0]   • Hypertension [I10]   • Knee pain [M25.569]         PAST MEDICAL HISTORY  Past Medical History:   Diagnosis Date   • Aortic regurgitation    • Aortic valve insufficiency    • Arthritis     Bilateral knee   • Basal cell carcinoma of left hand     sched excision   • Chest pain    • Diastolic dysfunction    • History of GI diverticular bleed 05/2017   • History of transfusion     last 9/12/17 2 units   • History of urinary retention    • History of vertigo    • Hx MRSA infection 07/2017    + culture abadominal wound   • Hypertension    • MDS (myelodysplastic syndrome)    • RBBB (right bundle branch block)    • Self-catheterizes urinary bladder     3-4 times aday   • Skin cancer          SURGICAL HISTORY  Past Surgical History:   Procedure Laterality Date   • APPENDECTOMY     • BACK SURGERY  2008    fusion   • CHOLECYSTECTOMY     • COLON RESECTION N/A 5/26/2017    Procedure: sub-total colectomy with ileo-rectal anastamosis, mobilization of splenic  INTRAOPERATIVE COLONOSCOPY  ([id number]), rigid sigmoidoscopy;  Surgeon: Kaylie Granados MD;  Location: [facility] OR;   Service:    • COLONOSCOPY N/A 2/1/2017    Procedure: COLONOSCOPY;  Surgeon: Bridger Amado MD;  Location: [facility] OR;  Service:    • COLONOSCOPY N/A 5/24/2017    Procedure: COLONOSCOPY w/ polypectomy;  Surgeon: Bridger Amado MD;  Location: [facility] OR;  Service:    • COLONOSCOPY N/A 5/24/2017    Procedure: COLONOSCOPY-return to surgery 2nd procedure, sclerotherapy with epi injection @ 40cm, placement of resolution clips X 2;  Surgeon: Bridger Amado MD;  Location: [facility] OR;  Service:    • CYSTOSCOPY W/ URETERAL STENT PLACEMENT Bilateral 12/21/2017    Procedure: CYSTOSCOPY bilateral URETERAL CATHETER INSERTION;  Surgeon: Davy Irene MD;  Location: [facility] OR;  Service:    • ENDOSCOPY N/A 5/23/2017    Procedure: ESOPHAGOGASTRODUODENOSCOPY;  Surgeon: Bridger Amado MD;  Location: [facility] OR;  Service:    • EXCISION MASS ARM Left 9/19/2017    Procedure: EXCISION MASS UPPER EXTREMITY  --  wide excision left hand mass;  Surgeon: Kaylie Granados MD;  Location: [facility] OR;  Service:    • EXPLORATORY LAPAROTOMY N/A 5/26/2017    Procedure: LAPAROTOMY EXPLORATORY - Explantation of old hernia mesh;  Surgeon: Kaylie Granados MD;  Location: [facility] OR;  Service:    • EXPLORATORY LAPAROTOMY N/A 12/21/2017    Procedure: LAPAROTOMY EXPLORATORY -  placement of strattice 6x6 small bowel resection of fistulas and anastamosis, oversew of rectal stump ,  extensive lysis of adhesions,  resection of fistulas, small bowel resectionsx2 and side to side anatomosis, ileostomy placement ;  Surgeon: Kaylie Granados MD;  Location: [facility] OR;  Service:    • HERNIA REPAIR      umbilical, inguinal          FAMILY HISTORY  Family History   Problem Relation Age of Onset   • Diabetes Brother    • Liver cancer Brother    • Heart disease Mother    • Heart attack Mother    • Diabetes Mother    • Heart attack Father    • Other Sister      Brain tumor   • Cancer Sister    • Emphysema Brother    •  Cancer Brother    • Alcohol abuse Brother    • Cancer Brother          SOCIAL HISTORY  Social History     Occupational History   •  Retired     Social History Main Topics   • Smoking status: Never Smoker   • Smokeless tobacco: Never Used   • Alcohol use 1.8 oz/week     2 Cans of beer, 1 Shots of liquor per week   • Drug use: No   • Sexual activity: Defer         CURRENT MEDICATIONS    Current Outpatient Prescriptions:   •  acetaminophen (TYLENOL) 325 MG tablet, Take 650 mg by mouth every 6 (six) hours as needed for mild pain (1-3)., Disp: , Rfl:   •  cholestyramine (QUESTRAN) 4 g packet, Take 1 packet by mouth 2 (Two) Times a Day With Meals., Disp: , Rfl:   •  clotrimazole-betamethasone (LOTRISONE) 1-0.05 % cream, Apply 1 application topically Every 12 (Twelve) Hours., Disp: , Rfl:   •  cyanocobalamin 1000 MCG/ML injection, Inject 1 mL into the shoulder, thigh, or buttocks Every 28 (Twenty-Eight) Days., Disp: , Rfl:   •  diclofenac (VOLTAREN) 1 % gel gel, Apply 2 g topically 4 (Four) Times a Day., Disp: , Rfl:   •  folic acid (FOLVITE) 400 MCG tablet, Take 400 mcg by mouth Daily., Disp: , Rfl:   •  hydrophor (AQUAPHOR) ointment ointment, Apply  topically Every 12 (Twelve) Hours., Disp: , Rfl:   •  lactobacillus acidophilus (RISAQUAD) capsule capsule, Take 1 capsule by mouth Daily., Disp: 30 capsule, Rfl: 0  •  magnesium oxide (MAGOX) 400 (241.3 Mg) MG tablet tablet, Take 1 tablet by mouth Daily., Disp: 30 each, Rfl:   •  metoprolol tartrate (LOPRESSOR) 25 MG tablet, Take 0.5 tablets by mouth Every 12 (Twelve) Hours., Disp: , Rfl:   •  MULTIPLE VITAMINS-MINERALS PO, Take 1 tablet by mouth Daily., Disp: , Rfl:   •  ondansetron (ZOFRAN) 4 MG tablet, Take 1 tablet by mouth Every 6 (Six) Hours As Needed for Nausea or Vomiting., Disp: , Rfl:   •  oxyCODONE-acetaminophen (PERCOCET) 5-325 MG per tablet, Take 1 tablet by mouth Every 6 (Six) Hours As Needed for Severe Pain  (Pain). May take 2 tablet prior to  "PT if needed, Disp: 40 tablet, Rfl: 0  •  pantoprazole (PROTONIX) 40 MG EC tablet, Take 1 tablet by mouth Daily., Disp: 30 tablet, Rfl: 1  •  potassium phosphate, monobasic, (K-PHOS) 500 MG tablet, Take 1 tablet by mouth 2 (Two) Times a Day Before Meals., Disp: , Rfl:   •  sucralfate (CARAFATE) 1 g tablet, Take 1 tablet by mouth 4 (Four) Times a Day Before Meals & at Bedtime., Disp: , Rfl:     ALLERGIES  Review of patient's allergies indicates no known allergies.    VITALS  Vitals:    01/10/18 1039   BP: 132/82   Height: 177.8 cm (70\")       LAST RESULTS   Admission on 12/15/2017, Discharged on 01/05/2018   No results displayed because visit has over 200 results.        Ct Abdomen Pelvis Without Contrast    Result Date: 12/18/2017  Narrative: CT ABDOMEN AND PELVIS, NONCONTRAST, 12/17/2017:  HISTORY: 83-year-old male status post near-total colectomy with ileorectal anastomosis May 2017 for bleeding colonic diverticular disease. Recently noted fecal matter within urine. 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 As a history of myelodysplastic syndrome.  TECHNIQUE: CT examination of the abdomen and pelvis was performed with oral contrast only. IV contrast was not administered. Radiation dose reduction techniques were utilized, including automated exposure control and exposure modulation based on body size.  COMPARISON: *  CT abdomen/pelvis, 11/15/2017 and 6/5/2017.  ABDOMEN FINDINGS: Stable mild splenomegaly. Liver, pancreas, spleen and kidneys are otherwise negative. No evidence of upper urinary tract obstruction. Cholecystectomy. No bile duct dilatation. Normal caliber abdominal aorta.  PELVIS FINDINGS: Extensive inflammation is present throughout the mid and lower pelvis centered on the patient's ileorectal anastomosis. Multiple loops of inflamed small bowel appear tethered in the mid pelvic midline near the anastomosis and show moderate dilatation and diffuse wall thickening. There is also marked thickening of adjacent " urinary bladder wall. Some ill-defined linear tracts of oral contrast extends from the region of the anastomosis toward several mid pelvic small bowel loops and could potentially represent fistulae, although decompressed, inflamed small bowel segments are also possible. There may be some very faint contrast material within the urinary bladder near the Ayoub catheter balloon, but a definitive bladder fistula is not identified. Repeat CT examination with rectal contrast may be helpful to better delineate presumed colovesical fistula and any additional fistulae near the ileorectal anastomosis. Extensive inflammatory soft tissue stranding is present throughout the pelvis, but no abscess or other drainable fluid collection is identified.  Limited images of the lower chest show mild diffuse chronic interstitial pulmonary fibrosis.      Impression: 1. Postop changes near total colectomy with ileorectal anastomosis. 2. Extensive inflammation within the midline pelvis near the anastomosis involving multiple small bowel loops and the urinary bladder. 3. Findings suspicious for small bowel fistulae and possible colorectal fistula near the anastomosis. Repeat CT examination with rectal contrast may be helpful. 4. No abscess or other undrained fluid collection. 5. Stable splenomegaly. 6. Pulmonary fibrosis. 7. Initial stat report to the ordering service from Dr. Rafiq Blue at 1428 hours on 12/17/2017.  This report was finalized on 12/18/2017 7:27 AM by Dr. Chencho Henderson MD.      Xr Hand 3+ View Left    Result Date: 1/4/2018  Narrative: FIVE VIEWS OF THE LEFT HAND  DATE: 01/03/2018.  HISTORY: 83-year-old male with left thumb pain with any movement. No known trauma. Pain has been present for months.  FINDINGS: No acute fracture or joint dislocation is seen. Advanced osteoarthritic changes are present at the 1st carpometacarpal joint with joint space narrowing, articular sclerosis, and prominent marginal osteophyte formation.  Moderate osteoarthritic type changes are also demonstrated at the 2nd metacarpophalangeal joint. Mild-to-moderate osteoarthritic type changes in the remainder of the metacarpophalangeal joints and interphalangeal joints. Mild degenerative narrowing of the radiocarpal joint. Advanced calcific atherosclerotic changes are present. No retained radiopaque foreign body is seen.  IMPRESSION Osteoarthritic changes within the left hand and wrist, greatest at the 1st carpometacarpal joint. No acute findings.  This report was finalized on 1/4/2018 8:17 AM by Dr. Samantha Keita MD.      Ct Head Without Contrast    Result Date: 12/15/2017  Narrative: UNENHANCED HEAD CT:  12/15/2017 3:09 PM  HISTORY: Fall. Found down. Slipped on a rug. Trauma.  TECHNIQUE: Axial unenhanced head CT. Radiation dose reduction techniques were utilized, including automated exposure control and exposure modulation based on body size.  COMPARISON: None  FINDINGS: No intracranial hemorrhage, mass, or infarct. No hydrocephalus or extra-axial fluid collection. There is mild generalized atrophy and chronic small vessel changes. The skull base, calvarium, and extracranial soft tissues are normal.      Impression: Normal, negative unenhanced head CT.       This report was finalized on 12/15/2017 3:10 PM by Dr. Rafiq Blue MD.      Ct Abdomen Pelvis With Contrast    Result Date: 12/18/2017  Narrative: CT ABDOMEN AND PELVIS WITH IV AND RECTAL CONTRAST, 12/18/2017:  HISTORY: 83-year-old male status post near total colectomy and ileorectal anastomosis May 2017. Recently noted stool in urine. Assess for colovesical fistula.  TECHNIQUE: CT examination of the abdomen and pelvis was performed with IV contrast following installation of 500 cc rectal GI contrast. Radiation dose reduction techniques were utilized, including automated exposure control and exposure modulation based on body size.  COMPARISON: *  CT abdomen/pelvis, 12/17/2017.  FINDINGS: The examination  shows small irregular linear collections of GI contrast and air extending to the right and left from the rectal anastomosis in the midline pelvis towards adjacent segments of tethered, dilated pelvic small bowel segments. The appearance suggests contrast leak and likely small bowel fistula formation from the anastomosis. No drainable abscess or other fluid collection is demonstrated.  There is also a tiny amount of high attenuation contrast within the lumen of a markedly thick-walled urinary bladder compatible with bladder fistula. There is also mild dilatation of both ureters and both renal collecting systems, not visible on the earlier study.  Moderately severe dilatation of multiple small bowel loops within the lower abdomen and pelvis likely representing adynamic ileus due or partial obstruction due to pelvic inflammation.  Remainder the examination is unchanged since the earlier study. Moderate splenomegaly. Liver and pancreas are unremarkable.      Impression: 1. CT findings compatible with ileorectal anastomosis leak and likely small bowel fistula formation. 2. Faint GI contrast within the urinary bladder compatible with colovesical fistula. 3. Extensive peritoneal inflammation throughout the lower abdomen and pelvis. Moderate dilatation of pelvic small bowel segments, several of which appear tethered to the region of the anastomosis. 4. Mild bilateral pyelocaliectasis and ureterectasis. 5. Splenomegaly.  This report was finalized on 12/18/2017 3:53 PM by Dr. Chencho Henderson MD.      Xr Chest 1 View    Result Date: 12/19/2017  Narrative: CHEST X-RAY, 12/18/2017:  HISTORY: PICC placement.  TECHNIQUE: AP portable chest x-ray.  FINDINGS: Newly placed right arm PICC tip is in good position in the low SVC.  Diffuse fibrosis throughout both lungs, greatest at the lung bases. Low lung volumes. Mild cardiomegaly.      Impression: 1. PICC in good position. 2. Requested initial stat report to the ordering service  from Dr. Rickie Kent at 2113 hours on 12/18/2017.  This report was finalized on 12/19/2017 6:14 AM by Dr. Chencho Henderson MD.      Xr Knee 1 Or 2 View Bilateral    Result Date: 1/1/2018  Narrative: EXAM: 2 views right knee. 2 views left knee.  DATE: 12/31/2017  HISTORY: Bilateral knee pain for 10 years. No trauma.  COMPARISON: None  FINDINGS: Right knee demonstrates advanced medial compartment joint space narrowing, moderate patellofemoral compartment joint space narrowing. Prominent medial compartment and patellofemoral compartment osteophyte formation. No acute fracture joint dislocation is seen. Chondrocalcinosis changes in the lateral meniscus. Advanced calcific atherosclerotic changes are present. No definite joint effusion.  Left knee demonstrates moderate patellofemoral compartment and moderately advanced medial and lateral compartment joint space narrowing. Prominent posterior patellar osteophyte formation with smaller marginal ossified the medial and lateral compartments. Subchondral cystic change in the lateral compartment. No definite joint effusion. Advanced calcific atherosclerosis.      Impression: 1. Moderate to moderately advanced at degenerative changes of the knees, as described in detail in the report. No acute findings.  This report was finalized on 1/1/2018 7:53 AM by Dr. Samantha Keita MD.      Xr Abdomen Kub    Result Date: 12/29/2017  Narrative: KUB  DATE: 12/28/2017  HISTORY: Abdominal surgery 1 week ago. Abdominal distention. Blood coming from NG tube. Verify NG tube placement. Possible ileus.  COMPARISON: CT abdomen and pelvis 12/18/2017.  FINDINGS: Midline laparotomy staples are present. NG tube tip projects into the region of the proximal gastric body, tip approximately 6 cm below the EG junction. The sidehole of the NG tube is thought to be located at the EG junction.  Drainage catheter projects over the left lateral abdomen. Cholecystectomy changes are present. Mild generalized  gaseous distention of predominantly small bowel loops, favored to represent mild postoperative ileus. No gross free air is seen. Probable minimal left basilar atelectasis. Degenerative endplate changes in the imaged spine. Calcified phleboliths in the pelvis to the right. Presumed Ayoub catheter in place. Drainage catheter also is seen projecting just to the right of midline within the pelvis.      Impression: 1. No acute findings in the abdomen. 2. Mild generalized gaseous distention of bowel, predominantly small bowel, favored to represent mild postoperative ileus. 3. NG tube tip about 6 cm below the EG junction with sidehole at the level of the EG junction. 4. Preliminary report provided by Dr. Kumari on 12/28/2017 at 1930 hours.  This report was finalized on 12/29/2017 9:19 AM by Dr. Samantha Keita MD.        PHYSICAL EXAM  Physical Exam   Constitutional:   Chronically ill-appearing, very depressed   Cardiovascular: Normal rate, regular rhythm and normal heart sounds.    Pulmonary/Chest: Breath sounds normal.   Abdominal:   Soft, nondistended nontender positive bowel sounds in all 4 quadrants.  Midline incision healing well.  Right lower quadrant ileostomy functioning well   Genitourinary:   Genitourinary Comments: Digital rectal exam-patient refused examination   Musculoskeletal:   Bilateral knee swelling right greater than left.  Very tender to touch   Vitals reviewed.      ASSESSMENT  Status post exploratory laparotomy, extensive lysis of adhesions, takedown fistula as an end ileostomy   Chronic malnutrition   Myelodysplastic syndrome with chronic pancytopenia and coagulopathy  Bilateral knee severe osteoarthritis    Once again had a very lengthy discussion with patient at this point I would continue to encourage oral diet and enteral supplements  He is not a surgical candidate  Patient to see Dr. Gandara this afternoon for knee injections  Continue to encourage physical therapy  Rx for Percocet 7.5/ 325 mg 1  PO Q 4 hrs PRN severe pain #40 no refills was given    PLAN  Follow-up 2 weeks.  Patient and nursing staff advised call the office sooner should he have worsening symptoms or go to the nearest emergency room.     Plan of care was discussed with Dr. Turner.

## 2018-01-22 NOTE — PROGRESS NOTES
PATIENT INFORMATION  Jason Zelaya   - 1934    CHIEF COMPLAINT  Chief Complaint   Patient presents with   • Post-op Follow-up   4 wks 4 days s/p exp lap, check ileostomy site, redness    HISTORY OF PRESENT ILLNESS  HPI  Patient was asked to the office today for concern over redness around the right lower quadrant ileostomy site and some redness associated with the old CAYETANO site.  Nursing home and call this morning wanted patient to be seen.  Patient reports she's had a low-grade temperature of 99°F 3 days ago.  He denies any drainage, nausea vomiting.  Complains of incisional abdominal pain and bilateral knee pain.  He is seeing Dr. Gandara for his severe arthritis.  His appetite is still very poor and is taking minimal by mouth enteral nutrition.  He has refused dobof/PEG tube.      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]     Overview Note:     Added automatically from request for surgery [id number]     • Hand lesion [L98.9]     Overview Note:     Added automatically from request for surgery [id number]     • Acute blood loss anemia [D62]   • Rectal bleed [K62.5]   • AI (aortic incompetence) [I35.1]   • Bundle branch block, right [I45.10]   • MDS (myelodysplastic syndrome), low grade [D46.20]   • Vitamin B 12 deficiency [E53.8]   • Benign prostatic hyperplasia [N40.0]   • Hypertension [I10]   • Knee pain [M25.569]         PAST MEDICAL HISTORY  Past Medical History:   Diagnosis Date   • Aortic regurgitation    • Aortic valve insufficiency    • Arthritis     Bilateral knee   • Basal cell carcinoma of left hand     sched excision   • Chest  pain    • Diastolic dysfunction    • History of GI diverticular bleed 05/2017   • History of transfusion     last 9/12/17 2 units   • History of urinary retention    • History of vertigo    • Hx MRSA infection 07/2017    + culture abadominal wound   • Hypertension    • MDS (myelodysplastic syndrome)    • RBBB (right bundle branch block)    • Self-catheterizes urinary bladder     3-4 times aday   • Skin cancer          SURGICAL HISTORY  Past Surgical History:   Procedure Laterality Date   • APPENDECTOMY     • BACK SURGERY  2008    fusion   • CHOLECYSTECTOMY     • COLON RESECTION N/A 5/26/2017    Procedure: sub-total colectomy with ileo-rectal anastamosis, mobilization of splenic  INTRAOPERATIVE COLONOSCOPY  ([id number]), rigid sigmoidoscopy;  Surgeon: Kaylie Granados MD;  Location: [facility] OR;  Service:    • COLONOSCOPY N/A 2/1/2017    Procedure: COLONOSCOPY;  Surgeon: Bridger Amado MD;  Location: [facility] OR;  Service:    • COLONOSCOPY N/A 5/24/2017    Procedure: COLONOSCOPY w/ polypectomy;  Surgeon: Bridger Amado MD;  Location: [facility] OR;  Service:    • COLONOSCOPY N/A 5/24/2017    Procedure: COLONOSCOPY-return to surgery 2nd procedure, sclerotherapy with epi injection @ 40cm, placement of resolution clips X 2;  Surgeon: Bridger Amado MD;  Location: [facility] OR;  Service:    • CYSTOSCOPY W/ URETERAL STENT PLACEMENT Bilateral 12/21/2017    Procedure: CYSTOSCOPY bilateral URETERAL CATHETER INSERTION;  Surgeon: Davy Irene MD;  Location: [facility] OR;  Service:    • ENDOSCOPY N/A 5/23/2017    Procedure: ESOPHAGOGASTRODUODENOSCOPY;  Surgeon: Bridger Amado MD;  Location: [facility] OR;  Service:    • EXCISION MASS ARM Left 9/19/2017    Procedure: EXCISION MASS UPPER EXTREMITY  --  wide excision left hand mass;  Surgeon: Kaylie Granados MD;  Location: [facility] OR;  Service:    • EXPLORATORY LAPAROTOMY N/A 5/26/2017    Procedure: LAPAROTOMY EXPLORATORY - Explantation of  old hernia mesh;  Surgeon: Kaylie Granados MD;  Location:  [facility] OR;  Service:    • EXPLORATORY LAPAROTOMY N/A 12/21/2017    Procedure: LAPAROTOMY EXPLORATORY -  placement of strattice 6x6 small bowel resection of fistulas and anastamosis, oversew of rectal stump ,  extensive lysis of adhesions,  resection of fistulas, small bowel resectionsx2 and side to side anatomosis, ileostomy placement ;  Surgeon: Kaylie Granados MD;  Location:  [facility] OR;  Service:    • HERNIA REPAIR      umbilical, inguinal          FAMILY HISTORY  Family History   Problem Relation Age of Onset   • Diabetes Brother    • Liver cancer Brother    • Heart disease Mother    • Heart attack Mother    • Diabetes Mother    • Heart attack Father    • Other Sister      Brain tumor   • Cancer Sister    • Emphysema Brother    • Cancer Brother    • Alcohol abuse Brother    • Cancer Brother          SOCIAL HISTORY  Social History     Occupational History   •  Retired     Social History Main Topics   • Smoking status: Never Smoker   • Smokeless tobacco: Never Used   • Alcohol use 1.8 oz/week     2 Cans of beer, 1 Shots of liquor per week   • Drug use: No   • Sexual activity: Defer         CURRENT MEDICATIONS    Current Outpatient Prescriptions:   •  acetaminophen (TYLENOL) 325 MG tablet, Take 650 mg by mouth every 6 (six) hours as needed for mild pain (1-3)., Disp: , Rfl:   •  cholestyramine (QUESTRAN) 4 g packet, Take 1 packet by mouth 2 (Two) Times a Day With Meals., Disp: , Rfl:   •  clotrimazole-betamethasone (LOTRISONE) 1-0.05 % cream, Apply 1 application topically Every 12 (Twelve) Hours., Disp: , Rfl:   •  cyanocobalamin 1000 MCG/ML injection, Inject 1 mL into the shoulder, thigh, or buttocks Every 28 (Twenty-Eight) Days., Disp: , Rfl:   •  diclofenac (VOLTAREN) 1 % gel gel, Apply 2 g topically 4 (Four) Times a Day., Disp: , Rfl:   •  folic acid (FOLVITE) 400 MCG tablet, Take 400 mcg by mouth Daily., Disp: , Rfl:   •  hydrophor  "(AQUAPHOR) ointment ointment, Apply  topically Every 12 (Twelve) Hours., Disp: , Rfl:   •  lactobacillus acidophilus (RISAQUAD) capsule capsule, Take 1 capsule by mouth Daily., Disp: 30 capsule, Rfl: 0  •  magnesium oxide (MAGOX) 400 (241.3 Mg) MG tablet tablet, Take 1 tablet by mouth Daily., Disp: 30 each, Rfl:   •  metoprolol tartrate (LOPRESSOR) 25 MG tablet, Take 0.5 tablets by mouth Every 12 (Twelve) Hours., Disp: , Rfl:   •  MULTIPLE VITAMINS-MINERALS PO, Take 1 tablet by mouth Daily., Disp: , Rfl:   •  ondansetron (ZOFRAN) 4 MG tablet, Take 1 tablet by mouth Every 6 (Six) Hours As Needed for Nausea or Vomiting., Disp: , Rfl:   •  oxyCODONE-acetaminophen (PERCOCET) 5-325 MG per tablet, Take 1 tablet by mouth Every 6 (Six) Hours As Needed for Severe Pain  (Pain). May take 2 tablet prior to PT if needed, Disp: 40 tablet, Rfl: 0  •  pantoprazole (PROTONIX) 40 MG EC tablet, Take 1 tablet by mouth Daily., Disp: 30 tablet, Rfl: 1  •  potassium phosphate, monobasic, (K-PHOS) 500 MG tablet, Take 1 tablet by mouth 2 (Two) Times a Day Before Meals., Disp: , Rfl:   •  sucralfate (CARAFATE) 1 g tablet, Take 1 tablet by mouth 4 (Four) Times a Day Before Meals & at Bedtime., Disp: , Rfl:     ALLERGIES  Review of patient's allergies indicates no known allergies.    VITALS  Vitals:    01/22/18 1048   BP: 112/62   Weight: 64 kg (141 lb)   Height: 177.8 cm (70\")       LAST RESULTS   Admission on 12/15/2017, Discharged on 01/05/2018   No results displayed because visit has over 200 results.        Xr Hand 3+ View Left    Result Date: 1/4/2018  Narrative: FIVE VIEWS OF THE LEFT HAND  DATE: 01/03/2018.  HISTORY: 83-year-old male with left thumb pain with any movement. No known trauma. Pain has been present for months.  FINDINGS: No acute fracture or joint dislocation is seen. Advanced osteoarthritic changes are present at the 1st carpometacarpal joint with joint space narrowing, articular sclerosis, and prominent marginal " osteophyte formation. Moderate osteoarthritic type changes are also demonstrated at the 2nd metacarpophalangeal joint. Mild-to-moderate osteoarthritic type changes in the remainder of the metacarpophalangeal joints and interphalangeal joints. Mild degenerative narrowing of the radiocarpal joint. Advanced calcific atherosclerotic changes are present. No retained radiopaque foreign body is seen.  IMPRESSION Osteoarthritic changes within the left hand and wrist, greatest at the 1st carpometacarpal joint. No acute findings.  This report was finalized on 1/4/2018 8:17 AM by Dr. Samantha Keita MD.      Xr Knee 1 Or 2 View Bilateral    Result Date: 1/1/2018  Narrative: EXAM: 2 views right knee. 2 views left knee.  DATE: 12/31/2017  HISTORY: Bilateral knee pain for 10 years. No trauma.  COMPARISON: None  FINDINGS: Right knee demonstrates advanced medial compartment joint space narrowing, moderate patellofemoral compartment joint space narrowing. Prominent medial compartment and patellofemoral compartment osteophyte formation. No acute fracture joint dislocation is seen. Chondrocalcinosis changes in the lateral meniscus. Advanced calcific atherosclerotic changes are present. No definite joint effusion.  Left knee demonstrates moderate patellofemoral compartment and moderately advanced medial and lateral compartment joint space narrowing. Prominent posterior patellar osteophyte formation with smaller marginal ossified the medial and lateral compartments. Subchondral cystic change in the lateral compartment. No definite joint effusion. Advanced calcific atherosclerosis.      Impression: 1. Moderate to moderately advanced at degenerative changes of the knees, as described in detail in the report. No acute findings.  This report was finalized on 1/1/2018 7:53 AM by Dr. Samantha Keita MD.      Xr Abdomen Kub    Result Date: 12/29/2017  Narrative: KUB  DATE: 12/28/2017  HISTORY: Abdominal surgery 1 week ago. Abdominal distention.  Blood coming from NG tube. Verify NG tube placement. Possible ileus.  COMPARISON: CT abdomen and pelvis 12/18/2017.  FINDINGS: Midline laparotomy staples are present. NG tube tip projects into the region of the proximal gastric body, tip approximately 6 cm below the EG junction. The sidehole of the NG tube is thought to be located at the EG junction.  Drainage catheter projects over the left lateral abdomen. Cholecystectomy changes are present. Mild generalized gaseous distention of predominantly small bowel loops, favored to represent mild postoperative ileus. No gross free air is seen. Probable minimal left basilar atelectasis. Degenerative endplate changes in the imaged spine. Calcified phleboliths in the pelvis to the right. Presumed Ayoub catheter in place. Drainage catheter also is seen projecting just to the right of midline within the pelvis.      Impression: 1. No acute findings in the abdomen. 2. Mild generalized gaseous distention of bowel, predominantly small bowel, favored to represent mild postoperative ileus. 3. NG tube tip about 6 cm below the EG junction with sidehole at the level of the EG junction. 4. Preliminary report provided by Dr. Kumari on 12/28/2017 at 1930 hours.  This report was finalized on 12/29/2017 9:19 AM by Dr. Samantha Keita MD.        PHYSICAL EXAM  Physical Exam   Constitutional:   Very frail-appearing sitting in wheelchair.  Patient was examined in wheelchair.  Unable to transfer to exam table due to severe knee pain   Cardiovascular: Normal rate, regular rhythm and normal heart sounds.    Pulmonary/Chest: Breath sounds normal.   Abdominal:   Soft, Nondistended, nontender positive bowel sounds in all 4 quadrants  Right lower quadrant Ileostomy - viable and functioning.  There is some excoriation of the skin around it.  Left lower quadrant CAYETANO site with mild induration and a scant drop of drainage- cultures were obtained.  No drainable collection or abscess or necrosis.   Nursing  note and vitals reviewed.      ASSESSMENT  Status post exploratory laparoscopy, extensive lysis of adhesion, resection of enterocutaneous fistulas, and end ileostomy    He has developed localized skin excoriation and fungal reaction around the right lower quadrant ileostomy.  I have recommended that they apply nystatin powder, in change ileostomy appliance frequently.  I have also requested that they consult the wound care/ostomy nurse at the nursing home    Left lower quadrant CAYETANO site culture was obtained.  Would recommend the wound be cleaned with peroxide daily  We'll follow-up on cultures and make recommendations regarding antibiotics based on culture results    PLAN  Follow-up one week.  Patient, wife and nursing home staff were advised to call the office sooner should he have worsening symptoms or go to the nearest emergency room

## 2018-01-31 NOTE — PROGRESS NOTES
PATIENT INFORMATION  Jason Zelaya   - 1934    CHIEF COMPLAINT  Chief Complaint   Patient presents with   • Post-op Follow-up   5 wks 6 days s/p exp lap, nursing home is saying incision is infected      HISTORY OF PRESENT ILLNESS  HPI  Patient presents to the office today for follow-up.  The last office visit when I had seen him there was a small drop of purulent drainage noted from the superior left abdominal wall CAYETANO site this was cultured and grew multiple organisms including MRSA.  Case was discussed at length with the hospitalist Dr. Turner who has been managing patient at [facility].  She had evaluated the patient also last week and did inform me that he had been afebrile and clinically doing well and that there was no further drainage noted nor any evidence of skin/soft tissue infection in fact the area had healed up she was concerned that Mr. Simmons has been colonized by several organisms and at this point wanted to hold off on any further treatment until patient clinically manifested signs of infection.  He presents to the office today with his wife.  He reports this morning the [facility] staff noticed some drainage from the superior left abdominal wall CAYETANO site.  He reports he has had a fever of 99.9 over the weekend.  He is also complaining of pain and some redness around the area.  He reports his abdominal pain is about the same.  He has had 3 cortisone injections for his arthritic knee pain and reports that that has significantly improved he is now out of bed ambulating and working with physical therapy.      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569]         PAST MEDICAL HISTORY  Past Medical History:   Diagnosis Date   • Aortic regurgitation    • Aortic valve insufficiency    • Arthritis     Bilateral knee   • Basal cell carcinoma of left hand     sched excision   • Chest pain    • Diastolic dysfunction    • History of GI diverticular bleed 05/2017   • History of transfusion     last 9/12/17 2 units   • History of urinary retention    • History of vertigo    • Hx MRSA infection 07/2017    + culture abadominal wound   • Hypertension    • MDS (myelodysplastic syndrome)    • RBBB (right bundle branch block)    • Self-catheterizes urinary bladder     3-4 times aday   • Skin cancer          SURGICAL HISTORY  Past Surgical History:   Procedure Laterality Date   • APPENDECTOMY     • BACK SURGERY  2008    fusion   • CHOLECYSTECTOMY     • COLON RESECTION N/A 5/26/2017    Procedure: sub-total colectomy with ileo-rectal anastamosis, mobilization of splenic  INTRAOPERATIVE COLONOSCOPY  ([id number]), rigid sigmoidoscopy;  Surgeon: Kaylie Granados MD;  Location: [facility] OR;  Service:    • COLONOSCOPY N/A 2/1/2017    Procedure: COLONOSCOPY;  Surgeon: Bridger Amado MD;  Location: [facility] OR;  Service:    • COLONOSCOPY N/A 5/24/2017    Procedure: COLONOSCOPY w/ polypectomy;  Surgeon: Bridger Amado MD;  Location: [facility] OR;  Service:    • COLONOSCOPY N/A 5/24/2017    Procedure: COLONOSCOPY-return to surgery 2nd  procedure, sclerotherapy with epi injection @ 40cm, placement of resolution clips X 2;  Surgeon: Bridger Amado MD;  Location: [facility] OR;  Service:    • CYSTOSCOPY W/ URETERAL STENT PLACEMENT Bilateral 12/21/2017    Procedure: CYSTOSCOPY bilateral URETERAL CATHETER INSERTION;  Surgeon: Davy Irene MD;  Location: [facility] OR;  Service:    • ENDOSCOPY N/A 5/23/2017    Procedure: ESOPHAGOGASTRODUODENOSCOPY;  Surgeon: Bridger Amado MD;  Location: [facility] OR;  Service:    • EXCISION MASS ARM Left 9/19/2017    Procedure: EXCISION MASS UPPER EXTREMITY  --  wide excision left hand mass;  Surgeon: Kaylie Granados MD;  Location: [facility] OR;  Service:    • EXPLORATORY LAPAROTOMY N/A 5/26/2017    Procedure: LAPAROTOMY EXPLORATORY - Explantation of old hernia mesh;  Surgeon: Kaylie Granados MD;  Location: [facility] OR;  Service:    • EXPLORATORY LAPAROTOMY N/A 12/21/2017    Procedure: LAPAROTOMY EXPLORATORY -  placement of strattice 6x6 small bowel resection of fistulas and anastamosis, oversew of rectal stump ,  extensive lysis of adhesions,  resection of fistulas, small bowel resectionsx2 and side to side anatomosis, ileostomy placement ;  Surgeon: Kaylie Granados MD;  Location: [facility] OR;  Service:    • HERNIA REPAIR      umbilical, inguinal          FAMILY HISTORY  Family History   Problem Relation Age of Onset   • Diabetes Brother    • Liver cancer Brother    • Heart disease Mother    • Heart attack Mother    • Diabetes Mother    • Heart attack Father    • Other Sister      Brain tumor   • Cancer Sister    • Emphysema Brother    • Cancer Brother    • Alcohol abuse Brother    • Cancer Brother          SOCIAL HISTORY  Social History     Occupational History   •  Retired     Social History Main Topics   • Smoking status: Never Smoker   • Smokeless tobacco: Never Used   • Alcohol use 1.8 oz/week     2 Cans of beer, 1 Shots of liquor per week   • Drug use: No   • Sexual  activity: Defer         CURRENT MEDICATIONS    Current Outpatient Prescriptions:   •  acetaminophen (TYLENOL) 325 MG tablet, Take 650 mg by mouth every 6 (six) hours as needed for mild pain (1-3)., Disp: , Rfl:   •  ampicillin (PRINCIPEN) 500 MG capsule, Take 1 capsule by mouth 4 (Four) Times a Day for 14 days., Disp: 56 capsule, Rfl: 0  •  cholestyramine (QUESTRAN) 4 g packet, Take 1 packet by mouth 2 (Two) Times a Day With Meals., Disp: , Rfl:   •  clotrimazole-betamethasone (LOTRISONE) 1-0.05 % cream, Apply 1 application topically Every 12 (Twelve) Hours., Disp: , Rfl:   •  cyanocobalamin 1000 MCG/ML injection, Inject 1 mL into the shoulder, thigh, or buttocks Every 28 (Twenty-Eight) Days., Disp: , Rfl:   •  diclofenac (VOLTAREN) 1 % gel gel, Apply 2 g topically 4 (Four) Times a Day., Disp: , Rfl:   •  folic acid (FOLVITE) 400 MCG tablet, Take 400 mcg by mouth Daily., Disp: , Rfl:   •  hydrophor (AQUAPHOR) ointment ointment, Apply  topically Every 12 (Twelve) Hours., Disp: , Rfl:   •  lactobacillus acidophilus (RISAQUAD) capsule capsule, Take 1 capsule by mouth Daily., Disp: 30 capsule, Rfl: 0  •  magnesium oxide (MAGOX) 400 (241.3 Mg) MG tablet tablet, Take 1 tablet by mouth Daily., Disp: 30 each, Rfl:   •  metoprolol tartrate (LOPRESSOR) 25 MG tablet, Take 0.5 tablets by mouth Every 12 (Twelve) Hours., Disp: , Rfl:   •  MULTIPLE VITAMINS-MINERALS PO, Take 1 tablet by mouth Daily., Disp: , Rfl:   •  nystatin (MYCOSTATIN) 661586 UNIT/GM powder, Apply to affected area 3 times daily, Disp: 30 g, Rfl: 0  •  ondansetron (ZOFRAN) 4 MG tablet, Take 1 tablet by mouth Every 6 (Six) Hours As Needed for Nausea or Vomiting., Disp: , Rfl:   •  oxyCODONE-acetaminophen (PERCOCET) 5-325 MG per tablet, Take 1 tablet by mouth Every 6 (Six) Hours As Needed for Severe Pain  (Pain). May take 2 tablet prior to PT if needed, Disp: 40 tablet, Rfl: 0  •  pantoprazole (PROTONIX) 40 MG EC tablet, Take 1 tablet by mouth Daily., Disp: 30  "tablet, Rfl: 1  •  potassium phosphate, monobasic, (K-PHOS) 500 MG tablet, Take 1 tablet by mouth 2 (Two) Times a Day Before Meals., Disp: , Rfl:   •  sucralfate (CARAFATE) 1 g tablet, Take 1 tablet by mouth 4 (Four) Times a Day Before Meals & at Bedtime., Disp: , Rfl:   •  sulfamethoxazole-trimethoprim (BACTRIM DS) 800-160 MG per tablet, Take 1 tablet by mouth 2 (Two) Times a Day for 14 days., Disp: 28 tablet, Rfl: 0    ALLERGIES  Review of patient's allergies indicates no known allergies.    VITALS  Vitals:    01/31/18 1016   BP: 102/62   Weight: 64 kg (141 lb)   Height: 177.8 cm (70\")       LAST RESULTS   Office Visit on 01/22/2018   Component Date Value Ref Range Status   • Wound Culture 01/22/2018 Moderate growth (3+) Enterococcus faecium*  Final   • Wound Culture 01/22/2018 Moderate growth (3+) Staphylococcus aureus, MRSA*  Final    D test is negative. Isolate does not exhibit \"inducible\" resistance to Clindamycin (isolate remains susceptible to Clindamycin).    Methicillin resistant Staphylococcus aureus, Patient may be an isolation risk.   • Wound Culture 01/22/2018 Heavy growth (4+) Proteus vulgaris*  Final   • Wound Culture 01/22/2018 Heavy growth (4+) Citrobacter freundii*  Final   • Gram Stain Result 01/22/2018 Few (2+) Gram positive cocci   Final   • Gram Stain Result 01/22/2018 Rare (1+) Gram positive bacilli   Final   • Gram Stain Result 01/22/2018 Rare (1+) Gram negative bacilli   Final   • Gram Stain Result 01/22/2018 Rare (1+) WBCs per low power field   Final     Xr Hand 3+ View Left    Result Date: 1/4/2018  Narrative: FIVE VIEWS OF THE LEFT HAND  DATE: 01/03/2018.  HISTORY: 83-year-old male with left thumb pain with any movement. No known trauma. Pain has been present for months.  FINDINGS: No acute fracture or joint dislocation is seen. Advanced osteoarthritic changes are present at the 1st carpometacarpal joint with joint space narrowing, articular sclerosis, and prominent marginal osteophyte " formation. Moderate osteoarthritic type changes are also demonstrated at the 2nd metacarpophalangeal joint. Mild-to-moderate osteoarthritic type changes in the remainder of the metacarpophalangeal joints and interphalangeal joints. Mild degenerative narrowing of the radiocarpal joint. Advanced calcific atherosclerotic changes are present. No retained radiopaque foreign body is seen.  IMPRESSION Osteoarthritic changes within the left hand and wrist, greatest at the 1st carpometacarpal joint. No acute findings.  This report was finalized on 1/4/2018 8:17 AM by Dr. Samantha Keita MD.        PHYSICAL EXAM  Physical Exam   Constitutional:   Appears frail and weak sitting in wheelchair   Cardiovascular: Normal rate, regular rhythm and normal heart sounds.    Pulmonary/Chest: Breath sounds normal.   Abdominal:   Soft, nondistended nontender positive bowel sounds in all 4 quadrants    Right lower quadrant-ileostomy viable and functioning  Left abdominal wall superior CAYETANO site was probed, no drainage noted, there is some induration, no fluctuance or abscess.  The inferior drain site is completely healed.   Nursing note and vitals reviewed.      ASSESSMENT    Soft tissue infection   There is always the possibility of a enterocutaneous fistula  Considering that he is so malnourished on corticosteroids and immunosuppressed he is at high risk for such.  At this point I would recommend that since he is not a surgical candidate we should continue to treat him medically.  I have recommended local wound care --  clean the wound with peroxide and iodoform gauze packing daily  Based on his sensitivities and multi organisms I have E scribed ampicillin and Bactrim ×14 days to his pharmacy      Wound care instructions were discussed with the nursing staff at the [facility] facility  I also discussed the case with Muna Addison and Dr. Berman the hospitalist who will be making rounds at [facility] tomorrow  I  will also inform Dr. Turner.    PLAN  Follow-up 2 weeks.  Patient and wife were advised to call the office sooner should he have worsening symptoms or go to the nearest emergency room.

## 2018-02-05 NOTE — PATIENT INSTRUCTIONS
"  Call Melquiades @ [phone] if you have any problems or concerns.    We know you have a Choice in healthcare and appreciate you using [facility].  Our purpose is to provide you \"Excellent Care\".  We hope that you will always choose us in the future and continue to recommend us to your family and friends.              Blood Transfusion, Care After  This sheet gives you information about how to care for yourself after your procedure. Your doctor may also give you more specific instructions. If you have problems or questions, contact your doctor.  Follow these instructions at home:  · Take over-the-counter and prescription medicines only as told by your doctor.  · Go back to your normal activities as told by your doctor.  · Follow instructions from your doctor about how to take care of the area where an IV tube was put into your vein (insertion site). Make sure you:  ¨ Wash your hands with soap and water before you change your bandage (dressing). If there is no soap and water, use hand .  ¨ Change your bandage as told by your doctor.  · Check your IV insertion site every day for signs of infection. Check for:  ¨ More redness, swelling, or pain.  ¨ More fluid or blood.  ¨ Warmth.  ¨ Pus or a bad smell.  Contact a doctor if:  · You have more redness, swelling, or pain around the IV insertion site..  · You have more fluid or blood coming from the IV insertion site.  · Your IV insertion site feels warm to the touch.  · You have pus or a bad smell coming from the IV insertion site.  · Your pee (urine) turns pink, red, or brown.  · You feel weak after doing your normal activities.  Get help right away if:  · You have signs of a serious allergic or body defense (immune) system reaction, including:  ¨ Itchiness.  ¨ Hives.  ¨ Trouble breathing.  ¨ Anxiety.  ¨ Pain in your chest or lower back.  ¨ Fever, flushing, and chills.  ¨ Fast pulse.  ¨ Rash.  ¨ Watery poop (diarrhea).  ¨ Throwing up " (vomiting).  ¨ Dark pee.  ¨ Serious headache.  ¨ Dizziness.  ¨ Stiff neck.  ¨ Yellow color in your face or the white parts of your eyes (jaundice).  Summary  · After a blood transfusion, return to your normal activities as told by your doctor.  · Every day, check for signs of infection where the IV tube was put into your vein.  · Some signs of infection are warm skin, more redness and pain, more fluid or blood, and pus or a bad smell where the needle went in.  · Contact your doctor if you feel weak or have any unusual symptoms.  This information is not intended to replace advice given to you by your health care provider. Make sure you discuss any questions you have with your health care provider.  Document Released: 01/08/2016 Document Revised: 08/11/2017 Document Reviewed: 08/11/2017  [employer] Interactive Patient Education © 2017 [employer]

## 2018-02-05 NOTE — PROGRESS NOTES
NURSING PROGRESS NOTE      1516 Tolerated prescribed treatment without incident. Discharged to [facility] per w/c. AVS reviewed with patient. Condition Satisfactory. Ana Maria Mullen RN

## 2018-02-05 NOTE — PROGRESS NOTES
NURSING PROGRESS NOTE      3514  Pt to [facility] per w/c with spouse .Pt ID verified by (2) identifiers. Allergies, medications and medical history reviewed and verified.Consent signed for blood transfusion. Condition Satisfactory .  Ana Maria Mullen RN

## 2018-02-05 NOTE — TELEPHONE ENCOUNTER
Spoke with patient's RN (Zuri)at the nursing home - and advised them to medicate the patient prior to dressing change.  He has been prescribed Percocet.  The are to medicate the patient prior to dressing change, the CAYETANO site can then be irrigated with peroxide and packed with quarter inch iodoform daily.   There were advised to call the office should they have any questions or concerns.

## 2018-02-13 NOTE — PROGRESS NOTES
Subjective    REASONS FOR FOLLOW-UP:   Low-grade myelodysplastic syndrome with chronic pancytopenia.       History of Present Illness    Mr. Zelaya is a pleasant 83-year-old man returning for follow-up of his myelodysplastic syndrome with chronic pancytopenia.  The patient had extensive hospitalization December and early January requiring abdominal surgery for entero-enteric, intrarectal and colovesicular fistulas.  He now has an abdominal ostomy.  He has had issues with wound healing of the abdomen being followed closely by surgery.  He is currently residing in a nursing facility to assist with his wound care.  He remains very weak.  He states that he had to receive 1 unit of packed red blood cells last week for a hemoglobin around 7.5.  Hemoglobin is only 7.7 today.  He is neutropenic but denies having fever or chills.  He continues to have mild to moderate thrombocytopenia but currently no bleeding.      PAST MEDICAL HISTORY:    1. Arthritis.  2. Urinary retention.  3. Aortic insufficiency  4. Myelodysplastic syndrome  5. Squamous cell skin cancer resected from the left hand September 2017  6. Extensive hospital stay 12/15 through 1/5/18 requiring abdominal surgery for enteroenteric, intrarectal and colovesical fistulas    PAST SURGICAL HISTORY:    1. Back fusion in 2008.    2. Hernia surgery.    3. Cholecystectomy.  4. Knee arthroscopic surgery.      SOCIAL HISTORY:  .  Retired, worked in sheet metals.  Never smoked.  Alcohol consumption of approximately a 6 pack per week.  No risk factors for HIV.    FAMILY HISTORY: Negative for hematologic disorders. Sister had brain tumor. A brother had liver cancer.    Review of Systems   Constitutional: Positive for fatigue and unexpected weight change. Negative for activity change and fever.   Respiratory: Negative for cough and shortness of breath.    Cardiovascular: Negative for palpitations and leg swelling.   Gastrointestinal: Positive for abdominal pain.  Negative for abdominal distention, diarrhea, nausea, rectal pain and vomiting.        Poor appetite   Endocrine: Positive for cold intolerance. Negative for heat intolerance.   Musculoskeletal: Positive for arthralgias and gait problem.        Right shoulder pain   Skin: Positive for pallor.   Neurological: Positive for weakness.   Hematological: Negative for adenopathy. Does not bruise/bleed easily.      A comprehensive 14 point review of systems was performed and was negative except as mentioned.    Medications:  The current medication list was reviewed in the EMR    ALLERGIES:  No Known Allergies    Objective    Temp 100.8  /61  Sat 95% RA        Physical Exam   Constitutional: No distress.   Ill and weak appearing   HENT:   Head: Atraumatic.   Eyes: No scleral icterus.   Cardiovascular: Normal rate.    Murmur heard.  Pulmonary/Chest: Effort normal and breath sounds normal. No respiratory distress.   Abdominal: Soft. He exhibits no distension and no mass. There is no tenderness. There is no rebound and no guarding.   Abdominal wound dressed    Musculoskeletal:   Decreased ROM right shoulder   Skin: Skin is warm. No erythema. There is pallor.   Psychiatric: He has a normal mood and affect.          RECENT LABS:  Hematology WBC   Date Value Ref Range Status   01/05/2018 5.21 4.80 - 10.80 10*3/mm3 Final     RBC   Date Value Ref Range Status   01/05/2018 2.69 (L) 4.70 - 6.10 10*6/mm3 Final     Hemoglobin   Date Value Ref Range Status   01/05/2018 8.6 (L) 14.0 - 18.0 g/dL Final     Hematocrit   Date Value Ref Range Status   01/05/2018 26.9 (L) 42.0 - 52.0 % Final     Platelets   Date Value Ref Range Status   01/05/2018 81 (L) 140 - 500 10*3/mm3 Final       Lab Results   Component Value Date    GLUCOSE 83 01/05/2018    BUN 15 01/05/2018    CREATININE 0.61 (L) 01/05/2018    EGFRIFNONA 126 01/05/2018    EGFRIFAFRI  09/01/2016      Comment:      <15 Indicative of kidney failure.    BCR 24.6 01/05/2018    CO2  26.3 01/05/2018    CALCIUM 7.8 (L) 01/05/2018    ALBUMIN 2.40 (L) 12/29/2017    LABIL2 0.6 12/29/2017    AST 11 12/29/2017    ALT <5 (L) 12/29/2017            Assessment/Plan     1.  Myelodysplastic syndrome with chronic pancytopenia further complicated by extensive recent hospitalization, surgeries, poor nutrition, probable GI bleeding etc.  He comes in today with a hemoglobin of 7.7 despite having received 1 unit of packed red blood cells last week.  He denies any obvious clinical bleeding such as bright red blood per rectum or melanotic stool.  He is neutropenic with an ANC 0.68 but having no fever or chill.  He has abdominal wounds which are being packed and high risk of secondary infection.    At this time I have recommended and written orders for his nursing facility:  1.  Transfusion of 2 units packed red blood cells tomorrow  2.  Weekly CBC with results faxed to my office [phone]  3.  Procrit 60,000 units subcutaneously once weekly for hemoglobin less than or equal to 10.0  4.  Transfusion of 2 units packed red blood cells for hemoglobin less than 8.0  5.  The patient needs to be in neutropenic precautions at his nursing facility  6.  His physicians need to be made aware or the patient sent to the emergency department for any fever over 100.5 in the setting of his neutropenia  7.  Two-month follow-up requested      2/13/2018      CC:

## 2018-02-14 NOTE — PROGRESS NOTES
1550 BLOOD INFUSED, VSS, DENIES C/O. AVS PRINTED & REVIEWED WITH PT. DISCHARGED TO NURSING HOME VIA W/C,  TO PICK PT. UP. PHYLLIS WARREN RN

## 2018-02-14 NOTE — PROGRESS NOTES
PATIENT INFORMATION  Jason Zelaya   - 1934    CHIEF COMPLAINT  Chief Complaint   Patient presents with   • Post-op Follow-up   7 wks 6 days s/p exp lap, pt is w/o complaints    HISTORY OF PRESENT ILLNESS  HPI  She presents to the office today for follow-up.  He reports he is still on antibiotics.  Denies any fevers, chills, redness or drainage reports that the left lower quadrant CAYETANO site is actually healing well the lower site has completely healed and the upper one is being packed with iodoform daily.  He did see Dr. Sifuentes again with hematology yesterday and most recent hemoglobin and hematocrit of 7.7 and 24.1, his absolute neutrophil count is 0.68, I have reviewed Dr. Green's office consultation from yesterday.  He is scheduled to receive 2 units of packed RBCs this morning.  He is also complaining of possible gout.  Advised patient to address this with the internist at the nursing home.  Patient's wife reports that they are planning on discharging him by the end of this week with home health.  He reports his appetite is fair he has been trying to drink more antral supplements.    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569]         PAST MEDICAL HISTORY  Past Medical History:   Diagnosis Date   • Aortic regurgitation    • Aortic valve insufficiency    • Arthritis     Bilateral knee   • Basal cell carcinoma of left hand     sched excision   • Chest pain    • Diastolic dysfunction    • History of GI diverticular bleed 05/2017   • History of transfusion     last 9/12/17 2 units   • History of urinary retention    • History of vertigo    • Hx MRSA infection 07/2017    + culture abadominal wound   • Hypertension    • MDS (myelodysplastic syndrome)    • RBBB (right bundle branch block)    • Self-catheterizes urinary bladder     3-4 times aday   • Skin cancer          SURGICAL HISTORY  Past Surgical History:   Procedure Laterality Date   • APPENDECTOMY     • BACK SURGERY  2008    fusion   • CHOLECYSTECTOMY     • COLON RESECTION N/A 5/26/2017    Procedure: sub-total colectomy with ileo-rectal anastamosis, mobilization of splenic  INTRAOPERATIVE COLONOSCOPY  ([id number]), rigid sigmoidoscopy;  Surgeon: Kaylie Granados MD;  Location: [facility] OR;  Service:    • COLONOSCOPY N/A 2/1/2017    Procedure: COLONOSCOPY;  Surgeon: Bridger Amado MD;  Location: [facility] OR;  Service:    • COLONOSCOPY N/A 5/24/2017    Procedure: COLONOSCOPY w/ polypectomy;  Surgeon: Bridger Amado MD;  Location: [facility] OR;  Service:    • COLONOSCOPY N/A 5/24/2017    Procedure: COLONOSCOPY-return to surgery 2nd procedure, sclerotherapy with epi injection @ 40cm, placement of resolution clips X 2;  Surgeon: Bridger Amado MD;  Location: [facility] OR;  Service:    • CYSTOSCOPY W/ URETERAL STENT PLACEMENT Bilateral 12/21/2017    Procedure: CYSTOSCOPY bilateral URETERAL CATHETER INSERTION;  Surgeon: Davy Irene MD;  Location: [facility] OR;  Service:    • ENDOSCOPY N/A 5/23/2017    Procedure: ESOPHAGOGASTRODUODENOSCOPY;  Surgeon: Bridger Amado  MD;  Location: [facility] OR;  Service:    • EXCISION MASS ARM Left 9/19/2017    Procedure: EXCISION MASS UPPER EXTREMITY  --  wide excision left hand mass;  Surgeon: Kaylie Granados MD;  Location: [facility] OR;  Service:    • EXPLORATORY LAPAROTOMY N/A 5/26/2017    Procedure: LAPAROTOMY EXPLORATORY - Explantation of old hernia mesh;  Surgeon: Kaylie Granados MD;  Location:  [facility] OR;  Service:    • EXPLORATORY LAPAROTOMY N/A 12/21/2017    Procedure: LAPAROTOMY EXPLORATORY -  placement of strattice 6x6 small bowel resection of fistulas and anastamosis, oversew of rectal stump ,  extensive lysis of adhesions,  resection of fistulas, small bowel resectionsx2 and side to side anatomosis, ileostomy placement ;  Surgeon: Kaylie Granadso MD;  Location: [facility] OR;  Service:    • HERNIA REPAIR      umbilical, inguinal          FAMILY HISTORY  Family History   Problem Relation Age of Onset   • Diabetes Brother    • Liver cancer Brother    • Heart disease Mother    • Heart attack Mother    • Diabetes Mother    • Heart attack Father    • Other Sister      Brain tumor   • Cancer Sister    • Emphysema Brother    • Cancer Brother    • Alcohol abuse Brother    • Cancer Brother          SOCIAL HISTORY  Social History     Occupational History   •  Retired     Social History Main Topics   • Smoking status: Never Smoker   • Smokeless tobacco: Never Used   • Alcohol use 1.8 oz/week     2 Cans of beer, 1 Shots of liquor per week   • Drug use: No   • Sexual activity: Defer         CURRENT MEDICATIONS    Current Outpatient Prescriptions:   •  acetaminophen (TYLENOL) 325 MG tablet, Take 650 mg by mouth every 6 (six) hours as needed for mild pain (1-3)., Disp: , Rfl:   •  bisacodyl (BISACODYL LAXATIVE) 10 MG suppository, Insert 10 mg into the rectum Daily As Needed for Constipation., Disp: , Rfl:   •  cholestyramine (QUESTRAN) 4 g packet, Take 1 packet by mouth 2 (Two) Times a Day With Meals., Disp: , Rfl:   •   clotrimazole-betamethasone (LOTRISONE) 1-0.05 % cream, Apply 1 application topically Every 12 (Twelve) Hours., Disp: , Rfl:   •  cyanocobalamin 1000 MCG/ML injection, Inject 1 mL into the shoulder, thigh, or buttocks Every 28 (Twenty-Eight) Days., Disp: , Rfl:   •  diclofenac (VOLTAREN) 1 % gel gel, Apply 2 g topically 4 (Four) Times a Day., Disp: , Rfl:   •  folic acid (FOLVITE) 400 MCG tablet, Take 400 mcg by mouth Daily., Disp: , Rfl:   •  hydrophor (AQUAPHOR) ointment ointment, Apply  topically Every 12 (Twelve) Hours., Disp: , Rfl:   •  lactobacillus acidophilus (RISAQUAD) capsule capsule, Take 1 capsule by mouth Daily., Disp: 30 capsule, Rfl: 0  •  lactulose (CHRONULAC) 10 GM/15ML solution, Take 30 mL by mouth Daily As Needed., Disp: , Rfl:   •  magnesium oxide (MAGOX) 400 (241.3 Mg) MG tablet tablet, Take 1 tablet by mouth Daily., Disp: 30 each, Rfl:   •  metoprolol tartrate (LOPRESSOR) 25 MG tablet, Take 0.5 tablets by mouth Every 12 (Twelve) Hours., Disp: , Rfl:   •  MULTIPLE VITAMINS-MINERALS PO, Take 1 tablet by mouth Daily., Disp: , Rfl:   •  nystatin (MYCOSTATIN) 889679 UNIT/GM powder, Apply to affected area 3 times daily, Disp: 30 g, Rfl: 0  •  ondansetron (ZOFRAN) 4 MG tablet, Take 1 tablet by mouth Every 6 (Six) Hours As Needed for Nausea or Vomiting., Disp: , Rfl:   •  oxyCODONE-acetaminophen (PERCOCET) 5-325 MG per tablet, Take 1 tablet by mouth Every 6 (Six) Hours As Needed for Severe Pain  (Pain). May take 2 tablet prior to PT if needed, Disp: 40 tablet, Rfl: 0  •  pantoprazole (PROTONIX) 40 MG EC tablet, Take 1 tablet by mouth Daily., Disp: 30 tablet, Rfl: 1  •  potassium phosphate, monobasic, (K-PHOS) 500 MG tablet, Take 1 tablet by mouth 2 (Two) Times a Day Before Meals., Disp: , Rfl:   •  sucralfate (CARAFATE) 1 g tablet, Take 1 tablet by mouth 4 (Four) Times a Day Before Meals & at Bedtime., Disp: , Rfl:   No current facility-administered medications for this visit.     Facility-Administered  "Medications Ordered in Other Visits:   •  sodium chloride 0.9 % infusion 250 mL, 250 mL, Intravenous, PRN, Anupam Green MD, Stopped at 02/14/18 1546    ALLERGIES  Review of patient's allergies indicates no known allergies.    VITALS  Vitals:    02/14/18 0905   BP: 102/62   Weight: 54.9 kg (121 lb)   Height: 177.8 cm (70\")       LAST RESULTS   Lab on 02/13/2018   Component Date Value Ref Range Status   • Glucose 02/13/2018 99  65 - 99 mg/dL Final   • BUN 02/13/2018 27* 8 - 23 mg/dL Final   • Creatinine 02/13/2018 0.96  0.76 - 1.27 mg/dL Final   • Sodium 02/13/2018 133* 136 - 145 mmol/L Final   • Potassium 02/13/2018 4.6  3.5 - 5.2 mmol/L Final   • Chloride 02/13/2018 99  98 - 107 mmol/L Final   • CO2 02/13/2018 19.9* 22.0 - 29.0 mmol/L Final   • Calcium 02/13/2018 8.9  8.8 - 10.5 mg/dL Final   • Total Protein 02/13/2018 8.7* 6.0 - 8.5 g/dL Final   • Albumin 02/13/2018 3.20* 3.50 - 5.20 g/dL Final   • ALT (SGPT) 02/13/2018 26  5 - 41 U/L Final   • AST (SGOT) 02/13/2018 21  5 - 40 U/L Final   • Alkaline Phosphatase 02/13/2018 130* 40 - 129 U/L Final   • Total Bilirubin 02/13/2018 0.2  0.2 - 1.2 mg/dL Final   • eGFR Non African Amer 02/13/2018 75  >60 mL/min/1.73 Final   • Globulin 02/13/2018 5.5  gm/dL Final   • A/G Ratio 02/13/2018 0.6  g/dL Final   • BUN/Creatinine Ratio 02/13/2018 28.1* 7.0 - 25.0 Final   • Anion Gap 02/13/2018 14.1  mmol/L Final   • Iron 02/13/2018 209* 59 - 158 mcg/dL Final   • Iron Saturation 02/13/2018   % Final    Unable to calculate   • UIBC 02/13/2018 <16* 112 - 346 mcg/dL Final   • TIBC 02/13/2018   261 - 498 mcg/dL Final    Unable to calculate   • Ferritin 02/13/2018 822.00* 30.00 - 400.00 ng/mL Final   • WBC 02/13/2018 2.47* 4.80 - 10.80 10*3/mm3 Final   • RBC 02/13/2018 2.37* 4.70 - 6.10 10*6/mm3 Final   • Hemoglobin 02/13/2018 7.7* 14.0 - 18.0 g/dL Final   • Hematocrit 02/13/2018 24.1* 42.0 - 52.0 % Final   • MCV 02/13/2018 101.7* 80.0 - 94.0 fL Final   • MCH 02/13/2018 32.5* 27.0 - " 31.0 pg Final   • MCHC 02/13/2018 32.0  31.0 - 37.0 g/dL Final   • RDW 02/13/2018 22.8* 11.5 - 14.5 % Final   • RDW-SD 02/13/2018 81.8* 37.0 - 54.0 fl Final   • MPV 02/13/2018 10.1  7.4 - 10.4 fL Final   • Platelets 02/13/2018 135* 140 - 500 10*3/mm3 Final   • Neutrophil % 02/13/2018 27.6* 45.0 - 70.0 % Final   • Lymphocyte % 02/13/2018 42.1  20.0 - 45.0 % Final   • Monocyte % 02/13/2018 29.1* 3.0 - 8.0 % Final   • Eosinophil % 02/13/2018 0.0  0.0 - 4.0 % Final   • Basophil % 02/13/2018 0.0  0.0 - 2.0 % Final   • Immature Grans % 02/13/2018 1.2* 0.0 - 0.5 % Final   • Neutrophils, Absolute 02/13/2018 0.68* 1.50 - 8.30 10*3/mm3 Final   • Lymphocytes, Absolute 02/13/2018 1.04  0.60 - 4.80 10*3/mm3 Final   • Monocytes, Absolute 02/13/2018 0.72  0.00 - 1.00 10*3/mm3 Final   • Eosinophils, Absolute 02/13/2018 0.00* 0.10 - 0.30 10*3/mm3 Final   • Basophils, Absolute 02/13/2018 0.00  0.00 - 0.20 10*3/mm3 Final   • Immature Grans, Absolute 02/13/2018 0.03  0.00 - 0.03 10*3/mm3 Final   • nRBC 02/13/2018 0.0  0.0 - 0.0 /100 WBC Final     No results found.    PHYSICAL EXAM  Physical Exam   Constitutional:   Frail, chronically ill-appearing.   Cardiovascular: Normal rate, regular rhythm and normal heart sounds.    Pulmonary/Chest: Breath sounds normal.   Abdominal:   Soft, nondistended nontender positive bowel sounds in all 4 quadrants.  Right lower quadrant ileostomy viable and functioning well.  Midline incision well healed.  Left lower quadrant superior CAYETANO drain site wound is only 2 mm deep it was cleansed with peroxide and packed with quarter-inch are performed.  The induration has completely resolved.  The inferior CAYETANO site is completely healed.   Nursing note and vitals reviewed.      ASSESSMENT  Continue local wound care  Patient was advised to complete the antibiotics  Would be a good idea to involve home health upon discharge.  This was communicated with Dr. Turner who does manage patient's care at the [place]  nursing home      PLAN  I will see him now in 2 weeks.  Patient was advised to call the office sooner should he have worsening symptoms or go to the nearest emergency room.

## 2018-02-14 NOTE — PROGRESS NOTES
NURSING PROGRESS NOTE: Patient arrived to [facility] at 1010 per W/C.  Assisted to bed per wife.  History and medications reviewed and consent signed.  Transfusion of PRBC started at 1054.  Patient without c/o.  HAILE Hudson  2nd units of PRBC started at 1327.  Assisted patient in emptying his right abd. Colostomy bag of small amount semi liquid stool.  Gave patient a can of Boost per request by his wife. She states they try to encourage at least 3 cans per day.  HAILE Hudson

## 2018-02-14 NOTE — PATIENT INSTRUCTIONS
"  Call Dr. Anupam Green, [facility] at [phone] if you have any problems or concerns.    We know you have a Choice in healthcare and appreciate you using [facility].  Our purpose is to provide you \"Excellent Care\".  We hope that you will always choose us in the future and continue to recommend us to your family and friends.              Blood Transfusion, Care After  This sheet gives you information about how to care for yourself after your procedure. Your doctor may also give you more specific instructions. If you have problems or questions, contact your doctor.  Follow these instructions at home:  · Take over-the-counter and prescription medicines only as told by your doctor.  · Go back to your normal activities as told by your doctor.  · Follow instructions from your doctor about how to take care of the area where an IV tube was put into your vein (insertion site). Make sure you:  ¨ Wash your hands with soap and water before you change your bandage (dressing). If there is no soap and water, use hand .  ¨ Change your bandage as told by your doctor.  · Check your IV insertion site every day for signs of infection. Check for:  ¨ More redness, swelling, or pain.  ¨ More fluid or blood.  ¨ Warmth.  ¨ Pus or a bad smell.  Contact a doctor if:  · You have more redness, swelling, or pain around the IV insertion site..  · You have more fluid or blood coming from the IV insertion site.  · Your IV insertion site feels warm to the touch.  · You have pus or a bad smell coming from the IV insertion site.  · Your pee (urine) turns pink, red, or brown.  · You feel weak after doing your normal activities.  Get help right away if:  · You have signs of a serious allergic or body defense (immune) system reaction, including:  ¨ Itchiness.  ¨ Hives.  ¨ Trouble breathing.  ¨ Anxiety.  ¨ Pain in your chest or lower back.  ¨ Fever, flushing, and chills.  ¨ Fast pulse.  ¨ Rash.  ¨ Watery poop " (diarrhea).  ¨ Throwing up (vomiting).  ¨ Dark pee.  ¨ Serious headache.  ¨ Dizziness.  ¨ Stiff neck.  ¨ Yellow color in your face or the white parts of your eyes (jaundice).  Summary  · After a blood transfusion, return to your normal activities as told by your doctor.  · Every day, check for signs of infection where the IV tube was put into your vein.  · Some signs of infection are warm skin, more redness and pain, more fluid or blood, and pus or a bad smell where the needle went in.  · Contact your doctor if you feel weak or have any unusual symptoms.  This information is not intended to replace advice given to you by your health care provider. Make sure you discuss any questions you have with your health care provider.  Document Released: 01/08/2016 Document Revised: 08/11/2017 Document Reviewed: 08/11/2017  [employer] Interactive Patient Education © 2017 [employer]

## 2018-02-26 NOTE — TELEPHONE ENCOUNTER
Kandi RN with [facility] calling to report hgb of 8.4, she states this is down from last week 9.1.  She states the wife of the pt is concerned his hgb is going to continue to drop and he may require blood.  I reviewed with the nurse that pt is to have weekly cbc, receive procrit 60,000 unit for hgb <10 and transfuse 2 units PRBC for hgb <8.  She was not aware of these orders and requested these be faxed to [facility].   I informed her we would bring the pt in this Wednesday for lab and procrit injection, she v/u.    Last office note faxed to [facility] and message sent to scheduling desk to establish weekly procrit injections

## 2018-02-26 NOTE — TELEPHONE ENCOUNTER
----- Message from Clara Polanco RN sent at 2/26/2018  3:56 PM EST -----  Pt will need to be set up for weekly procrit injections for hgb <10, please schedule injection for this Wednesday 2-28-18, call pt with appt time

## 2018-02-27 PROBLEM — K63.2 ENTEROCUTANEOUS FISTULA: Status: ACTIVE | Noted: 2018-01-01

## 2018-03-01 NOTE — PROGRESS NOTES
General Surgery Progress Note      Chief Complaint:  Follow-up enterocutaneous and rectocutaneous fistula    Interval History: Complaining of left great toe and right great toe pain as well as bilateral knee pain.  Reports even the sheet touching the feet and knees causes severe pain.  Spoke with the hospitalist Dr. Eastman patient was started on cultures sent for presumed gout.  Ostomy appliance was placed on the intracutaneous fistula in the last 24 hours it has been scant output.He denies any abdominal pain, nausea, vomiting at this time.  Did receive 1 unit of packed RBCs yesterday and PICC line was also placed yesterday    Objective     Vital Signs  Temp:  [97.2 °F (36.2 °C)-98.1 °F (36.7 °C)] 97.4 °F (36.3 °C)  Heart Rate:  [51-70] 55  Resp:  [16-20] 18  BP: ()/(49-68) 105/50  Body mass index is 17.32 kg/(m^2).    Intake/Output Summary (Last 24 hours) at 03/01/18 1715  Last data filed at 03/01/18 1600   Gross per 24 hour   Intake             2532 ml   Output             1660 ml   Net              872 ml     I/O this shift:  In: 1200 [I.V.:1000; IV Piggyback:200]  Out: 150 [Stool:150]       Physical Exam:   General: patient awake, alert and cooperative   Cardiovascular: regular rhythm and rate, no murmurs auscultated   Pulm: clear to auscultation bilaterally, regular and unlabored   Abdomen: soft, nontender, nondistended; normal bowel sounds,Right lower quadrant ileostomy viable and functioning, enterocutaneous fistula with scant output   Extremities: no rash or edema   Neurologic: Normal mood and behavior     Results Review:     I reviewed the patient's new clinical results.    Lab Results (last 24 hours)     Procedure Component Value Units Date/Time    Prealbumin [[id number]]  (Abnormal) Collected:  02/28/18 1133    Specimen:  Blood Updated:  02/28/18 1751     Prealbumin 13.0 (L) mg/dL     Urine Culture - Urine, Urine, Clean Catch [[id number]]  (Abnormal) Collected:  02/27/18 2055    Specimen:  Urine from  Urine, Catheter Updated:  03/01/18 0914     Urine Culture --      >100,000 CFU/mL Gram Positive Cocci (A)    CBC & Differential [[id number]] Collected:  03/01/18 0651    Specimen:  Blood Updated:  03/01/18 1055    Narrative:       The following orders were created for panel order CBC & Differential.  Procedure                               Abnormality         Status                     ---------                               -----------         ------                     CBC Auto Differential[[id number]]        Abnormal            Final result                 Please view results for these tests on the individual orders.    CBC Auto Differential [[id number]]  (Abnormal) Collected:  03/01/18 0651    Specimen:  Blood Updated:  03/01/18 1055     WBC 4.64 (L) 10*3/mm3      RBC 2.31 (L) 10*6/mm3      Hemoglobin 7.2 (C) g/dL      Hematocrit 22.4 (C) %      MCV 97.0 (H) fL      MCH 31.2 (H) pg      MCHC 32.1 g/dL      RDW 22.6 (H) %      RDW-SD 78.2 (H) fl      MPV 11.8 (H) fL      Platelets 99 (L) 10*3/mm3      Neutrophil % 59.9 %      Lymphocyte % 17.7 (L) %      Monocyte % 18.3 (H) %      Eosinophil % 0.0 %      Basophil % 0.0 %      Immature Grans % 4.1 (H) %      Neutrophils, Absolute 2.78 10*3/mm3      Lymphocytes, Absolute 0.82 10*3/mm3      Monocytes, Absolute 0.85 10*3/mm3      Eosinophils, Absolute 0.00 (L) 10*3/mm3      Basophils, Absolute 0.00 10*3/mm3      Immature Grans, Absolute 0.19 (H) 10*3/mm3      nRBC 0.0 /100 WBC     Comprehensive Metabolic Panel [[id number]]  (Abnormal) Collected:  03/01/18 0651    Specimen:  Blood Updated:  03/01/18 1127     Glucose 72 mg/dL      BUN 11 mg/dL      Creatinine 0.59 (L) mg/dL      Sodium 138 mmol/L      Potassium 4.5 mmol/L      Chloride 107 mmol/L      CO2 20.5 (L) mmol/L      Calcium 8.2 (L) mg/dL      Total Protein 6.4 g/dL      Albumin 2.40 (L) g/dL      ALT (SGPT) <5 (L) U/L      AST (SGOT) 22 U/L      Alkaline Phosphatase 98 U/L      Total Bilirubin 0.4 mg/dL      eGFR  Non  Amer 131 mL/min/1.73      Globulin 4.0 gm/dL      A/G Ratio 0.6 g/dL      BUN/Creatinine Ratio 18.6     Anion Gap 10.5 mmol/L     Narrative:       The MDRD GFR formula is only valid for adults with stable renal function between ages 18 and 70.          Radiology:    Imaging Results (last 72 hours)     Procedure Component Value Units Date/Time    CT Abdomen Pelvis With Contrast [[id number]] Collected:  02/28/18 0824     Updated:  02/28/18 0834    Narrative:       CT ABDOMEN PELVIS W CONTRAST-: 2/27/2018 10:34 PM     INDICATION:   Diffuse abdominal pain and intermittent fever. 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     TECHNIQUE:   CT of the abdomen and pelvis with contrast. Coronal and sagittal  reconstructions were obtained.  Radiation dose reduction techniques  included automated exposure control or exposure modulation based on body  size. Radiation audit for number of CT and nuclear cardiology exams  performed in the last year: 5.       COMPARISON:   CT abdomen pelvis 12/18/2017, 12/17/2017 11/15/2017.     FINDINGS:  ABDOMEN:   The aortic root is dilated measuring 4.7 cm at the sinus of Valsalva,  unchanged. There are a few small pulmonary nodules in the lung bases are  similar to the prior study. There is severe emphysema.     The solid abdominal organs are unchanged in enhancement. There is  atrophy of the pancreas.. The gallbladder is surgically absent.       The small bowel is not dilated. The patient has had a subtotal  colectomy. There is a diverting right lower quadrant ileostomy. A  fistulous tract has developed into the anterior abdominal wall above the  umbilicus. There is fluid tracking the left rectus abdominis muscle  inferiorly. A fistulous tract communicates with the rectal stump and  likely some of the adjacent small bowel. There is some enteric contrast  within the fistulous tract and the rectal stump from the adjacent  small  bowel. Small bowel fistulous tract may involve the small bowel  anastomosis or several loops of small bowel in the lower abdomen/pelvis  that are slightly inflamed. The inflammatory change extends over the  dome of the bladder. There is gas within the bladder wall potentially  representing a enterovesicular or rectovesicular fistula.       PELVIS:   A. Ayoub catheter decompresses the bladder. Diffuse bladder wall  thickening is noted. No enlarged pelvic or inguinal lymph nodes. No  loculated drainable fluid collections          No acute osseous abnormalities.       Impression:          1. 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  2. Air tracking within the bladder wall is likely secondary to fistulous  involvement of the bladder as well.         Initial interpretation provided by Dr. Bassem Henderson at 23:19 on  02/27/2018.     This report was finalized on 2/28/2018 8:32 AM by Dr. Rafiq Blue MD.       XR Chest 1 View [[id number]] Collected:  03/01/18 0709     Updated:  03/01/18 0713    Narrative:       Chest x-ray     INDICATION: PICC placement today.     FINDINGS: AP portable chest is compared 12/18/2017. Left arm approach  PICC is in the lower SVC. Cardiomegaly is stable. Chronic opacities  noted in the bases could reflect some scarring. No pneumothorax is seen.     A preliminary report was provided by Dr. Wong at 1753 hours on  02/28/2018.     This report was finalized on 3/1/2018 7:11 AM by Dr. Nabeel Cantu MD.               Adult [facility]imix TPN     And     fat emulsion 72 mL    Pharmacy to dose vancomycin     sodium chloride 100 mL/hr Last Rate: 100 mL/hr (03/01/18 1526)           Assessment/Plan     Patient Active Problem List   Diagnosis Code   • MDS (myelodysplastic syndrome), low grade D46.20   • Vitamin B 12 deficiency E53.8   • AI (aortic incompetence) I35.1   • Bundle branch block, right I45.10   •  Benign prostatic hyperplasia N40.0   • Hypertension I10   • Knee pain M25.569   • Rectal bleed K62.5   • Acute blood loss anemia D62   • Hand lesion L98.9   • Acute UTI N39.0   • Acute lower UTI (urinary tract infection) N39.0   • 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   • 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  At this time I would recommend we continue to be conservative.    Plan of care discussed with patient, multiple family members including his wife and daughter and the hospitalist Dr. Eastman      03/01/18  5:15 PM

## 2018-03-01 NOTE — PROGRESS NOTES
"Paola Eastman MD Physician Incomplete  Progress Notes Date of Service: 3/1/2018 10:46 AM         []Hide copied text  []Hover for attribution information  SERVICE: [facility] HOSPITALIST     CONSULTANTS:  [facility]       CHIEF COMPLAINT: Lft Great toe pain, f/u for intraperitoneal abscess with new enteroenteric fistula     SUBJECTIVE:  Lethargy and overall function improved. Pt concerned over nutrition. Complains of Lft >> Rt 1st phalange severe nocturnal pain, worsened by contact with sheet, made better by putting feet on floor. Been ongoing for weeks, some small improvement, had appointment to have evaluated by PCP day of admission. Pt attributes to gout, no known FMH of gout.    ROS: No nasuesa, denies f/c     OBJECTIVE:     BP 92/50 (BP Location: Right arm, Patient Position: Sitting)  Pulse 62  Temp 97.6 °F (36.4 °C) (Oral)   Resp 16  Ht 177 cm (69.69\")  Wt 54.3 kg (119 lb 9.6 oz)  SpO2 93%  BMI 17.32 kg/m2     MEDS/LABS REVIEWED AND ORDERED        cyanocobalamin 1,000 mcg Intramuscular Q28 Days   diclofenac 2 g Topical 4x Daily   hydrophor   Topical Q12H   lactobacillus acidophilus 1 capsule Oral Daily   magnesium oxide 400 mg Oral Daily   multivitamin with minerals 1 tablet Oral Daily   nystatin   Topical Q12H   pantoprazole 40 mg Oral Q AM   piperacillin-tazobactam 3.375 g Intravenous Q8H   piperacillin-tazobactam 4.5 g Intravenous Once   sucralfate 1 g Oral 4x Daily AC & at Bedtime   vancomycin 1,000 mg Intravenous Q24H         Physical Exam   Constitutional: He is oriented to person, place, and time. He appears well-developed. No distress.   HENT:   Head: Normocephalic and atraumatic.   Eyes: Pupils are equal, round, and reactive to light.   Cardiovascular: Normal rate, regular rhythm and normal heart sounds.    Pulmonary/Chest: Effort normal and breath sounds normal.   Abdominal: Soft. Bowel sounds are normal. He exhibits no distension. There is no tenderness.   Air filled " Ileostomy sac with green feces, small amount of feces collected in fistula bag   Musculoskeletal: He exhibits tendernessof first toe MTP. He exhibits no edema.   Lft >> rt erythema of 1st MCP  Neurological: He is alert and oriented to person, place, and time.   Skin: Skin is warm and dry. He is not diaphoretic.   Psychiatric: He has a normal mood and affect.            LAB/DIAGNOSTICS:     Lab Results (last 24 hours)     Procedure Component Value Units Date/Time             Hemoglobin & Hematocrit, Blood [[id number]]  (Abnormal) Collected:  02/28/18 1540     Specimen:  Blood Updated:  02/28/18 1555       Hemoglobin 7.3 (C) g/dL         Hematocrit 22.8 (C) %       Prealbumin [[id number]]  (Abnormal) Collected:  02/28/18 1133     Specimen:  Blood Updated:  02/28/18 1751       Prealbumin 13.0 (L) mg/dL       Urine Culture - Urine, Urine, Clean Catch [[id number]]  (Abnormal) Collected:  02/27/18 2055     Specimen:  Urine from Urine, Catheter Updated:  03/01/18 0914       Urine Culture --         >100,000 CFU/mL Gram Positive Cocci (A)            ASSESSMENT/PLAN:  84 WM w/ PMH of MDS (chronic pancytopenia), severe protein malnutrition, chronic steroid use, GI bleed s/p left delmis colectomy w/ prolonged hospital, with admission for multiple fistulas in December.       Intra-abdominal vs. Bladder bacterial infection associated with newly developed Enterocutaneous and rectocutaneous fistula  - Urology consulted; chronic glasgow, no sign of enterovisical fistual  - Admit WBC 12.05, SIRS 2/4, blood pressures chronically low  - WBC improved on Zosyn and Vanc, Started 2/26, awaiting urine cultures before de-escalation  - bowel rest    Acute Gout of the Left 1st MCP w/ ?rt 1st MCP  - Discussed with Pharm options 2/2 NPO status, can not do Colchicine  - Cr 0.9  - Apply diclofenac to affected areas   - Do not wish to do intraarticular steroid injection due to active infection  - Will discuss option of systemic steroids with  Surgery    Chronic Pancytopenia with acute multifactorial anemia requiring transfusion  - S/p 1 unit PRBC 2/28  - Attempt to stagger AM labs as feasible to reduce risk of iatrogenic anemia  - HGB today >7, plan for transfusion less than 7       Chronic Protein total calorie malnutrition from chronic illness  - Admit Albumin 3.1  - Dietitian following  - NPO w/ plan for TPN  - Picc placed 2/28    Ostomy / Fistula  - Ostomy nurse following

## 2018-03-01 NOTE — PLAN OF CARE
Problem: Patient Care Overview (Adult)  Goal: Plan of Care Review  Outcome: Ongoing (interventions implemented as appropriate)   03/01/18 0509   Coping/Psychosocial Response Interventions   Plan Of Care Reviewed With patient   Patient Care Overview   Progress progress towards functional goals is fair     Goal: Discharge Needs Assessment  Outcome: Ongoing (interventions implemented as appropriate)   03/01/18 0509   Discharge Needs Assessment   Concerns To Be Addressed denies needs/concerns at this time   Readmission Within The Last 30 Days no previous admission in last 30 days   Self-Care   Equipment Currently Used at Home cane, quad;walker, rolling;wheelchair;wound care supplies;colostomy/ostomy supplies;commode;shower chair   Living Environment   Transportation Available family or friend will provide       Problem: Infection, Risk/Actual (Adult)  Goal: Infection Prevention/Resolution  Outcome: Ongoing (interventions implemented as appropriate)   03/01/18 0509   Infection, Risk/Actual (Adult)   Infection Prevention/Resolution making progress toward outcome       Problem: Fall Risk (Adult)  Goal: Absence of Falls  Outcome: Ongoing (interventions implemented as appropriate)   03/01/18 0509   Fall Risk (Adult)   Absence of Falls making progress toward outcome       Problem: Skin Integrity Impairment, Risk/Actual (Adult)  Goal: Skin Integrity/Wound Healing  Outcome: Ongoing (interventions implemented as appropriate)   03/01/18 0509   Skin Integrity Impairment, Risk/Actual (Adult)   Skin Integrity/Wound Healing making progress toward outcome       Problem: Pressure Ulcer Risk (Binh Scale) (Adult,Obstetrics,Pediatric)  Goal: Skin Integrity  Outcome: Ongoing (interventions implemented as appropriate)   03/01/18 0509   Pressure Ulcer Risk (Binh Scale) (Adult,Obstetrics,Pediatric)   Skin Integrity making progress toward outcome

## 2018-03-01 NOTE — PLAN OF CARE
Problem: Patient Care Overview (Adult)  Goal: Plan of Care Review  Outcome: Ongoing (interventions implemented as appropriate)   02/28/18 2025   Coping/Psychosocial Response Interventions   Plan Of Care Reviewed With patient;spouse   Patient Care Overview   Progress progress towards functional goals is fair   Outcome Evaluation   Outcome Summary/Follow up Plan pt had a fair day today. family has been here. He was seen by wound ostomy nurse today and he now has his ileostomy bag,  drained 100cc of brown liq today, NPO, except meds his fistula now has a ostomy bag over it.and scant amt lt brown liq noted in bag, bottom is red, mepilex applied and pt is now on [place] Hi [place] air mattress bed, turned/repositioned every 2 hours. IVf cont x2 #20's to his left forearm, He did recieve a PICC today LISSETTE arm, and placement verified. He also recieved 1upc today, and last hgb was 7.3, was 6.6 prior. His IVF cont NS at 125, Vancomycin /pipracillin cont as ordered. Pain today c/o abdomen hurting. Percocet given po x2, and Dilaudid was given x1, at present time his pain level is a 3. Family has been here to visit and he continues to have his glasgow, to BSD and urine yellow. VSS, except B/p has been running low, last b/p 101/62. Dr. Granados and Dr. Yue houser seen pt today.       Problem: Infection, Risk/Actual (Adult)  Goal: Infection Prevention/Resolution  Outcome: Ongoing (interventions implemented as appropriate)      Problem: Fall Risk (Adult)  Goal: Absence of Falls  Outcome: Ongoing (interventions implemented as appropriate)      Problem: Skin Integrity Impairment, Risk/Actual (Adult)  Goal: Skin Integrity/Wound Healing  Outcome: Ongoing (interventions implemented as appropriate)      Problem: Pressure Ulcer Risk (Binh Scale) (Adult,Obstetrics,Pediatric)  Goal: Skin Integrity  Outcome: Ongoing (interventions implemented as appropriate)

## 2018-03-01 NOTE — CONSULTS
Adult Nutrition  Assessment/PES    Patient Name:  Jason Zelaya  YOB: 1934  MRN: [id number]  Admit Date:  2/27/2018    Assessment Date:  3/1/2018    Comments:  TPN Clinamex (D20 5% AA) 21ml/hr + 140 kcal IL = 583 kcal   Each day increase rate by 20 ml/hour as tolerated to a goal rate of 70 ml/hour.  This providesa total of 1,982 kcals and 84 grams protein).             Reason for Assessment       03/01/18 1442    Reason for Assessment    Reason For Assessment/Visit physician consult;TF/PN        Will cont to follow and monitor.      Electronically signed by:  Roseanne Clayton RD  03/01/18 2:42 PM

## 2018-03-02 NOTE — THERAPY DISCHARGE NOTE
Acute Care - Occupational Therapy Initial Eval/Discharge   Tiffanie Keith     Patient Name: Jason Zelaya  : 1934  MRN: [id number]  Today's Date: 3/2/2018  Onset of Illness/Injury or Date of Surgery Date: 18  Date of Referral to OT: 18  Referring Physician: Kendrick      Admit Date: 2018       ICD-10-CM ICD-9-CM   1. 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 Acute UTI N39.0 599.0   3. Acute abdominal pain R10.9 789.00     338.19   4. Hyponatremia E87.1 276.1   5. Chronic anemia D64.9 285.9     Patient Active Problem List   Diagnosis   • MDS (myelodysplastic syndrome), low grade   • Vitamin B 12 deficiency   • AI (aortic incompetence)   • Bundle branch block, right   • Benign prostatic hyperplasia   • Hypertension   • Knee pain   • Rectal bleed   • Acute blood loss anemia   • Hand lesion   • Acute UTI   • Acute lower UTI (urinary tract infection)   • Enteroenteric fistula   • Colovesical fistula   • Coagulopathy   • Primary osteoarthritis of both knees   • Enterocutaneous fistula     Past Medical History:   Diagnosis Date   • Aortic regurgitation    • Aortic valve insufficiency    • Arthritis     Bilateral knee   • Basal cell carcinoma of left hand     sched excision   • Chest pain    • Diastolic dysfunction    • History of GI diverticular bleed 2017   • History of transfusion     last 17 2 units   • History of urinary retention    • History of vertigo    • Hx MRSA infection 2017    + culture abadominal wound   • Hypertension    • MDS (myelodysplastic syndrome)    • RBBB (right bundle branch block)    • Self-catheterizes urinary bladder     3-4 times aday   • Skin cancer      Past Surgical History:   Procedure Laterality Date   • APPENDECTOMY     • BACK SURGERY  2008    fusion   • CHOLECYSTECTOMY     • COLON RESECTION N/A 2017    Procedure: sub-total colectomy with ileo-rectal anastamosis, mobilization of splenic  INTRAOPERATIVE COLONOSCOPY  ([id number]), rigid  sigmoidoscopy;  Surgeon: Kaylie Granados MD;  Location: [facility] OR;  Service:    • COLONOSCOPY N/A 2/1/2017    Procedure: COLONOSCOPY;  Surgeon: Bridger Amado MD;  Location: [facility] OR;  Service:    • COLONOSCOPY N/A 5/24/2017    Procedure: COLONOSCOPY w/ polypectomy;  Surgeon: Bridger Amado MD;  Location: [facility] OR;  Service:    • COLONOSCOPY N/A 5/24/2017    Procedure: COLONOSCOPY-return to surgery 2nd procedure, sclerotherapy with epi injection @ 40cm, placement of resolution clips X 2;  Surgeon: Bridger Amado MD;  Location: [facility] OR;  Service:    • CYSTOSCOPY W/ URETERAL STENT PLACEMENT Bilateral 12/21/2017    Procedure: CYSTOSCOPY bilateral URETERAL CATHETER INSERTION;  Surgeon: Davy Irene MD;  Location: [facility] OR;  Service:    • ENDOSCOPY N/A 5/23/2017    Procedure: ESOPHAGOGASTRODUODENOSCOPY;  Surgeon: Bridger Amado MD;  Location: [facility] OR;  Service:    • EXCISION MASS ARM Left 9/19/2017    Procedure: EXCISION MASS UPPER EXTREMITY  --  wide excision left hand mass;  Surgeon: Kaylie Granados MD;  Location: [facility] OR;  Service:    • EXPLORATORY LAPAROTOMY N/A 5/26/2017    Procedure: LAPAROTOMY EXPLORATORY - Explantation of old hernia mesh;  Surgeon: Kaylie Granados MD;  Location: [facility] OR;  Service:    • EXPLORATORY LAPAROTOMY N/A 12/21/2017    Procedure: LAPAROTOMY EXPLORATORY -  placement of strattice 6x6 small bowel resection of fistulas and anastamosis, oversew of rectal stump ,  extensive lysis of adhesions,  resection of fistulas, small bowel resectionsx2 and side to side anatomosis, ileostomy placement ;  Surgeon: Kaylie Granados MD;  Location: [facility] OR;  Service:    • HERNIA REPAIR      umbilical, inguinal           OT ASSESSMENT FLOWSHEET (last 72 hours)      OT Evaluation       03/02/18 1421 03/02/18 1400          Document Type  evaluation;discharge summary  -SD    Subjective Information  agree to therapy;complains of;pain  -SD     "Patient Effort, Rehab Treatment  good  -SD    Symptoms Noted During/After Treatment         Patient Profile Review  yes  -SD    Onset of Illness/Injury or Date of Surgery Date  02/27/18  -SD    Referring Physician  Kendrick  -SD    General Observations  Patient supine in bed with HOB elevated. TPN. In no acute distress, Agrees to evaluation.   -SD    Pertinent History Of Current Problem  Patient presents to the ED with abdominal pain and fever. Diagnosed with intraperitoneal abcess with new enteroenteric fistula. Patent with chronic ostomy with new ostomy over fistula. Patient diagnosed with acute gout on 3/1. Per patient report he does not ambulate at baseline, he performs stand pivot transfer bed to/from w/c and to/from BSC. He was recently discharged from [facility].  Patient states wife assists with all mobility and ADL's. He does not use an AD for transfers.   -SD    Precautions/Limitations  fall precautions;other (see comments)   isolation precautions  -SD    Prior Level of Function  mod assist:;ADL's   Pt states home health or spouse assist with adl's  -SD    Equipment Currently Used at Home  hospital bed;wheelchair;walker, rolling   BSC  -SD    Plans/Goals Discussed With  patient;agreed upon  -SD    Risks Reviewed  patient:;LOB;increased discomfort  -SD    Benefits Reviewed  patient:;improve function;increase strength  -SD    Barriers to Rehab  previous functional deficit;medically complex  -SD       Lives With  spouse  -SD    Living Arrangements  house  -SD    Home Accessibility  bed and bath on same level  -SD    Living Environment Comment         Date of Referral to OT  03/02/18  -SD    OT Diagnosis  weakness  -SD    Prognosis  good  -SD    Functional Level At Time Of Evaluation  Pt was able to perform stand pivot transfers with CGA to/from bed to w/c.  Pt requires mod assistance with adl's.  Pt states he has received assistance with adl's for a lengthy period due to \"all my lines and " "drains.\"  Pt states he is functionally managing near his baseline status.    -SD    Impairments Found (describe specific impairments)  gait, locomotion, and balance  -SD    Patient/Family Goals Statement  pt stated he planned to transfer to [facility] for ongoing medical care  -SD    Criteria for Skilled Therapeutic Interventions Met  no;current level of function same as previous level of function  -SD    Therapy Frequency  evaluation only  -SD    Anticipated Discharge Disposition long term acute care facility   pt states he plans to transfer to [place] for several weeks for TPN  -SD long term acute care facility   Rec therapy consult at LT to prevent weakness during stay  -SD       Pain Assessment  0-10  -SD    Pain Score  3  -SD    Pain Type  Chronic pain  -SD    Pain Location  Generalized  -SD    Pain Intervention(s)  Repositioned  -SD    Response to Interventions         Current Cognitive/Communication Assessment      Follows Commands/Answers Questions      Personal Safety      Personal Safety Interventions  gait belt;fall prevention program maintained;nonskid shoes/slippers when out of bed;supervised activity  -SD       General ROM Detail  BUE rom wfl  -SD       General MMT Assessment Detail  BUE strength wfl  -SD          Bed Mobility, Assistive Device  bed rails;head of bed elevated  -SD    Bed Mob, Supine to Sit, [place]  contact guard assist;verbal cues required  -SD    Bed Mobility, Comment         Transfers, Bed-Chair [place]  contact guard assist;verbal cues required  -SD    Transfers, Chair-Bed [place]  contact guard assist;verbal cues required  -SD    Transfer, Comment  Patient performed to stand pivot bed to/from chair x 2 with CGA. Patient refuses use of AD for transfer. Able to complete transfer without assist, patient keeps UE's supported on bed rail or armrest of w/c   -SD       UB Bathing Assess/Train, Comment  Pt states he receives assistance with adl's due to \"all my " "lines and drains\"  -SD       Additional Documentation         Sitting-Level of Assistance  Distant supervision  -SD    Sitting-Balance Support  Feet supported  -SD    Standing-Level of Assistance  Contact guard  -SD    Static Standing Balance Support         Bilateral Upper Extremity  --   discussed benefits of BUE rom program  -SD       Pre-Treatment Position  in bed  -SD    Post Treatment Position  wheelchair  -SD    In Wheelchair  sitting;call light within reach;notified nsg;encouraged to call for assist  -SD      User Key  (r) = Recorded By, (t) = Taken By, (c) = Cosigned By    Initials Name Effective Dates    SD Ernst Peacock, OTR 06/22/16 -           Occupational Therapy Education     Title: PT OT SLP Therapies (Done)     Topic: Occupational Therapy (Resolved)     Point: ADL training (Resolved)    Description: Instruct learner(s) on proper safety adaptation and remediation techniques during self care or transfers.   Instruct in proper use of assistive devices.    Learning Progress Summary    Learner Readiness Method Response Comment Documented by Status   Patient Acceptance E VU Education regarding OT services and benefits of activity.  Pt states he has received assist with adl's for a lengthy period.  Nursing assisted at [place] and then his spouse and  staff assisted at home.  \"I just need help because of the lines.\" SD 03/02/18 1415 Done               Point: Home exercise program (Resolved)    Description: Instruct learner(s) on appropriate technique for monitoring, assisting and/or progressing therapeutic exercises/activities.    Learning Progress Summary    Learner Readiness Method Response Comment Documented by Status   Patient Acceptance E VU Education regarding benefits of UE rom/strengthening program to prevent weakness during hospitalization.  Pt states he is familiar with UE rom/exercises and does not need education/support with another program.  \"I've already done that.\" SD 03/02/18 1418 Done " "                     User Key     Initials Effective Dates Name Provider Type Discipline    SD 06/22/16 -  Ernst Peacock, OTR Occupational Therapist OT                OT Recommendation and Plan  Anticipated Discharge Disposition: long term acute care facility (pt states he plans to transfer to [place] for several weeks for TPN)  Therapy Frequency: evaluation only  Plan of Care Review  Plan Of Care Reviewed With: patient  Outcome Summary/Follow up Plan: OT consult completed.  Pt states he has had assistance with basic ADL tasks for a lengthy period (from nursing at [facility] or from spouse/Home Health staff when at home). Pt states \"I just need help because of all my lines\".  Pt was able to manage a stand pivot transfer from the bed to w/c to bed with CGA.  Pt indicated that this is the same as his recent functional status.  Benefits of UE strengthening program were discussed with patient.  He states he is familiar with UE strenghtening programs from therapy at [facility] and with Home Health.  He reported he was not interested in participating in a strengthening program at this time.  Pt states he plans to transfer to [place] for several weeks due to his acute medical status.  Pt indicated he would be receptive to therapy at the [facility] to maintain his strength during the stay.                Outcome Measures       03/02/18 1400        How much help from another is currently needed...    Putting on and taking off regular lower body clothing? 2  -SD      Bathing (including washing, rinsing, and drying) 2  -SD      Toileting (which includes using toilet bed pan or urinal) 2  -SD      Putting on and taking off regular upper body clothing 3  -SD      Taking care of personal grooming (such as brushing teeth) 3  -SD      Eating meals 1   pt is NPO  -SD      Score 13  -SD      Functional Assessment    Outcome Measure Options AM-PAC 6 Clicks Daily Activity (OT)  -SD        User Key  (r) = Recorded By, (t) = Taken By, (c) = " "Cosigned By    Initials Name Provider Type    ANALISA Peacock OTR Occupational Therapist          Time Calculation:         Time Calculation- OT       03/02/18 1427          Time Calculation- OT    OT Start Time 1340  -SD      OT Stop Time --  -SD      OT Time Calculation (min) --  -SD        User Key  (r) = Recorded By, (t) = Taken By, (c) = Cosigned By    Initials Name Provider Type    ANALISA Peacock OTR Occupational Therapist          Therapy Charges for Today     Code Description Service Date Service Provider Modifiers Qty    [id number]  OT EVAL LOW COMPLEXITY 2 3/2/2018 BRENNEN Chen GO 1               OT Discharge Summary  Anticipated Discharge Disposition: long term acute care facility (pt states he plans to transfer to [place] for several weeks for TPN)  Reason for Discharge: At baseline function (Pt states there is no change with his management of basic daily tasks.  Pt states he has received assistance for a lengthy period.  \"I just need help because of all the lines.\"  Pt may benefit from OT consult at [place] to prevent decline in function. )    Ernst Peacock OTR  3/2/2018  "

## 2018-03-02 NOTE — NURSING NOTE
CWON consult follow up for enterocutaneous fistula. 2 piece Cesar pouching systems to ileostomy and abdominal fisutla opening are intact and without leakage.  RYLADN mattress in place. Emptied 100 ml of ileostomy output and there is scant amount of thin output currently in fistula pouch.  Supplies are at bedside but pouching systems will not need to be changed until I see him Monday unless leaking occurs.  Please do not hesitate to call with any questions.

## 2018-03-02 NOTE — NURSING NOTE
Continued Stay Note  RENATA Wheatley     Patient Name: Jason Zelaya  MRN: [id number]  Today's Date: 3/2/2018    Admit Date: 2/27/2018          Discharge Plan       03/02/18 1421    Case Management/Social Work Plan    Plan plan [facility]    Patient/Family In Agreement With Plan yes    Additional Comments Spoke with Latisha/ITZEL to inform of referral to [facility] as patient is current with them. Will continue to follow.      03/02/18 1329    Case Management/Social Work Plan    Plan plan [facility]    Additional Comments Spoke with Alexsandra Garcia/Chai, referral given. Will continue to follow.              Discharge Codes     None            Bigg Renteria RN

## 2018-03-02 NOTE — PLAN OF CARE
Problem: Skin Integrity Impairment, Risk/Actual (Adult)  Goal: Skin Integrity/Wound Healing  Outcome: Ongoing (interventions implemented as appropriate)   03/02/18 0621   Skin Integrity Impairment, Risk/Actual (Adult)   Skin Integrity/Wound Healing making progress toward outcome

## 2018-03-02 NOTE — PROGRESS NOTES
"Hospitalist Team      Patient Care Team:  David Cedillo MD as PCP - General (Family Medicine)  Anupam Green MD as Consulting Physician (Hematology and Oncology)  Bennie Galo MD as Referring Physician (Orthopedic Surgery)  Kaylie Granados MD as Surgeon (General Surgery)        Chief Complaint:   F/U Intra-abdominal vs. Bladder bacterial infection associated with newly developed Enterocutaneous and rectocutaneous fistula    Subjective    Interval History and ROS:     Patient notes his knees are sore but this in not new. He notes the pain in his toes has improved today. He notes overall he just feels tired. He denies any CP, SOA or N/V. He is afebrile.       Objective    Vital Signs  Temp:  [97.2 °F (36.2 °C)-97.9 °F (36.6 °C)] 97.9 °F (36.6 °C)  Heart Rate:  [50-55] 53  Resp:  [18-20] 20  BP: ()/(49-71) 148/71  Oxygen Therapy  SpO2: 97 %  Pulse Oximetry Type: Intermittent  O2 Device: room air (o2 sat 97%)     Flowsheet Rows         First Filed Value    Admission Height  177 cm (69.69\") Documented at 02/27/2018 2011    Admission Weight  57.2 kg (126 lb) Documented at 02/27/2018 2011            Physical Exam:  Constitutional: Patient appears thin, elderly and in no acute distress   HEENT:   Head: Normocephalic and atraumatic.   Eyes:  EOM are intact. Sclera are anicteric and non-injected.  Mouth and Throat: Patient has moist mucous membranes.     Neck: Neck supple. No JVD present.   Cardiovascular: Regular rate, regular rhythm.  Exam reveals no gallop and no friction rub.  No murmur heard.  Pulmonary/Chest: Lungs are clear to auscultation bilaterally. No respiratory distress. No wheezes. No rhonchi. No rales.   Abdominal: Soft. Bowel sounds are normal. No distension. There is no tenderness. Very little stool and some air noted in ileostomy and fistula bags.  Extremities: No edema. Pulses are palpable in all 4 extremities. Mild erythema of great toes bilaterally L>R. No TTP today.   Neurological: " Patient is alert and oriented to person, place, and time.   Skin: Skin is warm. No cyanosis.      Results Review:     I reviewed the patient's new clinical results.    Lab Results (last 24 hours)     Procedure Component Value Units Date/Time    CBC & Differential [[id number]] Collected:  03/01/18 0651    Specimen:  Blood Updated:  03/01/18 1055    Narrative:       The following orders were created for panel order CBC & Differential.  Procedure                               Abnormality         Status                     ---------                               -----------         ------                     CBC Auto Differential[[id number]]        Abnormal            Final result                 Please view results for these tests on the individual orders.    CBC Auto Differential [[id number]]  (Abnormal) Collected:  03/01/18 0651    Specimen:  Blood Updated:  03/01/18 1055     WBC 4.64 (L) 10*3/mm3      RBC 2.31 (L) 10*6/mm3      Hemoglobin 7.2 (C) g/dL      Hematocrit 22.4 (C) %      MCV 97.0 (H) fL      MCH 31.2 (H) pg      MCHC 32.1 g/dL      RDW 22.6 (H) %      RDW-SD 78.2 (H) fl      MPV 11.8 (H) fL      Platelets 99 (L) 10*3/mm3      Neutrophil % 59.9 %      Lymphocyte % 17.7 (L) %      Monocyte % 18.3 (H) %      Eosinophil % 0.0 %      Basophil % 0.0 %      Immature Grans % 4.1 (H) %      Neutrophils, Absolute 2.78 10*3/mm3      Lymphocytes, Absolute 0.82 10*3/mm3      Monocytes, Absolute 0.85 10*3/mm3      Eosinophils, Absolute 0.00 (L) 10*3/mm3      Basophils, Absolute 0.00 10*3/mm3      Immature Grans, Absolute 0.19 (H) 10*3/mm3      nRBC 0.0 /100 WBC     Comprehensive Metabolic Panel [[id number]]  (Abnormal) Collected:  03/01/18 0651    Specimen:  Blood Updated:  03/01/18 1127     Glucose 72 mg/dL      BUN 11 mg/dL      Creatinine 0.59 (L) mg/dL      Sodium 138 mmol/L      Potassium 4.5 mmol/L      Chloride 107 mmol/L      CO2 20.5 (L) mmol/L      Calcium 8.2 (L) mg/dL      Total Protein 6.4 g/dL      Albumin  2.40 (L) g/dL      ALT (SGPT) <5 (L) U/L      AST (SGOT) 22 U/L      Alkaline Phosphatase 98 U/L      Total Bilirubin 0.4 mg/dL      eGFR Non African Amer 131 mL/min/1.73      Globulin 4.0 gm/dL      A/G Ratio 0.6 g/dL      BUN/Creatinine Ratio 18.6     Anion Gap 10.5 mmol/L     Narrative:       The MDRD GFR formula is only valid for adults with stable renal function between ages 18 and 70.    POC Glucose Once [[id number]]  (Normal) Collected:  03/01/18 1800    Specimen:  Blood Updated:  03/01/18 1808     Glucose 130 mg/dL     Narrative:       Meter: [id number] : [id number] Preet Upton RN    POC Glucose Once [[id number]]  (Normal) Collected:  03/01/18 2359    Specimen:  Blood Updated:  03/02/18 0005     Glucose 129 mg/dL     Narrative:       Meter: [id number] : [id number] Rafael Banks RN    POC Glucose Once [[id number]]  (Normal) Collected:  03/02/18 0535    Specimen:  Blood Updated:  03/02/18 0542     Glucose 101 mg/dL     Narrative:       Meter: [id number] : [id number] Stacie Lazcano RN    Magnesium [[id number]]  (Abnormal) Collected:  03/02/18 0529    Specimen:  Blood Updated:  03/02/18 0620     Magnesium 1.6 (L) mg/dL     Phosphorus [[id number]]  (Abnormal) Collected:  03/02/18 0529    Specimen:  Blood Updated:  03/02/18 0620     Phosphorus 2.6 (L) mg/dL     Basic Metabolic Panel [[id number]]  (Abnormal) Collected:  03/02/18 0529    Specimen:  Blood Updated:  03/02/18 0623     Glucose 97 mg/dL      BUN 8 mg/dL      Creatinine 0.60 (L) mg/dL      Sodium 143 mmol/L      Potassium 3.1 (L) mmol/L      Chloride 112 (H) mmol/L      CO2 19.6 (L) mmol/L      Calcium 7.8 (L) mg/dL      eGFR Non African Amer 128 mL/min/1.73      BUN/Creatinine Ratio 13.3     Anion Gap 11.4 mmol/L     Narrative:       The MDRD GFR formula is only valid for adults with stable renal function between ages 18 and 70.    Urine Culture - Urine, Urine, Clean Catch [[id number]]  (Abnormal)  (Susceptibility) Collected:  02/27/18 2059     Specimen:  Urine from Urine, Catheter Updated:  03/02/18 0632     Urine Culture --      >100,000 CFU/mL Staphylococcus aureus, MRSA (A)        Methicillin resistant Staph aureus, patient may be an isolation risk.       Susceptibility      Staphylococcus aureus, MRSA     JULISA     Nitrofurantoin <=16 ug/ml Susceptible     Oxacillin >=4 ug/ml Resistant     Penicillin G >=0.5 ug/ml Resistant     Rifampin <=0.5 ug/ml Susceptible     Tetracycline <=1 ug/ml Susceptible     Trimethoprim + Sulfamethoxazole <=10 ug/ml Susceptible     Vancomycin 1 ug/ml Susceptible                          Imaging Results (last 24 hours)     ** No results found for the last 24 hours. **        ECG/EMG Results (most recent)     None          Medication Review:   I have reviewed the patient's current medication list    Current Facility-Administered Medications:   •  acetaminophen (TYLENOL) tablet 650 mg, 650 mg, Oral, Q6H PRN, Rian Dean MD  •  Adult [facility] Clinimix TPN, , Intravenous, Continuous **AND** fat emulsion (INTRALIPID,LIPOSYN) 20 % infusion 14.4 g, 72 mL, Intravenous, Continuous, Kaylie Granados MD  •  colchicine tablet 0.6 mg, 0.6 mg, Oral, Once, Paola Eastman MD  •  colchicine tablet 0.6 mg, 0.6 mg, Oral, Daily, Paola Eastman MD, 0.6 mg at 03/02/18 0909  •  cyanocobalamin injection 1,000 mcg, 1,000 mcg, Intramuscular, Q28 Days, Rian Dean MD, 1,000 mcg at 02/28/18 1050  •  diclofenac (VOLTAREN) 1 % gel 2 g, 2 g, Topical, 4x Daily, Rian Dean MD, 2 g at 03/02/18 0841  •  HYDROmorphone (DILAUDID) injection 1 mg, 1 mg, Intravenous, Q4H PRN, Kaylie Granados MD, 1 mg at 03/01/18 0220  •  hydrophor (AQUAPHOR) ointment, , Topical, Q12H, Rian Dean MD  •  lactobacillus acidophilus (RISAQUAD) capsule 1 capsule, 1 capsule, Oral, Daily, Rian Dean MD, 1 capsule at 03/02/18 0841  •  magnesium oxide (MAGOX) tablet 400 mg, 400 mg, Oral, Daily, Rian Dean MD, 400 mg at 03/02/18 0841  •  multivitamin  with minerals 1 tablet, 1 tablet, Oral, Daily, Rian Dean MD, 1 tablet at 03/02/18 0841  •  nystatin (MYCOSTATIN) powder, , Topical, Q12H, Rian Dean MD  •  ondansetron (ZOFRAN) tablet 4 mg, 4 mg, Oral, Q6H PRN, Rian Dean MD  •  oxyCODONE-acetaminophen (PERCOCET) 5-325 MG per tablet 2 tablet, 2 tablet, Oral, Q6H PRN, Barbara Turner DO, 2 tablet at 03/02/18 0840  •  pantoprazole (PROTONIX) EC tablet 40 mg, 40 mg, Oral, Q AM, Rian Dean MD, 40 mg at 03/02/18 0655  •  Pharmacy to dose vancomycin, , Does not apply, Continuous PRN, Rian Dean MD  •  piperacillin-tazobactam (ZOSYN) 3.375 g/100 mL 0.9% NS IVPB (mbp), 3.375 g, Intravenous, Q8H, Rian Dean MD, Last Rate: 0 mL/hr at 02/28/18 1306, 3.375 g at 03/02/18 0758  •  piperacillin-tazobactam (ZOSYN) 4.5 g/100 mL 0.9% NS IVPB (mbp), 4.5 g, Intravenous, Once, Rafiq Irene MD, Stopped at 02/28/18 0030  •  Insert peripheral IV, , , Once **AND** sodium chloride 0.9 % flush 10 mL, 10 mL, Intravenous, PRN, Rafiq Irene MD, 10 mL at 03/01/18 1754  •  sodium chloride 0.9 % infusion, 50 mL/hr, Intravenous, Continuous, Kaylie Granados MD, Last Rate: 50 mL/hr at 03/02/18 0843, 50 mL/hr at 03/02/18 0843  •  sucralfate (CARAFATE) tablet 1 g, 1 g, Oral, 4x Daily AC & at Bedtime, Rian Dean MD, 1 g at 03/02/18 0841  •  vancomycin (VANCOCIN) IVPB 1000 mg in 250 mL NS, 1,000 mg, Intravenous, Q24H, Rian Dean MD, 1,000 mg at 03/02/18 0130      Assessment/Plan     1. Enterocutaneous, Rectocutaneous fistula:  Patient will be treated conservatively as he is not a surgical candidate. Dr. Granados following and I discussed patient with her today.  Will continue bowel rest and TPN, likely will take 6-8 weeks to see if fistulas will heal.  Urology consulted, patient with chronic glasgow. No sign of enterovisical fistula  Admit WBC 12.05 now leukopenic which is his baseline, Blood pressures chronically low/NL  Continue Zosyn and Vanc Started 2/26, I  consulted Dr. Robles (ID) and he recommended continue Vanco and zosyn until 3/15/18.  Referral placed to [facility]. Patient ok to take meds po with sips per Dr. Granados.    2. MRSA UTI with chronic glasgow catheter:  Urology will change catheter prior to discharge. Patient on IV Vancomycin till 3/15/18 per ID recs.      3. Acute Gout of the Left 1st MCP and ? right 1st MCP  On Colchicine and symptoms improving.  Apply diclofenac to affected areas  Will avoid intraarticular steroid injection due to active infection    4. Chronic Pancytopenia with MDS: HemeOnc consulted and patient transfused 2 units PRBCs today.   I spoke with Dr Rashid who felt overall counts were fairly stable.   Also revcieved 1 unit PRBC 2/28/18 here  Attempt to stagger AM labs as feasible to reduce risk of iatrogenic anemia  Monitor.    5. Chronic Protein calorie malnutrition from chronic illness  Nutrition following, patient now with PICC on TPN and will remain on TPN for 6-8 weeks per Dr. Granados secondary to #1 above. Monitoring Accuchecks while on TPN.     6. Chronic diastolic dysfunction, PAF, PSVT, Non-rheumatic aortic valve insufficiency, chronic RBBB, MR and AR: Patient no longer on any medications for these issues outpatient secondary to chronically low/NL BP and bradycardia. No acute issues here. Monitor.    7. Hypophosphatemia, hypomagnesemia and hypokalemia: Will start electrolyte replacement protocols and Dr. Granados adjusting in TPN. Monitor.    8. H/O HTN: Patient for some time now has had low/NL BPs, I have noticed with initiation of TNP BP has become more elevated. Patient may need addition of BP medication prior to discharge and close monitoring for s/s of volume overload. Monitor closely.       Plan for disposition: Likely [facility] when approved.    Kenya Marks MD  03/02/18  10:54 AM

## 2018-03-02 NOTE — PLAN OF CARE
"Problem: Patient Care Overview (Adult)  Goal: Plan of Care Review   03/02/18 1322   Outcome Evaluation   Outcome Summary/Follow up Plan OT consult completed. Pt states he has had assistance with basic ADL tasks for a lengthy period (from nursing at [facility] or from spouse/Home Health staff when at home). Pt states \"I just need help because of all my lines\". Pt was able to manage a stand pivot transfer from the bed to w/c to bed with CGA. Pt indicated that this is the same as his recent functional status. Benefits of UE strengthening program were discussed with patient. He states he is familiar with UE strenghtening programs from therapy at [facility] and with Home Health. He reported he was not interested in participating in a strengthening program at this time. Pt states he plans to transfer to [facility] for several weeks due to his acute medical status. Pt indicated he would be receptive to therapy at the LT[facility] to maintain his strength and participate more with adl tasks once he is more medically stable and able to reduce the number of \"lines and drains\".          "

## 2018-03-02 NOTE — THERAPY DISCHARGE NOTE
Acute Care - Physical Therapy Initial Eval/Discharge   Tiffanie Keith     Patient Name: Jason Zelaya  : 1934  MRN: [id number]  Today's Date: 3/2/2018   Onset of Illness/Injury or Date of Surgery Date: 18  Date of Referral to PT: 18  Referring Physician: Dr. Marks      Admit Date: 2018    Visit Dx:    ICD-10-CM ICD-9-CM   1. 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 Acute UTI N39.0 599.0   3. Acute abdominal pain R10.9 789.00     338.19   4. Hyponatremia E87.1 276.1   5. Chronic anemia D64.9 285.9     Patient Active Problem List   Diagnosis   • MDS (myelodysplastic syndrome), low grade   • Vitamin B 12 deficiency   • AI (aortic incompetence)   • Bundle branch block, right   • Benign prostatic hyperplasia   • Hypertension   • Knee pain   • Rectal bleed   • Acute blood loss anemia   • Hand lesion   • Acute UTI   • Acute lower UTI (urinary tract infection)   • Enteroenteric fistula   • Colovesical fistula   • Coagulopathy   • Primary osteoarthritis of both knees   • Enterocutaneous fistula     Past Medical History:   Diagnosis Date   • Aortic regurgitation    • Aortic valve insufficiency    • Arthritis     Bilateral knee   • Basal cell carcinoma of left hand     sched excision   • Chest pain    • Diastolic dysfunction    • History of GI diverticular bleed 2017   • History of transfusion     last 17 2 units   • History of urinary retention    • History of vertigo    • Hx MRSA infection 2017    + culture abadominal wound   • Hypertension    • MDS (myelodysplastic syndrome)    • RBBB (right bundle branch block)    • Self-catheterizes urinary bladder     3-4 times aday   • Skin cancer      Past Surgical History:   Procedure Laterality Date   • APPENDECTOMY     • BACK SURGERY      fusion   • CHOLECYSTECTOMY     • COLON RESECTION N/A 2017    Procedure: sub-total colectomy with ileo-rectal anastamosis, mobilization of splenic  INTRAOPERATIVE COLONOSCOPY  ([id number]), rigid  sigmoidoscopy;  Surgeon: Kaylie Granados MD;  Location: [facility] OR;  Service:    • COLONOSCOPY N/A 2/1/2017    Procedure: COLONOSCOPY;  Surgeon: Bridger Amado MD;  Location: [facility] OR;  Service:    • COLONOSCOPY N/A 5/24/2017    Procedure: COLONOSCOPY w/ polypectomy;  Surgeon: Bridger Amado MD;  Location: [facility] OR;  Service:    • COLONOSCOPY N/A 5/24/2017    Procedure: COLONOSCOPY-return to surgery 2nd procedure, sclerotherapy with epi injection @ 40cm, placement of resolution clips X 2;  Surgeon: Bridger Amado MD;  Location: [facility] OR;  Service:    • CYSTOSCOPY W/ URETERAL STENT PLACEMENT Bilateral 12/21/2017    Procedure: CYSTOSCOPY bilateral URETERAL CATHETER INSERTION;  Surgeon: Davy Irene MD;  Location: [facility] OR;  Service:    • ENDOSCOPY N/A 5/23/2017    Procedure: ESOPHAGOGASTRODUODENOSCOPY;  Surgeon: Bridger Amado MD;  Location: [facility] OR;  Service:    • EXCISION MASS ARM Left 9/19/2017    Procedure: EXCISION MASS UPPER EXTREMITY  --  wide excision left hand mass;  Surgeon: Kaylie Granados MD;  Location: [facility] OR;  Service:    • EXPLORATORY LAPAROTOMY N/A 5/26/2017    Procedure: LAPAROTOMY EXPLORATORY - Explantation of old hernia mesh;  Surgeon: Kaylie Granados MD;  Location: [facility] OR;  Service:    • EXPLORATORY LAPAROTOMY N/A 12/21/2017    Procedure: LAPAROTOMY EXPLORATORY -  placement of strattice 6x6 small bowel resection of fistulas and anastamosis, oversew of rectal stump ,  extensive lysis of adhesions,  resection of fistulas, small bowel resectionsx2 and side to side anatomosis, ileostomy placement ;  Surgeon: Kaylie Granados MD;  Location: [facility] OR;  Service:    • HERNIA REPAIR      umbilical, inguinal           PT ASSESSMENT (last 72 hours)      PT Evaluation       03/02/18 1340 03/02/18 0822    Rehab Evaluation    Document Type evaluation  -BP     Subjective Information agree to therapy;complains of;pain  -BP     Patient  Effort, Rehab Treatment good  -BP     Symptoms Noted During/After Treatment none  -BP     General Information    Patient Profile Review yes  -BP     Onset of Illness/Injury or Date of Surgery Date 02/27/18  -BP     Referring Physician Dr. Marks  -BP     General Observations Patient supine in bed with HOB elevated. TPN. In no acute distress, Agrees to evaluation.   -BP     Pertinent History Of Current Problem Patient presents to the ED with abdominal pain and fever. Diagnosed with intraperitoneal abcess with new enteroenteric fistula. Patent with chronic ostomy with new ostomy over fistula. Patient diagnosed with acute gout on 3/1. Per patient report he does not ambulate at baseline, he performs stand pivot transfer bed to/from w/c and to[place]from [facility]. [facility] was [facility].  Patient states wife assists with all mobility and ADL's. He does not use an AD for transfers.   -BP     Precautions/Limitations fall precautions;other (see comments)   TPN, ostomy  -BP     Prior Level of Function --   see pertinent history of current problem   -BP     Equipment Currently Used at Home hospital bed;walker, rolling;wheelchair;other (see comments)   [facility]  -BP     Plans/Goals Discussed With patient;agreed upon  -BP     Risks Reviewed patient:;LOB;increased discomfort  -BP     Benefits Reviewed patient:;improve function;increase independence;increase strength  -BP     Barriers to Rehab medically complex;previous functional deficit  -BP     Living Environment    Lives With spouse  -BP     Living Arrangements house  -BP     Home Accessibility bed and bath on same level  -BP     Living Environment Comment He reports his son is going to build a ramp prior to return home.   -BP     Clinical Impression    Date of Referral to PT 03/02/18  -BP     PT Diagnosis Decreased functional mobility  -BP     Criteria for Skilled Therapeutic Interventions Met current level of function same as previous level of  function;no significant expected improvement in functional status  -BP     Pain Assessment    Pain Assessment 0-10  -BP     Pain Score 3  -BP     Pain Type Chronic pain  -BP     Pain Location Generalized  -BP     Pain Intervention(s) Repositioned  -BP     Response to Interventions tolerated  -BP     Cognitive Assessment/Intervention    Current Cognitive/Communication Assessment impaired  -BP     Follows Commands/Answers Questions 100% of the time;able to follow single-step instructions  -BP     Personal Safety WNL/WFL  -BP     ROM (Range of Motion)    General ROM Detail B LE AROM WFL  -BP     MMT (Manual Muscle Testing)    General MMT Assessment Detail B LE strength testing deferred secondary to chronic knee pain. Strength functional   -BP                           Bed Mobility, Assessment/Treatment    Bed Mobility, Assistive Device bed rails;head of bed elevated  -BP     Bed Mob, Supine to Sit, [place] contact guard assist;verbal cues required  -BP     Bed Mobility, Comment Extended time to complete transfer. Patient has a hospital bed at home   -BP     Transfer Assessment/Treatment    Transfers, Bed-Chair [place] contact guard assist;verbal cues required  -BP     Transfers, Chair-Bed [place] contact guard assist;verbal cues required  -BP     Transfer, Comment Patient performed to stand pivot bed to/from chair x 2 with CGA. Patient refuses use of AD for transfer. Able to complete transfer without assist, patient keeps UE's supported on bed rail or armrest of w/c   -BP     Gait Assessment/Treatment    Gait, Comment Not appropriate to assess, patient does not ambulate at baseline.   -BP     Motor Skills/Interventions    Additional Documentation Balance Skills Training (Group)  -BP     Balance Skills Training    Sitting-Level of Assistance Distant supervision  -BP     Sitting-Balance Support Feet supported  -BP     Standing-Level of Assistance Contact guard  -BP     Static Standing Balance Support --    unilateral UE support   -BP     Positioning and Restraints    Pre-Treatment Position in bed  -BP     Post Treatment Position wheelchair  -BP     In Wheelchair sitting;call light within reach;encouraged to call for assist  -BP       User Key  (r) = Recorded By, (t) = Taken By, (c) = Cosigned By    Initials Name Provider Type    MYRIAM Oviedo, RN Registered Nurse    MAAME Renteria, RN Case Manager    AB Latisha Shoemaker, RN Registered Nurse    LB Skyalr Dooley, RN Registered Nurse    BP Hermila St, PT Physical Therapist    CLIVE Mathis RN Registered Nurse          Physical Therapy Education     Title: PT OT SLP Therapies (Done)     Topic: Physical Therapy (Done)     Point: Mobility training (Done)    Learning Progress Summary    Learner Readiness Method Response Comment Documented by Status   Patient Acceptance E VU Educated regarding improved method of transfer as well as benefit of using an AD during transfer. Patient is not open to use of an AD. States transfer is at baseline. BP 03/02/18 1435 Done                      User Key     Initials Effective Dates Name Provider Type Discipline     12/01/15 -  Hermila St, PT Physical Therapist PT                PT Recommendation and Plan  Anticipated Equipment Needs At Discharge:  (none)  Anticipated Discharge Disposition: home with 24/7 care, other (see comments), long term acute care facility (Per chart review, anticipate d/c to [facility])  PT Frequency: evaluation only  Plan of Care Review  Plan Of Care Reviewed With: patient  Outcome Summary/Follow up Plan: PT Eval Complete: Patient performs supine to sit transfer with CGA, sit to/from stand transfer with CGA and bed to/from chair transfer via stand pivot with CGA without use of an AD. Stand pivot transfer performed x 2. Patient reports transfer is at baseline. He is non ambulatory. He refuses use of an AD for transfers. Recommend continued mobiltiy with nursing staff. Recommend  continued 24 hour care upon return home.               Outcome Measures       03/02/18 1400 03/02/18 1340       How much help from another person do you currently need...    Turning from your back to your side while in flat bed without using bedrails?  3  -BP     Moving from lying on back to sitting on the side of a flat bed without bedrails?  3  -BP     Moving to and from a bed to a chair (including a wheelchair)?  3  -BP     Standing up from a chair using your arms (e.g., wheelchair, bedside chair)?  3  -BP     Climbing 3-5 steps with a railing?  1  -BP     To walk in hospital room?  1  -BP     AM-PAC 6 Clicks Score  14  -BP     How much help from another is currently needed...    Putting on and taking off regular lower body clothing? 2  -SD      Bathing (including washing, rinsing, and drying) 2  -SD      Toileting (which includes using toilet bed pan or urinal) 2  -SD      Putting on and taking off regular upper body clothing 3  -SD      Taking care of personal grooming (such as brushing teeth) 3  -SD      Eating meals 1   pt is NPO  -SD      Score 13  -SD      Functional Assessment    Outcome Measure Options AM-PAC 6 Clicks Daily Activity (OT)  -SD AM-PAC 6 Clicks Basic Mobility (PT)  -BP       User Key  (r) = Recorded By, (t) = Taken By, (c) = Cosigned By    Initials Name Provider Type    SD Ernst Peacock, OTR Occupational Therapist    BP Hermila St, PT Physical Therapist           Time Calculation:         PT Charges       03/02/18 1439          Time Calculation    Start Time 1340  -BP      PT Received On 03/02/18  -BP        User Key  (r) = Recorded By, (t) = Taken By, (c) = Cosigned By    Initials Name Provider Type    BP Hermila St, PT Physical Therapist          Therapy Charges for Today     Code Description Service Date Service Provider Modifiers Qty    78868526475 HC PT EVAL LOW COMPLEXITY 2 3/2/2018 Hermila St, PT GP 1          PT G-Codes  Outcome Measure Options: AM-PAC 6 Clicks  Daily Activity (OT)    PT Discharge Summary  Anticipated Discharge Disposition: home with 24/7 care, other (see comments), long term acute care facility (Per chart review, anticipate d/c to [facility])  Reason for Discharge: At baseline function    Hermila St, PT  3/2/2018

## 2018-03-02 NOTE — CONSULTS
LOS: 3 days   Patient Care Team:  David Cedillo MD as PCP - General (Family Medicine)  Anupam Green MD as Consulting Physician (Hematology and Oncology)  Bennie Galo MD as Referring Physician (Orthopedic Surgery)  Kaylie Granados MD as Surgeon (General Surgery)        Subjective       Attending MD : Rian Dean MD    Patient Complaints: pain         History of Present Illness  :     Patient Denies:  NV    PMH : Active Problems:    MDS (myelodysplastic syndrome), low grade    Vitamin B 12 deficiency    Acute blood loss anemia    Enterocutaneous fistula      Review of Systems:    10 point ROS done    Objective     Vital Signs  Temp:  [97.2 °F (36.2 °C)-98.2 °F (36.8 °C)] 98.2 °F (36.8 °C)  Heart Rate:  [46-55] 46  Resp:  [18-20] 20  BP: (105-148)/(49-71) 147/65    Physical Exam:     General Appearance:    Alert, cooperative, in no acute distress   Head:    Normocephalic, without obvious abnormality, atraumatic   Eyes:            Lids and lashes normal, conjunctivae and sclerae normal, no   icterus, no pallor, corneas clear, PERRLA   Ears:    Ears appear intact with no abnormalities noted   Throat:   No oral lesions, no thrush, oral mucosa moist   Neck:   No adenopathy, supple, trachea midline, no thyromegaly, no   carotid bruit, no JVD   Back:     No kyphosis present, no scoliosis present, no skin lesions,      erythema or scars, no tenderness to percussion or                   palpation,   range of motion normal   Lungs:     Clear to auscultation,respirations regular, even and                  unlabored    Heart:    Regular rhythm and normal rate, normal S1 and S2, no            murmur, no gallop, no rub, no click   Chest Wall:    No abnormalities observed   Abdomen:     Normal bowel sounds, no masses, no organomegaly, soft       Tender ostomy in place   Rectal:     Deferred   Extremities:   Moves all extremities well, no edema, no cyanosis, no             redness   Pulses:   Pulses  palpable and equal bilaterally   Skin:   No bleeding, bruising or rash   Lymph nodes:   No palpable adenopathy   Neurologic:   Cranial nerves 2 - 12 grossly intact, sensation intact, DTR       present and equal bilaterally          Results Review:    Lab Results (last 72 hours)     Procedure Component Value Units Date/Time    Lactic Acid, Plasma [[id number]]  (Normal) Collected:  02/27/18 2055    Specimen:  Blood Updated:  02/27/18 2117     Lactate 1.4 mmol/L     Urinalysis, Microscopic Only - Urine, Clean Catch [[id number]]  (Abnormal) Collected:  02/27/18 2055    Specimen:  Urine from Urine, Catheter Updated:  02/27/18 2117     RBC, UA None Seen /HPF      WBC, UA 31-50 (A) /HPF      Bacteria, UA 3+ (A) /HPF      Squamous Epithelial Cells, UA None Seen /HPF      Hyaline Casts, UA None Seen /LPF      Methodology Manual Light Microscopy    Urinalysis With / Culture If Indicated - Urine, Catheter [[id number]]  (Abnormal) Collected:  02/27/18 2055    Specimen:  Urine from Urine, Catheter Updated:  02/27/18 2118     Color, UA Yellow     Appearance, UA Clear     pH, UA 6.0     Specific Gravity, UA 1.020     Glucose, UA Negative     Ketones, UA Negative     Bilirubin, UA Small (1+) (A)     Blood, UA Large (3+) (A)     Protein,  mg/dL (2+) (A)     Leuk Esterase, UA Moderate (2+) (A)     Nitrite, UA Negative     Urobilinogen, UA 0.2 E.U./dL    Influenza Antigen, Rapid - Swab, Nasopharynx [[id number]]  (Normal) Collected:  02/27/18 2055    Specimen:  Swab from Nasopharynx Updated:  02/27/18 2118     Influenza A Ag, EIA Negative     Influenza B Ag, EIA Negative    Lipase [[id number]]  (Abnormal) Collected:  02/27/18 2045    Specimen:  Blood Updated:  02/27/18 2121     Lipase 9 (L) U/L     Comprehensive Metabolic Panel [[id number]]  (Abnormal) Collected:  02/27/18 2045    Specimen:  Blood Updated:  02/27/18 2121     Glucose 107 (H) mg/dL      BUN 17 mg/dL      Creatinine 0.85 mg/dL      Sodium 130 (L) mmol/L      Potassium 3.9  mmol/L      Chloride 96 (L) mmol/L      CO2 19.1 (L) mmol/L      Calcium 8.9 mg/dL      Total Protein 8.0 g/dL      Albumin 3.10 (L) g/dL      ALT (SGPT) 13 U/L      AST (SGOT) 14 U/L      Alkaline Phosphatase 112 U/L      Total Bilirubin 0.8 mg/dL      eGFR Non African Amer 86 mL/min/1.73      Globulin 4.9 gm/dL      A/G Ratio 0.6 g/dL      BUN/Creatinine Ratio 20.0     Anion Gap 14.9 mmol/L     Narrative:       The MDRD GFR formula is only valid for adults with stable renal function between ages 18 and 70.    CBC & Differential [[id number]] Collected:  02/27/18 2045    Specimen:  Blood Updated:  02/27/18 2137    Narrative:       The following orders were created for panel order CBC & Differential.  Procedure                               Abnormality         Status                     ---------                               -----------         ------                     Scan Slide[[id number]]                                                                  CBC Auto Differential[[id number]]        Abnormal            Final result                 Please view results for these tests on the individual orders.    CBC Auto Differential [[id number]]  (Abnormal) Collected:  02/27/18 2045    Specimen:  Blood Updated:  02/27/18 2137     WBC 12.05 (H) 10*3/mm3      RBC 2.66 (L) 10*6/mm3      Hemoglobin 8.4 (L) g/dL      Hematocrit 25.5 (L) %      MCV 95.9 (H) fL      MCH 31.6 (H) pg      MCHC 32.9 g/dL      RDW 21.9 (H) %      RDW-SD 73.5 (H) fl      MPV 11.4 (H) fL      Platelets 110 (L) 10*3/mm3      Neutrophil % 67.9 %      Lymphocyte % 6.7 (L) %      Monocyte % 23.4 (H) %      Eosinophil % 0.0 %      Basophil % 0.1 %      Immature Grans % 1.9 (H) %      Neutrophils, Absolute 8.18 10*3/mm3      Lymphocytes, Absolute 0.81 10*3/mm3      Monocytes, Absolute 2.82 (H) 10*3/mm3      Eosinophils, Absolute 0.00 (L) 10*3/mm3      Basophils, Absolute 0.01 10*3/mm3      Immature Grans, Absolute 0.23 (H) 10*3/mm3      nRBC 0.0 /100 WBC      CBC & Differential [[id number]] Collected:  02/28/18 0519    Specimen:  Blood Updated:  02/28/18 0536    Narrative:       The following orders were created for panel order CBC & Differential.  Procedure                               Abnormality         Status                     ---------                               -----------         ------                     CBC Auto Differential[[id number]]        Abnormal            Final result                 Please view results for these tests on the individual orders.    CBC Auto Differential [[id number]]  (Abnormal) Collected:  02/28/18 0519    Specimen:  Blood Updated:  02/28/18 0536     WBC 11.18 (H) 10*3/mm3      RBC 2.09 (L) 10*6/mm3      Hemoglobin 6.6 (C) g/dL      Hematocrit 20.5 (C) %      MCV 98.1 (H) fL      MCH 31.6 (H) pg      MCHC 32.2 g/dL      RDW 21.7 (H) %      RDW-SD 73.5 (H) fl      MPV 11.0 (H) fL      Platelets 99 (L) 10*3/mm3      Neutrophil % 69.8 %      Lymphocyte % 9.5 (L) %      Monocyte % 18.4 (H) %      Eosinophil % 0.0 %      Basophil % 0.1 %      Immature Grans % 2.2 (H) %      Neutrophils, Absolute 7.80 10*3/mm3      Lymphocytes, Absolute 1.06 10*3/mm3      Monocytes, Absolute 2.06 (H) 10*3/mm3      Eosinophils, Absolute 0.00 (L) 10*3/mm3      Basophils, Absolute 0.01 10*3/mm3      Immature Grans, Absolute 0.25 (H) 10*3/mm3      nRBC 0.0 /100 WBC     Lactic Acid, Plasma [[id number]]  (Normal) Collected:  02/28/18 0519    Specimen:  Blood Updated:  02/28/18 0604     Lactate 1.1 mmol/L     Phosphorus [[id number]]  (Normal) Collected:  02/28/18 0518    Specimen:  Blood Updated:  02/28/18 0608     Phosphorus 3.4 mg/dL     Magnesium [[id number]]  (Abnormal) Collected:  02/28/18 0518    Specimen:  Blood Updated:  02/28/18 0608     Magnesium 1.5 (L) mg/dL     Comprehensive Metabolic Panel [[id number]]  (Abnormal) Collected:  02/28/18 0518    Specimen:  Blood Updated:  02/28/18 0608     Glucose 97 mg/dL      BUN 17 mg/dL      Creatinine 0.94 mg/dL       Sodium 134 (L) mmol/L      Potassium 4.1 mmol/L      Chloride 101 mmol/L      CO2 22.4 mmol/L      Calcium 8.2 (L) mg/dL      Total Protein 6.7 g/dL      Albumin 2.50 (L) g/dL      ALT (SGPT) 10 U/L      AST (SGOT) 11 U/L      Alkaline Phosphatase 87 U/L      Total Bilirubin 0.5 mg/dL      eGFR Non African Amer 76 mL/min/1.73      Globulin 4.2 gm/dL      A/G Ratio 0.6 g/dL      BUN/Creatinine Ratio 18.1     Anion Gap 10.6 mmol/L     Narrative:       The MDRD GFR formula is only valid for adults with stable renal function between ages 18 and 70.    Hemoglobin & Hematocrit, Blood [[id number]]  (Abnormal) Collected:  02/28/18 1540    Specimen:  Blood Updated:  02/28/18 1555     Hemoglobin 7.3 (C) g/dL      Hematocrit 22.8 (C) %     Prealbumin [[id number]]  (Abnormal) Collected:  02/28/18 1133    Specimen:  Blood Updated:  02/28/18 1751     Prealbumin 13.0 (L) mg/dL     CBC & Differential [[id number]] Collected:  03/01/18 0651    Specimen:  Blood Updated:  03/01/18 1055    Narrative:       The following orders were created for panel order CBC & Differential.  Procedure                               Abnormality         Status                     ---------                               -----------         ------                     CBC Auto Differential[[id number]]        Abnormal            Final result                 Please view results for these tests on the individual orders.    CBC Auto Differential [[id number]]  (Abnormal) Collected:  03/01/18 0651    Specimen:  Blood Updated:  03/01/18 1055     WBC 4.64 (L) 10*3/mm3      RBC 2.31 (L) 10*6/mm3      Hemoglobin 7.2 (C) g/dL      Hematocrit 22.4 (C) %      MCV 97.0 (H) fL      MCH 31.2 (H) pg      MCHC 32.1 g/dL      RDW 22.6 (H) %      RDW-SD 78.2 (H) fl      MPV 11.8 (H) fL      Platelets 99 (L) 10*3/mm3      Neutrophil % 59.9 %      Lymphocyte % 17.7 (L) %      Monocyte % 18.3 (H) %      Eosinophil % 0.0 %      Basophil % 0.0 %      Immature Grans % 4.1 (H) %       Neutrophils, Absolute 2.78 10*3/mm3      Lymphocytes, Absolute 0.82 10*3/mm3      Monocytes, Absolute 0.85 10*3/mm3      Eosinophils, Absolute 0.00 (L) 10*3/mm3      Basophils, Absolute 0.00 10*3/mm3      Immature Grans, Absolute 0.19 (H) 10*3/mm3      nRBC 0.0 /100 WBC     Comprehensive Metabolic Panel [[id number]]  (Abnormal) Collected:  03/01/18 0651    Specimen:  Blood Updated:  03/01/18 1127     Glucose 72 mg/dL      BUN 11 mg/dL      Creatinine 0.59 (L) mg/dL      Sodium 138 mmol/L      Potassium 4.5 mmol/L      Chloride 107 mmol/L      CO2 20.5 (L) mmol/L      Calcium 8.2 (L) mg/dL      Total Protein 6.4 g/dL      Albumin 2.40 (L) g/dL      ALT (SGPT) <5 (L) U/L      AST (SGOT) 22 U/L      Alkaline Phosphatase 98 U/L      Total Bilirubin 0.4 mg/dL      eGFR Non African Amer 131 mL/min/1.73      Globulin 4.0 gm/dL      A/G Ratio 0.6 g/dL      BUN/Creatinine Ratio 18.6     Anion Gap 10.5 mmol/L     Narrative:       The MDRD GFR formula is only valid for adults with stable renal function between ages 18 and 70.    POC Glucose Once [[id number]]  (Normal) Collected:  03/01/18 1800    Specimen:  Blood Updated:  03/01/18 1808     Glucose 130 mg/dL     Narrative:       Meter: [id number] : [id number] Preet Upton RN    POC Glucose Once [[id number]]  (Normal) Collected:  03/01/18 2359    Specimen:  Blood Updated:  03/02/18 0005     Glucose 129 mg/dL     Narrative:       Meter: [id number] : [id number] Rafael Banks RN    POC Glucose Once [[id number]]  (Normal) Collected:  03/02/18 0535    Specimen:  Blood Updated:  03/02/18 0542     Glucose 101 mg/dL     Narrative:       Meter: [id number] : [id number] Stacie Lazcano RN    Magnesium [[id number]]  (Abnormal) Collected:  03/02/18 0529    Specimen:  Blood Updated:  03/02/18 0620     Magnesium 1.6 (L) mg/dL     Phosphorus [[id number]]  (Abnormal) Collected:  03/02/18 0529    Specimen:  Blood Updated:  03/02/18 0620     Phosphorus 2.6 (L) mg/dL     Basic Metabolic Panel  [[id number]]  (Abnormal) Collected:  03/02/18 0529    Specimen:  Blood Updated:  03/02/18 0623     Glucose 97 mg/dL      BUN 8 mg/dL      Creatinine 0.60 (L) mg/dL      Sodium 143 mmol/L      Potassium 3.1 (L) mmol/L      Chloride 112 (H) mmol/L      CO2 19.6 (L) mmol/L      Calcium 7.8 (L) mg/dL      eGFR Non African Amer 128 mL/min/1.73      BUN/Creatinine Ratio 13.3     Anion Gap 11.4 mmol/L     Narrative:       The MDRD GFR formula is only valid for adults with stable renal function between ages 18 and 70.    Urine Culture - Urine, Urine, Clean Catch [[id number]]  (Abnormal)  (Susceptibility) Collected:  02/27/18 2055    Specimen:  Urine from Urine, Catheter Updated:  03/02/18 0632     Urine Culture --      >100,000 CFU/mL Staphylococcus aureus, MRSA (A)        Methicillin resistant Staph aureus, patient may be an isolation risk.       Susceptibility      Staphylococcus aureus, MRSA     JULISA     Nitrofurantoin <=16 ug/ml Susceptible     Oxacillin >=4 ug/ml Resistant     Penicillin G >=0.5 ug/ml Resistant     Rifampin <=0.5 ug/ml Susceptible     Tetracycline <=1 ug/ml Susceptible     Trimethoprim + Sulfamethoxazole <=10 ug/ml Susceptible     Vancomycin 1 ug/ml Susceptible                    CBC Auto Differential [[id number]] Collected:  03/02/18 1200    Specimen:  Blood Updated:  03/02/18 1210    POC Glucose Once [[id number]]  (Normal) Collected:  03/02/18 1210    Specimen:  Blood Updated:  03/02/18 1221     Glucose 121 mg/dL     Narrative:       Meter: [id number] : [id number] Soumya Cheng RN    CBC & Differential [[id number]] Collected:  03/02/18 1200    Specimen:  Blood Updated:  03/02/18 1223    Narrative:       The following orders were created for panel order CBC & Differential.  Procedure                               Abnormality         Status                     ---------                               -----------         ------                     Scan Slide[[id number]]                                       In  process                 CBC Auto Differential[[id number]]                            In process                   Please view results for these tests on the individual orders.    Scan Slide [[id number]] Collected:  03/02/18 1200    Specimen:  Blood Updated:  03/02/18 1223              Imaging Results (last 72 hours)     Procedure Component Value Units Date/Time    CT Abdomen Pelvis With Contrast [[id number]] Collected:  02/28/18 0824     Updated:  02/28/18 0834    Narrative:       CT ABDOMEN PELVIS W CONTRAST-: 2/27/2018 10:34 PM     INDICATION:   Diffuse abdominal pain and intermittent fever. 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     TECHNIQUE:   CT of the abdomen and pelvis with contrast. Coronal and sagittal  reconstructions were obtained.  Radiation dose reduction techniques  included automated exposure control or exposure modulation based on body  size. Radiation audit for number of CT and nuclear cardiology exams  performed in the last year: 5.       COMPARISON:   CT abdomen pelvis 12/18/2017, 12/17/2017 11/15/2017.     FINDINGS:  ABDOMEN:   The aortic root is dilated measuring 4.7 cm at the sinus of Valsalva,  unchanged. There are a few small pulmonary nodules in the lung bases are  similar to the prior study. There is severe emphysema.     The solid abdominal organs are unchanged in enhancement. There is  atrophy of the pancreas.. The gallbladder is surgically absent.       The small bowel is not dilated. The patient has had a subtotal  colectomy. There is a diverting right lower quadrant ileostomy. A  fistulous tract has developed into the anterior abdominal wall above the  umbilicus. There is fluid tracking the left rectus abdominis muscle  inferiorly. A fistulous tract communicates with the rectal stump and  likely some of the adjacent small bowel. There is some enteric contrast  within the fistulous tract and the rectal stump from the adjacent  small  bowel. Small bowel fistulous tract may involve the small bowel  anastomosis or several loops of small bowel in the lower abdomen/pelvis  that are slightly inflamed. The inflammatory change extends over the  dome of the bladder. There is gas within the bladder wall potentially  representing a enterovesicular or rectovesicular fistula.       PELVIS:   A. Ayoub catheter decompresses the bladder. Diffuse bladder wall  thickening is noted. No enlarged pelvic or inguinal lymph nodes. No  loculated drainable fluid collections          No acute osseous abnormalities.       Impression:          1. 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  2. Air tracking within the bladder wall is likely secondary to fistulous  involvement of the bladder as well.         Initial interpretation provided by Dr. Bassem Henderson at 23:19 on  02/27/2018.     This report was finalized on 2/28/2018 8:32 AM by Dr. Rafiq Blue MD.       XR Chest 1 View [[id number]] Collected:  03/01/18 0709     Updated:  03/01/18 0713    Narrative:       Chest x-ray     INDICATION: PICC placement today.     FINDINGS: AP portable chest is compared 12/18/2017. Left arm approach  PICC is in the lower SVC. Cardiomegaly is stable. Chronic opacities  noted in the bases could reflect some scarring. No pneumothorax is seen.     A preliminary report was provided by Dr. Wong at 1753 hours on  02/28/2018.     This report was finalized on 3/1/2018 7:11 AM by Dr. Nabeel Cantu MD.               Medication Review:      Hospital Medications (active)       Dose Frequency Start End    acetaminophen (TYLENOL) tablet 650 mg 650 mg Every 6 Hours PRN 2/27/2018     Sig - Route: Take 2 tablets by mouth Every 6 (Six) Hours As Needed for Mild Pain . - Oral    Adult Central Clinimix TPN  Continuous TPN 3/2/2018 3/3/2018    Sig - Route: Infuse  into a venous catheter Continuous. - Intravenous     "Linked Group 1:  \"And\" Linked Group Details        colchicine tablet 0.6 mg 0.6 mg Once 3/1/2018     Sig - Route: Take 1 tablet by mouth 1 (One) Time. - Oral    colchicine tablet 0.6 mg 0.6 mg Daily 3/2/2018     Sig - Route: Take 1 tablet by mouth Daily. - Oral    colchicine tablet 0.6 mg 0.6 mg Once 3/1/2018 3/1/2018    Sig - Route: Take 1 tablet by mouth 1 (One) Time. - Oral    colchicine tablet 1.2 mg 1.2 mg Once 3/1/2018 3/1/2018    Sig - Route: Take 2 tablets by mouth 1 (One) Time. - Oral    cyanocobalamin injection 1,000 mcg 1,000 mcg Every 28 Days 2/28/2018     Sig - Route: Inject 1 mL into the shoulder, thigh, or buttocks Every 28 (Twenty-Eight) Days. - Intramuscular    diclofenac (VOLTAREN) 1 % gel 2 g 2 g 4 Times Daily 2/28/2018     Sig - Route: Apply 2 g topically 4 (Four) Times a Day. - Topical    fat emulsion (INTRALIPID,LIPOSYN) 20 % infusion 14.4 g 72 mL Continuous TPN 3/2/2018 3/3/2018    Sig - Route: Infuse 72 mL into a venous catheter Continuous. - Intravenous    Linked Group 1:  \"And\" Linked Group Details        HYDROmorphone (DILAUDID) injection 1 mg 1 mg Every 4 Hours PRN 2/28/2018 3/10/2018    Sig - Route: Infuse 0.5 mL into a venous catheter Every 4 (Four) Hours As Needed for Severe Pain . - Intravenous    hydrophor (AQUAPHOR) ointment  Every 12 Hours Scheduled 2/28/2018     Sig - Route: Apply  topically Every 12 (Twelve) Hours. - Topical    lactobacillus acidophilus (RISAQUAD) capsule 1 capsule 1 capsule Daily 2/28/2018     Sig - Route: Take 1 capsule by mouth Daily. - Oral    magnesium oxide (MAGOX) tablet 400 mg 400 mg Daily 2/28/2018     Sig - Route: Take 1 tablet by mouth Daily. - Oral    magnesium sulfate 2 GM/50ML infusion  - ADS Override Pull   3/2/2018 3/3/2018    Notes to Pharmacy: Created by cabinet override    Magnesium Sulfate 2 gram infusion- Mg 1.6 - 1.9 mg/dL 2 g As Needed 3/2/2018     Sig - Route: Infuse 50 mL into a venous catheter As Needed (Mg 1.6 - 1.9 mg/dL). - " "Intravenous    Linked Group 2:  \"Or\" Linked Group Details        magnesium sulfate 3 gram infusion (1gm x 3) - Mg 1.1 - 1.5 mg/dL 1 g As Needed 3/2/2018     Sig - Route: Infuse 100 mL into a venous catheter As Needed (Mg 1.1 - 1.5 mg/dL). - Intravenous    Linked Group 2:  \"Or\" Linked Group Details        magnesium sulfate 4 gram infusion - Mg less than or equal to 1mg/dL 4 g As Needed 3/2/2018     Sig - Route: Infuse 100 mL into a venous catheter As Needed (Mg less than or equal to 1mg/dL). - Intravenous    Linked Group 2:  \"Or\" Linked Group Details        multivitamin with minerals 1 tablet 1 tablet Daily 2/28/2018     Sig - Route: Take 1 tablet by mouth Daily. - Oral    nystatin (MYCOSTATIN) powder  Every 12 Hours Scheduled 2/28/2018 1/22/2019    Sig - Route: Apply  topically Every 12 (Twelve) Hours. - Topical    ondansetron (ZOFRAN) tablet 4 mg 4 mg Every 6 Hours PRN 2/28/2018     Sig - Route: Take 1 tablet by mouth Every 6 (Six) Hours As Needed for Nausea or Vomiting. - Oral    oxyCODONE-acetaminophen (PERCOCET) 5-325 MG per tablet 2 tablet 2 tablet Every 6 Hours PRN 2/28/2018 3/10/2018    Sig - Route: Take 2 tablets by mouth Every 6 (Six) Hours As Needed for Severe Pain . - Oral    pantoprazole (PROTONIX) EC tablet 40 mg 40 mg Every Early Morning 2/28/2018     Sig - Route: Take 1 tablet by mouth Every Morning. - Oral    Pharmacy to dose vancomycin  Continuous PRN 2/27/2018 3/4/2018    Sig - Route: Continuous As Needed for Consult. - Does not apply    piperacillin-tazobactam (ZOSYN) 3.375 g/100 mL 0.9% NS IVPB (mbp) 3.375 g Every 8 Hours 2/28/2018 3/6/2018    Sig - Route: Infuse 100 mL into a venous catheter Every 8 (Eight) Hours. - Intravenous    piperacillin-tazobactam (ZOSYN) 4.5 g/100 mL 0.9% NS IVPB (mbp) 4.5 g Once 2/27/2018     Sig - Route: Infuse 100 mL into a venous catheter 1 (One) Time. - Intravenous    potassium chloride (K-DUR,KLOR-CON) CR tablet 40 mEq 40 mEq As Needed 3/2/2018     Sig - Route: " "Take 2 tablets by mouth As Needed (potassium replacement.  see admin instructions). - Oral    Linked Group 3:  \"Or\" Linked Group Details        potassium chloride (KLOR-CON) packet 40 mEq 40 mEq As Needed 3/2/2018     Sig - Route: Take 40 mEq by mouth As Needed (potassium replacement, see admin instructions). - Oral    Linked Group 3:  \"Or\" Linked Group Details        potassium chloride 10 mEq in 100 mL IVPB 10 mEq Every 1 Hour PRN 3/2/2018     Sig - Route: Infuse 100 mL into a venous catheter Every 1 (One) Hour As Needed (potassium protocol PERIPHERAL - see admin instructions). - Intravenous    Linked Group 3:  \"Or\" Linked Group Details        potassium phosphate 15 mmol in sodium chloride 0.9 % 100 mL infusion 15 mmol As Needed 3/2/2018     Sig - Route: Infuse 15 mmol into a venous catheter As Needed (Peripheral IV - Phosphorus 1.8 - 2.5). - Intravenous    Linked Group 4:  \"Or\" Linked Group Details        potassium phosphate 20 mmol in sodium chloride 0.9 % 500 mL infusion 20 mmol Once 3/2/2018 3/2/2018    Sig - Route: Infuse 20 mmol into a venous catheter 1 (One) Time. - Intravenous    potassium phosphate 30 mmol in sodium chloride 0.9 % 250 mL infusion 30 mmol As Needed 3/2/2018     Sig - Route: Infuse 30 mmol into a venous catheter As Needed (Peripheral IV - Phosphorus 1.3 - 1.7). - Intravenous    Linked Group 4:  \"Or\" Linked Group Details        potassium phosphate 45 mmol in sodium chloride 0.9 % 500 mL infusion 45 mmol As Needed 3/2/2018     Sig - Route: Infuse 45 mmol into a venous catheter As Needed (Peripheral IV - Phosphorus Less Than 1.3). - Intravenous    Linked Group 4:  \"Or\" Linked Group Details        sodium chloride 0.9 % flush 10 mL 10 mL As Needed 2/27/2018     Sig - Route: Infuse 10 mL into a venous catheter As Needed for Line Care. - Intravenous    Linked Group 5:  \"And\" Linked Group Details        sodium chloride 0.9 % infusion 50 mL/hr Continuous 2/27/2018     Sig - Route: Infuse 50 mL/hr " "into a venous catheter Continuous. - Intravenous    sodium phosphates 15 mmol in sodium chloride 0.9 % 250 mL IVPB 15 mmol As Needed 3/2/2018     Sig - Route: Infuse 15 mmol into a venous catheter As Needed (Peripheral IV - Phosphorus 1.8 - 2.5 & Potassium Greater Than 4). - Intravenous    Linked Group 4:  \"Or\" Linked Group Details        sodium phosphates 30 mmol in sodium chloride 0.9 % 250 mL IVPB 30 mmol As Needed 3/2/2018     Sig - Route: Infuse 30 mmol into a venous catheter As Needed (Peripheral IV - Phosphorus 1.3-1.7 & Potassium Greater Than 4). - Intravenous    Linked Group 4:  \"Or\" Linked Group Details        sodium phosphates 45 mmol in sodium chloride 0.9 % 500 mL IVPB 45 mmol As Needed 3/2/2018     Sig - Route: Infuse 45 mmol into a venous catheter As Needed (Peripheral IV - Phosphorus Less Than 1.3 & Potassium Greater Than 4). - Intravenous    Linked Group 4:  \"Or\" Linked Group Details        sucralfate (CARAFATE) tablet 1 g 1 g 4 Times Daily Before Meals & Nightly 2/28/2018     Sig - Route: Take 1 tablet by mouth 4 (Four) Times a Day Before Meals & at Bedtime. - Oral    vancomycin (VANCOCIN) IVPB 1000 mg in 250 mL NS 1,000 mg Every 24 Hours 3/1/2018 3/11/2018    Sig - Route: Infuse 1,000 mg into a venous catheter Daily. - Intravenous    Adult Central Clinimix TPN (Discontinued)  Continuous TPN 3/1/2018 3/2/2018    Sig - Route: Infuse  into a venous catheter Continuous. - Intravenous    Linked Group 1:  \"And\" Linked Group Details        colchicine tablet 0.6 mg (Discontinued) 0.6 mg Daily 3/1/2018 3/1/2018    Sig - Route: Take 1 tablet by mouth Daily. - Oral    fat emulsion (INTRALIPID,LIPOSYN) 20 % infusion 14.4 g (Discontinued) 72 mL Continuous TPN 3/1/2018 3/2/2018    Sig - Route: Infuse 72 mL into a venous catheter Continuous. - Intravenous    Linked Group 1:  \"And\" Linked Group Details              Assessment/Plan       Active Problems:    MDS (myelodysplastic syndrome), low grade    Vitamin B 12 " deficiency    Acute blood loss anemia    Enterocutaneous fistula    Enterocutaneous and rectocutaneous fistula    Urology's input noted  Myelodysplastic syndrome with chronic pancytopenia    Severe protein malnutrition    UTI MRSA    Plan :    IV vanc till   and Zosyn till 3/15/18  Local care   Will follow  Thank you     Geovanna Robles MD  03/02/18  12:26 PM

## 2018-03-02 NOTE — CONSULTS
Subjective     REASON FOR CONSULTATION:     Myelodysplasia   Enterocutaneous fistula/abdominal pain   Gout                              REQUESTING PHYSICIAN:     Roberta    RECORDS OBTAINED:  Records of the patients history including those obtained from the referring provider were reviewed and summarized in detail.    History of Present Illness    Mr Zelaya is a very pleasant 83 y/o with diagnosis of MDS x 8 years. He has chronic mild thrombocytopenia.        Past Medical History:   Diagnosis Date   • Aortic regurgitation    • Aortic valve insufficiency    • Arthritis     Bilateral knee   • Basal cell carcinoma of left hand     sched excision   • Chest pain    • Diastolic dysfunction    • History of GI diverticular bleed 05/2017   • History of transfusion     last 9/12/17 2 units   • History of urinary retention    • History of vertigo    • Hx MRSA infection 07/2017    + culture abadominal wound   • Hypertension    • MDS (myelodysplastic syndrome)    • RBBB (right bundle branch block)    • Self-catheterizes urinary bladder     3-4 times aday   • Skin cancer         Past Surgical History:   Procedure Laterality Date   • APPENDECTOMY     • BACK SURGERY  2008    fusion   • CHOLECYSTECTOMY     • COLON RESECTION N/A 5/26/2017    Procedure: sub-total colectomy with ileo-rectal anastamosis, mobilization of splenic  INTRAOPERATIVE COLONOSCOPY  ([id number]), rigid sigmoidoscopy;  Surgeon: Kaylie Granados MD;  Location: [facility] OR;  Service:    • COLONOSCOPY N/A 2/1/2017    Procedure: COLONOSCOPY;  Surgeon: Bridger Amado MD;  Location: [facility] OR;  Service:    • COLONOSCOPY N/A 5/24/2017    Procedure: COLONOSCOPY w/ polypectomy;  Surgeon: Bridger Amado MD;  Location: [facility] OR;  Service:    • COLONOSCOPY N/A 5/24/2017    Procedure: COLONOSCOPY-return to surgery 2nd procedure, sclerotherapy with epi injection @ 40cm, placement of resolution clips X 2;  Surgeon: Bridger Amado MD;   Location: [facility] OR;  Service:    • CYSTOSCOPY W/ URETERAL STENT PLACEMENT Bilateral 12/21/2017    Procedure: CYSTOSCOPY bilateral URETERAL CATHETER INSERTION;  Surgeon: Davy Irene MD;  Location: [facility] OR;  Service:    • ENDOSCOPY N/A 5/23/2017    Procedure: ESOPHAGOGASTRODUODENOSCOPY;  Surgeon: Bridger Amado MD;  Location: [facility] OR;  Service:    • EXCISION MASS ARM Left 9/19/2017    Procedure: EXCISION MASS UPPER EXTREMITY  --  wide excision left hand mass;  Surgeon: Kaylie Granados MD;  Location: [facility] OR;  Service:    • EXPLORATORY LAPAROTOMY N/A 5/26/2017    Procedure: LAPAROTOMY EXPLORATORY - Explantation of old hernia mesh;  Surgeon: Kaylie Granados MD;  Location: [facility] OR;  Service:    • EXPLORATORY LAPAROTOMY N/A 12/21/2017    Procedure: LAPAROTOMY EXPLORATORY -  placement of strattice 6x6 small bowel resection of fistulas and anastamosis, oversew of rectal stump ,  extensive lysis of adhesions,  resection of fistulas, small bowel resectionsx2 and side to side anatomosis, ileostomy placement ;  Surgeon: Kaylie Granados MD;  Location: [facility];  Service:    • HERNIA REPAIR      umbilical, inguinal         No current facility-administered medications on file prior to encounter.      Current Outpatient Prescriptions on File Prior to Encounter   Medication Sig Dispense Refill   • folic acid (FOLVITE) 400 MCG tablet Take 400 mcg by mouth Daily.     • magnesium oxide (MAGOX) 400 (241.3 Mg) MG tablet tablet Take 1 tablet by mouth Daily. 30 each    • ondansetron (ZOFRAN) 4 MG tablet Take 1 tablet by mouth Every 6 (Six) Hours As Needed for Nausea or Vomiting.     • pantoprazole (PROTONIX) 40 MG EC tablet Take 1 tablet by mouth Daily. 30 tablet 1   • potassium phosphate, monobasic, (K-PHOS) 500 MG tablet Take 1 tablet by mouth 2 (Two) Times a Day Before Meals.     • sucralfate (CARAFATE) 1 g tablet Take 1 tablet by mouth 4 (Four) Times a Day Before Meals & at Bedtime.     •  acetaminophen (TYLENOL) 325 MG tablet Take 650 mg by mouth every 6 (six) hours as needed for mild pain (1-3).     • bisacodyl (BISACODYL LAXATIVE) 10 MG suppository Insert 10 mg into the rectum Daily As Needed for Constipation.     • cholestyramine (QUESTRAN) 4 g packet Take 1 packet by mouth 2 (Two) Times a Day With Meals.     • clotrimazole-betamethasone (LOTRISONE) 1-0.05 % cream Apply 1 application topically Every 12 (Twelve) Hours.     • cyanocobalamin 1000 MCG/ML injection Inject 1 mL into the shoulder, thigh, or buttocks Every 28 (Twenty-Eight) Days.     • diclofenac (VOLTAREN) 1 % gel gel Apply 2 g topically 4 (Four) Times a Day.     • hydrophor (AQUAPHOR) ointment ointment Apply  topically Every 12 (Twelve) Hours.     • lactobacillus acidophilus (RISAQUAD) capsule capsule Take 1 capsule by mouth Daily. 30 capsule 0   • lactulose (CHRONULAC) 10 GM/15ML solution Take 30 mL by mouth Daily As Needed.     • MULTIPLE VITAMINS-MINERALS PO Take 1 tablet by mouth Daily.     • nystatin (MYCOSTATIN) 960199 UNIT/GM powder Apply to affected area 3 times daily 30 g 0   • oxyCODONE-acetaminophen (PERCOCET) 5-325 MG per tablet Take 1 tablet by mouth Every 6 (Six) Hours As Needed for Severe Pain  (Pain). May take 2 tablet prior to PT if needed (Patient not taking: Reported on 2/28/2018) 40 tablet 0        ALLERGIES:  No Known Allergies     Social History     Social History   • Marital status:      Spouse name: Neris   • Years of education: 12     Occupational History   •  Retired     Social History Main Topics   • Smoking status: Never Smoker   • Smokeless tobacco: Never Used   • Alcohol use No   • Drug use: No   • Sexual activity: Defer        Family History   Problem Relation Age of Onset   • Diabetes Brother    • Liver cancer Brother    • Heart disease Mother    • Heart attack Mother    • Diabetes Mother    • Heart attack Father    • Other Sister      Brain tumor   • Cancer Sister    •  Emphysema Brother    • Cancer Brother    • Alcohol abuse Brother    • Cancer Brother         Review of Systems   Constitutional: Negative for fever.   HENT: Positive for congestion.    Respiratory: Positive for apnea, cough, choking and chest tightness.    Endocrine: Positive for polydipsia.   Neurological: Positive for dizziness.        Objective     Vitals:    03/02/18 0000 03/02/18 0330 03/02/18 0759 03/02/18 1138   BP: 121/51 124/54 148/71 147/65   BP Location: Right arm Right arm Right arm Right arm   Patient Position: Lying Lying Lying Lying   Pulse: 52 50 53 (!) 46   Resp: 18 18 20 20   Temp: 97.6 °F (36.4 °C) 97.7 °F (36.5 °C) 97.9 °F (36.6 °C) 98.2 °F (36.8 °C)   TempSrc: Oral Oral Oral Oral   SpO2: 98% 98% 97% 99%   Weight:       Height:         Current Status 2/13/2018   ECOG score 4       Physical Exam   HENT:   Right Ear: External ear normal.    Family at bedside. Alert, orientd     RECENT LABS:  Hematology WBC   Date Value Ref Range Status   03/02/2018 3.51 (L) 4.80 - 10.80 10*3/mm3 Final     RBC   Date Value Ref Range Status   03/02/2018 2.30 (L) 4.70 - 6.10 10*6/mm3 Final     Hemoglobin   Date Value Ref Range Status   03/02/2018 7.1 (C) 14.0 - 18.0 g/dL Final     Hematocrit   Date Value Ref Range Status   03/02/2018 22.3 (C) 42.0 - 52.0 % Final     Platelets   Date Value Ref Range Status   03/02/2018 102 (L) 140 - 500 10*3/mm3 Final          Assessment/Plan   MDS: 2nd unit PRBC today   Counts are stable

## 2018-03-02 NOTE — NURSING NOTE
Continued Stay Note  RENATA Wheatley     Patient Name: Jason Zelaya  MRN: [id number]  Today's Date: 3/2/2018    Admit Date: 2/27/2018          Discharge Plan       03/02/18 1329    Case Management/Social Work Plan    Plan plan [facility]    Additional Comments  Spoke with patient and wife at bedside. IMM updated. Patient and wife aware of plan to dc to [facility]. Spoke with Alexsandra [facility], referral given. Will continue to follow.              Discharge Codes     None            Bgig Renteria RN

## 2018-03-02 NOTE — PROGRESS NOTES
General Surgery Progress Note      Chief Complaint:  Follow-up for enterocutaneous and rectocutaneous fistula    Interval History: No acute overnight events.  Reports his bilateral knee and toe pain is better.  Denies abdominal pain and nausea.  Fistula output not being recorded by nursing staff this has been discussed.    Urine culture positive for MRSA.  Case discussed with infectious disease and Dr. Berman patient is currently on IV vancomycin and Zosyn.    Objective     Vital Signs  Temp:  [97.2 °F (36.2 °C)-98.2 °F (36.8 °C)] 98.2 °F (36.8 °C)  Heart Rate:  [46-55] 46  Resp:  [18-20] 20  BP: (105-148)/(49-71) 147/65  Body mass index is 17.32 kg/(m^2).    Intake/Output Summary (Last 24 hours) at 03/02/18 1238  Last data filed at 03/02/18 1223   Gross per 24 hour   Intake          3126.67 ml   Output             1835 ml   Net          1291.67 ml     I/O this shift:  In: 220 [P.O.:120; IV Piggyback:100]  Out: 435 [Urine:375; Stool:50]       Physical Exam:   General: patient awake, alert and cooperative   Cardiovascular: regular rhythm and rate, no murmurs auscultated   Pulm: clear to auscultation bilaterally, regular and unlabored   Abdomen: soft, nontender, nondistended; normal bowel sounds,Right lower quadrant ileostomy viable and functioning   Extremities: no rash or edema   Neurologic: Normal mood and behavior     Results Review:     I reviewed the patient's new clinical results.    Lab Results (last 24 hours)     Procedure Component Value Units Date/Time    POC Glucose Once [[id number]]  (Normal) Collected:  03/01/18 1800    Specimen:  Blood Updated:  03/01/18 1808     Glucose 130 mg/dL     Narrative:       Meter: [id number] : [id number] Preet Upton RN    POC Glucose Once [[id number]]  (Normal) Collected:  03/01/18 2359    Specimen:  Blood Updated:  03/02/18 0005     Glucose 129 mg/dL     Narrative:       Meter: [id number] : [id number] Rafael Banks RN    POC Glucose Once [[id number]]  (Normal)  Collected:  03/02/18 0535    Specimen:  Blood Updated:  03/02/18 0542     Glucose 101 mg/dL     Narrative:       Meter: [id number] : [id number] Stacie Lazcano RN    Magnesium [[id number]]  (Abnormal) Collected:  03/02/18 0529    Specimen:  Blood Updated:  03/02/18 0620     Magnesium 1.6 (L) mg/dL     Phosphorus [[id number]]  (Abnormal) Collected:  03/02/18 0529    Specimen:  Blood Updated:  03/02/18 0620     Phosphorus 2.6 (L) mg/dL     Basic Metabolic Panel [[id number]]  (Abnormal) Collected:  03/02/18 0529    Specimen:  Blood Updated:  03/02/18 0623     Glucose 97 mg/dL      BUN 8 mg/dL      Creatinine 0.60 (L) mg/dL      Sodium 143 mmol/L      Potassium 3.1 (L) mmol/L      Chloride 112 (H) mmol/L      CO2 19.6 (L) mmol/L      Calcium 7.8 (L) mg/dL      eGFR Non African Amer 128 mL/min/1.73      BUN/Creatinine Ratio 13.3     Anion Gap 11.4 mmol/L     Narrative:       The MDRD GFR formula is only valid for adults with stable renal function between ages 18 and 70.    Urine Culture - Urine, Urine, Clean Catch [[id number]]  (Abnormal)  (Susceptibility) Collected:  02/27/18 2055    Specimen:  Urine from Urine, Catheter Updated:  03/02/18 0632     Urine Culture --      >100,000 CFU/mL Staphylococcus aureus, MRSA (A)        Methicillin resistant Staph aureus, patient may be an isolation risk.       Susceptibility      Staphylococcus aureus, MRSA     JULISA     Nitrofurantoin <=16 ug/ml Susceptible     Oxacillin >=4 ug/ml Resistant     Penicillin G >=0.5 ug/ml Resistant     Rifampin <=0.5 ug/ml Susceptible     Tetracycline <=1 ug/ml Susceptible     Trimethoprim + Sulfamethoxazole <=10 ug/ml Susceptible     Vancomycin 1 ug/ml Susceptible                    POC Glucose Once [[id number]]  (Normal) Collected:  03/02/18 1210    Specimen:  Blood Updated:  03/02/18 1221     Glucose 121 mg/dL     Narrative:       Meter: [id number] : [id number] Soumya Cheng RN    CBC Auto Differential [[id number]]  (Abnormal) Collected:  03/02/18 1200     Specimen:  Blood Updated:  03/02/18 1226     WBC 3.51 (L) 10*3/mm3      RBC 2.30 (L) 10*6/mm3      Hemoglobin 7.1 (C) g/dL      Hematocrit 22.3 (C) %      MCV 97.0 (H) fL      MCH 30.9 pg      MCHC 31.8 g/dL      RDW 22.6 (H) %      RDW-SD 77.2 (H) fl      MPV 11.5 (H) fL      Platelets 102 (L) 10*3/mm3      Neutrophil % 51.2 %      Lymphocyte % 23.4 %      Monocyte % 19.4 (H) %      Eosinophil % 0.0 %      Basophil % 0.3 %      Immature Grans % 5.7 (H) %      Neutrophils, Absolute 1.80 10*3/mm3      Lymphocytes, Absolute 0.82 10*3/mm3      Monocytes, Absolute 0.68 10*3/mm3      Eosinophils, Absolute 0.00 (L) 10*3/mm3      Basophils, Absolute 0.01 10*3/mm3      Immature Grans, Absolute 0.20 (H) 10*3/mm3      nRBC 0.0 /100 WBC     CBC & Differential [[id number]] Collected:  03/02/18 1200    Specimen:  Blood Updated:  03/02/18 1227    Narrative:       The following orders were created for panel order CBC & Differential.  Procedure                               Abnormality         Status                     ---------                               -----------         ------                     Scan Slide[[id number]]                                                                  CBC Auto Differential[[id number]]        Abnormal            Final result                 Please view results for these tests on the individual orders.          Radiology:    Imaging Results (last 72 hours)     Procedure Component Value Units Date/Time    CT Abdomen Pelvis With Contrast [[id number]] Collected:  02/28/18 0824     Updated:  02/28/18 0834    Narrative:       CT ABDOMEN PELVIS W CONTRAST-: 2/27/2018 10:34 PM     INDICATION:   Diffuse abdominal pain and intermittent fever. 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     TECHNIQUE:   CT of the abdomen and pelvis with contrast. Coronal and sagittal  reconstructions were obtained.  Radiation dose reduction techniques  included  automated exposure control or exposure modulation based on body  size. Radiation audit for number of CT and nuclear cardiology exams  performed in the last year: 5.       COMPARISON:   CT abdomen pelvis 12/18/2017, 12/17/2017 11/15/2017.     FINDINGS:  ABDOMEN:   The aortic root is dilated measuring 4.7 cm at the sinus of Valsalva,  unchanged. There are a few small pulmonary nodules in the lung bases are  similar to the prior study. There is severe emphysema.     The solid abdominal organs are unchanged in enhancement. There is  atrophy of the pancreas.. The gallbladder is surgically absent.       The small bowel is not dilated. The patient has had a subtotal  colectomy. There is a diverting right lower quadrant ileostomy. A  fistulous tract has developed into the anterior abdominal wall above the  umbilicus. There is fluid tracking the left rectus abdominis muscle  inferiorly. A fistulous tract communicates with the rectal stump and  likely some of the adjacent small bowel. There is some enteric contrast  within the fistulous tract and the rectal stump from the adjacent small  bowel. Small bowel fistulous tract may involve the small bowel  anastomosis or several loops of small bowel in the lower abdomen/pelvis  that are slightly inflamed. The inflammatory change extends over the  dome of the bladder. There is gas within the bladder wall potentially  representing a enterovesicular or rectovesicular fistula.       PELVIS:   A. Ayoub catheter decompresses the bladder. Diffuse bladder wall  thickening is noted. No enlarged pelvic or inguinal lymph nodes. No  loculated drainable fluid collections          No acute osseous abnormalities.       Impression:          1. 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  2. Air tracking within the bladder wall is likely secondary to fistulous  involvement of the bladder as well.         Initial  interpretation provided by Dr. Bassem Henderson at 23:19 on  02/27/2018.     This report was finalized on 2/28/2018 8:32 AM by Dr. Rafiq Blue MD.       XR Chest 1 View [[id number]] Collected:  03/01/18 0709     Updated:  03/01/18 0713    Narrative:       Chest x-ray     INDICATION: PICC placement today.     FINDINGS: AP portable chest is compared 12/18/2017. Left arm approach  PICC is in the lower SVC. Cardiomegaly is stable. Chronic opacities  noted in the bases could reflect some scarring. No pneumothorax is seen.     A preliminary report was provided by Dr. Wong at 1753 hours on  02/28/2018.     This report was finalized on 3/1/2018 7:11 AM by Dr. Nabeel Cantu MD.               Adult Central Clinimix TPN     And     fat emulsion 72 mL    Pharmacy to dose vancomycin     sodium chloride 50 mL/hr Last Rate: 50 mL/hr (03/02/18 0843)           Assessment/Plan     Patient Active Problem List   Diagnosis Code   • MDS (myelodysplastic syndrome), low grade D46.20   • Vitamin B 12 deficiency E53.8   • AI (aortic incompetence) I35.1   • Bundle branch block, right I45.10   • Benign prostatic hyperplasia N40.0   • Hypertension I10   • Knee pain M25.569   • Rectal bleed K62.5   • Acute blood loss anemia D62   • Hand lesion L98.9   • Acute UTI N39.0   • Acute lower UTI (urinary tract infection) N39.0   • 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   • 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   • Coagulopathy D68.9   • Primary osteoarthritis of both knees M17.0   • Enterocutaneous fistula K63.2       Enterocutaneous rectocutaneous fistula  Continue bowel rest/TPN  TPN reviewed  Hypokalemia, hypophosphatemia and hypomagnesemia-electrolytes have been adjusted in TPN  Continue IV Zosyn  MRSA UTI on IV vancomycin    Myelodysplastic syndrome with chronic pancytopenia  H&H noted-will monitor - may need to transfuse  Hematology consult pending    Severe protein malnutrition, chronic steroid use, multiple medical problems and failure to thrive and severe  deconditioning  Continue physical therapy   consult for discharge planning  Plan of care discussed with patient, wife, Sophie Hollingsworth and Boone Granados MD  03/02/18  12:38 PM

## 2018-03-02 NOTE — PLAN OF CARE
Problem: Patient Care Overview (Adult)  Goal: Plan of Care Review   03/02/18 6113   Coping/Psychosocial Response Interventions   Plan Of Care Reviewed With patient   Outcome Evaluation   Outcome Summary/Follow up Plan PT Eval Complete: Patient performs supine to sit transfer with CGA, sit to/from stand transfer with CGA and bed to/from chair transfer via stand pivot with CGA without use of an AD. Stand pivot transfer performed x 2. Patient reports transfer is at baseline. He is non ambulatory. He refuses use of an AD for transfers. Recommend continued mobiltiy with nursing staff. Recommend continued 24 hour care upon return home.

## 2018-03-03 NOTE — PROGRESS NOTES
He denies any abdominal pain.  His abdominal exam is benign.  His abdominal wall enterocutaneous fistula is putting out small volumes.  He denies any movement from his rectum.  Dr Robles's  note was appreciated. hematology will be contacted regarding a possible transfusion.  His TPN was adjusted increasing the rate by 20 decreasing his baseline fluids by 20 increasing his magnesium potassium and phosphorus in the TPN.  Recheck labs in the a.m.

## 2018-03-03 NOTE — PROGRESS NOTES
"Hospitalist Team      Patient Care Team:  David Cedillo MD as PCP - General (Family Medicine)  Anupam Green MD as Consulting Physician (Hematology and Oncology)  Bennie Galo MD as Referring Physician (Orthopedic Surgery)  Kaylie Granados MD as Surgeon (General Surgery)        Chief Complaint:    F/U Intra-abdominal vs. Bladder bacterial infection associated with newly developed Enterocutaneous and rectocutaneous fistula    Subjective    Interval History and ROS:     Patient is depressed.  He is very concerned about having to go to [facility] with the TPN.  He feels it will be very difficult for his wife.  While he looks better today, his attitude is much worse.    Objective    Vital Signs  Temp:  [97.2 °F (36.2 °C)-98.8 °F (37.1 °C)] 98.8 °F (37.1 °C)  Heart Rate:  [51-98] 51  Resp:  [16-18] 16  BP: (135-157)/(61-94) 151/73  Oxygen Therapy  SpO2: 96 %  Pulse Oximetry Type: Intermittent  O2 Device: room air}  Flowsheet Rows         First Filed Value    Admission Height  177 cm (69.69\") Documented at 02/27/2018 2011    Admission Weight  57.2 kg (126 lb) Documented at 02/27/2018 2011        Body mass index is 17.32 kg/(m^2).      Physical Exam:    Constitutional: Patient appears thin, elderly and in no acute distress   HEENT:   Head: Normocephalic and atraumatic.   Eyes:  EOM are intact. Sclera are anicteric and non-injected.  Mouth and Throat: Patient has moist mucous membranes.     Neck: Neck supple. No JVD present.   Cardiovascular: Regular rate, regular rhythm.  Exam reveals no gallop and no friction rub.  No murmur heard.  Pulmonary/Chest: Lungs are clear to auscultation bilaterally. No respiratory distress. No wheezes. No rhonchi. No rales.   Abdominal: Soft. Bowel sounds are normal. No distension. There is no tenderness. Very little stool and some air noted in ileostomy and fistula bags.  Extremities: No edema. Pulses are palpable in all 4 extremities. Mild erythema of great toes bilaterally L>R. " No TTP today.   Neurological: Patient is alert and oriented to person, place, and time. He is very down about [facility]  Skin: Skin is warm. No cyanosis.    Results Review:     I reviewed the patient's new clinical results.    Lab Results (last 24 hours)     Procedure Component Value Units Date/Time    POC Glucose Once [[id number]]  (Normal) Collected:  03/02/18 1830    Specimen:  Blood Updated:  03/02/18 1841     Glucose 123 mg/dL     Narrative:       Meter: [id number] : [id number] Soumya Cheng RN    POC Glucose Once [[id number]]  (Abnormal) Collected:  03/03/18 0036    Specimen:  Blood Updated:  03/03/18 0042     Glucose 132 (H) mg/dL     Narrative:       Meter: [id number] : [id number] Stacie Lazcano RN    Vancomycin, Random [[id number]]  (Normal) Collected:  03/03/18 0030    Specimen:  Blood Updated:  03/03/18 0100     Vancomycin Random 9.50 mcg/mL     CBC Auto Differential [[id number]]  (Abnormal) Collected:  03/03/18 0626    Specimen:  Blood Updated:  03/03/18 0658     WBC 4.05 (L) 10*3/mm3      RBC 2.31 (L) 10*6/mm3      Hemoglobin 7.2 (C) g/dL      Hematocrit 22.3 (C) %      MCV 96.5 (H) fL      MCH 31.2 (H) pg      MCHC 32.3 g/dL      RDW 22.7 (H) %      RDW-SD 78.4 (H) fl      MPV 11.3 (H) fL      Platelets 102 (L) 10*3/mm3      Neutrophil % 48.7 %      Lymphocyte % 25.2 %      Monocyte % 20.7 (H) %      Eosinophil % 0.2 %      Basophil % 0.0 %      Immature Grans % 5.2 (H) %      Neutrophils, Absolute 1.97 10*3/mm3      Lymphocytes, Absolute 1.02 10*3/mm3      Monocytes, Absolute 0.84 10*3/mm3      Eosinophils, Absolute 0.01 (L) 10*3/mm3      Basophils, Absolute 0.00 10*3/mm3      Immature Grans, Absolute 0.21 (H) 10*3/mm3      nRBC 0.0 /100 WBC     CBC & Differential [[id number]] Collected:  03/03/18 0626    Specimen:  Blood Updated:  03/03/18 0658    Narrative:       The following orders were created for panel order CBC & Differential.  Procedure                               Abnormality         Status                      ---------                               -----------         ------                     Scan Slide[[id number]]                                                                  CBC Auto Differential[[id number]]        Abnormal            Final result                 Please view results for these tests on the individual orders.    Basic Metabolic Panel [[id number]]  (Abnormal) Collected:  03/03/18 0626    Specimen:  Blood Updated:  03/03/18 0700     Glucose 108 (H) mg/dL      BUN 6 (L) mg/dL      Creatinine 0.48 (L) mg/dL      Sodium 138 mmol/L      Potassium 3.8 mmol/L      Chloride 110 (H) mmol/L      CO2 19.3 (L) mmol/L      Calcium 7.9 (L) mg/dL      eGFR Non African Amer >150 mL/min/1.73      BUN/Creatinine Ratio 12.5     Anion Gap 8.7 mmol/L     Narrative:       The MDRD GFR formula is only valid for adults with stable renal function between ages 18 and 70.    Magnesium [[id number]]  (Normal) Collected:  03/03/18 0626    Specimen:  Blood Updated:  03/03/18 0700     Magnesium 1.9 mg/dL     Phosphorus [[id number]]  (Abnormal) Collected:  03/03/18 0626    Specimen:  Blood Updated:  03/03/18 0700     Phosphorus 2.3 (L) mg/dL     POC Glucose Once [[id number]]  (Normal) Collected:  03/03/18 0634    Specimen:  Blood Updated:  03/03/18 0706     Glucose 111 mg/dL     Narrative:       Meter: [id number] : [id number] Stacie Lazcano RN    POC Glucose Once [[id number]]  (Normal) Collected:  03/03/18 1124    Specimen:  Blood Updated:  03/03/18 1134     Glucose 110 mg/dL     Narrative:       Meter: [id number] : [id number] Preet Upton RN          Imaging Results (last 24 hours)     ** No results found for the last 24 hours. **          Xray not reviewed personally by physician.      ECG not reviewed personally by physician  ECG/EMG Results (most recent)     None          Medication Review:   I have reviewed the patient's current medication list    Current Facility-Administered Medications:   •  acetaminophen  (TYLENOL) tablet 650 mg, 650 mg, Oral, Q6H PRN, Rian Dean MD  •  Adult [facility] TPN, , Intravenous, Continuous, Last Rate: 41.6 mL/hr at 03/02/18 1806 **AND** fat emulsion (INTRALIPID,LIPOSYN) 20 % infusion 14.4 g, 72 mL, Intravenous, Continuous, Kaylie Granados MD, Last Rate: 6 mL/hr at 03/02/18 1758, 14.4 g at 03/02/18 1758  •  Adult [facility] TPN, , Intravenous, Continuous **AND** fat emulsion (INTRALIPID,LIPOSYN) 20 % infusion 14.4 g, 72 mL, Intravenous, Continuous, Marvin Ma MD  •  colchicine tablet 0.6 mg, 0.6 mg, Oral, Once, Paola Eastman MD  •  colchicine tablet 0.6 mg, 0.6 mg, Oral, Daily, Paola Eastman MD, 0.6 mg at 03/03/18 0840  •  cyanocobalamin injection 1,000 mcg, 1,000 mcg, Intramuscular, Q28 Days, Rian Dean MD, 1,000 mcg at 02/28/18 1050  •  diclofenac (VOLTAREN) 1 % gel 2 g, 2 g, Topical, 4x Daily, Rian Dean MD, 2 g at 03/03/18 1156  •  HYDROmorphone (DILAUDID) injection 1 mg, 1 mg, Intravenous, Q4H PRN, Kaylie Granados MD, 1 mg at 03/01/18 0220  •  hydrophor (AQUAPHOR) ointment, , Topical, Q12H, Rian Dean MD  •  lactobacillus acidophilus (RISAQUAD) capsule 1 capsule, 1 capsule, Oral, Daily, Rian Dean MD, 1 capsule at 03/03/18 0840  •  magnesium oxide (MAGOX) tablet 400 mg, 400 mg, Oral, Daily, Rian Dean MD, 400 mg at 03/03/18 0840  •  magnesium sulfate 4 gram infusion - Mg less than or equal to 1mg/dL, 4 g, Intravenous, PRN **OR** magnesium sulfate 3 gram infusion (1gm x 3) - Mg 1.1 - 1.5 mg/dL, 1 g, Intravenous, PRN **OR** Magnesium Sulfate 2 gram infusion- Mg 1.6 - 1.9 mg/dL, 2 g, Intravenous, PRN, Kenya Marks MD, Stopped at 03/02/18 1422  •  multivitamin with minerals 1 tablet, 1 tablet, Oral, Daily, Rian Dean MD, 1 tablet at 03/03/18 0839  •  nystatin (MYCOSTATIN) powder, , Topical, Q12H, Rian Dean MD  •  ondansetron (ZOFRAN) tablet 4 mg, 4 mg, Oral, Q6H PRN, Rian Dean MD  •   oxyCODONE-acetaminophen (PERCOCET) 7.5-325 MG per tablet 1 tablet, 1 tablet, Oral, Q6H PRN, Kaylie Granados MD, 1 tablet at 03/03/18 0028  •  pantoprazole (PROTONIX) EC tablet 40 mg, 40 mg, Oral, Q AM, Rian Dean MD, 40 mg at 03/03/18 0624  •  Pharmacy to dose vancomycin, , Does not apply, Continuous PRN, Rian Dean MD  •  piperacillin-tazobactam (ZOSYN) 3.375 g/100 mL 0.9% NS IVPB (mbp), 3.375 g, Intravenous, Q8H, Rian Dean MD, Last Rate: 0 mL/hr at 03/02/18 1223, 3.375 g at 03/03/18 0852  •  piperacillin-tazobactam (ZOSYN) 4.5 g/100 mL 0.9% NS IVPB (mbp), 4.5 g, Intravenous, Once, Rafiq Irene MD, Stopped at 02/28/18 0030  •  potassium chloride (K-DUR,KLOR-CON) CR tablet 40 mEq, 40 mEq, Oral, PRN, 40 mEq at 03/02/18 2124 **OR** potassium chloride (KLOR-CON) packet 40 mEq, 40 mEq, Oral, PRN **OR** potassium chloride 10 mEq in 100 mL IVPB, 10 mEq, Intravenous, Q1H PRN, Kenya Marks MD  •  potassium phosphate 45 mmol in sodium chloride 0.9 % 500 mL infusion, 45 mmol, Intravenous, PRN **OR** potassium phosphate 30 mmol in sodium chloride 0.9 % 250 mL infusion, 30 mmol, Intravenous, PRN **OR** potassium phosphate 15 mmol in sodium chloride 0.9 % 100 mL infusion, 15 mmol, Intravenous, PRN, Last Rate: 25 mL/hr at 03/03/18 1155, 15 mmol at 03/03/18 1155 **OR** sodium phosphates 45 mmol in sodium chloride 0.9 % 500 mL IVPB, 45 mmol, Intravenous, PRN **OR** sodium phosphates 30 mmol in sodium chloride 0.9 % 250 mL IVPB, 30 mmol, Intravenous, PRN **OR** sodium phosphates 15 mmol in sodium chloride 0.9 % 250 mL IVPB, 15 mmol, Intravenous, PRN, Kenya Marks MD  •  Insert peripheral IV, , , Once **AND** sodium chloride 0.9 % flush 10 mL, 10 mL, Intravenous, PRN, Rafiq Irene MD, 10 mL at 03/03/18 0838  •  sodium chloride 0.9 % infusion, 30 mL/hr, Intravenous, Continuous, Marvin Ma MD, Last Rate: 30 mL/hr at 03/03/18 1040, 30 mL/hr at 03/03/18 1040  •  sucralfate  (CARAFATE) tablet 1 g, 1 g, Oral, 4x Daily AC & at Bedtime, Rian Dean MD, 1 g at 03/03/18 1156  •  vancomycin (VANCOCIN) IVPB 1000 mg in 250 mL NS, 1,000 mg, Intravenous, Q12H, Rian Dean MD, 1,000 mg at 03/03/18 1326      Assessment/Plan     1. Enterocutaneous, Rectocutaneous fistula:  Patient will be treated conservatively as he is not a surgical candidate.  Will continue bowel rest and TPN, likely will take 6-8 weeks to see if fistulas will heal.  Urology consulted, patient with chronic glasgow. No sign of enterovisical fistula  Admit WBC 12.05 now leukopenic which is his baseline, Blood pressures chronically low/NL  Continue Zosyn and Vanc Started 2/26, Dr. Robles (ID) consulted and he recommended continue Vanco and zosyn until 3/15/18.  Referral placed to [facility]. Patient ok to take meds po with sips per Dr. Granadso.     2. MRSA UTI with chronic glasgow catheter:  Urology will change catheter prior to discharge. Patient on IV Vancomycin till 3/15/18 per ID recs.       3. Acute Gout of the Left 1st MCP and ? right 1st MCP  On Colchicine and symptoms improving.  Apply diclofenac to affected areas  Will avoid intraarticular steroid injection due to active infection     4. Chronic Pancytopenia with MDS: HemeOnc consulted and patient transfused 2 units PRBCs today.   I spoke with Dr Rashid who felt overall counts were fairly stable.   Also revcieved 1 unit PRBC 2/28/18 here  Attempt to stagger AM labs as feasible to reduce risk of iatrogenic anemia  Monitor.     5. Chronic Protein calorie malnutrition from chronic illness  Nutrition following, patient now with PICC on TPN and will remain on TPN for 6-8 weeks per Dr. Granados secondary to #1 above. Monitoring Accuchecks while on TPN.      6. Chronic diastolic dysfunction, PAF, PSVT, Non-rheumatic aortic valve insufficiency, chronic RBBB, MR and AR: Patient no longer on any medications for these issues outpatient secondary to chronically low/NL BP and bradycardia. No  acute issues here. Monitor.     7. Hypophosphatemia, hypomagnesemia and hypokalemia: Will start electrolyte replacement protocols and Dr. Granados adjusting in TPN. Monitor.     8. H/O HTN: Patient for some time now has had low/NL BPs, I have noticed with initiation of TNP BP has become more elevated. Patient may need addition of BP medication prior to discharge and close monitoring for s/s of volume overload. Monitor closely.     9.  Situational Depression.  He does not want to go to [facility]       Plan for disposition:  [facility]?    Barbara Turner DO  03/03/18  5:36 PM      Time: 20 min

## 2018-03-03 NOTE — PLAN OF CARE
Problem: Patient Care Overview (Adult)  Goal: Plan of Care Review  Outcome: Ongoing (interventions implemented as appropriate)   03/02/18 1933   Coping/Psychosocial Response Interventions   Plan Of Care Reviewed With patient;spouse   Patient Care Overview   Progress progress towards functional goals is fair   Outcome Evaluation   Outcome Summary/Follow up Plan pt had a fair day, family was here to visit, was up in the chair PT/OT worked with pt today. Hgb 7.1, to recheck in the am, monitor pt. Dr. Granados, Dr. Marks, Dr. Brice cbc group, and inf control md all here to see pt today. Vancomycin/Zosyn cont. Picc cont, Lipids now at 6ml, TPN now infusing at 40ml/hr. Urine + MRSA, accuchecks ok, Pt had Mag, K+ and Potassium Phos as scheduled prn today per labs/protocol. VS, Heartrate remains low, 48-53. MD is aware. Bp stable afebrile, skin cont to be dry, aquaphor applied.  fistula total 10ml noted per ostomy bag, ileostomy 150ml noted, bottom is breaking down and c/o scrotal; tender, powder applied as ordered, zinc to bottom, very depressed today, case management spoke with pt and possible [facility] upon discharge due to Nutrition per PICC.        Problem: Infection, Risk/Actual (Adult)  Goal: Infection Prevention/Resolution  Outcome: Ongoing (interventions implemented as appropriate)      Problem: Skin Integrity Impairment, Risk/Actual (Adult)  Goal: Skin Integrity/Wound Healing  Outcome: Ongoing (interventions implemented as appropriate)      Problem: Pressure Ulcer Risk (Binh Scale) (Adult,Obstetrics,Pediatric)  Goal: Skin Integrity  Outcome: Ongoing (interventions implemented as appropriate)

## 2018-03-04 NOTE — PROGRESS NOTES
Reviewed chart. Noted plan for spacing out labs in order to prevent iatragenic anemia. His hemoglobin is stable today - will hold on transfusion.

## 2018-03-04 NOTE — PROGRESS NOTES
"Hospitalist Team      Patient Care Team:  David Cedillo MD as PCP - General (Family Medicine)  Anupam Green MD as Consulting Physician (Hematology and Oncology)  Bennie Galo MD as Referring Physician (Orthopedic Surgery)  Kaylie Granados MD as Surgeon (General Surgery)        Chief Complaint:    F/U Intra-abdominal vs. Bladder bacterial infection associated with newly developed Enterocutaneous and rectocutaneous fistula    Subjective    Interval History and ROS:     Patient States Feeling better today  Patient Complaints: not eating.  We are resting the bowel in hopes that the fistula will close on its own  Patient Denies:  Nausea and vomiting  History taken from: patient  He is still very concerned about his wife and going to [place].    Objective    Vital Signs  Temp:  [97.8 °F (36.6 °C)-98.8 °F (37.1 °C)] 98.4 °F (36.9 °C)  Heart Rate:  [46-56] 53  Resp:  [16-20] 20  BP: (145-157)/(66-84) 150/66  Oxygen Therapy  SpO2: 98 %  Pulse Oximetry Type: Intermittent  O2 Device: room air}  Flowsheet Rows         First Filed Value    Admission Height  177 cm (69.69\") Documented at 02/27/2018 2011    Admission Weight  57.2 kg (126 lb) Documented at 02/27/2018 2011        Body mass index is 17.32 kg/(m^2).      Physical Exam:    Physical Exam   Constitutional: Patient appears well-developed and well-nourished and in no acute distress .  He appears better hydrated and more alert (less ill).  HEENT:   Head: Normocephalic and atraumatic.   Eyes:  Pupils are equal, round, and reactive to light. EOM are intact. Sclera are anicteric and non-injected.  Mouth and Throat: Patient has moist mucous membranes. Oropharynx is clear of any erythema or exudate.     Neck: Neck supple. No JVD present. No thyromegaly present. No lymphadenopathy present.  Cardiovascular: Regular rate, regular rhythm, S1 normal and S2 normal.  Exam reveals no gallop and no friction rub.  No murmur heard.  Pulmonary/Chest: Lungs are clear to " auscultation bilaterally. No respiratory distress. No wheezes. No rhonchi. No rales.   Abdominal: Soft. Bowel sounds are normal. No distension and no mass. There is no hepatosplenomegaly. There is no tenderness. Two pouches.  Musculoskeletal: Normal Muscle tone  Extremities: No edema. Pulses are palpable in all 4 extremities.  Neurological: Patient is alert and oriented to person, place, and time. Cranial nerves II-XII are grossly intact with no focal deficits.  Skin: Skin is warm. No rash noted. Nails show no clubbing.  No cyanosis or erythema.         Results Review:     I reviewed the patient's new clinical results.    Lab Results (last 24 hours)     Procedure Component Value Units Date/Time    POC Glucose Once [[id number]]  (Normal) Collected:  03/03/18 1124    Specimen:  Blood Updated:  03/03/18 1134     Glucose 110 mg/dL     Narrative:       Meter: [id number] : [id number] Preet Upton RN    POC Glucose Once [[id number]]  (Normal) Collected:  03/03/18 1838    Specimen:  Blood Updated:  03/03/18 1854     Glucose 118 mg/dL     Narrative:       Meter: [id number] : [id number] Preet Upton RN    POC Glucose Once [[id number]]  (Normal) Collected:  03/04/18 0556    Specimen:  Blood Updated:  03/04/18 0603     Glucose 116 mg/dL     Narrative:       Meter: [id number] : [id number] Stacie Lazcano RN    Comprehensive Metabolic Panel [[id number]]  (Abnormal) Collected:  03/04/18 0549    Specimen:  Blood Updated:  03/04/18 0640     Glucose 117 (H) mg/dL      BUN 7 (L) mg/dL      Creatinine 0.47 (L) mg/dL      Sodium 140 mmol/L      Potassium 3.6 mmol/L      Chloride 110 (H) mmol/L      CO2 22.7 mmol/L      Calcium 7.9 (L) mg/dL      Total Protein 6.1 g/dL      Albumin 2.30 (L) g/dL      ALT (SGPT) 10 U/L      AST (SGOT) 12 U/L      Alkaline Phosphatase 82 U/L      Total Bilirubin 0.4 mg/dL      eGFR Non African Amer >150 mL/min/1.73      Globulin 3.8 gm/dL      A/G Ratio 0.6 g/dL      BUN/Creatinine Ratio 14.9      Anion Gap 7.3 mmol/L     Narrative:       The MDRD GFR formula is only valid for adults with stable renal function between ages 18 and 70.    Magnesium [[id number]]  (Normal) Collected:  03/04/18 0549    Specimen:  Blood Updated:  03/04/18 0640     Magnesium 1.7 mg/dL     CBC Auto Differential [[id number]]  (Abnormal) Collected:  03/04/18 0915    Specimen:  Blood Updated:  03/04/18 0932     WBC 3.74 (L) 10*3/mm3      RBC 2.31 (L) 10*6/mm3      Hemoglobin 7.3 (C) g/dL      Hematocrit 23.1 (C) %      .0 (H) fL      MCH 31.6 (H) pg      MCHC 31.6 g/dL      RDW 22.6 (H) %      RDW-SD 78.6 (H) fl      MPV 11.5 (H) fL      Platelets 108 (L) 10*3/mm3      Neutrophil % 48.1 %      Lymphocyte % 23.5 %      Monocyte % 24.1 (H) %      Eosinophil % 0.3 %      Basophil % 0.3 %      Immature Grans % 3.7 (H) %      Neutrophils, Absolute 1.80 10*3/mm3      Lymphocytes, Absolute 0.88 10*3/mm3      Monocytes, Absolute 0.90 10*3/mm3      Eosinophils, Absolute 0.01 (L) 10*3/mm3      Basophils, Absolute 0.01 10*3/mm3      Immature Grans, Absolute 0.14 (H) 10*3/mm3      nRBC 0.0 /100 WBC     CBC & Differential [[id number]] Collected:  03/04/18 0915    Specimen:  Blood Updated:  03/04/18 0932    Narrative:       The following orders were created for panel order CBC & Differential.  Procedure                               Abnormality         Status                     ---------                               -----------         ------                     Scan Slide[[id number]]                                                                  CBC Auto Differential[[id number]]        Abnormal            Final result                 Please view results for these tests on the individual orders.          Imaging Results (last 24 hours)     ** No results found for the last 24 hours. **          Xray not reviewed personally by physician.      ECG not reviewed personally by physician  ECG/EMG Results (most recent)     None          Medication  Review:   I have reviewed the patient's current medication list    Current Facility-Administered Medications:   •  acetaminophen (TYLENOL) tablet 650 mg, 650 mg, Oral, Q6H PRN, Rian Dean MD  •  Adult [facility] Clinimix TPN, , Intravenous, Continuous, Last Rate: 62.7 mL/hr at 03/03/18 1820 **AND** fat emulsion (INTRALIPID,LIPOSYN) 20 % infusion 14.4 g, 72 mL, Intravenous, Continuous, Marvin Ma MD, Last Rate: 9 mL/hr at 03/03/18 1821, 14.4 g at 03/03/18 1821  •  colchicine tablet 0.6 mg, 0.6 mg, Oral, Once, Paola Eastman MD  •  colchicine tablet 0.6 mg, 0.6 mg, Oral, Daily, Paola Eastman MD, 0.6 mg at 03/04/18 0841  •  cyanocobalamin injection 1,000 mcg, 1,000 mcg, Intramuscular, Q28 Days, Rian Dean MD, 1,000 mcg at 02/28/18 1050  •  diclofenac (VOLTAREN) 1 % gel 2 g, 2 g, Topical, 4x Daily, Rian Dean MD, 2 g at 03/04/18 0840  •  HYDROmorphone (DILAUDID) injection 1 mg, 1 mg, Intravenous, Q4H PRN, Kaylie Granados MD, 1 mg at 03/01/18 0220  •  hydrophor (AQUAPHOR) ointment, , Topical, Q12H, Rian Dean MD  •  lactobacillus acidophilus (RISAQUAD) capsule 1 capsule, 1 capsule, Oral, Daily, Rian Dean MD, 1 capsule at 03/04/18 0841  •  magnesium oxide (MAGOX) tablet 400 mg, 400 mg, Oral, Daily, Rian Dean MD, 400 mg at 03/04/18 0841  •  magnesium sulfate 4 gram infusion - Mg less than or equal to 1mg/dL, 4 g, Intravenous, PRN **OR** magnesium sulfate 3 gram infusion (1gm x 3) - Mg 1.1 - 1.5 mg/dL, 1 g, Intravenous, PRN **OR** Magnesium Sulfate 2 gram infusion- Mg 1.6 - 1.9 mg/dL, 2 g, Intravenous, PRN, Kenya Marks MD, Stopped at 03/02/18 1422  •  multivitamin with minerals 1 tablet, 1 tablet, Oral, Daily, Rian Dean MD, 1 tablet at 03/04/18 0841  •  nystatin (MYCOSTATIN) powder, , Topical, Q12H, Rian Dean MD  •  ondansetron (ZOFRAN) tablet 4 mg, 4 mg, Oral, Q6H PRN, Rian Dean MD  •  oxyCODONE-acetaminophen (PERCOCET) 7.5-325 MG per  tablet 1 tablet, 1 tablet, Oral, Q6H PRN, Kaylie Granados MD, 1 tablet at 03/04/18 0901  •  pantoprazole (PROTONIX) EC tablet 40 mg, 40 mg, Oral, Q AM, Rian Dean MD, 40 mg at 03/04/18 0547  •  Pharmacy to dose vancomycin, , Does not apply, Continuous PRN, Rian Dean MD  •  piperacillin-tazobactam (ZOSYN) 3.375 g/100 mL 0.9% NS IVPB (mbp), 3.375 g, Intravenous, Q8H, Rian Dean MD, Last Rate: 0 mL/hr at 03/02/18 1223, 3.375 g at 03/04/18 0839  •  piperacillin-tazobactam (ZOSYN) 4.5 g/100 mL 0.9% NS IVPB (mbp), 4.5 g, Intravenous, Once, Rafiq Irene MD, Stopped at 02/28/18 0030  •  potassium chloride (K-DUR,KLOR-CON) CR tablet 40 mEq, 40 mEq, Oral, PRN, 40 mEq at 03/02/18 2124 **OR** potassium chloride (KLOR-CON) packet 40 mEq, 40 mEq, Oral, PRN **OR** potassium chloride 10 mEq in 100 mL IVPB, 10 mEq, Intravenous, Q1H PRN, Kenya Marks MD  •  potassium phosphate 45 mmol in sodium chloride 0.9 % 500 mL infusion, 45 mmol, Intravenous, PRN **OR** potassium phosphate 30 mmol in sodium chloride 0.9 % 250 mL infusion, 30 mmol, Intravenous, PRN **OR** potassium phosphate 15 mmol in sodium chloride 0.9 % 100 mL infusion, 15 mmol, Intravenous, PRN, Last Rate: 25 mL/hr at 03/03/18 1155, 15 mmol at 03/03/18 1155 **OR** sodium phosphates 45 mmol in sodium chloride 0.9 % 500 mL IVPB, 45 mmol, Intravenous, PRN **OR** sodium phosphates 30 mmol in sodium chloride 0.9 % 250 mL IVPB, 30 mmol, Intravenous, PRN **OR** sodium phosphates 15 mmol in sodium chloride 0.9 % 250 mL IVPB, 15 mmol, Intravenous, PRN, Kenya Marks MD  •  Insert peripheral IV, , , Once **AND** sodium chloride 0.9 % flush 10 mL, 10 mL, Intravenous, PRN, Rafiq Irene MD, 10 mL at 03/03/18 0838  •  sodium chloride 0.9 % infusion, 30 mL/hr, Intravenous, Continuous, Marvin Ma MD, Last Rate: 30 mL/hr at 03/03/18 1040, 30 mL/hr at 03/03/18 1040  •  sucralfate (CARAFATE) tablet 1 g, 1 g, Oral, 4x Daily AC & at  Bedtime, Rian Dean MD, 1 g at 03/04/18 0814  •  vancomycin (VANCOCIN) IVPB 1000 mg in 250 mL NS, 1,000 mg, Intravenous, Q12H, Rian Dean MD, 1,000 mg at 03/04/18 0227      Assessment/Plan     1. Enterocutaneous, Rectocutaneous fistula:  Patient will be treated conservatively as he is not a surgical candidate.  Will continue bowel rest and TPN, likely will take 6-8 weeks to see if fistulas will heal.  Urology consulted, patient with chronic glasgow. No sign of enterovisical fistula  Admit WBC 12.05 now leukopenic which is his baseline, Blood pressures chronically low/NL  Continue Zosyn and Vanc Started 2/26, Dr. Robles (ID) consulted and he recommended continue Vanco and zosyn until 3/15/18.  Referral placed to [place]. Patient ok to take meds po with sips per Dr. Granados.      2. MRSA UTI with chronic glasgow catheter:  Urology will change catheter prior to discharge. Patient on IV Vancomycin till 3/15/18 per ID recs.       3. 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 right 1st MCP  On Colchicine and symptoms improving.  Apply diclofenac to affected areas  Will avoid intraarticular steroid injection due to active infection      4. Chronic Pancytopenia with MDS: HemeOnc consulted and patient transfused 2 units PRBCs today.   I spoke with Dr Rashid who felt overall counts were fairly stable.   Also revcieved 1 unit PRBC 2/28/18 here  Attempt to stagger AM labs as feasible to reduce risk of iatrogenic anemia  Monitor.      5. Chronic Protein calorie malnutrition from chronic illness  Nutrition following, patient now with PICC on TPN and will remain on TPN for 6-8 weeks per Dr. Granados secondary to #1 above. Monitoring Accuchecks while on TPN.      6. Chronic diastolic dysfunction, PAF, PSVT, Non-rheumatic aortic valve insufficiency, chronic RBBB, MR and AR: Patient no longer on any medications for these issues outpatient secondary to chronically low/NL BP and bradycardia. No acute issues here. Monitor.      7.  Hypophosphatemia, hypomagnesemia and hypokalemia: Will start electrolyte replacement protocols and Dr. Granados adjusting in TPN. Monitor.      8. H/O HTN: Patient for some time now has had low/NL BPs, I have noticed with initiation of TNP BP has become more elevated. Patient may need addition of BP medication prior to discharge and close monitoring for s/s of volume overload. Monitor closely.      9.  Situational Depression.  He does not want to go to [place].  He is very concerned about his wife.  Consult  Addie Oviedo       Plan for disposition:  Evaluation for [facility].    Barbara Turner DO  03/04/18  10:19 AM      Time: 20 min

## 2018-03-04 NOTE — PLAN OF CARE
Problem: Skin Integrity Impairment, Risk/Actual (Adult)  Goal: Skin Integrity/Wound Healing  Outcome: Ongoing (interventions implemented as appropriate)   03/04/18 0414   Skin Integrity Impairment, Risk/Actual (Adult)   Skin Integrity/Wound Healing making progress toward outcome

## 2018-03-04 NOTE — PROGRESS NOTES
I'll complaints.  He says his abdominal pain is stable.  He is afebrile vital signs are stable.  His fistula output is stable.  His hemoglobin is stable.  His electrolytes magnesium level were noted.  His TPN was adjusted again today.  We added more potassium more magnesium and added ascorbic acid.  Recheck phosphorus and magnesium CMP and CBC in the a.m.

## 2018-03-05 NOTE — DISCHARGE INSTR - APPOINTMENTS
Patient to follow-up on 4/11/2018@ 9:30 a.m.  Kaylie Granados MD  Surgeon in [place], [place]  Address: [address] [place] [address] [id number], [place] [address]  Phone: [phone]

## 2018-03-05 NOTE — DISCHARGE SUMMARY
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, w/ transfusion dependent anemia  Chronic Protein Total Calorie Malnutrition from chronic illness  Chronic Diastolic HF w/ RBBB, MR, AR      PCP  Patient Care Team:  David Cedillo MD as PCP - General (Family Medicine)  Anupam Green MD as Consulting Physician (Hematology and Oncology)  Bennie Galo MD as Referring Physician (Orthopedic Surgery)  Kaylie Granados MD as Surgeon (General Surgery)    Consults:   Consults     Date and Time Order Name Status Description    3/2/2018 1211 Inpatient Infectious Diseases Consult Completed     3/1/2018 1717 Hematology & Oncology Inpatient Consult Completed     2/27/2018 2337 Inpatient Urology Consult Completed     2/27/2018 2337 Inpatient General Surgery Consult Completed     2/27/2018 2328 Hospitalist (on-call MD unless specified) Completed           Operations and Procedures Performed:       Ct Abdomen Pelvis With Contrast    Result Date: 2/28/2018  Narrative: CT ABDOMEN PELVIS W CONTRAST-: 2/27/2018 10:34 PM  INDICATION: Diffuse abdominal pain and intermittent fever. Cutaneous fistula with draining incision. History of colovesicular fistula and diverticulitis.  TECHNIQUE: CT of the abdomen and pelvis with contrast. Coronal and sagittal reconstructions were obtained.  Radiation dose reduction techniques included automated exposure control or exposure modulation based on body size. Radiation audit for number of CT and nuclear cardiology exams performed in the last year: 5.   COMPARISON: CT abdomen pelvis 12/18/2017, 12/17/2017  11/15/2017.  FINDINGS: ABDOMEN: The aortic root is dilated measuring 4.7 cm at the sinus of Valsalva, unchanged. There are a few small pulmonary nodules in the lung bases are similar to the prior study. There is severe emphysema.  The solid abdominal organs are unchanged in enhancement. There is atrophy of the pancreas.. The gallbladder is surgically absent.   The small bowel is not dilated. The patient has had a subtotal colectomy. There is a diverting right lower quadrant ileostomy. A fistulous tract has developed into the anterior abdominal wall above the umbilicus. There is fluid tracking the left rectus abdominis muscle inferiorly. A fistulous tract communicates with the rectal stump and likely some of the adjacent small bowel. There is some enteric contrast within the fistulous tract and the rectal stump from the adjacent small bowel. Small bowel fistulous tract may involve the small bowel anastomosis or several loops of small bowel in the lower abdomen/pelvis that are slightly inflamed. The inflammatory change extends over the dome of the bladder. There is gas within the bladder wall potentially representing a enterovesicular or rectovesicular fistula.   PELVIS: A. Ayoub catheter decompresses the bladder. Diffuse bladder wall thickening is noted. No enlarged pelvic or inguinal lymph nodes. No loculated drainable fluid collections      No acute osseous abnormalities.      Impression:  1. Complex cutaneous fistula tracking through the anterior abdominal wall likely has a enterocutaneous and rectocutaneous component. 2. Air tracking within the bladder wall is likely secondary to fistulous involvement of the bladder as well.   Initial interpretation provided by Dr. Bassem Henderson at 23:19 on 02/27/2018.  This report was finalized on 2/28/2018 8:32 AM by Dr. Rafiq Blue MD.      Xr Chest 1 View    Result Date: 3/1/2018  Narrative: Chest x-ray  INDICATION: PICC placement today.  FINDINGS: AP portable chest is  compared 12/18/2017. Left arm approach PICC is in the lower SVC. Cardiomegaly is stable. Chronic opacities noted in the bases could reflect some scarring. No pneumothorax is seen.  A preliminary report was provided by Dr. Wong at 1753 hours on 02/28/2018.  This report was finalized on 3/1/2018 7:11 AM by Dr. Nabeel Cantu MD.        Allergies:  has No Known Allergies.    Song  reviewed    Discharge Medications:   Jason Zelaya   Home Medication Instructions TALI:[id number]    Printed on:03/05/18 9677   Medication Information                      acetaminophen (TYLENOL) 325 MG tablet  Take 2 tablets by mouth Every 6 (Six) Hours As Needed for Mild Pain .             colchicine 0.6 MG tablet  Take 1 tablet by mouth 2 (Two) Times a Day.             cyanocobalamin 1000 MCG/ML injection  Inject 1 mL into the shoulder, thigh, or buttocks Every 28 (Twenty-Eight) Days.             diclofenac (VOLTAREN) 1 % gel gel  Apply 2 g topically 4 (Four) Times a Day.             fat emulsion (INTRALIPID,LIPOSYN) 20 % infusion  Infuse 72 mL into a venous catheter Continuous for 1 day.             folic acid (FOLVITE) 400 MCG tablet  Take 400 mcg by mouth Daily.             hydrophor (AQUAPHOR) ointment ointment  Apply  topically Every 12 (Twelve) Hours.             lactobacillus acidophilus (RISAQUAD) capsule capsule  Take 1 capsule by mouth Daily.             magnesium oxide (MAGOX) 400 (241.3 Mg) MG tablet tablet  Take 1 tablet by mouth Daily.             Multiple Vitamins-Minerals (MULTIVITAMIN WITH MINERALS) tablet tablet  Take 1 tablet by mouth Daily.             nystatin (MYCOSTATIN) 439596 UNIT/GM powder  Apply  topically Every 12 (Twelve) Hours for 647 doses.             ondansetron (ZOFRAN) 4 MG tablet  Take 1 tablet by mouth Every 6 (Six) Hours As Needed for Nausea or Vomiting.             oxyCODONE-acetaminophen (PERCOCET) 7.5-325 MG per tablet  Take 1 tablet by mouth Every 4 (Four) Hours As Needed for Moderate  Pain  or Severe Pain .             pantoprazole (PROTONIX) 40 MG EC tablet  Take 1 tablet by mouth Daily.             piperacillin-tazobactam (ZOSYN) 3.375 g/100 mL 0.9% NS IVPB (mbp)  Infuse 100 mL into a venous catheter Every 8 (Eight) Hours for 21 doses. Indications: Infection Within the Abdomen             sodium chloride 0.9 % solution  Infuse 20 mL/hr into a venous catheter Continuous.             sucralfate (CARAFATE) 1 g tablet  Take 1 tablet by mouth 4 (Four) Times a Day Before Meals & at Bedtime.             TPN for discharge  See most recent TPN order.             vancomycin 1,000 mg in sodium chloride 0.9 % 250 mL IVPB  Infuse 1,000 mg into a venous catheter Every 12 (Twelve) Hours for 9 doses. Indications: Infection Within the Abdomen                 History of Present Illness:  83 yo WM w/ PMH of multiple co morbidities, who has has several long hospital courses in the 1-2 years. Initially underwent subtotal colectomy 1.5 years ago for GI Bleed for which he recovered after a prolonged hospital course. He was then readmitted in Dec 2017 for multiple enterovesical, rectovesical fistulas and underwent surgical revision, with a hospital course for the Dec admission complicated by multiple VRE & MRSA infections, along with the need for prolonged TPN. He was discharged to a rehab facility and was doing well until just prior to this admission on 2/27/2018.    Pt had been seen in Surgery follow up 2 weeks prior to admission, and per note from Dr. Granados, had a well healed abdominal scar midline and working ileostomy . Night of admission, pt had sudden onset N/v/ abdominal pain with greenish discharge from newly formed enterocutaneous & Rectocutaneous fistulas. Pt rapidly improved on Vanc and Zosyn. Surgery deemed non-surgical candidate, however, needs prolonged bowel rest to help promote healing/ decrease recurrence of fistula.      Hospital Course  Today's Exam:   CC: Fistula    Stated he was feeling better,  moderate pain in toes improved since start of colchicine but not resolved, no longer sensitive to touch. Knee pain stable.         A/P:    1. Enterocutaneous / Rectocutaneous Fistula  w abdominal infection of unknown organism    - Pt improved on Vanc and Zosyn.   - Urology ruled out recurrance of entreoviscular fistula.   - Pt is being transferred to [facility] for TPN for bowel rest.    - Continue Zosyn to complete course 3/15    2. ACUTE MRSA Cystitis associated with chronic indwelling Glasgow   - ID felt MRSA urine - culture represented - UTI in the setting of chronic glasgow.   - Continue vanc to complete course on 3/15  - Glasgow to be changed prior to discharge    3. Acute Gout of the Left 1st MTP/DIP and Rt 1st DIP  - Improved on Colchicine 0.6mg Daily, increasing to 0.6mg bid today  - Renal function reviewed    4. 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  - Recommend reducing lab draws to reduce iatrogenic worsening of underproliferative chronic anemia     5. Chronic Protein Total Calorie Malnutrition from chronic illness  - To [facility] for TPN    6. Chronic Diastolic HF w/ RBBB, MR, AR  - Not on medications due to low blood pressures and bradycardia      Last Lab Results:   Lab Results (most recent)     Procedure Component Value Units Date/Time    Lactic Acid, Plasma [[id number]]  (Normal) Collected:  02/27/18 2055    Specimen:  Blood Updated:  02/27/18 2117     Lactate 1.4 mmol/L     Urinalysis, Microscopic Only - Urine, Clean Catch [[id number]]  (Abnormal) Collected:  02/27/18 2055    Specimen:  Urine from Urine, Catheter Updated:  02/27/18 2117     RBC, UA None Seen /HPF      WBC, UA 31-50 (A) /HPF      Bacteria, UA 3+ (A) /HPF      Squamous Epithelial Cells, UA None Seen /HPF      Hyaline Casts, UA None Seen /LPF      Methodology Manual Light Microscopy    Urinalysis With / Culture If Indicated - Urine, Catheter [[id number]]  (Abnormal)  Collected:  02/27/18 2055    Specimen:  Urine from Urine, Catheter Updated:  02/27/18 2118     Color, UA Yellow     Appearance, UA Clear     pH, UA 6.0     Specific Gravity, UA 1.020     Glucose, UA Negative     Ketones, UA Negative     Bilirubin, UA Small (1+) (A)     Blood, UA Large (3+) (A)     Protein,  mg/dL (2+) (A)     Leuk Esterase, UA Moderate (2+) (A)     Nitrite, UA Negative     Urobilinogen, UA 0.2 E.U./dL    Influenza Antigen, Rapid - Swab, Nasopharynx [[id number]]  (Normal) Collected:  02/27/18 2055    Specimen:  Swab from Nasopharynx Updated:  02/27/18 2118     Influenza A Ag, EIA Negative     Influenza B Ag, EIA Negative    Lipase [[id number]]  (Abnormal) Collected:  02/27/18 2045    Specimen:  Blood Updated:  02/27/18 2121     Lipase 9 (L) U/L     Comprehensive Metabolic Panel [[id number]]  (Abnormal) Collected:  02/27/18 2045    Specimen:  Blood Updated:  02/27/18 2121     Glucose 107 (H) mg/dL      BUN 17 mg/dL      Creatinine 0.85 mg/dL      Sodium 130 (L) mmol/L      Potassium 3.9 mmol/L      Chloride 96 (L) mmol/L      CO2 19.1 (L) mmol/L      Calcium 8.9 mg/dL      Total Protein 8.0 g/dL      Albumin 3.10 (L) g/dL      ALT (SGPT) 13 U/L      AST (SGOT) 14 U/L      Alkaline Phosphatase 112 U/L      Total Bilirubin 0.8 mg/dL      eGFR Non African Amer 86 mL/min/1.73      Globulin 4.9 gm/dL      A/G Ratio 0.6 g/dL      BUN/Creatinine Ratio 20.0     Anion Gap 14.9 mmol/L     Narrative:       The MDRD GFR formula is only valid for adults with stable renal function between ages 18 and 70.    CBC & Differential [[id number]] Collected:  02/27/18 2045    Specimen:  Blood Updated:  02/27/18 2137    Narrative:       The following orders were created for panel order CBC & Differential.  Procedure                               Abnormality         Status                     ---------                               -----------         ------                     Scan Slide[[id number]]                                                                   CBC Auto Differential[[id number]]        Abnormal            Final result                 Please view results for these tests on the individual orders.    CBC Auto Differential [[id number]]  (Abnormal) Collected:  02/27/18 2045    Specimen:  Blood Updated:  02/27/18 2137     WBC 12.05 (H) 10*3/mm3      RBC 2.66 (L) 10*6/mm3      Hemoglobin 8.4 (L) g/dL      Hematocrit 25.5 (L) %      MCV 95.9 (H) fL      MCH 31.6 (H) pg      MCHC 32.9 g/dL      RDW 21.9 (H) %      RDW-SD 73.5 (H) fl      MPV 11.4 (H) fL      Platelets 110 (L) 10*3/mm3      Neutrophil % 67.9 %      Lymphocyte % 6.7 (L) %      Monocyte % 23.4 (H) %      Eosinophil % 0.0 %      Basophil % 0.1 %      Immature Grans % 1.9 (H) %      Neutrophils, Absolute 8.18 10*3/mm3      Lymphocytes, Absolute 0.81 10*3/mm3      Monocytes, Absolute 2.82 (H) 10*3/mm3      Eosinophils, Absolute 0.00 (L) 10*3/mm3      Basophils, Absolute 0.01 10*3/mm3      Immature Grans, Absolute 0.23 (H) 10*3/mm3      nRBC 0.0 /100 WBC     CBC & Differential [[id number]] Collected:  02/28/18 0519    Specimen:  Blood Updated:  02/28/18 0536    Narrative:       The following orders were created for panel order CBC & Differential.  Procedure                               Abnormality         Status                     ---------                               -----------         ------                     CBC Auto Differential[[id number]]        Abnormal            Final result                 Please view results for these tests on the individual orders.    CBC Auto Differential [[id number]]  (Abnormal) Collected:  02/28/18 0519    Specimen:  Blood Updated:  02/28/18 0536     WBC 11.18 (H) 10*3/mm3      RBC 2.09 (L) 10*6/mm3      Hemoglobin 6.6 (C) g/dL      Hematocrit 20.5 (C) %      MCV 98.1 (H) fL      MCH 31.6 (H) pg      MCHC 32.2 g/dL      RDW 21.7 (H) %      RDW-SD 73.5 (H) fl      MPV 11.0 (H) fL      Platelets 99 (L) 10*3/mm3      Neutrophil % 69.8 %       Lymphocyte % 9.5 (L) %      Monocyte % 18.4 (H) %      Eosinophil % 0.0 %      Basophil % 0.1 %      Immature Grans % 2.2 (H) %      Neutrophils, Absolute 7.80 10*3/mm3      Lymphocytes, Absolute 1.06 10*3/mm3      Monocytes, Absolute 2.06 (H) 10*3/mm3      Eosinophils, Absolute 0.00 (L) 10*3/mm3      Basophils, Absolute 0.01 10*3/mm3      Immature Grans, Absolute 0.25 (H) 10*3/mm3      nRBC 0.0 /100 WBC     Lactic Acid, Plasma [[id number]]  (Normal) Collected:  02/28/18 0519    Specimen:  Blood Updated:  02/28/18 0604     Lactate 1.1 mmol/L     Phosphorus [[id number]]  (Normal) Collected:  02/28/18 0518    Specimen:  Blood Updated:  02/28/18 0608     Phosphorus 3.4 mg/dL     Magnesium [[id number]]  (Abnormal) Collected:  02/28/18 0518    Specimen:  Blood Updated:  02/28/18 0608     Magnesium 1.5 (L) mg/dL     Comprehensive Metabolic Panel [[id number]]  (Abnormal) Collected:  02/28/18 0518    Specimen:  Blood Updated:  02/28/18 0608     Glucose 97 mg/dL      BUN 17 mg/dL      Creatinine 0.94 mg/dL      Sodium 134 (L) mmol/L      Potassium 4.1 mmol/L      Chloride 101 mmol/L      CO2 22.4 mmol/L      Calcium 8.2 (L) mg/dL      Total Protein 6.7 g/dL      Albumin 2.50 (L) g/dL      ALT (SGPT) 10 U/L      AST (SGOT) 11 U/L      Alkaline Phosphatase 87 U/L      Total Bilirubin 0.5 mg/dL      eGFR Non African Amer 76 mL/min/1.73      Globulin 4.2 gm/dL      A/G Ratio 0.6 g/dL      BUN/Creatinine Ratio 18.1     Anion Gap 10.6 mmol/L     Narrative:       The MDRD GFR formula is only valid for adults with stable renal function between ages 18 and 70.    Hemoglobin & Hematocrit, Blood [[id number]]  (Abnormal) Collected:  02/28/18 1540    Specimen:  Blood Updated:  02/28/18 1555     Hemoglobin 7.3 (C) g/dL      Hematocrit 22.8 (C) %     Prealbumin [[id number]]  (Abnormal) Collected:  02/28/18 1133    Specimen:  Blood Updated:  02/28/18 1751     Prealbumin 13.0 (L) mg/dL     CBC & Differential [[id number]] Collected:  03/01/18  0651    Specimen:  Blood Updated:  03/01/18 1055    Narrative:       The following orders were created for panel order CBC & Differential.  Procedure                               Abnormality         Status                     ---------                               -----------         ------                     CBC Auto Differential[[id number]]        Abnormal            Final result                 Please view results for these tests on the individual orders.    CBC Auto Differential [[id number]]  (Abnormal) Collected:  03/01/18 0651    Specimen:  Blood Updated:  03/01/18 1055     WBC 4.64 (L) 10*3/mm3      RBC 2.31 (L) 10*6/mm3      Hemoglobin 7.2 (C) g/dL      Hematocrit 22.4 (C) %      MCV 97.0 (H) fL      MCH 31.2 (H) pg      MCHC 32.1 g/dL      RDW 22.6 (H) %      RDW-SD 78.2 (H) fl      MPV 11.8 (H) fL      Platelets 99 (L) 10*3/mm3      Neutrophil % 59.9 %      Lymphocyte % 17.7 (L) %      Monocyte % 18.3 (H) %      Eosinophil % 0.0 %      Basophil % 0.0 %      Immature Grans % 4.1 (H) %      Neutrophils, Absolute 2.78 10*3/mm3      Lymphocytes, Absolute 0.82 10*3/mm3      Monocytes, Absolute 0.85 10*3/mm3      Eosinophils, Absolute 0.00 (L) 10*3/mm3      Basophils, Absolute 0.00 10*3/mm3      Immature Grans, Absolute 0.19 (H) 10*3/mm3      nRBC 0.0 /100 WBC     Comprehensive Metabolic Panel [[id number]]  (Abnormal) Collected:  03/01/18 0651    Specimen:  Blood Updated:  03/01/18 1127     Glucose 72 mg/dL      BUN 11 mg/dL      Creatinine 0.59 (L) mg/dL      Sodium 138 mmol/L      Potassium 4.5 mmol/L      Chloride 107 mmol/L      CO2 20.5 (L) mmol/L      Calcium 8.2 (L) mg/dL      Total Protein 6.4 g/dL      Albumin 2.40 (L) g/dL      ALT (SGPT) <5 (L) U/L      AST (SGOT) 22 U/L      Alkaline Phosphatase 98 U/L      Total Bilirubin 0.4 mg/dL      eGFR Non African Amer 131 mL/min/1.73      Globulin 4.0 gm/dL      A/G Ratio 0.6 g/dL      BUN/Creatinine Ratio 18.6     Anion Gap 10.5 mmol/L     Narrative:        The MDRD GFR formula is only valid for adults with stable renal function between ages 18 and 70.    POC Glucose Once [[id number]]  (Normal) Collected:  03/01/18 1800    Specimen:  Blood Updated:  03/01/18 1808     Glucose 130 mg/dL     Narrative:       Meter: [id number] : [id number] Preet Upton RN    POC Glucose Once [[id number]]  (Normal) Collected:  03/01/18 2359    Specimen:  Blood Updated:  03/02/18 0005     Glucose 129 mg/dL     Narrative:       Meter: [id number] : [id number] Rafael Banks RN    POC Glucose Once [[id number]]  (Normal) Collected:  03/02/18 0535    Specimen:  Blood Updated:  03/02/18 0542     Glucose 101 mg/dL     Narrative:       Meter: [id number] : [id number] Stacie Lazcano RN    Magnesium [[id number]]  (Abnormal) Collected:  03/02/18 0529    Specimen:  Blood Updated:  03/02/18 0620     Magnesium 1.6 (L) mg/dL     Phosphorus [[id number]]  (Abnormal) Collected:  03/02/18 0529    Specimen:  Blood Updated:  03/02/18 0620     Phosphorus 2.6 (L) mg/dL     Basic Metabolic Panel [[id number]]  (Abnormal) Collected:  03/02/18 0529    Specimen:  Blood Updated:  03/02/18 0623     Glucose 97 mg/dL      BUN 8 mg/dL      Creatinine 0.60 (L) mg/dL      Sodium 143 mmol/L      Potassium 3.1 (L) mmol/L      Chloride 112 (H) mmol/L      CO2 19.6 (L) mmol/L      Calcium 7.8 (L) mg/dL      eGFR Non African Amer 128 mL/min/1.73      BUN/Creatinine Ratio 13.3     Anion Gap 11.4 mmol/L     Narrative:       The MDRD GFR formula is only valid for adults with stable renal function between ages 18 and 70.    Urine Culture - Urine, Urine, Clean Catch [[id number]]  (Abnormal)  (Susceptibility) Collected:  02/27/18 2055    Specimen:  Urine from Urine, Catheter Updated:  03/02/18 0632     Urine Culture --      >100,000 CFU/mL Staphylococcus aureus, MRSA (A)        Methicillin resistant Staph aureus, patient may be an isolation risk.       Susceptibility      Staphylococcus aureus, MRSA     JULISA     Nitrofurantoin <=16  ug/ml Susceptible     Oxacillin >=4 ug/ml Resistant     Penicillin G >=0.5 ug/ml Resistant     Rifampin <=0.5 ug/ml Susceptible     Tetracycline <=1 ug/ml Susceptible     Trimethoprim + Sulfamethoxazole <=10 ug/ml Susceptible     Vancomycin 1 ug/ml Susceptible                    POC Glucose Once [[id number]]  (Normal) Collected:  03/02/18 1210    Specimen:  Blood Updated:  03/02/18 1221     Glucose 121 mg/dL     Narrative:       Meter: [id number] : [id number] Soumya Cheng RN    CBC Auto Differential [[id number]]  (Abnormal) Collected:  03/02/18 1200    Specimen:  Blood Updated:  03/02/18 1226     WBC 3.51 (L) 10*3/mm3      RBC 2.30 (L) 10*6/mm3      Hemoglobin 7.1 (C) g/dL      Hematocrit 22.3 (C) %      MCV 97.0 (H) fL      MCH 30.9 pg      MCHC 31.8 g/dL      RDW 22.6 (H) %      RDW-SD 77.2 (H) fl      MPV 11.5 (H) fL      Platelets 102 (L) 10*3/mm3      Neutrophil % 51.2 %      Lymphocyte % 23.4 %      Monocyte % 19.4 (H) %      Eosinophil % 0.0 %      Basophil % 0.3 %      Immature Grans % 5.7 (H) %      Neutrophils, Absolute 1.80 10*3/mm3      Lymphocytes, Absolute 0.82 10*3/mm3      Monocytes, Absolute 0.68 10*3/mm3      Eosinophils, Absolute 0.00 (L) 10*3/mm3      Basophils, Absolute 0.01 10*3/mm3      Immature Grans, Absolute 0.20 (H) 10*3/mm3      nRBC 0.0 /100 WBC     CBC & Differential [[id number]] Collected:  03/02/18 1200    Specimen:  Blood Updated:  03/02/18 1227    Narrative:       The following orders were created for panel order CBC & Differential.  Procedure                               Abnormality         Status                     ---------                               -----------         ------                     Scan Slide[[id number]]                                                                  CBC Auto Differential[[id number]]        Abnormal            Final result                 Please view results for these tests on the individual orders.    POC Glucose Once [[id number]]  (Normal)  Collected:  03/02/18 1830    Specimen:  Blood Updated:  03/02/18 1841     Glucose 123 mg/dL     Narrative:       Meter: [id number] : [id number] Soumya Cheng RN    POC Glucose Once [[id number]]  (Abnormal) Collected:  03/03/18 0036    Specimen:  Blood Updated:  03/03/18 0042     Glucose 132 (H) mg/dL     Narrative:       Meter: [id number] : [id number] Stacie Lazcano RN    Vancomycin, Random [[id number]]  (Normal) Collected:  03/03/18 0030    Specimen:  Blood Updated:  03/03/18 0100     Vancomycin Random 9.50 mcg/mL     CBC Auto Differential [[id number]]  (Abnormal) Collected:  03/03/18 0626    Specimen:  Blood Updated:  03/03/18 0658     WBC 4.05 (L) 10*3/mm3      RBC 2.31 (L) 10*6/mm3      Hemoglobin 7.2 (C) g/dL      Hematocrit 22.3 (C) %      MCV 96.5 (H) fL      MCH 31.2 (H) pg      MCHC 32.3 g/dL      RDW 22.7 (H) %      RDW-SD 78.4 (H) fl      MPV 11.3 (H) fL      Platelets 102 (L) 10*3/mm3      Neutrophil % 48.7 %      Lymphocyte % 25.2 %      Monocyte % 20.7 (H) %      Eosinophil % 0.2 %      Basophil % 0.0 %      Immature Grans % 5.2 (H) %      Neutrophils, Absolute 1.97 10*3/mm3      Lymphocytes, Absolute 1.02 10*3/mm3      Monocytes, Absolute 0.84 10*3/mm3      Eosinophils, Absolute 0.01 (L) 10*3/mm3      Basophils, Absolute 0.00 10*3/mm3      Immature Grans, Absolute 0.21 (H) 10*3/mm3      nRBC 0.0 /100 WBC     CBC & Differential [[id number]] Collected:  03/03/18 0626    Specimen:  Blood Updated:  03/03/18 0658    Narrative:       The following orders were created for panel order CBC & Differential.  Procedure                               Abnormality         Status                     ---------                               -----------         ------                     Scan Slide[[id number]]                                                                  CBC Auto Differential[[id number]]        Abnormal            Final result                 Please view results for these tests on the individual orders.     Basic Metabolic Panel [[id number]]  (Abnormal) Collected:  03/03/18 0626    Specimen:  Blood Updated:  03/03/18 0700     Glucose 108 (H) mg/dL      BUN 6 (L) mg/dL      Creatinine 0.48 (L) mg/dL      Sodium 138 mmol/L      Potassium 3.8 mmol/L      Chloride 110 (H) mmol/L      CO2 19.3 (L) mmol/L      Calcium 7.9 (L) mg/dL      eGFR Non African Amer >150 mL/min/1.73      BUN/Creatinine Ratio 12.5     Anion Gap 8.7 mmol/L     Narrative:       The MDRD GFR formula is only valid for adults with stable renal function between ages 18 and 70.    Magnesium [[id number]]  (Normal) Collected:  03/03/18 0626    Specimen:  Blood Updated:  03/03/18 0700     Magnesium 1.9 mg/dL     Phosphorus [[id number]]  (Abnormal) Collected:  03/03/18 0626    Specimen:  Blood Updated:  03/03/18 0700     Phosphorus 2.3 (L) mg/dL     POC Glucose Once [[id number]]  (Normal) Collected:  03/03/18 0634    Specimen:  Blood Updated:  03/03/18 0706     Glucose 111 mg/dL     Narrative:       Meter: [id number] : [id number] Stacie Lazcano RN    POC Glucose Once [[id number]]  (Normal) Collected:  03/03/18 1124    Specimen:  Blood Updated:  03/03/18 1134     Glucose 110 mg/dL     Narrative:       Meter: [id number] : [id number] Preet Upton RN    POC Glucose Once [[id number]]  (Normal) Collected:  03/03/18 1838    Specimen:  Blood Updated:  03/03/18 1854     Glucose 118 mg/dL     Narrative:       Meter: [id number] : [id number] Preet Upton RN    POC Glucose Once [[id number]]  (Normal) Collected:  03/04/18 0556    Specimen:  Blood Updated:  03/04/18 0603     Glucose 116 mg/dL     Narrative:       Meter: [id number] : [id number] Stacie Lazcano RN    Comprehensive Metabolic Panel [[id number]]  (Abnormal) Collected:  03/04/18 0549    Specimen:  Blood Updated:  03/04/18 0640     Glucose 117 (H) mg/dL      BUN 7 (L) mg/dL      Creatinine 0.47 (L) mg/dL      Sodium 140 mmol/L      Potassium 3.6 mmol/L      Chloride 110 (H) mmol/L      CO2 22.7 mmol/L       Calcium 7.9 (L) mg/dL      Total Protein 6.1 g/dL      Albumin 2.30 (L) g/dL      ALT (SGPT) 10 U/L      AST (SGOT) 12 U/L      Alkaline Phosphatase 82 U/L      Total Bilirubin 0.4 mg/dL      eGFR Non African Amer >150 mL/min/1.73      Globulin 3.8 gm/dL      A/G Ratio 0.6 g/dL      BUN/Creatinine Ratio 14.9     Anion Gap 7.3 mmol/L     Narrative:       The MDRD GFR formula is only valid for adults with stable renal function between ages 18 and 70.    Magnesium [[id number]]  (Normal) Collected:  03/04/18 0549    Specimen:  Blood Updated:  03/04/18 0640     Magnesium 1.7 mg/dL     CBC Auto Differential [[id number]]  (Abnormal) Collected:  03/04/18 0915    Specimen:  Blood Updated:  03/04/18 0932     WBC 3.74 (L) 10*3/mm3      RBC 2.31 (L) 10*6/mm3      Hemoglobin 7.3 (C) g/dL      Hematocrit 23.1 (C) %      .0 (H) fL      MCH 31.6 (H) pg      MCHC 31.6 g/dL      RDW 22.6 (H) %      RDW-SD 78.6 (H) fl      MPV 11.5 (H) fL      Platelets 108 (L) 10*3/mm3      Neutrophil % 48.1 %      Lymphocyte % 23.5 %      Monocyte % 24.1 (H) %      Eosinophil % 0.3 %      Basophil % 0.3 %      Immature Grans % 3.7 (H) %      Neutrophils, Absolute 1.80 10*3/mm3      Lymphocytes, Absolute 0.88 10*3/mm3      Monocytes, Absolute 0.90 10*3/mm3      Eosinophils, Absolute 0.01 (L) 10*3/mm3      Basophils, Absolute 0.01 10*3/mm3      Immature Grans, Absolute 0.14 (H) 10*3/mm3      nRBC 0.0 /100 WBC     CBC & Differential [[id number]] Collected:  03/04/18 0915    Specimen:  Blood Updated:  03/04/18 0932    Narrative:       The following orders were created for panel order CBC & Differential.  Procedure                               Abnormality         Status                     ---------                               -----------         ------                     Scan Slide[[id number]]                                                                  CBC Auto Differential[[id number]]        Abnormal            Final result                  Please view results for these tests on the individual orders.    POC Glucose Once [[id number]]  (Normal) Collected:  03/04/18 1206    Specimen:  Blood Updated:  03/04/18 1227     Glucose 111 mg/dL     Narrative:       Meter: [id number] : [id number] Soumya Cheng RN    Vancomycin, Random [[id number]]  (Normal) Collected:  03/04/18 1158    Specimen:  Blood Updated:  03/04/18 1248     Vancomycin Random 20.60 mcg/mL     POC Glucose Once [[id number]]  (Normal) Collected:  03/04/18 1807    Specimen:  Blood Updated:  03/04/18 1816     Glucose 78 mg/dL     Narrative:       Meter: [id number] : [id number] Soumya Cheng RN    Potassium [[id number]]  (Normal) Collected:  03/04/18 2226    Specimen:  Blood Updated:  03/04/18 2258     Potassium 4.4 mmol/L     POC Glucose Once [[id number]]  (Normal) Collected:  03/05/18 0620    Specimen:  Blood Updated:  03/05/18 0628     Glucose 104 mg/dL     Narrative:       Meter: [id number] : [id number] Stacie Lazcano RN    Comprehensive Metabolic Panel [[id number]]  (Abnormal) Collected:  03/05/18 0616    Specimen:  Blood Updated:  03/05/18 0751     Glucose 105 (H) mg/dL      BUN 11 mg/dL      Creatinine 0.51 (L) mg/dL      Sodium 140 mmol/L      Potassium 4.2 mmol/L      Chloride 108 (H) mmol/L      CO2 25.1 mmol/L      Calcium 8.2 (L) mg/dL      Total Protein 6.4 g/dL      Albumin 2.50 (L) g/dL      ALT (SGPT) 10 U/L      AST (SGOT) 12 U/L      Alkaline Phosphatase 82 U/L      Total Bilirubin 0.3 mg/dL      eGFR Non African Amer >150 mL/min/1.73      Globulin 3.9 gm/dL      A/G Ratio 0.6 g/dL      BUN/Creatinine Ratio 21.6     Anion Gap 6.9 mmol/L     Narrative:       The MDRD GFR formula is only valid for adults with stable renal function between ages 18 and 70.    Magnesium [[id number]]  (Normal) Collected:  03/05/18 0616    Specimen:  Blood Updated:  03/05/18 0751     Magnesium 2.2 mg/dL     Phosphorus [[id number]]  (Normal) Collected:  03/05/18 0616    Specimen:  Blood Updated:  03/05/18  0752     Phosphorus 4.0 mg/dL     CBC (No Diff) [[id number]]  (Abnormal) Collected:  03/05/18 0616    Specimen:  Blood Updated:  03/05/18 0755     WBC 3.57 (L) 10*3/mm3      RBC 2.45 (L) 10*6/mm3      Hemoglobin 7.6 (L) g/dL      Hematocrit 23.3 (C) %      MCV 95.1 (H) fL      MCH 31.0 pg      MCHC 32.6 g/dL      RDW 21.9 (H) %      RDW-SD 73.3 (H) fl      MPV 11.2 (H) fL      Platelets 107 (L) 10*3/mm3     POC Glucose Once [[id number]]  (Normal) Collected:  03/05/18 1225    Specimen:  Blood Updated:  03/05/18 1241     Glucose 85 mg/dL     Narrative:       Meter: [id number] : [id number] Hermila Em RN    Vancomycin, Random [[id number]]  (Normal) Collected:  03/05/18 1226    Specimen:  Blood Updated:  03/05/18 1412     Vancomycin Random 22.10 mcg/mL     C-reactive Protein [[id number]]  (Abnormal) Collected:  03/05/18 1226    Specimen:  Blood Updated:  03/05/18 1429     C-Reactive Protein 1.15 (H) mg/dL     Prealbumin [[id number]]  (Normal) Collected:  03/05/18 1226    Specimen:  Blood Updated:  03/05/18 1430     Prealbumin 21.0 mg/dL     Calcium, Ionized [[id number]] Collected:  03/05/18 1430    Specimen:  Blood Updated:  03/05/18 1436     Ionized Calcium 4.78 mg/dL      Collected by lab        Imaging Results (most recent)     Procedure Component Value Units Date/Time    CT Abdomen Pelvis With Contrast [[id number]] Collected:  02/28/18 0824     Updated:  02/28/18 0834    Narrative:       CT ABDOMEN PELVIS W CONTRAST-: 2/27/2018 10:34 PM     INDICATION:   Diffuse abdominal pain and intermittent fever. 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     TECHNIQUE:   CT of the abdomen and pelvis with contrast. Coronal and sagittal  reconstructions were obtained.  Radiation dose reduction techniques  included automated exposure control or exposure modulation based on body  size. Radiation audit for number of CT and nuclear cardiology exams  performed in the last year:  5.       COMPARISON:   CT abdomen pelvis 12/18/2017, 12/17/2017 11/15/2017.     FINDINGS:  ABDOMEN:   The aortic root is dilated measuring 4.7 cm at the sinus of Valsalva,  unchanged. There are a few small pulmonary nodules in the lung bases are  similar to the prior study. There is severe emphysema.     The solid abdominal organs are unchanged in enhancement. There is  atrophy of the pancreas.. The gallbladder is surgically absent.       The small bowel is not dilated. The patient has had a subtotal  colectomy. There is a diverting right lower quadrant ileostomy. A  fistulous tract has developed into the anterior abdominal wall above the  umbilicus. There is fluid tracking the left rectus abdominis muscle  inferiorly. A fistulous tract communicates with the rectal stump and  likely some of the adjacent small bowel. There is some enteric contrast  within the fistulous tract and the rectal stump from the adjacent small  bowel. Small bowel fistulous tract may involve the small bowel  anastomosis or several loops of small bowel in the lower abdomen/pelvis  that are slightly inflamed. The inflammatory change extends over the  dome of the bladder. There is gas within the bladder wall potentially  representing a enterovesicular or rectovesicular fistula.       PELVIS:   A. Ayoub catheter decompresses the bladder. Diffuse bladder wall  thickening is noted. No enlarged pelvic or inguinal lymph nodes. No  loculated drainable fluid collections          No acute osseous abnormalities.       Impression:          1. 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  2. Air tracking within the bladder wall is likely secondary to fistulous  involvement of the bladder as well.         Initial interpretation provided by Dr. Bassem Henderson at 23:19 on  02/27/2018.     This report was finalized on 2/28/2018 8:32 AM by Dr. Rafiq Blue MD.       XR Chest 1  View [[id number]] Collected:  03/01/18 0709     Updated:  03/01/18 0713    Narrative:       Chest x-ray     INDICATION: PICC placement today.     FINDINGS: AP portable chest is compared 12/18/2017. Left arm approach  PICC is in the lower SVC. Cardiomegaly is stable. Chronic opacities  noted in the bases could reflect some scarring. No pneumothorax is seen.     A preliminary report was provided by Dr. Wong at 1753 hours on  02/28/2018.     This report was finalized on 3/1/2018 7:11 AM by Dr. Nabeel Cantu MD.             PROCEDURES      Condition on Discharge:  Good    Physical Exam at Discharge  Vital Signs  Temp:  [97.4 °F (36.3 °C)-98.3 °F (36.8 °C)] 98.2 °F (36.8 °C)  Heart Rate:  [50-57] 57  Resp:  [18-20] 18  BP: (145-152)/(66-74) 152/73    Physical Exam:  EXAM:  Temp:  [97.4 °F (36.3 °C)-98.3 °F (36.8 °C)] 98.2 °F (36.8 °C)  GEN:Laying comfortably in bed, wife at bedside, NAD  HEAD: Normal cephalic, atraumatic, PERRLA  NECK: Trachea midline, no JVD  LUNGS: CTAB, no wheezes, ronchi  HEART: RRR, no m/g/r  GI: soft, scaphoid, ntnd  MSK: lft 1st MTP resolution of erythema since last exam, 1st lft DIP continued erythema w/ healing ulceration of skin overlying joint, no erythema of the rt mtp, mild erythema of 1st rt DIP  NEURO: A0x3, EOMI  Pysch: Euthymic mood, full affect  Skin: pink, warm to palpation    Discharge Disposition  To [facility]    Visiting Nurse:    NO    Home PT/OT:  NO    Home Safety Evaluation:  NO    DME  NO    Discharge Diet:           Dietary Orders            Start     Ordered    03/01/18 1350  NPO Diet  Diet Effective Now     Comments:  Except for meds    03/01/18 1350        TPN     Activity at Discharge:  As tolerated    Pre-discharge education  Wound Care      Follow-up Appointments  Future Appointments  Date Time Provider Department Center   4/17/2018 1:20 PM LAB CHAIR [facility]  [facility]   4/17/2018 2:00 PM Anupam Green MD [facility]  Instructions for the Follow-ups that You Need to Schedule     Discharge Follow-up with Specialty: Dr. Granados; 1 Month    As directed    Specialty:  Dr. Granados    Follow Up:  1 Month                     Test Results Pending at Discharge  None     Paola Eastman MD  03/05/18  2:40 PM    Time: Discharge 30 min (if over 30 minutes give explanation as to why it took greater than 30 minutes)

## 2018-03-05 NOTE — NURSING NOTE
Continued Stay Note  [facility]     Patient Name: Jason Zelaya  MRN: [id number]  Today's Date: 3/5/2018    Admit Date: 2/27/2018          Discharge Plan       03/05/18 1356    Case Management/Social Work Plan    Plan plan  [facility]    Additional Comments Rec'd call from Alexsandra/[facility] who states she has rec'd insurance precert for admission. She will finalize admission time, but can accept patient today. Rui APRN updated. Will follow through [place].              Discharge Codes     None            Bigg Renteria RN

## 2018-03-05 NOTE — NURSING NOTE
03/05/18 1221   Wound 02/28/18 1010 midline abdomen fistula   Date first assessed/Time first assessed: 02/28/18 1010   Orientation: midline  Location: abdomen  Type: (c) fistula   Wound WDL ex   Dressing Appearance other (see comments)  (2 piece ostomy pouch intact, no leakage)   Base unable to visualize   Periwound Area dry;pink;intact   Dressing Dressing changed;other (see comments)  (ostomy appliance changed)   Ileostomy 12/21/17 1608 ileostomy   Placement Date/Time: 12/21/17 1608   Ileostomy Type: ileostomy   Stoma Appearance round;red;moist;protruding above skin level   Appliance 2-piece;drainable pouch;intact;no leakage;changed;per protocol/policy   Accessories/Skin Care cleansed with water;skin barrier powder;skin barrier ring;skin sealant   Stoma Function stool   Stool Color brown   Peristomal Skin dry;intact   Tolerance no signs/symptoms of discomfort     CWON consult follow up.  Both pouching systems to abdominal fistula and to ileostomy were changed today without any difficulties.  Peristomal and cheli wound skin under flange is pink, dry, and intact.  Patient to be discharged to [place] within next day or two. Discussed with HAILE Lazar.

## 2018-03-05 NOTE — PLAN OF CARE
Problem: Patient Care Overview (Adult)  Goal: Plan of Care Review  Outcome: Ongoing (interventions implemented as appropriate)   03/05/18 1821   Coping/Psychosocial Response Interventions   Plan Of Care Reviewed With patient   Patient Care Overview   Progress no change   Outcome Evaluation   Outcome Summary/Follow up Plan Pt is ready for discharge to [place]. VSS. Remains on TPN/lipids/IV abx. Ayoub changed today. Minimal pain- received percocet x1 during this shift. He is to transfer to [place] after 20:00 this evening.       Problem: Infection, Risk/Actual (Adult)  Goal: Infection Prevention/Resolution  Outcome: Ongoing (interventions implemented as appropriate)      Problem: Fall Risk (Adult)  Goal: Absence of Falls  Outcome: Ongoing (interventions implemented as appropriate)      Problem: Skin Integrity Impairment, Risk/Actual (Adult)  Goal: Skin Integrity/Wound Healing  Outcome: Ongoing (interventions implemented as appropriate)      Problem: Pressure Ulcer Risk (Binh Scale) (Adult,Obstetrics,Pediatric)  Goal: Skin Integrity  Outcome: Ongoing (interventions implemented as appropriate)      Problem: Grieving (Adult)  Goal: Knowledge of Grief and Grieving  Outcome: Ongoing (interventions implemented as appropriate)

## 2018-03-05 NOTE — PROGRESS NOTES
The patient has been discharged today, is awaiting transfer to [place] where he will continue on TPN, bowel rest, and IV Zosyn (through 3/15/18).  Length of stay at [place] is somewhat unclear, will need to improve nutritional status, heal enterocutaneous and rectal cutaneous fistulas.  In regards to the patient's MDS, his counts have been relatively stable, CBC today with WBC 3.57, platelet count 107,000.  He recently has not been neutropenic.  He has had a hemoglobin in the 7-9 range which is multifactorial related to MDS and anemia of chronic disease.  There was discussion regarding administration of further Procrit (has not been received from what I can see since December 2017 at 20,000 units) however that likely will not be possible at [place].  Would recommend further transfusion support if his hemoglobin declines into the low 7 or certainly into the 6 range if he is symptomatic.  Hemoglobin today did improved to 7.6, will not pursue transfusion currently.  I reviewed this with his wife and she will discuss monitoring the patient's CBC with the staff at [place].  If there are any questions they can contact our office.  She is aware that when he is discharged from [place] he is to reestablish care in our office for weekly CBC monitoring.

## 2018-03-05 NOTE — NURSING NOTE
Glasgow catheter present on admission was removed and replaced with a clean 16 [place] glasgow per urology.

## 2018-03-05 NOTE — PROGRESS NOTES
Adult Nutrition  Assessment/PES    Patient Name:  Jason Zelaya  YOB: 1934  MRN: [id number]  Admit Date:  2/27/2018    Assessment Date:  3/5/2018    Comments:  Continue TPN/IL standard Clin[facility]x with IL to provide for nutrition needs.  Currently meeting 95% kcal, 91% pro needs. Will cont to follow and monitor.           Reason for Assessment       03/05/18 1339    Reason for Assessment    Reason For Assessment/Visit follow up protocol    Gastrointestinal Fistula    Other diagnosis severe protein calorie malnutrition (chronic), myelodysplastic syndrome, EC fistulas            Data Eval/ Notes       03/05/18 1340     Notes    Note Spoke w pt and wife. Missing eating sweets the most. Reports no huger. Pt and wife familiar with IV nutrition.                Labs/Tests/Procedures/Meds       03/05/18 1341    Labs/Tests/Procedures/Meds    Diagnostic Test/Procedure Review reviewed    Labs/Tests Review Reviewed;Mg++;Phos    Medication Review Reviewed, pertinent;Multivitamin;Other (comment);MgSO4   risaquad              Estimated/Assessed Needs       03/05/18 1342    Estimated/Assessed Energy Needs    Estimated Kcal Range  --   needs remain appropraite             Nutrition Prescription Ordered       03/05/18 1342    Nutrition Prescription PO    Current PO Diet NPO    Nutrition Prescription PN    PN Current Rate (mL/hr) 73.8 mL/hr    Dextrose Concentration (%) 20 %    Dextrose (Kcal) 1204    Amino Acid Concentration (%) 5 %    Amino Acid (gm) 88.5    Lipid Frequency Daily   14.4 gm = 144 kcal             Evaluation of Received Nutrient/Fluid Intake       03/05/18 1343    Evaluation of Received Nutrient/Fluid Intake    Nutrition Delivered Fluid Evaluation    Fluid Intake Evaluation    IV Fluid (mL) 1120    Total Fluid Intake (mL) 1120    % Fluid Needs 82    PO Evaluation    Number of Days PO Intake Evaluated Insufficient Data    PN Evaluation    Number of Days PN Evaluated 3 days    PN  Average delivery (mL/day)  1129 mL/day            Problem/Interventions:          Problem 2       03/05/18 1345    Nutrition Diagnoses Problem 2    Problem 2 Malnutrition    Etiology (related to) Medical Diagnosis    Signs/Symptoms (evidenced by) Unintended Weight Change;Report/Observation    BMI 17 - 17.9    Unintended Weight Change Loss    Number of Pounds Lost 24    Weight loss time period March 2017                  Intervention Goal       03/05/18 1345    Intervention Goal    TF/PN Deliver estimated need (%)    Deliver % of Estimated Need 80 %   at least 80%     Weight No significant weight loss            Nutrition Intervention       03/05/18 1345    Nutrition Intervention    RD/Tech Action Follow Tx progress            Nutrition Prescription       03/05/18 1346    Nutrition Prescription PN    Parenteral Prescription Continue same protocal            Education/Evaluation       03/05/18 1346    Education    Education Other (comment)   No edu needs, pt and wife aware of TPN/NPO. Pt does have hard candy at bedside bc doesn't like mouth swabs, he said RN told him was ok.     Monitor/Evaluation    Monitor Per protocol;I&O;Pertinent labs;PN delivery/tolerance;Weight;Symptoms   nutrition support likely d/c nurition need, noted eval for Kinded possible        Electronically signed by:  Roseanne Clayton RD  03/05/18 1:47 PM

## 2018-03-05 NOTE — PLAN OF CARE
Problem: Grieving (Adult)  Goal: Identify Related Risk Factors and Signs and Symptoms  Outcome: Ongoing (interventions implemented as appropriate)   03/04/18 1930   Grieving   Related Risk Factors (Grieving) terminal illness;future plans change   Signs and Symptoms (Grieving) affective reactions (e.g., fear, sadness, hopelessness, anxiety)  (pt very depressed about having to go to [facility]. )

## 2018-03-05 NOTE — PLAN OF CARE
Problem: Grieving (Adult)  Goal: Knowledge of Grief and Grieving  Outcome: Ongoing (interventions implemented as appropriate)   03/05/18 0656   Grieving (Adult)   Knowledge of Grief and Grieving making progress toward outcome

## 2018-03-05 NOTE — NURSING NOTE
Attempted to call report to [facility] at 1530. RN states that they cannot take report until after 19:30.

## 2018-03-05 NOTE — NURSING NOTE
Continued Stay Note  RENATA Wheatley     Patient Name: Jason Zelaya  MRN: [id number]  Today's Date: 3/5/2018    Admit Date: 2/27/2018          Discharge Plan       03/05/18 1509    Case Management/Social Work Plan    Plan plan [facility]    Patient/Family In Agreement With Plan yes    Additional Comments Spoke with patient/family at bedside. IMM updated. Informed precert approved for [facility] and will dc to them this evening. Patient and family agreeable to plan. Nagi BE updated. Facility is [address] Room [address]. CALL REPORT [phone]. Will follow through dc.      03/05/18 1356    Case Management/Social Work Plan    Plan plan  [facility]    Additional Comments Rec'd call from Alexsandra/[facility] who states she has rec'd insurance precert for admission. She will finalize admission time, but can accept patient today. Rui ALEXANDER updated. Will follow through dc.              Discharge Codes     None        Expected Discharge Date and Time     Expected Discharge Date Expected Discharge Time    Mar 5, 2018             Bigg Renteria RN

## 2018-03-05 NOTE — PLAN OF CARE
Problem: Grieving (Adult)  Goal: Identify Related Risk Factors and Signs and Symptoms  Outcome: Outcome(s) achieved Date Met: 03/05/18    Goal: Knowledge of Grief and Grieving  Outcome: Ongoing (interventions implemented as appropriate)

## 2018-03-05 NOTE — PLAN OF CARE
Problem: Patient Care Overview (Adult)  Goal: Plan of Care Review   03/04/18 1920   Coping/Psychosocial Response Interventions   Plan Of Care Reviewed With patient;spouse   Patient Care Overview   Progress progress towards functional goals is fair   Outcome Evaluation   Outcome Summary/Follow up Plan Fair day today, pt had lots of visitors, has been feeling down, does not want to go to [facility]. Worried about his wife going downtown. States that facility used to be a hosp and he was born there, he does not want to die there too. Very sad, tearful, offered sympathy/active listening. IVF cont NS at 20cc/hr per PICC. TPN now is 73.8, and Lipids are infusing at 10.5. RA and sats are good. HR remains caleb. with no symptoms. Skin is getting better. He is on a reg bed with waffle per pt, Bottom is red, scrotum red, nystatin powder applied and healing. pt voiced feels better, ileostomy cont to drain 200cc brown liq today when emptied. Fistula cont to drain per ostomy bag and 15ml of brown liq noted today. Hgb was 7.3 today, Mag was 1.7 and 2 gm was infused, Potassium was 3.6 and 40 meq x2 admisistered per protocol. Remains NPO except meds, was up in the chair today, for 1.5 hours. spiritual consult was called. To recheck labs in the am. Contact Isolation cont MRSA +       Problem: Infection, Risk/Actual (Adult)  Goal: Infection Prevention/Resolution  Outcome: Ongoing (interventions implemented as appropriate)      Problem: Skin Integrity Impairment, Risk/Actual (Adult)  Goal: Skin Integrity/Wound Healing  Outcome: Ongoing (interventions implemented as appropriate)      Problem: Pressure Ulcer Risk (Binh Scale) (Adult,Obstetrics,Pediatric)  Goal: Skin Integrity  Outcome: Ongoing (interventions implemented as appropriate)

## 2018-03-05 NOTE — PROGRESS NOTES
General Surgery Progress Note      Chief Complaint:  Follow up Enterocutaneous and rectocutaneous fistula    Interval History: No acute overnight events.  Reports abdominal pain is about the same.  Denies nausea vomiting.  Was seen by hematology.  Fistula output less than 500 cc in 24 hours    Objective     Vital Signs  Temp:  [97.4 °F (36.3 °C)-98.3 °F (36.8 °C)] 98.2 °F (36.8 °C)  Heart Rate:  [50-59] 57  Resp:  [16-20] 18  BP: (145-153)/(66-79) 152/73  Body mass index is 17.32 kg/(m^2).    Intake/Output Summary (Last 24 hours) at 03/05/18 0827  Last data filed at 03/05/18 0610   Gross per 24 hour   Intake             1556 ml   Output             2865 ml   Net            -1309 ml             Physical Exam:   General: patient awake, alert and cooperative    Cardiovascular: regular rhythm and rate, no murmurs auscultated   Pulm: clear to auscultation bilaterally, regular and unlabored   Abdomen: soft, nontender, nondistended; normal bowel sounds,Right lower quadrant ileostomy viable and functioning   Extremities: no rash or edema   Neurologic: Normal mood and behavior     Results Review:     I reviewed the patient's new clinical results.    Lab Results (last 24 hours)     Procedure Component Value Units Date/Time    CBC Auto Differential [[id number]]  (Abnormal) Collected:  03/04/18 0915    Specimen:  Blood Updated:  03/04/18 0932     WBC 3.74 (L) 10*3/mm3      RBC 2.31 (L) 10*6/mm3      Hemoglobin 7.3 (C) g/dL      Hematocrit 23.1 (C) %      .0 (H) fL      MCH 31.6 (H) pg      MCHC 31.6 g/dL      RDW 22.6 (H) %      RDW-SD 78.6 (H) fl      MPV 11.5 (H) fL      Platelets 108 (L) 10*3/mm3      Neutrophil % 48.1 %      Lymphocyte % 23.5 %      Monocyte % 24.1 (H) %      Eosinophil % 0.3 %      Basophil % 0.3 %      Immature Grans % 3.7 (H) %      Neutrophils, Absolute 1.80 10*3/mm3      Lymphocytes, Absolute 0.88 10*3/mm3      Monocytes, Absolute 0.90 10*3/mm3      Eosinophils, Absolute 0.01 (L) 10*3/mm3       Basophils, Absolute 0.01 10*3/mm3      Immature Grans, Absolute 0.14 (H) 10*3/mm3      nRBC 0.0 /100 WBC     CBC & Differential [[id number]] Collected:  03/04/18 0915    Specimen:  Blood Updated:  03/04/18 0932    Narrative:       The following orders were created for panel order CBC & Differential.  Procedure                               Abnormality         Status                     ---------                               -----------         ------                     Scan Slide[[id number]]                                                                  CBC Auto Differential[[id number]]        Abnormal            Final result                 Please view results for these tests on the individual orders.    POC Glucose Once [[id number]]  (Normal) Collected:  03/04/18 1206    Specimen:  Blood Updated:  03/04/18 1227     Glucose 111 mg/dL     Narrative:       Meter: [id number] : [id number] Soumya Cheng RN    Vancomycin, Random [[id number]]  (Normal) Collected:  03/04/18 1158    Specimen:  Blood Updated:  03/04/18 1248     Vancomycin Random 20.60 mcg/mL     POC Glucose Once [[id number]]  (Normal) Collected:  03/04/18 1807    Specimen:  Blood Updated:  03/04/18 1816     Glucose 78 mg/dL     Narrative:       Meter: [id number] : [id number] Soumya Cheng RN    Potassium [[id number]]  (Normal) Collected:  03/04/18 2226    Specimen:  Blood Updated:  03/04/18 2258     Potassium 4.4 mmol/L     POC Glucose Once [[id number]]  (Normal) Collected:  03/05/18 0620    Specimen:  Blood Updated:  03/05/18 0628     Glucose 104 mg/dL     Narrative:       Meter: [id number] : [id number] Stacie Lazcano RN    Comprehensive Metabolic Panel [[id number]]  (Abnormal) Collected:  03/05/18 0616    Specimen:  Blood Updated:  03/05/18 0751     Glucose 105 (H) mg/dL      BUN 11 mg/dL      Creatinine 0.51 (L) mg/dL      Sodium 140 mmol/L      Potassium 4.2 mmol/L      Chloride 108 (H) mmol/L      CO2 25.1 mmol/L      Calcium 8.2 (L) mg/dL      Total  Protein 6.4 g/dL      Albumin 2.50 (L) g/dL      ALT (SGPT) 10 U/L      AST (SGOT) 12 U/L      Alkaline Phosphatase 82 U/L      Total Bilirubin 0.3 mg/dL      eGFR Non African Amer >150 mL/min/1.73      Globulin 3.9 gm/dL      A/G Ratio 0.6 g/dL      BUN/Creatinine Ratio 21.6     Anion Gap 6.9 mmol/L     Narrative:       The MDRD GFR formula is only valid for adults with stable renal function between ages 18 and 70.    Magnesium [[id number]]  (Normal) Collected:  03/05/18 0616    Specimen:  Blood Updated:  03/05/18 0751     Magnesium 2.2 mg/dL     Phosphorus [[id number]]  (Normal) Collected:  03/05/18 0616    Specimen:  Blood Updated:  03/05/18 0752     Phosphorus 4.0 mg/dL     CBC (No Diff) [[id number]]  (Abnormal) Collected:  03/05/18 0616    Specimen:  Blood Updated:  03/05/18 0755     WBC 3.57 (L) 10*3/mm3      RBC 2.45 (L) 10*6/mm3      Hemoglobin 7.6 (L) g/dL      Hematocrit 23.3 (C) %      MCV 95.1 (H) fL      MCH 31.0 pg      MCHC 32.6 g/dL      RDW 21.9 (H) %      RDW-SD 73.3 (H) fl      MPV 11.2 (H) fL      Platelets 107 (L) 10*3/mm3           Radiology:    Imaging Results (last 72 hours)     ** No results found for the last 72 hours. **            Adult [facility] [employer]im[employer] TPN  Last Rate: 73.8 mL/hr at 03/04/18 1752   And     fat emulsion 72 mL Last Rate: 14.4 g (03/04/18 1752)   Adult [facility] [employer] TPN     And     fat emulsion 72 mL    sodium chloride 20 mL/hr Last Rate: 20 mL/hr (03/04/18 1136)           Assessment/Plan     Patient Active Problem List   Diagnosis Code   • MDS (myelodysplastic syndrome), low grade D46.20   • Vitamin B 12 deficiency E53.8   • AI (aortic incompetence) I35.1   • Bundle branch block, right I45.10   • Benign prostatic hyperplasia N40.0   • Hypertension I10   • Knee pain M25.569   • Rectal bleed K62.5   • Acute blood loss anemia D62   • Hand lesion L98.9   • Acute UTI N39.0   • Acute lower UTI (urinary tract infection) N39.0   • 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 Eastman and Muna Addison hospitalist team    Kaylie Granados MD  03/05/18  8:27 AM

## 2018-03-06 NOTE — NURSING NOTE
Second attempt to call report to [facility], was on hold briefly and charge nurse Alexsandra started to accept report when accepting RN Valerie became available and completed receiving report.

## 2018-03-06 NOTE — NURSING NOTE
Continued Stay Note  RENATA Wheatley     Patient Name: Jason Zelaya  MRN: [id number]  Today's Date: 3/6/2018    Admit Date: 2/27/2018          Discharge Plan       03/06/18 42    Case Management/Social Work Plan    Additional Comments 3/6/18@[facility] notified patient dc'd to [facility]. She will follow patient.      03/06/18 0803    Final Note    Final Note dc to [facility]              Discharge Codes       03/06/18 0803    Discharge Codes    Discharge Codes 63  Discharged/transferred to a long term hospital        Expected Discharge Date and Time     Expected Discharge Date Expected Discharge Time    Mar 5, 2018             Bigg Renteria RN

## 2018-03-06 NOTE — NURSING NOTE
Case Management Discharge Note    Final Note: dc to [facility]    Discharge Placement     Facility/Agency Request Status Selected? Address Phone Number Fax Number    [facility] Accepted    Yes [address] [address] [phone]         Bigg Renteria, RN 3/5/2018 15:16    [address] [facility] [address]  [phone]               [facility] Accepted     [address] [address] [phone]    [facility] Accepted     [address] [address] [phone]              Discharge Codes: 63  Discharged/transferred to a long term hospital

## 2018-04-02 NOTE — TELEPHONE ENCOUNTER
Janeth with [facility] calling to give CBC results. WBC 2.41, ANC 0.97, HGB 8.4, platelets 48. She states family denies pt having fever and no bleeding.     Reviewed with Alexsandra PENDLETON. Order to check weekly labs. Make sure pt follows neutropenic precautions and call with any fever

## 2018-04-09 PROBLEM — R50.9 FEVER IN ADULT: Status: ACTIVE | Noted: 2018-01-01

## 2018-04-09 NOTE — TELEPHONE ENCOUNTER
Wife called and stated that pt had temp this morning of 99.4 but that she took temp later in afternoon and now temp at 102.1.  She had not given tylenol to pt and did not know if that was okay to do.  Instructed okay to give tylenol.  Wife denied that pt had aches or chills, no cough.  Reviewed with Dr Green.  Pt has history of abdominal/GI surgeries and needs to contact surgeons to make sure that fever is not related to GI.  Called wife and reviewed with wife and wife v/u. Wife also stated that he also has a catheter.  Instructed wife that she needed to contact primary MD also to make sure pt does not have UTI.  Wife v/u.

## 2018-04-09 NOTE — TELEPHONE ENCOUNTER
Spoke with Janeth at [facility].  Pt was also running low grade temp 99.4, no aches, no chills, no cough no bleeding.  Reviewed with Dr Green.  See orders below.  Will send to scheduling to get pt set up for blood transfusion.  Janeth stated that CMP was also faxed to primary MD.        ----- Message from Anupam Green MD sent at 4/9/2018 12:43 PM EDT -----  Please set him up for 2 units PRBCs at [facility]>  I am not sure why I am receiving CMP results as I only see him for blood issues.  His liver tests are abnormal.  Please fax to his primary provider.    ----- Message -----  From: Lab, Background User  Sent: 4/9/2018  11:46 AM  To: Anupam Green MD

## 2018-04-09 NOTE — TELEPHONE ENCOUNTER
----- Message from Gloria Villagran RN sent at 4/9/2018  2:59 PM EDT -----  Please call pt and set pt up for 2 units of PRBC at [place].    Thanks

## 2018-04-09 NOTE — ED PROVIDER NOTES
Subjective   History of Present Illness     History of Present Illness    Chief complaint: Fever, abdominal pain    Location: Diffuse    Quality/Severity:  MAXIMUM TEMPERATURE 100.2 at home    Timing/Duration: Began last night    Modifying Factors: Improved Tylenol    Associated Symptoms: None identified    Narrative: 84-year-old male with multiple medical problems who is roughly 5 weeks postop from significant bowel surgery leaving him with a colostomy, nothing by mouth and receive nutrition via TPN through a PICC line in his right arm presents the emergency department today with fever chills yesterday and increased abdominal discomfort.  He reports normal colostomy output though he has noted some pill fragments the last couple of days dyspnea.  He remains nothing by mouth by surgeons orders.    PCP: David Cedillo  General surgery: NANDO Granados  Gastroenterology: Ingris  Urology:    Review of Systems  Denies cough, dyspnea, vomiting, bloody stools.  All other systems reviewed and are otherwise negative as related chief compaint    Past Medical History:   Diagnosis Date   • Aortic regurgitation    • Aortic valve insufficiency    • Arthritis     Bilateral knee   • Basal cell carcinoma of left hand     sched excision   • Chest pain    • Diastolic dysfunction    • History of GI diverticular bleed 05/2017   • History of transfusion     last 9/12/17 2 units   • History of urinary retention    • History of vertigo    • Hx MRSA infection 07/2017    + culture abadominal wound   • Hypertension    • MDS (myelodysplastic syndrome)    • RBBB (right bundle branch block)    • Self-catheterizes urinary bladder     3-4 times aday   • Skin cancer        No Known Allergies    Past Surgical History:   Procedure Laterality Date   • APPENDECTOMY     • BACK SURGERY  2008    fusion   • CHOLECYSTECTOMY     • COLON RESECTION N/A 5/26/2017    Procedure: sub-total colectomy with ileo-rectal anastamosis, mobilization of splenic  INTRAOPERATIVE  COLONOSCOPY  ([id number]), rigid sigmoidoscopy;  Surgeon: Kaylie Granados MD;  Location: [facility] OR;  Service:    • COLONOSCOPY N/A 2/1/2017    Procedure: COLONOSCOPY;  Surgeon: Bridger Amado MD;  Location: [facility] OR;  Service:    • COLONOSCOPY N/A 5/24/2017    Procedure: COLONOSCOPY w/ polypectomy;  Surgeon: Bridger Amado MD;  Location: [facility] OR;  Service:    • COLONOSCOPY N/A 5/24/2017    Procedure: COLONOSCOPY-return to surgery 2nd procedure, sclerotherapy with epi injection @ 40cm, placement of resolution clips X 2;  Surgeon: Bridger Amado MD;  Location: [facility] OR;  Service:    • CYSTOSCOPY W/ URETERAL STENT PLACEMENT Bilateral 12/21/2017    Procedure: CYSTOSCOPY bilateral URETERAL CATHETER INSERTION;  Surgeon: Davy Irene MD;  Location: [facility] OR;  Service:    • ENDOSCOPY N/A 5/23/2017    Procedure: ESOPHAGOGASTRODUODENOSCOPY;  Surgeon: Bridger Amado MD;  Location: [facility] OR;  Service:    • EXCISION MASS ARM Left 9/19/2017    Procedure: EXCISION MASS UPPER EXTREMITY  --  wide excision left hand mass;  Surgeon: Kaylie Grnaados MD;  Location: [facility] OR;  Service:    • EXPLORATORY LAPAROTOMY N/A 5/26/2017    Procedure: LAPAROTOMY EXPLORATORY - Explantation of old hernia mesh;  Surgeon: Kaylie Granados MD;  Location: [facility] OR;  Service:    • EXPLORATORY LAPAROTOMY N/A 12/21/2017    Procedure: LAPAROTOMY EXPLORATORY -  placement of strattice 6x6 small bowel resection of fistulas and anastamosis, oversew of rectal stump ,  extensive lysis of adhesions,  resection of fistulas, small bowel resectionsx2 and side to side anatomosis, ileostomy placement ;  Surgeon: Kaylie Granados MD;  Location: [facility] OR;  Service:    • HERNIA REPAIR      umbilical, inguinal        Family History   Problem Relation Age of Onset   • Diabetes Brother    • Liver cancer Brother    • Heart disease Mother    • Heart attack Mother    • Diabetes Mother    • Heart attack Father     • Other Sister      Brain tumor   • Cancer Sister    • Emphysema Brother    • Cancer Brother    • Alcohol abuse Brother    • Cancer Brother        Social History     Social History   • Marital status:      Spouse name: Neris   • Years of education: 12     Occupational History   •  Retired     Social History Main Topics   • Smoking status: Never Smoker   • Smokeless tobacco: Never Used   • Alcohol use No   • Drug use: No   • Sexual activity: Defer     Other Topics Concern   • Not on file       ED Triage Vitals [04/09/18 1611]   Temp Heart Rate Resp BP SpO2   (!) 100.6 °F (38.1 °C) 115 -- 129/74 98 %      Temp src Heart Rate Source Patient Position BP Location FiO2 (%)   Oral -- Lying Right arm --         Objective   Physical Exam   Constitutional: He is oriented to person, place, and time. He appears well-developed. No distress.   Acutely ill-appearing on chronic illness.  No acute distress noted   HENT:   Head: Normocephalic.   Mildly dry mucous membranes   Eyes: Conjunctivae are normal. No scleral icterus.   Neck: Neck supple.   Painless movement   Cardiovascular: Regular rhythm and intact distal pulses.    Tachycardic   Pulmonary/Chest: Effort normal and breath sounds normal. No respiratory distress.   Abdominal: Soft.   Colostomy with normal-appearing output.  Site appears clean dry and intact.  Diffuse discomfort of the abdomen with palpation however the abdomen is soft without rigidity   Musculoskeletal:   MAEE, normal strength   Neurological: He is alert and oriented to person, place, and time.   Skin: Skin is warm and dry.   Psychiatric: He has a normal mood and affect. Thought content normal.   Nursing note and vitals reviewed.      Procedures         ED Course  ED Course   Value Comment By Time   Hemoglobin: (!) 7.7 Stable    Results for KATIE GREER (MRN [id number]) as of 4/9/2018 16:55    3/4/2018 09:15  Hemoglobin: 7.3 (C)    3/5/2018 06:16  Hemoglobin: 7.6  (L)    4/2/2018 13:14  Hemoglobin: 8.4 (L)    4/9/2018 11:33  Hemoglobin: 7.0 (C)    4/9/2018 16:30  Hemoglobin: 7.7 (L)   Rafiq Irene MD 04/09 1655   WBC: (!!) 2.46 Stable    Results for KATIE GREER (MRN [id number]) as of 4/9/2018 16:55    3/4/2018 09:15  WBC: 3.74 (L)    3/5/2018 06:16  WBC: 3.57 (L)    4/2/2018 13:14  WBC: 2.41 (C)    4/9/2018 11:33  WBC: 2.58 (L)    4/9/2018 16:30  WBC: 2.46 (C)   Rafiq Irene MD 04/09 3236    Immunocompromised febrile patient with recent surgical history has complex abdominal surgical history with abnormal CT findings that may suggest recurrence of colovesicular fistula as patient has a UTI today.  Patient also has indwelling PICC line required for TPN.  I will cover him with broad-spectrum antibiotics to cover intra-abdominal infection as well as bacteremia related to indwelling line.  Recommend admission. Rafiq Irene MD 04/09 1511    CONSULT        Provider: Dr. HERNANDEZ Ma - Gen Surg    Discussion: Patient history with which she is somewhat familiar, ED presentation and evaluation including CT result and urinalysis.  Dr. Granados will be back tomorrow and agreeable to consult.    Agreeable c treatment and planned disposition.   Rafiq Irene MD 04/09 4857    CONSULT        Provider: Dr. Marks - [facility]    Discussion: Patient history, ED presentation and evaluation.  Accepts patient as a full admission to telemetry bed    Agreeable c treatment and planned disposition.   Rafiq Irene MD 04/09 2126      EKG           EKG time: 1645  Rhythm/Rate: Sinus, 95  P waves and CT: MARITZA within normal limits  QRS, axis: Right bundle-branch block   ST and T waves: Repolarization abnormality, no STEMI; QTc within normal limits     Interpreted contemporaneously with treatment by me, independently viewed  Comparison dated 12/18/17 reviewed-no acute change    Results for orders placed or performed during the hospital encounter of 04/09/18   Comprehensive Metabolic Panel   Result Value  Ref Range    Glucose 95 65 - 99 mg/dL    BUN 30 (H) 8 - 23 mg/dL    Creatinine 0.77 0.76 - 1.27 mg/dL    Sodium 133 (L) 136 - 145 mmol/L    Potassium 4.5 3.5 - 5.2 mmol/L    Chloride 98 98 - 107 mmol/L    CO2 20.8 (L) 22.0 - 29.0 mmol/L    Calcium 9.5 8.8 - 10.5 mg/dL    Total Protein 8.8 (H) 6.0 - 8.5 g/dL    Albumin 3.60 3.50 - 5.20 g/dL    ALT (SGPT) 77 (H) 5 - 41 U/L    AST (SGOT) 60 (H) 5 - 40 U/L    Alkaline Phosphatase 298 (H) 40 - 129 U/L    Total Bilirubin 1.2 0.2 - 1.2 mg/dL    eGFR Non African Amer 96 >60 mL/min/1.73    Globulin 5.2 gm/dL    A/G Ratio 0.7 g/dL    BUN/Creatinine Ratio 39.0 (H) 7.0 - 25.0    Anion Gap 14.2 mmol/L   Lactic Acid, Plasma   Result Value Ref Range    Lactate 1.9 0.5 - 2.0 mmol/L   Urinalysis With / Culture If Indicated - Urine, Catheter   Result Value Ref Range    Color, UA Yellow Yellow, Straw    Appearance, UA Slightly Cloudy (A) Clear    pH, UA 5.5 4.5 - 8.0    Specific Gravity, UA 1.010 1.003 - 1.030    Glucose, UA Negative Negative    Ketones, UA Negative Negative, 80 mg/dL (3+), >=160 mg/dL (4+)    Bilirubin, UA Negative Negative    Blood, UA Large (3+) (A) Negative    Protein,  mg/dL (2+) (A) Negative    Leuk Esterase, UA Small (1+) (A) Negative    Nitrite, UA Positive (A) Negative    Urobilinogen, UA 0.2 E.U./dL 0.2 - 1.0 E.U./dL   CBC Auto Differential   Result Value Ref Range    WBC 2.46 (C) 4.80 - 10.80 10*3/mm3    RBC 2.39 (L) 4.70 - 6.10 10*6/mm3    Hemoglobin 7.7 (L) 14.0 - 18.0 g/dL    Hematocrit 23.6 (C) 42.0 - 52.0 %    MCV 98.7 (H) 80.0 - 94.0 fL    MCH 32.2 (H) 27.0 - 31.0 pg    MCHC 32.6 31.0 - 37.0 g/dL    RDW 23.0 (H) 11.5 - 14.5 %    RDW-SD 81.0 (H) 37.0 - 54.0 fl    MPV 13.4 (H) 7.4 - 10.4 fL    Platelets 52 (L) 140 - 500 10*3/mm3    Neutrophil % 53.3 45.0 - 70.0 %    Lymphocyte % 18.3 (L) 20.0 - 45.0 %    Monocyte % 26.8 (H) 3.0 - 8.0 %    Eosinophil % 0.0 0.0 - 4.0 %    Basophil % 0.0 0.0 - 2.0 %    Immature Grans % 1.6 (H) 0.0 - 0.5 %     Neutrophils, Absolute 1.31 (L) 1.50 - 8.30 10*3/mm3    Lymphocytes, Absolute 0.45 (L) 0.60 - 4.80 10*3/mm3    Monocytes, Absolute 0.66 0.00 - 1.00 10*3/mm3    Eosinophils, Absolute 0.00 (L) 0.10 - 0.30 10*3/mm3    Basophils, Absolute 0.00 0.00 - 0.20 10*3/mm3    Immature Grans, Absolute 0.04 (H) 0.00 - 0.03 10*3/mm3    nRBC 0.0 0.0 - 0.0 /100 WBC   Urinalysis, Microscopic Only - Urine, Clean Catch   Result Value Ref Range    RBC, UA 21-30 (A) None Seen /HPF    WBC, UA 6-12 (A) None Seen /HPF    Bacteria, UA 3+ (A) None Seen /HPF    Squamous Epithelial Cells, UA 0-2 None Seen, 0-2 /HPF    Hyaline Casts, UA None Seen None Seen /LPF    Methodology Manual Light Microscopy    Light Blue Top   Result Value Ref Range    Extra Tube hold for add-on    Green Top (Gel)   Result Value Ref Range    Extra Tube Hold for add-ons.    Lavender Top   Result Value Ref Range    Extra Tube hold for add-on    Gold Top - SST   Result Value Ref Range    Extra Tube Hold for add-ons.      RADIOLOGY        Study: Chest x-ray-1 view    Findings: No acute change from prior dated 2/28/18    Interpreted contemporaneously with treatment by me, independently viewed by me    RADIOLOGY        Study: CT abdomen pelvis with IV contrast    Findings: Compared to 2/27/18, persistent complex fistula in the pelvis likely communicating with rectal stump and bladder apex and between left anterior pelvic small bowel and anterior left lower abdominal wall; interval improvement in the tract extending to the midline mid abdomen skin.  No new gas or fluid collection    Interpreted contemporaneously with treatment by Dr. Kumari-radiologist, independently viewed by me            MDM  Number of Diagnoses or Management Options  Abdominal fistula:   Acute UTI:   Fever in adult:   SIRS (systemic inflammatory response syndrome):      Amount and/or Complexity of Data Reviewed  Clinical lab tests: ordered and reviewed  Tests in the radiology section of CPT®: ordered and  reviewed  Tests in the medicine section of CPT®: reviewed  Decide to obtain previous medical records or to obtain history from someone other than the patient: yes  Review and summarize past medical records: yes  Discuss the patient with other providers: yes  Independent visualization of images, tracings, or specimens: yes        Final diagnoses:   Fever in adult   SIRS (systemic inflammatory response syndrome)   Acute UTI   Abdominal fistula            Rafiq Irene MD  04/09/18 4537

## 2018-04-10 NOTE — PLAN OF CARE
Problem: Patient Care Overview  Goal: Plan of Care Review  Outcome: Ongoing (interventions implemented as appropriate)   04/10/18 0655   Coping/Psychosocial   Plan of Care Reviewed With patient       Problem: Skin Injury Risk (Adult)  Goal: Identify Related Risk Factors and Signs and Symptoms  Outcome: Ongoing (interventions implemented as appropriate)    Goal: Skin Health and Integrity  Outcome: Ongoing (interventions implemented as appropriate)      Problem: Fall Risk (Adult)  Goal: Identify Related Risk Factors and Signs and Symptoms  Outcome: Ongoing (interventions implemented as appropriate)    Goal: Absence of Fall  Outcome: Ongoing (interventions implemented as appropriate)

## 2018-04-10 NOTE — PLAN OF CARE
Problem: Patient Care Overview  Goal: Discharge Needs Assessment  Outcome: Ongoing (interventions implemented as appropriate)   04/09/18 2052 04/10/18 0943 04/10/18 1000   Discharge Needs Assessment   Readmission Within the Last 30 Days --  no previous admission in last 30 days --    Patient/Family Anticipates Transition to home with family --  --    Patient/Family Anticipated Services at Transition --   --    Transportation Anticipated --  family or friend will provide --    Discharge Coordination/Progress --  --  Patient in agreement with speaking to  with wife and son ( from [place]) at bedside. Patient has been home for about two weeks; was at [facility]. Explained Important Message from Medicare to patient and wife; verbalizes understanding. Patient is present with [facility]; notified Georgina of admission. He has a transport chair, wheelchair, gait belt, hospital bed (from [facility]), txf tub bench, ostomy supplies and a ramp accessible home. He has a rolling walker, quad cane and bedside commode which he does not use at this time. He is w/c bound. He is dependent with ADLs. Wife and adult children are patient's care givers. He has a home health aid that assists with bathing. He is receiving TPN at home; he only has sips of water by mouth. He does not have a Living Will and at this time declines any information. He fills scripts at [facility] pharmacy in [place] and has no issues getting for his medicines. Patient and family say that the plan is home when medically ready. In agreement with some type of placement if needed. Wife does request if LTACH is needed would like Trevor [facility]; it is not so far into [place].  will continue to follow and assist with disposition.    Disability   Equipment Currently Used at Home --  colostomy/ostomy supplies;wheelchair;hospital bed;ramp  (transport chair) --

## 2018-04-10 NOTE — NURSING NOTE
Discharge Planning Assessment   Tiffanie Keith     Patient Name: Jason Zelaya  MRN: [id number]  Today's Date: 4/10/2018    Admit Date: 4/9/2018          Discharge Needs Assessment     Row Name 04/10/18 0943       Living Environment    Lives With spouse    Name(s) of Who Lives With Patient wife; Neris    Current Living Arrangements home/apartment/condo    Primary Care Provided by spouse/significant other;child(jovita)    Provides Primary Care For no one    Family Caregiver if Needed spouse;child(jovita), adult    Able to Return to Prior Arrangements --    will need to follow and assist with appropriate disposition.       Transition Planning    Patient/Family Anticipated Services at Transition     Transportation Anticipated family or friend will provide       Discharge Needs Assessment    Readmission Within the Last 30 Days no previous admission in last 30 days    Equipment Currently Used at Home colostomy/ostomy supplies;wheelchair;hospital bed;ramp   transport chair            Discharge Plan     Row Name 04/10/18 0914       Plan    Plan Plan to be determined.    Patient/Family in Agreement with Plan yes    Plan Comments Patient in agreement with speaking to  with wife and son ( from [place]) at bedside.  Patient has been home for about two weeks;  was at [facility].  Explained Important Message from Medicare to patient and wife; verbalizes understanding.  Patient is present with [facility];  notified Georgina of admission.  He has a transport chair, wheelchair, gait belt, hospital bed (from [facility]), txf tub bench, ostomy supplies and a ramp accessible home.  He has a rolling walker, quad cane and bedside commode which he does not use at this time.  He is w/c bound.  He is dependent with ADLs.  Wife and adult children are patient's care givers.  He has a home health aid that assists with bathing.  He is receiving TPN at home;  he only has sips of water by mouth.   He does not have a Living Will and at this time declines any information.  He fills scripts at [facility] pharmacy in [place] and has no issues getting for his medicines.  Patient and family say that the plan is home when medically ready.  In agreement with some type of placement if needed.  Wife does request if LTACH is needed would like Chai [facility];  it is not so far into [place].   will continue to follow and assist with disposition.          Destination     No service coordination in this encounter.      Durable Medical Equipment     No service coordination in this encounter.      Dialysis/Infusion     No service coordination in this encounter.      Home Medical Care     No service coordination in this encounter.      Social Care     No service coordination in this encounter.                Demographic Summary     Row Name 04/10/18 0941       General Information    Admission Type inpatient    Arrived From home    Required Notices Provided Important Message from Medicare    Referral Source admission list    Preferred Language English     Used During This Interaction no       Contact Information    Permission Granted to Share Info With             Functional Status    No documentation.           Psychosocial    No documentation.           Abuse/Neglect    No documentation.           Legal    No documentation.           Substance Abuse    No documentation.           Patient Forms    No documentation.         Jamaica Kessler RN

## 2018-04-10 NOTE — PROGRESS NOTES
"SERVICE: [facility] HOSPITALIST    CONSULTANTS: Surgery    CHIEF COMPLAINT: Follow-up abdominal pain    SUBJECTIVE: Upon entry to room patient noted to be moaning continuously, complaining of abdominal pain, knee pain, neck pain and reporting being quite uncomfortable.  He notes he has daily pain and had fever overnight.  He reports that he gets some relief with IV pain medicines and Percocet. He otherwise denies cough/soa/chest pain/n/v/d or other new concerns.    OBJECTIVE:    /55   Pulse 103   Temp (!) 101 °F (38.3 °C) (Oral)   Resp 18   Ht 177.8 cm (70\")   Wt 64.7 kg (142 lb 10.2 oz)   SpO2 98%   BMI 20.47 kg/m²     MEDS/LABS REVIEWED AND ORDERED    cholestyramine 1 packet Oral TID With Meals   clotrimazole-betamethasone  Topical Q12H   colchicine 0.6 mg Oral BID   diclofenac 2 g Topical 4x Daily   folic acid 400 mcg Oral Daily   gabapentin 100 mg Oral TID   hydrophor  Topical Q12H   lactobacillus acidophilus 1 capsule Oral Daily   lidocaine 1 patch Transdermal Q24H   magnesium oxide 400 mg Oral Daily   multivitamin with minerals 1 tablet Oral Daily   pantoprazole 40 mg Oral Daily   piperacillin-tazobactam 3.375 g Intravenous Q8H   sodium chloride      sucralfate 1 g Oral 4x Daily AC & at Bedtime   vancomycin 750 mg Intravenous Q12H     Physical Exam   Constitutional: He is oriented to person, place, and time.   thin   HENT:   Head: Normocephalic and atraumatic.   Eyes: EOM are normal. Pupils are equal, round, and reactive to light.   Cardiovascular: Normal rate and regular rhythm.    Pulmonary/Chest: Effort normal and breath sounds normal.   Abdominal:   Guarding, tenderness generalized. Right abdomen ileostomy with yellow/green fluid noted.   Genitourinary:   Genitourinary Comments: Ayoub with pamella colored liquid noted in bag   Musculoskeletal: He exhibits no edema.   Neurological: He is alert and oriented to person, place, and time.   Skin: Skin is warm and dry. No erythema. "   Psychiatric: He has a normal mood and affect. His behavior is normal.   Vitals reviewed.    LAB/DIAGNOSTICS:    Lab Results (last 24 hours)     Procedure Component Value Units Date/Time    Respiratory Panel, PCR - Swab, Nasopharynx [[id number]]  (Normal) Collected:  04/09/18 7009    Specimen:  Swab from Nasopharynx Updated:  04/10/18 0944     ADENOVIRUS, PCR Not Detected     Coronavirus 229E Not Detected     Coronavirus HKU1 Not Detected     Coronavirus NL63 Not Detected     Coronavirus OC43 Not Detected     Human Metapneumovirus Not Detected     Human Rhinovirus/Enterovirus Not Detected     Influenza B PCR Not Detected     Parainfluenza Virus 1 Not Detected     Parainfluenza Virus 2 Not Detected     Parainfluenza Virus 3 Not Detected     Parainfluenza Virus 4 Not Detected     Bordetella pertussis pcr Not Detected     Influenza A H1 2009 PCR Not Detected     Chlamydophila pneumoniae PCR Not Detected     Mycoplasma pneumo by PCR Not Detected     Influenza A PCR Not Detected     Influenza A H3 Not Detected     Influenza A H1 Not Detected     RSV, PCR Not Detected    Urine Culture - Urine, Urine, Clean Catch [[id number]]  (Abnormal) Collected:  04/09/18 1746    Specimen:  Urine from Urine, Catheter Updated:  04/10/18 0821     Urine Culture --      >100,000 CFU/mL Gram Negative Bacilli (A)    CBC Auto Differential [[id number]]  (Abnormal) Collected:  04/10/18 0405    Specimen:  Blood Updated:  04/10/18 0529     WBC 1.77 (C) 10*3/mm3      RBC 2.12 (L) 10*6/mm3      Hemoglobin 6.9 (C) g/dL      Hematocrit 21.2 (C) %      .0 (H) fL      MCH 32.5 (H) pg      MCHC 32.5 g/dL      RDW 23.7 (H) %      RDW-SD 82.0 (H) fl      MPV 13.2 (H) fL      Platelets 45 (C) 10*3/mm3      Neutrophil % 54.3 %      Lymphocyte % 18.6 (L) %      Monocyte % 25.4 (H) %      Eosinophil % 0.0 %      Basophil % 0.0 %      Immature Grans % 1.7 (H) %      Neutrophils, Absolute 0.96 (L) 10*3/mm3      Lymphocytes, Absolute 0.33 (L) 10*3/mm3       Monocytes, Absolute 0.45 10*3/mm3      Eosinophils, Absolute 0.00 (L) 10*3/mm3      Basophils, Absolute 0.00 10*3/mm3      Immature Grans, Absolute 0.03 10*3/mm3      nRBC 0.0 /100 WBC     Blood Culture - Blood, [[id number]]  (Normal) Collected:  04/09/18 1712    Specimen:  Blood from Arm, Right Updated:  04/10/18 0516     Blood Culture No growth at less than 24 hours    Comprehensive Metabolic Panel [[id number]]  (Abnormal) Collected:  04/10/18 0404    Specimen:  Blood Updated:  04/10/18 0504     Glucose 88 mg/dL      BUN 26 (H) mg/dL      Creatinine 0.89 mg/dL      Sodium 137 mmol/L      Potassium 3.6 mmol/L      Chloride 103 mmol/L      CO2 19.8 (L) mmol/L      Calcium 8.9 mg/dL      Total Protein 7.8 g/dL      Albumin 3.20 (L) g/dL      ALT (SGPT) 69 (H) U/L      AST (SGOT) 47 (H) U/L      Alkaline Phosphatase 269 (H) U/L      Total Bilirubin 1.5 (H) mg/dL      eGFR Non African Amer 81 mL/min/1.73      Globulin 4.6 gm/dL      A/G Ratio 0.7 g/dL      BUN/Creatinine Ratio 29.2 (H)     Anion Gap 14.2 mmol/L     Narrative:       The MDRD GFR formula is only valid for adults with stable renal function between ages 18 and 70.    Urinalysis, Microscopic Only - Urine, Clean Catch [[id number]]  (Abnormal) Collected:  04/09/18 1746    Specimen:  Urine from Urine, Catheter Updated:  04/09/18 1810     RBC, UA 21-30 (A) /HPF      WBC, UA 6-12 (A) /HPF      Bacteria, UA 3+ (A) /HPF      Squamous Epithelial Cells, UA 0-2 /HPF      Hyaline Casts, UA None Seen /LPF      Methodology Manual Light Microscopy    Urinalysis With / Culture If Indicated - Urine, Catheter [[id number]]  (Abnormal) Collected:  04/09/18 1746    Specimen:  Urine from Urine, Catheter Updated:  04/09/18 1754     Color, UA Yellow     Appearance, UA Slightly Cloudy (A)     pH, UA 5.5     Specific Gravity, UA 1.010     Glucose, UA Negative     Ketones, UA Negative     Bilirubin, UA Negative     Blood, UA Large (3+) (A)     Protein,  mg/dL (2+) (A)     Leuk  Esterase, UA Small (1+) (A)     Nitrite, UA Positive (A)     Urobilinogen, UA 0.2 E.U./dL    Shaniko Draw [[id number]] Collected:  04/09/18 1630    Specimen:  Blood Updated:  04/09/18 1731    Narrative:       The following orders were created for panel order Shaniko Draw.  Procedure                               Abnormality         Status                     ---------                               -----------         ------                     Light Blue Top[[id number]]                                   Final result               Green Top (Gel)[[id number]]                                  Final result               Lavender Top[[id number]]                                     Final result               Gold Top - SST[[id number]]                                   Final result                 Please view results for these tests on the individual orders.    Light Blue Top [[id number]] Collected:  04/09/18 1630    Specimen:  Blood Updated:  04/09/18 1731     Extra Tube hold for add-on     Comment: Auto resulted       Green Top (Gel) [[id number]] Collected:  04/09/18 1630    Specimen:  Blood Updated:  04/09/18 1731     Extra Tube Hold for add-ons.     Comment: Auto resulted.       Lavender Top [[id number]] Collected:  04/09/18 1630    Specimen:  Blood Updated:  04/09/18 1731     Extra Tube hold for add-on     Comment: Auto resulted       Gold Top - SST [[id number]] Collected:  04/09/18 1630    Specimen:  Blood Updated:  04/09/18 1731     Extra Tube Hold for add-ons.     Comment: Auto resulted.       Comprehensive Metabolic Panel [[id number]]  (Abnormal) Collected:  04/09/18 1630    Specimen:  Blood Updated:  04/09/18 1712     Glucose 95 mg/dL      BUN 30 (H) mg/dL      Creatinine 0.77 mg/dL      Sodium 133 (L) mmol/L      Potassium 4.5 mmol/L      Comment: Specimen hemolyzed.  Results may be affected.        Chloride 98 mmol/L      CO2 20.8 (L) mmol/L      Calcium 9.5 mg/dL      Total Protein 8.8 (H) g/dL      Albumin 3.60 g/dL       ALT (SGPT) 77 (H) U/L      Comment: Specimen hemolyzed.  Results may be affected.        AST (SGOT) 60 (H) U/L      Comment: Specimen hemolyzed.  Results may be affected.        Alkaline Phosphatase 298 (H) U/L      Total Bilirubin 1.2 mg/dL      eGFR Non African Amer 96 mL/min/1.73      Globulin 5.2 gm/dL      A/G Ratio 0.7 g/dL      BUN/Creatinine Ratio 39.0 (H)     Anion Gap 14.2 mmol/L     Narrative:       The MDRD GFR formula is only valid for adults with stable renal function between ages 18 and 70.    CBC & Differential [[id number]] Collected:  04/09/18 1630    Specimen:  Blood Updated:  04/09/18 1658    Narrative:       The following orders were created for panel order CBC & Differential.  Procedure                               Abnormality         Status                     ---------                               -----------         ------                     Scan Slide[[id number]]                                                                  CBC Auto Differential[[id number]]        Abnormal            Final result                 Please view results for these tests on the individual orders.    Lactic Acid, Plasma [[id number]]  (Normal) Collected:  04/09/18 1631    Specimen:  Blood Updated:  04/09/18 1655     Lactate 1.9 mmol/L     CBC Auto Differential [[id number]]  (Abnormal) Collected:  04/09/18 1630    Specimen:  Blood Updated:  04/09/18 1655     WBC 2.46 (C) 10*3/mm3      RBC 2.39 (L) 10*6/mm3      Hemoglobin 7.7 (L) g/dL      Hematocrit 23.6 (C) %      MCV 98.7 (H) fL      MCH 32.2 (H) pg      MCHC 32.6 g/dL      RDW 23.0 (H) %      RDW-SD 81.0 (H) fl      MPV 13.4 (H) fL      Platelets 52 (L) 10*3/mm3      Neutrophil % 53.3 %      Lymphocyte % 18.3 (L) %      Monocyte % 26.8 (H) %      Eosinophil % 0.0 %      Basophil % 0.0 %      Immature Grans % 1.6 (H) %      Neutrophils, Absolute 1.31 (L) 10*3/mm3      Lymphocytes, Absolute 0.45 (L) 10*3/mm3      Monocytes, Absolute 0.66 10*3/mm3       Eosinophils, Absolute 0.00 (L) 10*3/mm3      Basophils, Absolute 0.00 10*3/mm3      Immature Grans, Absolute 0.04 (H) 10*3/mm3      nRBC 0.0 /100 WBC     Blood Culture - Blood, [[id number]] Collected:  04/09/18 1630    Specimen:  Blood from Hand, Left Updated:  04/09/18 1639        ECG 12 Lead           Results for orders placed during the hospital encounter of 12/15/17   Adult Transthoracic Echo Complete W/ Cont if Necessary Per Protocol    Narrative · Left ventricular wall thickness is consistent with mild concentric   hypertrophy.  · Left ventricular systolic function is moderately decreased. Estimated EF   = 38%.  · Mild aortic valve regurgitation is present.  · Moderate mitral valve regurgitation is present  · Mild tricuspid valve regurgitation is present.        Ct Abdomen Pelvis With Contrast    Result Date: 4/10/2018  1. 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 2. Indwelling Glasgow catheter within a decompressed, diffusely thick-walled urinary bladder. No evidence of upper urinary tract obstruction or pyelonephritis at this time. 3. No bowel dilatation to suggest obstruction. Right lower quadrant ileostomy. 4. No new abscess or other undrained fluid collection is identified. 5. Stable splenomegaly and patient with myelodysplastic syndrome. 6. Stable ectatic aortic root. 7. Initial stat report to the ED from Dr. Reji Kumari at 1810 hours on 4/9/2018.  This report was finalized on 4/10/2018 6:33 AM by Dr. Chencho Henderson MD.      Xr Chest 1 View    Result Date: 4/10/2018  1. No active disease. 2. Chronic bibasilar fibrosis. 3. Right arm PICC in good position.  This report was finalized on 4/10/2018 8:25 AM by Dr. Chencho Henderson MD.      ASSESSMENT/PLAN:   1. Sepsis secondary to UTI with chronic indwelling glasgow with h/o MRSA UTI/cystitis: Surgery consulted, patient of Dr. 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 Severe malnutrition secondary to chronic illness:  4. Transaminitis/hyperbilirubinemia/elevated alk phos:   Add clinimix TPN without lipids secondary to elevated LFTs (reviewed by pharmacy and nutrition)  CT does not note any new fistulas or fluid collections, no mention of liver/duct findings, check liver ultrasound to evaluate for ductal stone (h/o cholecystectomy)  Patient continues to complain of severe abdominal pain  Control pain, d/c tylenol for now, continue oxycodone/colchicine  Add dilaudid, monitor for effect  Await further input from Dr. Granados    5. Acute on chronic pancytopenia/MDS:   Hemoglobin down to 6.9 today, transfusing 1 unit PRBCs     6. Gout: Continues to complain of severe bilateral knee pain,  Continue home dose colchicine for now, monitor LFTs, if persistently elevated may consider discontinuing this medication     7. Chronic diastolic dysfunction, PAF and PSVT, Non-rheumatic aortic valve insufficiency, chronic RBBB, MR and AR:   No acute issues currently     8. HTN: Blood pressure at goal     9. OA, chronic knee pain:   This is a continuous complaint, continues on Neurontin, Voltaren, Percocet      DVT prophy: SCDs. No AC until eval by Dr. Granados     Plan: await culture results/US result/control pain  If no improvement, not unreasonable to discuss hospice/care and comfort

## 2018-04-10 NOTE — NURSING NOTE
Continued Stay Note  RENATA Wheatley     Patient Name: Jason Zelaya  MRN: [id number]  Today's Date: 4/10/2018    Admit Date: 4/9/2018          Discharge Plan     Row Name 04/10/18 1512       Plan    Plan Comments Per patient and family request referral made to [facility] for Living Will information.                Discharge Codes    No documentation.           Jamaica Kessler RN

## 2018-04-10 NOTE — H&P
[facility] HOSPITALIST     David Cedillo MD    CHIEF COMPLAINT: Fever and abdominal pain    HISTORY OF PRESENT ILLNESS:    83 y/o male with enterocutaneous/rectocutaneous fistula, chronic indwelling glasgwo with h/o MRSA UTI/cystitis, gout, MDS with chronic pancytopenia, malnutrition secondary to chronic illness, Chronic diastolic dysfunction, PAF and PSVT, Non-rheumatic aortic valve insufficiency, chronic RBBB, MR and AR and h/o HTN presented to the ER today secondary to fever and abdominal pain. The patient is well known to us from previous admission. He was admitted here 2/27-3/5/18 secondary to an abdominal infection related to his fistulas and an MRSA UTI/cystitis. He was discharged to [facility] for continued TPN and PT/OT and remained NPO as the patient is not a surgical candidate for his fistulas. He notes he left [place] about a week and a half ago and remains NPO except water with meds and continued TPN at home. He notes he transfers to a wheelchair with help. He states he was having some abdominal pain prior to leaving [facility] but since he has been home it has gotten a lot worse. Then today he notes fever and chills this AM. He notes brown liquid stool in his ostomy bag which is NL per his report. He notes the pain is sharp and points to his right upper abdomen when I ask him where it hurts the most. He denies any N/V. He denies any CP or SOA. He denies any dizziness. He notes continued chronic joint pain particularly in his knees. He notes his urine has been clear in his glasgow bag. He denies any cough or upper respiratory symptoms.       Past Medical History:   Diagnosis Date   • Aortic regurgitation    • Aortic valve insufficiency    • Arthritis     Bilateral knee   • Basal cell carcinoma of left hand     sched excision   • Chest pain    • Diastolic dysfunction    • History of GI diverticular bleed 05/2017   • History of transfusion     last 9/12/17 2 units   • History of urinary  retention    • History of vertigo    • Hx MRSA infection 07/2017    + culture abadominal wound   • Hypertension    • MDS (myelodysplastic syndrome)    • RBBB (right bundle branch block)    • Self-catheterizes urinary bladder     3-4 times aday   • Skin cancer      Past Surgical History:   Procedure Laterality Date   • APPENDECTOMY     • BACK SURGERY  2008    fusion   • CHOLECYSTECTOMY     • COLON RESECTION N/A 5/26/2017    Procedure: sub-total colectomy with ileo-rectal anastamosis, mobilization of splenic  INTRAOPERATIVE COLONOSCOPY  ([id number]), rigid sigmoidoscopy;  Surgeon: Kaylie Granados MD;  Location: [facility] OR;  Service:    • COLONOSCOPY N/A 2/1/2017    Procedure: COLONOSCOPY;  Surgeon: Bridger Amado MD;  Location: [facility] OR;  Service:    • COLONOSCOPY N/A 5/24/2017    Procedure: COLONOSCOPY w/ polypectomy;  Surgeon: Bridger Amado MD;  Location: [facility] OR;  Service:    • COLONOSCOPY N/A 5/24/2017    Procedure: COLONOSCOPY-return to surgery 2nd procedure, sclerotherapy with epi injection @ 40cm, placement of resolution clips X 2;  Surgeon: Bridger Amado MD;  Location: [facility] OR;  Service:    • CYSTOSCOPY W/ URETERAL STENT PLACEMENT Bilateral 12/21/2017    Procedure: CYSTOSCOPY bilateral URETERAL CATHETER INSERTION;  Surgeon: Davy Irene MD;  Location: [facility] OR;  Service:    • ENDOSCOPY N/A 5/23/2017    Procedure: ESOPHAGOGASTRODUODENOSCOPY;  Surgeon: Bridger Amado MD;  Location: [facility] OR;  Service:    • EXCISION MASS ARM Left 9/19/2017    Procedure: EXCISION MASS UPPER EXTREMITY  --  wide excision left hand mass;  Surgeon: Kaylie Granados MD;  Location: [facility] OR;  Service:    • EXPLORATORY LAPAROTOMY N/A 5/26/2017    Procedure: LAPAROTOMY EXPLORATORY - Explantation of old hernia mesh;  Surgeon: Kaylie Granados MD;  Location: [facility] OR;  Service:    • EXPLORATORY LAPAROTOMY N/A 12/21/2017    Procedure: LAPAROTOMY EXPLORATORY -  placement of  strattice 6x6 small bowel resection of fistulas and anastamosis, oversew of rectal stump ,  extensive lysis of adhesions,  resection of fistulas, small bowel resectionsx2 and side to side anatomosis, ileostomy placement ;  Surgeon: Kaylie Granados MD;  Location: [facility];  Service:    • HERNIA REPAIR      umbilical, inguinal      Family History   Problem Relation Age of Onset   • Diabetes Brother    • Liver cancer Brother    • Heart disease Mother    • Heart attack Mother    • Diabetes Mother    • Heart attack Father    • Other Sister      Brain tumor   • Cancer Sister    • Emphysema Brother    • Cancer Brother    • Alcohol abuse Brother    • Cancer Brother      Social History   Substance Use Topics   • Smoking status: Never Smoker   • Smokeless tobacco: Never Used   • Alcohol use No     Prescriptions Prior to Admission   Medication Sig Dispense Refill Last Dose   • cholestyramine (QUESTRAN) 4 g packet Take 1 packet by mouth 3 (Three) Times a Day With Meals.      • clotrimazole-betamethasone (LOTRISONE) 1-0.05 % cream Apply  topically 2 (Two) Times a Day.      • colchicine 0.6 MG tablet Take 1 tablet by mouth 2 (Two) Times a Day.   4/9/2018 at Unknown time   • cyanocobalamin 1000 MCG/ML injection Inject 1 mL into the shoulder, thigh, or buttocks Every 28 (Twenty-Eight) Days. (Patient taking differently: Inject 1,000 mcg into the shoulder, thigh, or buttocks Every 28 (Twenty-Eight) Days. Pt does not remember last injection date)      • diclofenac (VOLTAREN) 1 % gel gel Apply 2 g topically 4 (Four) Times a Day.      • folic acid (FOLVITE) 400 MCG tablet Take 400 mcg by mouth Daily.   4/9/2018 at Unknown time   • gabapentin (NEURONTIN) 100 MG capsule Take 100 mg by mouth 3 (Three) Times a Day.      • hydrophor (AQUAPHOR) ointment ointment Apply  topically Every 12 (Twelve) Hours.      • lactobacillus acidophilus (RISAQUAD) capsule capsule Take 1 capsule by mouth Daily.      • lidocaine (LIDODERM) 5 % Place 1 patch  "on the skin Daily. Remove & Discard patch within 12 hours or as directed by MD   4/9/2018 at Unknown time   • magnesium oxide (MAGOX) 400 (241.3 Mg) MG tablet tablet Take 1 tablet by mouth Daily. 30 each     • Multiple Vitamins-Minerals (MULTIVITAMIN WITH MINERALS) tablet tablet Take 1 tablet by mouth Daily.      • nystatin (MYCOSTATIN) 612637 UNIT/GM powder Apply  topically Every 12 (Twelve) Hours for 647 doses.      • oxyCODONE-acetaminophen (PERCOCET) 7.5-325 MG per tablet Take 1 tablet by mouth Every 4 (Four) Hours As Needed for Moderate Pain  or Severe Pain .   4/9/2018 at 1330   • sucralfate (CARAFATE) 1 g tablet Take 1 tablet by mouth 4 (Four) Times a Day Before Meals & at Bedtime.      • TPN for discharge See most recent TPN order.      • acetaminophen (TYLENOL) 325 MG tablet Take 2 tablets by mouth Every 6 (Six) Hours As Needed for Mild Pain .      • ondansetron (ZOFRAN) 4 MG tablet Take 1 tablet by mouth Every 6 (Six) Hours As Needed for Nausea or Vomiting.      • pantoprazole (PROTONIX) 40 MG EC tablet Take 1 tablet by mouth Daily.      • sodium chloride 0.9 % solution Infuse 20 mL/hr into a venous catheter Continuous.        Allergies:  Review of patient's allergies indicates no known allergies.    REVIEW OF SYSTEMS:  Please see the above history of present illness for pertinent positives and negatives.  The remainder of the patient's systems have been reviewed and are negative.     Vital Signs  Temp:  [98.5 °F (36.9 °C)-100.6 °F (38.1 °C)] 98.5 °F (36.9 °C)  Heart Rate:  [101-115] 101  Resp:  [20-24] 20  BP: (120-144)/(63-76) 144/76  Oxygen Therapy  SpO2: 99 %  Pulse Oximetry Type: Continuous  Device (Oxygen Therapy): room air  Body mass index is 20.48 kg/m².     Flowsheet Rows    Flowsheet Row First Filed Value   Admission Height 182.8 cm (71.97\") Documented at 04/09/2018 1611   Admission Weight 65.5 kg (144 lb 5 oz) Documented at 04/09/2018 1611             Physical Exam:  Physical Exam "   Constitutional: Patient appears thin, elderly and chronically ill, he is in some distress at the time of my interview and exam secondary to abdominal pain.  HEENT:   Head: Normocephalic and atraumatic.   Eyes:  Pupils are equal, round, and reactive to light. EOM are intact. Sclera are anicteric and non-injected.  Mouth and Throat: Patient has moist mucous membranes. Oropharynx is clear of any erythema or exudate.   Edentulous.  Neck: Neck supple. No JVD present. No thyromegaly present. No lymphadenopathy present.  Cardiovascular: mildly tachycardic, regular rhythm.  Exam reveals no gallop and no friction rub.  Faint systolic murmur heard.  Pulmonary/Chest: Lungs are clear to auscultation bilaterally. No respiratory distress. No wheezes. No rhonchi. No rales.   Abdominal: Soft. Bowel sounds are normal. No distension. There is tenderness in the right upper and lower quadrants. Ostomy bag with brown liquid stool present.   Musculoskeletal: Decreased muscle bulk.  Extremities: No edema. Pulses are palpable in all 4 extremities.  Neurological: Patient is alert and oriented to person, place, and time. Cranial nerves II-XII are grossly intact with no focal deficits.  Skin: Skin is warm. Nails show no clubbing.  No cyanosis or erythema.     Results Review:    I reviewed the patient's new clinical results.  Lab Results (most recent)     Procedure Component Value Units Date/Time    Urinalysis, Microscopic Only - Urine, Clean Catch [[id number]]  (Abnormal) Collected:  04/09/18 1746    Specimen:  Urine from Urine, Catheter Updated:  04/09/18 1810     RBC, UA 21-30 (A) /HPF      WBC, UA 6-12 (A) /HPF      Bacteria, UA 3+ (A) /HPF      Squamous Epithelial Cells, UA 0-2 /HPF      Hyaline Casts, UA None Seen /LPF      Methodology Manual Light Microscopy    Urinalysis With / Culture If Indicated - Urine, Catheter [[id number]]  (Abnormal) Collected:  04/09/18 1746    Specimen:  Urine from Urine, Catheter Updated:  04/09/18 2424      Color, UA Yellow     Appearance, UA Slightly Cloudy (A)     pH, UA 5.5     Specific Gravity, UA 1.010     Glucose, UA Negative     Ketones, UA Negative     Bilirubin, UA Negative     Blood, UA Large (3+) (A)     Protein,  mg/dL (2+) (A)     Leuk Esterase, UA Small (1+) (A)     Nitrite, UA Positive (A)     Urobilinogen, UA 0.2 E.U./dL    Urine Culture - Urine, Urine, Clean Catch [[id number]] Collected:  04/09/18 1746    Specimen:  Urine from Urine, Catheter Updated:  04/09/18 1754    [place] Draw [[id number]] Collected:  04/09/18 1630    Specimen:  Blood Updated:  04/09/18 1731    Narrative:       The following orders were created for panel order [place] Draw.  Procedure                               Abnormality         Status                     ---------                               -----------         ------                     Light Blue Top[[id number]]                                   Final result               Green Top (Gel)[[id number]]                                  Final result               Lavender Top[[id number]]                                     Final result               Gold Top - SST[[id number]]                                   Final result                 Please view results for these tests on the individual orders.    Light Blue Top [[id number]] Collected:  04/09/18 1630    Specimen:  Blood Updated:  04/09/18 1731     Extra Tube hold for add-on     Comment: Auto resulted       Green Top (Gel) [[id number]] Collected:  04/09/18 1630    Specimen:  Blood Updated:  04/09/18 1731     Extra Tube Hold for add-ons.     Comment: Auto resulted.       Lavender Top [[id number]] Collected:  04/09/18 1630    Specimen:  Blood Updated:  04/09/18 1731     Extra Tube hold for add-on     Comment: Auto resulted       Gold Top - SST [[id number]] Collected:  04/09/18 1630    Specimen:  Blood Updated:  04/09/18 1731     Extra Tube Hold for add-ons.     Comment: Auto resulted.       Blood Culture - Blood, [[id number]]  Collected:  04/09/18 1712    Specimen:  Blood from Arm, Right Updated:  04/09/18 1714    Comprehensive Metabolic Panel [[id number]]  (Abnormal) Collected:  04/09/18 1630    Specimen:  Blood Updated:  04/09/18 1712     Glucose 95 mg/dL      BUN 30 (H) mg/dL      Creatinine 0.77 mg/dL      Sodium 133 (L) mmol/L      Potassium 4.5 mmol/L      Comment: Specimen hemolyzed.  Results may be affected.        Chloride 98 mmol/L      CO2 20.8 (L) mmol/L      Calcium 9.5 mg/dL      Total Protein 8.8 (H) g/dL      Albumin 3.60 g/dL      ALT (SGPT) 77 (H) U/L      Comment: Specimen hemolyzed.  Results may be affected.        AST (SGOT) 60 (H) U/L      Comment: Specimen hemolyzed.  Results may be affected.        Alkaline Phosphatase 298 (H) U/L      Total Bilirubin 1.2 mg/dL      eGFR Non African Amer 96 mL/min/1.73      Globulin 5.2 gm/dL      A/G Ratio 0.7 g/dL      BUN/Creatinine Ratio 39.0 (H)     Anion Gap 14.2 mmol/L     Narrative:       The MDRD GFR formula is only valid for adults with stable renal function between ages 18 and 70.    CBC & Differential [[id number]] Collected:  04/09/18 1630    Specimen:  Blood Updated:  04/09/18 1658    Narrative:       The following orders were created for panel order CBC & Differential.  Procedure                               Abnormality         Status                     ---------                               -----------         ------                     Scan Slide[[id number]]                                                                  CBC Auto Differential[[id number]]        Abnormal            Final result                 Please view results for these tests on the individual orders.    Lactic Acid, Plasma [[id number]]  (Normal) Collected:  04/09/18 1631    Specimen:  Blood Updated:  04/09/18 1655     Lactate 1.9 mmol/L     CBC Auto Differential [[id number]]  (Abnormal) Collected:  04/09/18 1630    Specimen:  Blood Updated:  04/09/18 1655     WBC 2.46 (C) 10*3/mm3      RBC 2.39 (L)  10*6/mm3      Hemoglobin 7.7 (L) g/dL      Hematocrit 23.6 (C) %      MCV 98.7 (H) fL      MCH 32.2 (H) pg      MCHC 32.6 g/dL      RDW 23.0 (H) %      RDW-SD 81.0 (H) fl      MPV 13.4 (H) fL      Platelets 52 (L) 10*3/mm3      Neutrophil % 53.3 %      Lymphocyte % 18.3 (L) %      Monocyte % 26.8 (H) %      Eosinophil % 0.0 %      Basophil % 0.0 %      Immature Grans % 1.6 (H) %      Neutrophils, Absolute 1.31 (L) 10*3/mm3      Lymphocytes, Absolute 0.45 (L) 10*3/mm3      Monocytes, Absolute 0.66 10*3/mm3      Eosinophils, Absolute 0.00 (L) 10*3/mm3      Basophils, Absolute 0.00 10*3/mm3      Immature Grans, Absolute 0.04 (H) 10*3/mm3      nRBC 0.0 /100 WBC     Blood Culture - Blood, [[id number]] Collected:  04/09/18 1630    Specimen:  Blood from Hand, Left Updated:  04/09/18 1639          Imaging Results (most recent)     Procedure Component Value Units Date/Time    CT Abdomen Pelvis With Contrast [[id number]] Updated:  04/09/18 2134    XR Chest 1 View [[id number]] Updated:  04/09/18 1753            ECG/EMG Results (most recent)     Procedure Component Value Units Date/Time    ECG 12 Lead [[id number]] Collected:  04/09/18 1645     Updated:  04/09/18 1646            Assessment/Plan     1. Sepsis secondary to suspected UTI with chronic indwelling glasgow with h/o MRSA UTI/cystitis: Patient with fever, tachycardia and tachypnea in ER. Suspected UTI. Recieved Vancomycin and zosyn which I will continue until we at least get a gram stain back on the patient's urine culture given his Hx. Unclear if #2 has any relation to this. Dr. Granados to eval in AM.     2. Enterocutaneous/rectocutaneous fistula and abdominal pain: On CT scan Complex fistulas remains with improvement in tract to midline and no fluid collection seen. Will ask Dr. Granados to see in AM. Continue home PRN percocet as the patient notes this has been helping with his pain. Monitor.    3. MDS with chronic pancytopenia: All levels appear at patient's baseline compared to  previous. Monitor. Patient has been seen and followed by hematology here in the past.     4. Elevated Transaminases and Alk Phos: Bili is NL. Unclear if this may be related to the patient's abdominal pain as he c/o greatest pain in RUQ. This may be related to TPN. Monitor. Dr. Granados will see patient in AM. May need GI involvement. Not resuming TPN tonight as noted in #6 below.    5. Gout: Continue patient's home dose of colchicine. No acute issues currently.    6. Malnutrition secondary to chronic illness: Will resume TPN in AM after wife brings bag from home so we can obtain the correct orders for it. Tonight will place on LR at 50ml/hr. Continue patient's home po supplements and electrolyte replacements.    7. Chronic diastolic dysfunction, PAF and PSVT, Non-rheumatic aortic valve insufficiency, chronic RBBB, MR and AR: Patient no longer on any medications for these issues secondary to chronically low/NL BP and h/o bradycardia. No acute issues here. Monitor.    8. H/o HTN: Patient has not needed meds for this for some time now. BP here is WNL. Monitor.     9. OA and Chronic joint pain (knees): Continue patient's home dose neurontin, voltaren gel and PRN percocet. (he notes the percocet has also been helping control his abdominal pain above).    9. DVT prophy: SCDs. No AC until eval by Dr. Granados in AM.    I discussed the patients findings and my recommendations with patient.     Kenya Marks MD  04/09/18  10:34 PM

## 2018-04-10 NOTE — CONSULTS
General Surgery      Patient Care Team:  David Cedillo MD as PCP - General (Family Medicine)  Anupam Green MD as Consulting Physician (Hematology and Oncology)  Bennie Galo MD as Referring Physician (Orthopedic Surgery)  Kaylie Granados MD as Surgeon (General Surgery)    CHIEF COMPLAINT: Abdominal pain-enterocutaneous fistula    Referring provider: Kenya Hollingsworth M.D.    HISTORY OF PRESENT ILLNESS: Patient is a very well known to me and well-established with the surgical service.  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  He has since then been discharged home with continuous TPN that his family members are administering via a PICC line.  He presented to [facility] yesterday with high-grade fevers, chills, rigors, abdominal discomfort.  He reports most of his abdominal discomfort is in the right subcostal area and right upper quadrant.  He met SIRS and sepsis criteria upon ER admission.  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  He has a chronic indwelling Ayoub catheter with a decompressed diffusely thick-walled urinary bladder and has history of recurrent urinary tract infections.  No evidence of bowel obstruction, no new abscess or fluid collection.  Right lower quadrant ileostomy has been functioning well according to the patient and his wife.  Unfortunately his overall functional status continues to deteriorate.  He has had tremendous decline in his physical strength, he is chronically fatigued and in generalized pain.  He does have myelodysplastic syndrome and has now been transfusion dependent.  He has been admitted to the medical service with consultation to general surgery for further ongoing  care.    I had a very lengthy discussion with him, his wife and son.  Patient has very clearly stated to me that he does not want any aggressive life-saving measures.  He has has informed me that he does not want CPR, ventilation, dialysis or feeding tube.  He is agreeable to IV antibiotics and parenteral nutrition at this time.        Past Medical History:   Diagnosis Date   • Aortic regurgitation    • Aortic valve insufficiency    • Arthritis     Bilateral knee   • Basal cell carcinoma of left hand     sched excision   • Chest pain    • Diastolic dysfunction    • History of GI diverticular bleed 05/2017   • History of transfusion     last 9/12/17 2 units   • History of urinary retention    • History of vertigo    • Hx MRSA infection 07/2017    + culture abadominal wound   • Hypertension    • MDS (myelodysplastic syndrome)    • RBBB (right bundle branch block)    • Self-catheterizes urinary bladder     3-4 times aday   • Skin cancer      Past Surgical History:   Procedure Laterality Date   • APPENDECTOMY     • BACK SURGERY  2008    fusion   • CHOLECYSTECTOMY     • COLON RESECTION N/A 5/26/2017    Procedure: sub-total colectomy with ileo-rectal anastamosis, mobilization of splenic  INTRAOPERATIVE COLONOSCOPY  ([id number]), rigid sigmoidoscopy;  Surgeon: Kaylie Granados MD;  Location: [facility] OR;  Service:    • COLONOSCOPY N/A 2/1/2017    Procedure: COLONOSCOPY;  Surgeon: Bridger Amado MD;  Location: [facility] OR;  Service:    • COLONOSCOPY N/A 5/24/2017    Procedure: COLONOSCOPY w/ polypectomy;  Surgeon: Bridger Amado MD;  Location: [facility] OR;  Service:    • COLONOSCOPY N/A 5/24/2017    Procedure: COLONOSCOPY-return to surgery 2nd procedure, sclerotherapy with epi injection @ 40cm, placement of resolution clips X 2;  Surgeon: Bridger Amado MD;  Location: [facility] OR;  Service:    • CYSTOSCOPY W/ URETERAL STENT PLACEMENT Bilateral 12/21/2017    Procedure: CYSTOSCOPY bilateral  URETERAL CATHETER INSERTION;  Surgeon: Davy Irene MD;  Location: [facility] OR;  Service:    • ENDOSCOPY N/A 5/23/2017    Procedure: ESOPHAGOGASTRODUODENOSCOPY;  Surgeon: Bridger Amado MD;  Location: [facility] OR;  Service:    • EXCISION MASS ARM Left 9/19/2017    Procedure: EXCISION MASS UPPER EXTREMITY  --  wide excision left hand mass;  Surgeon: Kaylie Granados MD;  Location: [facility] OR;  Service:    • EXPLORATORY LAPAROTOMY N/A 5/26/2017    Procedure: LAPAROTOMY EXPLORATORY - Explantation of old hernia mesh;  Surgeon: Kaylie Granados MD;  Location: [facility] OR;  Service:    • EXPLORATORY LAPAROTOMY N/A 12/21/2017    Procedure: LAPAROTOMY EXPLORATORY -  placement of strattice 6x6 small bowel resection of fistulas and anastamosis, oversew of rectal stump ,  extensive lysis of adhesions,  resection of fistulas, small bowel resectionsx2 and side to side anatomosis, ileostomy placement ;  Surgeon: Kaylie Granados MD;  Location: [facility] OR;  Service:    • HERNIA REPAIR      umbilical, inguinal      Family History   Problem Relation Age of Onset   • Diabetes Brother    • Liver cancer Brother    • Heart disease Mother    • Heart attack Mother    • Diabetes Mother    • Heart attack Father    • Other Sister      Brain tumor   • Cancer Sister    • Emphysema Brother    • Cancer Brother    • Alcohol abuse Brother    • Cancer Brother      Social History   Substance Use Topics   • Smoking status: Never Smoker   • Smokeless tobacco: Never Used   • Alcohol use No     Prescriptions Prior to Admission   Medication Sig Dispense Refill Last Dose   • cholestyramine (QUESTRAN) 4 g packet Take 1 packet by mouth 3 (Three) Times a Day With Meals.      • clotrimazole-betamethasone (LOTRISONE) 1-0.05 % cream Apply  topically 2 (Two) Times a Day.      • colchicine 0.6 MG tablet Take 1 tablet by mouth 2 (Two) Times a Day.   4/9/2018 at Unknown time   • cyanocobalamin 1000 MCG/ML injection Inject 1 mL into the shoulder,  thigh, or buttocks Every 28 (Twenty-Eight) Days. (Patient taking differently: Inject 1,000 mcg into the shoulder, thigh, or buttocks Every 28 (Twenty-Eight) Days. Pt does not remember last injection date)      • diclofenac (VOLTAREN) 1 % gel gel Apply 2 g topically 4 (Four) Times a Day.      • folic acid (FOLVITE) 400 MCG tablet Take 400 mcg by mouth Daily.   4/9/2018 at Unknown time   • gabapentin (NEURONTIN) 100 MG capsule Take 100 mg by mouth 3 (Three) Times a Day.      • hydrophor (AQUAPHOR) ointment ointment Apply  topically Every 12 (Twelve) Hours.      • lactobacillus acidophilus (RISAQUAD) capsule capsule Take 1 capsule by mouth Daily.      • lidocaine (LIDODERM) 5 % Place 1 patch on the skin Daily. Remove & Discard patch within 12 hours or as directed by MD   4/9/2018 at Unknown time   • magnesium oxide (MAGOX) 400 (241.3 Mg) MG tablet tablet Take 1 tablet by mouth Daily. 30 each     • Multiple Vitamins-Minerals (MULTIVITAMIN WITH MINERALS) tablet tablet Take 1 tablet by mouth Daily.      • nystatin (MYCOSTATIN) 350937 UNIT/GM powder Apply  topically Every 12 (Twelve) Hours for 647 doses.      • oxyCODONE-acetaminophen (PERCOCET) 7.5-325 MG per tablet Take 1 tablet by mouth Every 4 (Four) Hours As Needed for Moderate Pain  or Severe Pain .   4/9/2018 at 1330   • sucralfate (CARAFATE) 1 g tablet Take 1 tablet by mouth 4 (Four) Times a Day Before Meals & at Bedtime.      • TPN for discharge See most recent TPN order.      • acetaminophen (TYLENOL) 325 MG tablet Take 2 tablets by mouth Every 6 (Six) Hours As Needed for Mild Pain .      • ondansetron (ZOFRAN) 4 MG tablet Take 1 tablet by mouth Every 6 (Six) Hours As Needed for Nausea or Vomiting.      • pantoprazole (PROTONIX) 40 MG EC tablet Take 1 tablet by mouth Daily.      • sodium chloride 0.9 % solution Infuse 20 mL/hr into a venous catheter Continuous.        Allergies:  Review of patient's allergies indicates no known allergies.    REVIEW OF SYSTEMS:   "Please see the above history of present illness for pertinent positives and negatives.  The remainder of the patient's systems have been reviewed and are negative.     Vital Signs  Temp:  [97.3 °F (36.3 °C)-102 °F (38.9 °C)] 97.3 °F (36.3 °C)  Heart Rate:  [] 83  Resp:  [16-24] 18  BP: ()/(48-76) 107/55    Flowsheet Rows    Flowsheet Row First Filed Value   Admission Height 182.8 cm (71.97\") Documented at 04/09/2018 1611   Admission Weight 65.5 kg (144 lb 5 oz) Documented at 04/09/2018 1611           Physical Exam:  Physical Exam   Constitutional:  Chronically ill appearing, malnourished, in no acute distress   HEENT:   Head: Normocephalic and atraumatic.    Eyes:  Pupils are equal, round, and reactive to light.  Mouth and Throat: Patient has moist mucous membranes. Oropharynx is clear of any erythema or exudate.     Neck: Neck supple. No JVD present. No thyromegaly present. No lymphadenopathy present.  Cardiovascular: Regular rate, regular rhythm.  Pulmonary/Chest: Lungs are clear to auscultation bilaterally.  Abdominal: Soft, scaphoid, nondistended, mild tenderness right subcostal and right upper quadrant. Enterocutaneous fistula wound healed.  Right lower quadrant ileostomy viable and functioning well.    Extremities: Plus 1 pitting edema Pulses are palpable in all 4 extremities.  Neurological: Patient is alert and oriented.  Psychological:   Mood and behavior appropriate.  Skin: Skin is warm and dry.     Results Review:    I reviewed the patient's new clinical results.    Results from last 7 days  Lab Units 04/10/18  0405   WBC 10*3/mm3 1.77*   HEMOGLOBIN g/dL 6.9*   HEMATOCRIT % 21.2*   PLATELETS 10*3/mm3 45*       Results from last 7 days  Lab Units 04/10/18  0404   SODIUM mmol/L 137   POTASSIUM mmol/L 3.6   CHLORIDE mmol/L 103   CO2 mmol/L 19.8*   BUN mg/dL 26*   CREATININE mg/dL 0.89   GLUCOSE mg/dL 88   CALCIUM mg/dL 8.9     Lab Results (most recent)     Procedure Component Value Units Date/Time "    Blood Culture - Blood, [[id number]]  (Normal) Collected:  04/09/18 1630    Specimen:  Blood from Hand, Left Updated:  04/10/18 1246     Blood Culture No growth at less than 24 hours    Respiratory Panel, PCR - Swab, Nasopharynx [[id number]]  (Normal) Collected:  04/09/18 2359    Specimen:  Swab from Nasopharynx Updated:  04/10/18 0944     ADENOVIRUS, PCR Not Detected     Coronavirus 229E Not Detected     Coronavirus HKU1 Not Detected     Coronavirus NL63 Not Detected     Coronavirus OC43 Not Detected     Human Metapneumovirus Not Detected     Human Rhinovirus/Enterovirus Not Detected     Influenza B PCR Not Detected     Parainfluenza Virus 1 Not Detected     Parainfluenza Virus 2 Not Detected     Parainfluenza Virus 3 Not Detected     Parainfluenza Virus 4 Not Detected     Bordetella pertussis pcr Not Detected     Influenza A H1 2009 PCR Not Detected     Chlamydophila pneumoniae PCR Not Detected     Mycoplasma pneumo by PCR Not Detected     Influenza A PCR Not Detected     Influenza A H3 Not Detected     Influenza A H1 Not Detected     RSV, PCR Not Detected    Urine Culture - Urine, Urine, Clean Catch [[id number]]  (Abnormal) Collected:  04/09/18 1746    Specimen:  Urine from Urine, Catheter Updated:  04/10/18 0821     Urine Culture --      >100,000 CFU/mL Gram Negative Bacilli (A)    CBC Auto Differential [[id number]]  (Abnormal) Collected:  04/10/18 0405    Specimen:  Blood Updated:  04/10/18 0529     WBC 1.77 (C) 10*3/mm3      RBC 2.12 (L) 10*6/mm3      Hemoglobin 6.9 (C) g/dL      Hematocrit 21.2 (C) %      .0 (H) fL      MCH 32.5 (H) pg      MCHC 32.5 g/dL      RDW 23.7 (H) %      RDW-SD 82.0 (H) fl      MPV 13.2 (H) fL      Platelets 45 (C) 10*3/mm3      Neutrophil % 54.3 %      Lymphocyte % 18.6 (L) %      Monocyte % 25.4 (H) %      Eosinophil % 0.0 %      Basophil % 0.0 %      Immature Grans % 1.7 (H) %      Neutrophils, Absolute 0.96 (L) 10*3/mm3      Lymphocytes, Absolute 0.33 (L) 10*3/mm3       Monocytes, Absolute 0.45 10*3/mm3      Eosinophils, Absolute 0.00 (L) 10*3/mm3      Basophils, Absolute 0.00 10*3/mm3      Immature Grans, Absolute 0.03 10*3/mm3      nRBC 0.0 /100 WBC     Blood Culture - Blood, [[id number]]  (Normal) Collected:  04/09/18 1712    Specimen:  Blood from Arm, Right Updated:  04/10/18 0516     Blood Culture No growth at less than 24 hours    Comprehensive Metabolic Panel [[id number]]  (Abnormal) Collected:  04/10/18 0404    Specimen:  Blood Updated:  04/10/18 0504     Glucose 88 mg/dL      BUN 26 (H) mg/dL      Creatinine 0.89 mg/dL      Sodium 137 mmol/L      Potassium 3.6 mmol/L      Chloride 103 mmol/L      CO2 19.8 (L) mmol/L      Calcium 8.9 mg/dL      Total Protein 7.8 g/dL      Albumin 3.20 (L) g/dL      ALT (SGPT) 69 (H) U/L      AST (SGOT) 47 (H) U/L      Alkaline Phosphatase 269 (H) U/L      Total Bilirubin 1.5 (H) mg/dL      eGFR Non African Amer 81 mL/min/1.73      Globulin 4.6 gm/dL      A/G Ratio 0.7 g/dL      BUN/Creatinine Ratio 29.2 (H)     Anion Gap 14.2 mmol/L     Narrative:       The MDRD GFR formula is only valid for adults with stable renal function between ages 18 and 70.    Urinalysis, Microscopic Only - Urine, Clean Catch [[id number]]  (Abnormal) Collected:  04/09/18 1746    Specimen:  Urine from Urine, Catheter Updated:  04/09/18 1810     RBC, UA 21-30 (A) /HPF      WBC, UA 6-12 (A) /HPF      Bacteria, UA 3+ (A) /HPF      Squamous Epithelial Cells, UA 0-2 /HPF      Hyaline Casts, UA None Seen /LPF      Methodology Manual Light Microscopy    Urinalysis With / Culture If Indicated - Urine, Catheter [[id number]]  (Abnormal) Collected:  04/09/18 1746    Specimen:  Urine from Urine, Catheter Updated:  04/09/18 1754     Color, UA Yellow     Appearance, UA Slightly Cloudy (A)     pH, UA 5.5     Specific Gravity, UA 1.010     Glucose, UA Negative     Ketones, UA Negative     Bilirubin, UA Negative     Blood, UA Large (3+) (A)     Protein,  mg/dL (2+) (A)     Leuk  Esterase, UA Small (1+) (A)     Nitrite, UA Positive (A)     Urobilinogen, UA 0.2 E.U./dL    [place] Draw [[id number]] Collected:  04/09/18 1630    Specimen:  Blood Updated:  04/09/18 1731    Narrative:       The following orders were created for panel order [place] Draw.  Procedure                               Abnormality         Status                     ---------                               -----------         ------                     Light Blue Top[[id number]]                                   Final result               Green Top (Gel)[[id number]]                                  Final result               Lavender Top[[id number]]                                     Final result               Gold Top - SST[[id number]]                                   Final result                 Please view results for these tests on the individual orders.    Light Blue Top [[id number]] Collected:  04/09/18 1630    Specimen:  Blood Updated:  04/09/18 1731     Extra Tube hold for add-on     Comment: Auto resulted       Green Top (Gel) [[id number]] Collected:  04/09/18 1630    Specimen:  Blood Updated:  04/09/18 1731     Extra Tube Hold for add-ons.     Comment: Auto resulted.       Lavender Top [[id number]] Collected:  04/09/18 1630    Specimen:  Blood Updated:  04/09/18 1731     Extra Tube hold for add-on     Comment: Auto resulted       Gold Top - SST [[id number]] Collected:  04/09/18 1630    Specimen:  Blood Updated:  04/09/18 1731     Extra Tube Hold for add-ons.     Comment: Auto resulted.       Comprehensive Metabolic Panel [[id number]]  (Abnormal) Collected:  04/09/18 1630    Specimen:  Blood Updated:  04/09/18 1712     Glucose 95 mg/dL      BUN 30 (H) mg/dL      Creatinine 0.77 mg/dL      Sodium 133 (L) mmol/L      Potassium 4.5 mmol/L      Comment: Specimen hemolyzed.  Results may be affected.        Chloride 98 mmol/L      CO2 20.8 (L) mmol/L      Calcium 9.5 mg/dL      Total Protein 8.8 (H) g/dL      Albumin 3.60 g/dL       ALT (SGPT) 77 (H) U/L      Comment: Specimen hemolyzed.  Results may be affected.        AST (SGOT) 60 (H) U/L      Comment: Specimen hemolyzed.  Results may be affected.        Alkaline Phosphatase 298 (H) U/L      Total Bilirubin 1.2 mg/dL      eGFR Non African Amer 96 mL/min/1.73      Globulin 5.2 gm/dL      A/G Ratio 0.7 g/dL      BUN/Creatinine Ratio 39.0 (H)     Anion Gap 14.2 mmol/L     Narrative:       The MDRD GFR formula is only valid for adults with stable renal function between ages 18 and 70.    CBC & Differential [[id number]] Collected:  04/09/18 1630    Specimen:  Blood Updated:  04/09/18 1658    Narrative:       The following orders were created for panel order CBC & Differential.  Procedure                               Abnormality         Status                     ---------                               -----------         ------                     Scan Slide[[id number]]                                                                  CBC Auto Differential[[id number]]        Abnormal            Final result                 Please view results for these tests on the individual orders.    Lactic Acid, Plasma [[id number]]  (Normal) Collected:  04/09/18 1631    Specimen:  Blood Updated:  04/09/18 1655     Lactate 1.9 mmol/L     CBC Auto Differential [[id number]]  (Abnormal) Collected:  04/09/18 1630    Specimen:  Blood Updated:  04/09/18 1655     WBC 2.46 (C) 10*3/mm3      RBC 2.39 (L) 10*6/mm3      Hemoglobin 7.7 (L) g/dL      Hematocrit 23.6 (C) %      MCV 98.7 (H) fL      MCH 32.2 (H) pg      MCHC 32.6 g/dL      RDW 23.0 (H) %      RDW-SD 81.0 (H) fl      MPV 13.4 (H) fL      Platelets 52 (L) 10*3/mm3      Neutrophil % 53.3 %      Lymphocyte % 18.3 (L) %      Monocyte % 26.8 (H) %      Eosinophil % 0.0 %      Basophil % 0.0 %      Immature Grans % 1.6 (H) %      Neutrophils, Absolute 1.31 (L) 10*3/mm3      Lymphocytes, Absolute 0.45 (L) 10*3/mm3      Monocytes, Absolute 0.66 10*3/mm3       Eosinophils, Absolute 0.00 (L) 10*3/mm3      Basophils, Absolute 0.00 10*3/mm3      Immature Grans, Absolute 0.04 (H) 10*3/mm3      nRBC 0.0 /100 WBC           Imaging Results (most recent)     Procedure Component Value Units Date/Time    US Liver [[id number]] Collected:  04/10/18 1407     Updated:  04/10/18 1412    Narrative:       ULTRASOUND ABDOMEN, LIMITED, 4/10/2018:     HISTORY:  84-year-old male admitted to the hospital with sepsis and urinary tract  infection. Prior GI tract surgeries with fistulae. Elevated liver  enzymes are noted.     TECHNIQUE:  Grayscale ultrasound imaging of the right upper quadrant was performed.     COMPARISON:  *  CT abdomen/pelvis, 4/9/2018.     FINDINGS:  The gallbladder is surgically absent. There is no intrahepatic or extra  hepatic bile duct dilatation (CBD 5 mm).      Liver is normal in ultrasound appearance. No ascites in the right upper  abdomen. Pancreas is partially obscured by overlying bowel gas but is  negative where seen. Right kidney is negative with no hydronephrosis.       Impression:       1. Cholecystectomy. No bile duct dilatation.  2. Negative liver.     This report was finalized on 4/10/2018 2:10 PM by Dr. Chencho Henderson MD.       XR Chest 1 View [[id number]] Collected:  04/10/18 0824     Updated:  04/10/18 0828    Narrative:       CHEST X-RAY, 4/9/2018:     HISTORY:   84-year-old male admitted to the hospital with sepsis, urinary tract  infection and abdominal pain. Postop bowel surgery with enterocutaneous  and enterovesical fistulae.     TECHNIQUE:  AP upright chest x-ray.     FINDINGS:  Right arm PICC is in good position with tip in the upper SVC.     Fibrosis in both lung bases, greater on the right, unchanged since  2/28/2018 and best seen on prior CT studies. No new pulmonary  infiltrate, visible airspace consolidation or pleural effusion. Heart  size and pulmonary vascularity are normal.       Impression:       1. No active disease.  2. Chronic  bibasilar fibrosis.  3. Right arm PICC in good position.     This report was finalized on 4/10/2018 8:25 AM by Dr. Chencho Henderson MD.       CT Abdomen Pelvis With Contrast [[id number]] Collected:  04/10/18 0623     Updated:  04/10/18 0635    Narrative:       CT ABDOMEN AND PELVIS WITH CONTRAST, 4/9/2018:     HISTORY:  84-year-old male with complex surgical history including subtotal  colectomy with enterocutaneous and enterovesical fistula formation,  subsequent diverting ileostomy and oversewing of the rectal stump. 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 He presents to the ED with sepsis, urinary tract infection and  new onset abdomen pain today.     TECHNIQUE:  CT examination of the abdomen and pelvis with IV contrast. Radiation  dose reduction techniques included automated exposure control or  exposure modulation based on body size. Radiation audit for CT and  nuclear cardiology exams in the last 12 months: 6.     COMPARISON:  *  CT abdomen/pelvis, 2/27/2018 and 12/18/2017.     ABDOMEN FINDINGS:  There is a persistent complex enterocutaneous fistula extending between  a cavity at the rectal stump to the left anterior pelvic wall, although  the caliber and extent of the fistulous tract appears slightly smaller  than on the most recent prior exam. Air remains present within the  anterosuperior bladder wall but is also an intimate communication with  the rectal stump cavity, likely representing a persistent bladder  fistula. Ayoub catheter is present within a decompressed, diffusely  thick-walled urinary bladder. There is no bowel dilatation to suggest  obstruction proximal or distal to the right lower quadrant ileostomy. No  undrained collection is identified to suggest a new or enlarging  abscess.     Today, there is no evidence of upper urinary tract obstruction, and both  kidneys enhance normally.     Stable splenomegaly (history of myelodysplastic syndrome). Liver  and  pancreas are in appearance. Abdominal aorta is normal in caliber.     PELVIS FINDINGS:  Bladder, rectal stump and enterovesicle fistula as noted above. Low  rectum is normal. No undrained pelvic fluid collection.     Limited lung base images show chronic pulmonary fibrosis and stable  ectatic aortic root. No pleural effusion.       Impression:       1. Enterocutaneous fistula and likely enterovesicle fistula remain  present, although appearing slightly smaller than on 2/27/2018.  2. Indwelling Ayoub catheter within a decompressed, diffusely  thick-walled urinary bladder. No evidence of upper urinary tract  obstruction or pyelonephritis at this time.  3. No bowel dilatation to suggest obstruction. Right lower quadrant  ileostomy.  4. No new abscess or other undrained fluid collection is identified.  5. Stable splenomegaly and patient with myelodysplastic syndrome.  6. Stable ectatic aortic root.  7. Initial stat report to the ED from Dr. Reji Kumari at 1810 hours on  4/9/2018.     This report was finalized on 4/10/2018 6:33 AM by Dr. Chencho Henderson MD.           reviewed    ECG/EMG Results (most recent)     Procedure Component Value Units Date/Time    ECG 12 Lead [[id number]] Collected:  04/09/18 1645     Updated:  04/10/18 1245    Narrative:       RR Interval= 625 ms  MO Interval= 164 ms  QRSD Interval= 136 ms  QT Interval= 384 ms  QTc Interval= 486 ms  Heart Rate= 96 ms  P Axis= -40 deg  QRS Axis= -31 deg  T Wave Axis= 19 deg  I: 40 Axis= 11 deg  T: 40 Axis= 192 deg  ST Axis= -10 deg  SINUS RHYTHM  RBBB AND LAFB  NO SIGNIFICANT CHANGE FROM PREVIOUS ECG  Electronically Signed by:  Abimael Laguerre ([facility]) 10-Apr-2018 12:43:23  Date and Time of Study: 2018-04-09 16:45:20        reviewed    Assessment/Plan     Enterocutaneous fistula - smaller  Would recommend continue bowel rest/nothing by mouth/TPN  TPN has been continued  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 Irene  Continue  Ayoub catheter for now    Sepsis/Sirs/fevers  History of multiple drug resistant abdominal infections as well as MRSA UTI/cystitis  Continue antibiotics  Follow up on blood cultures,  Severe protein malnutrition and elevated transaminases/total bilirubin and alkaline phosphatase  Would continue to monitor LFTs closely.    Myelodysplastic syndrome with Pancytopenia - H&H decreased receiving blood transfusions.  Recommend consultation with hematology    Unfortunately he is not a surgical candidate would continue to maximize medical management at this time.    End-of-life issues discussed with patient and his wife -  he has requested to be DNR.  Will consult spiritual care.      Plan of care discussed with patient, nursing staff and hospitalist team-Dr. Easmtan and Muna Granados MD  04/10/18  2:41 PM

## 2018-04-10 NOTE — PHARMACY RECOMMENDATION
Pharmacy Vancomycin Renal dosing consult  Initiate dose at 750 mg IVPB every 12 hours  .  Serum creatinine will be ordered per policy.  Plan for trough as patient approaches steady state, prior to the 4 th dose.  Due to infection severity, will target a trough of 15-20.   Pharmacy will continue to follow the patient’s culture results and clinical progress daily.  Day Of therapy:2   Next level is due: 08:00 Wednesday 4/11/18   WBC= 1.77   Platelets=45   Scr = 0.77   Est CrCL= 56.5

## 2018-04-10 NOTE — CONSULTS
Malnutrition Severity Assessment    Patient Name:  Jason Zelaya  YOB: 1934  MRN: [id number]  Admit Date:  4/9/2018    Patient meets criteria for : Severe malnutrition    Comments:  Pt physical fat and muscle loss visible.     Malnutrition Type: Chronic Illness Malnutrition     Malnutrition Type (last 8 hours)      Malnutrition Severity Assessment     Row Name 04/10/18 1148       Malnutrition Severity Assessment    Malnutrition Type Chronic Illness Malnutrition    Row Name 04/10/18 1148       Physical Signs of Malnutrition (Chronic)    Muscle Wasting Severe    Fat Loss Severe    Row Name 04/10/18 1148       Energy Intake Status (Chronic)    Energy Intake --   pt reliant on TPN recently    Row Name 04/10/18 1148       Criteria Met (Must meet criteria for severity in at least 2 of these categories: M Wasting, Fat Loss, Fluid, Secondary Signs, Wt. Status, Intake)    Patient meets criteria for  Severe malnutrition          Electronically signed by:  Roseanne Clayton RD  04/10/18 12:00 PM

## 2018-04-10 NOTE — CONSULTS
Adult Nutrition  Assessment/PES    Patient Name:  Jason Zelaya  YOB: 1934  MRN: [id number]  Admit Date:  4/9/2018    Assessment Date:  4/10/2018    Recommend: Standard Clinamex (D20%, 5% AA, 3% IL) goal 80 ml/hr to provide 2265 kcal, 96 gm pro (noted plan to hold IL with elevated LFT today per ARNP)   Monitor Mg, Phos, K+, BG (this will provide more kcal/pro than home TPN due to cont to appear physically malnourished)   Noted oral MVT, Magox, and folic acid may need to adjust these or the TPN as indicated          Adult Nutrition Assessment     Row Name 04/10/18 1148       Nutrition Prescription PO    Current PO Diet NPO       Fluid Intake Evaluation    Oral Fluid (mL) --   insufficient data     IV Fluid (mL) --   insufficient data       PO Evaluation    Number of Days PO Intake Evaluated Insufficient Data       Malnutrition Severity Assessment    Malnutrition Type Chronic Illness Malnutrition       Physical Signs of Malnutrition (Chronic)    Muscle Wasting Severe    Fat Loss Severe       Weight Status (Chronic)    Energy Intake --   pt reliant on TPN recently       Criteria Met (Must meet criteria for severity in at least 2 of these categories: M Wasting, Fat Loss, Fluid, Secondary Signs, Wt. Status, Intake)    Patient meets criteria for  Severe malnutrition    Row Name 04/10/18 1137       Physical Findings    Overall Physical Appearance loss of muscle mass;loss of subcutaneous fat       Calculation Measurements    Weight Used For Calculations 64.7 kg (142 lb 10.2 oz)       Estimated/Assessed Needs    Additional Documentation Calorie Requirements (Group);Fluid Requirements (Group);Protein Requirements (Group)       Calorie Requirements    Estimated Calorie Need Method [place]    Estimated Calorie Requirement Comment 7586-9012 kcal (mifflin x 1.4-35 kcal/kg)    254 gm CHO, 45% kcal                                                                            Protein Requirements    Est  Protein Requirement Amount (gms/kg) 1.2 gm protein    Estimated Protein Requirements (gms/day) 77.64       Fluid Requirements    Estimated Fluid Requirements (mL/day) 1940   30 ml/kg, c/w with 1900 ml CHF guidelines, noted hx CHF    Estimated Fluid Requirement Method other (see comments)    RDA Method (mL) 1940    Pancho-Bill Method (over 20 kg) 2794    Row Name 04/10/18 1136       Anthropometrics    Weight 64.7 kg (142 lb 10.2 oz)       Body Mass Index (BMI)    BMI Assessment BMI 18.5-24.9: normal       Labs/Procedures/Meds    Lab Results Reviewed reviewed   meds reviewed: folic  acid, magox, MVT, ABX, Risaquad noted    Lab Results Comments ast/ALT elevated    Row Name 04/10/18 1130       Nutrition/Diet History    Typical Food/Fluid Intake Spoke w pt, wife, and son. NKFA. Pt c/o fever. Wife reports UTI on admit likely cause of fever. Pt takes sips of water at home with meds. Wife feels weight has stablized.    Row Name 04/10/18 1129       Reason for Assessment    Reason For Assessment nurse/nurse practitioner consult    Diagnosis other (see comments)   admit: fever, abd pain hx EC fistula, ostomy, MDS, CHF, PAF, TPN at home since being d/c [facility] 2 weeks ago    Identified At Risk by Screening Criteria MST SCORE 2+          Problem/Interventions:        Problem 1     Row Name 04/10/18 1151       Nutrition Diagnoses Problem 1    Problem 1 Malnutrition    Etiology (related to) Factors Affecting Nutrition    Signs/Symptoms (evidenced by) NPO;Other (comment)   TPN for nutrition needs                    Intervention Goal     Row Name 04/10/18 1151       Intervention Goal    TF/PN Inititiate TF/PN    Weight No significant weight loss            Nutrition Intervention     Row Name 04/10/18 1151       Nutrition Intervention    RD/Tech Action Follow Tx progress            Nutrition Prescription     Row Name 04/10/18 1151       Nutrition Prescription PN    Parenteral Prescription PN begin/change    Dextrose Concentration  (%) 20 %    Amino Acid Concentration (%) 5.0%    PN Goal Rate (mL/hr) 80 mL/hr    Lipid Volume (%) 3.0%    Lipid Frequency --   daily once LFT improved            Education/Evaluation     Row Name 04/10/18 9073       Education    Education Other (comment)   Spoke w pt, son, & spouse. All aware of TPN, no current edu needs.        Monitor/Evaluation    Monitor Per protocol;I&O;PO intake;Pertinent labs;PN delivery/tolerance;Symptoms;Weight   likely d/c home with TPN unless care goals are modified        Electronically signed by:  Roseanne Clayton RD  04/10/18 11:53 AM

## 2018-04-11 NOTE — PROGRESS NOTES
"SERVICE: [facility] HOSPITALIST    CONSULTANTS: Surgery/Hem-Onc    CHIEF COMPLAINT: f/u fevers/generalized pain/pancytopenia    SUBJECTIVE: Staff notes persistent fevers overnight, pain all over body.  Mr. Simmons is noted to be moaning in pain however he reports that when he gets the IV pain medication, he is relieved for a short time.  He complains of generalized body pain and general malaise.  He denies nausea, vomiting, change in stools out of ostomy however he does report in the past some pill fragments and at times whole pills noted in his ostomy bag.  He continues to feel weak and tired and miserable.  OBJECTIVE:    /57   Pulse 92   Temp (!) 101.4 °F (38.6 °C) (Oral)   Resp 22   Ht 177.8 cm (70\")   Wt 64.7 kg (142 lb 10.2 oz)   SpO2 96%   BMI 20.47 kg/m²     MEDS/LABS REVIEWED AND ORDERED    acetylcysteine 150 mg/kg Intravenous Once   Followed by      acetylcysteine 50 mg/kg Intravenous Once   Followed by      acetylcysteine 100 mg/kg Intravenous Once   cefepime 1 g Intravenous Q12H   cholestyramine 1 packet Oral TID With Meals   clotrimazole-betamethasone  Topical Q12H   diclofenac 2 g Topical 4x Daily   folic acid 400 mcg Oral Daily   gabapentin 100 mg Oral TID   hydrophor  Topical Q12H   lactobacillus acidophilus 1 capsule Oral Daily   lidocaine 1 patch Transdermal Q24H   magnesium oxide 400 mg Oral Daily   metroNIDAZOLE 500 mg Intravenous Q8H   multivitamin with minerals 1 tablet Oral Daily   pantoprazole 40 mg Oral Daily   sucralfate 1 g Oral 4x Daily AC & at Bedtime   vancomycin 750 mg Intravenous Q12H     Physical Exam   Constitutional: He is oriented to person, place, and time.   thin   HENT:   Head: Normocephalic and atraumatic.   edentulous   Eyes: EOM are normal. Pupils are equal, round, and reactive to light.   Cardiovascular: Normal rate.    Irregular rhythm, PACs noted on monitor   Pulmonary/Chest: Effort normal and breath sounds normal. No respiratory distress. He " has no wheezes. He has no rales.   Abdominal: Soft. Bowel sounds are normal. He exhibits no distension. There is no tenderness. There is no guarding.   Musculoskeletal: He exhibits no edema.   Neurological: He is alert and oriented to person, place, and time.   Skin: Skin is warm and dry. No erythema.   Scabbed area noted over middle of abdominal incision, no drainage noted.    Psychiatric: He has a normal mood and affect. His behavior is normal.   Vitals reviewed.    LAB/DIAGNOSTICS:    Lab Results (last 24 hours)     Procedure Component Value Units Date/Time    Urine Culture - Urine, Urine, Clean Catch [[id number]]  (Abnormal)  (Susceptibility) Collected:  04/09/18 1746    Specimen:  Urine from Urine, Catheter Updated:  04/11/18 0933     Urine Culture --      >100,000 CFU/mL Pseudomonas aeruginosa (A)    Susceptibility      Pseudomonas aeruginosa     JULISA     Amikacin <=2 ug/ml Susceptible     Cefepime 2 ug/ml Susceptible     Ceftazidime 4 ug/ml Susceptible     Ciprofloxacin <=0.25 ug/ml Susceptible     Gentamicin <=1 ug/ml Susceptible     Levofloxacin 1 ug/ml Susceptible     Meropenem <=0.25 ug/ml Susceptible     Piperacillin + Tazobactam 8 ug/ml Susceptible     Tobramycin <=1 ug/ml Susceptible                    Procalcitonin [[id number]]  (Abnormal) Collected:  04/11/18 0715    Specimen:  Blood Updated:  04/11/18 0825     Procalcitonin 0.64 (C) ng/mL     Narrative:       As a Marker for Sepsis (Non-Neonates):   1. <0.5 ng/mL represents a low risk of severe sepsis and/or septic shock.  2. >2 ng/mL represents a high risk of severe sepsis and/or septic shock.    As a Marker for Lower Respiratory Tract Infections that require antibiotic therapy:    PCT on Admission     Antibiotic Therapy       6-12 Hrs later  > 0.5                Strongly Recommended             >0.25 - <0.5         Recommended  0.1 - 0.25           Discouraged              Remeasure/reassess PCT  <0.1                 Strongly Discouraged      Remeasure/reassess PCT                     PCT values of < 0.5 ng/mL do not exclude an infection, because localized infections (without systemic signs) may be associated with such low concentrations, or a systemic infection in its initial stages (< 6 hours). Furthermore, increased PCT can occur without infection. PCT concentrations between 0.5 and 2.0 ng/mL should be interpreted taking into account the patient's history. It is recommended to retest PCT within 6-24 hours if any concentrations < 2 ng/mL are obtained.    Blood Culture - Blood, [[id number]] Collected:  04/11/18 0816    Specimen:  Blood from Hand, Right Updated:  04/11/18 0816    Blood Culture - Blood, [[id number]] Collected:  04/11/18 0815    Specimen:  Blood from Hand, Right Updated:  04/11/18 0816    Comprehensive Metabolic Panel [[id number]]  (Abnormal) Collected:  04/11/18 0715    Specimen:  Blood Updated:  04/11/18 0740     Glucose 123 (H) mg/dL      BUN 26 (H) mg/dL      Creatinine 0.92 mg/dL      Sodium 133 (L) mmol/L      Potassium 3.2 (L) mmol/L      Chloride 100 mmol/L      CO2 18.7 (L) mmol/L      Calcium 9.1 mg/dL      Total Protein 8.1 g/dL      Albumin 3.00 (L) g/dL      ALT (SGPT) 68 (H) U/L      AST (SGOT) 58 (H) U/L      Alkaline Phosphatase 243 (H) U/L      Total Bilirubin 1.8 (H) mg/dL      eGFR Non African Amer 78 mL/min/1.73      Globulin 5.1 gm/dL      A/G Ratio 0.6 g/dL      BUN/Creatinine Ratio 28.3 (H)     Anion Gap 14.3 mmol/L     Narrative:       The MDRD GFR formula is only valid for adults with stable renal function between ages 18 and 70.    Magnesium [[id number]]  (Abnormal) Collected:  04/11/18 0715    Specimen:  Blood Updated:  04/11/18 0738     Magnesium 1.6 (L) mg/dL     Vancomycin, Random [[id number]]  (Normal) Collected:  04/11/18 0712    Specimen:  Blood Updated:  04/11/18 0738     Vancomycin Random 15.50 mcg/mL     Phosphorus [[id number]]  (Normal) Collected:  04/11/18 0712    Specimen:  Blood Updated:  04/11/18 0736      Phosphorus 3.2 mg/dL     CBC & Differential [[id number]] Collected:  04/11/18 0715    Specimen:  Blood Updated:  04/11/18 0721    Narrative:       The following orders were created for panel order CBC & Differential.  Procedure                               Abnormality         Status                     ---------                               -----------         ------                     Scan Slide[[id number]]                                                                  CBC Auto Differential[[id number]]        Abnormal            Final result                 Please view results for these tests on the individual orders.    CBC Auto Differential [[id number]]  (Abnormal) Collected:  04/11/18 0715    Specimen:  Blood Updated:  04/11/18 0721     WBC 1.53 (C) 10*3/mm3      RBC 2.57 (L) 10*6/mm3      Hemoglobin 8.3 (L) g/dL      Hematocrit 25.2 (L) %      MCV 98.1 (H) fL      MCH 32.3 (H) pg      MCHC 32.9 g/dL      RDW 22.8 (H) %      RDW-SD 78.7 (H) fl      MPV 13.4 (H) fL      Platelets 37 (C) 10*3/mm3      Neutrophil % 34.7 (L) %      Lymphocyte % 20.9 %      Monocyte % 39.2 (H) %      Eosinophil % 0.0 %      Basophil % 0.0 %      Immature Grans % 5.2 (H) %      Neutrophils, Absolute 0.53 (L) 10*3/mm3      Lymphocytes, Absolute 0.32 (L) 10*3/mm3      Monocytes, Absolute 0.60 10*3/mm3      Eosinophils, Absolute 0.00 (L) 10*3/mm3      Basophils, Absolute 0.00 10*3/mm3      Immature Grans, Absolute 0.08 (H) 10*3/mm3      nRBC 0.0 /100 WBC     Blood Culture - Blood, [[id number]]  (Normal) Collected:  04/09/18 1712    Specimen:  Blood from Arm, Right Updated:  04/10/18 1716     Blood Culture No growth at 24 hours    Blood Culture - Blood, [[id number]]  (Normal) Collected:  04/09/18 1630    Specimen:  Blood from Hand, Left Updated:  04/10/18 1646     Blood Culture No growth at 24 hours    Comprehensive Metabolic Panel [[id number]]  (Abnormal) Collected:  04/10/18 1528    Specimen:  Blood Updated:  04/10/18 1548     Glucose 78  mg/dL      BUN 24 (H) mg/dL      Creatinine 0.93 mg/dL      Sodium 135 (L) mmol/L      Potassium 3.5 mmol/L      Chloride 101 mmol/L      CO2 21.6 (L) mmol/L      Calcium 8.8 mg/dL      Total Protein 7.4 g/dL      Albumin 3.00 (L) g/dL      ALT (SGPT) 69 (H) U/L      AST (SGOT) 55 (H) U/L      Alkaline Phosphatase 261 (H) U/L      Total Bilirubin 1.6 (H) mg/dL      eGFR Non African Amer 77 mL/min/1.73      Globulin 4.4 gm/dL      A/G Ratio 0.7 g/dL      BUN/Creatinine Ratio 25.8 (H)     Anion Gap 12.4 mmol/L     Narrative:       The MDRD GFR formula is only valid for adults with stable renal function between ages 18 and 70.    Magnesium [[id number]]  (Normal) Collected:  04/10/18 1528    Specimen:  Blood Updated:  04/10/18 1548     Magnesium 1.7 mg/dL     CBC & Differential [[id number]] Collected:  04/10/18 1528    Specimen:  Blood Updated:  04/10/18 1536    Narrative:       The following orders were created for panel order CBC & Differential.  Procedure                               Abnormality         Status                     ---------                               -----------         ------                     Scan Slide[[id number]]                                                                  CBC Auto Differential[[id number]]        Abnormal            Final result                 Please view results for these tests on the individual orders.    CBC Auto Differential [[id number]]  (Abnormal) Collected:  04/10/18 1528    Specimen:  Blood Updated:  04/10/18 1535     WBC 0.98 (C) 10*3/mm3      RBC 2.38 (L) 10*6/mm3      Hemoglobin 7.7 (L) g/dL      Hematocrit 23.2 (C) %      MCV 97.5 (H) fL      MCH 32.4 (H) pg      MCHC 33.2 g/dL      RDW 22.5 (H) %      RDW-SD 77.0 (H) fl      MPV 13.2 (H) fL      Platelets 41 (C) 10*3/mm3      Neutrophil % 36.6 (L) %      Lymphocyte % 27.6 %      Monocyte % 32.7 (H) %      Eosinophil % 0.0 %      Basophil % 0.0 %      Immature Grans % 3.1 (H) %      Neutrophils, Absolute 0.36 (L)  10*3/mm3      Lymphocytes, Absolute 0.27 (L) 10*3/mm3      Monocytes, Absolute 0.32 10*3/mm3      Eosinophils, Absolute 0.00 (L) 10*3/mm3      Basophils, Absolute 0.00 10*3/mm3      Immature Grans, Absolute 0.03 10*3/mm3      nRBC 0.0 /100 WBC         ECG 12 Lead   Final Result   RR Interval= 625 ms   WY Interval= 164 ms   QRSD Interval= 136 ms   QT Interval= 384 ms   QTc Interval= 486 ms   Heart Rate= 96 ms   P Axis= -40 deg   QRS Axis= -31 deg   T Wave Axis= 19 deg   I: 40 Axis= 11 deg   T: 40 Axis= 192 deg   ST Axis= -10 deg   SINUS RHYTHM   RBBB AND LAFB   NO SIGNIFICANT CHANGE FROM PREVIOUS ECG   Electronically Signed by:  Abimael Laguerre ([facility], [place]) 10-Apr-2018 12:43:23   Date and Time of Study: 2018-04-09 16:45:20        Results for orders placed during the hospital encounter of 12/15/17   Adult Transthoracic Echo Complete W/ Cont if Necessary Per Protocol    Narrative · Left ventricular wall thickness is consistent with mild concentric   hypertrophy.  · Left ventricular systolic function is moderately decreased. Estimated EF   = 38%.  · Mild aortic valve regurgitation is present.  · Moderate mitral valve regurgitation is present  · Mild tricuspid valve regurgitation is present.        Ct Abdomen Pelvis With Contrast    Result Date: 4/10/2018  1. 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 2. Indwelling Ayoub catheter within a decompressed, diffusely thick-walled urinary bladder. No evidence of upper urinary tract obstruction or pyelonephritis at this time. 3. No bowel dilatation to suggest obstruction. Right lower quadrant ileostomy. 4. No new abscess or other undrained fluid collection is identified. 5. Stable splenomegaly and patient with myelodysplastic syndrome. 6. Stable ectatic aortic root. 7. Initial stat report to the ED from Dr. Reji Kumari at 1810 hours on 4/9/2018.  This report was finalized on 4/10/2018 6:33 AM by   Chencho Henderson MD.      Us Liver    Result Date: 4/10/2018  1. Cholecystectomy. No bile duct dilatation. 2. Negative liver.  This report was finalized on 4/10/2018 2:10 PM by Dr. Chencho Henderson MD.      Xr Chest 1 View    Result Date: 4/10/2018  1. No active disease. 2. Chronic bibasilar fibrosis. 3. Right arm PICC in good position.  This report was finalized on 4/10/2018 8:25 AM by Dr. Chencho Henderson MD.      ASSESSMENT/PLAN:  1. Sepsis secondary to pseudomonas UTI with chronic indwelling glasgow with h/o MRSA UTI/cystitis:   2. Persistent fevers: Dr. Granados following/consult ID  Continue Vancomycin  Add flagyl, repeat BC x 2 x PICC and peripheral  D/C Zosyn, add Cefepime  De-escalate as able  Consider viral/fungal sources for infection, will d/w ID    3. 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 Severe malnutrition secondary to chronic illness:  5. Transaminitis/hyperbilirubinemia/elevated alk phos:  6. Electrolyte inbalance: add electrolyte protocol   Continue clinimix TPN, resume lipids as able based on LFTs  Increase dilaudid to every 2 hours as needed, increase tylenol to every 6 hours as needed  CT does not note any new fistulas or fluid collections, no mention of liver/duct findings.  US liver unrevealing  Monitor closely     7. Acute on chronic pancytopenia/MDS: Consult hematology-oncology   S/P 1 unit PRBCs, hemoglobin 8.3 this am  All cell lines low, slightly improved this morning after blood transfusion last night  As above will treat as neutropenic fever, pan-cultures, consider fungal/viral sources     8. Gout: Continues to complain of severe bilateral knee pain,  D/C colchicine for now, monitor LFTs     9. Chronic diastolic dysfunction, PAF and PSVT, Non-rheumatic aortic valve insufficiency, chronic RBBB, MR and AR:   No acute issues currently, NSR with PACs     10. HTN: Blood pressure at goal     11. OA, chronic knee pain:   Continue on Neurontin, Voltaren,  Percocet      Plan: discussed with Dr. Granados and family  Await ID/hem-onc input, moving to ICU

## 2018-04-11 NOTE — ACP (ADVANCE CARE PLANNING)
Completed living will with patient, wife and family present.  Copy scanned to chart.    Addie Oviedo

## 2018-04-11 NOTE — PROGRESS NOTES
General Surgery Progress Note    Chief Complaint:  Follow-up fever, abdominal pain      Interval History: Febrile overnight.  Complaining of generalized pain but worse in knees and feet.  Did receive PRBCs yesterday.  He has now been made DNR/DNI.    His blood cultures are still pending.  Urine culture noted.    Objective     Vital Signs  Temp:  [97.3 °F (36.3 °C)-102.8 °F (39.3 °C)] 102.8 °F (39.3 °C)  Heart Rate:  [] 113  Resp:  [18-24] 18  BP: ()/(52-74) 139/64  Body mass index is 20.47 kg/m².    Intake/Output Summary (Last 24 hours) at 04/11/18 0822  Last data filed at 04/11/18 0811   Gross per 24 hour   Intake             1810 ml   Output             1950 ml   Net             -140 ml     I/O this shift:  In: 200 [IV Piggyback:200]  Out: -        Physical Exam:   General: chronically ill, groaning and moaning in pain   Cardiovascular: regular rhythm and rate, no murmurs auscultated   Pulm: clear to auscultation bilaterally, regular and unlabored   Abdomen: soft, nontender, nondistended; normal bowel sounds, ileostomy viable and functioning   Extremities: no rash or edema   Neurologic: Normal mood and behavior     Results Review:     I reviewed the patient's new clinical results.    Lab Results (last 24 hours)     Procedure Component Value Units Date/Time    Blood Culture - Blood, [[id number]] Collected:  04/11/18 0816    Specimen:  Blood from Hand, Right Updated:  04/11/18 0816    Blood Culture - Blood, [[id number]] Collected:  04/11/18 0815    Specimen:  Blood from Hand, Right Updated:  04/11/18 0816    Procalcitonin [[id number]] Collected:  04/11/18 0715    Specimen:  Blood Updated:  04/11/18 0748    Comprehensive Metabolic Panel [[id number]]  (Abnormal) Collected:  04/11/18 0715    Specimen:  Blood Updated:  04/11/18 0740     Glucose 123 (H) mg/dL      BUN 26 (H) mg/dL      Creatinine 0.92 mg/dL      Sodium 133 (L) mmol/L      Potassium 3.2 (L) mmol/L      Chloride 100 mmol/L      CO2 18.7 (L) mmol/L       Calcium 9.1 mg/dL      Total Protein 8.1 g/dL      Albumin 3.00 (L) g/dL      ALT (SGPT) 68 (H) U/L      AST (SGOT) 58 (H) U/L      Alkaline Phosphatase 243 (H) U/L      Total Bilirubin 1.8 (H) mg/dL      eGFR Non African Amer 78 mL/min/1.73      Globulin 5.1 gm/dL      A/G Ratio 0.6 g/dL      BUN/Creatinine Ratio 28.3 (H)     Anion Gap 14.3 mmol/L     Narrative:       The MDRD GFR formula is only valid for adults with stable renal function between ages 18 and 70.    Magnesium [[id number]]  (Abnormal) Collected:  04/11/18 0715    Specimen:  Blood Updated:  04/11/18 0738     Magnesium 1.6 (L) mg/dL     Vancomycin, Random [[id number]]  (Normal) Collected:  04/11/18 0712    Specimen:  Blood Updated:  04/11/18 0738     Vancomycin Random 15.50 mcg/mL     Phosphorus [[id number]]  (Normal) Collected:  04/11/18 0712    Specimen:  Blood Updated:  04/11/18 0736     Phosphorus 3.2 mg/dL     CBC & Differential [[id number]] Collected:  04/11/18 0715    Specimen:  Blood Updated:  04/11/18 0721    Narrative:       The following orders were created for panel order CBC & Differential.  Procedure                               Abnormality         Status                     ---------                               -----------         ------                     Scan Slide[[id number]]                                                                  CBC Auto Differential[[id number]]        Abnormal            Final result                 Please view results for these tests on the individual orders.    CBC Auto Differential [[id number]]  (Abnormal) Collected:  04/11/18 0715    Specimen:  Blood Updated:  04/11/18 0721     WBC 1.53 (C) 10*3/mm3      RBC 2.57 (L) 10*6/mm3      Hemoglobin 8.3 (L) g/dL      Hematocrit 25.2 (L) %      MCV 98.1 (H) fL      MCH 32.3 (H) pg      MCHC 32.9 g/dL      RDW 22.8 (H) %      RDW-SD 78.7 (H) fl      MPV 13.4 (H) fL      Platelets 37 (C) 10*3/mm3      Neutrophil % 34.7 (L) %      Lymphocyte % 20.9 %       Monocyte % 39.2 (H) %      Eosinophil % 0.0 %      Basophil % 0.0 %      Immature Grans % 5.2 (H) %      Neutrophils, Absolute 0.53 (L) 10*3/mm3      Lymphocytes, Absolute 0.32 (L) 10*3/mm3      Monocytes, Absolute 0.60 10*3/mm3      Eosinophils, Absolute 0.00 (L) 10*3/mm3      Basophils, Absolute 0.00 10*3/mm3      Immature Grans, Absolute 0.08 (H) 10*3/mm3      nRBC 0.0 /100 WBC     Blood Culture - Blood, [[id number]]  (Normal) Collected:  04/09/18 1712    Specimen:  Blood from Arm, Right Updated:  04/10/18 1716     Blood Culture No growth at 24 hours    Blood Culture - Blood, [[id number]]  (Normal) Collected:  04/09/18 1630    Specimen:  Blood from Hand, Left Updated:  04/10/18 1646     Blood Culture No growth at 24 hours    Comprehensive Metabolic Panel [[id number]]  (Abnormal) Collected:  04/10/18 1528    Specimen:  Blood Updated:  04/10/18 1548     Glucose 78 mg/dL      BUN 24 (H) mg/dL      Creatinine 0.93 mg/dL      Sodium 135 (L) mmol/L      Potassium 3.5 mmol/L      Chloride 101 mmol/L      CO2 21.6 (L) mmol/L      Calcium 8.8 mg/dL      Total Protein 7.4 g/dL      Albumin 3.00 (L) g/dL      ALT (SGPT) 69 (H) U/L      AST (SGOT) 55 (H) U/L      Alkaline Phosphatase 261 (H) U/L      Total Bilirubin 1.6 (H) mg/dL      eGFR Non African Amer 77 mL/min/1.73      Globulin 4.4 gm/dL      A/G Ratio 0.7 g/dL      BUN/Creatinine Ratio 25.8 (H)     Anion Gap 12.4 mmol/L     Narrative:       The MDRD GFR formula is only valid for adults with stable renal function between ages 18 and 70.    Magnesium [[id number]]  (Normal) Collected:  04/10/18 1528    Specimen:  Blood Updated:  04/10/18 1548     Magnesium 1.7 mg/dL     CBC & Differential [[id number]] Collected:  04/10/18 1528    Specimen:  Blood Updated:  04/10/18 1536    Narrative:       The following orders were created for panel order CBC & Differential.  Procedure                               Abnormality         Status                     ---------                                -----------         ------                     Scan Slide[[id number]]                                                                  CBC Auto Differential[[id number]]        Abnormal            Final result                 Please view results for these tests on the individual orders.    CBC Auto Differential [[id number]]  (Abnormal) Collected:  04/10/18 1528    Specimen:  Blood Updated:  04/10/18 1535     WBC 0.98 (C) 10*3/mm3      RBC 2.38 (L) 10*6/mm3      Hemoglobin 7.7 (L) g/dL      Hematocrit 23.2 (C) %      MCV 97.5 (H) fL      MCH 32.4 (H) pg      MCHC 33.2 g/dL      RDW 22.5 (H) %      RDW-SD 77.0 (H) fl      MPV 13.2 (H) fL      Platelets 41 (C) 10*3/mm3      Neutrophil % 36.6 (L) %      Lymphocyte % 27.6 %      Monocyte % 32.7 (H) %      Eosinophil % 0.0 %      Basophil % 0.0 %      Immature Grans % 3.1 (H) %      Neutrophils, Absolute 0.36 (L) 10*3/mm3      Lymphocytes, Absolute 0.27 (L) 10*3/mm3      Monocytes, Absolute 0.32 10*3/mm3      Eosinophils, Absolute 0.00 (L) 10*3/mm3      Basophils, Absolute 0.00 10*3/mm3      Immature Grans, Absolute 0.03 10*3/mm3      nRBC 0.0 /100 WBC     Respiratory Panel, PCR - Swab, Nasopharynx [[id number]]  (Normal) Collected:  04/09/18 7603    Specimen:  Swab from Nasopharynx Updated:  04/10/18 0980     ADENOVIRUS, PCR Not Detected     Coronavirus 229E Not Detected     Coronavirus HKU1 Not Detected     Coronavirus NL63 Not Detected     Coronavirus OC43 Not Detected     Human Metapneumovirus Not Detected     Human Rhinovirus/Enterovirus Not Detected     Influenza B PCR Not Detected     Parainfluenza Virus 1 Not Detected     Parainfluenza Virus 2 Not Detected     Parainfluenza Virus 3 Not Detected     Parainfluenza Virus 4 Not Detected     Bordetella pertussis pcr Not Detected     Influenza A H1 2009 PCR Not Detected     Chlamydophila pneumoniae PCR Not Detected     Mycoplasma pneumo by PCR Not Detected     Influenza A PCR Not Detected     Influenza A H3 Not  Detected     Influenza A H1 Not Detected     RSV, PCR Not Detected          Radiology:    Imaging Results (last 72 hours)     Procedure Component Value Units Date/Time    US Liver [[id number]] Collected:  04/10/18 1407     Updated:  04/10/18 1412    Narrative:       ULTRASOUND ABDOMEN, LIMITED, 4/10/2018:     HISTORY:  84-year-old male admitted to the hospital with sepsis and urinary tract  infection. Prior GI tract surgeries with fistulae. Elevated liver  enzymes are noted.     TECHNIQUE:  Grayscale ultrasound imaging of the right upper quadrant was performed.     COMPARISON:  *  CT abdomen/pelvis, 4/9/2018.     FINDINGS:  The gallbladder is surgically absent. There is no intrahepatic or extra  hepatic bile duct dilatation (CBD 5 mm).      Liver is normal in ultrasound appearance. No ascites in the right upper  abdomen. Pancreas is partially obscured by overlying bowel gas but is  negative where seen. Right kidney is negative with no hydronephrosis.       Impression:       1. Cholecystectomy. No bile duct dilatation.  2. Negative liver.     This report was finalized on 4/10/2018 2:10 PM by Dr. Chencho Henderson MD.       XR Chest 1 View [[id number]] Collected:  04/10/18 0824     Updated:  04/10/18 0828    Narrative:       CHEST X-RAY, 4/9/2018:     HISTORY:   84-year-old male admitted to the hospital with sepsis, urinary tract  infection and abdominal pain. Postop bowel surgery with enterocutaneous  and enterovesical fistulae.     TECHNIQUE:  AP upright chest x-ray.     FINDINGS:  Right arm PICC is in good position with tip in the upper SVC.     Fibrosis in both lung bases, greater on the right, unchanged since  2/28/2018 and best seen on prior CT studies. No new pulmonary  infiltrate, visible airspace consolidation or pleural effusion. Heart  size and pulmonary vascularity are normal.       Impression:       1. No active disease.  2. Chronic bibasilar fibrosis.  3. Right arm PICC in good position.     This report  was finalized on 4/10/2018 8:25 AM by Dr. Chencho Henderson MD.       CT Abdomen Pelvis With Contrast [[id number]] Collected:  04/10/18 0623     Updated:  04/10/18 0635    Narrative:       CT ABDOMEN AND PELVIS WITH CONTRAST, 4/9/2018:     HISTORY:  84-year-old male with complex surgical history including subtotal  colectomy with enterocutaneous and enterovesical fistula formation,  subsequent diverting ileostomy and oversewing of the rectal stump. 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 He presents to the ED with sepsis, urinary tract infection and  new onset abdomen pain today.     TECHNIQUE:  CT examination of the abdomen and pelvis with IV contrast. Radiation  dose reduction techniques included automated exposure control or  exposure modulation based on body size. Radiation audit for CT and  nuclear cardiology exams in the last 12 months: 6.     COMPARISON:  *  CT abdomen/pelvis, 2/27/2018 and 12/18/2017.     ABDOMEN FINDINGS:  There is a persistent complex enterocutaneous fistula extending between  a cavity at the rectal stump to the left anterior pelvic wall, although  the caliber and extent of the fistulous tract appears slightly smaller  than on the most recent prior exam. Air remains present within the  anterosuperior bladder wall but is also an intimate communication with  the rectal stump cavity, likely representing a persistent bladder  fistula. Ayoub catheter is present within a decompressed, diffusely  thick-walled urinary bladder. There is no bowel dilatation to suggest  obstruction proximal or distal to the right lower quadrant ileostomy. No  undrained collection is identified to suggest a new or enlarging  abscess.     Today, there is no evidence of upper urinary tract obstruction, and both  kidneys enhance normally.     Stable splenomegaly (history of myelodysplastic syndrome). Liver and  pancreas are in appearance. Abdominal aorta is normal in caliber.      PELVIS FINDINGS:  Bladder, rectal stump and enterovesicle fistula as noted above. Low  rectum is normal. No undrained pelvic fluid collection.     Limited lung base images show chronic pulmonary fibrosis and stable  ectatic aortic root. No pleural effusion.       Impression:       1. Enterocutaneous fistula and likely enterovesicle fistula remain  present, although appearing slightly smaller than on 2/27/2018.  2. Indwelling Ayoub catheter within a decompressed, diffusely  thick-walled urinary bladder. No evidence of upper urinary tract  obstruction or pyelonephritis at this time.  3. No bowel dilatation to suggest obstruction. Right lower quadrant  ileostomy.  4. No new abscess or other undrained fluid collection is identified.  5. Stable splenomegaly and patient with myelodysplastic syndrome.  6. Stable ectatic aortic root.  7. Initial stat report to the ED from Dr. Reji Kumari at 1810 hours on  4/9/2018.     This report was finalized on 4/10/2018 6:33 AM by Dr. Chencho Henderson MD.               [facility] TPN  Last Rate: 83.3 mL/hr at 04/11/18 0727   lactated ringers 100 mL/hr Last Rate: 100 mL/hr (04/11/18 0727)   Pharmacy Consult - Pharmacy to dose     Pharmacy to dose vancomycin             Assessment/Plan     Patient Active Problem List   Diagnosis Code   • MDS (myelodysplastic syndrome), low grade D46.20   • Vitamin B 12 deficiency E53.8   • AI (aortic incompetence) I35.1   • Bundle branch block, right I45.10   • Benign prostatic hyperplasia N40.0   • Hypertension I10   • Knee pain M25.569   • Rectal bleed K62.5   • Acute blood loss anemia D62   • Hand lesion L98.9   • Acute UTI N39.0   • Acute lower UTI (urinary tract infection) N39.0   • 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  Continue nothing by mouth/TPN  LFTs noted.  Would hold off on lipids.    Hypokalemia and hypomagnesemia will adjust in TPN    Acute on chronic pancytopenia/myelodysplastic syndrome  Gout    Severe malnutrition and chronic illness     Discussed with hospitalist regarding better pain management.        Kaylie Granados MD  04/11/18  8:22 AM

## 2018-04-11 NOTE — PLAN OF CARE
Problem: Patient Care Overview  Goal: Plan of Care Review  Outcome: Ongoing (interventions implemented as appropriate)    Goal: Individualization and Mutuality  Outcome: Ongoing (interventions implemented as appropriate)    Goal: Discharge Needs Assessment  Outcome: Ongoing (interventions implemented as appropriate)    Goal: Interprofessional Rounds/Family Conf  Outcome: Ongoing (interventions implemented as appropriate)      Problem: Skin Injury Risk (Adult)  Goal: Skin Health and Integrity  Outcome: Ongoing (interventions implemented as appropriate)      Problem: Fall Risk (Adult)  Goal: Absence of Fall  Outcome: Ongoing (interventions implemented as appropriate)

## 2018-04-11 NOTE — CONSULTS
[facility] CBC GROUP INITIAL INPATIENT CONSULTATION NOTE    REASON FOR CONSULTATION: Pancytopenia    HISTORY OF PRESENT ILLNESS:  Jason Zelaya is a 84 y.o. male who we are asked to see today in consultation for known myelodysplasia with chronic pancytopenia.    The patient had extensive hospitalization December and early January requiring abdominal surgery for entero-enteric, intrarectal and colovesicular fistulas.  He now has an abdominal ostomy.  He has had issues with wound healing of the abdomen being followed closely by surgery.  He is currently residing in a nursing facility to assist with his wound care.  He remains very weak and is seen periodically in office including February 13 with a hemoglobin of 7.7 leading to additional transfusion and subsequent Procrit.  He was later admitted with further pain February 28, reviewed again by surgery with enterocutaneous and rectocutaneous fistula with bowel rest initiated and supportive via transfusion.  We saw on Hospital March 2 and further transfused and the patient was ultimately discharged March 5 to be transferred to [place] where he continued on TPN and bowel rest and IV antibiotic therapy through March 15.   He presented to the emergency department April 9 with fevers, chills and increased abdominal discomfort though had normal colostomy output.  He remained nothing by mouth during this time.  Studies include an H&H of 7. 723.6 white count of 2460, platelet count 52,000 with 53% neutrophils.  He was felt to have sepsis from suspected UTI from chronic indwelling Ayoub, received vancomycin and Zosyn, ID evaluation to follow.  A CT scan revealed complex fistulas with some improvement and no fluid seen, surgical consult anticipated.  His CODE STATUS was reviewed with surgery with aggressive life-saving measures not requested-DNR.  The patient's had persistent fevers and is currently in the ICU.  His urine is growing greater than 100,000 CFU per  millimeter of pseudomonas aeruginosa, slightly elevated pro-calcitonin with H&H of 8.3 and 25.2, white count of 1530 platelet count of 37,000.     I have met with the patient and his family and discussed his circumstances with his wife by telephone.  It is certain that he has no meaningful chance of recovery, suggested changing him to a DNR status discontinuing his intensive care therapy -  IV antibiotics- and instituting PCA pain management..       Past Medical   Past Medical History:   Diagnosis Date   • Aortic regurgitation    • Aortic valve insufficiency    • Arthritis     Bilateral knee   • Basal cell carcinoma of left hand     sched excision   • Chest pain    • Diastolic dysfunction    • History of GI diverticular bleed 05/2017   • History of transfusion     last 9/12/17 2 units   • History of urinary retention    • History of vertigo    • Hx MRSA infection 07/2017    + culture abadominal wound   • Hypertension    • MDS (myelodysplastic syndrome)    • RBBB (right bundle branch block)    • Self-catheterizes urinary bladder     3-4 times aday   • Skin cancer      Social History   Social History     Social History   • Marital status:      Spouse name: Neris   • Number of children: N/A   • Years of education: 12     Occupational History   •  Retired     Social History Main Topics   • Smoking status: Never Smoker   • Smokeless tobacco: Never Used   • Alcohol use No   • Drug use: No   • Sexual activity: Defer     Other Topics Concern   • Not on file     Social History Narrative   • No narrative on file     Family History  Family History   Problem Relation Age of Onset   • Diabetes Brother    • Liver cancer Brother    • Heart disease Mother    • Heart attack Mother    • Diabetes Mother    • Heart attack Father    • Other Sister      Brain tumor   • Cancer Sister    • Emphysema Brother    • Cancer Brother    • Alcohol abuse Brother    • Cancer Brother      Medications    Current  Facility-Administered Medications:   •  acetaminophen (TYLENOL) tablet 650 mg, 650 mg, Oral, Q6H PRN, Muna Addison, APRN  •  acetylcysteine (ACETADOTE) 9,700 mg in dextrose (D5W) 5 % 200 mL infusion, 150 mg/kg, Intravenous, Once **FOLLOWED BY** acetylcysteine (ACETADOTE) 3,240 mg in dextrose (D5W) 5 % 500 mL infusion, 50 mg/kg, Intravenous, Once **FOLLOWED BY** acetylcysteine (ACETADOTE) 6,480 mg in dextrose (D5W) 5 % 1,000 mL infusion, 100 mg/kg, Intravenous, Once, Muna Addison, APRN  •  Adult Central Clinimix TPN, , Intravenous, Continuous, Muna Addison, APRN, Last Rate: 83.3 mL/hr at 04/11/18 0727  •  Adult Central Clinimix TPN, , Intravenous, Continuous, Kaylie Granados MD  •  cefepime (MAXIPIME) 1 g/100 mL 0.9% NS IVPB (mbp), 1 g, Intravenous, Q12H, Muna Addison, APRN  •  cholestyramine (QUESTRAN) packet 1 packet, 1 packet, Oral, TID With Meals, Kenya Marks MD, 1 packet at 04/11/18 1023  •  clotrimazole-betamethasone (LOTRISONE) 1-0.05 % cream, , Topical, Q12H, Kenya Marks MD  •  diclofenac (VOLTAREN) 1 % gel 2 g, 2 g, Topical, 4x Daily, Kenya Marks MD, 2 g at 04/11/18 1024  •  folic acid (FOLVITE) tablet 400 mcg, 400 mcg, Oral, Daily, Kenya Marks MD, 400 mcg at 04/11/18 1023  •  gabapentin (NEURONTIN) capsule 100 mg, 100 mg, Oral, TID, Kenya Marks MD, 100 mg at 04/11/18 1015  •  HYDROmorphone (DILAUDID) injection 1 mg, 1 mg, Intravenous, Q2H PRN, Muna Addison, APRN, 1 mg at 04/11/18 1201  •  hydrophor (AQUAPHOR) ointment, , Topical, Q12H, Kenya Marks MD  •  lactated ringers infusion, 50 mL/hr, Intravenous, Continuous, Muna Addison APRN, Last Rate: 50 mL/hr at 04/11/18 1054, 50 mL/hr at 04/11/18 1054  •  lactobacillus acidophilus (RISAQUAD) capsule 1 capsule, 1 capsule, Oral, Daily, Kenya Marks MD, 1 capsule at 04/11/18 1024  •  lidocaine (LIDODERM) 5 % 1 patch,  1 patch, Transdermal, Q24H, Kenya Marks MD  •  magnesium oxide (MAGOX) tablet 400 mg, 400 mg, Oral, Daily, Kenya Marks MD, 400 mg at 04/11/18 1024  •  magnesium sulfate 4 gram infusion - Mg less than or equal to 1mg/dL, 4 g, Intravenous, PRN **OR** magnesium sulfate 3 gram infusion (1gm x 3) - Mg 1.1 - 1.5 mg/dL, 1 g, Intravenous, PRN **OR** Magnesium Sulfate 2 gram infusion- Mg 1.6 - 1.9 mg/dL, 2 g, Intravenous, PRN, Muna Addison, APRN, Last Rate: 25 mL/hr at 04/11/18 1016, 2 g at 04/11/18 1016  •  metroNIDAZOLE (FLAGYL) IVPB 500 mg, 500 mg, Intravenous, Q8H, Muna Addison, APRN, 500 mg at 04/11/18 0743  •  multivitamin with minerals 1 tablet, 1 tablet, Oral, Daily, Kenya Marks MD, 1 tablet at 04/11/18 1024  •  nystatin (MYCOSTATIN) powder, , Topical, Daily PRN, Muna Addison, APRN  •  ondansetron (ZOFRAN) tablet 4 mg, 4 mg, Oral, Q6H PRN, Kenya Marks MD  •  oxyCODONE-acetaminophen (PERCOCET) 7.5-325 MG per tablet 1 tablet, 1 tablet, Oral, Q4H PRN, Kenya Marks MD, 1 tablet at 04/11/18 0833  •  pantoprazole (PROTONIX) EC tablet 40 mg, 40 mg, Oral, Daily, Kenya Marks MD, 40 mg at 04/11/18 1024  •  Pharmacy Consult - Pharmacy to dose, , Does not apply, Continuous PRN, Kenya Marks MD  •  Pharmacy to dose vancomycin, , Does not apply, Continuous PRN, Kenya Marks MD  •  potassium chloride (K-DUR,KLOR-CON) CR tablet 40 mEq, 40 mEq, Oral, PRN **OR** potassium chloride (KLOR-CON) packet 40 mEq, 40 mEq, Oral, PRN **OR** potassium chloride 10 mEq in 100 mL IVPB, 10 mEq, Intravenous, Q1H PRN, Muna Addison, BENJAMIN, Last Rate: 100 mL/hr at 04/11/18 0835, 10 mEq at 04/11/18 0835  •  potassium phosphate 45 mmol in sodium chloride 0.9 % 250 mL infusion, 45 mmol, Intravenous, PRN **OR** potassium phosphate 30 mmol in sodium chloride 0.9 % 100 mL infusion, 30 mmol, Intravenous, PRN  **OR** potassium phosphate 15 mmol in sodium chloride 0.9 % 100 mL infusion, 15 mmol, Intravenous, PRN **OR** sodium phosphates 45 mmol in sodium chloride 0.9 % 250 mL IVPB, 45 mmol, Intravenous, PRN **OR** sodium phosphates 30 mmol in sodium chloride 0.9 % 100 mL IVPB, 30 mmol, Intravenous, PRN **OR** sodium phosphates 15 mmol in sodium chloride 0.9 % 100 mL IVPB, 15 mmol, Intravenous, PRN, Muna Addison, APRN  •  sodium chloride 0.9 % flush 1-10 mL, 1-10 mL, Intravenous, PRN, Kenya Marks MD  •  sodium chloride 0.9 % flush 10 mL, 10 mL, Intravenous, PRN, Rafiq Irene MD  •  sodium chloride 0.9 % infusion 40 mL, 40 mL, Intravenous, PRN, Kenya Marks MD  •  sucralfate (CARAFATE) tablet 1 g, 1 g, Oral, 4x Daily AC & at Bedtime, Kenya Marks MD, 1 g at 04/11/18 1030  •  vancomycin (VANCOCIN) 750 mg in sodium chloride 0.9 % 250 mL IVPB, 750 mg, Intravenous, Q12H, Kenya Marks MD, Last Rate: 0 mL/hr at 04/10/18 1205, 750 mg at 04/11/18 1015  Allergies  No Known Allergies  Review of Systems  Fourteen systems have been reviewed with the patient and are positive per HPI only.     Objective:     Vitals:    04/11/18 1130   BP: 106/57   Pulse: 92   Resp:    Temp:    SpO2: 96%     GENERAL:  Well-developed, well-nourished in no acute distress.   SKIN:  Warm, dry without rashes, purpura or petechiae.  HEAD:  Normocephalic.  EYES:  Pupils equal, round and reactive to light.  EOMs intact.  Conjunctivae normal.  EARS:  Hearing intact.  NOSE:  Septum midline.  No excoriations or nasal discharge.  MOUTH:  Tongue is well-papillated; no stomatitis or ulcers.  Lips normal.  THROAT:  Oropharynx without lesions or exudates.  NECK:  Supple with good range of motion; no thyromegaly or masses, no JVD.  LYMPHATICS:  No cervical, supraclavicular, axillary or inguinal adenopathy.  CHEST:  Lungs clear to percussion and auscultation. Good airflow.  CARDIAC:  Regular rate and  rhythm without murmurs, rubs or gallops. Normal S1,S2.  ABDOMEN:  Soft, nontender with no organomegaly or masses.  EXTREMITIES:  No clubbing, cyanosis or edema.  NEUROLOGICAL:  Cranial Nerves II-XII grossly intact.  No focal neurological deficits.  PSYCHIATRIC:  Normal affect and mood.        DIAGNOSTIC DATA:    Results from last 7 days  Lab Units 04/11/18  0715 04/10/18  1528 04/10/18  0405   WBC 10*3/mm3 1.53* 0.98* 1.77*   HEMOGLOBIN g/dL 8.3* 7.7* 6.9*   HEMATOCRIT % 25.2* 23.2* 21.2*   PLATELETS 10*3/mm3 37* 41* 45*       Results from last 7 days  Lab Units 04/11/18  0715 04/10/18  1528 04/10/18  0404   SODIUM mmol/L 133* 135* 137   POTASSIUM mmol/L 3.2* 3.5 3.6   CHLORIDE mmol/L 100 101 103   CO2 mmol/L 18.7* 21.6* 19.8*   BUN mg/dL 26* 24* 26*   CREATININE mg/dL 0.92 0.93 0.89   CALCIUM mg/dL 9.1 8.8 8.9   BILIRUBIN mg/dL 1.8* 1.6* 1.5*   ALK PHOS U/L 243* 261* 269*   ALT (SGPT) U/L 68* 69* 69*   AST (SGOT) U/L 58* 55* 47*   GLUCOSE mg/dL 123* 78 88       IMAGING:      Assessment/Plan   Assessment/Plan:   This is a 84 y.o. male with 83 y/o male with enterocutaneous/rectocutaneous fistula, chronic indwelling [place] with h/o MRSA UTI/cystitis, gout, MDS with chronic pancytopenia, malnutrition secondary to chronic illness, chronic diastolic dysfunction, PAF and PSVT, AVR, chronic RBBB, MR and AR and h/o HTN presented to the ER secondary to fever and abdominal pain.The patient's record has been reviewed in detail and it is not felt the patient has any meaningful hopeful recovery.  I feel we should move to palliative care and has had discussions with the patient, his son, his daughter, his son-in-law and his wife about discontinuing intensive care therapies and moving to symptomatic treatment-Comfort Care.  At the time of this dictation of discussed this with his primary physician as well and will move to start PCA for pain control.  The patient's other orders will be managed by the hospitalist to discussions this  evening.  This will lead to discontinuation of antibiotic therapies and TPN and thereafter  a hospice consultation to follow.  We will also follow with you and appreciate the opportunity to work with you in  this patient's care.       Serjio Beckwith MD

## 2018-04-11 NOTE — PLAN OF CARE
Problem: Patient Care Overview  Goal: Plan of Care Review  Outcome: Ongoing (interventions implemented as appropriate)   04/11/18 0308   Coping/Psychosocial   Plan of Care Reviewed With patient   Plan of Care Review   Progress no change   OTHER   Outcome Summary Patient temperature elevated at beginning of shift. MD notified and tylenol 650mg administered. Temperature decreased and monitored throughout the night. Patient generalized pain. Pain controlled with oral and IV medication. IVF increased to 100ml/hr LR (see orders by MD Keller). Patient receiving IV vancomycin and zosyn. A/O x4. Patient tele ST () and SR throughout the night. Will continue to monitor patient.     Goal: Individualization and Mutuality  Outcome: Ongoing (interventions implemented as appropriate)    Goal: Discharge Needs Assessment  Outcome: Ongoing (interventions implemented as appropriate)    Goal: Interprofessional Rounds/Family Conf  Outcome: Ongoing (interventions implemented as appropriate)      Problem: Skin Injury Risk (Adult)  Goal: Identify Related Risk Factors and Signs and Symptoms  Outcome: Outcome(s) achieved Date Met: 04/11/18    Goal: Skin Health and Integrity  Outcome: Ongoing (interventions implemented as appropriate)      Problem: Fall Risk (Adult)  Goal: Identify Related Risk Factors and Signs and Symptoms  Outcome: Outcome(s) achieved Date Met: 04/11/18    Goal: Absence of Fall  Outcome: Ongoing (interventions implemented as appropriate)      Problem: Sepsis/Septic Shock (Adult)  Goal: Signs and Symptoms of Listed Potential Problems Will be Absent, Minimized or Managed (Sepsis/Septic Shock)  Outcome: Ongoing (interventions implemented as appropriate)

## 2018-04-11 NOTE — CONSULTS
LOS: 2 days   Patient Care Team:  David Cedillo MD as PCP - General (Family Medicine)  Anupam Green MD as Consulting Physician (Hematology and Oncology)  Bennie Galo MD as Referring Physician (Orthopedic Surgery)  Kaylie Granados MD as Surgeon (General Surgery)        Subjective       Attending MD : Kenya Jameson*    Patient Complaints: weak         History of Present Illness  :83 y/o male with enterocutaneous/rectocutaneous fistula, chronic indwelling glasgow with h/o MRSA UTI/cystitis, gout, MDS with chronic pancytopenia, malnutrition secondary to chronic illness, Chronic diastolic dysfunction, PAF and PSVT, Non-rheumatic aortic valve insufficiency, chronic RBBB, MR and AR and h/o HTN presented to the ER today secondary to fever and abdominal pain. The patient is well known to us from previous admission. He was admitted here 2/27-3/5/18 secondary to an abdominal infection related to his fistulas and an MRSA UTI/cystitis. He was discharged to [place] for continued TPN and PT/OT and remained NPO as the patient is not a surgical candidate for his fistulas. He notes he left [place] about a week and a half ago and remains NPO except water with meds and continued TPN at home. He notes he transfers to a wheelchair with help. He states he was having some abdominal pain prior to leaving [place] but since he has been home it has gotten a lot worse. Then today he notes fever and chills this AM. He notes brown liquid stool in his ostomy bag which is NL per his report. He notes the pain is sharp and points to his right upper abdomen when I ask him where it hurts the most. He denies any N/V. He denies any CP or SOA. He denies any dizziness. He notes continued chronic joint pain particularly in his knees. He notes his urine has been clear in his glasgow bag. He denies any cough or upper respiratory symptoms.      Patient Denies:  HA    PMH : Active Problems:    Fever in adult      Review of Systems:     12 point ROS done     Objective     Vital Signs  Temp:  [97.7 °F (36.5 °C)-102.8 °F (39.3 °C)] 98 °F (36.7 °C)  Heart Rate:  [] 86  Resp:  [18-24] 18  BP: (100-152)/(48-80) 114/67    Physical Exam:     General Appearance:    Alert, cooperative, in no acute distress   Head:    Normocephalic, without obvious abnormality, atraumatic   Eyes:            Lids and lashes normal, conjunctivae and sclerae normal, no   icterus, no pallor, corneas clear, PERRLA   Ears:    Ears appear intact with no abnormalities noted   Throat:   No oral lesions, no thrush, oral mucosa moist   Neck:   No adenopathy, supple, trachea midline, no thyromegaly, no   carotid bruit, no JVD   Back:     No kyphosis present, no scoliosis present, no skin lesions,      erythema or scars, no tenderness to percussion or                   palpation,   range of motion normal   Lungs:     Clear to auscultation,respirations regular, even and                  unlabored    Heart:    Regular rhythm and normal rate, normal S1 and S2, no            murmur, no gallop, no rub, no click   Chest Wall:    No abnormalities observed   Abdomen:     Normal bowel sounds, no masses, no organomegaly, soft        non-tender, non-distended, no guarding, no rebound                tenderness   Rectal:     Deferred   Extremities:   Moves all extremities well, no edema, no cyanosis, no             redness   Pulses:   Pulses palpable and equal bilaterally   Skin:   No bleeding, bruising or rash   Lymph nodes:   No palpable adenopathy   Neurologic:   Cranial nerves 2 - 12 grossly intact, sensation intact, DTR       present and equal bilaterally          Results Review:    Lab Results (last 72 hours)     Procedure Component Value Units Date/Time    Urine Culture - Urine, Urine, Clean Catch [[id number]]  (Abnormal)  (Susceptibility) Collected:  04/09/18 1746    Specimen:  Urine from Urine, Catheter Updated:  04/11/18 0933     Urine Culture --      >100,000 CFU/mL Pseudomonas  aeruginosa (A)    Susceptibility      Pseudomonas aeruginosa     JULISA     Amikacin <=2 ug/ml Susceptible     Cefepime 2 ug/ml Susceptible     Ceftazidime 4 ug/ml Susceptible     Ciprofloxacin <=0.25 ug/ml Susceptible     Gentamicin <=1 ug/ml Susceptible     Levofloxacin 1 ug/ml Susceptible     Meropenem <=0.25 ug/ml Susceptible     Piperacillin + Tazobactam 8 ug/ml Susceptible     Tobramycin <=1 ug/ml Susceptible                    Procalcitonin [[id number]]  (Abnormal) Collected:  04/11/18 0715    Specimen:  Blood Updated:  04/11/18 0825     Procalcitonin 0.64 (C) ng/mL     Narrative:       As a Marker for Sepsis (Non-Neonates):   1. <0.5 ng/mL represents a low risk of severe sepsis and/or septic shock.  2. >2 ng/mL represents a high risk of severe sepsis and/or septic shock.    As a Marker for Lower Respiratory Tract Infections that require antibiotic therapy:    PCT on Admission     Antibiotic Therapy       6-12 Hrs later  > 0.5                Strongly Recommended             >0.25 - <0.5         Recommended  0.1 - 0.25           Discouraged              Remeasure/reassess PCT  <0.1                 Strongly Discouraged     Remeasure/reassess PCT                     PCT values of < 0.5 ng/mL do not exclude an infection, because localized infections (without systemic signs) may be associated with such low concentrations, or a systemic infection in its initial stages (< 6 hours). Furthermore, increased PCT can occur without infection. PCT concentrations between 0.5 and 2.0 ng/mL should be interpreted taking into account the patient's history. It is recommended to retest PCT within 6-24 hours if any concentrations < 2 ng/mL are obtained.    Blood Culture - Blood, [[id number]] Collected:  04/11/18 0816    Specimen:  Blood from Hand, Right Updated:  04/11/18 0816    Blood Culture - Blood, [[id number]] Collected:  04/11/18 0815    Specimen:  Blood from Hand, Right Updated:  04/11/18 0816    Comprehensive Metabolic Panel  [[id number]]  (Abnormal) Collected:  04/11/18 0715    Specimen:  Blood Updated:  04/11/18 0740     Glucose 123 (H) mg/dL      BUN 26 (H) mg/dL      Creatinine 0.92 mg/dL      Sodium 133 (L) mmol/L      Potassium 3.2 (L) mmol/L      Chloride 100 mmol/L      CO2 18.7 (L) mmol/L      Calcium 9.1 mg/dL      Total Protein 8.1 g/dL      Albumin 3.00 (L) g/dL      ALT (SGPT) 68 (H) U/L      AST (SGOT) 58 (H) U/L      Alkaline Phosphatase 243 (H) U/L      Total Bilirubin 1.8 (H) mg/dL      eGFR Non African Amer 78 mL/min/1.73      Globulin 5.1 gm/dL      A/G Ratio 0.6 g/dL      BUN/Creatinine Ratio 28.3 (H)     Anion Gap 14.3 mmol/L     Narrative:       The MDRD GFR formula is only valid for adults with stable renal function between ages 18 and 70.    Magnesium [[id number]]  (Abnormal) Collected:  04/11/18 0715    Specimen:  Blood Updated:  04/11/18 0738     Magnesium 1.6 (L) mg/dL     Vancomycin, Random [[id number]]  (Normal) Collected:  04/11/18 0712    Specimen:  Blood Updated:  04/11/18 0738     Vancomycin Random 15.50 mcg/mL     Phosphorus [[id number]]  (Normal) Collected:  04/11/18 0712    Specimen:  Blood Updated:  04/11/18 0736     Phosphorus 3.2 mg/dL     CBC & Differential [[id number]] Collected:  04/11/18 0715    Specimen:  Blood Updated:  04/11/18 0721    Narrative:       The following orders were created for panel order CBC & Differential.  Procedure                               Abnormality         Status                     ---------                               -----------         ------                     Scan Slide[[id number]]                                                                  CBC Auto Differential[[id number]]        Abnormal            Final result                 Please view results for these tests on the individual orders.    CBC Auto Differential [[id number]]  (Abnormal) Collected:  04/11/18 0715    Specimen:  Blood Updated:  04/11/18 0721     WBC 1.53 (C) 10*3/mm3      RBC 2.57 (L) 10*6/mm3       Hemoglobin 8.3 (L) g/dL      Hematocrit 25.2 (L) %      MCV 98.1 (H) fL      MCH 32.3 (H) pg      MCHC 32.9 g/dL      RDW 22.8 (H) %      RDW-SD 78.7 (H) fl      MPV 13.4 (H) fL      Platelets 37 (C) 10*3/mm3      Neutrophil % 34.7 (L) %      Lymphocyte % 20.9 %      Monocyte % 39.2 (H) %      Eosinophil % 0.0 %      Basophil % 0.0 %      Immature Grans % 5.2 (H) %      Neutrophils, Absolute 0.53 (L) 10*3/mm3      Lymphocytes, Absolute 0.32 (L) 10*3/mm3      Monocytes, Absolute 0.60 10*3/mm3      Eosinophils, Absolute 0.00 (L) 10*3/mm3      Basophils, Absolute 0.00 10*3/mm3      Immature Grans, Absolute 0.08 (H) 10*3/mm3      nRBC 0.0 /100 WBC     Blood Culture - Blood, [[id number]]  (Normal) Collected:  04/09/18 1712    Specimen:  Blood from Arm, Right Updated:  04/10/18 1716     Blood Culture No growth at 24 hours    Blood Culture - Blood, [[id number]]  (Normal) Collected:  04/09/18 1630    Specimen:  Blood from Hand, Left Updated:  04/10/18 1646     Blood Culture No growth at 24 hours    Comprehensive Metabolic Panel [[id number]]  (Abnormal) Collected:  04/10/18 1528    Specimen:  Blood Updated:  04/10/18 1548     Glucose 78 mg/dL      BUN 24 (H) mg/dL      Creatinine 0.93 mg/dL      Sodium 135 (L) mmol/L      Potassium 3.5 mmol/L      Chloride 101 mmol/L      CO2 21.6 (L) mmol/L      Calcium 8.8 mg/dL      Total Protein 7.4 g/dL      Albumin 3.00 (L) g/dL      ALT (SGPT) 69 (H) U/L      AST (SGOT) 55 (H) U/L      Alkaline Phosphatase 261 (H) U/L      Total Bilirubin 1.6 (H) mg/dL      eGFR Non African Amer 77 mL/min/1.73      Globulin 4.4 gm/dL      A/G Ratio 0.7 g/dL      BUN/Creatinine Ratio 25.8 (H)     Anion Gap 12.4 mmol/L     Narrative:       The MDRD GFR formula is only valid for adults with stable renal function between ages 18 and 70.    Magnesium [[id number]]  (Normal) Collected:  04/10/18 1528    Specimen:  Blood Updated:  04/10/18 1548     Magnesium 1.7 mg/dL     CBC & Differential [[id number]]  Collected:  04/10/18 1528    Specimen:  Blood Updated:  04/10/18 1536    Narrative:       The following orders were created for panel order CBC & Differential.  Procedure                               Abnormality         Status                     ---------                               -----------         ------                     Scan Slide[[id number]]                                                                  CBC Auto Differential[[id number]]        Abnormal            Final result                 Please view results for these tests on the individual orders.    CBC Auto Differential [[id number]]  (Abnormal) Collected:  04/10/18 1528    Specimen:  Blood Updated:  04/10/18 1535     WBC 0.98 (C) 10*3/mm3      RBC 2.38 (L) 10*6/mm3      Hemoglobin 7.7 (L) g/dL      Hematocrit 23.2 (C) %      MCV 97.5 (H) fL      MCH 32.4 (H) pg      MCHC 33.2 g/dL      RDW 22.5 (H) %      RDW-SD 77.0 (H) fl      MPV 13.2 (H) fL      Platelets 41 (C) 10*3/mm3      Neutrophil % 36.6 (L) %      Lymphocyte % 27.6 %      Monocyte % 32.7 (H) %      Eosinophil % 0.0 %      Basophil % 0.0 %      Immature Grans % 3.1 (H) %      Neutrophils, Absolute 0.36 (L) 10*3/mm3      Lymphocytes, Absolute 0.27 (L) 10*3/mm3      Monocytes, Absolute 0.32 10*3/mm3      Eosinophils, Absolute 0.00 (L) 10*3/mm3      Basophils, Absolute 0.00 10*3/mm3      Immature Grans, Absolute 0.03 10*3/mm3      nRBC 0.0 /100 WBC     Respiratory Panel, PCR - Swab, Nasopharynx [[id number]]  (Normal) Collected:  04/09/18 4358    Specimen:  Swab from Nasopharynx Updated:  04/10/18 3451     ADENOVIRUS, PCR Not Detected     Coronavirus 229E Not Detected     Coronavirus HKU1 Not Detected     Coronavirus NL63 Not Detected     Coronavirus OC43 Not Detected     Human Metapneumovirus Not Detected     Human Rhinovirus/Enterovirus Not Detected     Influenza B PCR Not Detected     Parainfluenza Virus 1 Not Detected     Parainfluenza Virus 2 Not Detected     Parainfluenza Virus 3 Not  Detected     Parainfluenza Virus 4 Not Detected     Bordetella pertussis pcr Not Detected     Influenza A H1 2009 PCR Not Detected     Chlamydophila pneumoniae PCR Not Detected     Mycoplasma pneumo by PCR Not Detected     Influenza A PCR Not Detected     Influenza A H3 Not Detected     Influenza A H1 Not Detected     RSV, PCR Not Detected    CBC Auto Differential [[id number]]  (Abnormal) Collected:  04/10/18 0405    Specimen:  Blood Updated:  04/10/18 0529     WBC 1.77 (C) 10*3/mm3      RBC 2.12 (L) 10*6/mm3      Hemoglobin 6.9 (C) g/dL      Hematocrit 21.2 (C) %      .0 (H) fL      MCH 32.5 (H) pg      MCHC 32.5 g/dL      RDW 23.7 (H) %      RDW-SD 82.0 (H) fl      MPV 13.2 (H) fL      Platelets 45 (C) 10*3/mm3      Neutrophil % 54.3 %      Lymphocyte % 18.6 (L) %      Monocyte % 25.4 (H) %      Eosinophil % 0.0 %      Basophil % 0.0 %      Immature Grans % 1.7 (H) %      Neutrophils, Absolute 0.96 (L) 10*3/mm3      Lymphocytes, Absolute 0.33 (L) 10*3/mm3      Monocytes, Absolute 0.45 10*3/mm3      Eosinophils, Absolute 0.00 (L) 10*3/mm3      Basophils, Absolute 0.00 10*3/mm3      Immature Grans, Absolute 0.03 10*3/mm3      nRBC 0.0 /100 WBC     Comprehensive Metabolic Panel [[id number]]  (Abnormal) Collected:  04/10/18 0404    Specimen:  Blood Updated:  04/10/18 0504     Glucose 88 mg/dL      BUN 26 (H) mg/dL      Creatinine 0.89 mg/dL      Sodium 137 mmol/L      Potassium 3.6 mmol/L      Chloride 103 mmol/L      CO2 19.8 (L) mmol/L      Calcium 8.9 mg/dL      Total Protein 7.8 g/dL      Albumin 3.20 (L) g/dL      ALT (SGPT) 69 (H) U/L      AST (SGOT) 47 (H) U/L      Alkaline Phosphatase 269 (H) U/L      Total Bilirubin 1.5 (H) mg/dL      eGFR Non African Amer 81 mL/min/1.73      Globulin 4.6 gm/dL      A/G Ratio 0.7 g/dL      BUN/Creatinine Ratio 29.2 (H)     Anion Gap 14.2 mmol/L     Narrative:       The MDRD GFR formula is only valid for adults with stable renal function between ages 18 and 70.     Urinalysis, Microscopic Only - Urine, Clean Catch [[id number]]  (Abnormal) Collected:  04/09/18 1746    Specimen:  Urine from Urine, Catheter Updated:  04/09/18 1810     RBC, UA 21-30 (A) /HPF      WBC, UA 6-12 (A) /HPF      Bacteria, UA 3+ (A) /HPF      Squamous Epithelial Cells, UA 0-2 /HPF      Hyaline Casts, UA None Seen /LPF      Methodology Manual Light Microscopy    Urinalysis With / Culture If Indicated - Urine, Catheter [[id number]]  (Abnormal) Collected:  04/09/18 1746    Specimen:  Urine from Urine, Catheter Updated:  04/09/18 1754     Color, UA Yellow     Appearance, UA Slightly Cloudy (A)     pH, UA 5.5     Specific Gravity, UA 1.010     Glucose, UA Negative     Ketones, UA Negative     Bilirubin, UA Negative     Blood, UA Large (3+) (A)     Protein,  mg/dL (2+) (A)     Leuk Esterase, UA Small (1+) (A)     Nitrite, UA Positive (A)     Urobilinogen, UA 0.2 E.U./dL    [facility] Draw [[id number]] Collected:  04/09/18 1630    Specimen:  Blood Updated:  04/09/18 1731    Narrative:       The following orders were created for panel order [facility] Draw.  Procedure                               Abnormality         Status                     ---------                               -----------         ------                     Light Blue Top[[id number]]                                   Final result               Green Top (Gel)[[id number]]                                  Final result               Lavender Top[[id number]]                                     Final result               Gold Top - SST[[id number]]                                   Final result                 Please view results for these tests on the individual orders.    Light Blue Top [[id number]] Collected:  04/09/18 1630    Specimen:  Blood Updated:  04/09/18 1731     Extra Tube hold for add-on     Comment: Auto resulted       Green Top (Gel) [[id number]] Collected:  04/09/18 1630    Specimen:  Blood Updated:  04/09/18 1731     Extra Tube Hold for  add-ons.     Comment: Auto resulted.       Lavender Top [[id number]] Collected:  04/09/18 1630    Specimen:  Blood Updated:  04/09/18 1731     Extra Tube hold for add-on     Comment: Auto resulted       Gold Top - SST [[id number]] Collected:  04/09/18 1630    Specimen:  Blood Updated:  04/09/18 1731     Extra Tube Hold for add-ons.     Comment: Auto resulted.       Comprehensive Metabolic Panel [[id number]]  (Abnormal) Collected:  04/09/18 1630    Specimen:  Blood Updated:  04/09/18 1712     Glucose 95 mg/dL      BUN 30 (H) mg/dL      Creatinine 0.77 mg/dL      Sodium 133 (L) mmol/L      Potassium 4.5 mmol/L      Comment: Specimen hemolyzed.  Results may be affected.        Chloride 98 mmol/L      CO2 20.8 (L) mmol/L      Calcium 9.5 mg/dL      Total Protein 8.8 (H) g/dL      Albumin 3.60 g/dL      ALT (SGPT) 77 (H) U/L      Comment: Specimen hemolyzed.  Results may be affected.        AST (SGOT) 60 (H) U/L      Comment: Specimen hemolyzed.  Results may be affected.        Alkaline Phosphatase 298 (H) U/L      Total Bilirubin 1.2 mg/dL      eGFR Non African Amer 96 mL/min/1.73      Globulin 5.2 gm/dL      A/G Ratio 0.7 g/dL      BUN/Creatinine Ratio 39.0 (H)     Anion Gap 14.2 mmol/L     Narrative:       The MDRD GFR formula is only valid for adults with stable renal function between ages 18 and 70.    CBC & Differential [[id number]] Collected:  04/09/18 1630    Specimen:  Blood Updated:  04/09/18 1658    Narrative:       The following orders were created for panel order CBC & Differential.  Procedure                               Abnormality         Status                     ---------                               -----------         ------                     Scan Slide[[id number]]                                                                  CBC Auto Differential[[id number]]        Abnormal            Final result                 Please view results for these tests on the individual orders.    Lactic Acid, Plasma  [[id number]]  (Normal) Collected:  04/09/18 1631    Specimen:  Blood Updated:  04/09/18 1655     Lactate 1.9 mmol/L     CBC Auto Differential [[id number]]  (Abnormal) Collected:  04/09/18 1630    Specimen:  Blood Updated:  04/09/18 1655     WBC 2.46 (C) 10*3/mm3      RBC 2.39 (L) 10*6/mm3      Hemoglobin 7.7 (L) g/dL      Hematocrit 23.6 (C) %      MCV 98.7 (H) fL      MCH 32.2 (H) pg      MCHC 32.6 g/dL      RDW 23.0 (H) %      RDW-SD 81.0 (H) fl      MPV 13.4 (H) fL      Platelets 52 (L) 10*3/mm3      Neutrophil % 53.3 %      Lymphocyte % 18.3 (L) %      Monocyte % 26.8 (H) %      Eosinophil % 0.0 %      Basophil % 0.0 %      Immature Grans % 1.6 (H) %      Neutrophils, Absolute 1.31 (L) 10*3/mm3      Lymphocytes, Absolute 0.45 (L) 10*3/mm3      Monocytes, Absolute 0.66 10*3/mm3      Eosinophils, Absolute 0.00 (L) 10*3/mm3      Basophils, Absolute 0.00 10*3/mm3      Immature Grans, Absolute 0.04 (H) 10*3/mm3      nRBC 0.0 /100 WBC               Imaging Results (last 72 hours)     Procedure Component Value Units Date/Time    US Liver [[id number]] Collected:  04/10/18 1407     Updated:  04/10/18 1412    Narrative:       ULTRASOUND ABDOMEN, LIMITED, 4/10/2018:     HISTORY:  84-year-old male admitted to the hospital with sepsis and urinary tract  infection. Prior GI tract surgeries with fistulae. Elevated liver  enzymes are noted.     TECHNIQUE:  Grayscale ultrasound imaging of the right upper quadrant was performed.     COMPARISON:  *  CT abdomen/pelvis, 4/9/2018.     FINDINGS:  The gallbladder is surgically absent. There is no intrahepatic or extra  hepatic bile duct dilatation (CBD 5 mm).      Liver is normal in ultrasound appearance. No ascites in the right upper  abdomen. Pancreas is partially obscured by overlying bowel gas but is  negative where seen. Right kidney is negative with no hydronephrosis.       Impression:       1. Cholecystectomy. No bile duct dilatation.  2. Negative liver.     This report was  finalized on 4/10/2018 2:10 PM by Dr. Chencho Henderson MD.       XR Chest 1 View [[id number]] Collected:  04/10/18 0824     Updated:  04/10/18 0828    Narrative:       CHEST X-RAY, 4/9/2018:     HISTORY:   84-year-old male admitted to the hospital with sepsis, urinary tract  infection and abdominal pain. Postop bowel surgery with enterocutaneous  and enterovesical fistulae.     TECHNIQUE:  AP upright chest x-ray.     FINDINGS:  Right arm PICC is in good position with tip in the upper SVC.     Fibrosis in both lung bases, greater on the right, unchanged since  2/28/2018 and best seen on prior CT studies. No new pulmonary  infiltrate, visible airspace consolidation or pleural effusion. Heart  size and pulmonary vascularity are normal.       Impression:       1. No active disease.  2. Chronic bibasilar fibrosis.  3. Right arm PICC in good position.     This report was finalized on 4/10/2018 8:25 AM by Dr. Chencho Henderson MD.       CT Abdomen Pelvis With Contrast [[id number]] Collected:  04/10/18 0623     Updated:  04/10/18 0635    Narrative:       CT ABDOMEN AND PELVIS WITH CONTRAST, 4/9/2018:     HISTORY:  84-year-old male with complex surgical history including subtotal  colectomy with enterocutaneous and enterovesical fistula formation,  subsequent diverting ileostomy and oversewing of the rectal stump. 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 He presents to the ED with sepsis, urinary tract infection and  new onset abdomen pain today.     TECHNIQUE:  CT examination of the abdomen and pelvis with IV contrast. Radiation  dose reduction techniques included automated exposure control or  exposure modulation based on body size. Radiation audit for CT and  nuclear cardiology exams in the last 12 months: 6.     COMPARISON:  *  CT abdomen/pelvis, 2/27/2018 and 12/18/2017.     ABDOMEN FINDINGS:  There is a persistent complex enterocutaneous fistula extending between  a cavity at  the rectal stump to the left anterior pelvic wall, although  the caliber and extent of the fistulous tract appears slightly smaller  than on the most recent prior exam. Air remains present within the  anterosuperior bladder wall but is also an intimate communication with  the rectal stump cavity, likely representing a persistent bladder  fistula. Ayoub catheter is present within a decompressed, diffusely  thick-walled urinary bladder. There is no bowel dilatation to suggest  obstruction proximal or distal to the right lower quadrant ileostomy. No  undrained collection is identified to suggest a new or enlarging  abscess.     Today, there is no evidence of upper urinary tract obstruction, and both  kidneys enhance normally.     Stable splenomegaly (history of myelodysplastic syndrome). Liver and  pancreas are in appearance. Abdominal aorta is normal in caliber.     PELVIS FINDINGS:  Bladder, rectal stump and enterovesicle fistula as noted above. Low  rectum is normal. No undrained pelvic fluid collection.     Limited lung base images show chronic pulmonary fibrosis and stable  ectatic aortic root. No pleural effusion.       Impression:       1. Enterocutaneous fistula and likely enterovesicle fistula remain  present, although appearing slightly smaller than on 2/27/2018.  2. Indwelling Ayoub catheter within a decompressed, diffusely  thick-walled urinary bladder. No evidence of upper urinary tract  obstruction or pyelonephritis at this time.  3. No bowel dilatation to suggest obstruction. Right lower quadrant  ileostomy.  4. No new abscess or other undrained fluid collection is identified.  5. Stable splenomegaly and patient with myelodysplastic syndrome.  6. Stable ectatic aortic root.  7. Initial stat report to the ED from Dr. Reji Kumari at 1810 hours on  4/9/2018.     This report was finalized on 4/10/2018 6:33 AM by Dr. Chencho Henderson MD.               Medication Review:      Hospital Medications (active)   "     Dose Frequency Start End    acetaminophen (TYLENOL) tablet 650 mg 650 mg Every 6 Hours PRN 4/11/2018     Sig - Route: Take 2 tablets by mouth Every 6 (Six) Hours As Needed for Mild Pain . - Oral    acetylcysteine (ACETADOTE) 3,240 mg in dextrose (D5W) 5 % 500 mL infusion 50 mg/kg × 64.7 kg Once 4/11/2018     Sig - Route: Infuse 3,240 mg into a venous catheter 1 (One) Time. - Intravenous    Linked Group 1:  \"Followed by\" Linked Group Details        acetylcysteine (ACETADOTE) 6,480 mg in dextrose (D5W) 5 % 1,000 mL infusion 100 mg/kg × 64.7 kg Once 4/11/2018     Sig - Route: Infuse 6,480 mg into a venous catheter 1 (One) Time. - Intravenous    Linked Group 1:  \"Followed by\" Linked Group Details        acetylcysteine (ACETADOTE) 9,700 mg in dextrose (D5W) 5 % 200 mL infusion 150 mg/kg × 64.7 kg Once 4/11/2018     Sig - Route: Infuse 9,700 mg into a venous catheter 1 (One) Time. - Intravenous    Linked Group 1:  \"Followed by\" Linked Group Details        Adult Central [facility] TPN  Continuous TPN 4/10/2018     Sig - Route: Infuse  into a venous catheter Continuous. - Intravenous    Adult [facility] TPN  Continuous TPN 4/11/2018 4/12/2018    Sig - Route: Infuse  into a venous catheter Continuous. - Intravenous    cefepime (MAXIPIME) 1 g/100 mL 0.9% NS IVPB (mbp) 1 g Every 12 Hours 4/11/2018 4/18/2018    Sig - Route: Infuse 100 mL into a venous catheter Every 12 (Twelve) Hours. - Intravenous    cefepime 2 g in 100 mL NS 2 g Once 4/11/2018 4/11/2018    Sig - Route: Infuse 2 g into a venous catheter 1 (One) Time. - Intravenous    cholestyramine (QUESTRAN) packet 1 packet 1 packet 3 Times Daily With Meals 4/10/2018     Sig - Route: Take 1 packet by mouth 3 (Three) Times a Day With Meals. - Oral    clotrimazole-betamethasone (LOTRISONE) 1-0.05 % cream  Every 12 Hours Scheduled 4/9/2018     Sig - Route: Apply  topically Every 12 (Twelve) Hours. - Topical    diclofenac (VOLTAREN) 1 % gel 2 g 2 g 4 Times Daily 4/9/2018 " "    Sig - Route: Apply 2 g topically 4 (Four) Times a Day. - Topical    folic acid (FOLVITE) tablet 400 mcg 400 mcg Daily 4/10/2018     Sig - Route: Take 1 tablet by mouth Daily. - Oral    gabapentin (NEURONTIN) capsule 100 mg 100 mg 3 Times Daily 4/9/2018     Sig - Route: Take 1 capsule by mouth 3 (Three) Times a Day. - Oral    HYDROmorphone (DILAUDID) injection 1 mg 1 mg Every 2 Hours PRN 4/11/2018     Sig - Route: Infuse 0.5 mL into a venous catheter Every 2 (Two) Hours As Needed for Severe Pain . - Intravenous    hydrophor (AQUAPHOR) ointment  Every 12 Hours Scheduled 4/9/2018     Sig - Route: Apply  topically Every 12 (Twelve) Hours. - Topical    lactated ringers infusion 50 mL/hr Continuous 4/9/2018     Sig - Route: Infuse 50 mL/hr into a venous catheter Continuous. - Intravenous    lactobacillus acidophilus (RISAQUAD) capsule 1 capsule 1 capsule Daily 4/10/2018     Sig - Route: Take 1 capsule by mouth Daily. - Oral    lidocaine (LIDODERM) 5 % 1 patch 1 patch Every 24 Hours 4/9/2018     Sig - Route: Place 1 patch on the skin Daily. - Transdermal    magnesium oxide (MAGOX) tablet 400 mg 400 mg Daily 4/10/2018     Sig - Route: Take 1 tablet by mouth Daily. - Oral    Magnesium Sulfate 2 gram infusion- Mg 1.6 - 1.9 mg/dL 2 g As Needed 4/11/2018     Sig - Route: Infuse 50 mL into a venous catheter As Needed (Mg 1.6 - 1.9 mg/dL). - Intravenous    Linked Group 2:  \"Or\" Linked Group Details        magnesium sulfate 3 gram infusion (1gm x 3) - Mg 1.1 - 1.5 mg/dL 1 g As Needed 4/11/2018     Sig - Route: Infuse 100 mL into a venous catheter As Needed (Mg 1.1 - 1.5 mg/dL). - Intravenous    Linked Group 2:  \"Or\" Linked Group Details        magnesium sulfate 4 gram infusion - Mg less than or equal to 1mg/dL 4 g As Needed 4/11/2018     Sig - Route: Infuse 100 mL into a venous catheter As Needed (Mg less than or equal to 1mg/dL). - Intravenous    Linked Group 2:  \"Or\" Linked Group Details        metroNIDAZOLE (FLAGYL) IVPB " "500 mg 500 mg Every 8 Hours 4/11/2018 4/18/2018    Sig - Route: Infuse 100 mL into a venous catheter Every 8 (Eight) Hours. - Intravenous    multivitamin with minerals 1 tablet 1 tablet Daily 4/10/2018     Sig - Route: Take 1 tablet by mouth Daily. - Oral    nystatin (MYCOSTATIN) powder  Daily PRN 4/10/2018     Sig - Route: Apply  topically Daily As Needed (when we change colostomy bag). - Topical    ondansetron (ZOFRAN) tablet 4 mg 4 mg Every 6 Hours PRN 4/9/2018     Sig - Route: Take 1 tablet by mouth Every 6 (Six) Hours As Needed for Nausea or Vomiting. - Oral    oxyCODONE-acetaminophen (PERCOCET) 7.5-325 MG per tablet 1 tablet 1 tablet Every 4 Hours PRN 4/9/2018     Sig - Route: Take 1 tablet by mouth Every 4 (Four) Hours As Needed for Moderate Pain  or Severe Pain . - Oral    pantoprazole (PROTONIX) EC tablet 40 mg 40 mg Daily 4/10/2018     Sig - Route: Take 1 tablet by mouth Daily. - Oral    Pharmacy Consult - Pharmacy to dose  Continuous PRN 4/9/2018     Sig - Route: Continuous As Needed for Consult. - Does not apply    Pharmacy to dose vancomycin  Continuous PRN 4/9/2018 4/23/2018    Sig - Route: Continuous As Needed for Consult. - Does not apply    potassium chloride (K-DUR,KLOR-CON) CR tablet 40 mEq 40 mEq As Needed 4/11/2018     Sig - Route: Take 2 tablets by mouth As Needed (potassium replacement.  see admin instructions). - Oral    Linked Group 3:  \"Or\" Linked Group Details        potassium chloride (KLOR-CON) packet 40 mEq 40 mEq As Needed 4/11/2018     Sig - Route: Take 40 mEq by mouth As Needed (potassium replacement, see admin instructions). - Oral    Linked Group 3:  \"Or\" Linked Group Details        potassium chloride 10 mEq in 100 mL IVPB 10 mEq Every 1 Hour PRN 4/11/2018     Sig - Route: Infuse 100 mL into a venous catheter Every 1 (One) Hour As Needed (potassium protocol PERIPHERAL - see admin instructions). - Intravenous    Linked Group 3:  \"Or\" Linked Group Details        potassium phosphate " "15 mmol in sodium chloride 0.9 % 100 mL infusion 15 mmol As Needed 4/11/2018     Sig - Route: Infuse 15 mmol into a venous catheter As Needed (Central Line - Phosphorus 1.8 - 2.5). - Intravenous    Linked Group 4:  \"Or\" Linked Group Details        potassium phosphate 30 mmol in sodium chloride 0.9 % 100 mL infusion 30 mmol As Needed 4/11/2018     Sig - Route: Infuse 30 mmol into a venous catheter As Needed (Central Line - Phosphorus 1.3 - 1.7). - Intravenous    Linked Group 4:  \"Or\" Linked Group Details        potassium phosphate 45 mmol in sodium chloride 0.9 % 250 mL infusion 45 mmol As Needed 4/11/2018     Sig - Route: Infuse 45 mmol into a venous catheter As Needed (Central Line - Phosphorus Less Than 1.3). - Intravenous    Linked Group 4:  \"Or\" Linked Group Details        sodium chloride 0.9 % flush 1-10 mL 1-10 mL As Needed 4/9/2018     Sig - Route: Infuse 1-10 mL into a venous catheter As Needed for Line Care. - Intravenous    sodium chloride 0.9 % flush 10 mL 10 mL As Needed 4/9/2018     Sig - Route: Infuse 10 mL into a venous catheter As Needed for Line Care. - Intravenous    sodium chloride 0.9 % infusion  - ADS Override Pull   4/10/2018 4/10/2018    Notes to Pharmacy: Created by cabinet override    sodium chloride 0.9 % infusion 40 mL 40 mL As Needed 4/9/2018     Sig - Route: Infuse 40 mL into a venous catheter As Needed for Line Care. - Intravenous    sodium phosphates 15 mmol in sodium chloride 0.9 % 100 mL IVPB 15 mmol As Needed 4/11/2018     Sig - Route: Infuse 15 mmol into a venous catheter As Needed (Central Line - Phosphorus 1.8 - 2.5 & Potassium Greater Than 4). - Intravenous    Linked Group 4:  \"Or\" Linked Group Details        sodium phosphates 30 mmol in sodium chloride 0.9 % 100 mL IVPB 30 mmol As Needed 4/11/2018     Sig - Route: Infuse 30 mmol into a venous catheter As Needed (Central Line - Phosphorus 1.3 - 1.7 & Potassium Greater Than 4). - Intravenous    Linked Group 4:  \"Or\" Linked Group " "Details        sodium phosphates 45 mmol in sodium chloride 0.9 % 250 mL IVPB 45 mmol As Needed 4/11/2018     Sig - Route: Infuse 45 mmol into a venous catheter As Needed (Central Line - Phosphorus Less Than 1.3 & Potassium Greater Than 4). - Intravenous    Linked Group 4:  \"Or\" Linked Group Details        sucralfate (CARAFATE) tablet 1 g 1 g 4 Times Daily Before Meals & Nightly 4/9/2018     Sig - Route: Take 1 tablet by mouth 4 (Four) Times a Day Before Meals & at Bedtime. - Oral    vancomycin (VANCOCIN) 750 mg in sodium chloride 0.9 % 250 mL IVPB 750 mg Every 12 Hours 4/10/2018 4/20/2018    Sig - Route: Infuse 750 mg into a venous catheter Every 12 (Twelve) Hours. - Intravenous    acetaminophen (TYLENOL) tablet 650 mg (Discontinued) 650 mg Every 8 Hours PRN 4/10/2018 4/11/2018    Sig - Route: Take 2 tablets by mouth Every 8 (Eight) Hours As Needed for Mild Pain . - Oral    colchicine tablet 0.6 mg (Discontinued) 0.6 mg 2 Times Daily 4/9/2018 4/11/2018    Sig - Route: Take 1 tablet by mouth 2 (Two) Times a Day. - Oral    HYDROmorphone (DILAUDID) injection 1 mg (Discontinued) 1 mg Every 4 Hours PRN 4/10/2018 4/11/2018    Sig - Route: Infuse 0.5 mL into a venous catheter Every 4 (Four) Hours As Needed for Severe Pain . - Intravenous    piperacillin-tazobactam (ZOSYN) 3.375 g/100 mL 0.9% NS IVPB (mbp) (Discontinued) 3.375 g Every 8 Hours 4/10/2018 4/11/2018    Sig - Route: Infuse 100 mL into a venous catheter Every 8 (Eight) Hours. - Intravenous          Assessment/Plan       Active Problems:    Fever in adult  1. Sepsis secondary to  UTI with chronic indwelling glasgow with h/o MRSA UTI/cystitis: Patient with fever, tachycardia and tachypnea in ER.C&S  Noted      2. Enterocutaneous/rectocutaneous fistula and abdominal pain: On CT scan Complex fistulas remains with improvement in tract to midline and no fluid collection seen.      3. MDS with chronic pancytopenia: All levels appear at patient's baseline compared to " previous. Monitor.      4. Elevated Transaminases and Alk Phos: Bili is NL. Unclear if this may be related to the patient's abdominal pain as he c/o greatest pain in RUQ. This may be related to TPN. Monitor. D   5. Gout: Continue patient's home dose of colchicine. No acute issues currently.     6. Malnutrition secondary to chronic illness: on  TPN .     7. Chronic diastolic dysfunction, PAF and PSVT, Non-rheumatic aortic valve insufficiency, chronic RBBB, MR and AR: Patient n    Neutropenia    Plan :    Agree with AB  BC  Supp care  Will follow  Thank you       Geovanna Robles MD  04/11/18  2:32 PM

## 2018-04-11 NOTE — PROGRESS NOTES
I was contacted by Dr. Willard this evening who had spoken with the patient and family at bedside and the patient expressed his wishes to be care and comfort only. Dr. Willard placed the patient on a PCA pump for his pain. I came to the bedside to further discuss this issue. The patient and his wife and 3 of his children were present. The patient expressed that he wanted to stop his treatment including his TPN and IVFs and Abx's. He only wants to continue pain medication and other medications for comfort only. The patient understands that without this treatment he will eventually pass away. I discussed the dying process with the patent and family as they were asking what to expect. I also discussed [facility] and the possibility of a scattered bed here and they would like to speak with [facility] so I will place that consult. I will change the patient's treatments to care and comfort only per the patient's wishes (again family was at bedside and all were in agreement with this plan.

## 2018-04-12 PROBLEM — A41.9 SEPSIS AFFECTING SKIN: Status: ACTIVE | Noted: 2018-01-01

## 2018-04-12 NOTE — H&P
Patient Name: Jason Zelaya  :1934  84 y.o.    Date of Hospital Visit: 2018  3:53 PM  6:59 PM    Encounter Provider: Serjio Reynolds MD    Place of Service: [facility].  [facility] Hospitalist group.  Admit for inpatient Hospice Bed . palliative care.  transition of care.    Patient Care Team:  David Cedillo MD as PCP - General (Family Medicine)  Anupam Green MD as Consulting Physician (Hematology and Oncology)  Bennie Galo MD as Referring Physician (Orthopedic Surgery)  Kaylie Granados MD as Surgeon (General Surgery)      Chief complaint:terminal    History of Present Illness:  Terminal infection.  Talking with family in room  Everyone in good spritits.  PCA working well.        Past Medical History:   Diagnosis Date   • Aortic regurgitation    • Aortic valve insufficiency    • Arthritis     Bilateral knee   • Basal cell carcinoma of left hand     sched excision   • Chest pain    • Diastolic dysfunction    • History of GI diverticular bleed 2017   • History of transfusion     last 17 2 units   • History of urinary retention    • History of vertigo    • Hx MRSA infection 2017    + culture abadominal wound   • Hypertension    • MDS (myelodysplastic syndrome)    • RBBB (right bundle branch block)    • Self-catheterizes urinary bladder     3-4 times aday   • Skin cancer        Past Surgical History:   Procedure Laterality Date   • APPENDECTOMY     • BACK SURGERY      fusion   • CHOLECYSTECTOMY     • COLON RESECTION N/A 2017    Procedure: sub-total colectomy with ileo-rectal anastamosis, mobilization of splenic  INTRAOPERATIVE COLONOSCOPY  (5416-5477), rigid sigmoidoscopy;  Surgeon: Kaylie Granados MD;  Location: [facility] OR;  Service:    • COLONOSCOPY N/A 2017    Procedure: COLONOSCOPY;  Surgeon: Bridger Amado MD;  Location: [facility] OR;  Service:    • COLONOSCOPY N/A 2017    Procedure:  COLONOSCOPY w/ polypectomy;  Surgeon: Bridger Amado MD;  Location: [facility] OR;  Service:    • COLONOSCOPY N/A 5/24/2017    Procedure: COLONOSCOPY-return to surgery 2nd procedure, sclerotherapy with epi injection @ 40cm, placement of resolution clips X 2;  Surgeon: Bridger Amado MD;  Location: [facility] OR;  Service:    • CYSTOSCOPY W/ URETERAL STENT PLACEMENT Bilateral 12/21/2017    Procedure: CYSTOSCOPY bilateral URETERAL CATHETER INSERTION;  Surgeon: Davy Irene MD;  Location: [facility] OR;  Service:    • ENDOSCOPY N/A 5/23/2017    Procedure: ESOPHAGOGASTRODUODENOSCOPY;  Surgeon: Bridger Amado MD;  Location: [facility] OR;  Service:    • EXCISION MASS ARM Left 9/19/2017    Procedure: EXCISION MASS UPPER EXTREMITY  --  wide excision left hand mass;  Surgeon: Kaylie Granados MD;  Location: [facility] OR;  Service:    • EXPLORATORY LAPAROTOMY N/A 5/26/2017    Procedure: LAPAROTOMY EXPLORATORY - Explantation of old hernia mesh;  Surgeon: Kaylie Granados MD;  Location: [facility] OR;  Service:    • EXPLORATORY LAPAROTOMY N/A 12/21/2017    Procedure: LAPAROTOMY EXPLORATORY -  placement of strattice 6x6 small bowel resection of fistulas and anastamosis, oversew of rectal stump ,  extensive lysis of adhesions,  resection of fistulas, small bowel resectionsx2 and side to side anatomosis, ileostomy placement ;  Surgeon: Kaylie Granados MD;  Location: [facility] OR;  Service:    • HERNIA REPAIR      umbilical, inguinal          Prior to Admission medications    Medication Sig Start Date End Date Taking? Authorizing Provider   acetaminophen (TYLENOL) 325 MG tablet Take 2 tablets by mouth Every 6 (Six) Hours As Needed for Mild Pain . 3/5/18   Paola Eastman MD   cholestyramine (QUESTRAN) 4 g packet Take 1 packet by mouth 3 (Three) Times a Day With Meals.    Historical Provider, MD   clotrimazole-betamethasone (LOTRISONE) 1-0.05 % cream Apply  topically 2 (Two) Times a Day.    Historical  Provider, MD   colchicine 0.6 MG tablet Take 1 tablet by mouth 2 (Two) Times a Day. 3/5/18   Paola Eastman MD   cyanocobalamin 1000 MCG/ML injection Inject 1 mL into the shoulder, thigh, or buttocks Every 28 (Twenty-Eight) Days.  Patient taking differently: Inject 1,000 mcg into the shoulder, thigh, or buttocks Every 28 (Twenty-Eight) Days. Pt does not remember last injection date 3/28/18   Paola Eastman MD   diclofenac (VOLTAREN) 1 % gel Apply 2 g topically 4 (Four) Times a Day. 3/5/18   Paola Eastman MD   folic acid (FOLVITE) 400 MCG tablet Take 400 mcg by mouth Daily. 4/1/13   Historical Provider, MD   gabapentin (NEURONTIN) 100 MG capsule Take 100 mg by mouth 3 (Three) Times a Day.    Historical Provider, MD   Hydrophor (AQUAPHOR) ointment Apply  topically Every 12 (Twelve) Hours. 3/5/18   Paola Eastman MD   lactobacillus acidophilus (RISAQUAD) capsule Take 1 capsule by mouth Daily. 3/6/18   Paola Eastman MD   lidocaine (LIDODERM) 5 % Place 1 patch on the skin Daily. Remove & Discard patch within 12 hours or as directed by MD    Historical Provider, MD   magnesium oxide (MAGOX) 400 (241.3 Mg) MG tablet Take 1 tablet by mouth Daily. 3/6/18   Paola Eastman MD   Multiple Vitamins-Minerals (MULTIVITAMIN WITH MINERALS) tablet Take 1 tablet by mouth Daily. 3/6/18   Paola Eastman MD   nystatin (MYCOSTATIN) 611980 UNIT/GM powder Apply  topically Every 12 (Twelve) Hours for 647 doses. 3/5/18 1/23/19  Paola Eastman MD   ondansetron (ZOFRAN) 4 MG tablet Take 1 tablet by mouth Every 6 (Six) Hours As Needed for Nausea or Vomiting. 3/5/18   Paola Eastman MD   oxyCODONE-acetaminophen (PERCOCET) 7.5-325 MG per tablet Take 1 tablet by mouth Every 4 (Four) Hours As Needed for Moderate Pain  or Severe Pain . 3/5/18   Paola Eastman MD   pantoprazole (PROTONIX) 40 MG EC tablet Take 1 tablet by mouth Daily. 3/5/18   Paola Eastman MD   sodium chloride 0.9 %  "solution Infuse 20 mL/hr into a venous catheter Continuous. 3/5/18   Paola Eastman MD   sucralfate (CARAFATE) 1 g tablet Take 1 tablet by mouth 4 (Four) Times a Day Before Meals & at Bedtime. 3/5/18   Paola Eastman MD   TPN for discharge See most recent TPN order. 3/5/18   Paola Eastman MD       No Known Allergies    Social History     Social History   • Marital status:      Spouse name: Neris   • Years of education: 12     Occupational History   •  Retired     Social History Main Topics   • Smoking status: Never Smoker   • Smokeless tobacco: Never Used   • Alcohol use No   • Drug use: No   • Sexual activity: Defer     Other Topics Concern   • Not on file       Family History   Problem Relation Age of Onset   • Diabetes Brother    • Liver cancer Brother    • Heart disease Mother    • Heart attack Mother    • Diabetes Mother    • Heart attack Father    • Other Sister      Brain tumor   • Cancer Sister    • Emphysema Brother    • Cancer Brother    • Alcohol abuse Brother    • Cancer Brother        REVIEW OF SYSTEMS:   All other systems reviewed and negative.        Objective:     Vitals:    04/12/18 1600   BP: 107/65   BP Location: Left arm   Patient Position: Lying   Pulse: 96   Resp: 18   Temp: 97.8 °F (36.6 °C)   TempSrc: Oral   SpO2: 95%   Weight: 64.7 kg (142 lb 10.2 oz)   Height: 177.8 cm (70\")     Body mass index is 20.47 kg/m².    Constitutional: alert. Laying in bed , eyes open. Talking with family.  No pain  PULM: no distress.  Heart: RR  Neuro: no seizure activity.      Lab Review:             Assessment and Plan:       Patient transferred to inpatient Hospice bed today      1. Sepsis secondary to pseudomonas UTI with chronic indwelling glasgow with h/o MRSA UTI/cystitis   2. Persistent fevers   3. 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 Severe malnutrition secondary to chronic illness  5. " Transaminitis/hyperbilirubinemia/elevated alk phos  6. Electrolyte imbalance   7. Acute on chronic pancytopenia/MDS    Serjio Reynolds MD  04/12/18  6:59 PM

## 2018-04-12 NOTE — NURSING NOTE
Continued Stay Note  RENATA Wheatley     Patient Name: Jason Zelaya  MRN: [id number]  Today's Date: 4/12/2018    Admit Date: 4/9/2018          Discharge Plan     Row Name 04/12/18 0950       Plan    Plan \[facility]\"" scattered bed referral    Plan Comments spoke with Krystyna @ \[facility]\"" r/t referral for scattered bed. will continue to follow.     Row Name 04/12/18 0910       Plan    Plan Comments Spoke with patient, wife and son, Brigido at bedside and in agreement with speaking to \[facility]\"" today.               Discharge Codes    No documentation.           Jamaica Kessler RN

## 2018-04-12 NOTE — PROGRESS NOTES
General surgery    Events of the last 24 hours noted and discussed with Muna Addison and Dr. Berman.  Patient has been made palliative care/comfort care  Would recommend we wean off TPN    We will sign off.  Please recall if needed.    thanks

## 2018-04-12 NOTE — PLAN OF CARE
Problem: Patient Care Overview  Goal: Plan of Care Review  Outcome: Ongoing (interventions implemented as appropriate)   04/12/18 4370   Coping/Psychosocial   Plan of Care Reviewed With patient   OTHER   Outcome Summary Care and comfort; IVF and TPN infusing; PCA pump; pt. resting comfortably     Goal: Individualization and Mutuality  Outcome: Ongoing (interventions implemented as appropriate)    Goal: Discharge Needs Assessment  Outcome: Ongoing (interventions implemented as appropriate)      Problem: Skin Injury Risk (Adult)  Goal: Skin Health and Integrity  Outcome: Ongoing (interventions implemented as appropriate)      Problem: Fall Risk (Adult)  Goal: Absence of Fall  Outcome: Ongoing (interventions implemented as appropriate)      Problem: Sepsis/Septic Shock (Adult)  Goal: Signs and Symptoms of Listed Potential Problems Will be Absent, Minimized or Managed (Sepsis/Septic Shock)  Outcome: Ongoing (interventions implemented as appropriate)

## 2018-04-12 NOTE — DISCHARGE SUMMARY
Jason WISEMAN Garcia  1934  [id number]    Hospitalists Discharge Summary    Date of Admission: 4/9/2018  Date of Discharge:  4/12/2018    Primary Discharge Diagnoses:   1. Sepsis secondary to pseudomonas UTI with chronic indwelling glasgow with h/o MRSA UTI/cystitis   2. Persistent fevers   3. 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 Severe malnutrition secondary to chronic illness  5. Transaminitis/hyperbilirubinemia/elevated alk phos  6. Electrolyte inbalance   7. Acute on chronic pancytopenia/MDS  Secondary Discharge Diagnoses:    8. Gout   9. Chronic diastolic dysfunction, PAF and PSVT, Non-rheumatic aortic valve insufficiency, chronic RBBB, MR and AR   10. HTN   11. OA, chronic knee pain  PCP  Patient Care Team:  David Cedillo MD as PCP - General (Family Medicine)  Anupam Green MD as Consulting Physician (Hematology and Oncology)  Bennie Galo MD as Referring Physician (Orthopedic Surgery)  Kaylie Granados MD as Surgeon (General Surgery)    Consults:   Consults     Date and Time Order Name Status Description    4/11/2018 0851 Hematology & Oncology Inpatient Consult Completed     4/9/2018 2304 Inpatient General Surgery Consult Completed     4/9/2018 1851 Hospitalist (on-call MD unless specified) Completed     4/9/2018 1851 Surgery (on-call MD unless specified) Completed         Operations and Procedures Performed:     Ct Abdomen Pelvis With Contrast    Result Date: 4/10/2018  Narrative: CT ABDOMEN AND PELVIS WITH CONTRAST, 4/9/2018:  HISTORY: 84-year-old male with complex surgical history including subtotal colectomy with enterocutaneous and enterovesical fistula formation, subsequent diverting ileostomy and oversewing of the rectal stump. He has a chronic enterocutaneous fistula and a chronic indwelling Glasgow catheter. He presents to the ED with sepsis, urinary tract infection and new onset abdomen pain today.  TECHNIQUE: CT examination of the abdomen and  pelvis with IV contrast. Radiation dose reduction techniques included automated exposure control or exposure modulation based on body size. Radiation audit for CT and nuclear cardiology exams in the last 12 months: 6.  COMPARISON: *  CT abdomen/pelvis, 2/27/2018 and 12/18/2017.  ABDOMEN FINDINGS: There is a persistent complex enterocutaneous fistula extending between a cavity at the rectal stump to the left anterior pelvic wall, although the caliber and extent of the fistulous tract appears slightly smaller than on the most recent prior exam. Air remains present within the anterosuperior bladder wall but is also an intimate communication with the rectal stump cavity, likely representing a persistent bladder fistula. Ayoub catheter is present within a decompressed, diffusely thick-walled urinary bladder. There is no bowel dilatation to suggest obstruction proximal or distal to the right lower quadrant ileostomy. No undrained collection is identified to suggest a new or enlarging abscess.  Today, there is no evidence of upper urinary tract obstruction, and both kidneys enhance normally.  Stable splenomegaly (history of myelodysplastic syndrome). Liver and pancreas are in appearance. Abdominal aorta is normal in caliber.  PELVIS FINDINGS: Bladder, rectal stump and enterovesicle fistula as noted above. Low rectum is normal. No undrained pelvic fluid collection.  Limited lung base images show chronic pulmonary fibrosis and stable ectatic aortic root. No pleural effusion.      Impression: 1. 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 2. Indwelling Ayoub catheter within a decompressed, diffusely thick-walled urinary bladder. No evidence of upper urinary tract obstruction or pyelonephritis at this time. 3. No bowel dilatation to suggest obstruction. Right lower quadrant ileostomy. 4. No new abscess or other undrained fluid collection is  identified. 5. Stable splenomegaly and patient with myelodysplastic syndrome. 6. Stable ectatic aortic root. 7. Initial stat report to the ED from Dr. Reji Kumari at 1810 hours on 4/9/2018.  This report was finalized on 4/10/2018 6:33 AM by Dr. Chencho Henderson MD.      Us Liver    Result Date: 4/10/2018  Narrative: ULTRASOUND ABDOMEN, LIMITED, 4/10/2018:  HISTORY: 84-year-old male admitted to the hospital with sepsis and urinary tract infection. Prior GI tract surgeries with fistulae. Elevated liver enzymes are noted.  TECHNIQUE: Grayscale ultrasound imaging of the right upper quadrant was performed.  COMPARISON: *  CT abdomen/pelvis, 4/9/2018.  FINDINGS: The gallbladder is surgically absent. There is no intrahepatic or extra hepatic bile duct dilatation (CBD 5 mm).  Liver is normal in ultrasound appearance. No ascites in the right upper abdomen. Pancreas is partially obscured by overlying bowel gas but is negative where seen. Right kidney is negative with no hydronephrosis.      Impression: 1. Cholecystectomy. No bile duct dilatation. 2. Negative liver.  This report was finalized on 4/10/2018 2:10 PM by Dr. Chencho Henderson MD.      Xr Chest 1 View    Result Date: 4/10/2018  Narrative: CHEST X-RAY, 4/9/2018:  HISTORY: 84-year-old male admitted to the hospital with sepsis, urinary tract infection and abdominal pain. Postop bowel surgery with enterocutaneous and enterovesical fistulae.  TECHNIQUE: AP upright chest x-ray.  FINDINGS: Right arm PICC is in good position with tip in the upper SVC.  Fibrosis in both lung bases, greater on the right, unchanged since 2/28/2018 and best seen on prior CT studies. No new pulmonary infiltrate, visible airspace consolidation or pleural effusion. Heart size and pulmonary vascularity are normal.      Impression: 1. No active disease. 2. Chronic bibasilar fibrosis. 3. Right arm PICC in good position.  This report was finalized on 4/10/2018 8:25 AM by Dr. Chencho Henderson MD.   "    Allergies:  has No Known Allergies.    Song  n/a    Discharge Medications: PLEASE SEE DISCHARGE READMIT MEDICATIONS UPON RELEASE BY RN AT [facility]    History of Present Illness: Taken from [facility] on admit:   \"83 y/o male with enterocutaneous/rectocutaneous fistula, chronic indwelling glasgow with h/o MRSA UTI/cystitis, gout, MDS with chronic pancytopenia, malnutrition secondary to chronic illness, Chronic diastolic dysfunction, PAF and PSVT, Non-rheumatic aortic valve insufficiency, chronic RBBB, MR and AR and h/o HTN presented to the ER today secondary to fever and abdominal pain. The patient is well known to us from previous admission. He was admitted here 2/27-3/5/18 secondary to an abdominal infection related to his fistulas and an MRSA UTI/cystitis. He was discharged to [facility] for continued TPN and PT/OT and remained NPO as the patient is not a surgical candidate for his fistulas. He notes he left [facility] about a week and a half ago and remains NPO except water with meds and continued TPN at home. He notes he transfers to a wheelchair with help. He states he was having some abdominal pain prior to leaving [facility] but since he has been home it has gotten a lot worse. Then today he notes fever and chills this AM. He notes brown liquid stool in his ostomy bag which is NL per his report. He notes the pain is sharp and points to his right upper abdomen when I ask him where it hurts the most. He denies any N/V. He denies any CP or SOA. He denies any dizziness. He notes continued chronic joint pain particularly in his knees. He notes his urine has been clear in his glasgow bag. He denies any cough or upper respiratory symptoms.\"     Hospital Course  Patient changed to palliative care orders last night with complete pain relief. Decision made to continue with Hospice care as scatter bed at this facility. The following was care provided prior to this change overnight.     1. Sepsis secondary to pseudomonas UTI " with chronic indwelling glasgow with h/o MRSA UTI/cystitis:   2. Persistent fevers: Dr. Granados following/consult ID  Received Vancomycin/Cefepime/Flagyl with BC pending  ID concurred with antibiotic regimen, now discontinued     3. 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 Severe malnutrition secondary to chronic illness:  5. Transaminitis/hyperbilirubinemia/elevated alk phos:  6. Electrolyte inbalance: add electrolyte protocol   Previously continued on clinimix TPN, now discontinued  Now on dilaudid PCA   CT does not note any new fistulas or fluid collections, no mention of liver/duct findings.  US liver unrevealing     7. Acute on chronic pancytopenia/MDS: Hem/Onc following   S/P 1 unit PRBCs, hemoglobin 8.3 after transfusion  All cell lines low, slightly improved after blood transfusion   Treated as neutropenic fever  No further lab checks at this time     8. Gout: no acute issues, now on dilaudid pca  Colchicine discontinued     9. Chronic diastolic dysfunction, PAF and PSVT, Non-rheumatic aortic valve insufficiency, chronic RBBB, MR and AR:   No acute issues currently, NSR with PACs     10. HTN: Blood pressure at goal     11. OA, chronic knee/shoulder pain:   Continue on Voltaren gel    Last Lab Results:   Lab Results (most recent)     Procedure Component Value Units Date/Time    POC Glucose Once [[id number]]  (Normal) Collected:  04/12/18 1114    Specimen:  Blood Updated:  04/12/18 1127     Glucose 86 mg/dL     Narrative:       Meter: [id number] : [id number] Tutu HOWARD CERT.    Blood Culture - Blood, [[id number]]  (Normal) Collected:  04/11/18 0815    Specimen:  Blood from Hand, Right Updated:  04/12/18 0831     Blood Culture No growth at 24 hours    Blood Culture - Blood, [[id number]]  (Normal) Collected:  04/11/18 0816    Specimen:  Blood from Hand, Right Updated:  04/12/18 0831     Blood Culture No growth at 24 hours    POC Glucose Once [[id number]]  (Normal)  Collected:  04/12/18 0630    Specimen:  Blood Updated:  04/12/18 0636     Glucose 94 mg/dL     Narrative:       Meter: [id number] : [id number] Delfina Cabrera RN    POC Glucose Once [[id number]]  (Abnormal) Collected:  04/12/18 0151    Specimen:  Blood Updated:  04/12/18 0159     Glucose 136 (H) mg/dL     Narrative:       Meter: [id number] : [id number] Anushka Ulloa CNA    Blood Culture - Blood, [[id number]]  (Normal) Collected:  04/09/18 1712    Specimen:  Blood from Arm, Right Updated:  04/11/18 1716     Blood Culture No growth at 2 days    POC Glucose Once [[id number]]  (Abnormal) Collected:  04/11/18 1701    Specimen:  Blood Updated:  04/11/18 1707     Glucose 197 (H) mg/dL     Narrative:       Meter: [id number] : [id number] Chio Robin  CNA    Blood Culture - Blood, [[id number]]  (Normal) Collected:  04/09/18 1630    Specimen:  Blood from Hand, Left Updated:  04/11/18 1646     Blood Culture No growth at 2 days    Urine Culture - Urine, Urine, Clean Catch [[id number]]  (Abnormal)  (Susceptibility) Collected:  04/09/18 1746    Specimen:  Urine from Urine, Catheter Updated:  04/11/18 0933     Urine Culture --      >100,000 CFU/mL Pseudomonas aeruginosa (A)    Susceptibility      Pseudomonas aeruginosa     JULISA     Amikacin <=2 ug/ml Susceptible     Cefepime 2 ug/ml Susceptible     Ceftazidime 4 ug/ml Susceptible     Ciprofloxacin <=0.25 ug/ml Susceptible     Gentamicin <=1 ug/ml Susceptible     Levofloxacin 1 ug/ml Susceptible     Meropenem <=0.25 ug/ml Susceptible     Piperacillin + Tazobactam 8 ug/ml Susceptible     Tobramycin <=1 ug/ml Susceptible                    Procalcitonin [[id number]]  (Abnormal) Collected:  04/11/18 0715    Specimen:  Blood Updated:  04/11/18 0825     Procalcitonin 0.64 (C) ng/mL     Narrative:       As a Marker for Sepsis (Non-Neonates):   1. <0.5 ng/mL represents a low risk of severe sepsis and/or septic shock.  2. >2 ng/mL represents a high risk of severe sepsis and/or  septic shock.    As a Marker for Lower Respiratory Tract Infections that require antibiotic therapy:    PCT on Admission     Antibiotic Therapy       6-12 Hrs later  > 0.5                Strongly Recommended             >0.25 - <0.5         Recommended  0.1 - 0.25           Discouraged              Remeasure/reassess PCT  <0.1                 Strongly Discouraged     Remeasure/reassess PCT                     PCT values of < 0.5 ng/mL do not exclude an infection, because localized infections (without systemic signs) may be associated with such low concentrations, or a systemic infection in its initial stages (< 6 hours). Furthermore, increased PCT can occur without infection. PCT concentrations between 0.5 and 2.0 ng/mL should be interpreted taking into account the patient's history. It is recommended to retest PCT within 6-24 hours if any concentrations < 2 ng/mL are obtained.    Comprehensive Metabolic Panel [[id number]]  (Abnormal) Collected:  04/11/18 0715    Specimen:  Blood Updated:  04/11/18 0740     Glucose 123 (H) mg/dL      BUN 26 (H) mg/dL      Creatinine 0.92 mg/dL      Sodium 133 (L) mmol/L      Potassium 3.2 (L) mmol/L      Chloride 100 mmol/L      CO2 18.7 (L) mmol/L      Calcium 9.1 mg/dL      Total Protein 8.1 g/dL      Albumin 3.00 (L) g/dL      ALT (SGPT) 68 (H) U/L      AST (SGOT) 58 (H) U/L      Alkaline Phosphatase 243 (H) U/L      Total Bilirubin 1.8 (H) mg/dL      eGFR Non African Amer 78 mL/min/1.73      Globulin 5.1 gm/dL      A/G Ratio 0.6 g/dL      BUN/Creatinine Ratio 28.3 (H)     Anion Gap 14.3 mmol/L     Narrative:       The MDRD GFR formula is only valid for adults with stable renal function between ages 18 and 70.    Magnesium [[id number]]  (Abnormal) Collected:  04/11/18 0715    Specimen:  Blood Updated:  04/11/18 0738     Magnesium 1.6 (L) mg/dL     Vancomycin, Random [[id number]]  (Normal) Collected:  04/11/18 0712    Specimen:  Blood Updated:  04/11/18 0738     Vancomycin Random  15.50 mcg/mL     Phosphorus [[id number]]  (Normal) Collected:  04/11/18 0712    Specimen:  Blood Updated:  04/11/18 0736     Phosphorus 3.2 mg/dL     CBC & Differential [[id number]] Collected:  04/11/18 0715    Specimen:  Blood Updated:  04/11/18 0721    Narrative:       The following orders were created for panel order CBC & Differential.  Procedure                               Abnormality         Status                     ---------                               -----------         ------                     Scan Slide[[id number]]                                                                  CBC Auto Differential[[id number]]        Abnormal            Final result                 Please view results for these tests on the individual orders.    CBC Auto Differential [[id number]]  (Abnormal) Collected:  04/11/18 0715    Specimen:  Blood Updated:  04/11/18 0721     WBC 1.53 (C) 10*3/mm3      RBC 2.57 (L) 10*6/mm3      Hemoglobin 8.3 (L) g/dL      Hematocrit 25.2 (L) %      MCV 98.1 (H) fL      MCH 32.3 (H) pg      MCHC 32.9 g/dL      RDW 22.8 (H) %      RDW-SD 78.7 (H) fl      MPV 13.4 (H) fL      Platelets 37 (C) 10*3/mm3      Neutrophil % 34.7 (L) %      Lymphocyte % 20.9 %      Monocyte % 39.2 (H) %      Eosinophil % 0.0 %      Basophil % 0.0 %      Immature Grans % 5.2 (H) %      Neutrophils, Absolute 0.53 (L) 10*3/mm3      Lymphocytes, Absolute 0.32 (L) 10*3/mm3      Monocytes, Absolute 0.60 10*3/mm3      Eosinophils, Absolute 0.00 (L) 10*3/mm3      Basophils, Absolute 0.00 10*3/mm3      Immature Grans, Absolute 0.08 (H) 10*3/mm3      nRBC 0.0 /100 WBC     Comprehensive Metabolic Panel [[id number]]  (Abnormal) Collected:  04/10/18 1528    Specimen:  Blood Updated:  04/10/18 1548     Glucose 78 mg/dL      BUN 24 (H) mg/dL      Creatinine 0.93 mg/dL      Sodium 135 (L) mmol/L      Potassium 3.5 mmol/L      Chloride 101 mmol/L      CO2 21.6 (L) mmol/L      Calcium 8.8 mg/dL      Total Protein 7.4 g/dL      Albumin  3.00 (L) g/dL      ALT (SGPT) 69 (H) U/L      AST (SGOT) 55 (H) U/L      Alkaline Phosphatase 261 (H) U/L      Total Bilirubin 1.6 (H) mg/dL      eGFR Non African Amer 77 mL/min/1.73      Globulin 4.4 gm/dL      A/G Ratio 0.7 g/dL      BUN/Creatinine Ratio 25.8 (H)     Anion Gap 12.4 mmol/L     Narrative:       The MDRD GFR formula is only valid for adults with stable renal function between ages 18 and 70.    Magnesium [[id number]]  (Normal) Collected:  04/10/18 1528    Specimen:  Blood Updated:  04/10/18 1548     Magnesium 1.7 mg/dL     CBC & Differential [[id number]] Collected:  04/10/18 1528    Specimen:  Blood Updated:  04/10/18 1536    Narrative:       The following orders were created for panel order CBC & Differential.  Procedure                               Abnormality         Status                     ---------                               -----------         ------                     Scan Slide[[id number]]                                                                  CBC Auto Differential[[id number]]        Abnormal            Final result                 Please view results for these tests on the individual orders.    CBC Auto Differential [[id number]]  (Abnormal) Collected:  04/10/18 1528    Specimen:  Blood Updated:  04/10/18 1535     WBC 0.98 (C) 10*3/mm3      RBC 2.38 (L) 10*6/mm3      Hemoglobin 7.7 (L) g/dL      Hematocrit 23.2 (C) %      MCV 97.5 (H) fL      MCH 32.4 (H) pg      MCHC 33.2 g/dL      RDW 22.5 (H) %      RDW-SD 77.0 (H) fl      MPV 13.2 (H) fL      Platelets 41 (C) 10*3/mm3      Neutrophil % 36.6 (L) %      Lymphocyte % 27.6 %      Monocyte % 32.7 (H) %      Eosinophil % 0.0 %      Basophil % 0.0 %      Immature Grans % 3.1 (H) %      Neutrophils, Absolute 0.36 (L) 10*3/mm3      Lymphocytes, Absolute 0.27 (L) 10*3/mm3      Monocytes, Absolute 0.32 10*3/mm3      Eosinophils, Absolute 0.00 (L) 10*3/mm3      Basophils, Absolute 0.00 10*3/mm3      Immature Grans, Absolute 0.03 10*3/mm3       nRBC 0.0 /100 WBC     Respiratory Panel, PCR - Swab, Nasopharynx [[id number]]  (Normal) Collected:  04/09/18 8689    Specimen:  Swab from Nasopharynx Updated:  04/10/18 0944     ADENOVIRUS, PCR Not Detected     Coronavirus 229E Not Detected     Coronavirus HKU1 Not Detected     Coronavirus NL63 Not Detected     Coronavirus OC43 Not Detected     Human Metapneumovirus Not Detected     Human Rhinovirus/Enterovirus Not Detected     Influenza B PCR Not Detected     Parainfluenza Virus 1 Not Detected     Parainfluenza Virus 2 Not Detected     Parainfluenza Virus 3 Not Detected     Parainfluenza Virus 4 Not Detected     Bordetella pertussis pcr Not Detected     Influenza A H1 2009 PCR Not Detected     Chlamydophila pneumoniae PCR Not Detected     Mycoplasma pneumo by PCR Not Detected     Influenza A PCR Not Detected     Influenza A H3 Not Detected     Influenza A H1 Not Detected     RSV, PCR Not Detected    CBC Auto Differential [[id number]]  (Abnormal) Collected:  04/10/18 0405    Specimen:  Blood Updated:  04/10/18 0529     WBC 1.77 (C) 10*3/mm3      RBC 2.12 (L) 10*6/mm3      Hemoglobin 6.9 (C) g/dL      Hematocrit 21.2 (C) %      .0 (H) fL      MCH 32.5 (H) pg      MCHC 32.5 g/dL      RDW 23.7 (H) %      RDW-SD 82.0 (H) fl      MPV 13.2 (H) fL      Platelets 45 (C) 10*3/mm3      Neutrophil % 54.3 %      Lymphocyte % 18.6 (L) %      Monocyte % 25.4 (H) %      Eosinophil % 0.0 %      Basophil % 0.0 %      Immature Grans % 1.7 (H) %      Neutrophils, Absolute 0.96 (L) 10*3/mm3      Lymphocytes, Absolute 0.33 (L) 10*3/mm3      Monocytes, Absolute 0.45 10*3/mm3      Eosinophils, Absolute 0.00 (L) 10*3/mm3      Basophils, Absolute 0.00 10*3/mm3      Immature Grans, Absolute 0.03 10*3/mm3      nRBC 0.0 /100 WBC     Comprehensive Metabolic Panel [[id number]]  (Abnormal) Collected:  04/10/18 0404    Specimen:  Blood Updated:  04/10/18 0504     Glucose 88 mg/dL      BUN 26 (H) mg/dL      Creatinine 0.89 mg/dL      Sodium  137 mmol/L      Potassium 3.6 mmol/L      Chloride 103 mmol/L      CO2 19.8 (L) mmol/L      Calcium 8.9 mg/dL      Total Protein 7.8 g/dL      Albumin 3.20 (L) g/dL      ALT (SGPT) 69 (H) U/L      AST (SGOT) 47 (H) U/L      Alkaline Phosphatase 269 (H) U/L      Total Bilirubin 1.5 (H) mg/dL      eGFR Non African Amer 81 mL/min/1.73      Globulin 4.6 gm/dL      A/G Ratio 0.7 g/dL      BUN/Creatinine Ratio 29.2 (H)     Anion Gap 14.2 mmol/L     Narrative:       The MDRD GFR formula is only valid for adults with stable renal function between ages 18 and 70.    Urinalysis, Microscopic Only - Urine, Clean Catch [[id number]]  (Abnormal) Collected:  04/09/18 1746    Specimen:  Urine from Urine, Catheter Updated:  04/09/18 1810     RBC, UA 21-30 (A) /HPF      WBC, UA 6-12 (A) /HPF      Bacteria, UA 3+ (A) /HPF      Squamous Epithelial Cells, UA 0-2 /HPF      Hyaline Casts, UA None Seen /LPF      Methodology Manual Light Microscopy    Urinalysis With / Culture If Indicated - Urine, Catheter [[id number]]  (Abnormal) Collected:  04/09/18 1746    Specimen:  Urine from Urine, Catheter Updated:  04/09/18 1754     Color, UA Yellow     Appearance, UA Slightly Cloudy (A)     pH, UA 5.5     Specific Gravity, UA 1.010     Glucose, UA Negative     Ketones, UA Negative     Bilirubin, UA Negative     Blood, UA Large (3+) (A)     Protein,  mg/dL (2+) (A)     Leuk Esterase, UA Small (1+) (A)     Nitrite, UA Positive (A)     Urobilinogen, UA 0.2 E.U./dL    [place] Draw [[id number]] Collected:  04/09/18 1630    Specimen:  Blood Updated:  04/09/18 1731    Narrative:       The following orders were created for panel order [place] Draw.  Procedure                               Abnormality         Status                     ---------                               -----------         ------                     Light Blue Top[[id number]]                                   Final result               Green Top (Gel)[[id number]]                                   Final result               Lavender Top[[id number]]                                     Final result               Gold Top - SST[[id number]]                                   Final result                 Please view results for these tests on the individual orders.    Light Blue Top [[id number]] Collected:  04/09/18 1630    Specimen:  Blood Updated:  04/09/18 1731     Extra Tube hold for add-on     Comment: Auto resulted       Green Top (Gel) [[id number]] Collected:  04/09/18 1630    Specimen:  Blood Updated:  04/09/18 1731     Extra Tube Hold for add-ons.     Comment: Auto resulted.       Lavender Top [[id number]] Collected:  04/09/18 1630    Specimen:  Blood Updated:  04/09/18 1731     Extra Tube hold for add-on     Comment: Auto resulted       Gold Top - SST [[id number]] Collected:  04/09/18 1630    Specimen:  Blood Updated:  04/09/18 1731     Extra Tube Hold for add-ons.     Comment: Auto resulted.       Comprehensive Metabolic Panel [[id number]]  (Abnormal) Collected:  04/09/18 1630    Specimen:  Blood Updated:  04/09/18 1712     Glucose 95 mg/dL      BUN 30 (H) mg/dL      Creatinine 0.77 mg/dL      Sodium 133 (L) mmol/L      Potassium 4.5 mmol/L      Comment: Specimen hemolyzed.  Results may be affected.        Chloride 98 mmol/L      CO2 20.8 (L) mmol/L      Calcium 9.5 mg/dL      Total Protein 8.8 (H) g/dL      Albumin 3.60 g/dL      ALT (SGPT) 77 (H) U/L      Comment: Specimen hemolyzed.  Results may be affected.        AST (SGOT) 60 (H) U/L      Comment: Specimen hemolyzed.  Results may be affected.        Alkaline Phosphatase 298 (H) U/L      Total Bilirubin 1.2 mg/dL      eGFR Non African Amer 96 mL/min/1.73      Globulin 5.2 gm/dL      A/G Ratio 0.7 g/dL      BUN/Creatinine Ratio 39.0 (H)     Anion Gap 14.2 mmol/L     Narrative:       The MDRD GFR formula is only valid for adults with stable renal function between ages 18 and 70.    CBC & Differential [[id number]] Collected:  04/09/18 1630     Specimen:  Blood Updated:  04/09/18 1658    Narrative:       The following orders were created for panel order CBC & Differential.  Procedure                               Abnormality         Status                     ---------                               -----------         ------                     Scan Slide[[id number]]                                                                  CBC Auto Differential[[id number]]        Abnormal            Final result                 Please view results for these tests on the individual orders.    Lactic Acid, Plasma [[id number]]  (Normal) Collected:  04/09/18 1631    Specimen:  Blood Updated:  04/09/18 1655     Lactate 1.9 mmol/L     CBC Auto Differential [[id number]]  (Abnormal) Collected:  04/09/18 1630    Specimen:  Blood Updated:  04/09/18 1655     WBC 2.46 (C) 10*3/mm3      RBC 2.39 (L) 10*6/mm3      Hemoglobin 7.7 (L) g/dL      Hematocrit 23.6 (C) %      MCV 98.7 (H) fL      MCH 32.2 (H) pg      MCHC 32.6 g/dL      RDW 23.0 (H) %      RDW-SD 81.0 (H) fl      MPV 13.4 (H) fL      Platelets 52 (L) 10*3/mm3      Neutrophil % 53.3 %      Lymphocyte % 18.3 (L) %      Monocyte % 26.8 (H) %      Eosinophil % 0.0 %      Basophil % 0.0 %      Immature Grans % 1.6 (H) %      Neutrophils, Absolute 1.31 (L) 10*3/mm3      Lymphocytes, Absolute 0.45 (L) 10*3/mm3      Monocytes, Absolute 0.66 10*3/mm3      Eosinophils, Absolute 0.00 (L) 10*3/mm3      Basophils, Absolute 0.00 10*3/mm3      Immature Grans, Absolute 0.04 (H) 10*3/mm3      nRBC 0.0 /100 WBC         Imaging Results (most recent)     Procedure Component Value Units Date/Time    US Liver [[id number]] Collected:  04/10/18 1407     Updated:  04/10/18 1412    Narrative:       ULTRASOUND ABDOMEN, LIMITED, 4/10/2018:     HISTORY:  84-year-old male admitted to the hospital with sepsis and urinary tract  infection. Prior GI tract surgeries with fistulae. Elevated liver  enzymes are noted.     TECHNIQUE:  Grayscale ultrasound  imaging of the right upper quadrant was performed.     COMPARISON:  *  CT abdomen/pelvis, 4/9/2018.     FINDINGS:  The gallbladder is surgically absent. There is no intrahepatic or extra  hepatic bile duct dilatation (CBD 5 mm).      Liver is normal in ultrasound appearance. No ascites in the right upper  abdomen. Pancreas is partially obscured by overlying bowel gas but is  negative where seen. Right kidney is negative with no hydronephrosis.       Impression:       1. Cholecystectomy. No bile duct dilatation.  2. Negative liver.     This report was finalized on 4/10/2018 2:10 PM by Dr. Chencho Henderson MD.       XR Chest 1 View [[id number]] Collected:  04/10/18 0824     Updated:  04/10/18 0828    Narrative:       CHEST X-RAY, 4/9/2018:     HISTORY:   84-year-old male admitted to the hospital with sepsis, urinary tract  infection and abdominal pain. Postop bowel surgery with enterocutaneous  and enterovesical fistulae.     TECHNIQUE:  AP upright chest x-ray.     FINDINGS:  Right arm PICC is in good position with tip in the upper SVC.     Fibrosis in both lung bases, greater on the right, unchanged since  2/28/2018 and best seen on prior CT studies. No new pulmonary  infiltrate, visible airspace consolidation or pleural effusion. Heart  size and pulmonary vascularity are normal.       Impression:       1. No active disease.  2. Chronic bibasilar fibrosis.  3. Right arm PICC in good position.     This report was finalized on 4/10/2018 8:25 AM by Dr. Chencho Henderson MD.       CT Abdomen Pelvis With Contrast [[id number]] Collected:  04/10/18 0623     Updated:  04/10/18 0635    Narrative:       CT ABDOMEN AND PELVIS WITH CONTRAST, 4/9/2018:     HISTORY:  84-year-old male with complex surgical history including subtotal  colectomy with enterocutaneous and enterovesical fistula formation,  subsequent diverting ileostomy and oversewing of the rectal stump. He  has a chronic enterocutaneous fistula and a chronic  indwelling Ayoub  catheter. He presents to the ED with sepsis, urinary tract infection and  new onset abdomen pain today.     TECHNIQUE:  CT examination of the abdomen and pelvis with IV contrast. Radiation  dose reduction techniques included automated exposure control or  exposure modulation based on body size. Radiation audit for CT and  nuclear cardiology exams in the last 12 months: 6.     COMPARISON:  *  CT abdomen/pelvis, 2/27/2018 and 12/18/2017.     ABDOMEN FINDINGS:  There is a persistent complex enterocutaneous fistula extending between  a cavity at the rectal stump to the left anterior pelvic wall, although  the caliber and extent of the fistulous tract appears slightly smaller  than on the most recent prior exam. Air remains present within the  anterosuperior bladder wall but is also an intimate communication with  the rectal stump cavity, likely representing a persistent bladder  fistula. Ayoub catheter is present within a decompressed, diffusely  thick-walled urinary bladder. There is no bowel dilatation to suggest  obstruction proximal or distal to the right lower quadrant ileostomy. No  undrained collection is identified to suggest a new or enlarging  abscess.     Today, there is no evidence of upper urinary tract obstruction, and both  kidneys enhance normally.     Stable splenomegaly (history of myelodysplastic syndrome). Liver and  pancreas are in appearance. Abdominal aorta is normal in caliber.     PELVIS FINDINGS:  Bladder, rectal stump and enterovesicle fistula as noted above. Low  rectum is normal. No undrained pelvic fluid collection.     Limited lung base images show chronic pulmonary fibrosis and stable  ectatic aortic root. No pleural effusion.       Impression:       1. Enterocutaneous fistula and likely enterovesicle fistula remain  present, although appearing slightly smaller than on 2/27/2018.  2. Indwelling Ayoub catheter within a decompressed, diffusely  thick-walled urinary  bladder. No evidence of upper urinary tract  obstruction or pyelonephritis at this time.  3. No bowel dilatation to suggest obstruction. Right lower quadrant  ileostomy.  4. No new abscess or other undrained fluid collection is identified.  5. Stable splenomegaly and patient with myelodysplastic syndrome.  6. Stable ectatic aortic root.  7. Initial stat report to the ED from Dr. Reji Kumari at 1810 hours on  4/9/2018.     This report was finalized on 4/10/2018 6:33 AM by Dr. Chencho Henderson MD.           PROCEDURES: NONE    Condition on Discharge:  stable    Physical Exam at Discharge  Vital Signs  Temp:  [97.9 °F (36.6 °C)-102.9 °F (39.4 °C)] 98 °F (36.7 °C)  Heart Rate:  [] 83  Resp:  [18] 18  BP: (107-138)/(54-68) 108/56    Physical Exam:  Physical Exam   Constitutional: Patient appears thin, comfortable  HEENT:   Head: Normocephalic and atraumatic, edentulous   Eyes:  Pupils are equal, round, and reactive to light. EOM are intact. Sclera are anicteric and non-injected.  Mouth and Throat: Patient has moist mucous membranes. Oropharynx is clear of any erythema or exudate.     Neck: Neck supple. No JVD present. No thyromegaly present. No lymphadenopathy present.  Cardiovascular: Regular rate, regular rhythm, S1 normal and S2 normal.  Exam reveals no gallop and no friction rub.  No murmur heard.  Pulmonary/Chest: Lungs are clear to auscultation bilaterally. No respiratory distress. No wheezes. No rhonchi. No rales.   Abdominal: Right abdomen ileostomy with green/yellow liquid noted in bag. Soft. Bowel sounds are normal. No distension and no mass. There is no hepatosplenomegaly. There is no tenderness. mid abdomen with scabbed area noted over previous incision line without puruulent drainage or erythema noted.   Musculoskeletal: decreased muscle bulk  Extremities: No edema. Pulses are palpable in all 4 extremities.  Neurological: Patient is alert and oriented to person, place, and time. Cranial nerves II-XII  are grossly intact with no focal deficits.  Skin: Skin is warm. No rash noted. Nails show no clubbing.  No cyanosis or erythema.    Discharge Disposition  Scatterbed    Discharge Diet:    Dietary Orders     Start     Ordered    04/09/18 2303  NPO Diet NPO Except: Sips With Meds  Diet Effective Now     Question:  NPO Except:  Answer:  Sips With Meds    04/09/18 2304        Activity at Discharge:  bedrest    Follow-up Appointments  Future Appointments  Date Time Provider Department Center   4/17/2018 1:20 PM LAB CHAIR 1 [facility]   4/17/2018 2:00 PM Anupam Green MD [facility]   4/18/2018 10:10 AM Kaylie Granados MD [facility] None         Test Results Pending at Discharge   Order Current Status    Blood Culture - Blood, Preliminary result    Blood Culture - Blood, Preliminary result    Blood Culture - Blood, Preliminary result    Blood Culture - Blood, Preliminary result           Muna Addison, APRN  04/12/18  2:37 PM    Time: Discharge over 30 min (if over 30 minutes give explanation as to why it took greater than 30 minutes)  Secondary to:  Coordination of care  Medication reconciliation  Extensive D/W patient and family, questions answered  D/W Hospice/case management

## 2018-04-12 NOTE — NURSING NOTE
Continued Stay Note  RENATA Wheatley     Patient Name: Jason Zelaya  MRN: [id number]  Today's Date: 4/12/2018    Admit Date: 4/9/2018          Discharge Plan     Row Name 04/12/18 1420       Plan    Plan Comments lengthy converstation with patient, wife and family at bedside. Muna ALEXANDER and Waleska with [facility] lead conversation. discussed options, patient and wife made decision to become [facility] Scattered Bed today. emotional support provided. declined visit from  at this time. will continue to follow.               Discharge Codes    No documentation.           Phyllis Mayen RN

## 2018-04-12 NOTE — NURSING NOTE
Continued Stay Note  RENATA Wheatley     Patient Name: Jason Zelaya  MRN: [id number]  Today's Date: 4/12/2018    Admit Date: 4/9/2018          Discharge Plan     Row Name 04/12/18 0950       Plan    Plan [facility] scattered bed referral    Plan Comments [facility]. will continue to follow.               Discharge Codes    No documentation.           Phyllis Mayen RN

## 2018-04-12 NOTE — PLAN OF CARE
Problem: Fall Risk (Adult)  Goal: Identify Related Risk Factors and Signs and Symptoms  Outcome: Ongoing (interventions implemented as appropriate)    Goal: Absence of Fall  Outcome: Ongoing (interventions implemented as appropriate)      Problem: Skin Injury Risk (Adult)  Goal: Identify Related Risk Factors and Signs and Symptoms  Outcome: Ongoing (interventions implemented as appropriate)    Goal: Skin Health and Integrity  Outcome: Ongoing (interventions implemented as appropriate)      Problem: Patient Care Overview  Goal: Plan of Care Review  Outcome: Ongoing (interventions implemented as appropriate)    Goal: Individualization and Mutuality  Outcome: Ongoing (interventions implemented as appropriate)    Goal: Discharge Needs Assessment  Outcome: Ongoing (interventions implemented as appropriate)    Goal: Interprofessional Rounds/Family Conf  Outcome: Ongoing (interventions implemented as appropriate)

## 2018-04-12 NOTE — CONSULTS
Met with pt and family at bedside to discuss disease progression, goals of care and provided explanation of \[facility]\"" services. Pt and family are agreeable to inpatient hospice care for further symptom and medication management. Pt admitted to \[facility]\"" scattered bed on 4/12/18 with primary dx of ICD10-D.46.9 Myelodysplastic Syndrome. Symptom to be managed is pain. Our staff will follow daily for any further needs.     Thank you for this referral, please call with any questions or concerns.  Waleska Sparrow, Referral & Admissions Nurse [phone]

## 2018-04-13 NOTE — PLAN OF CARE
Problem: Fall Risk (Adult)  Goal: Absence of Fall  Outcome: Ongoing (interventions implemented as appropriate)   04/12/18 2333   Fall Risk (Adult)   Absence of Fall making progress toward outcome       Problem: Skin Injury Risk (Adult)  Goal: Skin Health and Integrity  Outcome: Ongoing (interventions implemented as appropriate)   04/12/18 2333   Skin Injury Risk (Adult)   Skin Health and Integrity unable to achieve outcome       Problem: Patient Care Overview  Goal: Plan of Care Review  Outcome: Ongoing (interventions implemented as appropriate)   04/12/18 2333   Coping/Psychosocial   Plan of Care Reviewed With patient   Plan of Care Review   Progress declining

## 2018-04-13 NOTE — NURSING NOTE
Discharge Planning Assessment  RENATA Wheatley     Patient Name: Jason Zelaya  MRN: [id number]  Today's Date: 4/13/2018    Admit Date: 4/12/2018          Discharge Needs Assessment    No documentation.           Discharge Plan     Row Name 04/13/18 1000       Plan    Plan [facility] scattered bed    Plan Comments spoke with wife, patient comfortable, signed IMM and she denied copy. emotional support provided. will continue to follow.     Row Name 04/13/18 0730                         Destination     No service coordination in this encounter.      Durable Medical Equipment     No service coordination in this encounter.      Dialysis/Infusion     No service coordination in this encounter.      Home Medical Care     No service coordination in this encounter.      Social Care     No service coordination in this encounter.                Demographic Summary     Row Name 04/13/18 0958       General Information    Admission Type inpatient    Arrived From hospital    Required Notices Provided Important Message from Medicare    Referral Source admission list    Reason for Consult palliative care    Preferred Language English     Used During This Interaction no       Contact Information    Permission Granted to Share Info With ;family/designee    Contact Information Obtained for             Functional Status    No documentation.           Psychosocial    No documentation.           Abuse/Neglect    No documentation.           Legal    No documentation.           Substance Abuse    No documentation.           Patient Forms    No documentation.         Phyllis Myaen RN

## 2018-04-13 NOTE — PROGRESS NOTES
"SERVICE: [facility] HOSPITALIST    CONSULTANTS:Hospice    CHIEF COMPLAINT: f/u palliative care    SUBJECTIVE: The patient and family are in good spirits today.  Patient reports that his pain is extremely well controlled and only has required extra pain medication infrequently.  He notes that he has eaten a muffin and is drinking some liquids and is enjoying those.  He has no new concerns today.    OBJECTIVE:    /69 (BP Location: Left arm, Patient Position: Lying)   Pulse 73   Temp 99 °F (37.2 °C) (Oral)   Resp 18   Ht 177.8 cm (70\")   Wt 64.7 kg (142 lb 10.2 oz)   SpO2 97%   BMI 20.47 kg/m²     MEDS/LABS REVIEWED AND ORDERED      diclofenac 2 g Topical 4x Daily   hydrophor  Topical Q12H   lidocaine 1 patch Transdermal Q24H     Physical Exam   Constitutional: He is oriented to person, place, and time.   thin   HENT:   Head: Normocephalic and atraumatic.   edentulous   Eyes: EOM are normal. Pupils are equal, round, and reactive to light.   Cardiovascular: Normal rate and regular rhythm.    Pulmonary/Chest: Effort normal and breath sounds normal.   Abdominal: Soft. Bowel sounds are normal. He exhibits no distension. There is no tenderness. There is no guarding.   Right abdomen with ostomy draining small amount greenish fluid   Genitourinary:   Genitourinary Comments: Ayoub catheter with dark yellow urine in bag   Musculoskeletal: He exhibits no edema.   Neurological: He is alert and oriented to person, place, and time.   Skin: Skin is warm and dry. No erythema.   Mid abdomen scar with scab noted intact without erythema/drainage   Psychiatric: He has a normal mood and affect. His behavior is normal. Judgment and thought content normal.   Vitals reviewed.    LAB/DIAGNOSTICS:    Lab Results (last 24 hours)     ** No results found for the last 24 hours. **        No orders to display     Results for orders placed during the hospital encounter of 12/15/17   Adult Transthoracic Echo Complete W/ " Cont if Necessary Per Protocol    Narrative · Left ventricular wall thickness is consistent with mild concentric   hypertrophy.  · Left ventricular systolic function is moderately decreased. Estimated EF   = 38%.  · Mild aortic valve regurgitation is present.  · Moderate mitral valve regurgitation is present  · Mild tricuspid valve regurgitation is present.        No radiology results for the last day    ASSESSMENT/PLAN:  Palliative care: Hospice following  Comfortable, eating food, pain controlled well, family surrounding him     Previous diagnoses:   1. Sepsis secondary to pseudomonas UTI with chronic indwelling glasgow with h/o MRSA UTI/cystitis:   2. Persistent fevers   3. 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 Severe malnutrition secondary to chronic illness  5. Transaminitis/hyperbilirubinemia/elevated alk phos  6. Electrolyte inbalance   7. Acute on chronic pancytopenia/MDS   8. Gout   9. Chronic diastolic dysfunction, PAF and PSVT, Non-rheumatic aortic valve insufficiency, chronic RBBB, MR and AR   10. HTN   11. OA, chronic knee pain

## 2018-04-13 NOTE — NURSING NOTE
04/13/18 1012   Ileostomy RLQ   Placement Date/Time: 04/09/18 2024   Placed by External Staff?: Other (Comment)  Location: RLQ   Stomal Appliance 2 piece;Changed;Leaking;Drainable   Stoma Appearance moist;red;protruding above skin level;round;rosebud appearance   Peristomal Assessment Blanchable erythema   Accessories/Skin Care skin barrier ring;skin barrier powder;skin sealant;cleansed with water   Stoma Function stool   Treatment Bag change;Other (Comment)  (2 piece minesh)

## 2018-04-13 NOTE — PLAN OF CARE
Problem: Patient Care Overview  Goal: Discharge Needs Assessment  Outcome: Ongoing (interventions implemented as appropriate)   04/12/18 1614 04/12/18 1758   Discharge Needs Assessment   Patient/Family Anticipates Transition to inpatient hospice --    Patient/Family Anticipated Services at Transition none --    Transportation Concerns car, none --    Transportation Anticipated family or friend will provide --    Outpatient/Agency/Support Group Needs --  hospice facility   Discharge Facility/Level of Care Needs --  hospice facility   Disability   Equipment Currently Used at Home none --

## 2018-04-13 NOTE — NURSING NOTE
Case Management Discharge Note    Final Note: Hospice scattered bed    Destination     No service coordination in this encounter.      Durable Medical Equipment     No service coordination in this encounter.      Dialysis/Infusion     No service coordination in this encounter.      Home Medical Care     No service coordination in this encounter.      Social Care     No service coordination in this encounter.             Final Discharge Disposition Code: 51 - hospice medical facility

## 2018-04-13 NOTE — PLAN OF CARE
Problem: Fall Risk (Adult)  Goal: Absence of Fall  Outcome: Ongoing (interventions implemented as appropriate)      Problem: Patient Care Overview  Goal: Plan of Care Review   04/13/18 7082   Coping/Psychosocial   Plan of Care Reviewed With patient;spouse;family   Plan of Care Review   Progress declining   OTHER   Outcome Summary pt had a fair day. He is now care and comfort, hospice was here to visit,   family, children and spouse were all here today at bedside. Pain has been controlled with his pca cont infusion. Resp 20 and sats are 99% on RA. he has no n/v noted today, appetite poor.. c/o not hungry but was able to eat some ice cream.  Tele monitor cont and SR. 80's. dozing between visits. pt cont to be talkative about his future. slowly declining, and weakness has progressed        Problem: Dying Patient, Actively (Adult)  Goal: Comfort/Pain Control  Outcome: Ongoing (interventions implemented as appropriate)

## 2018-04-14 NOTE — PROGRESS NOTES
"Hospitalist Team      Patient Care Team:  David Cedillo MD as PCP - General (Family Medicine)  Anupam Green MD as Consulting Physician (Hematology and Oncology)  Bennie Galo MD as Referring Physician (Orthopedic Surgery)  Kaylie Granados MD as Surgeon (General Surgery)        Chief Complaint:  F/U End of life/Palliative care    Subjective    Interval History and ROS:     Patient notes he is feeling ok today. He notes his pain is well controlled. His  and family are at bedside. The patient denies any CP, SOA or N/V. I answered any questions the family had.       Objective    Vital Signs  Temp:  [97.3 °F (36.3 °C)-98 °F (36.7 °C)] 97.6 °F (36.4 °C)  Heart Rate:  [73-79] 73  Resp:  [20] 20  BP: (128-134)/(65-77) 134/77  Oxygen Therapy  SpO2: 98 %  Pulse Oximetry Type: Continuous  Device (Oxygen Therapy): room air     Flowsheet Rows    Flowsheet Row First Filed Value   Admission Height 177.8 cm (70\") Documented at 04/12/2018 1600   Admission Weight 64.7 kg (142 lb 10.2 oz) Documented at 04/12/2018 1600            Physical Exam:  Constitutional: Patient appears well-developed and in no acute distress   HEENT:   Head: Normocephalic and atraumatic.   Eyes:  EOM are intact. Sclera are anicteric and non-injected.  Mouth and Throat: Patient has moist mucous membranes. Edentulous.     Cardiovascular: Regular rate, regular rhythm.    Pulmonary/Chest: Lungs are clear to auscultation bilaterally. No respiratory distress. No wheezes. No rhonchi. No rales.   Abdominal: Soft. Bowel sounds are normal. There is no noted tenderness. Ostomy bag with brown liquid stool.  Extremities: No edema.   Neurological: Patient is alert and oriented to person, place, and time.       Results Review:     I reviewed the patient's new clinical results.    Lab Results (last 24 hours)     ** No results found for the last 24 hours. **          Imaging Results (last 24 hours)     ** No results found for the last 24 hours. **    "       ECG/EMG Results (most recent)     None          Medication Review:   I have reviewed the patient's current medication list    Current Facility-Administered Medications:   •  acetaminophen (TYLENOL) tablet 650 mg, 650 mg, Oral, Q4H PRN **OR** acetaminophen (TYLENOL) 160 MG/5ML solution 650 mg, 650 mg, Oral, Q4H PRN **OR** acetaminophen (TYLENOL) suppository 650 mg, 650 mg, Rectal, Q4H PRN, Muna Addison, APRN  •  diclofenac (VOLTAREN) 1 % gel 2 g, 2 g, Topical, 4x Daily, Muna Addison, APRN, 2 g at 04/14/18 0810  •  diphenhydrAMINE (BENADRYL) capsule 25 mg, 25 mg, Oral, Q6H PRN **OR** diphenhydrAMINE (BENADRYL) injection 25 mg, 25 mg, Intravenous, Q6H PRN, Muna [place], APRN  •  diphenoxylate-atropine (LOMOTIL) 2.5-0.025 MG per tablet 1 tablet, 1 tablet, Oral, Q2H PRN, Muna Addison, APRN  •  Glycerin-Hypromellose- (ARTIFICIAL TEARS) 0.2-0.2-1 % ophthalmic solution solution 1 drop, 1 drop, Both Eyes, Q30 Min PRN, Muna [place], APRN  •  glycopyrrolate (ROBINUL) injection 0.2 mg, 0.2 mg, Intravenous, Q2H PRN **OR** glycopyrrolate (ROBINUL) injection 0.2 mg, 0.2 mg, Subcutaneous, Q2H PRN **OR** glycopyrrolate (ROBINUL) injection 0.4 mg, 0.4 mg, Intravenous, Q2H PRN **OR** glycopyrrolate (ROBINUL) injection 0.4 mg, 0.4 mg, Subcutaneous, Q2H PRN, Muna FARIA [place], APRN  •  HYDROmorphone (DILAUDID) (PF) PCA 0.2 mg/mL 30 mL syringe, , Intravenous, Continuous, Muna Addison, APRN  •  hydrophor (AQUAPHOR) ointment, , Topical, Q12H, BENJAMIN Webb, 1 application at 04/14/18 0813  •  lactated ringers infusion, 30 mL/hr, Intravenous, Continuous, BENJAMIN Webb, Last Rate: 30 mL/hr at 04/12/18 1645, 30 mL/hr at 04/12/18 1645  •  lidocaine (LIDODERM) 5 % 1 patch, 1 patch, Transdermal, Q24H, BENJAMIN Webb, 1 patch at 04/14/18 0450  •  LORazepam (ATIVAN) 2 MG/ML concentrated solution 0.5 mg, 0.5 mg, Oral, Q1H PRN, BENJAMIN Webb  •  LORazepam (ATIVAN) 2  MG/ML concentrated solution 0.5 mg, 0.5 mg, Sublingual, Q1H PRN, Muna R [place], APRN  •  LORazepam (ATIVAN) 2 MG/ML concentrated solution 1 mg, 1 mg, Oral, Q1H PRN, Muna R Azael, APRN  •  LORazepam (ATIVAN) 2 MG/ML concentrated solution 1 mg, 1 mg, Sublingual, Q1H PRN, Muna R Azael, APRN  •  LORazepam (ATIVAN) 2 MG/ML concentrated solution 2 mg, 2 mg, Oral, Q1H PRN, Muna R [place]zael, APRN  •  LORazepam (ATIVAN) 2 MG/ML concentrated solution 2 mg, 2 mg, Sublingual, Q1H PRN, Muna R [place], APRN  •  LORazepam (ATIVAN) tablet 0.5 mg, 0.5 mg, Oral, Q1H PRN, Muna R [place], APRN  •  LORazepam (ATIVAN) tablet 1 mg, 1 mg, Oral, Q1H PRN, Muna R [place], APRN  •  LORazepam (ATIVAN) tablet 2 mg, 2 mg, Oral, Q1H PRN, Muna R [place], APRN  •  ondansetron (ZOFRAN) injection 4 mg, 4 mg, Intravenous, Q6H PRN, Muna R Azael, APRN  •  Scopolamine (TRANSDERM-SCOP) 1.5 MG/3DAYS patch 1 patch, 1 patch, Transdermal, Q72H PRN, Muna R [place], APRN  •  sodium chloride 0.9 % flush 1-10 mL, 1-10 mL, Intravenous, PRN, Muna R Azael, APRN  •  sodium chloride 0.9 % flush 10 mL, 10 mL, Intravenous, PRN, Muna R [place], APRN  •  sodium chloride 0.9 % infusion 40 mL, 40 mL, Intravenous, PRN, Muna R Azael, APRN      Assessment/Plan     1. End of life/Palliative care: [facility] following  Patient is comfortable on PCA pump at current setting. He is awake and pleasant and family and his  are at bedside. Patient has no current concerns. Will continue current care.     Underlying recent acute and chronic diagnoses: (treatment for all of these issues, per patient request, were stopped when patient decided to be care and comfort only)  1. Sepsis secondary to pseudomonas UTI with chronic indwelling glasgow with h/o MRSA UTI/cystitis:   2. Persistent fevers   3. 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 Severe malnutrition secondary to chronic illness  5.  Transaminitis/hyperbilirubinemia/elevated alk phos  6. Electrolyte inbalance   7. Acute on chronic pancytopenia/MDS   8. Gout   9. Chronic diastolic dysfunction, PAF and PSVT, Non-rheumatic aortic valve insufficiency, chronic RBBB, MR and AR   10. HTN   11. OA, chronic knee pain        Kenya Marks MD  04/14/18  12:08 PM

## 2018-04-14 NOTE — PROGRESS NOTES
[facility] Visit Report    Jason Zelaya  [id number]  4/14/2018    Admission R/T [facility] Dx: YES      Reason for [facility] Admission: Myelodysplastic syndrome, Sepsis, UTI, Malnutrition      Symptom  Management: Pain control      Nursing/Medication Recommendations: No recommendations at this time      Psychosocial Issues and Recommendations: Provide support to patient and family      Spiritual Concerns and Recommendations: None at present      [facility]:  None at present, continue to monitor for decline and on Dilaudid continuous and demand PCA for symptom management of pain and meeting criteria for GIP as a [facility] scattered bed patient.      Review of Visit: Close monitoring for safety/falls, symptom management of pain, comfort care for declining patient. Patient sitting up in bed, awake and alert, a little drowsy but anwers questions appropriately. Patient reports he is comfortable now with the Dilaudid PCA and has not required any Ativan or Robinul. Spoke to spouse and she reports he is eating and drinking small amounts, but not enough to sustain him. We discussed disease progression and decline and she is aware and accepting but feels patient is a little better today. We discussed options if patient continues to stabilize and not decline further and would be willing to take him home with [facility] following if that comes about. We discussed the possibility of patient going home with the Dilaudid PCA if that is necessary. Emotional support provided and encouraged her to contact [facility] 24/7 for any further questions or concerns. Discussed care needs with staff HAILE Epps and patient is on Dilaudid PCA with continuous dose of 0.5mg/hr and 0.3mg q 8 min. Will continue to see daily to assess needs, monitor status, offer support and facilitate discharge if patient remains stable        Teri Ragsdale RN  [facility] Visit Nurse

## 2018-04-14 NOTE — PLAN OF CARE
Problem: Fall Risk (Adult)  Goal: Identify Related Risk Factors and Signs and Symptoms  Outcome: Outcome(s) achieved Date Met: 04/14/18 04/12/18 1752 04/14/18 1747   Fall Risk (Adult)   Related Risk Factors (Fall Risk) age-related changes;impaired vision;inadequate lighting;gait/mobility problems;fear of falling;environment unfamiliar --    Signs and Symptoms (Fall Risk) --  presence of risk factors     Goal: Absence of Fall  Outcome: Ongoing (interventions implemented as appropriate)   04/14/18 1747   Fall Risk (Adult)   Absence of Fall making progress toward outcome       Problem: Skin Injury Risk (Adult)  Goal: Identify Related Risk Factors and Signs and Symptoms  Outcome: Outcome(s) achieved Date Met: 04/14/18 04/12/18 1752   Skin Injury Risk (Adult)   Related Risk Factors (Skin Injury Risk) advanced age;body weight extremes;cognitive impairment;critical care admission;nutritional deficiencies;mobility impaired     Goal: Skin Health and Integrity  Outcome: Ongoing (interventions implemented as appropriate)   04/14/18 1747   Skin Injury Risk (Adult)   Skin Health and Integrity making progress toward outcome       Problem: Patient Care Overview  Goal: Plan of Care Review  Outcome: Ongoing (interventions implemented as appropriate)   04/14/18 1747   Coping/Psychosocial   Plan of Care Reviewed With patient;family   Plan of Care Review   Progress improving   OTHER   Outcome Summary Patient awake and alert all day. Patient had several family members visit throughout the day. Patient ate food that the family brought in. Patient also drank liquids that the family brought in. Bed and bath completed. Noticed a red blanchable spot on his left posterior hip therefore patient was positioned off of his left side. This AM, patient's PCA was not set at a continous rate in addition to his PCA doses. This was corrected and charge nurse informed. Urine output has been adequate. Colostomy emptied. Hospice nurse did talk to  family today and said if needed, patient can go home with a home PCA.     Goal: Individualization and Mutuality  Outcome: Ongoing (interventions implemented as appropriate)   04/14/18 1747   Individualization   Patient Specific Goals (Include Timeframe) pain management by the end of the day   Patient Specific Interventions patient's pain improved due to changing the PCA back to a continuous rate with intermittent PCA doses     Goal: Discharge Needs Assessment  Outcome: Ongoing (interventions implemented as appropriate)   04/14/18 1747   Discharge Needs Assessment   Concerns to be Addressed denies needs/concerns at this time     Goal: Interprofessional Rounds/Family Conf  Outcome: Ongoing (interventions implemented as appropriate)      Problem: Dying Patient, Actively (Adult)  Goal: Identify Related Risk Factors and Signs and Symptoms  Outcome: Outcome(s) achieved Date Met: 04/14/18 04/14/18 1747   Dying Patient, Actively (Adult)   Related Risk Factors (Actively Dying Patient) anticipatory loss;disease progression;age/developmental level;rate of onset of illness   Signs and Symptoms (Actively Dying Patient) awareness of life transition;changes in bladder elimination pattern;changes in bowel elimination pattern;total dependency;food/fluid disinterest/withdrawal;pain;vital sign changes     Goal: Comfort/Pain Control  Outcome: Ongoing (interventions implemented as appropriate)   04/14/18 1747   Dying Patient, Actively (Adult)   Comfort/Pain Control making progress toward outcome     Goal: Peace/Preservation of Dignity During the Dying Process  Outcome: Ongoing (interventions implemented as appropriate)   04/14/18 1747   Dying Patient, Actively (Adult)   Peace/Preservation of Dignity During the Dying Process making progress toward outcome

## 2018-04-14 NOTE — PLAN OF CARE
Problem: Fall Risk (Adult)  Goal: Absence of Fall  Outcome: Ongoing (interventions implemented as appropriate)   04/13/18 2259   Fall Risk (Adult)   Absence of Fall making progress toward outcome       Problem: Skin Injury Risk (Adult)  Goal: Skin Health and Integrity  Outcome: Ongoing (interventions implemented as appropriate)   04/13/18 2259   Skin Injury Risk (Adult)   Skin Health and Integrity making progress toward outcome       Problem: Patient Care Overview  Goal: Plan of Care Review  Outcome: Ongoing (interventions implemented as appropriate)   04/13/18 2259   Coping/Psychosocial   Plan of Care Reviewed With patient   Plan of Care Review   Progress improving

## 2018-04-15 NOTE — PROGRESS NOTES
"Hospitalist Team      Patient Care Team:  David Cedillo MD as PCP - General (Family Medicine)  Anupam Green MD as Consulting Physician (Hematology and Oncology)  Bennie Galo MD as Referring Physician (Orthopedic Surgery)  Kaylie Granados MD as Surgeon (General Surgery)        Chief Complaint:  F/U End of life/Palliative care    Subjective    Interval History and ROS:     Patient notes he is feeling ok today, the PCA continues to work well for his pain. He is not having any SOA or CP and was sleeping today when I entered the room. Family is at bedside. Patient and family with no questions or concerns.      Objective    Vital Signs  BP: (136)/(68) 136/68  Oxygen Therapy  SpO2: 98 %  Pulse Oximetry Type: Intermittent   Device (Oxygen Therapy): room air     Flowsheet Rows    Flowsheet Row First Filed Value   Admission Height 177.8 cm (70\") Documented at 04/12/2018 1600   Admission Weight 64.7 kg (142 lb 10.2 oz) Documented at 04/12/2018 1600            Physical Exam:  Constitutional: Patient appears well-developed and in no acute distress   HEENT:   Eyes:  EOM are intact. Sclera are anicteric and non-injected.  Mouth and Throat: Patient has moist mucous membranes. Edentulous.     Cardiovascular: Regular rate, regular rhythm.    Pulmonary/Chest: Lungs are clear to auscultation bilaterally. No respiratory distress. No wheezes. No rhonchi. No rales.   Abdominal: Soft. Bowel sounds are normal. There is no noted tenderness. Ostomy bag with small amount brown liquid stool.  Extremities: No edema.   Neurological: Patient is alert and oriented to person, place, and time.     Results Review:     I reviewed the patient's new clinical results.    Lab Results (last 24 hours)     ** No results found for the last 24 hours. **          Imaging Results (last 24 hours)     ** No results found for the last 24 hours. **          ECG/EMG Results (most recent)     None          Medication Review:   I have reviewed the " patient's current medication list    Current Facility-Administered Medications:   •  acetaminophen (TYLENOL) tablet 650 mg, 650 mg, Oral, Q4H PRN **OR** acetaminophen (TYLENOL) 160 MG/5ML solution 650 mg, 650 mg, Oral, Q4H PRN **OR** acetaminophen (TYLENOL) suppository 650 mg, 650 mg, Rectal, Q4H PRN, Muna Addison, APRN  •  diclofenac (VOLTAREN) 1 % gel 2 g, 2 g, Topical, 4x Daily, Muna Addison APRN, 2 g at 04/15/18 0742  •  diphenhydrAMINE (BENADRYL) capsule 25 mg, 25 mg, Oral, Q6H PRN **OR** diphenhydrAMINE (BENADRYL) injection 25 mg, 25 mg, Intravenous, Q6H PRN, uMna Addison, APRN  •  diphenoxylate-atropine (LOMOTIL) 2.5-0.025 MG per tablet 1 tablet, 1 tablet, Oral, Q2H PRN, Muna Addison, APRN  •  Glycerin-Hypromellose- (ARTIFICIAL TEARS) 0.2-0.2-1 % ophthalmic solution solution 1 drop, 1 drop, Both Eyes, Q30 Min PRN, Muna Addison, APRN  •  glycopyrrolate (ROBINUL) injection 0.2 mg, 0.2 mg, Intravenous, Q2H PRN **OR** glycopyrrolate (ROBINUL) injection 0.2 mg, 0.2 mg, Subcutaneous, Q2H PRN **OR** glycopyrrolate (ROBINUL) injection 0.4 mg, 0.4 mg, Intravenous, Q2H PRN **OR** glycopyrrolate (ROBINUL) injection 0.4 mg, 0.4 mg, Subcutaneous, Q2H PRN, Muna Addison, APRN  •  HYDROmorphone (DILAUDID) (PF) PCA 0.2 mg/mL 30 mL syringe  - ADS Override Pull, , , ,   •  HYDROmorphone (DILAUDID) (PF) PCA 0.2 mg/mL 30 mL syringe, , Intravenous, Continuous, Muna Addison APRN  •  hydrophor (AQUAPHOR) ointment, , Topical, Q12H, BENJAMIN Webb  •  lactated ringers infusion, 30 mL/hr, Intravenous, Continuous, BENJAMIN Webb, Last Rate: 30 mL/hr at 04/12/18 1645, 30 mL/hr at 04/12/18 1645  •  lidocaine (LIDODERM) 5 % 1 patch, 1 patch, Transdermal, Q24H, BENJAMIN Webb, 1 patch at 04/15/18 0741  •  LORazepam (ATIVAN) 2 MG/ML concentrated solution 0.5 mg, 0.5 mg, Oral, Q1H PRN, BENJAMIN Webb  •  LORazepam (ATIVAN) 2 MG/ML concentrated solution 0.5 mg, 0.5  mg, Sublingual, Q1H PRN, Muna R Azael, APRN  •  LORazepam (ATIVAN) 2 MG/ML concentrated solution 1 mg, 1 mg, Oral, Q1H PRN, Muna R [place], APRN  •  LORazepam (ATIVAN) 2 MG/ML concentrated solution 1 mg, 1 mg, Sublingual, Q1H PRN, Muna R Azael, APRN  •  LORazepam (ATIVAN) 2 MG/ML concentrated solution 2 mg, 2 mg, Oral, Q1H PRN, Muna R Azael, APRN  •  LORazepam (ATIVAN) 2 MG/ML concentrated solution 2 mg, 2 mg, Sublingual, Q1H PRN, Muna R [place], APRN  •  LORazepam (ATIVAN) tablet 0.5 mg, 0.5 mg, Oral, Q1H PRN, Muna R Azael, APRN  •  LORazepam (ATIVAN) tablet 1 mg, 1 mg, Oral, Q1H PRN, Muna R [place], APRN  •  LORazepam (ATIVAN) tablet 2 mg, 2 mg, Oral, Q1H PRN, Muna R [place], APRN  •  ondansetron (ZOFRAN) injection 4 mg, 4 mg, Intravenous, Q6H PRN, Muna R Azael, APRN  •  Scopolamine (TRANSDERM-SCOP) 1.5 MG/3DAYS patch 1 patch, 1 patch, Transdermal, Q72H PRN, Muna R [place], APRN  •  sodium chloride 0.9 % flush 1-10 mL, 1-10 mL, Intravenous, PRN, Muna R [place], APRN  •  sodium chloride 0.9 % flush 10 mL, 10 mL, Intravenous, PRN, Muna R Azael, APRN  •  sodium chloride 0.9 % infusion 40 mL, 40 mL, Intravenous, PRN, Muna R [place], APRN      Assessment/Plan     1. End of life/Palliative care: [facility] following  Patient is comfortable on PCA pump at current setting. Patient and family at bedside with no questions or concerns. Will continue current care.     Underlying recent acute and chronic diagnoses: (treatment for all of these issues, per patient request, were stopped when patient decided to be care and comfort only)  1. Sepsis secondary to pseudomonas UTI with chronic indwelling glasgow with h/o MRSA UTI/cystitis:   2. Persistent fevers   3. 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 Severe malnutrition secondary to chronic illness  5. Transaminitis/hyperbilirubinemia/elevated alk phos  6. Electrolyte inbalance   7. Acute on  chronic pancytopenia/MDS   8. Gout   9. Chronic diastolic dysfunction, PAF and PSVT, Non-rheumatic aortic valve insufficiency, chronic RBBB, MR and AR   10. HTN   11. OA, chronic knee pain      Kenya Marks MD  04/15/18  8:12 AM

## 2018-04-15 NOTE — PROGRESS NOTES
[facility] Visit Report    Jason Zelaya  [id number]  4/15/2018    Admission R/T [facility] Dx: YES      Reason for [facility] Admission: Myelodysplastic snydrome, Sepsis, UTI and Malnutrition      Symptom  Management:  Pain control      Nursing/Medication Recommendations: No recommendations at this time      Psychosocial Issues and Recommendations: Provide support to patient and family      Spiritual Concerns and Recommendations: None at present, had visit by roberto dennis      [facility] Discharge Plans:  None at present, continue to monitor for decline and patient on continuous and demand IV Dilaudid PCA for symptom management of pain and is meeting criteria for GIP as a [facility] scattered bed patient.      Review of Visit: Close monitoring for safety/falls, symptom management of pain, comfort care for declining patient. Patient awake, abed, sitting up and alert. Appears tired today but reports comfortable with Dilaudid. Eating and drinking small amounts per family. Spoke to patient and spouse regarding status and emotional support provided. Discussed care needs with staff HAILE Epps and with Tenri[facility] CM and if patient stabilizes may plan for transition to home with Dilaudid PCA if family agreeable and approved by [facility] Medical Director. Discussed options with spouse yesterday and will discuss further if patient continues to improve. There is a possibility that patient may start to decline in next day or two so will continue to monitor daily to assess needs, monitor status, offer support and facilitate any transfer needs as they arise.         Teri Ragsdale RN  [facility] Visit Nurse

## 2018-04-15 NOTE — PLAN OF CARE
Problem: Fall Risk (Adult)  Goal: Absence of Fall  Outcome: Ongoing (interventions implemented as appropriate)   04/15/18 1519   Fall Risk (Adult)   Absence of Fall making progress toward outcome       Problem: Skin Injury Risk (Adult)  Goal: Skin Health and Integrity  Outcome: Ongoing (interventions implemented as appropriate)   04/15/18 1519   Skin Injury Risk (Adult)   Skin Health and Integrity making progress toward outcome       Problem: Patient Care Overview  Goal: Plan of Care Review   04/15/18 1519   Coping/Psychosocial   Plan of Care Reviewed With patient   Plan of Care Review   Progress no change   OTHER   Outcome Summary Patient doing well. Pt eating and drinking. Urine output still adequate. Possibly home with hospice and PCA within next couple days if no decline noted by hospice nurse.     Goal: Individualization and Mutuality  Outcome: Ongoing (interventions implemented as appropriate)   04/15/18 1519   Individualization   Patient Specific Goals (Include Timeframe) pain management at discharge   Patient Specific Interventions patient can go home with PCA pump if being discharged in the next couple days     Goal: Discharge Needs Assessment  Outcome: Ongoing (interventions implemented as appropriate)   04/15/18 1519   Discharge Needs Assessment   Concerns to be Addressed denies needs/concerns at this time     Goal: Interprofessional Rounds/Family Conf  Outcome: Ongoing (interventions implemented as appropriate)      Problem: Dying Patient, Actively (Adult)  Goal: Comfort/Pain Control  Outcome: Ongoing (interventions implemented as appropriate)   04/15/18 1519   Dying Patient, Actively (Adult)   Comfort/Pain Control making progress toward outcome     Goal: Peace/Preservation of Dignity During the Dying Process  Outcome: Ongoing (interventions implemented as appropriate)   04/15/18 1519   Dying Patient, Actively (Adult)   Peace/Preservation of Dignity During the Dying Process making progress toward outcome

## 2018-04-16 NOTE — PLAN OF CARE
Problem: Fall Risk (Adult)  Goal: Absence of Fall  Outcome: Ongoing (interventions implemented as appropriate)   04/16/18 0307   Fall Risk (Adult)   Absence of Fall making progress toward outcome       Problem: Skin Injury Risk (Adult)  Goal: Skin Health and Integrity  Outcome: Ongoing (interventions implemented as appropriate)   04/16/18 0307   Skin Injury Risk (Adult)   Skin Health and Integrity making progress toward outcome       Problem: Patient Care Overview  Goal: Plan of Care Review  Outcome: Ongoing (interventions implemented as appropriate)   04/16/18 0307   Coping/Psychosocial   Plan of Care Reviewed With patient   Plan of Care Review   Progress no change   OTHER   Outcome Summary pt continues with dilaudid pcs, ostomy site appliance changed overnight, PICC line dressing due to be changed today     Goal: Individualization and Mutuality  Outcome: Ongoing (interventions implemented as appropriate)    Goal: Discharge Needs Assessment  Outcome: Ongoing (interventions implemented as appropriate)      Problem: Dying Patient, Actively (Adult)  Goal: Comfort/Pain Control  Outcome: Ongoing (interventions implemented as appropriate)   04/16/18 0307   Dying Patient, Actively (Adult)   Comfort/Pain Control making progress toward outcome     Goal: Peace/Preservation of Dignity During the Dying Process  Outcome: Ongoing (interventions implemented as appropriate)   04/16/18 0307   Dying Patient, Actively (Adult)   Peace/Preservation of Dignity During the Dying Process making progress toward outcome

## 2018-04-16 NOTE — NURSING NOTE
Continued Stay Note  RENATA Wheatley     Patient Name: Jason Zelaya  MRN: [id number]  Today's Date: 4/16/2018    Admit Date: 4/12/2018          Discharge Plan     Row Name 04/16/18 1015       Plan    Plan Comments spoke with Krystyna BE @ [facility] and she has spoken with Dr Marks. patient to remain here in scattered bed. will continue to follow.               Discharge Codes    No documentation.           Phyllis Mayen RN

## 2018-04-16 NOTE — PROGRESS NOTES
"SERVICE: [facility] HOSPITALIST    CONSULTANTS: Ras    CHIEF COMPLAINT: f/u end of life care    SUBJECTIVE: The patient notes he has required more dilaudid (pressing his PCA) more today due to increased abdominal pain. He has been drinking water, eating small amounts of food brought by visitors. He denies cough/soa/chest pain or other new concerns.     OBJECTIVE:    /71 (BP Location: Left arm, Patient Position: Lying)   Pulse 84   Temp 98.7 °F (37.1 °C) (Oral)   Resp 10   Ht 177.8 cm (70\")   Wt 64.7 kg (142 lb 10.2 oz)   SpO2 95%   BMI 20.47 kg/m²     MEDS/LABS REVIEWED AND ORDERED    diclofenac 2 g Topical 4x Daily   hydrophor  Topical Q12H   lidocaine 1 patch Transdermal Q24H     Physical Exam   Constitutional: He is oriented to person, place, and time.   thin   HENT:   Head: Normocephalic and atraumatic.   edentulous   Eyes: EOM are normal. Pupils are equal, round, and reactive to light.   Cardiovascular: Normal rate and regular rhythm.    Pulmonary/Chest: Effort normal and breath sounds normal.   Abdominal: Soft. Bowel sounds are normal.   Musculoskeletal: He exhibits no edema.   Neurological: He is alert and oriented to person, place, and time.   Skin: Skin is warm and dry. No erythema.   Psychiatric: He has a normal mood and affect. His behavior is normal.   Vitals reviewed.    LAB/DIAGNOSTICS:    Lab Results (last 24 hours)     ** No results found for the last 24 hours. **        No orders to display     Results for orders placed during the hospital encounter of 12/15/17   Adult Transthoracic Echo Complete W/ Cont if Necessary Per Protocol    Narrative · Left ventricular wall thickness is consistent with mild concentric   hypertrophy.  · Left ventricular systolic function is moderately decreased. Estimated EF   = 38%.  · Mild aortic valve regurgitation is present.  · Moderate mitral valve regurgitation is present  · Mild tricuspid valve regurgitation is present.        No " radiology results for the last day    ASSESSMENT/PLAN:  End of life care:   Dilaudid PCA continues for pain control, may need to increase basal rate depending on amount of extra requiring   Continue supportive care for patient and family  Monitor    Previous medical diagnoses prior to end of life care:   1. Sepsis secondary to pseudomonas UTI with chronic indwelling glasgow with h/o MRSA UTI/cystitis:   2. Persistent fevers   3. 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 Severe malnutrition secondary to chronic illness  5. Transaminitis/hyperbilirubinemia/elevated alk phos  6. Electrolyte inbalance   7. Acute on chronic pancytopenia/MDS   8. Gout   9. Chronic diastolic dysfunction, PAF and PSVT, Non-rheumatic aortic valve insufficiency, chronic RBBB, MR and AR   10. HTN   11. OA, chronic knee pain

## 2018-04-17 NOTE — PROGRESS NOTES
"Hospitalist Team      Patient Care Team:  David Cedillo MD as PCP - General (Family Medicine)  Anupam Green MD as Consulting Physician (Hematology and Oncology)  Bennie Galo MD as Referring Physician (Orthopedic Surgery)  Kaylie Granados MD as Surgeon (General Surgery)        Chief Complaint:  Care and Comfort, end of life care.    Subjective    Interval History and ROS:     Patient notes he is doing ok in general although he is having to hit his PCA button much more frequently to remain comfortable. He notes his abdominal pain has increased in the last 24 hours. He denies any CP or SOA. He denies any N/V. He notes decreased ostomy output. He is not eating or drinking much per family at bedside.       Objective    Vital Signs  Temp:  [98.2 °F (36.8 °C)] 98.2 °F (36.8 °C)  Heart Rate:  [86] 86  Resp:  [15] 15  BP: (148)/(68) 148/68  Oxygen Therapy  SpO2: 97 %  Pulse Oximetry Type: Intermittent  Device (Oxygen Therapy): room air}  Flowsheet Rows    Flowsheet Row First Filed Value   Admission Height 177.8 cm (70\") Documented at 04/12/2018 1600   Admission Weight 64.7 kg (142 lb 10.2 oz) Documented at 04/12/2018 1600            Physical Exam:  Constitutional: Patient appears well-developed and in no acute distress   HEENT:   Eyes:  EOM are intact. Sclera are anicteric and non-injected.  Mouth and Throat: Patient has moist mucous membranes. Edentulous.     Cardiovascular: Regular rate, regular rhythm.    Pulmonary/Chest: Lungs are clear to auscultation bilaterally. No respiratory distress. No wheezes. No rhonchi. No rales.   Abdominal: Soft. Bowel sounds are normal. There is generalized tenderness. Ostomy bag with small amount brown liquid stool.  Extremities: No edema.   Neurological: Patient is alert and oriented to person, place, and time.     Results Review:     I reviewed the patient's new clinical results.    Lab Results (last 24 hours)     ** No results found for the last 24 hours. **    "       Imaging Results (last 24 hours)     ** No results found for the last 24 hours. **        ECG/EMG Results (most recent)     None          Medication Review:   I have reviewed the patient's current medication list    Current Facility-Administered Medications:   •  acetaminophen (TYLENOL) tablet 650 mg, 650 mg, Oral, Q4H PRN **OR** acetaminophen (TYLENOL) 160 MG/5ML solution 650 mg, 650 mg, Oral, Q4H PRN **OR** acetaminophen (TYLENOL) suppository 650 mg, 650 mg, Rectal, Q4H PRN, Muna FARIA Azael, APRN  •  diclofenac (VOLTAREN) 1 % gel 2 g, 2 g, Topical, 4x Daily, Muna R Azael, APRN, 2 g at 04/17/18 1118  •  diphenhydrAMINE (BENADRYL) capsule 25 mg, 25 mg, Oral, Q6H PRN **OR** diphenhydrAMINE (BENADRYL) injection 25 mg, 25 mg, Intravenous, Q6H PRN, Muna R Azael, APRN  •  diphenoxylate-atropine (LOMOTIL) 2.5-0.025 MG per tablet 1 tablet, 1 tablet, Oral, Q2H PRN, Muna R Sheldon, APRN  •  Glycerin-Hypromellose- (ARTIFICIAL TEARS) 0.2-0.2-1 % ophthalmic solution solution 1 drop, 1 drop, Both Eyes, Q30 Min PRN, Muna R Azael, APRN  •  glycopyrrolate (ROBINUL) injection 0.2 mg, 0.2 mg, Intravenous, Q2H PRN **OR** glycopyrrolate (ROBINUL) injection 0.2 mg, 0.2 mg, Subcutaneous, Q2H PRN **OR** glycopyrrolate (ROBINUL) injection 0.4 mg, 0.4 mg, Intravenous, Q2H PRN **OR** glycopyrrolate (ROBINUL) injection 0.4 mg, 0.4 mg, Subcutaneous, Q2H PRN, Muna R Sheldon, APRN  •  HYDROmorphone (DILAUDID) (PF) PCA 0.2 mg/mL 30 mL syringe, , Intravenous, Continuous, Kenya Marks MD, Last Rate: 0 mL/hr at 04/15/18 0739  •  hydrophor (AQUAPHOR) ointment, , Topical, Q12H, BENJAMIN Webb  •  lactated ringers infusion, 30 mL/hr, Intravenous, Continuous, BENJAMIN Webb, Last Rate: 30 mL/hr at 04/17/18 0633, 30 mL/hr at 04/17/18 0633  •  lidocaine (LIDODERM) 5 % 1 patch, 1 patch, Transdermal, Q24H, BENJAMIN Webb, 1 patch at 04/17/18 0622  •  LORazepam (ATIVAN) 2 MG/ML  concentrated solution 0.5 mg, 0.5 mg, Oral, Q1H PRN, Muna R Independence, APRN  •  LORazepam (ATIVAN) 2 MG/ML concentrated solution 0.5 mg, 0.5 mg, Sublingual, Q1H PRN, Muna R Azael, APRN  •  LORazepam (ATIVAN) 2 MG/ML concentrated solution 1 mg, 1 mg, Oral, Q1H PRN, Muna R Independence, APRN  •  LORazepam (ATIVAN) 2 MG/ML concentrated solution 1 mg, 1 mg, Sublingual, Q1H PRN, Muna R Azael, APRN  •  LORazepam (ATIVAN) 2 MG/ML concentrated solution 2 mg, 2 mg, Oral, Q1H PRN, Muna R Azael, APRN  •  LORazepam (ATIVAN) 2 MG/ML concentrated solution 2 mg, 2 mg, Sublingual, Q1H PRN, Muna R Independence, APRN  •  LORazepam (ATIVAN) tablet 0.5 mg, 0.5 mg, Oral, Q1H PRN, Muna R Azael, APRN  •  LORazepam (ATIVAN) tablet 1 mg, 1 mg, Oral, Q1H PRN, Muna R Azael, APRN  •  LORazepam (ATIVAN) tablet 2 mg, 2 mg, Oral, Q1H PRN, Muna R Independence, APRN  •  ondansetron (ZOFRAN) injection 4 mg, 4 mg, Intravenous, Q6H PRN, Muna R Independence, APRN  •  Scopolamine (TRANSDERM-SCOP) 1.5 MG/3DAYS patch 1 patch, 1 patch, Transdermal, Q72H PRN, Muna R Independence, APRN  •  sodium chloride 0.9 % flush 1-10 mL, 1-10 mL, Intravenous, PRN, Muna R Independence, APRN  •  sodium chloride 0.9 % flush 10 mL, 10 mL, Intravenous, PRN, Muna R Azael, APRN  •  sodium chloride 0.9 % infusion 40 mL, 40 mL, Intravenous, PRN, Muna R Independence, APRN      Assessment/Plan     1. End of life/Palliative care: [facility] following  Patient c/o increased abdominal pain and using his PCA boluses much more frequently. Will adjust PCA setting and increase basal rate for better pain control. Otherwise patient notes he is doing ok. Patient and family at bedside has no other questions or concerns.      Underlying recent acute and chronic diagnoses: (treatment for all of these issues, per patient request, were stopped when patient decided to be care and comfort only)  1. Sepsis secondary to pseudomonas UTI with chronic indwelling glasgow with h/o MRSA  UTI/cystitis:   2. Persistent fevers   3. 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 Severe malnutrition secondary to chronic illness  5. Transaminitis/hyperbilirubinemia/elevated alk phos  6. Electrolyte inbalance   7. Acute on chronic pancytopenia/MDS   8. Gout   9. Chronic diastolic dysfunction, PAF and PSVT, Non-rheumatic aortic valve insufficiency, chronic RBBB, MR and AR   10. HTN   11. OA, chronic knee pain    Addendum: I called back to the unit to talk to the nurses about how much the patient was using the bolus on his PCA so that I could make appropriate adjustments and when the nurse went in to check the pump the patient stated he would like to hold off on adjusting the pump now. Apparently his pain has been better as the day progressed today and currently he is  Comfortable. So I have not adjusted the PCA basal rate today.     Kenya Marks MD  04/17/18  4:52 PM

## 2018-04-17 NOTE — PLAN OF CARE
Problem: Fall Risk (Adult)  Goal: Absence of Fall  Outcome: Ongoing (interventions implemented as appropriate)   04/17/18 0319   Fall Risk (Adult)   Absence of Fall making progress toward outcome       Problem: Skin Injury Risk (Adult)  Goal: Skin Health and Integrity  Outcome: Ongoing (interventions implemented as appropriate)   04/17/18 0319   Skin Injury Risk (Adult)   Skin Health and Integrity making progress toward outcome       Problem: Patient Care Overview  Goal: Plan of Care Review  Outcome: Ongoing (interventions implemented as appropriate)   04/17/18 0319   Coping/Psychosocial   Plan of Care Reviewed With patient;family   Plan of Care Review   Progress no change   OTHER   Outcome Summary pt remains on Dilaudid PCA. no new complaints overnight.      Goal: Individualization and Mutuality  Outcome: Ongoing (interventions implemented as appropriate)    Goal: Discharge Needs Assessment  Outcome: Ongoing (interventions implemented as appropriate)      Problem: Dying Patient, Actively (Adult)  Goal: Comfort/Pain Control  Outcome: Ongoing (interventions implemented as appropriate)   04/17/18 0319   Dying Patient, Actively (Adult)   Comfort/Pain Control making progress toward outcome     Goal: Peace/Preservation of Dignity During the Dying Process  Outcome: Ongoing (interventions implemented as appropriate)   04/17/18 0319   Dying Patient, Actively (Adult)   Peace/Preservation of Dignity During the Dying Process making progress toward outcome

## 2018-04-17 NOTE — NURSING NOTE
04/17/18 1414   Ileostomy RLQ   Placement Date/Time: 04/09/18 2024   Placed by External Staff?: Other (Comment)  Location: RLQ   Stomal Appliance 2 piece;Dry;Intact;Drainable   Stoma Appearance round;moist;protruding above skin level;red   Stoma Function flatus;stool   Safety   Safety WDL WDL;safety factors   Safety Factors side rails raised x 3;bed in low position;wheels locked;call light in reach;ID band on   All Alarms none present   Safety/Security Measures family to remain at bedside   Infection Prevention rest/sleep promoted   Isolation Precautions contact precautions maintained   Safety Management   Safety Promotion/Fall Prevention nonskid shoes/slippers when out of bed;safety round/check completed   Elopement Precautions family at bedside;room near unit station     CWON follow up for ileostomy management.  2 piece pouching system intact and without leakage.  Plan to change in am.

## 2018-04-17 NOTE — PLAN OF CARE
Problem: Skin Injury Risk (Adult)  Goal: Skin Health and Integrity  Outcome: Ongoing (interventions implemented as appropriate)      Problem: Patient Care Overview  Goal: Plan of Care Review  Outcome: Ongoing (interventions implemented as appropriate)   04/17/18 1938   Coping/Psychosocial   Plan of Care Reviewed With patient;daughter   Plan of Care Review   Progress improving   OTHER   Outcome Summary pt had a fair day today. His family has been at bedside. He has required his PCA pump and this evening is c/o of abdominal pain. encouraged pt to use his pain button. he did request and drink chocolate milk today. has napped approx 1.5 hours today. currently visiting family and watching TV       Problem: Dying Patient, Actively (Adult)  Goal: Comfort/Pain Control  Outcome: Ongoing (interventions implemented as appropriate)

## 2018-04-18 NOTE — PROGRESS NOTES
"Hospitalist Team      Patient Care Team:  David Cedillo MD as PCP - General (Family Medicine)  Anupam Green MD as Consulting Physician (Hematology and Oncology)  Bennie Galo MD as Referring Physician (Orthopedic Surgery)  Kaylie Granados MD as Surgeon (General Surgery)        Chief Complaint:  Follow-up End-of-Life care    Subjective    Mr. Zelaya reports \"nightmares\" overnight, like he was jumping out of bed.  Pain a little better controlled.  Little PO intake.    Objective    Vital Signs  Temp:  [98.6 °F (37 °C)] 98.6 °F (37 °C)  Heart Rate:  [88] 88  Resp:  [16] 16  BP: (136)/(70) 136/70  Oxygen Therapy  SpO2: 95 %  Pulse Oximetry Type: Intermittent  Device (Oxygen Therapy): room air}  Flowsheet Rows    Flowsheet Row First Filed Value   Admission Height 177.8 cm (70\") Documented at 04/12/2018 1600   Admission Weight 64.7 kg (142 lb 10.2 oz) Documented at 04/12/2018 1600          Physical Exam:    General Appearance:    Alert, cooperative, in no acute distress   Lungs:     Clear to auscultation,respirations regular, even and                  unlabored    Heart:    Regular rhythm and normal rate, normal S1 and S2, no            murmur, no gallop, no rub, no click   Abdomen:     Diminished bowel sounds   Neurologic:   Cranial nerves 2 - 12 grossly intact       Results Review:     I reviewed the patient's new clinical results.    Lab Results (last 24 hours)     ** No results found for the last 24 hours. **          Imaging Results (last 24 hours)     ** No results found for the last 24 hours. **            Medication Review:   I have reviewed the patient's current medication list    Current Facility-Administered Medications:   •  acetaminophen (TYLENOL) tablet 650 mg, 650 mg, Oral, Q4H PRN **OR** acetaminophen (TYLENOL) 160 MG/5ML solution 650 mg, 650 mg, Oral, Q4H PRN **OR** acetaminophen (TYLENOL) suppository 650 mg, 650 mg, Rectal, Q4H PRN, Muna Addison, BENJAMIN  •  diclofenac (VOLTAREN) 1 % " gel 2 g, 2 g, Topical, 4x Daily, Muna R Champion, APRN, 2 g at 04/17/18 2023  •  diphenhydrAMINE (BENADRYL) capsule 25 mg, 25 mg, Oral, Q6H PRN **OR** diphenhydrAMINE (BENADRYL) injection 25 mg, 25 mg, Intravenous, Q6H PRN, Muna R Champion, APRN  •  diphenoxylate-atropine (LOMOTIL) 2.5-0.025 MG per tablet 1 tablet, 1 tablet, Oral, Q2H PRN, Muna R Champion, APRN  •  Glycerin-Hypromellose- (ARTIFICIAL TEARS) 0.2-0.2-1 % ophthalmic solution solution 1 drop, 1 drop, Both Eyes, Q30 Min PRN, Muna R Champion, APRN  •  glycopyrrolate (ROBINUL) injection 0.2 mg, 0.2 mg, Intravenous, Q2H PRN **OR** glycopyrrolate (ROBINUL) injection 0.2 mg, 0.2 mg, Subcutaneous, Q2H PRN **OR** glycopyrrolate (ROBINUL) injection 0.4 mg, 0.4 mg, Intravenous, Q2H PRN **OR** glycopyrrolate (ROBINUL) injection 0.4 mg, 0.4 mg, Subcutaneous, Q2H PRN, Muna R Champion, APRN  •  HYDROmorphone (DILAUDID) (PF) PCA 0.2 mg/mL 30 mL syringe, , Intravenous, Continuous, Kenya Marks MD, Last Rate: 0 mL/hr at 04/15/18 0739  •  hydrophor (AQUAPHOR) ointment, , Topical, Q12H, Muna R Azael, APRN  •  lactated ringers infusion, 30 mL/hr, Intravenous, Continuous, Muna R Champion, APRN, Last Rate: 30 mL/hr at 04/18/18 0622, 30 mL/hr at 04/18/18 0622  •  lidocaine (LIDODERM) 5 % 1 patch, 1 patch, Transdermal, Q24H, Muna R Azael, APRN, 1 patch at 04/18/18 0621  •  LORazepam (ATIVAN) 2 MG/ML concentrated solution 0.5 mg, 0.5 mg, Oral, Q1H PRN, Muna R Champion, APRN  •  LORazepam (ATIVAN) 2 MG/ML concentrated solution 0.5 mg, 0.5 mg, Sublingual, Q1H PRN, Muna R Champion, APRN  •  LORazepam (ATIVAN) 2 MG/ML concentrated solution 1 mg, 1 mg, Oral, Q1H PRN, Muna R Azael, APRN  •  LORazepam (ATIVAN) 2 MG/ML concentrated solution 1 mg, 1 mg, Sublingual, Q1H PRN, Muna R Champion, APRN  •  LORazepam (ATIVAN) 2 MG/ML concentrated solution 2 mg, 2 mg, Oral, Q1H PRN, Muna R Azael, APRN  •  LORazepam (ATIVAN) 2 MG/ML  concentrated solution 2 mg, 2 mg, Sublingual, Q1H PRN, Muna R Hector, APRN  •  LORazepam (ATIVAN) tablet 0.5 mg, 0.5 mg, Oral, Q1H PRN, Muna R Hector, APRN  •  LORazepam (ATIVAN) tablet 1 mg, 1 mg, Oral, Q1H PRN, Muna R Hector, APRN  •  LORazepam (ATIVAN) tablet 2 mg, 2 mg, Oral, Q1H PRN, Muna R Azael, APRN  •  ondansetron (ZOFRAN) injection 4 mg, 4 mg, Intravenous, Q6H PRN, Muna R Azael, APRN  •  Scopolamine (TRANSDERM-SCOP) 1.5 MG/3DAYS patch 1 patch, 1 patch, Transdermal, Q72H PRN, Muna R Azael, APRN  •  sodium chloride 0.9 % flush 1-10 mL, 1-10 mL, Intravenous, PRN, Muna R Hector, APRN  •  sodium chloride 0.9 % flush 10 mL, 10 mL, Intravenous, PRN, Muna R Azael, APRN  •  sodium chloride 0.9 % infusion 40 mL, 40 mL, Intravenous, PRN, Muna R Hector, APRN      Assessment/Plan     1.  End-of-Life Care:  Not sure what is causing the nightmares, but will monitor.  May need to discuss with pharmacy.  Do not suspect this is due to hypoglycemia, but may need to check.      Ronald Sagastume MD  04/18/18  8:49 AM

## 2018-04-18 NOTE — PLAN OF CARE
Problem: Fall Risk (Adult)  Goal: Absence of Fall  Outcome: Ongoing (interventions implemented as appropriate)   04/18/18 0304   Fall Risk (Adult)   Absence of Fall making progress toward outcome       Problem: Skin Injury Risk (Adult)  Goal: Skin Health and Integrity  Outcome: Ongoing (interventions implemented as appropriate)   04/18/18 0304   Skin Injury Risk (Adult)   Skin Health and Integrity making progress toward outcome       Problem: Patient Care Overview  Goal: Plan of Care Review  Outcome: Ongoing (interventions implemented as appropriate)   04/18/18 0304   Coping/Psychosocial   Plan of Care Reviewed With patient;family   Plan of Care Review   Progress no change   OTHER   Outcome Summary pt continues with dilaudid PCA; pt has declined to have PCA dose increased at this time. Pt resting well overnight. Family remains at bedside     Goal: Individualization and Mutuality  Outcome: Ongoing (interventions implemented as appropriate)    Goal: Discharge Needs Assessment  Outcome: Ongoing (interventions implemented as appropriate)      Problem: Dying Patient, Actively (Adult)  Goal: Comfort/Pain Control  Outcome: Ongoing (interventions implemented as appropriate)   04/18/18 0304   Dying Patient, Actively (Adult)   Comfort/Pain Control making progress toward outcome     Goal: Peace/Preservation of Dignity During the Dying Process  Outcome: Ongoing (interventions implemented as appropriate)   04/18/18 0304   Dying Patient, Actively (Adult)   Peace/Preservation of Dignity During the Dying Process making progress toward outcome

## 2018-04-18 NOTE — NURSING NOTE
04/18/18 1127   Ileostomy RLQ   Placement Date/Time: 04/09/18 2024   Placed by External Staff?: Other (Comment)  Location: RLQ   Stomal Appliance 2 piece;Clean;Dry;Intact;Changed;Drainable   Stoma Appearance round;protruding above skin level;moist;red   Peristomal Assessment Clean;Intact;Blanchable erythema   Accessories/Skin Care cleansed with water;skin barrier powder;skin barrier ring;skin sealant   Stoma Function flatus;stool   Stool Color brown, light   Treatment Bag change

## 2018-04-19 NOTE — PLAN OF CARE
Problem: Fall Risk (Adult)  Goal: Absence of Fall  Outcome: Ongoing (interventions implemented as appropriate)      Problem: Skin Injury Risk (Adult)  Goal: Skin Health and Integrity  Outcome: Ongoing (interventions implemented as appropriate)      Problem: Patient Care Overview  Goal: Plan of Care Review  Outcome: Ongoing (interventions implemented as appropriate)    Goal: Individualization and Mutuality  Outcome: Ongoing (interventions implemented as appropriate)    Goal: Discharge Needs Assessment  Outcome: Ongoing (interventions implemented as appropriate)    Goal: Interprofessional Rounds/Family Conf  Outcome: Ongoing (interventions implemented as appropriate)      Problem: Dying Patient, Actively (Adult)  Goal: Comfort/Pain Control  Outcome: Ongoing (interventions implemented as appropriate)    Goal: Peace/Preservation of Dignity During the Dying Process  Outcome: Ongoing (interventions implemented as appropriate)

## 2018-04-19 NOTE — NURSING NOTE
Continued Stay Note  RENATA Wheatley     Patient Name: Jason Zelaya  MRN: [id number]  Today's Date: 4/19/2018    Admit Date: 4/12/2018          Discharge Plan     Row Name 04/19/18 1354       Plan    Plan Comments patient is LACE-no referrals r/t being in [facility] scattered bed. will continue to follow.               Discharge Codes    No documentation.           Phyllis Mayen RN

## 2018-04-19 NOTE — PROGRESS NOTES
"Hospitalist Team      Patient Care Team:  David Cedillo MD as PCP - General (Family Medicine)  Anupam Green MD as Consulting Physician (Hematology and Oncology)  Bennie Galo MD as Referring Physician (Orthopedic Surgery)  Kaylie Granados MD as Surgeon (General Surgery)        Chief Complaint:  Follow-up End-of-Life care    Subjective    Pain seems a little worse this morning.  Multiple prn doses of PCA administered.    Objective    Vital Signs  Temp:  [99.8 °F (37.7 °C)-102.9 °F (39.4 °C)] 102.9 °F (39.4 °C)  Heart Rate:  [76-80] 76  Resp:  [20-22] 22  BP: (118-128)/(58-64) 118/58  Oxygen Therapy  SpO2: 94 %  Pulse Oximetry Type: Intermittent  Device (Oxygen Therapy): room air}    Flowsheet Rows    Flowsheet Row First Filed Value   Admission Height 177.8 cm (70\") Documented at 04/12/2018 1600   Admission Weight 64.7 kg (142 lb 10.2 oz) Documented at 04/12/2018 1600          Physical Exam:    General Appearance:    In no acute distress   Lungs:     Diminished to auscultation,respirations regular, even and         unlabored    Heart:    Regular rhythm and normal rate, normal S1 and S2, no            murmur, no gallop, no rub, no click   Abdomen:     Diminished bowel sounds   Pulses:   Radial pulses 2+ bilaterally.       Results Review:     I reviewed the patient's new clinical results.    Lab Results (last 24 hours)     ** No results found for the last 24 hours. **          Imaging Results (last 24 hours)     ** No results found for the last 24 hours. **          ECG/EMG Results (most recent)     None          Medication Review:   I have reviewed the patient's current medication list    Current Facility-Administered Medications:   •  acetaminophen (TYLENOL) tablet 650 mg, 650 mg, Oral, Q4H PRN **OR** acetaminophen (TYLENOL) 160 MG/5ML solution 650 mg, 650 mg, Oral, Q4H PRN **OR** acetaminophen (TYLENOL) suppository 650 mg, 650 mg, Rectal, Q4H PRN, Muna [place], APRN, 650 mg at 04/19/18 1325  •  " diclofenac (VOLTAREN) 1 % gel 2 g, 2 g, Topical, 4x Daily, Muna FARIA [place], APRN, 2 g at 04/19/18 1158  •  diphenhydrAMINE (BENADRYL) capsule 25 mg, 25 mg, Oral, Q6H PRN **OR** diphenhydrAMINE (BENADRYL) injection 25 mg, 25 mg, Intravenous, Q6H PRN, Muna FARIA Azael, APRN  •  diphenoxylate-atropine (LOMOTIL) 2.5-0.025 MG per tablet 1 tablet, 1 tablet, Oral, Q2H PRN, Muna FARIA [place], APRN  •  Glycerin-Hypromellose- (ARTIFICIAL TEARS) 0.2-0.2-1 % ophthalmic solution solution 1 drop, 1 drop, Both Eyes, Q30 Min PRN, Muna R Azael, APRN  •  glycopyrrolate (ROBINUL) injection 0.2 mg, 0.2 mg, Intravenous, Q2H PRN **OR** glycopyrrolate (ROBINUL) injection 0.2 mg, 0.2 mg, Subcutaneous, Q2H PRN **OR** glycopyrrolate (ROBINUL) injection 0.4 mg, 0.4 mg, Intravenous, Q2H PRN **OR** glycopyrrolate (ROBINUL) injection 0.4 mg, 0.4 mg, Subcutaneous, Q2H PRN, Muna R [place], APRN  •  HYDROmorphone (DILAUDID) (PF) PCA 0.2 mg/mL 30 mL syringe, , Intravenous, Continuous, Ronald Sagastume MD  •  hydrophor (AQUAPHOR) ointment, , Topical, Q12H, Muna [place], APRN  •  lactated ringers infusion, 30 mL/hr, Intravenous, Continuous, Muna Addison, APRN, Last Rate: 30 mL/hr at 04/18/18 0622, 30 mL/hr at 04/18/18 0622  •  lidocaine (LIDODERM) 5 % 1 patch, 1 patch, Transdermal, Q24H, Muna Addison, APRN, 1 patch at 04/19/18 0607  •  LORazepam (ATIVAN) 2 MG/ML concentrated solution 0.5 mg, 0.5 mg, Oral, Q1H PRN, Muna R [place], APRN  •  LORazepam (ATIVAN) 2 MG/ML concentrated solution 0.5 mg, 0.5 mg, Sublingual, Q1H PRN, Muna R Azael, APRN  •  LORazepam (ATIVAN) 2 MG/ML concentrated solution 1 mg, 1 mg, Oral, Q1H PRN, Muna R Azael, APRN  •  LORazepam (ATIVAN) 2 MG/ML concentrated solution 1 mg, 1 mg, Sublingual, Q1H PRN, Muna R [place], APRN  •  LORazepam (ATIVAN) 2 MG/ML concentrated solution 2 mg, 2 mg, Oral, Q1H PRN, Muna R Azael, APRN  •  LORazepam (ATIVAN) 2 MG/ML concentrated solution 2 mg, 2  mg, Sublingual, Q1H PRN, Muna R Azael, APRN  •  LORazepam (ATIVAN) tablet 0.5 mg, 0.5 mg, Oral, Q1H PRN, Muna R Azael, APRN  •  LORazepam (ATIVAN) tablet 1 mg, 1 mg, Oral, Q1H PRN, Muna R Azael, APRN  •  LORazepam (ATIVAN) tablet 2 mg, 2 mg, Oral, Q1H PRN, Muna R Azael, APRN  •  ondansetron (ZOFRAN) injection 4 mg, 4 mg, Intravenous, Q6H PRN, Muna R Azael, APRN  •  Scopolamine (TRANSDERM-SCOP) 1.5 MG/3DAYS patch 1 patch, 1 patch, Transdermal, Q72H PRN, Muna R [place], APRN  •  sodium chloride 0.9 % flush 1-10 mL, 1-10 mL, Intravenous, PRN, Muna R Azael, APRN  •  sodium chloride 0.9 % flush 10 mL, 10 mL, Intravenous, PRN, Muna R Azael, APRN  •  sodium chloride 0.9 % infusion 40 mL, 40 mL, Intravenous, PRN, Muna R [place], APRN      Assessment/Plan     1.  End-of-Life care:  Increase basal rate of PCA.  Titrate to minimize prn dosing.  Discussed with Dr. Tomlinson.  Appreciate her help!      Ronald Sagastume MD  04/19/18  1:55 PM

## 2018-04-19 NOTE — PROGRESS NOTES
Adult Nutrition  Assessment/PES    Patient Name:  Jason Zelaya  YOB: 1934  MRN: [id number]  Admit Date:  4/12/2018    Assessment Date:  4/19/2018    Comments:  Pt hospice scatter bed, honor food prefs as voiced/indicated. Will remain available.           Adult Nutrition Assessment     Row Name 04/19/18 1402       Nutrition Prescription PO    Current PO Diet Regular       Fluid Intake Evaluation    Oral Fluid (mL) --   insufficient data 0-330 ml x 3 days       PO Evaluation    Number of Days PO Intake Evaluated Insufficient Data    % PO Intake 100 x 1 meal    Row Name 04/19/18 1401       Anthropometrics    Weight 64.7 kg (142 lb 10.2 oz)       Body Mass Index (BMI)    BMI Assessment BMI 18.5-24.9: normal       Labs/Procedures/Meds    Lab Results Reviewed reviewed   meds reviewed       Calculation Measurements    Weight Used For Calculations 64.7 kg (142 lb 10.2 oz)       Estimated/Assessed Needs    Additional Documentation Calorie Requirements (Group);Fluid Requirements (Group);Protein Requirements (Group)       Calorie Requirements    Estimated Calorie Need Method [place]-St Jeor    Estimated Calorie Requirement Comment 1613 kcal (mifflin x 1.2)                                                                 [place]-St. Jeor Equation    RMR ([place]lina-St. Jeor Equation) 1343.25       Protein Requirements    Est Protein Requirement Amount (gms/kg) 1.0 gm protein    Estimated Protein Requirements (gms/day) 64.7       Fluid Requirements    Estimated Fluid Requirements (mL/day) 1613    Estimated Fluid Requirement Method RDA Method    RDA Method (mL) 1613    [place]-Bill Method (over 20 kg) 2794    Row Name 04/19/18 1400       Reason for Assessment    Reason For Assessment per organizational policy   LOS    Diagnosis other (see comments)   end of life care        Nutrition/Diet History    Typical Food/Fluid Intake Pt in ICU room, TPN/IL discontinued previously and made end of life care in hospice  scatter bed.           Problem/Interventions:        Problem 1     Row Name 04/19/18 1403       Nutrition Diagnoses Problem 1    Problem 1 Nutrition Appropriate for Condition at this Time                    Intervention Goal     Row Name 04/19/18 1403       Intervention Goal    General Hospice Care            Nutrition Intervention     Row Name 04/19/18 1403       Nutrition Intervention    RD/Tech Action Follow Tx progress              Education/Evaluation     Row Name 04/19/18 1403       Education    Education Other (comment)   No edu needs end of life care       Monitor/Evaluation    Monitor Per protocol;I&O;PO intake;Pertinent labs;Weight   no d/c nutrition needs        Electronically signed by:  Roseanne Clayton RD  04/19/18 2:03 PM

## 2018-04-19 NOTE — PLAN OF CARE
Problem: Fall Risk (Adult)  Goal: Absence of Fall  Outcome: Ongoing (interventions implemented as appropriate)   04/19/18 0427   Fall Risk (Adult)   Absence of Fall making progress toward outcome       Problem: Skin Injury Risk (Adult)  Goal: Skin Health and Integrity  Outcome: Ongoing (interventions implemented as appropriate)   04/19/18 0427   Skin Injury Risk (Adult)   Skin Health and Integrity making progress toward outcome       Problem: Patient Care Overview  Goal: Plan of Care Review  Outcome: Ongoing (interventions implemented as appropriate)   04/19/18 0427   Coping/Psychosocial   Plan of Care Reviewed With patient   Plan of Care Review   Progress no change   OTHER   Outcome Summary pt remains on dilaudid PCA to help control pain, family at bedside, resting comfortably at this time     Goal: Individualization and Mutuality  Outcome: Ongoing (interventions implemented as appropriate)   04/19/18 0427   Individualization   Patient Specific Preferences none voiced       Problem: Dying Patient, Actively (Adult)  Goal: Comfort/Pain Control  Outcome: Ongoing (interventions implemented as appropriate)   04/19/18 0427   Dying Patient, Actively (Adult)   Comfort/Pain Control making progress toward outcome     Goal: Peace/Preservation of Dignity During the Dying Process  Outcome: Ongoing (interventions implemented as appropriate)   04/19/18 0427   Dying Patient, Actively (Adult)   Peace/Preservation of Dignity During the Dying Process making progress toward outcome

## 2018-04-20 NOTE — PLAN OF CARE
Problem: Fall Risk (Adult)  Goal: Absence of Fall  Outcome: Ongoing (interventions implemented as appropriate)      Problem: Skin Injury Risk (Adult)  Goal: Skin Health and Integrity  Outcome: Ongoing (interventions implemented as appropriate)      Problem: Patient Care Overview  Goal: Plan of Care Review   04/20/18 0419   Coping/Psychosocial   Plan of Care Reviewed With patient;family   Plan of Care Review   Progress no change   OTHER   Outcome Summary Remains on Dilaudid PCA for pain control. VSS       Problem: Dying Patient, Actively (Adult)  Goal: Comfort/Pain Control  Outcome: Ongoing (interventions implemented as appropriate)

## 2018-04-20 NOTE — NURSING NOTE
04/20/18 1640   Ileostomy RLQ   Placement Date/Time: 04/09/18 2024   Placed by External Staff?: Other (Comment)  Location: RLQ   Stomal Appliance 2 piece;Clean;Dry;Intact;Changed;Drainable   Stoma Appearance irregular;protruding above skin level;red;moist   Peristomal Assessment Clean;Intact;Pink   Accessories/Skin Care skin sealant;skin barrier ring;cleansed with water   Stoma Function flatus;stool   Stool Color brown   Treatment Bag change   Output (mL) 150 mL

## 2018-04-20 NOTE — PLAN OF CARE
Problem: Patient Care Overview  Goal: Individualization and Mutuality  Outcome: Ongoing (interventions implemented as appropriate)    Goal: Discharge Needs Assessment  Outcome: Ongoing (interventions implemented as appropriate)   04/20/18 1713   Discharge Needs Assessment   Discharge Coordination/Progress Pt is on continuous PCA Dilaudid pump. Hospice  in to see pt today. Family at bedside. Requesting anxiety meds at this time. Will monitor       Problem: Dying Patient, Actively (Adult)  Goal: Comfort/Pain Control  Outcome: Ongoing (interventions implemented as appropriate)   04/20/18 1713   Dying Patient, Actively (Adult)   Comfort/Pain Control making progress toward outcome     Goal: Peace/Preservation of Dignity During the Dying Process  Outcome: Ongoing (interventions implemented as appropriate)   04/20/18 1713   Dying Patient, Actively (Adult)   Peace/Preservation of Dignity During the Dying Process making progress toward outcome

## 2018-04-21 NOTE — PROGRESS NOTES
[facility] Visit Report    Jason Zelaya  [id number]  4/21/2018    Admission R/T [facility] Dx: YES      Reason for [facility] Admission: Myelodysplastic Syndrome, Sepsis, UTI, Malnutrition      Symptom  Management: Pain/anxiety control      Nursing/Medication Recommendations: Recommend increasing Dilaudid basal rate to 1mg/hr and changing Ativan from SL and PO to IV per note from night shift RN      Psychosocial Issues and Recommendations: Provide support to patient and family      Spiritual Concerns and Recommendations: None at present      [facility] Discharge Plans:  None, patient declining steadily and receiving titration of IV medications scheduled and prn for symptom management of pain/anxiety. Patient is meeting criteria for GIP as a [facility] scattered bed       Review of Visit: Close monitoring for safety/falls, symptom management of pain/anxiety, comfort care for declining patient. Patient lying in bed, opens eyes but did not respond verbally, color ashen. Spoke to family regarding declining status and they are aware and accepting, emotional support provided and encouraged them to contact [facility] 24/7 for any further questions or concerns. Spoke to Dr. Turner regarding recommendations noted above and she has increased the Dilaudid basal rate and changed the Ativan to IV route. Discussed care needs with staff HAILE Bo and will continue to see daily to assess needs, monitor status and offer support.        Teri Ragsdale RN  [facility] Visit Nurse

## 2018-04-21 NOTE — PROGRESS NOTES
"Hospitalist Team      Patient Care Team:  David Cedillo MD as PCP - General (Family Medicine)  Anupam Green MD as Consulting Physician (Hematology and Oncology)  Bennie Galo MD as Referring Physician (Orthopedic Surgery)  Kaylie Granados MD as Surgeon (General Surgery)        Chief Complaint:  End of life care    Subjective    Interval History and ROS:     Patient has had some pain.  I was called by the hospice nurse this morning and we discussed the pain medications.  We have made some changes and I have not had any calls today from the nursing staff.      Objective    Vital Signs  Temp:  [101 °F (38.3 °C)-103 °F (39.4 °C)] 101.7 °F (38.7 °C)  Heart Rate:  [103-116] 112  Resp:  [18] 18  BP: (144)/(62) 144/62  Oxygen Therapy  SpO2: 96 %  Pulse Oximetry Type: Intermittent  Device (Oxygen Therapy): room air}  Flowsheet Rows    Flowsheet Row First Filed Value   Admission Height 177.8 cm (70\") Documented at 04/12/2018 1600   Admission Weight 64.7 kg (142 lb 10.2 oz) Documented at 04/12/2018 1600            Physical Exam:    Physical Exam   Constitutional: No acute distress  HEENT:   Cardiovascular: Regular rate, regular rhythm, S1 normal and S2 normal.  Exam reveals no gallop and no friction rub.  No murmur heard.  Pulmonary/Chest: Lungs are clear to auscultation bilaterally. No respiratory distress. No wheezes. No rhonchi. No rales.   Abdominal: Soft. Bowel sounds are normal. No distension and no mass. There is no hepatosplenomegaly. There is no tenderness.   Musculoskeletal: Poor Muscle tone/ atrophy         Results Review:     I reviewed the patient's new clinical results.    Lab Results (last 24 hours)     ** No results found for the last 24 hours. **          Imaging Results (last 24 hours)     ** No results found for the last 24 hours. **          Xray not reviewed personally by physician.      ECG not reviewed personally by physician  ECG/EMG Results (most recent)     None          Medication " Review:   I have reviewed the patient's current medication list    Current Facility-Administered Medications:   •  acetaminophen (TYLENOL) tablet 650 mg, 650 mg, Oral, Q4H PRN, 650 mg at 04/19/18 1642 **OR** acetaminophen (TYLENOL) 160 MG/5ML solution 650 mg, 650 mg, Oral, Q4H PRN, 650 mg at 04/20/18 0941 **OR** acetaminophen (TYLENOL) suppository 650 mg, 650 mg, Rectal, Q4H PRN, Muna FARIA Azael, APRN, 650 mg at 04/19/18 1325  •  diclofenac (VOLTAREN) 1 % gel 2 g, 2 g, Topical, 4x Daily, Muna R Azael, APRN, 2 g at 04/20/18 2137  •  diphenhydrAMINE (BENADRYL) capsule 25 mg, 25 mg, Oral, Q6H PRN **OR** diphenhydrAMINE (BENADRYL) injection 25 mg, 25 mg, Intravenous, Q6H PRN, Muna Addison, APRN  •  diphenoxylate-atropine (LOMOTIL) 2.5-0.025 MG per tablet 1 tablet, 1 tablet, Oral, Q2H PRN, Muna R [place], APRN  •  Glycerin-Hypromellose- (ARTIFICIAL TEARS) 0.2-0.2-1 % ophthalmic solution solution 1 drop, 1 drop, Both Eyes, Q30 Min PRN, Muna R Azael, APRN  •  glycopyrrolate (ROBINUL) injection 0.2 mg, 0.2 mg, Intravenous, Q2H PRN **OR** glycopyrrolate (ROBINUL) injection 0.2 mg, 0.2 mg, Subcutaneous, Q2H PRN **OR** glycopyrrolate (ROBINUL) injection 0.4 mg, 0.4 mg, Intravenous, Q2H PRN **OR** glycopyrrolate (ROBINUL) injection 0.4 mg, 0.4 mg, Subcutaneous, Q2H PRN, Muna R [place], APRN  •  HYDROmorphone (DILAUDID) (PF) PCA 0.2 mg/mL 30 mL syringe, , Intravenous, Continuous, Ronald Sagastume MD  •  hydrophor (AQUAPHOR) ointment, , Topical, Q12H, BENJAMIN Webb  •  lactated ringers infusion, 30 mL/hr, Intravenous, Continuous, BENJAMIN [place], Last Rate: 30 mL/hr at 04/18/18 0622, 30 mL/hr at 04/18/18 0622  •  lidocaine (LIDODERM) 5 % 1 patch, 1 patch, Transdermal, Q24H, BENJAMIN Webb, 1 patch at 04/21/18 0556  •  LORazepam (ATIVAN) 2 MG/ML concentrated solution 0.5 mg, 0.5 mg, Oral, Q1H PRN, BENJAMIN Webb  •  LORazepam (ATIVAN) 2 MG/ML concentrated solution 0.5  mg, 0.5 mg, Sublingual, Q1H PRN, Muna R [place], APRN  •  LORazepam (ATIVAN) 2 MG/ML concentrated solution 1 mg, 1 mg, Oral, Q1H PRN, Muna R Azael, APRN  •  LORazepam (ATIVAN) 2 MG/ML concentrated solution 1 mg, 1 mg, Sublingual, Q1H PRN, Muna R Azael, APRN, 1 mg at 04/20/18 1758  •  LORazepam (ATIVAN) 2 MG/ML concentrated solution 2 mg, 2 mg, Oral, Q1H PRN, Muna R [place], APRN  •  LORazepam (ATIVAN) 2 MG/ML concentrated solution 2 mg, 2 mg, Sublingual, Q1H PRN, Muna R [place], APRN  •  LORazepam (ATIVAN) tablet 0.5 mg, 0.5 mg, Oral, Q1H PRN, Muna R Azael, APRN  •  LORazepam (ATIVAN) tablet 1 mg, 1 mg, Oral, Q1H PRN, Muna R [place], APRN  •  LORazepam (ATIVAN) tablet 2 mg, 2 mg, Oral, Q1H PRN, Muna R [place], APRN  •  ondansetron (ZOFRAN) injection 4 mg, 4 mg, Intravenous, Q6H PRN, Muna R Azael, APRN  •  Scopolamine (TRANSDERM-SCOP) 1.5 MG/3DAYS patch 1 patch, 1 patch, Transdermal, Q72H PRN, Muna R [place], APRN  •  sodium chloride 0.9 % flush 1-10 mL, 1-10 mL, Intravenous, PRN, Muna R [place], APRN  •  sodium chloride 0.9 % flush 10 mL, 10 mL, Intravenous, PRN, Muna R Azael, APRN  •  sodium chloride 0.9 % infusion 40 mL, 40 mL, Intravenous, PRN, Muna R [place], APRN      Assessment/Plan     End of life care:  [facility]/ Increased pain medication today.    Underlying recent acute and chronic diagnoses: (treatment for all of these issues, per patient request, were stopped when patient decided to be care and comfort only)  1. Sepsis secondary to pseudomonas UTI with chronic indwelling glasgow with h/o MRSA UTI/cystitis:   2. Persistent fevers   3. 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 Severe malnutrition secondary to chronic illness  5. Transaminitis/hyperbilirubinemia/elevated alk phos  6. Electrolyte inbalance   7. Acute on chronic pancytopenia/MDS   8. Gout   9. Chronic diastolic dysfunction, PAF and PSVT, Non-rheumatic aortic  valve insufficiency, chronic RBBB, MR and AR   10. HTN   11. OA, chronic knee pain    Plan for disposition:end of life care.  Comfort is our goal    Barbara Turner DO  04/21/18  7:34 AM      Time: 15 min

## 2018-04-21 NOTE — PLAN OF CARE
Problem: Fall Risk (Adult)  Goal: Absence of Fall  Outcome: Outcome(s) achieved Date Met: 04/21/18      Problem: Skin Injury Risk (Adult)  Goal: Skin Health and Integrity  Outcome: Outcome(s) achieved Date Met: 04/21/18      Problem: Patient Care Overview  Goal: Plan of Care Review  Outcome: Ongoing (interventions implemented as appropriate)    Goal: Individualization and Mutuality  Outcome: Ongoing (interventions implemented as appropriate)    Goal: Discharge Needs Assessment  Outcome: Ongoing (interventions implemented as appropriate)    Goal: Interprofessional Rounds/Family Conf  Outcome: Ongoing (interventions implemented as appropriate)      Problem: Dying Patient, Actively (Adult)  Goal: Comfort/Pain Control  Outcome: Ongoing (interventions implemented as appropriate)    Goal: Peace/Preservation of Dignity During the Dying Process  Outcome: Ongoing (interventions implemented as appropriate)

## 2018-04-21 NOTE — NURSING NOTE
"Pt awake now. Pt refuses vs, and offer to reposition, states \"I just want to rest\". Pt says \"they lied to me, they told me this wouldn't hurt\" pt reminded to press PCA button. Pt offered Ativan but declined. Pt sipping water and coffee, tolerating well. Son is at bedside with patient.  "

## 2018-04-21 NOTE — PLAN OF CARE
Problem: Patient Care Overview  Goal: Plan of Care Review  Outcome: Ongoing (interventions implemented as appropriate)  Pt resting comfortably with eyes closed throughout the night. Pt responsive to voice and touch, not talkative. Son Maury at bedside . Pt's pain well controlled via Dilaudid PCA pump basal rate. Family requests pt not be disturbed for VS if sleeping. No distress noted. FC, colostomy in place. Safety measures in place.- 0402- 04/21/2018  Goal: Individualization and Mutuality  Outcome: Ongoing (interventions implemented as appropriate)    Goal: Interprofessional Rounds/Family Conf  Outcome: Ongoing (interventions implemented as appropriate)      Problem: Dying Patient, Actively (Adult)  Goal: Comfort/Pain Control  Outcome: Ongoing (interventions implemented as appropriate)    Goal: Peace/Preservation of Dignity During the Dying Process  Outcome: Ongoing (interventions implemented as appropriate)

## 2018-04-22 PROBLEM — I50.9 CONGESTIVE HEART FAILURE (CHF) (HCC): Status: ACTIVE | Noted: 2018-01-01

## 2018-04-23 NOTE — DISCHARGE SUMMARY
HOSPITALIST SERVICES  @ [facility], [place]          [facility] Hospitalist Team    DISCHARGE SUMMARY        Jason Zelaya  1934  [id number]        Hospitalists Discharge Summary    Date of Admission: 4/12/2018  Date of Discharge:  4/23/2018            DIAGNOSES:      Primary Discharge Diagnoses:     Primary Discharge Diagnoses:   1. Sepsis secondary to pseudomonas UTI with chronic indwelling glasgow with h/o MRSA UTI/cystitis (comfort Care)  2. Persistent fevers   3. 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 Severe malnutrition secondary to chronic illness  5. Transaminitis/hyperbilirubinemia/elevated alk phos  6. Electrolyte inbalance   7. Acute on chronic pancytopenia/MDS    8. Gout   9. Chronic diastolic dysfunction, PAF and PSVT, Non-rheumatic aortic valve insufficiency, chronic RBBB, MR and AR   10. HTN   11. OA, chronic knee pain        Secondary Discharge Diagnoses: As above    Surgical Discharge Diagnoses: As per Problem List        PCP  Patient Care Team:  David Cedillo MD as PCP - General (Family Medicine)  Anupam Green MD as Consulting Physician (Hematology and Oncology)  Bennie Galo MD as Referring Physician (Orthopedic Surgery)  Kaylie Granados MD as Surgeon (General Surgery)    Consults:   Consults     Date and Time Order Name Status Description    4/11/2018 0851 Hematology & Oncology Inpatient Consult Completed     4/9/2018 2304 Inpatient General Surgery Consult Completed     4/9/2018 1851 Hospitalist (on-call MD unless specified) Completed     4/9/2018 1851 Surgery (on-call MD unless specified) Completed           Operations and Procedures Performed:       Ct Abdomen Pelvis With Contrast    Result Date: 4/10/2018  Narrative: CT ABDOMEN AND PELVIS WITH CONTRAST, 4/9/2018:  HISTORY: 84-year-old male with complex surgical history including subtotal colectomy with enterocutaneous and enterovesical fistula  formation, subsequent diverting ileostomy and oversewing of the rectal stump. He has a chronic enterocutaneous fistula and a chronic indwelling Ayoub catheter. He presents to the ED with sepsis, urinary tract infection and new onset abdomen pain today.  TECHNIQUE: CT examination of the abdomen and pelvis with IV contrast. Radiation dose reduction techniques included automated exposure control or exposure modulation based on body size. Radiation audit for CT and nuclear cardiology exams in the last 12 months: 6.  COMPARISON: *  CT abdomen/pelvis, 2/27/2018 and 12/18/2017.  ABDOMEN FINDINGS: There is a persistent complex enterocutaneous fistula extending between a cavity at the rectal stump to the left anterior pelvic wall, although the caliber and extent of the fistulous tract appears slightly smaller than on the most recent prior exam. Air remains present within the anterosuperior bladder wall but is also an intimate communication with the rectal stump cavity, likely representing a persistent bladder fistula. Ayoub catheter is present within a decompressed, diffusely thick-walled urinary bladder. There is no bowel dilatation to suggest obstruction proximal or distal to the right lower quadrant ileostomy. No undrained collection is identified to suggest a new or enlarging abscess.  Today, there is no evidence of upper urinary tract obstruction, and both kidneys enhance normally.  Stable splenomegaly (history of myelodysplastic syndrome). Liver and pancreas are in appearance. Abdominal aorta is normal in caliber.  PELVIS FINDINGS: Bladder, rectal stump and enterovesicle fistula as noted above. Low rectum is normal. No undrained pelvic fluid collection.  Limited lung base images show chronic pulmonary fibrosis and stable ectatic aortic root. No pleural effusion.      Impression: 1. 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 2.  Indwelling Ayoub catheter within a decompressed, diffusely thick-walled urinary bladder. No evidence of upper urinary tract obstruction or pyelonephritis at this time. 3. No bowel dilatation to suggest obstruction. Right lower quadrant ileostomy. 4. No new abscess or other undrained fluid collection is identified. 5. Stable splenomegaly and patient with myelodysplastic syndrome. 6. Stable ectatic aortic root. 7. Initial stat report to the ED from Dr. Reji Kumari at 1810 hours on 4/9/2018.  This report was finalized on 4/10/2018 6:33 AM by Dr. Chencho Henderson MD.      Us Liver    Result Date: 4/10/2018  Narrative: ULTRASOUND ABDOMEN, LIMITED, 4/10/2018:  HISTORY: 84-year-old male admitted to the hospital with sepsis and urinary tract infection. Prior GI tract surgeries with fistulae. Elevated liver enzymes are noted.  TECHNIQUE: Grayscale ultrasound imaging of the right upper quadrant was performed.  COMPARISON: *  CT abdomen/pelvis, 4/9/2018.  FINDINGS: The gallbladder is surgically absent. There is no intrahepatic or extra hepatic bile duct dilatation (CBD 5 mm).  Liver is normal in ultrasound appearance. No ascites in the right upper abdomen. Pancreas is partially obscured by overlying bowel gas but is negative where seen. Right kidney is negative with no hydronephrosis.      Impression: 1. Cholecystectomy. No bile duct dilatation. 2. Negative liver.  This report was finalized on 4/10/2018 2:10 PM by Dr. Chencho Henderson MD.      Xr Chest 1 View    Result Date: 4/10/2018  Narrative: CHEST X-RAY, 4/9/2018:  HISTORY: 84-year-old male admitted to the hospital with sepsis, urinary tract infection and abdominal pain. Postop bowel surgery with enterocutaneous and enterovesical fistulae.  TECHNIQUE: AP upright chest x-ray.  FINDINGS: Right arm PICC is in good position with tip in the upper SVC.  Fibrosis in both lung bases, greater on the right, unchanged since 2/28/2018 and best seen on prior CT studies. No new  pulmonary infiltrate, visible airspace consolidation or pleural effusion. Heart size and pulmonary vascularity are normal.      Impression: 1. No active disease. 2. Chronic bibasilar fibrosis. 3. Right arm PICC in good position.  This report was finalized on 4/10/2018 8:25 AM by Dr. Chencho Henderson MD.        Allergies:  has No Known Allergies.    Song  not reviewed    Discharge Medications:   Jason Zelaya   Home Medication Instructions TALI:[id number]    Printed on:04/23/18 0100   Medication Information                      acetaminophen (TYLENOL) 325 MG tablet  Take 2 tablets by mouth Every 6 (Six) Hours As Needed for Mild Pain .             cholestyramine (QUESTRAN) 4 g packet  Take 1 packet by mouth 3 (Three) Times a Day With Meals.             clotrimazole-betamethasone (LOTRISONE) 1-0.05 % cream  Apply  topically 2 (Two) Times a Day.             colchicine 0.6 MG tablet  Take 1 tablet by mouth 2 (Two) Times a Day.             cyanocobalamin 1000 MCG/ML injection  Inject 1 mL into the shoulder, thigh, or buttocks Every 28 (Twenty-Eight) Days.             diclofenac (VOLTAREN) 1 % gel gel  Apply 2 g topically 4 (Four) Times a Day.             folic acid (FOLVITE) 400 MCG tablet  Take 400 mcg by mouth Daily.             gabapentin (NEURONTIN) 100 MG capsule  Take 100 mg by mouth 3 (Three) Times a Day.             hydrophor (AQUAPHOR) ointment ointment  Apply  topically Every 12 (Twelve) Hours.             lactobacillus acidophilus (RISAQUAD) capsule capsule  Take 1 capsule by mouth Daily.             lidocaine (LIDODERM) 5 %  Place 1 patch on the skin Daily. Remove & Discard patch within 12 hours or as directed by MD             magnesium oxide (MAGOX) 400 (241.3 Mg) MG tablet tablet  Take 1 tablet by mouth Daily.             Multiple Vitamins-Minerals (MULTIVITAMIN WITH MINERALS) tablet tablet  Take 1 tablet by mouth Daily.             nystatin (MYCOSTATIN) 987218 UNIT/GM powder  Apply  topically  Every 12 (Twelve) Hours for 647 doses.             ondansetron (ZOFRAN) 4 MG tablet  Take 1 tablet by mouth Every 6 (Six) Hours As Needed for Nausea or Vomiting.             oxyCODONE-acetaminophen (PERCOCET) 7.5-325 MG per tablet  Take 1 tablet by mouth Every 4 (Four) Hours As Needed for Moderate Pain  or Severe Pain .             pantoprazole (PROTONIX) 40 MG EC tablet  Take 1 tablet by mouth Daily.             sodium chloride 0.9 % solution  Infuse 20 mL/hr into a venous catheter Continuous.             sucralfate (CARAFATE) 1 g tablet  Take 1 tablet by mouth 4 (Four) Times a Day Before Meals & at Bedtime.             TPN for discharge  See most recent TPN order.                 History of Present Illness:  ASChief complaint:terminal     History of Present Illness:  Terminal infection.  Talking with family in room  Everyone in good spritits.  PCA working well.    per Dr Reynolds on 4/12/2018      Hospital Course  ASSESSMENT/PLAN:   1. Sepsis secondary to UTI with chronic indwelling glasgow with h/o MRSA UTI/cystitis: Surgery consulted, patient of Dr. Granados  Continue Vanco/Zosyn pending culture results     2. 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 Severe malnutrition secondary to chronic illness:  4. Transaminitis/hyperbilirubinemia/elevated alk phos:   Add clinimix TPN without lipids secondary to elevated LFTs (reviewed by pharmacy and nutrition)  CT does not note any new fistulas or fluid collections, no mention of liver/duct findings, check liver ultrasound to evaluate for ductal stone (h/o cholecystectomy)  Patient continues to complain of severe abdominal pain  Control pain, d/c tylenol for now, continue oxycodone/colchicine  Add dilaudid, monitor for effect  Await further input from Dr. Granados     5. Acute on chronic pancytopenia/MDS:   Hemoglobin down to 6.9 today, transfusing 1 unit PRBCs     6. Gout: Continues to complain of severe bilateral knee pain,  Continue home dose  colchicine for now, monitor LFTs, if persistently elevated may consider discontinuing this medication     7. Chronic diastolic dysfunction, PAF and PSVT, Non-rheumatic aortic valve insufficiency, chronic RBBB, MR and AR:   No acute issues currently     8. HTN: Blood pressure at goal     9. OA, chronic knee pain:   This is a continuous complaint, continues on Neurontin, Voltaren, Percocet      DVT prophy: SCDs. No AC until eval by Dr. Granados      Plan: await culture results/US result/control pain  If no improvement, not unreasonable to discuss hospice/care and comfort        Assessment/Plan         End of life care:  [facility]/ Increased pain medication today.     Underlying recent acute and chronic diagnoses: (treatment for all of these issues, per patient request, were stopped when patient decided to be care and comfort only)  1. Sepsis secondary to pseudomonas UTI with chronic indwelling glasgow with h/o MRSA UTI/cystitis:   2. Persistent fevers   3. 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 Severe malnutrition secondary to chronic illness  5. Transaminitis/hyperbilirubinemia/elevated alk phos  6. Electrolyte inbalance   7. Acute on chronic pancytopenia/MDS   8. Gout   9. Chronic diastolic dysfunction, PAF and PSVT, Non-rheumatic aortic valve insufficiency, chronic RBBB, MR and AR   10. HTN   11. OA, chronic knee pain     Plan for disposition:end of life care.  Comfort is our goal     Barbara Turner DO  18  7:34 AM              Last Lab Results:   Lab Results (most recent)     None        Imaging Results (most recent)     None          PROCEDURES      Condition on Discharge:  Patient  in early morning hours of 2018    Physical Exam at Discharge  Vital Signs     Patient       Discharge Disposition      Visiting Nurse:    No    Home PT/OT:  No    Home Safety Evaluation:  No    DME  N/A    Discharge Diet:    N/A    Activity at  Discharge:  N/A    Pre-discharge education  N/A      Follow-up Appointments  No future appointments.      Test Results Pending at Discharge       Rian Dean MD  04/23/18  1:00 AM    Time: Discharge 25 min (if over 30 minutes give explanation as to why it took greater than 30 minutes)  Discharge over 30 min (if over 30 minutes give explanation as to why it took greater than 30 minutes)  Secondary to:  Coordination of home care  D/W case management  Medication reconciliation

## 2019-02-11 NOTE — TELEPHONE ENCOUNTER
Referral is in place. I contacted referral specialist to help family with the scheduling.   What's her # - ill call

## 2022-02-28 NOTE — PROGRESS NOTES
Pt c/o abdominal pain which is pretty much consistent. The pt's wife explained that Dr. Granados informed them that the pain may not subside for up to a year following abdominal surgery. The pt did see his PCP and had an abdominal US which the pt's wife states was normal. The pt and wife were advised to call the pt's gastroenterologist, Dr. Amado, just to make sure he had no additional ideas or recommendations. The pt and wife v/u.   Declines

## 2024-07-23 NOTE — NURSING NOTE
Continued Stay Note  RENATA Wheatley     Patient Name: Jason Zelaya  MRN: [id number]  Today's Date: 4/11/2018    Admit Date: 4/9/2018          Discharge Plan     Row Name 04/11/18 1026       Plan    Plan plan to be determined    Patient/Family in Agreement with Plan yes    Plan Comments Patient current with [facility], spoke with Georgina/[facility] to notify of patient admission. Will continue to follow.              Discharge Codes    No documentation.           Bigg Renteria RN     Medication Stopping : Eliquis    Medication Starting : (Plavix) 75 mg - Take 1 tablet by mouth once a day    Medication Decreasing : Losartan 50 mg - Take 1 tablet by mouth once a day

## (undated) DEVICE — DRN PENRS 1/4X18IN LTX

## (undated) DEVICE — TP CLTH MEDIPORE SFT 4IN 10YD

## (undated) DEVICE — CATH IV INSYTE AUTOGARD 14G 1 1/2IN ORNG

## (undated) DEVICE — HYBRID TUBING/CAP SET FOR OLYMPUS® SCOPES: Brand: ERBE

## (undated) DEVICE — SUCTION CANISTER, 3000CC,SAFELINER: Brand: DEROYAL

## (undated) DEVICE — PREP SOL POVIDONE/IODINE BT 4OZ

## (undated) DEVICE — WEBRIL* CAST PADDING: Brand: DEROYAL

## (undated) DEVICE — SUT VIC 2/0 CT1 36IN

## (undated) DEVICE — NDL HYPO PRECISIONGLIDE REG 25G 1 1/2

## (undated) DEVICE — DRP WARMR MACH

## (undated) DEVICE — SYR LL 3CC

## (undated) DEVICE — Device: Brand: DEFENDO AIR/WATER/SUCTION AND BIOPSY VALVE

## (undated) DEVICE — ENDOSCOPY PORT CONNECTOR FOR OLYMPUS® SCOPES: Brand: ERBE

## (undated) DEVICE — Device

## (undated) DEVICE — PAD,ABDOMINAL,8"X10",ST,LF: Brand: MEDLINE

## (undated) DEVICE — MEDI-VAC YANK SUCT HNDL W/TPRD BULBOUS TIP: Brand: CARDINAL HEALTH

## (undated) DEVICE — SPLNT 1 STEP 3INX12IN

## (undated) DEVICE — SYR CONTRL LUERLOK 10CC

## (undated) DEVICE — GLV SURG SENSICARE LT W/ALOE PF LF 7 STRL

## (undated) DEVICE — SPNG GZ WOVN 4X4IN 12PLY 10/BX STRL

## (undated) DEVICE — 3M™ STERI-DRAPE™ INSTRUMENT POUCH 1018: Brand: STERI-DRAPE™

## (undated) DEVICE — SUT VIC 0 TIES 18IN J912G

## (undated) DEVICE — SUT SILK 2/0 LIGAPAK LA55G

## (undated) DEVICE — CATH URETRL FLXITP POLLACK STD 5F 70CM

## (undated) DEVICE — GLV SURG SENSICARE W/ALOE PF LF 6.5 STRL

## (undated) DEVICE — SUT SILK 2/0 SH CR8 18IN CR8 C012D

## (undated) DEVICE — SUT PDS 1 XLH LP 99IN Z881G

## (undated) DEVICE — SPNG LAP 18X18IN LF STRL PK/5

## (undated) DEVICE — CONTAINER,SPECIMEN,OR STERILE,4OZ: Brand: MEDLINE

## (undated) DEVICE — ULTRACLEAN ACCESSORY ELECTRODE 1" (2.54 CM) COATED BLADE: Brand: ULTRACLEAN

## (undated) DEVICE — BANDAGE,GAUZE,BULKEE II,4.5"X4.1YD,STRL: Brand: MEDLINE

## (undated) DEVICE — TOWEL,OR,DSP,ST,BLUE,STD,4/PK,20PK/CS: Brand: MEDLINE

## (undated) DEVICE — PROXIMATE RH ROTATING HEAD SKIN STAPLERS (35 WIDE) CONTAINS 35 STAINLESS STEEL STAPLES: Brand: PROXIMATE

## (undated) DEVICE — PAD GRND E/S W/CORD SPLT A/

## (undated) DEVICE — SUT VIC DEXON PRE 4 4/0 18IN J496G

## (undated) DEVICE — SPONGE,LAP,12"X12",XR,ST,5/PK,40PK/CS: Brand: MEDLINE

## (undated) DEVICE — PK BASIC ORTHO 90

## (undated) DEVICE — PENROSE DRAIN 18 X .5" SILICONE: Brand: MEDLINE

## (undated) DEVICE — SUT SILK 0 TIES 30IN A306H

## (undated) DEVICE — DRSNG TELFA PAD NONADH STR 1S 3X8IN

## (undated) DEVICE — SUT SILK 0 CT1 18IN 424H

## (undated) DEVICE — DRP Z/FRICTION 10X16IN

## (undated) DEVICE — GLV SURG NEOLON 2G PF LF 7.5 STRL

## (undated) DEVICE — SUT ETHLN 2/0 FS 18IN 664H

## (undated) DEVICE — SEALANT HEMOS FLOSEAL MATRX W/MALL TP 5ML

## (undated) DEVICE — SUT SILK 2/0 SH 30IN K833H

## (undated) DEVICE — FRCP BX RADJAW4 NDL 2.8 240CM LG OG BX40

## (undated) DEVICE — FLOSEAL MATRIX IS INDICATED IN SURGICAL PROCEDURES (OTHER THAN IN OPHTHALMIC) AS AN ADJUNCT TO HEMOSTASIS WHEN CONTROL OF BLEEDING BY LIGATURE OR CONVENTIONAL PROCEDURES IS INEFFECTIVE OR IMPRACTICAL.: Brand: FLOSEAL HEMOSTATIC MATRIX

## (undated) DEVICE — THE BITE BLOCK MAXI, LATEX FREE STRAP IS USED TO PROTECT THE ENDOSCOPE INSERTION TUBE FROM BEING BITTEN BY THE PATIENT.

## (undated) DEVICE — GOWN ISOL W/THUMB UNIV BLU BX/15

## (undated) DEVICE — PK PROC MAJ 90

## (undated) DEVICE — VIAL FORMALIN CAP 10P 40ML

## (undated) DEVICE — GAUZE,SPONGE,4"X4",16PLY,XRAY,STRL,LF: Brand: MEDLINE

## (undated) DEVICE — BW-412T DISP COMBO CLEANING BRUSH: Brand: SINGLE USE COMBINATION CLEANING BRUSH

## (undated) DEVICE — SUT VIC 3/0 SH 27IN J416H

## (undated) DEVICE — MASK,FACE,SHIELD,BLUE,ANTI FOG,TIES: Brand: MEDLINE

## (undated) DEVICE — ENDOSCOPE KLEENSPEC ILLUM SYS 19 25CM

## (undated) DEVICE — SIGMOIDOSCOPIC SUCTION INSTRUMENT 18 FR W/WINGED CAP CONTROL AND 6 FOOT (1.8M) TUBING: Brand: SIGMOIDOSCOPIC

## (undated) DEVICE — TRY SKINPREP DRYPREP

## (undated) DEVICE — ELECTRD BLD EZ CLN STD 6.5IN

## (undated) DEVICE — SUT ETHLN 3/0 PS2 18 IN 1669H

## (undated) DEVICE — GOWN,PREVENTION PLUS,XXLARGE,STERILE: Brand: MEDLINE

## (undated) DEVICE — SUT SILK 3/0 SH CR8 18IN C013D

## (undated) DEVICE — ELECTRD BLD EXT EDGE 1P COAT 6.5IN STRL

## (undated) DEVICE — DRN WND HUBLSS FLUT FULL PERF SIL10MM

## (undated) DEVICE — POOLE SUCTION INSTRUMENT WITH REMOVABLE SHEATH: Brand: POOLE

## (undated) DEVICE — CYSTO/BLADDER IRRIGATION SET, REGULATING CLAMP

## (undated) DEVICE — FLTR BULB INSUFL SIGMOIDOSCOPE

## (undated) DEVICE — TOTAL TRAY, DB, 100% SILI FOLEY, 16FR 10: Brand: MEDLINE

## (undated) DEVICE — JACKT LAB F/R KNIT CUFF/COLR XLG BLU

## (undated) DEVICE — SNAR POLYP CAPTIFLX WD OVL 27MM 240CM

## (undated) DEVICE — DECANTER: Brand: UNBRANDED

## (undated) DEVICE — 3M™ MEDIPORE™ H SOFT CLOTH SURGICAL TAPE 2864, 4 INCH X 10 YARD (10CM X 9,14M), 12 ROLLS/CASE: Brand: 3M™ MEDIPORE™

## (undated) DEVICE — FLTR BULB INSUFL

## (undated) DEVICE — SUT SILK 0 SH 30IN K834H

## (undated) DEVICE — SUT VIC 0 CT1 CR8 18IN J840D

## (undated) DEVICE — BNDG ELAS ELITE V/CLOSE 4IN 5YD LF

## (undated) DEVICE — RESERVOIR,SUCTION,100CC,SILICONE: Brand: MEDLINE

## (undated) DEVICE — DRSNG PAD ABD 8X10IN STRL

## (undated) DEVICE — APPL CHLORAPREP W/TINT 26ML ORNG

## (undated) DEVICE — SUT VIC 3/0 CTI 36IN J944H

## (undated) DEVICE — DRSNG WND GZ CURAD OIL EMULSION 3X3IN STRL

## (undated) DEVICE — ENDOGATOR AUXILIARY WATER JET CONNECTOR: Brand: ENDOGATOR

## (undated) DEVICE — TRAP POLYP 4CHAMBER

## (undated) DEVICE — CONMED FLEXITIP DISPOSABLE SCLEROTHERAPY NEEDLE, OPTIC YELLOW COLONOSCOPE NEEDLE, 5 MM X 230 CM, 25 GM: Brand: FLEXITIP

## (undated) DEVICE — ENSEAL 20 CM SHAFT, LARGE JAW: Brand: ENSEAL X1